# Patient Record
Sex: FEMALE | Race: BLACK OR AFRICAN AMERICAN | NOT HISPANIC OR LATINO | Employment: OTHER | URBAN - METROPOLITAN AREA
[De-identification: names, ages, dates, MRNs, and addresses within clinical notes are randomized per-mention and may not be internally consistent; named-entity substitution may affect disease eponyms.]

---

## 2017-10-14 ENCOUNTER — HOSPITAL ENCOUNTER (INPATIENT)
Facility: HOSPITAL | Age: 72
LOS: 3 days | Discharge: HOME | DRG: 291 | End: 2017-10-17
Attending: EMERGENCY MEDICINE | Admitting: FAMILY MEDICINE
Payer: MEDICARE

## 2017-10-14 DIAGNOSIS — E08.22 DIABETES MELLITUS DUE TO UNDERLYING CONDITION WITH CHRONIC KIDNEY DISEASE, WITH LONG-TERM CURRENT USE OF INSULIN, UNSPECIFIED CKD STAGE: ICD-10-CM

## 2017-10-14 DIAGNOSIS — J81.0 ACUTE PULMONARY EDEMA: ICD-10-CM

## 2017-10-14 DIAGNOSIS — R06.02 SOB (SHORTNESS OF BREATH): Primary | ICD-10-CM

## 2017-10-14 DIAGNOSIS — D64.9 ANEMIA, UNSPECIFIED TYPE: ICD-10-CM

## 2017-10-14 DIAGNOSIS — Z79.4 DIABETES MELLITUS DUE TO UNDERLYING CONDITION WITH CHRONIC KIDNEY DISEASE, WITH LONG-TERM CURRENT USE OF INSULIN, UNSPECIFIED CKD STAGE: ICD-10-CM

## 2017-10-14 DIAGNOSIS — N18.9 CHRONIC KIDNEY DISEASE, UNSPECIFIED CKD STAGE: ICD-10-CM

## 2017-10-14 DIAGNOSIS — R01.1 HEART MURMUR: ICD-10-CM

## 2017-10-14 DIAGNOSIS — E87.70 VOLUME OVERLOAD: ICD-10-CM

## 2017-10-14 PROBLEM — N28.9 KIDNEY DISEASE: Status: ACTIVE | Noted: 2017-10-14

## 2017-10-14 PROBLEM — I10 HTN (HYPERTENSION): Status: ACTIVE | Noted: 2017-10-14

## 2017-10-14 PROBLEM — E11.9 DM (DIABETES MELLITUS): Status: ACTIVE | Noted: 2017-10-14

## 2017-10-14 LAB
25(OH)D3+25(OH)D2 SERPL-MCNC: 12 NG/ML
ALBUMIN SERPL BCP-MCNC: 3.1 G/DL
ALP SERPL-CCNC: 90 U/L
ALT SERPL W/O P-5'-P-CCNC: 30 U/L
ANION GAP SERPL CALC-SCNC: 11 MMOL/L
AST SERPL-CCNC: 23 U/L
BASOPHILS # BLD AUTO: 0.02 K/UL
BASOPHILS # BLD AUTO: 0.02 K/UL
BASOPHILS NFR BLD: 0.2 %
BASOPHILS NFR BLD: 0.2 %
BILIRUB SERPL-MCNC: 0.2 MG/DL
BNP SERPL-MCNC: 384 PG/ML
BUN SERPL-MCNC: 57 MG/DL
CALCIUM SERPL-MCNC: 8.1 MG/DL
CHLORIDE SERPL-SCNC: 110 MMOL/L
CO2 SERPL-SCNC: 22 MMOL/L
CREAT SERPL-MCNC: 4.3 MG/DL
DIFFERENTIAL METHOD: ABNORMAL
DIFFERENTIAL METHOD: ABNORMAL
EOSINOPHIL # BLD AUTO: 0.2 K/UL
EOSINOPHIL # BLD AUTO: 0.2 K/UL
EOSINOPHIL NFR BLD: 2.6 %
EOSINOPHIL NFR BLD: 2.7 %
ERYTHROCYTE [DISTWIDTH] IN BLOOD BY AUTOMATED COUNT: 15.2 %
ERYTHROCYTE [DISTWIDTH] IN BLOOD BY AUTOMATED COUNT: 15.2 %
EST. GFR  (AFRICAN AMERICAN): 11 ML/MIN/1.73 M^2
EST. GFR  (NON AFRICAN AMERICAN): 10 ML/MIN/1.73 M^2
ESTIMATED AVG GLUCOSE: 192 MG/DL
FERRITIN SERPL-MCNC: 161 NG/ML
GLUCOSE SERPL-MCNC: 120 MG/DL
HBA1C MFR BLD HPLC: 8.3 %
HCT VFR BLD AUTO: 26.2 %
HCT VFR BLD AUTO: 26.8 %
HGB BLD-MCNC: 8.3 G/DL
HGB BLD-MCNC: 8.4 G/DL
IRON SERPL-MCNC: 35 UG/DL
LYMPHOCYTES # BLD AUTO: 1.7 K/UL
LYMPHOCYTES # BLD AUTO: 2 K/UL
LYMPHOCYTES NFR BLD: 20.3 %
LYMPHOCYTES NFR BLD: 24.5 %
MCH RBC QN AUTO: 23.3 PG
MCH RBC QN AUTO: 23.4 PG
MCHC RBC AUTO-ENTMCNC: 31.3 G/DL
MCHC RBC AUTO-ENTMCNC: 31.7 G/DL
MCV RBC AUTO: 74 FL
MCV RBC AUTO: 74 FL
MONOCYTES # BLD AUTO: 0.7 K/UL
MONOCYTES # BLD AUTO: 0.9 K/UL
MONOCYTES NFR BLD: 7.9 %
MONOCYTES NFR BLD: 9.9 %
NEUTROPHILS # BLD AUTO: 5.3 K/UL
NEUTROPHILS # BLD AUTO: 5.7 K/UL
NEUTROPHILS NFR BLD: 64.5 %
NEUTROPHILS NFR BLD: 66.8 %
PLATELET # BLD AUTO: 299 K/UL
PLATELET # BLD AUTO: 308 K/UL
PMV BLD AUTO: 8.9 FL
PMV BLD AUTO: 9.2 FL
POCT GLUCOSE: 133 MG/DL (ref 70–110)
POCT GLUCOSE: 197 MG/DL (ref 70–110)
POTASSIUM SERPL-SCNC: 4 MMOL/L
PROT SERPL-MCNC: 7.4 G/DL
PTH-INTACT SERPL-MCNC: 424.1 PG/ML
RBC # BLD AUTO: 3.54 M/UL
RBC # BLD AUTO: 3.61 M/UL
RETICS/RBC NFR AUTO: 2.3 %
SATURATED IRON: 13 %
SODIUM SERPL-SCNC: 143 MMOL/L
TOTAL IRON BINDING CAPACITY: 274 UG/DL
TRANSFERRIN SERPL-MCNC: 185 MG/DL
TROPONIN I SERPL DL<=0.01 NG/ML-MCNC: 0.01 NG/ML
TROPONIN I SERPL DL<=0.01 NG/ML-MCNC: <0.006 NG/ML
TSH SERPL DL<=0.005 MIU/L-ACNC: 1.58 UIU/ML
WBC # BLD AUTO: 8.26 K/UL
WBC # BLD AUTO: 8.59 K/UL

## 2017-10-14 PROCEDURE — 36415 COLL VENOUS BLD VENIPUNCTURE: CPT

## 2017-10-14 PROCEDURE — 85025 COMPLETE CBC W/AUTO DIFF WBC: CPT

## 2017-10-14 PROCEDURE — 93005 ELECTROCARDIOGRAM TRACING: CPT

## 2017-10-14 PROCEDURE — 11000001 HC ACUTE MED/SURG PRIVATE ROOM

## 2017-10-14 PROCEDURE — 99285 EMERGENCY DEPT VISIT HI MDM: CPT

## 2017-10-14 PROCEDURE — 83540 ASSAY OF IRON: CPT

## 2017-10-14 PROCEDURE — 63600175 PHARM REV CODE 636 W HCPCS: Performed by: STUDENT IN AN ORGANIZED HEALTH CARE EDUCATION/TRAINING PROGRAM

## 2017-10-14 PROCEDURE — 25000003 PHARM REV CODE 250: Performed by: EMERGENCY MEDICINE

## 2017-10-14 PROCEDURE — 63600175 PHARM REV CODE 636 W HCPCS: Performed by: EMERGENCY MEDICINE

## 2017-10-14 PROCEDURE — 84443 ASSAY THYROID STIM HORMONE: CPT

## 2017-10-14 PROCEDURE — 83036 HEMOGLOBIN GLYCOSYLATED A1C: CPT

## 2017-10-14 PROCEDURE — 83880 ASSAY OF NATRIURETIC PEPTIDE: CPT

## 2017-10-14 PROCEDURE — 82962 GLUCOSE BLOOD TEST: CPT

## 2017-10-14 PROCEDURE — 84484 ASSAY OF TROPONIN QUANT: CPT | Mod: 91

## 2017-10-14 PROCEDURE — 85045 AUTOMATED RETICULOCYTE COUNT: CPT

## 2017-10-14 PROCEDURE — 82728 ASSAY OF FERRITIN: CPT

## 2017-10-14 PROCEDURE — 84484 ASSAY OF TROPONIN QUANT: CPT

## 2017-10-14 PROCEDURE — 96374 THER/PROPH/DIAG INJ IV PUSH: CPT

## 2017-10-14 PROCEDURE — 82306 VITAMIN D 25 HYDROXY: CPT

## 2017-10-14 PROCEDURE — 83970 ASSAY OF PARATHORMONE: CPT

## 2017-10-14 PROCEDURE — 80053 COMPREHEN METABOLIC PANEL: CPT

## 2017-10-14 PROCEDURE — 25000003 PHARM REV CODE 250: Performed by: STUDENT IN AN ORGANIZED HEALTH CARE EDUCATION/TRAINING PROGRAM

## 2017-10-14 RX ORDER — IBUPROFEN 400 MG/1
400 TABLET ORAL 3 TIMES DAILY
COMMUNITY
End: 2018-04-15

## 2017-10-14 RX ORDER — FUROSEMIDE 10 MG/ML
60 INJECTION INTRAMUSCULAR; INTRAVENOUS 3 TIMES DAILY
Status: DISCONTINUED | OUTPATIENT
Start: 2017-10-15 | End: 2017-10-15

## 2017-10-14 RX ORDER — FUROSEMIDE 10 MG/ML
60 INJECTION INTRAMUSCULAR; INTRAVENOUS
Status: COMPLETED | OUTPATIENT
Start: 2017-10-14 | End: 2017-10-14

## 2017-10-14 RX ORDER — GLUCAGON 1 MG
1 KIT INJECTION
Status: DISCONTINUED | OUTPATIENT
Start: 2017-10-14 | End: 2017-10-17 | Stop reason: HOSPADM

## 2017-10-14 RX ORDER — ONDANSETRON 2 MG/ML
4 INJECTION INTRAMUSCULAR; INTRAVENOUS EVERY 12 HOURS PRN
Status: DISCONTINUED | OUTPATIENT
Start: 2017-10-14 | End: 2017-10-17 | Stop reason: HOSPADM

## 2017-10-14 RX ORDER — ACETAMINOPHEN 325 MG/1
325 TABLET ORAL EVERY 6 HOURS PRN
Status: DISCONTINUED | OUTPATIENT
Start: 2017-10-14 | End: 2017-10-17 | Stop reason: HOSPADM

## 2017-10-14 RX ORDER — NIFEDIPINE 30 MG/1
30 TABLET, FILM COATED, EXTENDED RELEASE ORAL DAILY
COMMUNITY
End: 2018-09-05

## 2017-10-14 RX ORDER — AMLODIPINE BESYLATE 5 MG/1
10 TABLET ORAL DAILY
Status: DISCONTINUED | OUTPATIENT
Start: 2017-10-14 | End: 2017-10-14

## 2017-10-14 RX ORDER — INSULIN GLARGINE 100 [IU]/ML
30 INJECTION, SOLUTION SUBCUTANEOUS NIGHTLY
COMMUNITY
End: 2018-09-08 | Stop reason: SDUPTHER

## 2017-10-14 RX ORDER — LORATADINE 10 MG/1
10 TABLET ORAL DAILY
COMMUNITY
End: 2023-07-10

## 2017-10-14 RX ORDER — ASPIRIN 325 MG
325 TABLET ORAL
Status: COMPLETED | OUTPATIENT
Start: 2017-10-14 | End: 2017-10-14

## 2017-10-14 RX ORDER — NITROGLYCERIN 0.4 MG/1
0.4 TABLET SUBLINGUAL EVERY 5 MIN PRN
Status: DISCONTINUED | OUTPATIENT
Start: 2017-10-14 | End: 2017-10-17 | Stop reason: HOSPADM

## 2017-10-14 RX ORDER — HEPARIN SODIUM 5000 [USP'U]/ML
5000 INJECTION, SOLUTION INTRAVENOUS; SUBCUTANEOUS EVERY 8 HOURS
Status: DISCONTINUED | OUTPATIENT
Start: 2017-10-14 | End: 2017-10-17 | Stop reason: HOSPADM

## 2017-10-14 RX ORDER — HYDRALAZINE HYDROCHLORIDE 20 MG/ML
10 INJECTION INTRAMUSCULAR; INTRAVENOUS EVERY 8 HOURS PRN
Status: DISCONTINUED | OUTPATIENT
Start: 2017-10-14 | End: 2017-10-17 | Stop reason: HOSPADM

## 2017-10-14 RX ORDER — INSULIN ASPART 100 [IU]/ML
1-10 INJECTION, SOLUTION INTRAVENOUS; SUBCUTANEOUS
Status: DISCONTINUED | OUTPATIENT
Start: 2017-10-14 | End: 2017-10-17 | Stop reason: HOSPADM

## 2017-10-14 RX ORDER — IBUPROFEN 200 MG
24 TABLET ORAL
Status: DISCONTINUED | OUTPATIENT
Start: 2017-10-14 | End: 2017-10-17 | Stop reason: HOSPADM

## 2017-10-14 RX ORDER — BISACODYL 5 MG
5 TABLET, DELAYED RELEASE (ENTERIC COATED) ORAL DAILY PRN
COMMUNITY

## 2017-10-14 RX ORDER — ATORVASTATIN CALCIUM 20 MG/1
20 TABLET, FILM COATED ORAL DAILY
COMMUNITY
End: 2018-09-05 | Stop reason: SDUPTHER

## 2017-10-14 RX ORDER — INSULIN ASPART 100 [IU]/ML
8 INJECTION, SOLUTION INTRAVENOUS; SUBCUTANEOUS
COMMUNITY
End: 2018-09-05

## 2017-10-14 RX ORDER — IBUPROFEN 200 MG
16 TABLET ORAL
Status: DISCONTINUED | OUTPATIENT
Start: 2017-10-14 | End: 2017-10-17 | Stop reason: HOSPADM

## 2017-10-14 RX ORDER — FUROSEMIDE 10 MG/ML
60 INJECTION INTRAMUSCULAR; INTRAVENOUS 2 TIMES DAILY
Status: DISCONTINUED | OUTPATIENT
Start: 2017-10-14 | End: 2017-10-14

## 2017-10-14 RX ORDER — FUROSEMIDE 20 MG/1
20 TABLET ORAL DAILY
Status: ON HOLD | COMMUNITY
End: 2017-10-16

## 2017-10-14 RX ADMIN — HEPARIN SODIUM 5000 UNITS: 5000 INJECTION, SOLUTION INTRAVENOUS; SUBCUTANEOUS at 09:10

## 2017-10-14 RX ADMIN — FUROSEMIDE 60 MG: 10 INJECTION, SOLUTION INTRAMUSCULAR; INTRAVENOUS at 06:10

## 2017-10-14 RX ADMIN — INSULIN ASPART 1 UNITS: 100 INJECTION, SOLUTION INTRAVENOUS; SUBCUTANEOUS at 09:10

## 2017-10-14 RX ADMIN — AMLODIPINE BESYLATE 10 MG: 5 TABLET ORAL at 06:10

## 2017-10-14 RX ADMIN — ASPIRIN 325 MG ORAL TABLET 325 MG: 325 PILL ORAL at 03:10

## 2017-10-14 RX ADMIN — FUROSEMIDE 60 MG: 10 INJECTION, SOLUTION INTRAMUSCULAR; INTRAVENOUS at 03:10

## 2017-10-14 NOTE — ED NOTES
APPEARANCE: Alert, oriented and in no acute distress.  CARDIAC: Tachycardic rate and rhythm, no murmur heard.   PERIPHERAL VASCULAR: peripheral pulses present. Normal cap refill. No edema. Warm to touch.    RESPIRATORY:Normal rate and effort, breath sounds diminished bilaterally throughout chest. Respirations are equal and unlabored, pt using accessory muscles on arrive to ER  GASTRO: soft, bowel sounds normal, no tenderness, no abdominal distention.  MUSC: Limited ROM due to aging process. No bony tenderness or soft tissue tenderness. No obvious deformity.  SKIN: Skin is warm and dry, normal skin turgor, mucous membranes moist.  NEURO: 5/5 strength major flexors/extensors bilaterally. Sensory intact to light touch bilaterally. Maribel coma scale: eyes open spontaneously-4, oriented & converses-5, obeys commands-6. No neurological abnormalities.   MENTAL STATUS: awake, alert and aware of environment.  EYE: PERRL, both eyes: pupils brisk and reactive to light. Normal size.  ENT: EARS: no obvious drainage. NOSE: no active bleeding.   Pt states she is short of breath increasing over the past week when her primary doctor changed her fluid pill.

## 2017-10-14 NOTE — H&P
History & Physical  [facility]      SUBJECTIVE:     History of Present Illness:  Patient is a 71 y.o. female with a pmhx of DM2, HTN, HLD, right leg amputation, and CKD presents with SOB for 2 weeks. The patient 1 month ago could walk blocks without getting SOB but she started having SOB upon exertion after her pcp started her on a beta blocker and changed her diuretic to lasix 20 mg daily. The patient says that after she too her beta blocker she had nausea and SOB so she stopped taking it. She now gets SOB when she walks around her house. She also has orthopnea, PND, has hx of swelling in her right leg, and sleeps on 4 pillows. She denies chest pain, syncope, palpitations, diaphoresis, nausea, vomiting, cough, fever, chills, prolonged immobilization, and calf tenderness. The patient denies ever being told of heart disease. She has a nephrologist and does urinate on her own regularly. She normally goes to [facility]. The patient notes that her sister had a CABG in her 60-70s and she does not smoke.       Review of patient's allergies indicates:   Allergen Reactions    Morphine        Past Medical History:   Diagnosis Date    Arthritis     Diabetes mellitus     Hyperlipidemia     Hypertension     Renal disorder     poor kidney function     Past Surgical History:   Procedure Laterality Date    FOOT AMPUTATION Right      History reviewed. No pertinent family history.  Social History   Substance Use Topics    Smoking status: Never Smoker    Smokeless tobacco: Not on file    Alcohol use No        Review of Systems:    Constitutional: Negative for fever and chills.   HENT: Negative for congestion and sore throat.    Eyes: Negative for photophobia and visual disturbance.   Respiratory: shortness of breath Negative for cough  Cardiovascular: Negative for chest pain and palpitations.   Genitourinary: Decreased urination. Negative for dysuria, frequency and hematuria.   Musculoskeletal: Negative  for back pain and gait problem.   Skin: Negative for rash and wound.   Neurological: Negative for seizures and headaches.   Hematological: Negative for adenopathy. Does not bruise/bleed easily.   Psychiatric/Behavioral: Negative for sleep disturbance. The patient is not nervous/anxious.        OBJECTIVE:     Vital Signs (Most Recent)  Temp: 97.7 °F (36.5 °C) (10/14/17 1444)  Pulse: 73 (10/14/17 1705)  Resp: 18 (10/14/17 1705)  BP: (!) 153/77 (10/14/17 1705)  SpO2: 96 % (4L per NC) (10/14/17 1616)    Physical Exam:  Constitutional:  well-developed and well-nourished. No distress.   HENT:   Head: Normocephalic and atraumatic.   Mouth/Throat: Oropharynx is clear and moist.   Eyes: Conjunctivae are normal.   Neck: Normal range of motion.   Cardiovascular: Normal rate and regular rhythm.   Mid systolic murmur heard best over second right intercostal space.  Pulmonary/Chest: She has decreased breath sounds.   Crackles bilaterally   Abdominal: Soft. Bowel sounds are normal. She exhibits no distension. There is no tenderness.   Musculoskeletal: Normal range of motion. She exhibits no edema or tenderness.   Right leg prosthetic  Neurological: She is alert and oriented to person, place, and time.   Skin: Skin is warm and dry.   Psychiatric: She has a normal mood and affect. Her behavior is normal.     Laboratory    LABS  CBC    Recent Labs  Lab 10/14/17  1432   WBC 8.59   RBC 3.61*   HGB 8.4*   HCT 26.8*      MCV 74*   MCH 23.3*   MCHC 31.3*     BMP    Recent Labs  Lab 10/14/17  1432      K 4.0   CO2 22*      BUN 57*   CREATININE 4.3*       POCT-Glucose  POCT Glucose   Date Value Ref Range Status   10/14/2017 133 (H) 70 - 110 mg/dL Final         Recent Labs  Lab 10/14/17  1432   CALCIUM 8.1*     LFT    Recent Labs  Lab 10/14/17  1432   PROT 7.4   ALBUMIN 3.1*   BILITOT 0.2   AST 23   ALKPHOS 90   ALT 30         COAGS  No results for input(s): INR, APTT in the last 168 hours.    Invalid input(s):  PT  CE    Recent Labs  Lab 10/14/17  1432   TROPONINI <0.006     ABGs  No results for input(s): PH, PCO2, PO2, HCO3, POCSATURATED, BE in the last 24 hours.  BNP    Recent Labs  Lab 10/14/17  1432   *     UA  No results for input(s): COLORU, CLARITYU, SPECGRAV, PHUR, PROTEINUA, GLUCOSEU, BLOODU, WBCU, RBCU, BACTERIA, MUCUS in the last 24 hours.    Invalid input(s):  BILIRUBINCON  LAST HbA1c  No results found for: HGBA1C      Diagnostic Results:    Imaging Results          X-Ray Chest 1 View (Final result)  Result time 10/14/17 14:31:08    Final result by Christiano Espinal MD (10/14/17 14:31:08)                 Impression:       Findings suggestive of pulmonary edema or less likely multifocal pneumonia.          Electronically signed by: CHRISTIANO ESPINAL MD  Date:     10/14/17  Time:    14:31              Narrative:    [id number]  Accession # [id number]      Study:  XR CHEST 1 VIEW    Indication: SOB.    Comparison: None.    Findings:     XR CHEST 1 VIEW.    Cardiac silhouette is enlarged in size with increase prominence of pulmonary vasculature.  Increased interstitial lung markings throughout the lungs bilaterally, most suggestive of pulmonary edema or less likely multifocal pneumonia.  No definite pleural effusion.  No pneumothorax.  Degenerative changes of the thoracic spine.                              ASSESSMENT/PLAN:   71 y.o.female has a past medical history of Arthritis; Diabetes mellitus; Hyperlipidemia; Hypertension; and Renal disorder. here for SOB upon exertion likely CHF exacerbation   Problem List as of 10/14/2017 Never Reviewed       Cardiac/Vascular    HTN (hypertension)       Renal/    Kidney disease       Endocrine    DM (diabetes mellitus)       Other    SOB (shortness of breath) on exertion    SOB (shortness of breath)              Plan:     SOB upon exertion  -CXR and crackles on exam support CHF exacerbation  -  -Troponin x2 negative. Follow third troponin  -EKG Normal   -Lasix 60  IV TID  -ASA and nitroglycerin given  -ECHO pending  CXR shows cardiomegaly and pulmonary edema    CKD  -GFR 11  -Patient not on dialysis and is urinating  -Cr 4.3   -Nephrology consulted  - renal diet  -monitor electrolytes  -PTH and Vit D pending     HTN  -Hydralazine prn 10 mg over 160     DM2  Levemir 15 units daily   SSI    Anemia  -8.3/26.2  -MCV 74   -Iron, TIBC, ferritin pend    DVT: Heparin prophylaxis    10/14/2017 Christiano Sheth M.D.

## 2017-10-14 NOTE — ED PROVIDER NOTES
Encounter Date: 10/14/2017       History     Chief Complaint   Patient presents with    Shortness of Breath     C/o SOB on exertion for the past few days, since having medications changed by pcp. EMS reports O2 sat. 90% on room air     71-year-old female with hypertension and diabetes presents with worsening shortness of breath ×1.5 weeks.  She states she saw her primary care physician about a week ago and her medications were changed.  Her diuretic was changed and a beta blocker was added.  She try the new medications and immediately noted increased shortness of breath.  She has continued the diuretic but did not continue the beta blocker.  She reports shortness of breath at rest and with exertion.  This morning she could not walk from one room to the other so she decided to call 911.  She also reports orthopnea.  She denies associated chest pain, diaphoresis, cough, or fever.  No lower extremity swelling.  She does feel bloated in the epigastric area.  Pt's family reports pt is from [place] all previous admissions were at [facility].  They have been unhappy with her care and follow up lately, so they brought her here today.          Review of patient's allergies indicates:   Allergen Reactions    Morphine      Past Medical History:   Diagnosis Date    Arthritis     Diabetes mellitus     Hyperlipidemia     Hypertension     Renal disorder     poor kidney function     Past Surgical History:   Procedure Laterality Date    FOOT AMPUTATION Right      History reviewed. No pertinent family history.  Social History   Substance Use Topics    Smoking status: Never Smoker    Smokeless tobacco: Not on file    Alcohol use No     Review of Systems   Constitutional: Negative for diaphoresis and fever.   Respiratory: Positive for shortness of breath. Negative for cough.    Cardiovascular: Negative for chest pain and leg swelling.   All other systems reviewed and are negative.      Physical Exam     Initial  Vitals [10/14/17 1403]   BP Pulse Resp Temp SpO2   (!) 144/68 78 (!) 25 97.8 °F (36.6 °C) (!) 93 %      MAP       93.33         Physical Exam    Nursing note and vitals reviewed.  Constitutional: She appears well-developed and well-nourished. No distress.   HENT:   Head: Normocephalic and atraumatic.   Mouth/Throat: Oropharynx is clear and moist.   Eyes: Conjunctivae are normal.   Neck: Normal range of motion.   Cardiovascular: Normal rate and regular rhythm.   Murmur heard.  Pulmonary/Chest: She has decreased breath sounds.   Crackles, decreased breath sounds in right lung base   Abdominal: Soft. Bowel sounds are normal. She exhibits no distension. There is no tenderness.   Musculoskeletal: Normal range of motion. She exhibits no edema or tenderness.   R leg prosthesis   Neurological: She is alert and oriented to person, place, and time.   Skin: Skin is warm and dry.   Psychiatric: She has a normal mood and affect. Her behavior is normal.         ED Course   Procedures  Labs Reviewed   CBC W/ AUTO DIFFERENTIAL - Abnormal; Notable for the following:        Result Value    RBC 3.61 (*)     Hemoglobin 8.4 (*)     Hematocrit 26.8 (*)     MCV 74 (*)     MCH 23.3 (*)     MCHC 31.3 (*)     RDW 15.2 (*)     MPV 8.9 (*)     All other components within normal limits   COMPREHENSIVE METABOLIC PANEL - Abnormal; Notable for the following:     CO2 22 (*)     Glucose 120 (*)     BUN, Bld 57 (*)     Creatinine 4.3 (*)     Calcium 8.1 (*)     Albumin 3.1 (*)     eGFR if  11 (*)     eGFR if non  10 (*)     All other components within normal limits   B-TYPE NATRIURETIC PEPTIDE - Abnormal; Notable for the following:      (*)     All other components within normal limits   TROPONIN I     EKG Readings: (Independently Interpreted)   Initial Reading: No STEMI. Rhythm: Normal Sinus Rhythm. Heart Rate: 79. Ectopy: No Ectopy. Conduction: Normal. ST Segments: Normal ST Segments. T Waves: Normal. Clinical  Impression: Normal Sinus Rhythm          Medical Decision Making:   Independently Interpreted Test(s):   I have ordered and independently interpreted EKG Reading(s) - see prior notes  Clinical Tests:   Lab Tests: Ordered and Reviewed  Radiological Study: Ordered and Reviewed  Medical Tests: Ordered and Reviewed  ED Management:  71F with HTN, HLD, and DM presents with worsening SOB x 1.5 weeks.  No associated fever, cough, or CP.  No known hx of CHF or COPD.  On exam, no peripheral edema, mild tachypnea but no respiratory distress.  She does have decreased BS and crackles on exam.  Labs remarkable for elevated BNP with pulm edema on CXR.  Given ASA and lasix.  Anemia, unknown baseline.  Decreased renal function, unknown baseline.  Will admit for pulmonary edema/SOB, likely CHF.  Evaluation of new murmur.  Admission discussed with [facility].                   ED Course      Clinical Impression:   The primary encounter diagnosis was SOB (shortness of breath). Diagnoses of Acute pulmonary edema, Chronic kidney disease, unspecified CKD stage, Anemia, unspecified type, and Heart murmur were also pertinent to this visit.                           Elena Cole MD  10/14/17 1602       Elena Cole MD  10/14/17 1609

## 2017-10-15 LAB
ALBUMIN SERPL BCP-MCNC: 2.9 G/DL
ALP SERPL-CCNC: 87 U/L
ALT SERPL W/O P-5'-P-CCNC: 24 U/L
ANION GAP SERPL CALC-SCNC: 11 MMOL/L
AST SERPL-CCNC: 17 U/L
BASOPHILS # BLD AUTO: 0.01 K/UL
BASOPHILS NFR BLD: 0.1 %
BILIRUB SERPL-MCNC: 0.3 MG/DL
BUN SERPL-MCNC: 59 MG/DL
CALCIUM SERPL-MCNC: 8.2 MG/DL
CHLORIDE SERPL-SCNC: 112 MMOL/L
CO2 SERPL-SCNC: 22 MMOL/L
CREAT SERPL-MCNC: 4.6 MG/DL
CREAT UR-MCNC: 20.7 MG/DL
DIFFERENTIAL METHOD: ABNORMAL
EOSINOPHIL # BLD AUTO: 0.2 K/UL
EOSINOPHIL NFR BLD: 2.8 %
ERYTHROCYTE [DISTWIDTH] IN BLOOD BY AUTOMATED COUNT: 15.5 %
EST. GFR  (AFRICAN AMERICAN): 10 ML/MIN/1.73 M^2
EST. GFR  (NON AFRICAN AMERICAN): 9 ML/MIN/1.73 M^2
GLUCOSE SERPL-MCNC: 139 MG/DL
HCT VFR BLD AUTO: 24.9 %
HGB BLD-MCNC: 8 G/DL
LYMPHOCYTES # BLD AUTO: 2 K/UL
LYMPHOCYTES NFR BLD: 23.5 %
MAGNESIUM SERPL-MCNC: 1.8 MG/DL
MCH RBC QN AUTO: 23.7 PG
MCHC RBC AUTO-ENTMCNC: 32.1 G/DL
MCV RBC AUTO: 74 FL
MONOCYTES # BLD AUTO: 0.5 K/UL
MONOCYTES NFR BLD: 6 %
NEUTROPHILS # BLD AUTO: 5.8 K/UL
NEUTROPHILS NFR BLD: 67.5 %
PHOSPHATE SERPL-MCNC: 5.6 MG/DL
PLATELET # BLD AUTO: 289 K/UL
PMV BLD AUTO: 9.1 FL
POCT GLUCOSE: 145 MG/DL (ref 70–110)
POCT GLUCOSE: 151 MG/DL (ref 70–110)
POCT GLUCOSE: 193 MG/DL (ref 70–110)
POCT GLUCOSE: 211 MG/DL (ref 70–110)
POTASSIUM SERPL-SCNC: 4.1 MMOL/L
PROT SERPL-MCNC: 7.4 G/DL
PROT UR-MCNC: 75 MG/DL
PROT/CREAT RATIO, UR: 3.62
RBC # BLD AUTO: 3.37 M/UL
SODIUM SERPL-SCNC: 145 MMOL/L
TROPONIN I SERPL DL<=0.01 NG/ML-MCNC: 0.01 NG/ML
TROPONIN I SERPL DL<=0.01 NG/ML-MCNC: 0.01 NG/ML
WBC # BLD AUTO: 8.64 K/UL

## 2017-10-15 PROCEDURE — 85025 COMPLETE CBC W/AUTO DIFF WBC: CPT

## 2017-10-15 PROCEDURE — 63600175 PHARM REV CODE 636 W HCPCS: Performed by: FAMILY MEDICINE

## 2017-10-15 PROCEDURE — 93005 ELECTROCARDIOGRAM TRACING: CPT

## 2017-10-15 PROCEDURE — 36415 COLL VENOUS BLD VENIPUNCTURE: CPT

## 2017-10-15 PROCEDURE — 11000001 HC ACUTE MED/SURG PRIVATE ROOM

## 2017-10-15 PROCEDURE — 63600175 PHARM REV CODE 636 W HCPCS: Performed by: STUDENT IN AN ORGANIZED HEALTH CARE EDUCATION/TRAINING PROGRAM

## 2017-10-15 PROCEDURE — 84100 ASSAY OF PHOSPHORUS: CPT

## 2017-10-15 PROCEDURE — 94761 N-INVAS EAR/PLS OXIMETRY MLT: CPT

## 2017-10-15 PROCEDURE — 27000221 HC OXYGEN, UP TO 24 HOURS

## 2017-10-15 PROCEDURE — 83735 ASSAY OF MAGNESIUM: CPT

## 2017-10-15 PROCEDURE — 80053 COMPREHEN METABOLIC PANEL: CPT

## 2017-10-15 PROCEDURE — 84484 ASSAY OF TROPONIN QUANT: CPT

## 2017-10-15 PROCEDURE — 84156 ASSAY OF PROTEIN URINE: CPT

## 2017-10-15 RX ORDER — FUROSEMIDE 10 MG/ML
80 INJECTION INTRAMUSCULAR; INTRAVENOUS 2 TIMES DAILY
Status: DISCONTINUED | OUTPATIENT
Start: 2017-10-15 | End: 2017-10-17 | Stop reason: HOSPADM

## 2017-10-15 RX ORDER — FUROSEMIDE 10 MG/ML
120 INJECTION INTRAMUSCULAR; INTRAVENOUS ONCE
Status: COMPLETED | OUTPATIENT
Start: 2017-10-15 | End: 2017-10-15

## 2017-10-15 RX ADMIN — HEPARIN SODIUM 5000 UNITS: 5000 INJECTION, SOLUTION INTRAVENOUS; SUBCUTANEOUS at 09:10

## 2017-10-15 RX ADMIN — INSULIN DETEMIR 15 UNITS: 100 INJECTION, SOLUTION SUBCUTANEOUS at 08:10

## 2017-10-15 RX ADMIN — HEPARIN SODIUM 5000 UNITS: 5000 INJECTION, SOLUTION INTRAVENOUS; SUBCUTANEOUS at 06:10

## 2017-10-15 RX ADMIN — FUROSEMIDE 80 MG: 10 INJECTION, SOLUTION INTRAMUSCULAR; INTRAVENOUS at 05:10

## 2017-10-15 RX ADMIN — HEPARIN SODIUM 5000 UNITS: 5000 INJECTION, SOLUTION INTRAVENOUS; SUBCUTANEOUS at 01:10

## 2017-10-15 RX ADMIN — FUROSEMIDE 60 MG: 10 INJECTION, SOLUTION INTRAMUSCULAR; INTRAVENOUS at 06:10

## 2017-10-15 RX ADMIN — INSULIN ASPART 2 UNITS: 100 INJECTION, SOLUTION INTRAVENOUS; SUBCUTANEOUS at 05:10

## 2017-10-15 RX ADMIN — INSULIN ASPART 4 UNITS: 100 INJECTION, SOLUTION INTRAVENOUS; SUBCUTANEOUS at 12:10

## 2017-10-15 RX ADMIN — FUROSEMIDE 120 MG: 10 INJECTION, SOLUTION INTRAMUSCULAR; INTRAVENOUS at 08:10

## 2017-10-15 NOTE — PROGRESS NOTES
Pt. Sitting in chair on room air, sats = 84% and a little short of breath, placed back on cannula at 3L/M and increased to 92% sats

## 2017-10-15 NOTE — PLAN OF CARE
Problem: Patient Care Overview  Goal: Plan of Care Review  Outcome: Ongoing (interventions implemented as appropriate)  Plan of care discussed with patient.  Verbalized understanding.  Pt remained AAOx4.  Up ad lindsey to and from bathroom.  Pt placed on strict I's and O's.  Currently outstanding for urine specimen.  Has denied pain, N/V/D.  US of the kidneys performed this AM.  Pt resting in bed with family at bedside.  Will continue to monitor.

## 2017-10-15 NOTE — PROGRESS NOTES
Progress Note    Admit Date: 10/14/2017   LOS: 1 day     SUBJECTIVE:     NAEON, patient reports improvement today from yesterday. Denies N/V/CP. SOB continues with exertion but improving. Urinating without issue. Last BM yesterday.     Scheduled Meds:   furosemide  80 mg Intravenous BID    heparin (porcine)  5,000 Units Subcutaneous Q8H    insulin detemir  15 Units Subcutaneous Daily     Continuous Infusions:   PRN Meds:acetaminophen, dextrose 50%, dextrose 50%, glucagon (human recombinant), glucose, glucose, hydrALAZINE, insulin aspart, nitroGLYCERIN, nitroGLYCERIN, ondansetron    Review of patient's allergies indicates:   Allergen Reactions    Morphine      Review of Systems:  Constitutional: no fever or chills  Eyes: no visual changes  Cardiovascular: no chest pain   Gastrointestinal: no nausea or vomiting; no abdominal pain   Genitourinary: +urination     OBJECTIVE:     Vital Signs (Most Recent)  Temp: 97.2 °F (36.2 °C) (10/15/17 0746)  Pulse: 76 (10/15/17 0746)  Resp: 19 (10/15/17 0746)  BP: 122/61 (10/15/17 0746)  SpO2: (!) 92 % (10/15/17 0815)    Vital Signs Range (Last 24H):  Temp:  [96.7 °F (35.9 °C)-98 °F (36.7 °C)]   Pulse:  [73-80]   Resp:  [18-28]   BP: (121-172)/(57-79)   SpO2:  [84 %-97 %]     I & O (Last 24H):  Intake/Output Summary (Last 24 hours) at 10/15/17 0852  Last data filed at 10/15/17 0400   Gross per 24 hour   Intake                0 ml   Output              600 ml   Net             -600 ml     Physical Exam:  General: well developed, well nourished  Eyes: conjunctivae/corneas clear.   Lungs: minor crackles  Cardiovascular: Heart: systolic murmur 3/6, no rub/ gallop. Chest Wall: no tenderness.   Abdomen/Rectal: Abdomen: soft, non-tender non-distented; bowel sounds normal; stable R BKA, no swelling in LE's.     Laboratory:  CBC:   Recent Labs  Lab 10/15/17  0603   WBC 8.64   RBC 3.37*   HGB 8.0*   HCT 24.9*      MCV 74*   MCH 23.7*   MCHC 32.1     CMP:   Recent Labs  Lab  10/15/17  0603   *   CALCIUM 8.2*   ALBUMIN 2.9*   PROT 7.4      K 4.1   CO2 22*   *   BUN 59*   CREATININE 4.6*   ALKPHOS 87   ALT 24   AST 17   BILITOT 0.3       Diagnostic Results:  CXR: Findings suggestive of pulmonary edema or less likely multifocal pneumonia.    ASSESSMENT/PLAN:     71 y.o.female has a past medical history of Arthritis; Diabetes mellitus; Hyperlipidemia; Hypertension; and Renal disorder. here for SOB upon exertion likely CHF exacerbation        Problem List as of 10/14/2017 Never Reviewed              Cardiac/Vascular     HTN (hypertension)          Renal/     Kidney disease          Endocrine     DM (diabetes mellitus)          Other     SOB (shortness of breath) on exertion     SOB (shortness of breath)                   Plan:      SOB upon exertion  -CXR and crackles on exam support CHF exacerbation  -  -Troponin x2 negative. Follow third troponin  -EKG Normal   -Lasix 60 IV TID  -ASA and nitroglycerin given  -ECHO pending  CXR shows cardiomegaly and pulmonary edema     CKD  -GFR 11  -Patient not on dialysis and is urinating  -Cr 4.6, 4.1 yesterday.  -Nephrology consulted, recs appreciated.   - renal diet  -monitor electrolytes  -PTH and Vit D consistent with CKD     HTN  -Hydralazine prn 10 mg over 160      DM2  Levemir 15 units daily   SSI     Anemia  -8.3/26.2  -MCV 74   -Iron, TIBC, ferritin consistent with AOCD     DVT: Heparin prophylaxis    Plan: f/u nephro recs, continue to diurese. F/U echo.      10/15/2017 8:54 AM Larry Robledo M.D.

## 2017-10-15 NOTE — CONSULTS
"[facility] Consult  Note  CC:   ckd stage 5 on volume over load     HPI:     71 y.o. female with a pmhx of DM2, HTN, HLD, right leg amputation, and CKD presents with SOB for 2 weeks. The patient 1 month ago could walk blocks without getting SOB but she started having SOB upon exertion after her pcp started her on a beta blocker and changed her diuretic to lasix 20 mg daily. The patient says that after she too her beta blocker she had nausea and SOB so she stopped taking it. She now gets SOB when she walks around her house. She also has orthopnea, PND, has hx of swelling in her right leg, and sleeps on 4 pillows. She denies chest pain, syncope, palpitations, diaphoresis, nausea, vomiting, cough, fever, chills, prolonged immobilization, and calf tenderness.      Past Medical History:   Diagnosis Date    Arthritis     Diabetes mellitus     Hyperlipidemia     Hypertension     Renal disorder     poor kidney function     ?  Martha  has a past medical history of Arthritis; Diabetes mellitus; Hyperlipidemia; Hypertension; and Renal disorder.  Martha  has a past surgical history that includes Foot Amputation (Right).  Her family history is not on file.  Martha  reports that she has never smoked. She does not have any smokeless tobacco history on file. She reports that she does not drink alcohol. Her drug history is not on file.  No current facility-administered medications on file prior to encounter.      No current outpatient prescriptions on file prior to encounter.     Martha is allergic to morphine.  ?    All other systems reviewed and otherwise negative?    Objective:   BP (!) 172/79 (BP Location: Right arm, Patient Position: Lying)   Pulse 80   Temp 96.7 °F (35.9 °C) (Oral)   Resp 20   Ht 5' 5" (1.651 m)   Wt 90.7 kg (200 lb)   SpO2 97%   BMI 33.28 kg/m²     Laboratory:  Recent Results (from the past 24 hour(s))   CBC auto differential    Collection Time: 10/14/17  2:32 PM   Result Value Ref Range "    WBC 8.59 3.90 - 12.70 K/uL    RBC 3.61 (L) 4.00 - 5.40 M/uL    Hemoglobin 8.4 (L) 12.0 - 16.0 g/dL    Hematocrit 26.8 (L) 37.0 - 48.5 %    MCV 74 (L) 82 - 98 fL    MCH 23.3 (L) 27.0 - 31.0 pg    MCHC 31.3 (L) 32.0 - 36.0 g/dL    RDW 15.2 (H) 11.5 - 14.5 %    Platelets 299 150 - 350 K/uL    MPV 8.9 (L) 9.2 - 12.9 fL    Gran # 5.7 1.8 - 7.7 K/uL    Lymph # 1.7 1.0 - 4.8 K/uL    Mono # 0.9 0.3 - 1.0 K/uL    Eos # 0.2 0.0 - 0.5 K/uL    Baso # 0.02 0.00 - 0.20 K/uL    Gran% 66.8 38.0 - 73.0 %    Lymph% 20.3 18.0 - 48.0 %    Mono% 9.9 4.0 - 15.0 %    Eosinophil% 2.6 0.0 - 8.0 %    Basophil% 0.2 0.0 - 1.9 %    Differential Method Automated    Comprehensive metabolic panel    Collection Time: 10/14/17  2:32 PM   Result Value Ref Range    Sodium 143 136 - 145 mmol/L    Potassium 4.0 3.5 - 5.1 mmol/L    Chloride 110 95 - 110 mmol/L    CO2 22 (L) 23 - 29 mmol/L    Glucose 120 (H) 70 - 110 mg/dL    BUN, Bld 57 (H) 8 - 23 mg/dL    Creatinine 4.3 (H) 0.5 - 1.4 mg/dL    Calcium 8.1 (L) 8.7 - 10.5 mg/dL    Total Protein 7.4 6.0 - 8.4 g/dL    Albumin 3.1 (L) 3.5 - 5.2 g/dL    Total Bilirubin 0.2 0.1 - 1.0 mg/dL    Alkaline Phosphatase 90 55 - 135 U/L    AST 23 10 - 40 U/L    ALT 30 10 - 44 U/L    Anion Gap 11 8 - 16 mmol/L    eGFR if African American 11 (A) >60 mL/min/1.73 m^2    eGFR if non African American 10 (A) >60 mL/min/1.73 m^2   Troponin I    Collection Time: 10/14/17  2:32 PM   Result Value Ref Range    Troponin I <0.006 0.000 - 0.026 ng/mL   Brain natriuretic peptide    Collection Time: 10/14/17  2:32 PM   Result Value Ref Range     (H) 0 - 99 pg/mL   POCT glucose    Collection Time: 10/14/17  5:33 PM   Result Value Ref Range    POCT Glucose 133 (H) 70 - 110 mg/dL   TSH    Collection Time: 10/14/17  5:34 PM   Result Value Ref Range    TSH 1.583 0.400 - 4.000 uIU/mL   Troponin I    Collection Time: 10/14/17  5:34 PM   Result Value Ref Range    Troponin I 0.007 0.000 - 0.026 ng/mL   CBC with Automated Differential     Collection Time: 10/14/17  5:34 PM   Result Value Ref Range    WBC 8.26 3.90 - 12.70 K/uL    RBC 3.54 (L) 4.00 - 5.40 M/uL    Hemoglobin 8.3 (L) 12.0 - 16.0 g/dL    Hematocrit 26.2 (L) 37.0 - 48.5 %    MCV 74 (L) 82 - 98 fL    MCH 23.4 (L) 27.0 - 31.0 pg    MCHC 31.7 (L) 32.0 - 36.0 g/dL    RDW 15.2 (H) 11.5 - 14.5 %    Platelets 308 150 - 350 K/uL    MPV 9.2 9.2 - 12.9 fL    Gran # 5.3 1.8 - 7.7 K/uL    Lymph # 2.0 1.0 - 4.8 K/uL    Mono # 0.7 0.3 - 1.0 K/uL    Eos # 0.2 0.0 - 0.5 K/uL    Baso # 0.02 0.00 - 0.20 K/uL    Gran% 64.5 38.0 - 73.0 %    Lymph% 24.5 18.0 - 48.0 %    Mono% 7.9 4.0 - 15.0 %    Eosinophil% 2.7 0.0 - 8.0 %    Basophil% 0.2 0.0 - 1.9 %    Differential Method Automated        Recent Labs  Lab 10/14/17  1432 10/14/17  1734   WBC 8.59 8.26   HGB 8.4* 8.3*   HCT 26.8* 26.2*    308   MCV 74* 74*       Recent Labs  Lab 10/14/17  1432      K 4.0      CO2 22*   BUN 57*   *   CALCIUM 8.1*       Recent Labs  Lab 10/14/17  1432   PROT 7.4   ALBUMIN 3.1*   BILITOT 0.2   AST 23   ALT 30   ALKPHOS 90     No results for input(s): PROTIME, PTT, INR in the last 168 hours.  Cardiac:   Recent Labs  Lab 10/14/17  1432 10/14/17  1734   TROPONINI <0.006 0.007   *  --      FLP: No results found for: CHOL, HDL, LDLCALC, TRIG, CHOLHDL  DM: Lab Results   Component Value Date    CREATININE 4.3 (H) 10/14/2017     Thyroid: Lab Results   Component Value Date    TSH 1.583 10/14/2017     Anemia: No results found for: IRON, TIBC, FERRITIN, WGCHLJOB25, FOLATE  Urinalysis: No results found for: LABURIN, COLORU, CLARITYU, SPECGRAV, LABSPEC, NITRITE, PROTEINUR, GLUCOSEU, KETONESU, UROBILINOGEN, BILIRUBINUR, BLOODU  Assessment:   71 y.o. female with a pmhx of DM2, HTN, HLD, right leg amputation, and CKD presents with SOB for 2 weeks.    Ckd stage 5    Patient without electrolytes abnormalities, or metabolic acidosis.or volume overload.    -- not emergent HD at this moment   -- Lasix 120 mg iv x1    -- laisx 80 mg iv bid   -- input and output   --  Renal ultrasound   -- protein/ creatinine ratio  -- urinalysis   -- pth levels     MBD     -- PTH levels   -- phosphate levels     HTN uncontrolled     -- Home meds;  if consider heart failure change nifedipine to hydralazine or amlodipine.     Anemia     -- iron sat  -- ferritin, tibc   Fidel Benoit  [facility] PGY4  Clinic Progress Note  ?  ?

## 2017-10-15 NOTE — PLAN OF CARE
Problem: Patient Care Overview  Goal: Plan of Care Review  Sats 94% on 4L NC; tolerating well; will continue to monitor.

## 2017-10-15 NOTE — PROGRESS NOTES
[facility] Progress Note  CC:   Patient consulted do to ckd and volume over load     Subjective:   71 y.o. female with a pmhx of DM2, HTN, HLD, right leg amputation, and CKD presents with SOB for 2 weeks. The patient 1 month ago could walk blocks without getting SOB but she started having SOB upon exertion after her pcp started her on a beta blocker and changed her diuretic to lasix 20 mg daily. The patient says that after she too her beta blocker she had nausea and SOB so she stopped taking it. She now gets SOB when she walks around her house. She also has orthopnea, PND, has hx of swelling in her right leg, and sleeps on 4 pillows. She denies chest pain, syncope, palpitations, diaphoresis, nausea, vomiting, cough, fever, chills, prolonged immobilization, and calf tenderness.  Past Medical History:   Diagnosis Date    Arthritis     Diabetes mellitus     Hyperlipidemia     Hypertension     Renal disorder     poor kidney function     ?  Martha  has a past medical history of Arthritis; Diabetes mellitus; Hyperlipidemia; Hypertension; and Renal disorder.  Martha  has a past surgical history that includes Foot Amputation (Right).  Her family history is not on file.  Martha  reports that she has never smoked. She does not have any smokeless tobacco history on file. She reports that she does not drink alcohol. Her drug history is not on file.  No current facility-administered medications on file prior to encounter.      No current outpatient prescriptions on file prior to encounter.     Martha is allergic to morphine.  ?  Review of Systems  Constitutional - no weight loss, fever, night sweats  HEENT - negative for visual blurring, double vision, eye pain  Resp - no cough, shortness of breath, or wheezing  negative for cough, shortness of breath, or wheezing  GI - Normal BM's, denies hematochezia, melena or pain.  Skin - negative for dry skin  Heme - anemia   - Negative for dysuria  All other systems  "reviewed and otherwise negative  ?    Objective:   /61 (Patient Position: Lying)   Pulse 74   Temp 97.2 °F (36.2 °C) (Oral)   Resp 19   Ht 5' 5" (1.651 m)   Wt 92.9 kg (204 lb 12.9 oz)   SpO2 (!) 93%   BMI 34.08 kg/m²   Gen - NAD, A+Ox4, not confused  Gait - normal  HEENT/ NECK - EOMI/, NCAT, no JVD   CVS - RRR, no m/r/s3/s4  Resp - CTAB, no w/r/r  Abd - soft, NT, ND   Ext - no c/c/e  Psych - normal affect and behavior  Neuro - no asterixis  Laboratory:  Recent Results (from the past 24 hour(s))   POCT glucose    Collection Time: 10/14/17  5:33 PM   Result Value Ref Range    POCT Glucose 133 (H) 70 - 110 mg/dL   TSH    Collection Time: 10/14/17  5:34 PM   Result Value Ref Range    TSH 1.583 0.400 - 4.000 uIU/mL   Troponin I    Collection Time: 10/14/17  5:34 PM   Result Value Ref Range    Troponin I 0.007 0.000 - 0.026 ng/mL   CBC with Automated Differential    Collection Time: 10/14/17  5:34 PM   Result Value Ref Range    WBC 8.26 3.90 - 12.70 K/uL    RBC 3.54 (L) 4.00 - 5.40 M/uL    Hemoglobin 8.3 (L) 12.0 - 16.0 g/dL    Hematocrit 26.2 (L) 37.0 - 48.5 %    MCV 74 (L) 82 - 98 fL    MCH 23.4 (L) 27.0 - 31.0 pg    MCHC 31.7 (L) 32.0 - 36.0 g/dL    RDW 15.2 (H) 11.5 - 14.5 %    Platelets 308 150 - 350 K/uL    MPV 9.2 9.2 - 12.9 fL    Gran # 5.3 1.8 - 7.7 K/uL    Lymph # 2.0 1.0 - 4.8 K/uL    Mono # 0.7 0.3 - 1.0 K/uL    Eos # 0.2 0.0 - 0.5 K/uL    Baso # 0.02 0.00 - 0.20 K/uL    Gran% 64.5 38.0 - 73.0 %    Lymph% 24.5 18.0 - 48.0 %    Mono% 7.9 4.0 - 15.0 %    Eosinophil% 2.7 0.0 - 8.0 %    Basophil% 0.2 0.0 - 1.9 %    Differential Method Automated    Hemoglobin A1c if not done in past 6 weeks    Collection Time: 10/14/17  5:34 PM   Result Value Ref Range    Hemoglobin A1C 8.3 (H) 4.0 - 5.6 %    Estimated Avg Glucose 192 (H) 68 - 131 mg/dL   Iron and TIBC    Collection Time: 10/14/17  8:01 PM   Result Value Ref Range    Iron 35 30 - 160 ug/dL    Transferrin 185 (L) 200 - 375 mg/dL    TIBC 274 250 - 450 " ug/dL    Saturated Iron 13 (L) 20 - 50 %   Ferritin    Collection Time: 10/14/17  8:01 PM   Result Value Ref Range    Ferritin 161 20.0 - 300.0 ng/mL   PTH, intact    Collection Time: 10/14/17  8:01 PM   Result Value Ref Range    PTH, Intact 424.1 (H) 9.0 - 77.0 pg/mL   Vitamin D 25 hydroxy    Collection Time: 10/14/17  8:01 PM   Result Value Ref Range    Vit D, 25-Hydroxy 12 (L) 30 - 96 ng/mL   Reticulocytes    Collection Time: 10/14/17  8:01 PM   Result Value Ref Range    Retic 2.3 0.5 - 2.5 %   POCT glucose    Collection Time: 10/14/17  8:30 PM   Result Value Ref Range    POCT Glucose 197 (H) 70 - 110 mg/dL   Troponin I    Collection Time: 10/14/17 11:45 PM   Result Value Ref Range    Troponin I 0.009 0.000 - 0.026 ng/mL   POCT glucose    Collection Time: 10/15/17  5:25 AM   Result Value Ref Range    POCT Glucose 151 (H) 70 - 110 mg/dL   Troponin I    Collection Time: 10/15/17  6:03 AM   Result Value Ref Range    Troponin I 0.008 0.000 - 0.026 ng/mL   Phosphorus    Collection Time: 10/15/17  6:03 AM   Result Value Ref Range    Phosphorus 5.6 (H) 2.7 - 4.5 mg/dL   Magnesium    Collection Time: 10/15/17  6:03 AM   Result Value Ref Range    Magnesium 1.8 1.6 - 2.6 mg/dL   Comprehensive Metabolic Panel (CMP)    Collection Time: 10/15/17  6:03 AM   Result Value Ref Range    Sodium 145 136 - 145 mmol/L    Potassium 4.1 3.5 - 5.1 mmol/L    Chloride 112 (H) 95 - 110 mmol/L    CO2 22 (L) 23 - 29 mmol/L    Glucose 139 (H) 70 - 110 mg/dL    BUN, Bld 59 (H) 8 - 23 mg/dL    Creatinine 4.6 (H) 0.5 - 1.4 mg/dL    Calcium 8.2 (L) 8.7 - 10.5 mg/dL    Total Protein 7.4 6.0 - 8.4 g/dL    Albumin 2.9 (L) 3.5 - 5.2 g/dL    Total Bilirubin 0.3 0.1 - 1.0 mg/dL    Alkaline Phosphatase 87 55 - 135 U/L    AST 17 10 - 40 U/L    ALT 24 10 - 44 U/L    Anion Gap 11 8 - 16 mmol/L    eGFR if African American 10 (A) >60 mL/min/1.73 m^2    eGFR if non African American 9 (A) >60 mL/min/1.73 m^2   CBC auto differential    Collection Time: 10/15/17   6:03 AM   Result Value Ref Range    WBC 8.64 3.90 - 12.70 K/uL    RBC 3.37 (L) 4.00 - 5.40 M/uL    Hemoglobin 8.0 (L) 12.0 - 16.0 g/dL    Hematocrit 24.9 (L) 37.0 - 48.5 %    MCV 74 (L) 82 - 98 fL    MCH 23.7 (L) 27.0 - 31.0 pg    MCHC 32.1 32.0 - 36.0 g/dL    RDW 15.5 (H) 11.5 - 14.5 %    Platelets 289 150 - 350 K/uL    MPV 9.1 (L) 9.2 - 12.9 fL    Gran # 5.8 1.8 - 7.7 K/uL    Lymph # 2.0 1.0 - 4.8 K/uL    Mono # 0.5 0.3 - 1.0 K/uL    Eos # 0.2 0.0 - 0.5 K/uL    Baso # 0.01 0.00 - 0.20 K/uL    Gran% 67.5 38.0 - 73.0 %    Lymph% 23.5 18.0 - 48.0 %    Mono% 6.0 4.0 - 15.0 %    Eosinophil% 2.8 0.0 - 8.0 %    Basophil% 0.1 0.0 - 1.9 %    Differential Method Automated    POCT glucose    Collection Time: 10/15/17 12:10 PM   Result Value Ref Range    POCT Glucose 211 (H) 70 - 110 mg/dL       Recent Labs  Lab 10/14/17  1432 10/14/17  1734 10/15/17  0603   WBC 8.59 8.26 8.64   HGB 8.4* 8.3* 8.0*   HCT 26.8* 26.2* 24.9*    308 289   MCV 74* 74* 74*       Recent Labs  Lab 10/14/17  1432 10/15/17  0603    145   K 4.0 4.1    112*   CO2 22* 22*   BUN 57* 59*   * 139*   CALCIUM 8.1* 8.2*   MG  --  1.8   PHOS  --  5.6*       Recent Labs  Lab 10/14/17  1432 10/15/17  0603   PROT 7.4 7.4   ALBUMIN 3.1* 2.9*   BILITOT 0.2 0.3   AST 23 17   ALT 30 24   ALKPHOS 90 87     No results for input(s): PROTIME, PTT, INR in the last 168 hours.  Cardiac:   Recent Labs  Lab 10/14/17  1432 10/14/17  1734 10/14/17  2345 10/15/17  0603   TROPONINI <0.006 0.007 0.009 0.008   *  --   --   --      FLP: No results found for: CHOL, HDL, LDLCALC, TRIG, CHOLHDL  DM: Lab Results   Component Value Date    HGBA1C 8.3 (H) 10/14/2017    CREATININE 4.6 (H) 10/15/2017     Thyroid: Lab Results   Component Value Date    TSH 1.583 10/14/2017     Anemia: Lab Results   Component Value Date    IRON 35 10/14/2017    TIBC 274 10/14/2017    FERRITIN 161 10/14/2017     Urinalysis: No results found for: LABURIN, COLORU, CLARITYU, SPECGRAV,  LABSPEC, NITRITE, PROTEINUR, GLUCOSEU, KETONESU, UROBILINOGEN, BILIRUBINUR, BLOODU  Assessment:   71 y.o. female with a pmhx of DM2, HTN, HLD, right leg amputation, and CKD presents with SOB for 2 weeks.     Ckd stage 5    -- Patient with not electrolytes abnormalities, not metabolic acidosis.today not fells sob, not orthopnea. Follow up with surgeon for avf may need HD in the next few months      -- not emergent HD    MBD   secondary hyperparathyroidism we recommend vit d 50,000 units a week and tums 1 tab with food   -- PTH levels 424  -- phosphate levels   -- vit D 12      HTN      -- Home meds;  if consider heart failure change nifedipine to hydralazine or amlodipine.      Anemia; iron def anemia      -- iron 35  --- sat  13  -- ferritin 161,   --- tibc 274  -- iron      Plan:     vid d 50,000 unit a weeks   Tums one tab with food tid   Ferrous sulfate po 325 tid   Follow up as outpatient for a avf   Continue lasix at the same dose     Fidel Benoit  [facility] PGY4  Clinic Progress Note  ?  ?

## 2017-10-15 NOTE — PLAN OF CARE
Problem: Patient Care Overview  Goal: Plan of Care Review  Outcome: Ongoing (interventions implemented as appropriate)  Pt. On oxygen in no apparent distress. Will cont. To monitor.

## 2017-10-16 PROBLEM — Z79.4 DIABETES MELLITUS DUE TO UNDERLYING CONDITION WITH CHRONIC KIDNEY DISEASE, WITH LONG-TERM CURRENT USE OF INSULIN: Status: ACTIVE | Noted: 2017-10-14

## 2017-10-16 PROBLEM — Z79.4 DIABETES MELLITUS DUE TO UNDERLYING CONDITION WITH CHRONIC KIDNEY DISEASE, WITH LONG-TERM CURRENT USE OF INSULIN: Status: ACTIVE | Noted: 2017-10-16

## 2017-10-16 PROBLEM — N18.9 CHRONIC KIDNEY DISEASE: Status: ACTIVE | Noted: 2017-10-16

## 2017-10-16 PROBLEM — E08.22 DIABETES MELLITUS DUE TO UNDERLYING CONDITION WITH CHRONIC KIDNEY DISEASE, WITH LONG-TERM CURRENT USE OF INSULIN: Status: ACTIVE | Noted: 2017-10-14

## 2017-10-16 PROBLEM — D64.9 ANEMIA: Status: ACTIVE | Noted: 2017-10-16

## 2017-10-16 PROBLEM — J81.0 ACUTE PULMONARY EDEMA: Status: ACTIVE | Noted: 2017-10-16

## 2017-10-16 PROBLEM — R01.1 HEART MURMUR: Status: ACTIVE | Noted: 2017-10-16

## 2017-10-16 PROBLEM — E08.22 DIABETES MELLITUS DUE TO UNDERLYING CONDITION WITH CHRONIC KIDNEY DISEASE, WITH LONG-TERM CURRENT USE OF INSULIN: Status: ACTIVE | Noted: 2017-10-16

## 2017-10-16 LAB
ALBUMIN SERPL BCP-MCNC: 3.2 G/DL
ALP SERPL-CCNC: 88 U/L
ALT SERPL W/O P-5'-P-CCNC: 23 U/L
ANION GAP SERPL CALC-SCNC: 13 MMOL/L
AORTIC VALVE REGURGITATION: ABNORMAL
AST SERPL-CCNC: 16 U/L
BASOPHILS # BLD AUTO: 0.03 K/UL
BASOPHILS NFR BLD: 0.4 %
BILIRUB SERPL-MCNC: 0.3 MG/DL
BUN SERPL-MCNC: 62 MG/DL
CALCIUM SERPL-MCNC: 8.3 MG/DL
CHLORIDE SERPL-SCNC: 108 MMOL/L
CO2 SERPL-SCNC: 23 MMOL/L
CREAT SERPL-MCNC: 4.5 MG/DL
DIASTOLIC DYSFUNCTION: YES
DIFFERENTIAL METHOD: ABNORMAL
EOSINOPHIL # BLD AUTO: 0.3 K/UL
EOSINOPHIL NFR BLD: 4 %
ERYTHROCYTE [DISTWIDTH] IN BLOOD BY AUTOMATED COUNT: 15.3 %
EST. GFR  (AFRICAN AMERICAN): 11 ML/MIN/1.73 M^2
EST. GFR  (NON AFRICAN AMERICAN): 9 ML/MIN/1.73 M^2
GLUCOSE SERPL-MCNC: 114 MG/DL
HCT VFR BLD AUTO: 28.2 %
HGB BLD-MCNC: 8.8 G/DL
LYMPHOCYTES # BLD AUTO: 2.3 K/UL
LYMPHOCYTES NFR BLD: 27.3 %
MAGNESIUM SERPL-MCNC: 1.9 MG/DL
MCH RBC QN AUTO: 23 PG
MCHC RBC AUTO-ENTMCNC: 31.2 G/DL
MCV RBC AUTO: 74 FL
MONOCYTES # BLD AUTO: 0.7 K/UL
MONOCYTES NFR BLD: 8.6 %
NEUTROPHILS # BLD AUTO: 5.1 K/UL
NEUTROPHILS NFR BLD: 59.5 %
PHOSPHATE SERPL-MCNC: 5.2 MG/DL
PLATELET # BLD AUTO: 334 K/UL
PMV BLD AUTO: 9.7 FL
POCT GLUCOSE: 131 MG/DL (ref 70–110)
POCT GLUCOSE: 206 MG/DL (ref 70–110)
POCT GLUCOSE: 242 MG/DL (ref 70–110)
POTASSIUM SERPL-SCNC: 4 MMOL/L
PROT SERPL-MCNC: 8.3 G/DL
RBC # BLD AUTO: 3.83 M/UL
RETIRED EF AND QEF - SEE NOTES: 65 (ref 55–65)
SODIUM SERPL-SCNC: 144 MMOL/L
WBC # BLD AUTO: 8.53 K/UL

## 2017-10-16 PROCEDURE — 63600175 PHARM REV CODE 636 W HCPCS: Performed by: FAMILY MEDICINE

## 2017-10-16 PROCEDURE — 93306 TTE W/DOPPLER COMPLETE: CPT

## 2017-10-16 PROCEDURE — 83735 ASSAY OF MAGNESIUM: CPT

## 2017-10-16 PROCEDURE — 36415 COLL VENOUS BLD VENIPUNCTURE: CPT

## 2017-10-16 PROCEDURE — 84100 ASSAY OF PHOSPHORUS: CPT

## 2017-10-16 PROCEDURE — 27000221 HC OXYGEN, UP TO 24 HOURS

## 2017-10-16 PROCEDURE — 63600175 PHARM REV CODE 636 W HCPCS: Performed by: STUDENT IN AN ORGANIZED HEALTH CARE EDUCATION/TRAINING PROGRAM

## 2017-10-16 PROCEDURE — 94761 N-INVAS EAR/PLS OXIMETRY MLT: CPT

## 2017-10-16 PROCEDURE — 11000001 HC ACUTE MED/SURG PRIVATE ROOM

## 2017-10-16 PROCEDURE — 80053 COMPREHEN METABOLIC PANEL: CPT

## 2017-10-16 PROCEDURE — 85025 COMPLETE CBC W/AUTO DIFF WBC: CPT

## 2017-10-16 RX ORDER — NIFEDIPINE 30 MG/1
30 TABLET, EXTENDED RELEASE ORAL DAILY
Status: DISCONTINUED | OUTPATIENT
Start: 2017-10-17 | End: 2017-10-17 | Stop reason: HOSPADM

## 2017-10-16 RX ORDER — FERROUS SULFATE 325(65) MG
325 TABLET ORAL
Qty: 90 TABLET | Refills: 2 | Status: SHIPPED | OUTPATIENT
Start: 2017-10-16 | End: 2018-09-05

## 2017-10-16 RX ORDER — ERGOCALCIFEROL 1.25 MG/1
50000 CAPSULE ORAL
Qty: 4 CAPSULE | Refills: 2 | Status: SHIPPED | OUTPATIENT
Start: 2017-10-17 | End: 2018-09-05

## 2017-10-16 RX ORDER — FUROSEMIDE 80 MG/1
80 TABLET ORAL EVERY MORNING
Qty: 30 TABLET | Refills: 2 | Status: SHIPPED | OUTPATIENT
Start: 2017-10-16 | End: 2018-07-06

## 2017-10-16 RX ADMIN — FUROSEMIDE 80 MG: 10 INJECTION, SOLUTION INTRAMUSCULAR; INTRAVENOUS at 05:10

## 2017-10-16 RX ADMIN — HEPARIN SODIUM 5000 UNITS: 5000 INJECTION, SOLUTION INTRAVENOUS; SUBCUTANEOUS at 05:10

## 2017-10-16 RX ADMIN — HEPARIN SODIUM 5000 UNITS: 5000 INJECTION, SOLUTION INTRAVENOUS; SUBCUTANEOUS at 10:10

## 2017-10-16 RX ADMIN — INSULIN ASPART 2 UNITS: 100 INJECTION, SOLUTION INTRAVENOUS; SUBCUTANEOUS at 10:10

## 2017-10-16 RX ADMIN — INSULIN ASPART 4 UNITS: 100 INJECTION, SOLUTION INTRAVENOUS; SUBCUTANEOUS at 12:10

## 2017-10-16 RX ADMIN — FUROSEMIDE 80 MG: 10 INJECTION, SOLUTION INTRAMUSCULAR; INTRAVENOUS at 09:10

## 2017-10-16 RX ADMIN — HEPARIN SODIUM 5000 UNITS: 5000 INJECTION, SOLUTION INTRAVENOUS; SUBCUTANEOUS at 01:10

## 2017-10-16 RX ADMIN — INSULIN DETEMIR 15 UNITS: 100 INJECTION, SOLUTION SUBCUTANEOUS at 09:10

## 2017-10-16 RX ADMIN — HYDRALAZINE HYDROCHLORIDE 10 MG: 20 INJECTION INTRAMUSCULAR; INTRAVENOUS at 04:10

## 2017-10-16 NOTE — PROGRESS NOTES
Progress Note    Admit Date: 10/14/2017   LOS: 2 days     SUBJECTIVE:     NAEON, patient reports improvement in SOB, no longer having dyspnea. Denies CP/N/V. Tolerated dinner. Urinated about 8x overnight. Passing gas.     Scheduled Meds:   furosemide  80 mg Intravenous BID    heparin (porcine)  5,000 Units Subcutaneous Q8H    insulin detemir  15 Units Subcutaneous Daily     Continuous Infusions:   PRN Meds:acetaminophen, dextrose 50%, dextrose 50%, glucagon (human recombinant), glucose, glucose, hydrALAZINE, insulin aspart, nitroGLYCERIN, nitroGLYCERIN, ondansetron    Review of patient's allergies indicates:   Allergen Reactions    Morphine      Review of Systems:  Constitutional: no fever or chills  Eyes: no visual changes  Respiratory: no cough or shortness of breath  Cardiovascular: no chest pain   Genitourinary: +urination     OBJECTIVE:     Vital Signs (Most Recent)  Temp: 98.6 °F (37 °C) (10/16/17 0742)  Pulse: 79 (10/16/17 0742)  Resp: 18 (10/16/17 0742)  BP: (!) 178/81 (10/16/17 0742)  SpO2: 98 % (10/16/17 0803)    Vital Signs Range (Last 24H):  Temp:  [97.7 °F (36.5 °C)-98.6 °F (37 °C)]   Pulse:  [74-79]   Resp:  [18-19]   BP: (150-178)/(67-81)   SpO2:  [78 %-98 %]     I & O (Last 24H):  Intake/Output Summary (Last 24 hours) at 10/16/17 0914  Last data filed at 10/16/17 0453   Gross per 24 hour   Intake              800 ml   Output             3100 ml   Net            -2300 ml     Physical Exam:  General: well developed, well nourished  Eyes: conjunctivae/corneas clear.   Lungs:  clear to auscultation bilaterally and normal respiratory effort  Cardiovascular: Heart: regular rate and rhythm, S1, S2 normal, 3/6 systolic murmur. Chest Wall: no tenderness.   Abdomen/Rectal: Abdomen: soft, non-tender non-distented; bowel sounds normal    Laboratory:  CBC:   Recent Labs  Lab 10/16/17  0514   WBC 8.53   RBC 3.83*   HGB 8.8*   HCT 28.2*      MCV 74*   MCH 23.0*   MCHC 31.2*     CMP:   Recent Labs  Lab  10/16/17  0514   *   CALCIUM 8.3*   ALBUMIN 3.2*   PROT 8.3      K 4.0   CO2 23      BUN 62*   CREATININE 4.5*   ALKPHOS 88   ALT 23   AST 16   BILITOT 0.3     Diagnostic Results:  US Kidney yesterday: Elevated resistive indices which is nonspecific but may be seen with medical renal disease.    Small amount of debris within the urinary bladder.  Correlation with urinalysis is recommended.    ASSESSMENT/PLAN:     71 y.o.female has a past medical history of Arthritis; Diabetes mellitus; Hyperlipidemia; Hypertension; and Renal disorder. here for SOB upon exertion likely CHF exacerbation      Volume overload  - likely 2/2 CHF vs. CKD, resolved at this time  - Continue to monitor  - F/U nephro and echo     CKD  -GFR 10  -Patient not on dialysis and is urinating  -Nephrology consulted, recs appreciated.   - renal diet  -monitor electrolytes  -PTH and Vit D consistent with CKD  - Consider outpatient management.      HTN  -Hydralazine prn 10 mg over 160      DM2  Levemir 15 units daily   SSI     Anemia  -stable  -Iron, TIBC, ferritin consistent with AOCD     DVT: Heparin prophylaxis     Plan: f/u nephro recs, continue to diurese. F/U echo. Consider outpatient management.      10/16/2017 9:17 AM Larry Robledo M.D.

## 2017-10-16 NOTE — PROGRESS NOTES
.Pharmacy New Medication Education    Patient accepted medication education.    Pharmacy educated patient on name and purpose of medications and possible side effects, using the teach-back method.     Apap  D50%  Lasix  Glucagon  Glucose  Heparin  Hydralazine  Novolog  Detemir  Ntg  zofran    Learners of pharmacy medication education included:  patient    Patient +/- learner response:  teachback

## 2017-10-16 NOTE — PLAN OF CARE
Problem: Patient Care Overview  Goal: Plan of Care Review  SpO2   95% on   2lpm NC. No apparent distress noted. Will continue to monitor.

## 2017-10-16 NOTE — PROGRESS NOTES
[facility] Progress Note  CC:   Patient consulted do to ckd and volume over load     Subjective:   71 y.o. female with a pmhx of DM2, HTN, HLD, right leg amputation, and CKD presents with SOB for 2 weeks. The patient 1 month ago could walk blocks without getting SOB but she started having SOB upon exertion after her pcp started her on a beta blocker and changed her diuretic to lasix 20 mg daily. The patient says that after she too her beta blocker she had nausea and SOB so she stopped taking it. She now gets SOB when she walks around her house. She also has orthopnea, PND, has hx of swelling in her right leg, and sleeps on 4 pillows. She denies chest pain, syncope, palpitations, diaphoresis, nausea, vomiting, cough, fever, chills, prolonged immobilization, and calf tenderness.  Past Medical History:   Diagnosis Date    Arthritis     Diabetes mellitus     Hyperlipidemia     Hypertension     Renal disorder     poor kidney function     ?  Martha  has a past medical history of Arthritis; Diabetes mellitus; Hyperlipidemia; Hypertension; and Renal disorder.  Martha  has a past surgical history that includes Foot Amputation (Right).  Her family history is not on file.  Martha  reports that she has never smoked. She does not have any smokeless tobacco history on file. She reports that she does not drink alcohol. Her drug history is not on file.  No current facility-administered medications on file prior to encounter.      No current outpatient prescriptions on file prior to encounter.     Martha is allergic to morphine.  ?  Review of Systems  Constitutional - no weight loss, fever, night sweats  HEENT - negative for visual blurring, double vision, eye pain  Resp - no cough, shortness of breath, or wheezing  negative for cough, shortness of breath, or wheezing  GI - Normal BM's, denies hematochezia, melena or pain.  Skin - negative for dry skin  Heme - anemia   - Negative for dysuria  All other systems  "reviewed and otherwise negative  ?    Objective:   BP (!) 169/80   Pulse 73   Temp 96.8 °F (36 °C)   Resp 18   Ht 5' 5" (1.651 m)   Wt 92.9 kg (204 lb 12.9 oz)   SpO2 96%   BMI 34.08 kg/m²   Gen - NAD, A+Ox4, not confused  Gait - normal  HEENT/ NECK - EOMI/, NCAT, no JVD   CVS - RRR, no m/r/s3/s4  Resp - CTAB, no w/r/r  Abd - soft, NT, ND   Ext - no c/c/e  Psych - normal affect and behavior  Neuro - no asterixis  Laboratory:  Recent Results (from the past 24 hour(s))   POCT glucose    Collection Time: 10/15/17  3:53 PM   Result Value Ref Range    POCT Glucose 193 (H) 70 - 110 mg/dL   Protein / creatinine ratio, urine    Collection Time: 10/15/17  5:23 PM   Result Value Ref Range    Protein, Urine Random 75 (H) 0 - 15 mg/dL    Creatinine, Random Ur 20.7 15.0 - 325.0 mg/dL    Prot/Creat Ratio, Ur 3.62 (H) 0.00 - 0.20   POCT glucose    Collection Time: 10/15/17  8:00 PM   Result Value Ref Range    POCT Glucose 145 (H) 70 - 110 mg/dL   POCT glucose    Collection Time: 10/16/17  4:51 AM   Result Value Ref Range    POCT Glucose 131 (H) 70 - 110 mg/dL   Phosphorus    Collection Time: 10/16/17  5:14 AM   Result Value Ref Range    Phosphorus 5.2 (H) 2.7 - 4.5 mg/dL   Magnesium    Collection Time: 10/16/17  5:14 AM   Result Value Ref Range    Magnesium 1.9 1.6 - 2.6 mg/dL   Comprehensive Metabolic Panel (CMP)    Collection Time: 10/16/17  5:14 AM   Result Value Ref Range    Sodium 144 136 - 145 mmol/L    Potassium 4.0 3.5 - 5.1 mmol/L    Chloride 108 95 - 110 mmol/L    CO2 23 23 - 29 mmol/L    Glucose 114 (H) 70 - 110 mg/dL    BUN, Bld 62 (H) 8 - 23 mg/dL    Creatinine 4.5 (H) 0.5 - 1.4 mg/dL    Calcium 8.3 (L) 8.7 - 10.5 mg/dL    Total Protein 8.3 6.0 - 8.4 g/dL    Albumin 3.2 (L) 3.5 - 5.2 g/dL    Total Bilirubin 0.3 0.1 - 1.0 mg/dL    Alkaline Phosphatase 88 55 - 135 U/L    AST 16 10 - 40 U/L    ALT 23 10 - 44 U/L    Anion Gap 13 8 - 16 mmol/L    eGFR if African American 11 (A) >60 mL/min/1.73 m^2    eGFR if non "  9 (A) >60 mL/min/1.73 m^2   CBC auto differential    Collection Time: 10/16/17  5:14 AM   Result Value Ref Range    WBC 8.53 3.90 - 12.70 K/uL    RBC 3.83 (L) 4.00 - 5.40 M/uL    Hemoglobin 8.8 (L) 12.0 - 16.0 g/dL    Hematocrit 28.2 (L) 37.0 - 48.5 %    MCV 74 (L) 82 - 98 fL    MCH 23.0 (L) 27.0 - 31.0 pg    MCHC 31.2 (L) 32.0 - 36.0 g/dL    RDW 15.3 (H) 11.5 - 14.5 %    Platelets 334 150 - 350 K/uL    MPV 9.7 9.2 - 12.9 fL    Gran # 5.1 1.8 - 7.7 K/uL    Lymph # 2.3 1.0 - 4.8 K/uL    Mono # 0.7 0.3 - 1.0 K/uL    Eos # 0.3 0.0 - 0.5 K/uL    Baso # 0.03 0.00 - 0.20 K/uL    Gran% 59.5 38.0 - 73.0 %    Lymph% 27.3 18.0 - 48.0 %    Mono% 8.6 4.0 - 15.0 %    Eosinophil% 4.0 0.0 - 8.0 %    Basophil% 0.4 0.0 - 1.9 %    Differential Method Automated    POCT glucose    Collection Time: 10/16/17 11:39 AM   Result Value Ref Range    POCT Glucose 242 (H) 70 - 110 mg/dL       Recent Labs  Lab 10/14/17  1734 10/15/17  0603 10/16/17  0514   WBC 8.26 8.64 8.53   HGB 8.3* 8.0* 8.8*   HCT 26.2* 24.9* 28.2*    289 334   MCV 74* 74* 74*       Recent Labs  Lab 10/14/17  1432 10/15/17  0603 10/16/17  0514    145 144   K 4.0 4.1 4.0    112* 108   CO2 22* 22* 23   BUN 57* 59* 62*   * 139* 114*   CALCIUM 8.1* 8.2* 8.3*   MG  --  1.8 1.9   PHOS  --  5.6* 5.2*       Recent Labs  Lab 10/14/17  1432 10/15/17  0603 10/16/17  0514   PROT 7.4 7.4 8.3   ALBUMIN 3.1* 2.9* 3.2*   BILITOT 0.2 0.3 0.3   AST 23 17 16   ALT 30 24 23   ALKPHOS 90 87 88     No results for input(s): PROTIME, PTT, INR in the last 168 hours.  Cardiac:     Recent Labs  Lab 10/14/17  1432 10/14/17  1734 10/14/17  2345 10/15/17  0603   TROPONINI <0.006 0.007 0.009 0.008   *  --   --   --      FLP: No results found for: CHOL, HDL, LDLCALC, TRIG, CHOLHDL  DM:   Lab Results   Component Value Date    HGBA1C 8.3 (H) 10/14/2017    CREATININE 4.5 (H) 10/16/2017     Thyroid:   Lab Results   Component Value Date    TSH 1.583 10/14/2017      Anemia:   Lab Results   Component Value Date    IRON 35 10/14/2017    TIBC 274 10/14/2017    FERRITIN 161 10/14/2017     Urinalysis: No results found for: LABURIN, COLORU, CLARITYU, SPECGRAV, LABSPEC, NITRITE, PROTEINUR, GLUCOSEU, KETONESU, UROBILINOGEN, BILIRUBINUR, BLOODU  Assessment:   71 y.o. female with a pmhx of DM2, HTN, HLD, right leg amputation, and CKD presents with SOB for 2 weeks.    Patient is urinating 3 L today and renal function is stable.     Ckd stage 5    -- Patient with not electrolytes abnormalities, not metabolic acidosis.today not fells sob, not orthopnea. Follow up with surgeon for avf may need HD in the next few months      -- not emergent HD    MBD   secondary hyperparathyroidism we recommend vit d 50,000 units a week and tums 1 tab with food   -- PTH levels 424  -- phosphate levels   -- vit D 12      HTN      -- blood pressure is uncontrol consider start home meds   -- patient on nifedipine 30 mg po daily at home.    Anemia; iron def anemia   Iron saturation is low this is consistent with low iron levels. Ferritin is not high. Anemia of chronic disease is r/o      -- iron 35  --- sat  13  -- ferritin 161,   --- tibc 274  -- iron      Plan:     vid d 50,000 unit a weeks   Tums one tab with food tid   Ferrous sulfate po 325 tid   Follow up as outpatient for a arterio venous fistula   Continue lasix  As outpatient 80 mg po  daily in am   Follow up with dr [facility] in one week       Fidel Benoit  [facility] PGY4  Clinic Progress Note  ?  ?

## 2017-10-16 NOTE — PLAN OF CARE
Patient presents with    Shortness of Breath       C/o SOB on exertion for the past few days, since having medications changed by pcp. EMS reports O2 sat. 90% on room air     Pt independent with ADLs, no HH, has RW, BSC, Straight Cane, Shower Chair, Right Prostetic Leg. Pt does not use the DME but got it when she had her amputation. Pt lives alone in [place] but stays in town during the week to watch her grandchildren for her daughter, Luzmaria. Daughter will provide transportation on discharge       10/15/17 2032   Discharge Assessment   Assessment Type Discharge Planning Assessment   Confirmed/corrected address and phone number on facesheet? Yes   Assessment information obtained from? Patient   Expected Length of Stay (days) 2   Communicated expected length of stay with patient/caregiver yes   Prior to hospitilization cognitive status: Alert/Oriented   Prior to hospitalization functional status: Independent   Current cognitive status: Alert/Oriented   Current Functional Status: Independent   Facility Arrived From: (home)   Lives With alone   Able to Return to Prior Arrangements yes   Is patient able to care for self after discharge? Yes   Who are your caregiver(s) and their phone number(s)? Daughter:  Luzmaria Ortiz  [phone]   Patient's perception of discharge disposition home or selfcare   Readmission Within The Last 30 Days no previous admission in last 30 days   Patient currently being followed by outpatient case management? No   Patient currently receives any other outside agency services? No   Equipment Currently Used at Home walker, rolling;bedside commode;cane, straight;shower chair;other (see comments)  (Right Prostetic Leg)   Do you have any problems affording any of your prescribed medications? No   Is the patient taking medications as prescribed? yes   Does the patient have transportation home? Yes   Transportation Available family or friend will provide   Dialysis Name and Scheduled days N/A    Does the patient receive services at the [facility]? No   Discharge Plan A Home   Discharge Plan B Home with family   Patient/Family In Agreement With Plan yes     Celia Orta RN Transitional Navigator  [phone]

## 2017-10-16 NOTE — PROGRESS NOTES
Family medicine notified that patient is still on 2L nasal cannula.  Ordered to hold off on discharge.  Will continue to monitor.

## 2017-10-17 VITALS
HEIGHT: 65 IN | BODY MASS INDEX: 34.12 KG/M2 | DIASTOLIC BLOOD PRESSURE: 74 MMHG | TEMPERATURE: 98 F | WEIGHT: 204.81 LBS | RESPIRATION RATE: 18 BRPM | HEART RATE: 83 BPM | OXYGEN SATURATION: 98 % | SYSTOLIC BLOOD PRESSURE: 120 MMHG

## 2017-10-17 PROBLEM — R06.02 SOB (SHORTNESS OF BREATH): Status: RESOLVED | Noted: 2017-10-14 | Resolved: 2017-10-17

## 2017-10-17 PROBLEM — R06.02 SOB (SHORTNESS OF BREATH) ON EXERTION: Status: RESOLVED | Noted: 2017-10-14 | Resolved: 2017-10-17

## 2017-10-17 LAB
ALBUMIN SERPL BCP-MCNC: 2.9 G/DL
ALP SERPL-CCNC: 81 U/L
ALT SERPL W/O P-5'-P-CCNC: 22 U/L
ANION GAP SERPL CALC-SCNC: 15 MMOL/L
AST SERPL-CCNC: 17 U/L
BASOPHILS # BLD AUTO: 0.02 K/UL
BASOPHILS NFR BLD: 0.2 %
BILIRUB SERPL-MCNC: 0.2 MG/DL
BUN SERPL-MCNC: 75 MG/DL
CALCIUM SERPL-MCNC: 8.1 MG/DL
CHLORIDE SERPL-SCNC: 104 MMOL/L
CO2 SERPL-SCNC: 24 MMOL/L
CREAT SERPL-MCNC: 5 MG/DL
DIFFERENTIAL METHOD: ABNORMAL
EOSINOPHIL # BLD AUTO: 0.3 K/UL
EOSINOPHIL NFR BLD: 3 %
ERYTHROCYTE [DISTWIDTH] IN BLOOD BY AUTOMATED COUNT: 15.4 %
EST. GFR  (AFRICAN AMERICAN): 9 ML/MIN/1.73 M^2
EST. GFR  (NON AFRICAN AMERICAN): 8 ML/MIN/1.73 M^2
GLUCOSE SERPL-MCNC: 184 MG/DL
HCT VFR BLD AUTO: 29.3 %
HGB BLD-MCNC: 9.2 G/DL
LYMPHOCYTES # BLD AUTO: 2.6 K/UL
LYMPHOCYTES NFR BLD: 28.1 %
MAGNESIUM SERPL-MCNC: 1.8 MG/DL
MCH RBC QN AUTO: 22.9 PG
MCHC RBC AUTO-ENTMCNC: 31.4 G/DL
MCV RBC AUTO: 73 FL
MONOCYTES # BLD AUTO: 0.9 K/UL
MONOCYTES NFR BLD: 9.8 %
NEUTROPHILS # BLD AUTO: 5.4 K/UL
NEUTROPHILS NFR BLD: 58.7 %
PHOSPHATE SERPL-MCNC: 5.6 MG/DL
PLATELET # BLD AUTO: 330 K/UL
PMV BLD AUTO: 9.5 FL
POCT GLUCOSE: 172 MG/DL (ref 70–110)
POCT GLUCOSE: 202 MG/DL (ref 70–110)
POTASSIUM SERPL-SCNC: 3.6 MMOL/L
PROT SERPL-MCNC: 7.7 G/DL
RBC # BLD AUTO: 4.02 M/UL
SODIUM SERPL-SCNC: 143 MMOL/L
WBC # BLD AUTO: 9.12 K/UL

## 2017-10-17 PROCEDURE — 80053 COMPREHEN METABOLIC PANEL: CPT

## 2017-10-17 PROCEDURE — 94761 N-INVAS EAR/PLS OXIMETRY MLT: CPT

## 2017-10-17 PROCEDURE — G0008 ADMIN INFLUENZA VIRUS VAC: HCPCS | Performed by: FAMILY MEDICINE

## 2017-10-17 PROCEDURE — G0009 ADMIN PNEUMOCOCCAL VACCINE: HCPCS | Performed by: FAMILY MEDICINE

## 2017-10-17 PROCEDURE — 85025 COMPLETE CBC W/AUTO DIFF WBC: CPT

## 2017-10-17 PROCEDURE — 90471 IMMUNIZATION ADMIN: CPT | Performed by: FAMILY MEDICINE

## 2017-10-17 PROCEDURE — 90662 IIV NO PRSV INCREASED AG IM: CPT | Performed by: FAMILY MEDICINE

## 2017-10-17 PROCEDURE — 84100 ASSAY OF PHOSPHORUS: CPT

## 2017-10-17 PROCEDURE — 90670 PCV13 VACCINE IM: CPT | Performed by: FAMILY MEDICINE

## 2017-10-17 PROCEDURE — 63600175 PHARM REV CODE 636 W HCPCS: Performed by: FAMILY MEDICINE

## 2017-10-17 PROCEDURE — 90472 IMMUNIZATION ADMIN EACH ADD: CPT | Performed by: FAMILY MEDICINE

## 2017-10-17 PROCEDURE — 83735 ASSAY OF MAGNESIUM: CPT

## 2017-10-17 PROCEDURE — 25000003 PHARM REV CODE 250: Performed by: FAMILY MEDICINE

## 2017-10-17 PROCEDURE — 3E0234Z INTRODUCTION OF SERUM, TOXOID AND VACCINE INTO MUSCLE, PERCUTANEOUS APPROACH: ICD-10-PCS | Performed by: FAMILY MEDICINE

## 2017-10-17 PROCEDURE — 63600175 PHARM REV CODE 636 W HCPCS: Performed by: STUDENT IN AN ORGANIZED HEALTH CARE EDUCATION/TRAINING PROGRAM

## 2017-10-17 PROCEDURE — 36415 COLL VENOUS BLD VENIPUNCTURE: CPT

## 2017-10-17 RX ADMIN — INSULIN ASPART 2 UNITS: 100 INJECTION, SOLUTION INTRAVENOUS; SUBCUTANEOUS at 12:10

## 2017-10-17 RX ADMIN — HEPARIN SODIUM 5000 UNITS: 5000 INJECTION, SOLUTION INTRAVENOUS; SUBCUTANEOUS at 06:10

## 2017-10-17 RX ADMIN — FUROSEMIDE 80 MG: 10 INJECTION, SOLUTION INTRAMUSCULAR; INTRAVENOUS at 10:10

## 2017-10-17 RX ADMIN — NIFEDIPINE 30 MG: 30 TABLET, FILM COATED, EXTENDED RELEASE ORAL at 10:10

## 2017-10-17 RX ADMIN — INSULIN ASPART 4 UNITS: 100 INJECTION, SOLUTION INTRAVENOUS; SUBCUTANEOUS at 06:10

## 2017-10-17 RX ADMIN — INFLUENZA A VIRUSA/MICHIGAN/45/2015 X-275 (H1N1) ANTIGEN (FORMALDEHYDE INACTIVATED), INFLUENZA A VIRUS A/HONG KONG/4801/2014 X-263B (H3N2) ANTIGEN (FORMALDEHYDE INACTIVATED), AND INFLUENZA B VIRUS B/BRISBANE/60/2008 ANTIGEN (FORMALDEHYDE INACTIVATED) 0.5 ML: 60; 60; 60 INJECTION, SUSPENSION INTRAMUSCULAR at 02:10

## 2017-10-17 RX ADMIN — HEPARIN SODIUM 5000 UNITS: 5000 INJECTION, SOLUTION INTRAVENOUS; SUBCUTANEOUS at 02:10

## 2017-10-17 RX ADMIN — INSULIN DETEMIR 15 UNITS: 100 INJECTION, SOLUTION SUBCUTANEOUS at 10:10

## 2017-10-17 RX ADMIN — PNEUMOCOCCAL 13-VALENT CONJUGATE VACCINE 0.5 ML: 2.2; 2.2; 2.2; 2.2; 2.2; 4.4; 2.2; 2.2; 2.2; 2.2; 2.2; 2.2; 2.2 INJECTION, SUSPENSION INTRAMUSCULAR at 02:10

## 2017-10-17 NOTE — PROGRESS NOTES
Home Oxygen Evaluation    Date Performed: 10/17/2017    1) Patient's Home O2 Sat on room air, while at rest: 98%        If O2 sats on room air at rest are 88% or below, patient qualifies. No additional testing needed. Document N/A in steps 2 and 3. If 89% or above, complete steps 2.      2) Patient's O2 Sat on room air while exercisin%        If O2 sats on room air while exercising remain 89% or above patient does not qualify, no further testing needed Document N/A in step 3. If O2 sats on room air while exercising are 88% or below, continue to step 3.      3)          (Must show improvement from #2 for patients to qualify)    If O2 sats improve on oxygen, patient qualifies for portable oxygen. If not, the patient does not qualify.

## 2017-10-17 NOTE — PLAN OF CARE
Problem: Patient Care Overview  Goal: Plan of Care Review  Outcome: Ongoing (interventions implemented as appropriate)  Pt on RA sats  95%.

## 2017-10-17 NOTE — PLAN OF CARE
Problem: Patient Care Overview  Goal: Plan of Care Review  Pt on RA SPO2 94%.  No apparent distress.  Will continue to monitor.

## 2017-10-17 NOTE — PROGRESS NOTES
Progress Note    Admit Date: 10/14/2017   LOS: 3 days     SUBJECTIVE:     10/16: PAOLA, patient reports improvement in SOB, no longer having dyspnea. Denies CP/N/V. Tolerated dinner. Urinated overnight. Passing gas.     10/17: NAEON, per nephro, f/u with surg for AVF for possible HD in future. Weaned off O2. On RMA overnight with sats 93-94%. F/U amb sats    Scheduled Meds:   furosemide  80 mg Intravenous BID    heparin (porcine)  5,000 Units Subcutaneous Q8H    insulin detemir  15 Units Subcutaneous Daily    nifedipine  30 mg Oral Daily     Continuous Infusions:   PRN Meds:acetaminophen, dextrose 50%, dextrose 50%, glucagon (human recombinant), glucose, glucose, hydrALAZINE, insulin aspart, nitroGLYCERIN, nitroGLYCERIN, ondansetron    Review of patient's allergies indicates:   Allergen Reactions    Morphine      Review of Systems:  Constitutional: no fever or chills  Eyes: no visual changes  Respiratory: no cough or shortness of breath  Cardiovascular: no chest pain   Genitourinary: +urination     OBJECTIVE:     Vital Signs (Most Recent)  Temp: 98.2 °F (36.8 °C) (10/17/17 0807)  Pulse: 83 (10/17/17 0807)  Resp: 18 (10/17/17 0807)  BP: 120/74 (10/17/17 0807)  SpO2: 95 % (10/17/17 0819)    Vital Signs Range (Last 24H):  Temp:  [96.8 °F (36 °C)-98.3 °F (36.8 °C)]   Pulse:  [73-86]   Resp:  [16-18]   BP: (120-169)/(61-80)   SpO2:  [93 %-96 %]     I & O (Last 24H):    Intake/Output Summary (Last 24 hours) at 10/17/17 0836  Last data filed at 10/17/17 0613   Gross per 24 hour   Intake              150 ml   Output             2200 ml   Net            -2050 ml     Physical Exam:  General: well developed, well nourished  Eyes: conjunctivae/corneas clear.   Lungs:  clear to auscultation bilaterally and normal respiratory effort  Cardiovascular: Heart: regular rate and rhythm, S1, S2 normal, 3/6 systolic murmur. Chest Wall: no tenderness.   Abdomen/Rectal: Abdomen: soft, non-tender non-distented; bowel sounds  normal    Laboratory:  CBC:     Recent Labs  Lab 10/17/17  0429   WBC 9.12   RBC 4.02   HGB 9.2*   HCT 29.3*      MCV 73*   MCH 22.9*   MCHC 31.4*     CMP:     Recent Labs  Lab 10/17/17  0429   *   CALCIUM 8.1*   ALBUMIN 2.9*   PROT 7.7      K 3.6   CO2 24      BUN 75*   CREATININE 5.0*   ALKPHOS 81   ALT 22   AST 17   BILITOT 0.2     Diagnostic Results:  US Kidney yesterday: Elevated resistive indices which is nonspecific but may be seen with medical renal disease.    Small amount of debris within the urinary bladder.  Correlation with urinalysis is recommended.    ASSESSMENT/PLAN:     71 y.o.female has a past medical history of Arthritis; Diabetes mellitus; Hyperlipidemia; Hypertension; and Renal disorder. here for SOB upon exertion likely CHF exacerbation      Volume overload  - likely 2/2 CHF vs. CKD, resolved at this time  - Continue to monitor  - F/U nephro and echo  - Weaned off O2. On RMA overnight with sats 93-94%. F/U amb sats      CKD  -GFR 10  -Patient not on dialysis and is urinating  -Nephrology consulted, recs appreciated.   - renal diet  -monitor electrolytes  -PTH and Vit D consistent with CKD  - Consider outpatient management.   - Per nephro, f/u with surg for AVF for possible HD in future.     HTN  -Hydralazine prn 10 mg over 160      DM2  Levemir 15 units daily   SSI     Anemia  -stable  -Iron, TIBC, ferritin consistent with AOCD     DVT: Heparin prophylaxis     Plan: f/u nephro recs, continue to diurese. F/U echo. Consider outpatient management.      10/17/2017 9:17 AM Palomo Carrillo M.D.

## 2017-10-17 NOTE — PROGRESS NOTES
.Pharmacy New Medication Education    Patient accepted medication education.    Pharmacy educated patient on name and purpose of medications and possible side effects, using the teach-back method.     procardia    Learners of pharmacy medication education included:  patient    Patient +/- learner response:  teachback

## 2017-10-17 NOTE — PLAN OF CARE
Problem: Patient Care Overview  Goal: Plan of Care Review  Outcome: Ongoing (interventions implemented as appropriate)  Plan of care discussed with patient.  Verbalized understanding.  Patient is AAOx4.  Up ad lindsey and ambulatory to bathroom to void.  Remained on 2L oxygen via nasal cannula throughout shift.  Discharge orders canceled due to need for oxygen.  Room air sats taken and patient's SpO2 showed 93%. High blood sugar covered with sliding scale insulin. Pt resting in bed with family at side.  Will continue to monitor.

## 2017-10-17 NOTE — PROGRESS NOTES
[facility] Progress Note  CC:   Patient consulted do to ckd and volume over load     Subjective:   71 y.o. female with a pmhx of DM2, HTN, HLD, right leg amputation, and CKD presents with SOB for 2 weeks. The patient 1 month ago could walk blocks without getting SOB but she started having SOB upon exertion after her pcp started her on a beta blocker and changed her diuretic to lasix 20 mg daily. The patient says that after she too her beta blocker she had nausea and SOB so she stopped taking it. She now gets SOB when she walks around her house. She also has orthopnea, PND, has hx of swelling in her right leg, and sleeps on 4 pillows. She denies chest pain, syncope, palpitations, diaphoresis, nausea, vomiting, cough, fever, chills, prolonged immobilization, and calf tenderness.  Past Medical History:   Diagnosis Date    Arthritis     Diabetes mellitus     Hyperlipidemia     Hypertension     Renal disorder     poor kidney function     ?  Martha  has a past medical history of Arthritis; Diabetes mellitus; Hyperlipidemia; Hypertension; and Renal disorder.  Martha  has a past surgical history that includes Foot Amputation (Right).  Her family history is not on file.  Martha  reports that she has never smoked. She does not have any smokeless tobacco history on file. She reports that she does not drink alcohol. Her drug history is not on file.  No current facility-administered medications on file prior to encounter.      No current outpatient prescriptions on file prior to encounter.     Martha is allergic to morphine.  ?  Review of Systems  Constitutional - no weight loss, fever, night sweats  HEENT - negative for visual blurring, double vision, eye pain  Resp - no cough, shortness of breath, or wheezing  negative for cough, shortness of breath, or wheezing  GI - Normal BM's, denies hematochezia, melena or pain.  Skin - negative for dry skin  Heme - anemia   - Negative for dysuria  All other systems  "reviewed and otherwise negative  ?    Objective:   /74 (BP Location: Left arm, Patient Position: Sitting)   Pulse 83   Temp 98.2 °F (36.8 °C) (Axillary)   Resp 18   Ht 5' 5" (1.651 m)   Wt 92.9 kg (204 lb 12.9 oz)   SpO2 98%   BMI 34.08 kg/m²   Gen - NAD, A+Ox4, not confused  Gait - normal  HEENT/ NECK - EOMI/, NCAT, no JVD   CVS - RRR, no m/r/s3/s4  Resp - CTAB, no w/r/r  Abd - soft, NT, ND   Ext - no c/c/e  Psych - normal affect and behavior  Neuro - no asterixis  Laboratory:  Recent Results (from the past 24 hour(s))   2D echo with color flow doppler    Collection Time: 10/16/17  3:03 PM   Result Value Ref Range    EF 65 55 - 65    Diastolic Dysfunction Yes (A)     Aortic Valve Regurgitation MILD    POCT glucose    Collection Time: 10/16/17  8:03 PM   Result Value Ref Range    POCT Glucose 206 (H) 70 - 110 mg/dL   Phosphorus    Collection Time: 10/17/17  4:29 AM   Result Value Ref Range    Phosphorus 5.6 (H) 2.7 - 4.5 mg/dL   Magnesium    Collection Time: 10/17/17  4:29 AM   Result Value Ref Range    Magnesium 1.8 1.6 - 2.6 mg/dL   Comprehensive Metabolic Panel (CMP)    Collection Time: 10/17/17  4:29 AM   Result Value Ref Range    Sodium 143 136 - 145 mmol/L    Potassium 3.6 3.5 - 5.1 mmol/L    Chloride 104 95 - 110 mmol/L    CO2 24 23 - 29 mmol/L    Glucose 184 (H) 70 - 110 mg/dL    BUN, Bld 75 (H) 8 - 23 mg/dL    Creatinine 5.0 (H) 0.5 - 1.4 mg/dL    Calcium 8.1 (L) 8.7 - 10.5 mg/dL    Total Protein 7.7 6.0 - 8.4 g/dL    Albumin 2.9 (L) 3.5 - 5.2 g/dL    Total Bilirubin 0.2 0.1 - 1.0 mg/dL    Alkaline Phosphatase 81 55 - 135 U/L    AST 17 10 - 40 U/L    ALT 22 10 - 44 U/L    Anion Gap 15 8 - 16 mmol/L    eGFR if African American 9 (A) >60 mL/min/1.73 m^2    eGFR if non African American 8 (A) >60 mL/min/1.73 m^2   CBC auto differential    Collection Time: 10/17/17  4:29 AM   Result Value Ref Range    WBC 9.12 3.90 - 12.70 K/uL    RBC 4.02 4.00 - 5.40 M/uL    Hemoglobin 9.2 (L) 12.0 - 16.0 g/dL    " Hematocrit 29.3 (L) 37.0 - 48.5 %    MCV 73 (L) 82 - 98 fL    MCH 22.9 (L) 27.0 - 31.0 pg    MCHC 31.4 (L) 32.0 - 36.0 g/dL    RDW 15.4 (H) 11.5 - 14.5 %    Platelets 330 150 - 350 K/uL    MPV 9.5 9.2 - 12.9 fL    Gran # 5.4 1.8 - 7.7 K/uL    Lymph # 2.6 1.0 - 4.8 K/uL    Mono # 0.9 0.3 - 1.0 K/uL    Eos # 0.3 0.0 - 0.5 K/uL    Baso # 0.02 0.00 - 0.20 K/uL    Gran% 58.7 38.0 - 73.0 %    Lymph% 28.1 18.0 - 48.0 %    Mono% 9.8 4.0 - 15.0 %    Eosinophil% 3.0 0.0 - 8.0 %    Basophil% 0.2 0.0 - 1.9 %    Differential Method Automated    POCT glucose    Collection Time: 10/17/17  4:34 AM   Result Value Ref Range    POCT Glucose 202 (H) 70 - 110 mg/dL   POCT glucose    Collection Time: 10/17/17 11:20 AM   Result Value Ref Range    POCT Glucose 172 (H) 70 - 110 mg/dL       Recent Labs  Lab 10/15/17  0603 10/16/17  0514 10/17/17  0429   WBC 8.64 8.53 9.12   HGB 8.0* 8.8* 9.2*   HCT 24.9* 28.2* 29.3*    334 330   MCV 74* 74* 73*       Recent Labs  Lab 10/15/17  0603 10/16/17  0514 10/17/17  0429    144 143   K 4.1 4.0 3.6   * 108 104   CO2 22* 23 24   BUN 59* 62* 75*   * 114* 184*   CALCIUM 8.2* 8.3* 8.1*   MG 1.8 1.9 1.8   PHOS 5.6* 5.2* 5.6*       Recent Labs  Lab 10/15/17  0603 10/16/17  0514 10/17/17  0429   PROT 7.4 8.3 7.7   ALBUMIN 2.9* 3.2* 2.9*   BILITOT 0.3 0.3 0.2   AST 17 16 17   ALT 24 23 22   ALKPHOS 87 88 81     No results for input(s): PROTIME, PTT, INR in the last 168 hours.  Cardiac:     Recent Labs  Lab 10/14/17  1432 10/14/17  1734 10/14/17  2345 10/15/17  0603   TROPONINI <0.006 0.007 0.009 0.008   *  --   --   --      FLP: No results found for: CHOL, HDL, LDLCALC, TRIG, CHOLHDL  DM:   Lab Results   Component Value Date    HGBA1C 8.3 (H) 10/14/2017    CREATININE 5.0 (H) 10/17/2017     Thyroid:   Lab Results   Component Value Date    TSH 1.583 10/14/2017     Anemia:   Lab Results   Component Value Date    IRON 35 10/14/2017    TIBC 274 10/14/2017    FERRITIN 161 10/14/2017      Urinalysis: No results found for: LABURIN, COLORU, CLARITYU, SPECGRAV, LABSPEC, NITRITE, PROTEINUR, GLUCOSEU, KETONESU, UROBILINOGEN, BILIRUBINUR, BLOODU  Assessment:   71 y.o. female with a pmhx of DM2, HTN, HLD, right leg amputation, and CKD presents with SOB for 2 weeks.    Patient is urinating 2.2  L today and renal function is stable. With out complain      Ckd stage 5    -- Patient with not electrolytes abnormalities, not metabolic acidosis.today not fells sob, not orthopnea. Follow up with surgeon for avf may need HD in the next few months      -- not emergent HD    MBD   secondary hyperparathyroidism we recommend vit d 50,000 units a week and tums 1 tab with food   -- PTH levels 424  -- phosphate levels   -- vit D 12      HTN      -- blood pressure is uncontrol consider start home meds   -- patient on nifedipine 30 mg po daily at home.    Anemia; iron def anemia   Iron saturation is low this is consistent with low iron levels. Ferritin is not high. Anemia of chronic disease is r/o      -- iron 35  --- sat  13  -- ferritin 161,   --- tibc 274  -- iron      Plan:     vid d 50,000 unit a weeks   Tums one tab with food tid   Ferrous sulfate po 325 tid   Follow up as outpatient for a arterio venous fistula   Continue lasix  As outpatient 80 mg po  daily in am   Follow up with dr flores clinic in one week       Fidel Benoit  [facility] PGY4  Clinic Progress Note  ?  ?

## 2017-10-17 NOTE — PLAN OF CARE
Pt to be discharged home today. Pt prefers to schedule her own follow up appts. Per Neph pt to follow up with Dr. Hernandez in 1 week as pt wanting to change Nephrologist however first availble for Dr. Hernandez is Dec 14,2017. Pt stated she will make own follow up with her current Nephrologist then go to Dr. Hernandez on 12/14/17 Pt's daughter Luzmaria at bedside and stated she can help pt if needed and will provide transportation home No further discharge needs.     10/17/17 1303   Final Note   Assessment Type Final Discharge Note   Discharge Disposition Home   What phone number can be called within the next 1-3 days to see how you are doing after discharge? [phone]   Hospital Follow Up  Appt(s) scheduled? No   Discharge plans and expectations educations in teach back method with documentation complete? Yes   Right Care Referral Info   Post Acute Recommendation No Care

## 2017-10-18 NOTE — PHYSICIAN QUERY
"PT Name: Martha Melvin  MR #: [id number]    Physician Query Form - Heart  Condition Clarification     CDS/: Velvet Welsh RN, CCDS          Contact information: [email or web address]  This form is a permanent document in the medical record.     Query Date: October 18, 2017    By submitting this query, we are merely seeking further clarification of documentation. Please utilize your independent clinical judgment when addressing the question(s) below.    The medical record contains the following   Indicators     Supporting Clinical Findings Location in Medical Record   X  10/14 lab   X EF (EF 60-65%).  10/16 echo   X Radiology findings Increased interstitial lung markings throughout the lungs bilaterally, most suggestive of pulmonary edema or less likely multifocal pneumonia.  10/14 cxr   X Echo Results Normal left ventricular systolic function (EF 60-65%).   - Impaired LV relaxation, normal LAP (grade 1 diastolic dysfunction).  10/16 echo    "Ascites" documented     X "SOB" or "MONTALVO" documented  presents with worsening shortness of breath ×1.5 weeks 10/14 ed note     "Hypoxia" documented     X Heart Failure documented  SOB upon exertion likely CHF exacerbation Volume overload  - likely 2/2 CHF vs. CKD 10/14 h/p    "Edema" documented     X Diuretics/Meds Furosemide 60 mg IV x1  Furosemide 120 mg IV x1  Furosemide 60 mg IV 2 x daily  Furosemide 80 mg IV 2 x  daily 10/14 mar  10/15 mar   10/15-mar  10/15 thru 10/17 mar    Treatment:      Other:          Provider, please specify diagnosis or diagnoses associated with above clinical findings.  Please clarify type CHF exacerbation.                                 [  ] Acute Diastolic Heart Failure ( EF > 40)*  [X  ] Acute on Chronic Diastolic Heart Failure( EF > 40)*  [  ] Other Type of Heart Failure (please specify type): _________________________  [  ] Heart Failure Ruled Out  [  ] Other (please specify): ___________________________________  [  ] Clinically " Undetermined            *American [employer] Association                                                                                                          Please document in your progress notes daily for the duration of treatment until resolved and include in your discharge summary.

## 2017-10-18 NOTE — NURSING
Pt discharged to home, via car with family. No oxygen required, O2 sats at 98% room air and 95% with ambulation.

## 2017-10-18 NOTE — MEDICAL/APP STUDENT
Discharge Summary      Admit Date: 10/14/2017    Discharge Date and Time: 10/18/2017    Attending Physician: Yoselin att. providers found     Discharge Physician:   Principal Diagnoses: Acute pulmonary edema  The primary encounter diagnosis was SOB (shortness of breath). Diagnoses of Acute pulmonary edema, Chronic kidney disease, unspecified CKD stage, Anemia, unspecified type, Heart murmur, Diabetes mellitus due to underlying condition with chronic kidney disease, with long-term current use of insulin, unspecified CKD stage, and Volume overload were also pertinent to this visit.    Discharged Condition: stable    Hospital Course: Martha easton a is a 49 y/o  female with a pmhx of hypertension, hyperlipidemia, below the right knee amputation, T2DM, and CKD who presented on 10/14/17 with SOB of 2 weeks duration. The patient stated that 1 month prior she could walk blocks without getting SOB, but she began becoming SOB upon exertion after her pcp started her on a beta blocker and changed her diuretic to Lasix 20 mg daily. The patient discontinued using the beta blocker due to nausea and SOB that she attributed to the medication. On presentation she stated that she had orthopnea, PND, and a history of swelling in her right leg. Initial physical exam demonstrated a mid-systolic murmur and decreased breath sounds with crackles bilaterally. Troponins were negative throughout admission, but the patient did present with a BNP of 384. Initial chest x ray demonstrates findings suggestive of pulmonary edema or less likely multifocal pneumonia. The patient was started on 60 Lasix IV TID, ASA, and nitro. Echocardiogram was ordered and showed a normal EF of 60-65%, moderate left atrial enlargement, concentric hypertrophy, grade 1 diastolic dysfunction, mild aortic regurgitation, and moderate aortic stenosis. The initial hemoglobin and hematocrit were 8.4 and 26.8, respectively. This value increased throughout  admission with a hemoglobin and hematocrit of 9.2 and 29.3 upon discharge. Iron, TIBC, and ferritin studies were ordered and are consistent with iron deficiency anemia and the patient was started on Ferrous sulfate po 325 tid. Initial BUN/Cr were 57/4.3 which trended up to 62/4.5 on day of discharge. The patient labs also showed a GFR of 11, Phosphorus of 5.6, Calcium of 8.1, PTH of 424.1, Vitamin D of 12, and a Protein/Creatine ratio of 3.62. Nephrology consult said these labs were consistent with CKD stage 5, and to follow up with an outpatient surgeon for AVF. They recommend that the patient may need hemodialysis in the next few months, but does not require emergent hemodialysis at this time and for her to continue 80 mg Lasix daily in the AM. For treatment of the secondary hyperparathyroidism nephrology recommends vitamin D 50,000 units a week and tums 1 tablet with food. Nephrology further recommends that if the patient does indeed have heart failure to manage the hypertension with Hydralazine or Amlodipine instead of the home dose of Nifedipine. The patient was discharged on 10/17/17 with follow up with Dr. Hernandez clinic in one week.      Consults:     Significant Diagnostic Studies: {diagnostics:96631}    Treatments: {Tx:39709}    Disposition: Home or Self Care    Patient Instructions:   Discharge Medication List as of 10/17/2017  2:46 PM      START taking these medications    Details   calcium carbonate (TUMS E-X) 300 mg (750 mg) Chew Take 1 tablet by mouth 3 (three) times daily with meals., Starting Mon 10/16/2017, Until Tue 10/16/2018, Normal      ergocalciferol (ERGOCALCIFEROL) 50,000 unit Cap Take 1 capsule (50,000 Units total) by mouth every 7 days., Starting Mon 10/16/2017, Normal      ferrous sulfate 325 mg (65 mg iron) Tab tablet Take 1 tablet (325 mg total) by mouth 3 (three) times daily with meals., Starting Mon 10/16/2017, Normal         CONTINUE these medications which have CHANGED    Details    furosemide (LASIX) 80 MG tablet Take 1 tablet (80 mg total) by mouth every morning., Starting Mon 10/16/2017, Normal         CONTINUE these medications which have NOT CHANGED    Details   atorvastatin (LIPITOR) 20 MG tablet Take 20 mg by mouth once daily., Historical Med      bisacodyl (DULCOLAX) 5 mg EC tablet Take 5 mg by mouth daily as needed for Constipation., Historical Med      ibuprofen (ADVIL,MOTRIN) 400 MG tablet Take 400 mg by mouth 3 (three) times daily., Historical Med      insulin aspart (NOVOLOG) 100 unit/mL injection Inject 8 Units into the skin 3 (three) times daily before meals., Historical Med      insulin glargine (LANTUS) 100 unit/mL injection Inject 20 Units into the skin every evening., Historical Med      loratadine (CLARITIN) 10 mg tablet Take 10 mg by mouth once daily., Historical Med      nifedipine (ADALAT CC) 30 MG TbSR Take 30 mg by mouth once daily., Historical Med               Discharge Procedure Orders  Diet renal     Diet Cardiac     Activity as tolerated     Call MD for:  temperature >100.4     Call MD for:  persistent nausea and vomiting or diarrhea     Call MD for:  severe uncontrolled pain     Call MD for:  redness, tenderness, or signs of infection (pain, swelling, redness, odor or green/yellow discharge around incision site)     Call MD for:  difficulty breathing or increased cough     Call MD for:  severe persistent headache     Call MD for:  worsening rash     Call MD for:  persistent dizziness, light-headedness, or visual disturbances     Call MD for:  increased confusion or weakness

## 2017-10-19 NOTE — DISCHARGE SUMMARY
Discharge Summary    Admit Date: 10/14/2017     Discharge Date and Time: 10/18/2017     Attending Physician: Yoselin att. providers found      Discharge Physician:   Principal Diagnoses: Acute pulmonary edema  The primary encounter diagnosis was SOB (shortness of breath). Diagnoses of Acute pulmonary edema, Chronic kidney disease, unspecified CKD stage, Anemia, unspecified type, Heart murmur, Diabetes mellitus due to underlying condition with chronic kidney disease, with long-term current use of insulin, unspecified CKD stage, and Volume overload were also pertinent to this visit.     Discharged Condition: stable     Hospital Course: Martha easton a is a 49 y/o  female with a pmhx of hypertension, hyperlipidemia, below the right knee amputation, T2DM, and CKD who presented on 10/14/17 with SOB of 2 weeks duration. The patient stated that 1 month prior she could walk blocks without getting SOB, but she began becoming SOB upon exertion after her pcp started her on a beta blocker and changed her diuretic to Lasix 20 mg daily. The patient discontinued using the beta blocker due to nausea and SOB that she attributed to the medication. On presentation she stated that she had orthopnea, PND, and a history of swelling in her right leg. Initial physical exam demonstrated a mid-systolic murmur and decreased breath sounds with crackles bilaterally. Troponins were negative throughout admission, but the patient did present with a BNP of 384. Initial chest x ray demonstrates findings suggestive of pulmonary edema or less likely multifocal pneumonia. The patient was started on 60 Lasix IV TID, ASA, and nitro. Echocardiogram was ordered and showed a normal EF of 60-65%, moderate left atrial enlargement, concentric hypertrophy, grade 1 diastolic dysfunction, mild aortic regurgitation, and moderate aortic stenosis. The initial hemoglobin and  hematocrit were 8.4 and 26.8, respectively. This value increased throughout admission with a hemoglobin and hematocrit of 9.2 and 29.3 upon discharge. Iron, TIBC, and ferritin studies were ordered and are consistent with iron deficiency anemia and the patient was started on Ferrous sulfate po 325 tid. Initial BUN/Cr were 57/4.3 which trended up to 62/4.5 on day of discharge. The patient labs also showed a GFR of 11, Phosphorus of 5.6, Calcium of 8.1, PTH of 424.1, Vitamin D of 12, and a Protein/Creatine ratio of 3.62. Nephrology consult said these labs were consistent with CKD stage 5, and to follow up with an outpatient surgeon for AVF. They recommend that the patient may need hemodialysis in the next few months, but does not require emergent hemodialysis at this time and for her to continue 80 mg Lasix daily in the AM. For treatment of the secondary hyperparathyroidism nephrology recommends vitamin D 50,000 units a week and tums 1 tablet with food. Nephrology further recommends that if the patient does indeed have heart failure to manage the hypertension with Hydralazine or Amlodipine instead of the home dose of Nifedipine. The patient was discharged on 10/17/17 with follow up with Dr. Hernandez clinic in one week.     Disposition: Home or Self Care     Patient Instructions:       Discharge Medication List as of 10/17/2017  2:46 PM           START taking these medications     Details   calcium carbonate (TUMS E-X) 300 mg (750 mg) Chew Take 1 tablet by mouth 3 (three) times daily with meals., Starting Mon 10/16/2017, Until Tue 10/16/2018, Normal       ergocalciferol (ERGOCALCIFEROL) 50,000 unit Cap Take 1 capsule (50,000 Units total) by mouth every 7 days., Starting Mon 10/16/2017, Normal       ferrous sulfate 325 mg (65 mg iron) Tab tablet Take 1 tablet (325 mg total) by mouth 3 (three) times daily with meals., Starting Mon 10/16/2017, Normal               CONTINUE these medications which have CHANGED     Details    furosemide (LASIX) 80 MG tablet Take 1 tablet (80 mg total) by mouth every morning., Starting Mon 10/16/2017, Normal               CONTINUE these medications which have NOT CHANGED     Details   atorvastatin (LIPITOR) 20 MG tablet Take 20 mg by mouth once daily., Historical Med       bisacodyl (DULCOLAX) 5 mg EC tablet Take 5 mg by mouth daily as needed for Constipation., Historical Med       ibuprofen (ADVIL,MOTRIN) 400 MG tablet Take 400 mg by mouth 3 (three) times daily., Historical Med       insulin aspart (NOVOLOG) 100 unit/mL injection Inject 8 Units into the skin 3 (three) times daily before meals., Historical Med       insulin glargine (LANTUS) 100 unit/mL injection Inject 20 Units into the skin every evening., Historical Med       loratadine (CLARITIN) 10 mg tablet Take 10 mg by mouth once daily., Historical Med       nifedipine (ADALAT CC) 30 MG TbSR Take 30 mg by mouth once daily., Historical Med                    Discharge Procedure Orders  Diet renal      Diet Cardiac      Activity as tolerated      Call MD for:  temperature >100.4      Call MD for:  persistent nausea and vomiting or diarrhea      Call MD for:  severe uncontrolled pain      Call MD for:  redness, tenderness, or signs of infection (pain, swelling, redness, odor or green/yellow discharge around incision site)      Call MD for:  difficulty breathing or increased cough      Call MD for:  severe persistent headache      Call MD for:  worsening rash      Call MD for:  persistent dizziness, light-headedness, or visual disturbances      Call MD for:  increased confusion or weakness

## 2018-01-15 PROBLEM — J81.0 ACUTE PULMONARY EDEMA: Status: RESOLVED | Noted: 2017-10-16 | Resolved: 2018-01-15

## 2018-04-15 ENCOUNTER — HOSPITAL ENCOUNTER (INPATIENT)
Facility: HOSPITAL | Age: 73
LOS: 11 days | Discharge: NURSING, REHAB OR LONG-TERM CARE | DRG: 208 | End: 2018-04-26
Attending: EMERGENCY MEDICINE | Admitting: EMERGENCY MEDICINE
Payer: MEDICARE

## 2018-04-15 DIAGNOSIS — I50.30 DIASTOLIC HEART FAILURE: ICD-10-CM

## 2018-04-15 DIAGNOSIS — Z86.74 HISTORY OF CARDIAC ARREST: ICD-10-CM

## 2018-04-15 DIAGNOSIS — N18.5 TYPE 2 DIABETES MELLITUS WITH STAGE 5 CHRONIC KIDNEY DISEASE AND HYPERTENSION: ICD-10-CM

## 2018-04-15 DIAGNOSIS — E11.69 TYPE 2 DIABETES MELLITUS WITH HYPERLIPIDEMIA: ICD-10-CM

## 2018-04-15 DIAGNOSIS — N18.5 STAGE 5 CHRONIC KIDNEY DISEASE NOT ON CHRONIC DIALYSIS: ICD-10-CM

## 2018-04-15 DIAGNOSIS — Z78.9 IMPAIRED MOBILITY AND ADLS: ICD-10-CM

## 2018-04-15 DIAGNOSIS — N17.9 ACUTE KIDNEY FAILURE: ICD-10-CM

## 2018-04-15 DIAGNOSIS — E78.5 TYPE 2 DIABETES MELLITUS WITH HYPERLIPIDEMIA: ICD-10-CM

## 2018-04-15 DIAGNOSIS — I46.9 CARDIAC ARREST: ICD-10-CM

## 2018-04-15 DIAGNOSIS — N18.6 ESRD (END STAGE RENAL DISEASE): ICD-10-CM

## 2018-04-15 DIAGNOSIS — I50.1 PULMONARY EDEMA CARDIAC CAUSE: ICD-10-CM

## 2018-04-15 DIAGNOSIS — Z74.09 IMPAIRED MOBILITY AND ADLS: ICD-10-CM

## 2018-04-15 DIAGNOSIS — J15.9 COMMUNITY ACQUIRED BACTERIAL PNEUMONIA: ICD-10-CM

## 2018-04-15 DIAGNOSIS — I49.8 BIGEMINY: ICD-10-CM

## 2018-04-15 DIAGNOSIS — R19.7 DIARRHEA OF PRESUMED INFECTIOUS ORIGIN: ICD-10-CM

## 2018-04-15 DIAGNOSIS — N28.9 RENAL INSUFFICIENCY: ICD-10-CM

## 2018-04-15 DIAGNOSIS — Z01.818 PRE-OP EXAMINATION: ICD-10-CM

## 2018-04-15 DIAGNOSIS — I12.0 TYPE 2 DIABETES MELLITUS WITH STAGE 5 CHRONIC KIDNEY DISEASE AND HYPERTENSION: ICD-10-CM

## 2018-04-15 DIAGNOSIS — R06.02 SHORTNESS OF BREATH: ICD-10-CM

## 2018-04-15 DIAGNOSIS — E11.22 TYPE 2 DIABETES MELLITUS WITH STAGE 5 CHRONIC KIDNEY DISEASE AND HYPERTENSION: ICD-10-CM

## 2018-04-15 DIAGNOSIS — J96.01 ACUTE HYPOXEMIC RESPIRATORY FAILURE: ICD-10-CM

## 2018-04-15 DIAGNOSIS — N17.9 AKI (ACUTE KIDNEY INJURY): Primary | ICD-10-CM

## 2018-04-15 DIAGNOSIS — I50.33 ACUTE ON CHRONIC DIASTOLIC HEART FAILURE: ICD-10-CM

## 2018-04-15 LAB
ALBUMIN SERPL BCP-MCNC: 3.1 G/DL
ALLENS TEST: ABNORMAL
ALP SERPL-CCNC: 80 U/L
ALT SERPL W/O P-5'-P-CCNC: 18 U/L
ANION GAP SERPL CALC-SCNC: 13 MMOL/L
AST SERPL-CCNC: 17 U/L
BACTERIA #/AREA URNS AUTO: ABNORMAL /HPF
BASOPHILS # BLD AUTO: 0.02 K/UL
BASOPHILS NFR BLD: 0.1 %
BILIRUB SERPL-MCNC: 0.7 MG/DL
BILIRUB UR QL STRIP: NEGATIVE
BNP SERPL-MCNC: 1051 PG/ML
BUN SERPL-MCNC: 70 MG/DL
CALCIUM SERPL-MCNC: 8.9 MG/DL
CHLORIDE SERPL-SCNC: 106 MMOL/L
CLARITY UR REFRACT.AUTO: ABNORMAL
CO2 SERPL-SCNC: 20 MMOL/L
COLOR UR AUTO: YELLOW
CREAT SERPL-MCNC: 5.4 MG/DL
DELSYS: ABNORMAL
DIFFERENTIAL METHOD: ABNORMAL
EOSINOPHIL # BLD AUTO: 0 K/UL
EOSINOPHIL NFR BLD: 0.1 %
ERYTHROCYTE [DISTWIDTH] IN BLOOD BY AUTOMATED COUNT: 16.1 %
EST. GFR  (AFRICAN AMERICAN): 8.6 ML/MIN/1.73 M^2
EST. GFR  (NON AFRICAN AMERICAN): 7.4 ML/MIN/1.73 M^2
GLUCOSE SERPL-MCNC: 222 MG/DL
GLUCOSE UR QL STRIP: ABNORMAL
HCO3 UR-SCNC: 17.8 MMOL/L (ref 24–28)
HCT VFR BLD AUTO: 25.5 %
HGB BLD-MCNC: 8.4 G/DL
HGB UR QL STRIP: ABNORMAL
HYALINE CASTS UR QL AUTO: 0 /LPF
IMM GRANULOCYTES # BLD AUTO: 0.07 K/UL
IMM GRANULOCYTES NFR BLD AUTO: 0.4 %
INR PPP: 1.1
KETONES UR QL STRIP: NEGATIVE
LEUKOCYTE ESTERASE UR QL STRIP: ABNORMAL
LYMPHOCYTES # BLD AUTO: 1 K/UL
LYMPHOCYTES NFR BLD: 6.5 %
MCH RBC QN AUTO: 23.8 PG
MCHC RBC AUTO-ENTMCNC: 32.9 G/DL
MCV RBC AUTO: 72 FL
MICROSCOPIC COMMENT: ABNORMAL
MONOCYTES # BLD AUTO: 1.4 K/UL
MONOCYTES NFR BLD: 8.8 %
NEUTROPHILS # BLD AUTO: 13.5 K/UL
NEUTROPHILS NFR BLD: 84.1 %
NITRITE UR QL STRIP: NEGATIVE
NRBC BLD-RTO: 0 /100 WBC
PCO2 BLDA: 33.5 MMHG (ref 35–45)
PH SMN: 7.33 [PH] (ref 7.35–7.45)
PH UR STRIP: 5 [PH] (ref 5–8)
PLATELET # BLD AUTO: 235 K/UL
PMV BLD AUTO: 10.7 FL
PO2 BLDA: 54 MMHG (ref 80–100)
POC BE: -8 MMOL/L
POC SATURATED O2: 86 % (ref 95–100)
POC TCO2: 19 MMOL/L (ref 23–27)
POCT GLUCOSE: 223 MG/DL (ref 70–110)
POCT GLUCOSE: 236 MG/DL (ref 70–110)
POTASSIUM SERPL-SCNC: 4 MMOL/L
PROT SERPL-MCNC: 8.3 G/DL
PROT UR QL STRIP: ABNORMAL
PROTHROMBIN TIME: 11.2 SEC
PROVIDER CREDENTIALS: ABNORMAL
PROVIDER NOTIFIED: ABNORMAL
RBC # BLD AUTO: 3.53 M/UL
RBC #/AREA URNS AUTO: 2 /HPF (ref 0–4)
SAMPLE: ABNORMAL
SITE: ABNORMAL
SODIUM SERPL-SCNC: 139 MMOL/L
SP GR UR STRIP: 1.01 (ref 1–1.03)
SQUAMOUS #/AREA URNS AUTO: 5 /HPF
TIME NOTIFIED: 1715
TROPONIN I SERPL DL<=0.01 NG/ML-MCNC: 0.14 NG/ML
URN SPEC COLLECT METH UR: ABNORMAL
UROBILINOGEN UR STRIP-ACNC: NEGATIVE EU/DL
VERBAL RESULT READBACK PERFORMED: YES
WBC # BLD AUTO: 15.99 K/UL
WBC #/AREA URNS AUTO: 16 /HPF (ref 0–5)
WBC CLUMPS UR QL AUTO: ABNORMAL

## 2018-04-15 PROCEDURE — 99291 CRITICAL CARE FIRST HOUR: CPT | Mod: ,,, | Performed by: EMERGENCY MEDICINE

## 2018-04-15 PROCEDURE — 63600175 PHARM REV CODE 636 W HCPCS: Performed by: EMERGENCY MEDICINE

## 2018-04-15 PROCEDURE — 99285 EMERGENCY DEPT VISIT HI MDM: CPT | Mod: 25

## 2018-04-15 PROCEDURE — 87086 URINE CULTURE/COLONY COUNT: CPT

## 2018-04-15 PROCEDURE — 94760 N-INVAS EAR/PLS OXIMETRY 1: CPT

## 2018-04-15 PROCEDURE — 25000242 PHARM REV CODE 250 ALT 637 W/ HCPCS: Performed by: EMERGENCY MEDICINE

## 2018-04-15 PROCEDURE — 99900035 HC TECH TIME PER 15 MIN (STAT)

## 2018-04-15 PROCEDURE — 87186 SC STD MICRODIL/AGAR DIL: CPT

## 2018-04-15 PROCEDURE — 84484 ASSAY OF TROPONIN QUANT: CPT | Mod: 91

## 2018-04-15 PROCEDURE — 84484 ASSAY OF TROPONIN QUANT: CPT

## 2018-04-15 PROCEDURE — 93010 ELECTROCARDIOGRAM REPORT: CPT | Mod: ,,, | Performed by: INTERNAL MEDICINE

## 2018-04-15 PROCEDURE — 93005 ELECTROCARDIOGRAM TRACING: CPT

## 2018-04-15 PROCEDURE — 82962 GLUCOSE BLOOD TEST: CPT

## 2018-04-15 PROCEDURE — 80053 COMPREHEN METABOLIC PANEL: CPT

## 2018-04-15 PROCEDURE — 99223 1ST HOSP IP/OBS HIGH 75: CPT | Mod: ,,, | Performed by: INTERNAL MEDICINE

## 2018-04-15 PROCEDURE — 25000003 PHARM REV CODE 250: Performed by: EMERGENCY MEDICINE

## 2018-04-15 PROCEDURE — 27000190 HC CPAP FULL FACE MASK W/VALVE

## 2018-04-15 PROCEDURE — 20600001 HC STEP DOWN PRIVATE ROOM

## 2018-04-15 PROCEDURE — 82803 BLOOD GASES ANY COMBINATION: CPT

## 2018-04-15 PROCEDURE — 85025 COMPLETE CBC W/AUTO DIFF WBC: CPT

## 2018-04-15 PROCEDURE — 94640 AIRWAY INHALATION TREATMENT: CPT

## 2018-04-15 PROCEDURE — 25000242 PHARM REV CODE 250 ALT 637 W/ HCPCS: Performed by: INTERNAL MEDICINE

## 2018-04-15 PROCEDURE — 99900029 HC O2 SETUP (STAT)

## 2018-04-15 PROCEDURE — 87077 CULTURE AEROBIC IDENTIFY: CPT

## 2018-04-15 PROCEDURE — 36600 WITHDRAWAL OF ARTERIAL BLOOD: CPT

## 2018-04-15 PROCEDURE — 63600175 PHARM REV CODE 636 W HCPCS: Performed by: INTERNAL MEDICINE

## 2018-04-15 PROCEDURE — 27000221 HC OXYGEN, UP TO 24 HOURS

## 2018-04-15 PROCEDURE — 96372 THER/PROPH/DIAG INJ SC/IM: CPT | Mod: 59

## 2018-04-15 PROCEDURE — 87088 URINE BACTERIA CULTURE: CPT

## 2018-04-15 PROCEDURE — 83880 ASSAY OF NATRIURETIC PEPTIDE: CPT

## 2018-04-15 PROCEDURE — 36415 COLL VENOUS BLD VENIPUNCTURE: CPT

## 2018-04-15 PROCEDURE — 25000003 PHARM REV CODE 250: Performed by: INTERNAL MEDICINE

## 2018-04-15 PROCEDURE — 96375 TX/PRO/DX INJ NEW DRUG ADDON: CPT

## 2018-04-15 PROCEDURE — 81001 URINALYSIS AUTO W/SCOPE: CPT

## 2018-04-15 PROCEDURE — 85610 PROTHROMBIN TIME: CPT

## 2018-04-15 PROCEDURE — 96376 TX/PRO/DX INJ SAME DRUG ADON: CPT

## 2018-04-15 PROCEDURE — 94660 CPAP INITIATION&MGMT: CPT

## 2018-04-15 PROCEDURE — 94761 N-INVAS EAR/PLS OXIMETRY MLT: CPT

## 2018-04-15 PROCEDURE — 96365 THER/PROPH/DIAG IV INF INIT: CPT

## 2018-04-15 RX ORDER — GLUCAGON 1 MG
1 KIT INJECTION
Status: DISCONTINUED | OUTPATIENT
Start: 2018-04-15 | End: 2018-04-26 | Stop reason: HOSPADM

## 2018-04-15 RX ORDER — AMOXICILLIN 250 MG
1 CAPSULE ORAL 2 TIMES DAILY
Status: DISCONTINUED | OUTPATIENT
Start: 2018-04-15 | End: 2018-04-16

## 2018-04-15 RX ORDER — INSULIN ASPART 100 [IU]/ML
0-5 INJECTION, SOLUTION INTRAVENOUS; SUBCUTANEOUS
Status: DISCONTINUED | OUTPATIENT
Start: 2018-04-15 | End: 2018-04-17

## 2018-04-15 RX ORDER — MOXIFLOXACIN HYDROCHLORIDE 400 MG/1
400 TABLET ORAL DAILY
Status: DISCONTINUED | OUTPATIENT
Start: 2018-04-16 | End: 2018-04-15

## 2018-04-15 RX ORDER — CEFTRIAXONE 1 G/1
1 INJECTION, POWDER, FOR SOLUTION INTRAMUSCULAR; INTRAVENOUS DAILY
Status: DISCONTINUED | OUTPATIENT
Start: 2018-04-16 | End: 2018-04-16

## 2018-04-15 RX ORDER — IPRATROPIUM BROMIDE AND ALBUTEROL SULFATE 2.5; .5 MG/3ML; MG/3ML
3 SOLUTION RESPIRATORY (INHALATION)
Status: COMPLETED | OUTPATIENT
Start: 2018-04-15 | End: 2018-04-15

## 2018-04-15 RX ORDER — FUROSEMIDE 10 MG/ML
80 INJECTION INTRAMUSCULAR; INTRAVENOUS
Status: COMPLETED | OUTPATIENT
Start: 2018-04-15 | End: 2018-04-15

## 2018-04-15 RX ORDER — ATORVASTATIN CALCIUM 20 MG/1
20 TABLET, FILM COATED ORAL DAILY
Status: DISCONTINUED | OUTPATIENT
Start: 2018-04-16 | End: 2018-04-17

## 2018-04-15 RX ORDER — HEPARIN SODIUM 5000 [USP'U]/ML
5000 INJECTION, SOLUTION INTRAVENOUS; SUBCUTANEOUS EVERY 8 HOURS
Status: DISCONTINUED | OUTPATIENT
Start: 2018-04-15 | End: 2018-04-17

## 2018-04-15 RX ORDER — ALBUTEROL SULFATE 0.83 MG/ML
2.5 SOLUTION RESPIRATORY (INHALATION) EVERY 4 HOURS PRN
Status: DISCONTINUED | OUTPATIENT
Start: 2018-04-15 | End: 2018-04-26 | Stop reason: HOSPADM

## 2018-04-15 RX ORDER — ONDANSETRON 8 MG/1
8 TABLET, ORALLY DISINTEGRATING ORAL EVERY 8 HOURS PRN
Status: DISCONTINUED | OUTPATIENT
Start: 2018-04-15 | End: 2018-04-19

## 2018-04-15 RX ORDER — ACETAMINOPHEN 325 MG/1
650 TABLET ORAL EVERY 6 HOURS PRN
Status: DISCONTINUED | OUTPATIENT
Start: 2018-04-15 | End: 2018-04-26 | Stop reason: HOSPADM

## 2018-04-15 RX ORDER — FUROSEMIDE 10 MG/ML
60 INJECTION INTRAMUSCULAR; INTRAVENOUS
Status: COMPLETED | OUTPATIENT
Start: 2018-04-15 | End: 2018-04-15

## 2018-04-15 RX ORDER — IBUPROFEN 200 MG
16 TABLET ORAL
Status: DISCONTINUED | OUTPATIENT
Start: 2018-04-15 | End: 2018-04-26 | Stop reason: HOSPADM

## 2018-04-15 RX ORDER — AMLODIPINE BESYLATE 5 MG/1
5 TABLET ORAL DAILY
Status: DISCONTINUED | OUTPATIENT
Start: 2018-04-16 | End: 2018-04-16

## 2018-04-15 RX ORDER — SODIUM CHLORIDE 0.9 % (FLUSH) 0.9 %
5 SYRINGE (ML) INJECTION
Status: DISCONTINUED | OUTPATIENT
Start: 2018-04-15 | End: 2018-04-26 | Stop reason: HOSPADM

## 2018-04-15 RX ORDER — FUROSEMIDE 10 MG/ML
60 INJECTION INTRAMUSCULAR; INTRAVENOUS 3 TIMES DAILY
Status: DISCONTINUED | OUTPATIENT
Start: 2018-04-15 | End: 2018-04-16

## 2018-04-15 RX ORDER — INSULIN ASPART 100 [IU]/ML
4 INJECTION, SOLUTION INTRAVENOUS; SUBCUTANEOUS
Status: DISCONTINUED | OUTPATIENT
Start: 2018-04-16 | End: 2018-04-16

## 2018-04-15 RX ORDER — ENOXAPARIN SODIUM 100 MG/ML
40 INJECTION SUBCUTANEOUS EVERY 24 HOURS
Status: DISCONTINUED | OUTPATIENT
Start: 2018-04-15 | End: 2018-04-15

## 2018-04-15 RX ORDER — IBUPROFEN 200 MG
24 TABLET ORAL
Status: DISCONTINUED | OUTPATIENT
Start: 2018-04-15 | End: 2018-04-26 | Stop reason: HOSPADM

## 2018-04-15 RX ORDER — CEFTRIAXONE 1 G/1
1 INJECTION, POWDER, FOR SOLUTION INTRAMUSCULAR; INTRAVENOUS
Status: COMPLETED | OUTPATIENT
Start: 2018-04-15 | End: 2018-04-15

## 2018-04-15 RX ADMIN — AZITHROMYCIN MONOHYDRATE 500 MG: 500 INJECTION, POWDER, LYOPHILIZED, FOR SOLUTION INTRAVENOUS at 04:04

## 2018-04-15 RX ADMIN — IPRATROPIUM BROMIDE AND ALBUTEROL SULFATE 3 ML: .5; 3 SOLUTION RESPIRATORY (INHALATION) at 03:04

## 2018-04-15 RX ADMIN — FUROSEMIDE 60 MG: 10 INJECTION, SOLUTION INTRAMUSCULAR; INTRAVENOUS at 09:04

## 2018-04-15 RX ADMIN — FUROSEMIDE 60 MG: 10 INJECTION, SOLUTION INTRAMUSCULAR; INTRAVENOUS at 04:04

## 2018-04-15 RX ADMIN — INSULIN DETEMIR 10 UNITS: 100 INJECTION, SOLUTION SUBCUTANEOUS at 09:04

## 2018-04-15 RX ADMIN — ALBUTEROL SULFATE 2.5 MG: 2.5 SOLUTION RESPIRATORY (INHALATION) at 10:04

## 2018-04-15 RX ADMIN — INSULIN ASPART 1 UNITS: 100 INJECTION, SOLUTION INTRAVENOUS; SUBCUTANEOUS at 09:04

## 2018-04-15 RX ADMIN — NITROGLYCERIN 0.5 INCH: 20 OINTMENT TOPICAL at 03:04

## 2018-04-15 RX ADMIN — NITROGLYCERIN 1 INCH: 20 OINTMENT TOPICAL at 06:04

## 2018-04-15 RX ADMIN — FUROSEMIDE 80 MG: 10 INJECTION, SOLUTION INTRAMUSCULAR; INTRAVENOUS at 04:04

## 2018-04-15 RX ADMIN — HEPARIN SODIUM 5000 UNITS: 5000 INJECTION, SOLUTION INTRAVENOUS; SUBCUTANEOUS at 10:04

## 2018-04-15 RX ADMIN — CEFTRIAXONE SODIUM 1 G: 1 INJECTION, POWDER, FOR SOLUTION INTRAMUSCULAR; INTRAVENOUS at 04:04

## 2018-04-15 RX ADMIN — STANDARDIZED SENNA CONCENTRATE AND DOCUSATE SODIUM 1 TABLET: 8.6; 5 TABLET, FILM COATED ORAL at 09:04

## 2018-04-15 NOTE — PROGRESS NOTES
Physician Attestation for Scribe:  I, Patricia Dai MD, personally performed the services described in this documentation. All medical record entries made by the scribe were at my direction and in my presence.  I have reviewed the chart and agree that the record reflects my personal performance and is accurate and complete.   Patricia Dai MD      [facility]  History and Physical Exam    Team: [facility]   Admit Date: 4/15/2018 Dr. Dai   Chief Complaint: SOB  Patient information was obtained from patient and ER records.   Primary care Physician: GUILLERMO Mcclure MD  Code status: Full Code    HPI:   Patient is a 73 yo woman with PMH of chronic diastolic dysfunction, CKD stage V, moderate aortic stenosis, diabetes type 2 and HTN is here for SOB for 4 days. She denies associated cough, wheeze, rhinorrhea, sore throat, fever or chills. She normally sleeps on 4 pillows for the last 2 years, but the last 2 months shes been having to sit up at the side of her bed to breathe at night. She does awake at night gasping for air more frequently. She has mild dyspnea on exertion for the last 4 days. She denies CP or palpitations. No nausea or vomiting. She states her normal weight should be 195 pounds. She does snore but has never been told she has sleep apnea. Patient was found to have pulse ox of 90% at home by EMS. On route she was placed on BIPAP and given albuterol treatments. Upon arrival her saturations were at 82%. She has never smoked and no history of COPD or asthma.     Past Medical History: Patient has a past medical history of Arthritis; Diabetes mellitus; Hyperlipidemia; Hypertension; and Renal disorder.    Past Surgical History: Patient has a past surgical history that includes Foot Amputation (Right).    Social History: Patient reports that she has never smoked. She does not have any smokeless tobacco history on file. She reports that she does not drink alcohol.    Family History: family history is not on  file.    Medications:   Prior to Admission medications    Medication Sig Start Date End Date Taking? Authorizing Provider   atorvastatin (LIPITOR) 20 MG tablet Take 20 mg by mouth once daily.   Yes Historical Provider, MD   bisacodyl (DULCOLAX) 5 mg EC tablet Take 5 mg by mouth daily as needed for Constipation.   Yes Historical Provider, MD   calcium carbonate (TUMS E-X) 300 mg (750 mg) Chew Take 1 tablet by mouth 3 (three) times daily with meals. 10/16/17 10/16/18 Yes Palomo Carrillo MD   ergocalciferol (ERGOCALCIFEROL) 50,000 unit Cap Take 1 capsule (50,000 Units total) by mouth every 7 days. 10/16/17  Yes Palomo Carrillo MD   ferrous sulfate 325 mg (65 mg iron) Tab tablet Take 1 tablet (325 mg total) by mouth 3 (three) times daily with meals. 10/16/17  Yes Palomo Carrillo MD   furosemide (LASIX) 80 MG tablet Take 1 tablet (80 mg total) by mouth every morning. 10/16/17  Yes Palomo Carrillo MD   insulin aspart (NOVOLOG) 100 unit/mL injection Inject 8 Units into the skin 3 (three) times daily before meals.   Yes Historical Provider, MD   insulin glargine (LANTUS) 100 unit/mL injection Inject 20 Units into the skin every evening.   Yes Historical Provider, MD   loratadine (CLARITIN) 10 mg tablet Take 10 mg by mouth once daily.   Yes Historical Provider, MD   nifedipine (ADALAT CC) 30 MG TbSR Take 30 mg by mouth once daily.   Yes Historical Provider, MD   ibuprofen (ADVIL,MOTRIN) 400 MG tablet Take 400 mg by mouth 3 (three) times daily.  4/15/18 Yes Historical Provider, MD       Allergies: Patient is allergic to morphine.    ROS  Constitutional: no fever or chills, see HPI  Respiratory: no cough or shortness of breath, see HPI  Cardiovascular: no chest pain or palpitations, See HPI  Gastrointestinal: no nausea or vomiting, no abdominal pain or change in bowel habits  Genitourinary: no hematuria or dysuria  Integument/Breast: no rash or pruritis  Hematologic/Lymphatic: no easy bruising or  lymphadenopathy  Musculoskeletal: no arthralgias or myalgias  Neurological: no seizures or tremors  Behavioral/Psych: no depression or anxiety    PEx  Temp:  [98.3 °F (36.8 °C)]   Pulse:  [85-93]   Resp:  [22-38]   BP: (133-172)/(67-94)   SpO2:  [82 %-97 %]   Body mass index is 33.28 kg/m².     General appearance: no distress, well-developed, well-nourished  Mental status: Alert and oriented x 3  HEENT:  conjunctivae/corneas clear, PERRL  Neck: supple, thyroid not enlarged  Pulm:   normal respiratory effort, CTA B, decreased breath sounds throughout, crackles from mid lung to bases bilaterally  Card: RRR, S1, S2 normal, click, rub or gallop, holosystolic murmur 3/6 on right upper sternal border  Abd: soft, NT, ND, BS present; no masses, no organomegaly  Ext: no c/c/e  Pulses: 2+, symmetric  Skin: color, texture, turgor normal. No rashes or lesions  Neuro: CN II-XII grossly intact, no focal numbness or weakness, normal strength and tone       Recent Labs  Lab 04/15/18  1514   WBC 15.99*   HGB 8.4*   HCT 25.5*          Recent Labs  Lab 04/15/18  1514      K 4.0      CO2 20*   BUN 70*   CREATININE 5.4*   *   CALCIUM 8.9       Recent Labs  Lab 04/15/18  1514   ALKPHOS 80   ALT 18   AST 17   ALBUMIN 3.1*   PROT 8.3   BILITOT 0.7   INR 1.1      Recent Labs      04/15/18   1514   TROPONINI  0.144*       Imaging Results          X-Ray Chest AP Portable (Final result)  Result time 04/15/18 16:29:24    Final result by Frederic Maria MD (04/15/18 16:29:24)                 Impression:      As above.      Electronically signed by: Frederic Maria MD  Date:    04/15/2018  Time:    16:29             Narrative:    EXAMINATION:  XR CHEST AP PORTABLE    CLINICAL HISTORY:  CHF;    TECHNIQUE:  Single frontal view of the chest was performed.    COMPARISON:  Chest one view 10/14/2017    FINDINGS:  There is patchy bilateral lung opacification that may reflect edema, hemorrhage, aspiration, infection, or sepsis.   No pneumothorax.  There is nonspecific enlargement of the cardiac silhouette, mediastinum, and pulmonary vasculature.  The osseous structures are unremarkable.                              ECHO 10/2017  CONCLUSIONS     1 - Moderate left atrial enlargement.     2 - Concentric hypertrophy.     3 - No wall motion abnormalities.     4 - Normal left ventricular systolic function (EF 60-65%).     5 - Impaired LV relaxation, normal LAP (grade 1 diastolic dysfunction).     6 - Normal right ventricular systolic function .     7 - Mild aortic regurgitation.     8 - Moderate aortic stenosis .       Active Hospital Problems    Diagnosis  POA    *Acute hypoxemic respiratory failure [J96.01]  Yes    Type 2 diabetes mellitus with stage 5 chronic kidney disease and hypertension [E11.22, I12.0, N18.5]  Yes    Acute on chronic diastolic heart failure [I50.33]  Yes    Community acquired bacterial pneumonia [J15.9]  Yes    Type 2 diabetes mellitus with hyperlipidemia [E11.69, E78.5]  Yes      Resolved Hospital Problems    Diagnosis Date Resolved POA   No resolved problems to display.       Overview 73 yo with CKD V and diastolic dysfunction is here for SOB x 4 days      Assessment and Plan for Problems addressed today:    Acute hypoxic respiratory failure  Acute on chronic diastolic Heart Failure  Probable Community Acquired Pneumonia, bacterial  Pulmonary edema  Patient received nitroglycerin paste, albuterol treatment, and IV Furosemide in ER   Awaiting ABG  Starting Furosemide 60mg IV TID. Strict I/Os. Daily weights. Fluid restriction 1L  Given ceftriaxone and azithromycin in ER. Will continue for 5 day course at present.   2D echo. Elevated Troponin from baseline. Will trend.   Albuterol nebs and BIPAP as needed.    Diabetes type 2 with CKD stage V and Hypertension  Home medications include detemir 20 units at bedtime and aspart 8 units with meals. Hemoglobin a1c is 8.3. Will give detemir 10 units at bedtime and aspart 4  units with meals. Will change home nifedipine to amlodipine 5mg once daily based on nephrology recommendations on previous stay.   Nephrology consult to follow along with diuresis. She may need initiation of dialysis during stay    Type 2 diabetes with hyperlipidemia  continue atorvastatin 20mg daily       DVT PPx: heparin 5000 unit subq TID    Provider  Scribe Attestation: I personally scribed for Dr. Patricia Dai  on 04/15/2018 at 4:55 PM. Electronically signed by gisele Austin on 04/15/2018 at 4:55 PM.

## 2018-04-15 NOTE — ED NOTES
Patient identifiers verified and correct for Martha Melvin.  LOC: The patient is awake, alert and aware of environment with an appropriate affect, the patient is oriented x 3 and speaking appropriately.   APPEARANCE: Patient appears comfortable and in no acute distress at this time, patient is clean and well groomed.  SKIN: The skin is warm and dry, color consistent with ethnicity, patient has normal skin turgor and moist mucus membranes, skin intact, no breakdown or bruising noted.   MUSCULOSKELETAL: Patient moving all extremities spontaneously, no swelling noted. Pt has right BKA.  RESPIRATORY: Airway is open and patent, respirations are spontaneous, patient has a labored effort and tachy rate, no accessory muscle use noted, pt placed on continuous pulse ox with O2 sats noted at 97% on 12/8 via BiPAP.  CARDIAC: Pt placed on cardiac monitor. Patient has a normal rate and regular rhythm, no edema noted, capillary refill < 3 seconds.   GASTRO: Soft and non tender to palpation, no distention noted, normoactive bowel sounds present in all four quadrants. Pt states bowel movements have been regular.  : Pt denies any pain or frequency with urination.  NEURO: Pt opens eyes spontaneously, behavior appropriate to situation, follows commands, facial expression symmetrical, bilateral hand grasp equal and even, purposeful motor response noted, normal sensation in all extremities when touched with a finger.

## 2018-04-15 NOTE — ED PROVIDER NOTES
Encounter Date: 4/15/2018       History     Chief Complaint   Patient presents with    Shortness of Breath     SOB onset 4 days ago, oxygen sat on scene 90% RA, placed on CPAP, oxygen sat now 82%, denies chest pain. EMS reports wheezing and deminished in all lobes     Disposition 72-year-old female with a history of diabetes, hypertension, CKD who presents with shortness of breath for 4 days.  Has had prior admission for shortness of breath but stated that for the last 4 days shortness of breath worsening.  Denies cough, fever, chills, chest pain, palpitations, nausea, vomiting.  Patient denies lower extremity edema, cough pain, recent travel, recent immobilization.  The patient was brought in by EMS and per EMS did get an albuterol treatment in route.  Also, patient was placed on BiPAP per EMS.          Review of patient's allergies indicates:   Allergen Reactions    Morphine      Past Medical History:   Diagnosis Date    Arthritis     Diabetes mellitus     Hyperlipidemia     Hypertension     Renal disorder     poor kidney function     Past Surgical History:   Procedure Laterality Date    FOOT AMPUTATION Right      History reviewed. No pertinent family history.  Social History   Substance Use Topics    Smoking status: Never Smoker    Smokeless tobacco: Not on file    Alcohol use No     Review of Systems   Constitutional: Negative for fever.   HENT: Negative for sore throat.    Respiratory: Positive for shortness of breath and wheezing. Negative for chest tightness.    Cardiovascular: Negative for chest pain.   Gastrointestinal: Negative for nausea.   Genitourinary: Negative for dysuria.   Musculoskeletal: Negative for back pain.   Skin: Negative for rash.   Neurological: Negative for weakness.   Hematological: Does not bruise/bleed easily.       Physical Exam     Initial Vitals [04/15/18 1447]   BP Pulse Resp Temp SpO2   (!) 167/94 85 (!) 34 -- (!) 82 %      MAP       118.33         Physical  Exam    Constitutional: She is not diaphoretic. She appears distressed.   Patient on BiPAP with mild respiratory distress, but speaking in full sentences and answering questions when taking the history   HENT:   Head: Normocephalic and atraumatic.   Eyes: EOM are normal. Pupils are equal, round, and reactive to light.   Neck: Normal range of motion.   Cardiovascular: Normal rate, regular rhythm and normal heart sounds. Exam reveals no gallop and no friction rub.    No murmur heard.  Pulmonary/Chest: No respiratory distress. She has wheezes. She has no rhonchi. She has rales.   Abdominal: Soft. Bowel sounds are normal. She exhibits no distension. There is no tenderness. There is no rebound and no guarding.   Musculoskeletal: She exhibits no edema or tenderness.        Legs:  Right lower extremity amputation.   Neurological: She is alert and oriented to person, place, and time.   Skin: Skin is warm.         ED Course   Critical Care  Date/Time: 4/15/2018 5:10 PM  Performed by: ROMARIO SINGH  Authorized by: ROMARIO SINGH   Direct patient critical care time: 20 minutes  Additional history critical care time: 5 minutes  Ordering / reviewing critical care time: 5 minutes  Documentation critical care time: 5 minutes  Total critical care time (exclusive of procedural time) : 35 minutes  Critical care was necessary to treat or prevent imminent or life-threatening deterioration of the following conditions: cardiac failure, circulatory failure and respiratory failure.  Critical care was time spent personally by me on the following activities: evaluation of patient's response to treatment, obtaining history from patient or surrogate, pulse oximetry, examination of patient, ordering and performing treatments and interventions, ordering and review of radiographic studies and re-evaluation of patient's condition.  Comments: Bipap management.         Labs Reviewed   CBC W/ AUTO DIFFERENTIAL - Abnormal; Notable for  the following:        Result Value    WBC 15.99 (*)     RBC 3.53 (*)     Hemoglobin 8.4 (*)     Hematocrit 25.5 (*)     MCV 72 (*)     MCH 23.8 (*)     RDW 16.1 (*)     Gran # (ANC) 13.5 (*)     Immature Grans (Abs) 0.07 (*)     Mono # 1.4 (*)     Gran% 84.1 (*)     Lymph% 6.5 (*)     All other components within normal limits   COMPREHENSIVE METABOLIC PANEL - Abnormal; Notable for the following:     CO2 20 (*)     Glucose 222 (*)     BUN, Bld 70 (*)     Creatinine 5.4 (*)     Albumin 3.1 (*)     eGFR if  8.6 (*)     eGFR if non  7.4 (*)     All other components within normal limits   TROPONIN I - Abnormal; Notable for the following:     Troponin I 0.144 (*)     All other components within normal limits   B-TYPE NATRIURETIC PEPTIDE - Abnormal; Notable for the following:     BNP 1,051 (*)     All other components within normal limits   POCT GLUCOSE - Abnormal; Notable for the following:     POCT Glucose 223 (*)     All other components within normal limits   PROTIME-INR   URINALYSIS, REFLEX TO URINE CULTURE   POCT GLUCOSE MONITORING CONTINUOUS                   APC / Resident Notes:   This is a 72-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    Differential diagnosis: Pneumonia, COPD/CHF exacerbation, asthma exacerbation, electrolyte abnormality, MI, PE, dysrhythmia    Workup: We will continue to monitor the patient on BiPAP and adjust accordingly.  Labs, x-ray, nitroglycerin, Lasix, EKG ordered.    However, patient was able to be weaned off BiPAP and able to maintain her O2 saturation above 92% with a nasal cannula.    Labs with a white count of 15, creatinine is elevated but near baseline, chest x-ray with diffuse pulmonary edema but no consolidation, no electrolyte abnormalities.    Patient is started on an antibiotics and hospital medicine has been consulted for admission for further care.    Valentino Schulz MD    Emergency Medicine PGY 3  4:39 PM 4/15/2018             Attending Attestation:   Physician Attestation Statement for Resident:  As the supervising MD   Physician Attestation Statement: I have personally seen and examined this patient.   I agree with the above history. -: Had been evaluated at clinic, but no plans to dialyze. Had gradual and progressive dyspnea.   -: Upon arrival on cpap, felt better and was able to answer questions well. Lungs with coarse rales and wheeze bilaterally.   -: In er sx improved. Min diuresis with first dose of lasix. Will offer additional lasix. Known hx of renal insuff. Family reports Cr of up to 10 in past. Has outside nephrologist.                        Clinical Impression:   The primary encounter diagnosis was Renal insufficiency. Diagnoses of Shortness of breath and Pulmonary edema cardiac cause were also pertinent to this visit.                           Duke Ralph MD  04/15/18 [id number]

## 2018-04-15 NOTE — ED TRIAGE NOTES
Pt presents to the ED via EMS with c/o SOB onset 4 days ago. Pt arrived with CPAP that has gotten progressively worse. Pt states she became extremely short winded this morning with exertion. Pt denies any lung hx. Pt has hx of diabetes, HTN, fluid retention, and right BKA. Pt denies NVD, chest pain, or changes in elimination, or the need for previous intubation.

## 2018-04-16 ENCOUNTER — ANESTHESIA EVENT (OUTPATIENT)
Dept: INTENSIVE CARE | Facility: HOSPITAL | Age: 73
DRG: 208 | End: 2018-04-16
Payer: MEDICARE

## 2018-04-16 ENCOUNTER — ANESTHESIA (OUTPATIENT)
Dept: INTENSIVE CARE | Facility: HOSPITAL | Age: 73
DRG: 208 | End: 2018-04-16
Payer: MEDICARE

## 2018-04-16 ENCOUNTER — ANESTHESIA (OUTPATIENT)
Dept: TRANSPLANT | Facility: HOSPITAL | Age: 73
DRG: 208 | End: 2018-04-16
Payer: MEDICARE

## 2018-04-16 ENCOUNTER — ANESTHESIA EVENT (OUTPATIENT)
Dept: TRANSPLANT | Facility: HOSPITAL | Age: 73
DRG: 208 | End: 2018-04-16
Payer: MEDICARE

## 2018-04-16 PROBLEM — N18.5 ANEMIA, CHRONIC RENAL FAILURE, STAGE 5: Status: ACTIVE | Noted: 2018-04-16

## 2018-04-16 PROBLEM — I46.9 CARDIAC ARREST: Status: ACTIVE | Noted: 2018-04-16

## 2018-04-16 PROBLEM — N18.5 STAGE 5 CHRONIC KIDNEY DISEASE NOT ON CHRONIC DIALYSIS: Status: ACTIVE | Noted: 2017-10-16

## 2018-04-16 PROBLEM — D63.1 ANEMIA, CHRONIC RENAL FAILURE, STAGE 5: Status: ACTIVE | Noted: 2018-04-16

## 2018-04-16 PROBLEM — N28.9 RENAL INSUFFICIENCY: Status: ACTIVE | Noted: 2018-04-16

## 2018-04-16 LAB
ABO + RH BLD: NORMAL
ALBUMIN SERPL BCP-MCNC: 2.8 G/DL
ALLENS TEST: ABNORMAL
ALLENS TEST: ABNORMAL
ANION GAP SERPL CALC-SCNC: 14 MMOL/L
BASOPHILS # BLD AUTO: 0.02 K/UL
BASOPHILS NFR BLD: 0.1 %
BLD GP AB SCN CELLS X3 SERPL QL: NORMAL
BUN SERPL-MCNC: 76 MG/DL
CALCIUM SERPL-MCNC: 8.3 MG/DL
CHLORIDE SERPL-SCNC: 104 MMOL/L
CO2 SERPL-SCNC: 18 MMOL/L
CREAT SERPL-MCNC: 5.9 MG/DL
DELSYS: ABNORMAL
DELSYS: ABNORMAL
DIASTOLIC DYSFUNCTION: YES
DIFFERENTIAL METHOD: ABNORMAL
EOSINOPHIL # BLD AUTO: 0 K/UL
EOSINOPHIL NFR BLD: 0.1 %
ERYTHROCYTE [DISTWIDTH] IN BLOOD BY AUTOMATED COUNT: 15.9 %
ERYTHROCYTE [SEDIMENTATION RATE] IN BLOOD BY WESTERGREN METHOD: 26 MM/H
EST. GFR  (AFRICAN AMERICAN): 7.7 ML/MIN/1.73 M^2
EST. GFR  (NON AFRICAN AMERICAN): 6.7 ML/MIN/1.73 M^2
ESTIMATED AVG GLUCOSE: 183 MG/DL
ESTIMATED PA SYSTOLIC PRESSURE: 64
FIO2: 100
GLUCOSE SERPL-MCNC: 250 MG/DL
HBA1C MFR BLD HPLC: 8 %
HCO3 UR-SCNC: 15.5 MMOL/L (ref 24–28)
HCO3 UR-SCNC: 20.9 MMOL/L (ref 24–28)
HCT VFR BLD AUTO: 22.7 %
HCT VFR BLD CALC: 22 %PCV (ref 36–54)
HGB BLD-MCNC: 7.3 G/DL
IMM GRANULOCYTES # BLD AUTO: 0.06 K/UL
IMM GRANULOCYTES NFR BLD AUTO: 0.4 %
INR PPP: 1.1
LACTATE SERPL-SCNC: 9.5 MMOL/L
LYMPHOCYTES # BLD AUTO: 0.9 K/UL
LYMPHOCYTES NFR BLD: 6 %
MAGNESIUM SERPL-MCNC: 1.9 MG/DL
MAGNESIUM SERPL-MCNC: 2 MG/DL
MCH RBC QN AUTO: 23.7 PG
MCHC RBC AUTO-ENTMCNC: 32.2 G/DL
MCV RBC AUTO: 74 FL
MIN VOL: 11.2
MODE: ABNORMAL
MONOCYTES # BLD AUTO: 1.5 K/UL
MONOCYTES NFR BLD: 9.7 %
NEUTROPHILS # BLD AUTO: 12.7 K/UL
NEUTROPHILS NFR BLD: 83.7 %
NRBC BLD-RTO: 0 /100 WBC
PCO2 BLDA: 50.7 MMHG (ref 35–45)
PCO2 BLDA: 76.1 MMHG (ref 35–45)
PEEP: 10
PH SMN: 7.05 [PH] (ref 7.35–7.45)
PH SMN: 7.09 [PH] (ref 7.35–7.45)
PHOSPHATE SERPL-MCNC: 4.2 MG/DL
PHOSPHATE SERPL-MCNC: 6.9 MG/DL
PIP: 25
PLATELET # BLD AUTO: 218 K/UL
PMV BLD AUTO: 11 FL
PO2 BLDA: 31 MMHG (ref 40–60)
PO2 BLDA: 49 MMHG (ref 80–100)
POC BE: -10 MMOL/L
POC BE: -14 MMOL/L
POC IONIZED CALCIUM: 1 MMOL/L (ref 1.06–1.42)
POC SATURATED O2: 36 % (ref 95–100)
POC SATURATED O2: 69 % (ref 95–100)
POC TCO2: 17 MMOL/L (ref 23–27)
POC TCO2: 23 MMOL/L (ref 24–29)
POCT GLUCOSE: 197 MG/DL (ref 70–110)
POCT GLUCOSE: 295 MG/DL (ref 70–110)
POTASSIUM BLD-SCNC: 4.8 MMOL/L (ref 3.5–5.1)
POTASSIUM SERPL-SCNC: 3.8 MMOL/L
PROTHROMBIN TIME: 11.6 SEC
RBC # BLD AUTO: 3.08 M/UL
RETIRED EF AND QEF - SEE NOTES: 55 (ref 55–65)
SAMPLE: ABNORMAL
SAMPLE: ABNORMAL
SITE: ABNORMAL
SITE: ABNORMAL
SODIUM BLD-SCNC: 140 MMOL/L (ref 136–145)
SODIUM SERPL-SCNC: 136 MMOL/L
SP02: 64
TRICUSPID VALVE REGURGITATION: ABNORMAL
TROPONIN I SERPL DL<=0.01 NG/ML-MCNC: 0.22 NG/ML
TROPONIN I SERPL DL<=0.01 NG/ML-MCNC: 0.22 NG/ML
VT: 364
WBC # BLD AUTO: 15.2 K/UL

## 2018-04-16 PROCEDURE — 99900035 HC TECH TIME PER 15 MIN (STAT)

## 2018-04-16 PROCEDURE — 93306 TTE W/DOPPLER COMPLETE: CPT | Mod: 26,,, | Performed by: INTERNAL MEDICINE

## 2018-04-16 PROCEDURE — 84100 ASSAY OF PHOSPHORUS: CPT

## 2018-04-16 PROCEDURE — 87040 BLOOD CULTURE FOR BACTERIA: CPT

## 2018-04-16 PROCEDURE — 63600175 PHARM REV CODE 636 W HCPCS: Performed by: INTERNAL MEDICINE

## 2018-04-16 PROCEDURE — 83036 HEMOGLOBIN GLYCOSYLATED A1C: CPT

## 2018-04-16 PROCEDURE — 85025 COMPLETE CBC W/AUTO DIFF WBC: CPT | Mod: 91

## 2018-04-16 PROCEDURE — 97161 PT EVAL LOW COMPLEX 20 MIN: CPT

## 2018-04-16 PROCEDURE — G8978 MOBILITY CURRENT STATUS: HCPCS | Mod: CJ

## 2018-04-16 PROCEDURE — 85610 PROTHROMBIN TIME: CPT

## 2018-04-16 PROCEDURE — 97535 SELF CARE MNGMENT TRAINING: CPT

## 2018-04-16 PROCEDURE — G8979 MOBILITY GOAL STATUS: HCPCS | Mod: CI

## 2018-04-16 PROCEDURE — 20000000 HC ICU ROOM

## 2018-04-16 PROCEDURE — 94640 AIRWAY INHALATION TREATMENT: CPT

## 2018-04-16 PROCEDURE — 63600175 PHARM REV CODE 636 W HCPCS: Performed by: NURSE PRACTITIONER

## 2018-04-16 PROCEDURE — 25000242 PHARM REV CODE 250 ALT 637 W/ HCPCS: Performed by: PHYSICIAN ASSISTANT

## 2018-04-16 PROCEDURE — 76937 US GUIDE VASCULAR ACCESS: CPT | Performed by: ANESTHESIOLOGY

## 2018-04-16 PROCEDURE — 84132 ASSAY OF SERUM POTASSIUM: CPT

## 2018-04-16 PROCEDURE — 84295 ASSAY OF SERUM SODIUM: CPT

## 2018-04-16 PROCEDURE — 63600175 PHARM REV CODE 636 W HCPCS: Performed by: ANESTHESIOLOGY

## 2018-04-16 PROCEDURE — 25000003 PHARM REV CODE 250: Performed by: PHYSICIAN ASSISTANT

## 2018-04-16 PROCEDURE — 82330 ASSAY OF CALCIUM: CPT

## 2018-04-16 PROCEDURE — 97165 OT EVAL LOW COMPLEX 30 MIN: CPT

## 2018-04-16 PROCEDURE — 83540 ASSAY OF IRON: CPT

## 2018-04-16 PROCEDURE — 63600175 PHARM REV CODE 636 W HCPCS

## 2018-04-16 PROCEDURE — 37799 UNLISTED PX VASCULAR SURGERY: CPT

## 2018-04-16 PROCEDURE — 03HY32Z INSERTION OF MONITORING DEVICE INTO UPPER ARTERY, PERCUTANEOUS APPROACH: ICD-10-PCS | Performed by: INTERNAL MEDICINE

## 2018-04-16 PROCEDURE — 84484 ASSAY OF TROPONIN QUANT: CPT

## 2018-04-16 PROCEDURE — 85730 THROMBOPLASTIN TIME PARTIAL: CPT

## 2018-04-16 PROCEDURE — 83735 ASSAY OF MAGNESIUM: CPT

## 2018-04-16 PROCEDURE — 99223 1ST HOSP IP/OBS HIGH 75: CPT | Mod: GC,,, | Performed by: INTERNAL MEDICINE

## 2018-04-16 PROCEDURE — G8987 SELF CARE CURRENT STATUS: HCPCS | Mod: CK

## 2018-04-16 PROCEDURE — 83880 ASSAY OF NATRIURETIC PEPTIDE: CPT

## 2018-04-16 PROCEDURE — 83605 ASSAY OF LACTIC ACID: CPT

## 2018-04-16 PROCEDURE — 97530 THERAPEUTIC ACTIVITIES: CPT

## 2018-04-16 PROCEDURE — 86850 RBC ANTIBODY SCREEN: CPT

## 2018-04-16 PROCEDURE — 94002 VENT MGMT INPAT INIT DAY: CPT

## 2018-04-16 PROCEDURE — 36556 INSERT NON-TUNNEL CV CATH: CPT | Mod: ,,, | Performed by: ANESTHESIOLOGY

## 2018-04-16 PROCEDURE — 5A1945Z RESPIRATORY VENTILATION, 24-96 CONSECUTIVE HOURS: ICD-10-PCS | Performed by: INTERNAL MEDICINE

## 2018-04-16 PROCEDURE — 80069 RENAL FUNCTION PANEL: CPT

## 2018-04-16 PROCEDURE — 85014 HEMATOCRIT: CPT

## 2018-04-16 PROCEDURE — 82803 BLOOD GASES ANY COMBINATION: CPT

## 2018-04-16 PROCEDURE — G8988 SELF CARE GOAL STATUS: HCPCS | Mod: CI

## 2018-04-16 PROCEDURE — 31500 INSERT EMERGENCY AIRWAY: CPT

## 2018-04-16 PROCEDURE — 86920 COMPATIBILITY TEST SPIN: CPT

## 2018-04-16 PROCEDURE — 99233 SBSQ HOSP IP/OBS HIGH 50: CPT | Mod: ,,, | Performed by: INTERNAL MEDICINE

## 2018-04-16 PROCEDURE — 94761 N-INVAS EAR/PLS OXIMETRY MLT: CPT

## 2018-04-16 PROCEDURE — 25000242 PHARM REV CODE 250 ALT 637 W/ HCPCS: Performed by: INTERNAL MEDICINE

## 2018-04-16 PROCEDURE — 25000003 PHARM REV CODE 250: Performed by: INTERNAL MEDICINE

## 2018-04-16 PROCEDURE — 0BH18EZ INSERTION OF ENDOTRACHEAL AIRWAY INTO TRACHEA, VIA NATURAL OR ARTIFICIAL OPENING ENDOSCOPIC: ICD-10-PCS | Performed by: ANESTHESIOLOGY

## 2018-04-16 PROCEDURE — 36415 COLL VENOUS BLD VENIPUNCTURE: CPT

## 2018-04-16 PROCEDURE — 31500 INSERT EMERGENCY AIRWAY: CPT | Mod: ,,, | Performed by: ANESTHESIOLOGY

## 2018-04-16 PROCEDURE — 82728 ASSAY OF FERRITIN: CPT

## 2018-04-16 PROCEDURE — 93306 TTE W/DOPPLER COMPLETE: CPT

## 2018-04-16 PROCEDURE — 25000003 PHARM REV CODE 250: Performed by: ANESTHESIOLOGY

## 2018-04-16 PROCEDURE — 80053 COMPREHEN METABOLIC PANEL: CPT

## 2018-04-16 RX ORDER — SODIUM CHLORIDE 9 MG/ML
INJECTION, SOLUTION INTRAVENOUS
Status: CANCELLED | OUTPATIENT
Start: 2018-04-16

## 2018-04-16 RX ORDER — CISATRACURIUM BESYLATE 2 MG/ML
INJECTION, SOLUTION INTRAVENOUS
Status: COMPLETED
Start: 2018-04-16 | End: 2018-04-17

## 2018-04-16 RX ORDER — SODIUM CHLORIDE 9 MG/ML
INJECTION, SOLUTION INTRAVENOUS ONCE
Status: CANCELLED | OUTPATIENT
Start: 2018-04-16 | End: 2018-04-16

## 2018-04-16 RX ORDER — MAGNESIUM SULFATE HEPTAHYDRATE 40 MG/ML
2 INJECTION, SOLUTION INTRAVENOUS
Status: DISCONTINUED | OUTPATIENT
Start: 2018-04-17 | End: 2018-04-17 | Stop reason: ALTCHOICE

## 2018-04-16 RX ORDER — FUROSEMIDE 10 MG/ML
120 INJECTION INTRAMUSCULAR; INTRAVENOUS ONCE
Status: DISCONTINUED | OUTPATIENT
Start: 2018-04-16 | End: 2018-04-16

## 2018-04-16 RX ORDER — EPINEPHRINE 0.1 MG/ML
INJECTION INTRAVENOUS
Status: DISPENSED
Start: 2018-04-16 | End: 2018-04-17

## 2018-04-16 RX ORDER — AMLODIPINE BESYLATE 2.5 MG/1
2.5 TABLET ORAL DAILY
Status: DISCONTINUED | OUTPATIENT
Start: 2018-04-17 | End: 2018-04-17

## 2018-04-16 RX ORDER — FENTANYL CITRATE-0.9 % NACL/PF 10 MCG/ML
PLASTIC BAG, INJECTION (ML) INTRAVENOUS CONTINUOUS
Status: DISCONTINUED | OUTPATIENT
Start: 2018-04-17 | End: 2018-04-18

## 2018-04-16 RX ORDER — FUROSEMIDE 10 MG/ML
80 INJECTION INTRAMUSCULAR; INTRAVENOUS ONCE
Status: COMPLETED | OUTPATIENT
Start: 2018-04-16 | End: 2018-04-16

## 2018-04-16 RX ORDER — FUROSEMIDE 10 MG/ML
120 INJECTION INTRAMUSCULAR; INTRAVENOUS ONCE
Status: COMPLETED | OUTPATIENT
Start: 2018-04-16 | End: 2018-04-16

## 2018-04-16 RX ORDER — EPINEPHRINE 0.1 MG/ML
INJECTION INTRAVENOUS CODE/TRAUMA/SEDATION MEDICATION
Status: COMPLETED | OUTPATIENT
Start: 2018-04-16 | End: 2018-04-16

## 2018-04-16 RX ORDER — EPINEPHRINE 1 MG/ML
INJECTION INTRAMUSCULAR; INTRAVENOUS; SUBCUTANEOUS
Status: DISPENSED
Start: 2018-04-16 | End: 2018-04-17

## 2018-04-16 RX ORDER — HYDROCODONE BITARTRATE AND ACETAMINOPHEN 500; 5 MG/1; MG/1
TABLET ORAL CONTINUOUS
Status: DISCONTINUED | OUTPATIENT
Start: 2018-04-17 | End: 2018-04-17 | Stop reason: ALTCHOICE

## 2018-04-16 RX ORDER — FLUTICASONE PROPIONATE 50 MCG
2 SPRAY, SUSPENSION (ML) NASAL DAILY
Status: DISCONTINUED | OUTPATIENT
Start: 2018-04-16 | End: 2018-04-17

## 2018-04-16 RX ORDER — PROPOFOL 10 MG/ML
INJECTION, EMULSION INTRAVENOUS
Status: COMPLETED
Start: 2018-04-16 | End: 2018-04-16

## 2018-04-16 RX ADMIN — EPINEPHRINE 0.7 MCG/KG/MIN: 1 INJECTION INTRAMUSCULAR; INTRAVENOUS; SUBCUTANEOUS at 11:04

## 2018-04-16 RX ADMIN — STANDARDIZED SENNA CONCENTRATE AND DOCUSATE SODIUM 1 TABLET: 8.6; 5 TABLET, FILM COATED ORAL at 08:04

## 2018-04-16 RX ADMIN — AZITHROMYCIN MONOHYDRATE 250 MG: 500 INJECTION, POWDER, LYOPHILIZED, FOR SOLUTION INTRAVENOUS at 12:04

## 2018-04-16 RX ADMIN — ATORVASTATIN CALCIUM 20 MG: 20 TABLET, FILM COATED ORAL at 08:04

## 2018-04-16 RX ADMIN — HEPARIN SODIUM 5000 UNITS: 5000 INJECTION, SOLUTION INTRAVENOUS; SUBCUTANEOUS at 02:04

## 2018-04-16 RX ADMIN — EPINEPHRINE 1 MG: 0.1 INJECTION, SOLUTION ENDOTRACHEAL; INTRACARDIAC; INTRAVENOUS at 09:04

## 2018-04-16 RX ADMIN — INSULIN ASPART 4 UNITS: 100 INJECTION, SOLUTION INTRAVENOUS; SUBCUTANEOUS at 05:04

## 2018-04-16 RX ADMIN — PROPOFOL 20 MCG/KG/MIN: 10 INJECTION, EMULSION INTRAVENOUS at 11:04

## 2018-04-16 RX ADMIN — CHLOROTHIAZIDE SODIUM 250 MG: 500 INJECTION, POWDER, LYOPHILIZED, FOR SOLUTION INTRAVENOUS at 04:04

## 2018-04-16 RX ADMIN — GUAIFENESIN AND DEXTROMETHORPHAN HYDROBROMIDE 1 TABLET: 600; 30 TABLET, EXTENDED RELEASE ORAL at 05:04

## 2018-04-16 RX ADMIN — ALBUTEROL SULFATE 2.5 MG: 2.5 SOLUTION RESPIRATORY (INHALATION) at 10:04

## 2018-04-16 RX ADMIN — EPINEPHRINE 1 MCG/MIN: 1 INJECTION INTRAMUSCULAR; INTRAVENOUS; SUBCUTANEOUS at 09:04

## 2018-04-16 RX ADMIN — INSULIN ASPART 4 UNITS: 100 INJECTION, SOLUTION INTRAVENOUS; SUBCUTANEOUS at 12:04

## 2018-04-16 RX ADMIN — AMLODIPINE BESYLATE 5 MG: 5 TABLET ORAL at 08:04

## 2018-04-16 RX ADMIN — FUROSEMIDE 60 MG: 10 INJECTION, SOLUTION INTRAMUSCULAR; INTRAVENOUS at 09:04

## 2018-04-16 RX ADMIN — FUROSEMIDE 10 MG/HR: 10 INJECTION, SOLUTION INTRAVENOUS at 02:04

## 2018-04-16 RX ADMIN — INSULIN ASPART 4 UNITS: 100 INJECTION, SOLUTION INTRAVENOUS; SUBCUTANEOUS at 08:04

## 2018-04-16 RX ADMIN — Medication 150 MCG/HR: at 11:04

## 2018-04-16 RX ADMIN — Medication 50 MCG/HR: at 11:04

## 2018-04-16 RX ADMIN — CEFTRIAXONE SODIUM 1 G: 1 INJECTION, POWDER, FOR SOLUTION INTRAMUSCULAR; INTRAVENOUS at 08:04

## 2018-04-16 RX ADMIN — FLUTICASONE PROPIONATE 100 MCG: 50 SPRAY, METERED NASAL at 09:04

## 2018-04-16 RX ADMIN — ALBUTEROL SULFATE 2.5 MG: 2.5 SOLUTION RESPIRATORY (INHALATION) at 04:04

## 2018-04-16 RX ADMIN — SODIUM BICARBONATE 50 MEQ: 84 INJECTION, SOLUTION INTRAVENOUS at 09:04

## 2018-04-16 RX ADMIN — FUROSEMIDE 120 MG: 10 INJECTION, SOLUTION INTRAMUSCULAR; INTRAVENOUS at 04:04

## 2018-04-16 RX ADMIN — FUROSEMIDE 80 MG: 10 INJECTION, SOLUTION INTRAMUSCULAR; INTRAVENOUS at 02:04

## 2018-04-16 RX ADMIN — EPINEPHRINE 1 MG: 0.1 INJECTION, SOLUTION ENDOTRACHEAL; INTRACARDIAC; INTRAVENOUS at 10:04

## 2018-04-16 NOTE — PLAN OF CARE
Problem: Physical Therapy Goal  Goal: Physical Therapy Goal  Goals to be met by: 18     Patient will increase functional independence with mobility by performin. Bed to chair transfer with Stand-by Assistance using Rolling Walker PRN.    2. Gait  x 50 feet with Stand-by Assistance using Rolling Walker PRN, with supp O2 PRN.   3. Lower extremity exercise program x 20 reps per handout, with assistance as needed.    Outcome: Ongoing (interventions implemented as appropriate)  PT goals established.

## 2018-04-16 NOTE — PLAN OF CARE
PCP- BRIAN COPPOLA    PT HAS A RIDE HOME AND FAMILY SUPPORT WITH ADULT DAUGHTER       04/16/18 1420   Discharge Assessment   Assessment Type Discharge Planning Assessment   Confirmed/corrected address and phone number on facesheet? Yes   Assessment information obtained from? Patient   Expected Length of Stay (days) 3   Communicated expected length of stay with patient/caregiver yes   Prior to hospitilization cognitive status: Alert/Oriented   Prior to hospitalization functional status: Assistive Equipment   Current cognitive status: Alert/Oriented   Current Functional Status: Assistive Equipment   Lives With alone   Able to Return to Prior Arrangements yes   Is patient able to care for self after discharge? Yes   Patient's perception of discharge disposition home health;home or selfcare   Readmission Within The Last 30 Days no previous admission in last 30 days   Patient currently being followed by outpatient case management? No   Patient currently receives any other outside agency services? No   Equipment Currently Used at Home bedside commode;cane, straight;wheelchair;shower chair;grab bar   Do you have any problems affording any of your prescribed medications? No   Is the patient taking medications as prescribed? yes   Does the patient have transportation home? Yes   Transportation Available car;family or friend will provide   Does the patient receive services at the [facility]? No   Discharge Plan A Home with family   Discharge Plan B Home with family   Patient/Family In Agreement With Plan yes

## 2018-04-16 NOTE — ED PROVIDER NOTES
Encounter Date: 4/15/2018       History     Chief Complaint   Patient presents with    Shortness of Breath     SOB onset 4 days ago, oxygen sat on scene 90% RA, placed on CPAP, oxygen sat now 82%, denies chest pain. EMS reports wheezing and deminished in all lobes     HPI  Review of patient's allergies indicates:   Allergen Reactions    Morphine      Past Medical History:   Diagnosis Date    Arthritis     Diabetes mellitus     Hyperlipidemia     Hypertension     Renal disorder     poor kidney function     Past Surgical History:   Procedure Laterality Date    FOOT AMPUTATION Right      History reviewed. No pertinent family history.  Social History   Substance Use Topics    Smoking status: Never Smoker    Smokeless tobacco: Not on file    Alcohol use No     Review of Systems    Physical Exam     Initial Vitals   BP Pulse Resp Temp SpO2   04/15/18 1447 04/15/18 1447 04/15/18 1447 04/15/18 1617 04/15/18 1447   (!) 167/94 85 (!) 34 98.3 °F (36.8 °C) (!) 82 %      MAP       04/15/18 1447       118.33         Physical Exam    ED Course   Procedures  Labs Reviewed   CBC W/ AUTO DIFFERENTIAL - Abnormal; Notable for the following:        Result Value    WBC 15.99 (*)     RBC 3.53 (*)     Hemoglobin 8.4 (*)     Hematocrit 25.5 (*)     MCV 72 (*)     MCH 23.8 (*)     RDW 16.1 (*)     Gran # (ANC) 13.5 (*)     Immature Grans (Abs) 0.07 (*)     Mono # 1.4 (*)     Gran% 84.1 (*)     Lymph% 6.5 (*)     All other components within normal limits   COMPREHENSIVE METABOLIC PANEL - Abnormal; Notable for the following:     CO2 20 (*)     Glucose 222 (*)     BUN, Bld 70 (*)     Creatinine 5.4 (*)     Albumin 3.1 (*)     eGFR if  8.6 (*)     eGFR if non  7.4 (*)     All other components within normal limits   TROPONIN I - Abnormal; Notable for the following:     Troponin I 0.144 (*)     All other components within normal limits   B-TYPE NATRIURETIC PEPTIDE - Abnormal; Notable for the following:      BNP 1,051 (*)     All other components within normal limits   URINALYSIS, REFLEX TO URINE CULTURE - Abnormal; Notable for the following:     Appearance, UA Cloudy (*)     Protein, UA 3+ (*)     Glucose, UA 2+ (*)     Occult Blood UA 2+ (*)     Leukocytes, UA 1+ (*)     All other components within normal limits    Narrative:     Preferred Collection Type->Urine, Clean Catch   URINALYSIS MICROSCOPIC - Abnormal; Notable for the following:     WBC, UA 16 (*)     WBC Clumps, UA Occasional (*)     Bacteria, UA Many (*)     All other components within normal limits    Narrative:     Preferred Collection Type->Urine, Clean Catch   POCT GLUCOSE - Abnormal; Notable for the following:     POCT Glucose 223 (*)     All other components within normal limits   ISTAT PROCEDURE - Abnormal; Notable for the following:     POC PH 7.335 (*)     POC PCO2 33.5 (*)     POC PO2 54 (*)     POC HCO3 17.8 (*)     POC SATURATED O2 86 (*)     POC TCO2 19 (*)     All other components within normal limits   CULTURE, URINE   PROTIME-INR   POCT GLUCOSE MONITORING CONTINUOUS                                  Clinical Impression:   The primary encounter diagnosis was Renal insufficiency. Diagnoses of Shortness of breath, Pulmonary edema cardiac cause, and Diastolic heart failure were also pertinent to this visit.                           Valentino Schulz MD  Resident  04/15/18 2011

## 2018-04-16 NOTE — PLAN OF CARE
Problem: Patient Care Overview  Goal: Plan of Care Review  Outcome: Ongoing (interventions implemented as appropriate)  Pt is AAOx3.Pt needs stand by assistance only. CBG monitored AC HS.  SS coverage given when appropriate.Standard precautions maintained.  Pt denies pian and/or discomfort at this time.Pt remains injury and fall free, non skid footwear donned, call light within reach, personal items within reach, bed in low/locked position, pt able to voice needs all needs voiced have been met at this time.

## 2018-04-16 NOTE — MEDICAL/APP STUDENT
Hospital Medicine  Progress note    Team: [facility] MED D Krissy Austin  Admit Date: 4/15/2018  SHAE 4/19/2018  Code status: Full Code    Principal Problem:  Acute hypoxemic respiratory failure    Interval hx:  Feeling a little better but requiring 10L of oxygen at this point.     ROS   Respiratory: no cough or shortness of breath  Cardiovascular: no chest pain or palpitations  Gastrointestinal: no nausea or vomiting, no abdominal pain or change in bowel habits  Behavioral/Psych: no depression or anxiety      PEx  Temp:  [98.3 °F (36.8 °C)-99.5 °F (37.5 °C)]   Pulse:  []   Resp:  [19-38]   BP: (106-172)/(56-94)   SpO2:  [82 %-97 %]     Intake/Output Summary (Last 24 hours) at 04/16/18 1146  Last data filed at 04/16/18 0900   Gross per 24 hour   Intake              370 ml   Output              800 ml   Net             -430 ml        General appearance: no distress, well-developed, well-nourished  Mental status: Alert and oriented x 3  HEENT:  conjunctivae/corneas clear, PERRL  Neck: supple, thyroid not enlarged  Pulm:   normal respiratory effort, CTA B, decreased breath sounds throughout, crackles from mid lung to bases bilaterally  Card: RRR, S1, S2 normal, click, rub or gallop, holosystolic murmur 3/6 on right upper sternal border  Abd: soft, NT, ND, BS present; no masses, no organomegaly  Ext: no c/c/e  Pulses: 2+, symmetric  Skin: color, texture, turgor normal. No rashes or lesions  Neuro: CN II-XII grossly intact, no focal numbness or weakness, normal strength and tone       Recent Labs  Lab 04/15/18  1514 04/16/18  0458   WBC 15.99* 15.20*   HGB 8.4* 7.3*   HCT 25.5* 22.7*    218       Recent Labs  Lab 04/15/18  1514 04/16/18  0458    136   K 4.0 3.8    104   CO2 20* 18*   BUN 70* 76*   CREATININE 5.4* 5.9*   * 250*   CALCIUM 8.9 8.3*   MG  --  1.9   PHOS  --  4.2       Recent Labs  Lab 04/15/18  1514 04/16/18  0458   ALKPHOS 80  --    ALT 18  --    AST 17  --    ALBUMIN 3.1*  2.8*   PROT 8.3  --    BILITOT 0.7  --    INR 1.1  --         Recent Labs  Lab 04/15/18  1703 04/15/18  2133 04/16/18  0754   POCTGLUCOSE 223* 236* 197*     Recent Labs      04/15/18   1514  04/15/18   2352  04/16/18   0759   TROPONINI  0.144*  0.218*  0.216*       Scheduled Meds:   [START ON 4/17/2018] amLODIPine  2.5 mg Oral Daily    atorvastatin  20 mg Oral Daily    azithromycin  250 mg Intravenous Daily    cefTRIAXone (ROCEPHIN) IVPB  1 g Intravenous Daily    epoetin bashir (PROCRIT) injection  5,000 Units Subcutaneous Every Mon, Wed, Fri    fluticasone  2 spray Each Nare Daily    furosemide  80 mg Intravenous Once    heparin (porcine)  5,000 Units Subcutaneous Q8H    insulin aspart U-100  4 Units Subcutaneous TIDWM    insulin detemir U-100  10 Units Subcutaneous QHS    senna-docusate 8.6-50 mg  1 tablet Oral BID     Continuous Infusions:   furosemide (LASIX) 2 mg/mL infusion (non-titrating)       As Needed:  acetaminophen, albuterol sulfate, dextromethorphan-guaifenesin  mg, dextrose 50%, dextrose 50%, glucagon (human recombinant), glucose, glucose, insulin aspart U-100, ondansetron, sodium chloride 0.9%    Active Hospital Problems    Diagnosis  POA    *Acute hypoxemic respiratory failure [J96.01]  Yes    Anemia, chronic renal failure, stage 5 [N18.5, D63.1]  Yes    Type 2 diabetes mellitus with stage 5 chronic kidney disease and hypertension [E11.22, I12.0, N18.5]  Yes    Acute on chronic diastolic heart failure [I50.33]  Yes    Community acquired bacterial pneumonia [J15.9]  Yes    Type 2 diabetes mellitus with hyperlipidemia [E11.69, E78.5]  Yes      Resolved Hospital Problems    Diagnosis Date Resolved POA   No resolved problems to display.       Overview 71 yo with CKD V and diastolic dysfunction is here for SOB x 4 days      Assessment and Plan for Problems addressed today:    Acute hypoxic respiratory failure  Acute on chronic diastolic Heart Failure  Probable Community Acquired  Pneumonia, bacterial  Pulmonary edema  Patient received nitroglycerin paste, albuterol treatment, and IV Furosemide in ER   Awaiting ABG  Starting Furosemide 60mg IV TID. Strict I/Os. Daily weights. Fluid restriction 1L  Given ceftriaxone and azithromycin in ER. Will continue for 5 day course at present.   2D echo. Elevated Troponin from baseline. Will trend.   Albuterol nebs and BIPAP as needed.  4/16/18  Not responsive to IV bolus will switch to continuous [id number] per hour     Diabetes type 2 with CKD stage V and Hypertension  Home medications include detemir 20 units at bedtime and aspart 8 units with meals. Hemoglobin a1c is 8.3. Will give detemir 10 units at bedtime and aspart 4 units with meals. Will change home nifedipine to amlodipine 5mg once daily based on nephrology recommendations on previous stay.   Nephrology consult to follow along with diuresis. She may need initiation of dialysis during stay  4/16/18 Blood pressure much improved with some number at low normal. Decrease amlodipine to 2.5mg. May consider stopping after initiation of dialysis if required.     Type 2 diabetes with hyperlipidemia  continue atorvastatin 20mg daily     Anemia of CKD Stage V  Iron studies tomorrow. Checking stool occult. Starting epogen 5000 subQ 3x week     Probable UTI  UA equivocal for infection. Urine culture pending. Current treatment ceftriaxone     DVT PPx: heparin 5000 unit subq TID    Discharge plan and follow up  Home with or without home health. Probable outpatient dialysis.     Provider   Dr. Patricia Hameed Attestation: I personally scribed for Patricia Dai MD on 04/16/2018 at 11:35 AM. Electronically signed by gisele Austin on 04/16/2018 at 11:35 AM.

## 2018-04-16 NOTE — PT/OT/SLP EVAL
Occupational Therapy   Evaluation/Treatment    Name: Martha Melvin  MRN: [id number]  Admitting Diagnosis:  Acute hypoxemic respiratory failure      Recommendations:     Discharge Recommendations: home with home health  Discharge Equipment Recommendations:  none  Barriers to discharge:  None    History:     Occupational Profile:  Living Environment: Patient lives alone in a 1 SH with no steps to enter and 1 step inside the house. Patient has a tub/shower with bench and grab bars (but does not use the bench. She sits in tub to take a bath). Patient has a BSC but uses a regular toilet. Patient has a rollator, w/c, and 2 straight canes. Patient did not have oxygen at home, but was on 10 L in room on nasal cannula after receiving a breathing treatment from respiratory. Patient reports being (I) with ADLs PTA.   Equipment Owned:  bedside commode, cane, straight, wheelchair, rollator, shower chair    Past Medical History:   Diagnosis Date    Arthritis     Diabetes mellitus     Hyperlipidemia     Hypertension     Renal disorder     poor kidney function       Past Surgical History:   Procedure Laterality Date    FOOT AMPUTATION Right        Subjective     Chief Complaint:impaired cardiopulmonary response to activity  Patient/Family stated goals: to go home  Communicated with: patient prior to session.  Pain/Comfort:  · Pain Rating 1: 0/10  · Pain Rating Post-Intervention 1: 0/10    Patients cultural, spiritual, Holiness conflicts given the current situation:      Objective:     Patient found with: peripheral IV, oxygen    General Precautions: Standard, fall   Orthopedic Precautions:N/A   Braces:  (R LE prosthesis)     Occupational Performance:    Bed Mobility:    · Patient completed Scooting/Bridging with supervision  · Patient completed Supine to Sit with supervision    Functional Mobility/Transfers:  · Patient completed Sit <> Stand Transfer with supervision  with  no assistive device   · Patient completed Bed <>  Chair Transfer using Stand Step Pivot technique with stand by assistance with no assistive device    Activities of Daily Living:  · UB Dressing: supervision    · LB Dressing: contact guard assistance Donning R prosthesis    Cognitive/Visual Perceptual:  Cognitive/Psychosocial Skills:     -       Oriented to: Person, Place, Time and Situation   -       Follows Commands/attention:Follows multistep  commands  -       Communication: clear/fluent  -       Memory: No Deficits noted  -       Safety awareness/insight to disability: intact   -       Mood/Affect/Coping skills/emotional control: Appropriate to situation  Visual/Perceptual:      -Intact    Physical Exam:  Postural examination/scapula alignment:    -       No postural abnormalities identified  Skin integrity: Visible skin intact  Edema:  None noted  Sensation:    -       Intact  Motor Planning: -       WFL  Dominant hand: -       Right  Upper Extremity Range of Motion:     -       Right Upper Extremity: WFL  -       Left Upper Extremity: WFL  Upper Extremity Strength:    -       Right Upper Extremity: WFL  -       Left Upper Extremity: WFL   Strength:    -       Right Upper Extremity: WFL  -       Left Upper Extremity: WFL  Fine Motor Coordination:    -       Intact  Gross motor coordination:   WFL    Patient left up in chair with call button in reach and all needs met.     AMPA 6 Click:  AMPAC Total Score: 18    Treatment & Education:  Safety, ADL retraining, functional mobility, discharge planning  Education:    Assessment:     Martha Melvin is a 72 y.o. female with a medical diagnosis of Acute hypoxemic respiratory failure.  Performance deficits affecting function are weakness, impaired endurance, impaired self care skills, impaired functional mobilty, gait instability, impaired balance, impaired cardiopulmonary response to activity.      Rehab Prognosis:  good; patient would benefit from acute skilled OT services to address these deficits and reach  "maximum level of function.         Clinical Decision Makin.  OT Low:  "Pt evaluation falls under low complexity for evaluation coding due to performance deficits noted in 1-3 areas as stated above and 0 co-morbities affecting current functional status. Data obtained from problem focused assessments. No modifications or assistance was required for completion of evaluation. Only brief occupational profile and history review completed."     Plan:     Patient to be seen 3 x/week to address the above listed problems via self-care/home management, therapeutic activities, therapeutic exercises  · Plan of Care Expires: 18  · Plan of Care Reviewed with: patient    This Plan of care has been discussed with the patient who was involved in its development and understands and is in agreement with the identified goals and treatment plan    GOALS:    Occupational Therapy Goals        Problem: Occupational Therapy Goal    Goal Priority Disciplines Outcome Interventions   Occupational Therapy Goal     OT, PT/OT Ongoing (interventions implemented as appropriate)    Description:  Goals to be met by: 2018     Patient will increase functional independence with ADLs by performing:    LE Dressing with Modified [place].  Grooming while standing with Modified [place].  Toileting from bedside commode with Supervision for hygiene and clothing management.   Stand pivot transfers with Modified [place].  Toilet transfer to bedside commode with Modified [place].  Patient will be independent with B UE ROM HEP 3 x 15 reps (rest breaks as needed) to increase functional endurance.                       Time Tracking:     OT Date of Treatment: 18  OT Start Time: 1012  OT Stop Time: 1039  OT Total Time (min): 27 min    Billable Minutes:Evaluation 15  Self Care/Home Management 12    VARUN Kessler  2018    "

## 2018-04-16 NOTE — SUBJECTIVE & OBJECTIVE
Past Medical History:   Diagnosis Date    Arthritis     Diabetes mellitus     Hyperlipidemia     Hypertension     Renal disorder     poor kidney function       Past Surgical History:   Procedure Laterality Date    FOOT AMPUTATION Right        Review of patient's allergies indicates:   Allergen Reactions    Morphine      Current Facility-Administered Medications   Medication Frequency    acetaminophen tablet 650 mg Q6H PRN    albuterol nebulizer solution 2.5 mg Q4H PRN    [START ON 4/17/2018] amLODIPine tablet 2.5 mg Daily    atorvastatin tablet 20 mg Daily    azithromycin (ZITHROMAX) 250 mg in sodium chloride 0.9% 250 mL IVPB Daily    cefTRIAXone injection 1 g Daily    dextromethorphan-guaifenesin  mg per 12 hr tablet 1 tablet BID PRN    dextrose 50% injection 12.5 g PRN    dextrose 50% injection 25 g PRN    epoetin bashir (PROCRIT) injection 5,000 units kit Every Mon, Wed, Fri    fluticasone 50 mcg/actuation nasal spray 100 mcg Daily    furosemide (LASIX) 2 mg/mL in sodium chloride 0.9% 100 mL infusion (conc: 2 mg/mL) Continuous    furosemide injection 80 mg Once    glucagon (human recombinant) injection 1 mg PRN    glucose chewable tablet 16 g PRN    glucose chewable tablet 24 g PRN    heparin (porcine) injection 5,000 Units Q8H    insulin aspart U-100 pen 0-5 Units QID (AC + HS) PRN    insulin aspart U-100 pen 4 Units TIDWM    insulin detemir U-100 pen 10 Units QHS    ondansetron disintegrating tablet 8 mg Q8H PRN    senna-docusate 8.6-50 mg per tablet 1 tablet BID    sodium chloride 0.9% flush 5 mL PRN     Family History     None        Social History Main Topics    Smoking status: Never Smoker    Smokeless tobacco: Not on file    Alcohol use No    Drug use: Unknown    Sexual activity: Not on file     Review of Systems   Respiratory: Positive for shortness of breath.    Genitourinary: Negative for difficulty urinating, dysuria and hematuria.   All other systems reviewed and  are negative.    Objective:     Vital Signs (Most Recent):  Temp: 98.7 °F (37.1 °C) (04/16/18 1136)  Pulse: 96 (04/16/18 1136)  Resp: 20 (04/16/18 1136)  BP: (!) 106/56 (04/16/18 1136)  SpO2: (!) 86 % (04/16/18 1015)  O2 Device (Oxygen Therapy): High Flow nasal Cannula (04/16/18 1345) Vital Signs (24h Range):  Temp:  [98.3 °F (36.8 °C)-99.5 °F (37.5 °C)] 98.7 °F (37.1 °C)  Pulse:  [] 96  Resp:  [19-38] 20  SpO2:  [82 %-97 %] 86 %  BP: (106-172)/(56-94) 106/56     Weight: 90.7 kg (200 lb) (04/15/18 2223)  Body mass index is 32.28 kg/m².  Body surface area is 2.06 meters squared.    I/O last 3 completed shifts:  In: 250 [IV Piggyback:250]  Out: 700 [Urine:700]    Physical Exam   Constitutional: She is oriented to person, place, and time. She appears well-developed and well-nourished. No distress.   HENT:   Head: Normocephalic and atraumatic.   Eyes: Conjunctivae and EOM are normal.   Neck: Neck supple. No thyromegaly present.   Cardiovascular: Normal rate and regular rhythm.    Pulmonary/Chest: She has wheezes. She has rales.   Abdominal: Soft. She exhibits no distension.   Musculoskeletal: She exhibits no edema (BLE without edema) or deformity.   Lymphadenopathy:     She has no cervical adenopathy.        Right: No supraclavicular adenopathy present.        Left: No supraclavicular adenopathy present.   Neurological: She is alert and oriented to person, place, and time.   Skin: Skin is warm and dry. She is not diaphoretic.       Significant Labs:  ABGs:   Recent Labs  Lab 04/15/18  1714   PH 7.335*   PCO2 33.5*   HCO3 17.8*   POCSATURATED 86*   BE -8     Cardiac Markers: No results for input(s): CKMB, TROPONINT, MYOGLOBIN in the last 168 hours.  CBC:   Recent Labs  Lab 04/16/18  0458   WBC 15.20*   RBC 3.08*   HGB 7.3*   HCT 22.7*      MCV 74*   MCH 23.7*   MCHC 32.2     CMP:   Recent Labs  Lab 04/15/18  1514 04/16/18  0458   * 250*   CALCIUM 8.9 8.3*   ALBUMIN 3.1* 2.8*   PROT 8.3  --      136   K 4.0 3.8   CO2 20* 18*    104   BUN 70* 76*   CREATININE 5.4* 5.9*   ALKPHOS 80  --    ALT 18  --    AST 17  --    BILITOT 0.7  --        Recent Labs  Lab 04/15/18  1850   COLORU Yellow   SPECGRAV 1.010   PHUR 5.0   PROTEINUA 3+*   BACTERIA Many*   NITRITE Negative   LEUKOCYTESUR 1+*   UROBILINOGEN Negative   HYALINECASTS 0     All labs within the past 24 hours have been reviewed.    Significant Imaging:  Labs: Reviewed  X-Ray: Reviewed

## 2018-04-16 NOTE — HPI
Ms. Melvin is a 73 yo AAF with HFpEF, aortic stenosis, HTN, T2DM, and CKD stage V (baseline sCr 4.5-5.0) admitted on 4/15 with ADHF. She reported worsening SOB, orthopnea, and PND x 4 days. SpO2 82% on RA upon arrival. In the ED she was given nitro paste, albuterol, and lasix IV. She was admitted to medicine and started on lasix 60mg IV TID with 1L fluid restriction. She was also started on rocephin and azithromycin for CAP. No events overnight. UOP only 700cc. sCr tere from 5.4 to 5.9. Nephrology consulted for fluid management in setting of CKD stage V. Patient is aware of CKD diagnosis. She was offered dialysis 1.5 years ago but denied it at that time due to lack of transportation. She was previously followed by a nephrologist in [place] for the last 4 years. She was on lasix 20mg po for a long time. She developed ADHF a few months ago and was admitted to [facility]. She was told here that she would need higher doses of lasix given her advanced CKD. She was started on lasix 80mg po BID at that time. About 2 weeks ago she ran out of lasix. She called her PCP asking for a refill of the 80mg and this was denied. She asked if she could take 4 of the 20mg tablets and was told that her insurance would not cover this. She continues to feel very SOB this morning. She is interested in proceeding with dialysis if needed.

## 2018-04-16 NOTE — NURSING
Dr Dai to visit, informed of sats 85-86% on 6 liters ( with min exertion/ talking), switched over to 10 liters high nasal cannula. Orders noted for lasix gtt

## 2018-04-16 NOTE — PLAN OF CARE
Problem: Occupational Therapy Goal  Goal: Occupational Therapy Goal  Goals to be met by: 4/25/2018     Patient will increase functional independence with ADLs by performing:    LE Dressing with Modified [place].  Grooming while standing with Modified [place].  Toileting from bedside commode with Supervision for hygiene and clothing management.   Stand pivot transfers with Modified [place].  Toilet transfer to bedside commode with Modified [place].  Patient will be independent with B UE ROM HEP 3 x 15 reps (rest breaks as needed) to increase functional endurance.     Outcome: Ongoing (interventions implemented as appropriate)  Patient's goals are set.   VARUN Kessler  4/16/2018

## 2018-04-16 NOTE — CONSULTS
[facility]  Nephrology  Consult Note    Patient Name: Martha Melvin  MRN: [id number]  Admission Date: 4/15/2018  Hospital Length of Stay: 1 days  Attending Provider: Jeronimo Garcia MD   Primary Care Physician: GUILLERMO Mcclure MD  Principal Problem:Acute hypoxemic respiratory failure    Inpatient consult to Nephrology  Consult performed by: JEWELS VÁSQUEZ  Consult ordered by: JERONIMO GARCIA  Reason for consult: volume overload in CKD stage 5        Subjective:     HPI: Ms. Melvin is a 71 yo AAF with HFpEF, aortic stenosis, HTN, T2DM, and CKD stage V (baseline sCr 4.5-5.0) admitted on 4/15 with ADHF. She reported worsening SOB, orthopnea, and PND x 4 days. SpO2 82% on RA upon arrival. In the ED she was given nitro paste, albuterol, and lasix IV. She was admitted to medicine and started on lasix 60mg IV TID with 1L fluid restriction. She was also started on rocephin and azithromycin for CAP. No events overnight. UOP only 700cc. sCr tere from 5.4 to 5.9. Nephrology consulted for fluid management in setting of CKD stage V. Patient is aware of CKD diagnosis. She was offered dialysis 1.5 years ago but denied it at that time due to lack of transportation. She was previously followed by a nephrologist in [place] for the last 4 years. She was on lasix 20mg po for a long time. She developed ADHF a few months ago and was admitted to [facility]. She was told here that she would need higher doses of lasix given her advanced CKD. She was started on lasix 80mg po BID at that time. About 2 weeks ago she ran out of lasix. She called her PCP asking for a refill of the 80mg and this was denied. She asked if she could take 4 of the 20mg tablets and was told that her insurance would not cover this. She continues to feel very SOB this morning. She is interested in proceeding with dialysis if needed.     Past Medical History:   Diagnosis Date    Arthritis     Diabetes mellitus     Hyperlipidemia      Hypertension     Renal disorder     poor kidney function       Past Surgical History:   Procedure Laterality Date    FOOT AMPUTATION Right        Review of patient's allergies indicates:   Allergen Reactions    Morphine      Current Facility-Administered Medications   Medication Frequency    acetaminophen tablet 650 mg Q6H PRN    albuterol nebulizer solution 2.5 mg Q4H PRN    [START ON 4/17/2018] amLODIPine tablet 2.5 mg Daily    atorvastatin tablet 20 mg Daily    azithromycin (ZITHROMAX) 250 mg in sodium chloride 0.9% 250 mL IVPB Daily    cefTRIAXone injection 1 g Daily    dextromethorphan-guaifenesin  mg per 12 hr tablet 1 tablet BID PRN    dextrose 50% injection 12.5 g PRN    dextrose 50% injection 25 g PRN    epoetin bashir (PROCRIT) injection 5,000 units kit Every Mon, Wed, Fri    fluticasone 50 mcg/actuation nasal spray 100 mcg Daily    furosemide (LASIX) 2 mg/mL in sodium chloride 0.9% 100 mL infusion (conc: 2 mg/mL) Continuous    furosemide injection 80 mg Once    glucagon (human recombinant) injection 1 mg PRN    glucose chewable tablet 16 g PRN    glucose chewable tablet 24 g PRN    heparin (porcine) injection 5,000 Units Q8H    insulin aspart U-100 pen 0-5 Units QID (AC + HS) PRN    insulin aspart U-100 pen 4 Units TIDWM    insulin detemir U-100 pen 10 Units QHS    ondansetron disintegrating tablet 8 mg Q8H PRN    senna-docusate 8.6-50 mg per tablet 1 tablet BID    sodium chloride 0.9% flush 5 mL PRN     Family History     None        Social History Main Topics    Smoking status: Never Smoker    Smokeless tobacco: Not on file    Alcohol use No    Drug use: Unknown    Sexual activity: Not on file     Review of Systems   Respiratory: Positive for shortness of breath.    Genitourinary: Negative for difficulty urinating, dysuria and hematuria.   All other systems reviewed and are negative.    Objective:     Vital Signs (Most Recent):  Temp: 98.7 °F (37.1 °C) (04/16/18  1136)  Pulse: 96 (04/16/18 1136)  Resp: 20 (04/16/18 1136)  BP: (!) 106/56 (04/16/18 1136)  SpO2: (!) 86 % (04/16/18 1015)  O2 Device (Oxygen Therapy): High Flow nasal Cannula (04/16/18 1345) Vital Signs (24h Range):  Temp:  [98.3 °F (36.8 °C)-99.5 °F (37.5 °C)] 98.7 °F (37.1 °C)  Pulse:  [] 96  Resp:  [19-38] 20  SpO2:  [82 %-97 %] 86 %  BP: (106-172)/(56-94) 106/56     Weight: 90.7 kg (200 lb) (04/15/18 2223)  Body mass index is 32.28 kg/m².  Body surface area is 2.06 meters squared.    I/O last 3 completed shifts:  In: 250 [IV Piggyback:250]  Out: 700 [Urine:700]    Physical Exam   Constitutional: She is oriented to person, place, and time. She appears well-developed and well-nourished. No distress.   HENT:   Head: Normocephalic and atraumatic.   Eyes: Conjunctivae and EOM are normal.   Neck: Neck supple. No thyromegaly present.   Cardiovascular: Normal rate and regular rhythm.    Pulmonary/Chest: She has wheezes. She has rales.   Abdominal: Soft. She exhibits no distension.   Musculoskeletal: She exhibits no edema (BLE without edema) or deformity.   Lymphadenopathy:     She has no cervical adenopathy.        Right: No supraclavicular adenopathy present.        Left: No supraclavicular adenopathy present.   Neurological: She is alert and oriented to person, place, and time.   Skin: Skin is warm and dry. She is not diaphoretic.       Significant Labs:  ABGs:   Recent Labs  Lab 04/15/18  1714   PH 7.335*   PCO2 33.5*   HCO3 17.8*   POCSATURATED 86*   BE -8     Cardiac Markers: No results for input(s): CKMB, TROPONINT, MYOGLOBIN in the last 168 hours.  CBC:   Recent Labs  Lab 04/16/18  0458   WBC 15.20*   RBC 3.08*   HGB 7.3*   HCT 22.7*      MCV 74*   MCH 23.7*   MCHC 32.2     CMP:   Recent Labs  Lab 04/15/18  1514 04/16/18  0458   * 250*   CALCIUM 8.9 8.3*   ALBUMIN 3.1* 2.8*   PROT 8.3  --     136   K 4.0 3.8   CO2 20* 18*    104   BUN 70* 76*   CREATININE 5.4* 5.9*   ALKPHOS 80   --    ALT 18  --    AST 17  --    BILITOT 0.7  --        Recent Labs  Lab 04/15/18  1850   COLORU Yellow   SPECGRAV 1.010   PHUR 5.0   PROTEINUA 3+*   BACTERIA Many*   NITRITE Negative   LEUKOCYTESUR 1+*   UROBILINOGEN Negative   HYALINECASTS 0     All labs within the past 24 hours have been reviewed.    Significant Imaging:  Labs: Reviewed  X-Ray: Reviewed    Assessment/Plan:     Stage 5 chronic kidney disease not on chronic dialysis    - baseline sCr 4.5-5.0 c/w CKD stage V  - admitted with ADHF. Minimal UOP despite lasix 60mg IV TID. No improvement in symptoms; requiring 6L NC  - will increase lasix to 120mg BID now. If UOP doesn't immediately improve, recommend also adding diuril 250mg IV BID  - if no response overnight, will consider HD initiation as this is her 2nd admission with volume overload in setting of CKD stage V. Patient aware.  Will discuss with staff.         Anemia, chronic renal failure, stage 5    - will start weight-based CHACHO  - will check iron stores with AM labs            Matilda Pascal, PGY-5  Nephrology Fellow  [facility]  Pager: [phone]      ADDENDUM:  Rounded on patient with staff. Will order lasix 120mg IV + diuril 250mg IV x1 now. Will f/u UOP. If no response, will plan for HD initiation tomorrow. If patient able to lay flat, will attempt to arrange TDC placement with [facility]. Please call with any changes or worsening hypoxia overnight.   Patient may deny dialysis if she does not have transportation. Recommend SW/CM consult to discuss options.       Matilda Pascal, PGY-5  Nephrology Fellow  [facility]  Pager: [phone]

## 2018-04-16 NOTE — PT/OT/SLP EVAL
"Physical Therapy Evaluation    Patient Name:  Martha Melvin   MRN:  [id number]    Recommendations:     Discharge Recommendations:  home health PT   Discharge Equipment Recommendations: none   Barriers to discharge: None    Assessment:     Martha Melvin is a 72 y.o. female admitted with a medical diagnosis of Acute hypoxemic respiratory failure.  She presents with the following impairments/functional limitations:  weakness, impaired endurance, impaired cardiopulmonary response to activity, impaired self care skills, impaired functional mobilty, gait instability, impaired balance, decreased lower extremity function, decreased safety awareness.  Pt presents with a history of diabetes, hypertension, CKD who presents with shortness of breath for 4 days.  Currently requires 6L/min of supp O2 via NC.  Pts aerobic capacity is impaired to the point of causing MONTALVO sitting EOB when donning R prosthesis.  Recommendation for pt to receive skilled PT services in the home setting, pt may also require supp O2 upon discharge.  Pt will benefit from acute skilled PT services to address these deficits and reach maximum level of function.      Recent Surgery: * No surgery found *      Plan:     During this hospitalization, patient to be seen 3 x/week to address the above listed problems via gait training, therapeutic activities, therapeutic exercises, neuromuscular re-education  · Plan of Care Expires:  05/16/18   Plan of Care Reviewed with: patient    Subjective     Communicated with nursing prior to session.  Patient found in supine upon PT entry to room, agreeable to evaluation.      "Never had any pain."    Chief Complaint: MONTALVO  Patient comments/goals: To improve respiration  Pain/Comfort:  · Pain Rating 1: 0/10    Patients cultural, spiritual, Episcopal conflicts given the current situation: no    Living Environment:  Pt lives alone, SSH, 0 GERI, 1 step down into kitchen.  Pt has family and friends to provide transportation.  "   Prior to admission, patients level of function was independent.  Patient has the following equipment: grab bar, glucometer, bath bench.  DME owned (not currently used): rollator and BSC.  Upon discharge, patient will have assistance from friends.    Objective:     Patient found with: peripheral IV, telemetry, pulse ox (continuous), oxygen     General Precautions: Standard, fall   Orthopedic Precautions:N/A   Braces: N/A     Exams:  · Cognitive Exam:  Patient is oriented to Person, Place, Time and Situation and follows 100% of multi step commands   · Fine Motor Coordination:    · -       Intact  Left hand thumb/finger opposition skills and Right hand thumb/finger opposition skills  · Gross Motor Coordination:  WFL  · Postural Exam:  Patient presented with the following abnormalities:    · -       No postural abnormalities identified  · Sensation:    · -       Intact  light/touch B LEs  · Skin Integrity/Edema:      · -       Skin integrity: Visible skin intact  · -       Edema: None noted B LEs  · RUE ROM: WFL  · RUE Strength: WFL  · LUE ROM: WFL  · LUE Strength: WFL  · RLE ROM: WFL  · RLE Strength: WFL  · LLE ROM: WFL  · LLE Strength: WFL    Functional Mobility:  · Bed Mobility:     · Scooting: independence  · Supine to Sit: independence, with HOB elevated by 55 deg  · Sit to Supine: independence    · Transfers:     · Sit to Stand:  independence with no AD  · Bed to/from BSC: CGA, without AD, with A to stabilize BSC    · Gait: Pt amb a few steps during stand pivot transfer with CGA, without AD    · Balance: CGA: dynamic standing balance without AD    AM-PAC 6 CLICK MOBILITY  Total Score:21       Therapeutic Activities and Exercises:   Sitting EOB to don R prosthesis with Jami sec to MONTALVO    Patient left supine with all lines intact, call button in reach and nursing notified.    GOALS:    Physical Therapy Goals        Problem: Physical Therapy Goal    Goal Priority Disciplines Outcome Goal Variances Interventions    Physical Therapy Goal     PT/OT, PT Ongoing (interventions implemented as appropriate)     Description:  Goals to be met by: 18     Patient will increase functional independence with mobility by performin. Bed to chair transfer with Stand-by Assistance using Rolling Walker PRN.    2. Gait  x 50 feet with Stand-by Assistance using Rolling Walker PRN, with supp O2 PRN.   3. Lower extremity exercise program x 20 reps per handout, with assistance as needed.                      History:     Past Medical History:   Diagnosis Date    Arthritis     Diabetes mellitus     Hyperlipidemia     Hypertension     Renal disorder     poor kidney function       Past Surgical History:   Procedure Laterality Date    FOOT AMPUTATION Right        Clinical Decision Making:     History  Co-morbidities and personal factors that may impact the plan of care Examination  Body Structures and Functions, activity limitations and participation restrictions that may impact the plan of care Clinical Presentation   Decision Making/ Complexity Score   Co-morbidities:   [] Time since onset of injury / illness / exacerbation  [] Status of current condition  []Patient's cognitive status and safety concerns    [] Multiple Medical Problems (see med hx)  Personal Factors:   [] Patient's age  [] Prior Level of function   [] Patient's home situation (environment and family support)  [] Patient's level of motivation  [] Expected progression of patient      HISTORY:(criteria)    [] 54112 - no personal factors/history    [x] 20085 - has 1-2 personal factor/comorbidity     [] 81362 - has >3 personal factor/comorbidity     Body Regions:  [] Objective examination findings  [] Head     []  Neck  [x] Trunk   [] Upper Extremity  [x] Lower Extremity    Body Systems:  [] For communication ability, affect, cognition, language, and learning style: the assessment of the ability to make needs known, consciousness, orientation (person, place, and time),  expected emotional /behavioral responses, and learning preferences (eg, learning barriers, education  needs)  [x] For the neuromuscular system: a general assessment of gross coordinated movement (eg, balance, gait, locomotion, transfers, and transitions) and motor function  (motor control and motor learning)  [x] For the musculoskeletal system: the assessment of gross symmetry, gross range of motion, gross strength, height, and weight  [] For the integumentary system: the assessment of pliability(texture), presence of scar formation, skin color, and skin integrity  [x] For cardiovascular/pulmonary system: the assessment of heart rate, respiratory rate, blood pressure, and edema     Activity limitations:    [] Patient's cognitive status and saf ety concerns          [] Status of current condition      [] Weight bearing restriction  [x] Cardiopulmunary Restriction    Participation Restrictions:   [] Goals and goal agreement with the patient     [] Rehab potential (prognosis) and probable outcome      Examination of Body System: (criteria)    [x] 32578 - addressing 1-2 elements    [] 42793 - addressing a total of 3 or more elements     [] 26595 -  Addressing a total of 4 or more elements         Clinical Presentation: (criteria)  Stable - 48623     On examination of body system using standardized tests and measures patient presents with 1-2 elements from any of the following: body structures and functions, activity limitations, and/or participation restrictions.  Leading to a clinical presentation that is considered stable and/or uncomplicated                              Clinical Decision Making  (Eval Complexity):  Low- 15156     Time Tracking:     PT Received On: 04/16/18  PT Start Time: 1013     PT Stop Time: 1039  PT Total Time (min): 26 min     Billable Minutes: Evaluation 12 and Therapeutic Activity 14      Eda Solomon, PT  04/16/2018

## 2018-04-16 NOTE — ASSESSMENT & PLAN NOTE
- baseline sCr 4.5-5.0 c/w CKD stage V  - admitted with ADHF. Minimal UOP despite lasix 60mg IV TID. No improvement in symptoms; requiring 6L NC  - recommend increasing lasix to 100mg IV BID. If UOP doesn't immediately improve, recommend also adding diuril 250mg IV BID  - if no response overnight, will consider HD initiation as this is her 2nd admission with volume overload in setting of CKD stage V. Patient aware.  Will discuss with staff.

## 2018-04-16 NOTE — NURSING
Order noted for 120 mg Lasix IVP from neph. Dr. Pascal ( neph) called and informed that 80 mg Lasix just given before start of Lasix gtt per primary team order.Will reevaluate upon rounds with staff.

## 2018-04-17 PROBLEM — J81.1 PULMONARY EDEMA: Status: ACTIVE | Noted: 2018-04-17

## 2018-04-17 PROBLEM — N39.0 UTI (URINARY TRACT INFECTION): Status: ACTIVE | Noted: 2018-04-17

## 2018-04-17 PROBLEM — D72.829 LEUKOCYTOSIS: Status: ACTIVE | Noted: 2018-04-17

## 2018-04-17 PROBLEM — I35.0 AORTIC STENOSIS: Status: ACTIVE | Noted: 2018-04-17

## 2018-04-17 PROBLEM — E87.29 HIGH ANION GAP METABOLIC ACIDOSIS: Status: ACTIVE | Noted: 2018-04-17

## 2018-04-17 PROBLEM — R74.8 ELEVATED LIVER ENZYMES: Status: ACTIVE | Noted: 2018-04-17

## 2018-04-17 LAB
ALBUMIN SERPL BCP-MCNC: 2.1 G/DL
ALBUMIN SERPL BCP-MCNC: 2.2 G/DL
ALBUMIN SERPL BCP-MCNC: 2.4 G/DL
ALBUMIN SERPL BCP-MCNC: 2.4 G/DL
ALBUMIN SERPL BCP-MCNC: 2.5 G/DL
ALBUMIN SERPL BCP-MCNC: 2.6 G/DL
ALLENS TEST: ABNORMAL
ALP SERPL-CCNC: 135 U/L
ALP SERPL-CCNC: 158 U/L
ALP SERPL-CCNC: 87 U/L
ALT SERPL W/O P-5'-P-CCNC: 385 U/L
ALT SERPL W/O P-5'-P-CCNC: 771 U/L
ALT SERPL W/O P-5'-P-CCNC: 781 U/L
ANION GAP SERPL CALC-SCNC: 11 MMOL/L
ANION GAP SERPL CALC-SCNC: 12 MMOL/L
ANION GAP SERPL CALC-SCNC: 18 MMOL/L
ANION GAP SERPL CALC-SCNC: 18 MMOL/L
ANION GAP SERPL CALC-SCNC: 19 MMOL/L
ANION GAP SERPL CALC-SCNC: 19 MMOL/L
APTT BLDCRRT: 23.5 SEC
AST SERPL-CCNC: 1076 U/L
AST SERPL-CCNC: 1083 U/L
AST SERPL-CCNC: 443 U/L
BACTERIA #/AREA URNS AUTO: ABNORMAL /HPF
BASOPHILS # BLD AUTO: 0.02 K/UL
BASOPHILS # BLD AUTO: 0.02 K/UL
BASOPHILS # BLD AUTO: 0.03 K/UL
BASOPHILS # BLD AUTO: 0.03 K/UL
BASOPHILS NFR BLD: 0.1 %
BASOPHILS NFR BLD: 0.2 %
BASOPHILS NFR BLD: 0.2 %
BASOPHILS NFR BLD: 0.3 %
BILIRUB SERPL-MCNC: 0.5 MG/DL
BILIRUB SERPL-MCNC: 1.3 MG/DL
BILIRUB SERPL-MCNC: 1.3 MG/DL
BILIRUB UR QL STRIP: NEGATIVE
BLD PROD TYP BPU: NORMAL
BLOOD UNIT EXPIRATION DATE: NORMAL
BLOOD UNIT TYPE CODE: 5100
BLOOD UNIT TYPE: NORMAL
BNP SERPL-MCNC: 589 PG/ML
BUN SERPL-MCNC: 14 MG/DL
BUN SERPL-MCNC: 38 MG/DL
BUN SERPL-MCNC: 57 MG/DL
BUN SERPL-MCNC: 65 MG/DL
BUN SERPL-MCNC: 65 MG/DL
BUN SERPL-MCNC: 90 MG/DL
CALCIUM SERPL-MCNC: 7.2 MG/DL
CALCIUM SERPL-MCNC: 7.4 MG/DL
CALCIUM SERPL-MCNC: 7.6 MG/DL
CALCIUM SERPL-MCNC: 7.8 MG/DL
CALCIUM SERPL-MCNC: 7.8 MG/DL
CALCIUM SERPL-MCNC: 8 MG/DL
CHLORIDE SERPL-SCNC: 102 MMOL/L
CHLORIDE SERPL-SCNC: 103 MMOL/L
CHLORIDE SERPL-SCNC: 104 MMOL/L
CLARITY UR REFRACT.AUTO: ABNORMAL
CO2 SERPL-SCNC: 16 MMOL/L
CO2 SERPL-SCNC: 22 MMOL/L
CO2 SERPL-SCNC: 26 MMOL/L
CODING SYSTEM: NORMAL
COLOR UR AUTO: YELLOW
CREAT SERPL-MCNC: 1.6 MG/DL
CREAT SERPL-MCNC: 3.4 MG/DL
CREAT SERPL-MCNC: 4.8 MG/DL
CREAT SERPL-MCNC: 5.2 MG/DL
CREAT SERPL-MCNC: 5.2 MG/DL
CREAT SERPL-MCNC: 6.8 MG/DL
DELSYS: ABNORMAL
DIFFERENTIAL METHOD: ABNORMAL
DISPENSE STATUS: NORMAL
EOSINOPHIL # BLD AUTO: 0 K/UL
EOSINOPHIL # BLD AUTO: 0 K/UL
EOSINOPHIL # BLD AUTO: 0.1 K/UL
EOSINOPHIL # BLD AUTO: 0.1 K/UL
EOSINOPHIL NFR BLD: 0 %
EOSINOPHIL NFR BLD: 0.3 %
EOSINOPHIL NFR BLD: 0.6 %
EOSINOPHIL NFR BLD: 1.1 %
ERYTHROCYTE [DISTWIDTH] IN BLOOD BY AUTOMATED COUNT: 16.2 %
ERYTHROCYTE [DISTWIDTH] IN BLOOD BY AUTOMATED COUNT: 16.5 %
ERYTHROCYTE [DISTWIDTH] IN BLOOD BY AUTOMATED COUNT: 16.6 %
ERYTHROCYTE [DISTWIDTH] IN BLOOD BY AUTOMATED COUNT: 17.3 %
ERYTHROCYTE [SEDIMENTATION RATE] IN BLOOD BY WESTERGREN METHOD: 33 MM/H
ERYTHROCYTE [SEDIMENTATION RATE] IN BLOOD BY WESTERGREN METHOD: 35 MM/H
EST. GFR  (AFRICAN AMERICAN): 15 ML/MIN/1.73 M^2
EST. GFR  (AFRICAN AMERICAN): 37.4 ML/MIN/1.73 M^2
EST. GFR  (AFRICAN AMERICAN): 6.5 ML/MIN/1.73 M^2
EST. GFR  (AFRICAN AMERICAN): 9 ML/MIN/1.73 M^2
EST. GFR  (AFRICAN AMERICAN): 9 ML/MIN/1.73 M^2
EST. GFR  (AFRICAN AMERICAN): 9.9 ML/MIN/1.73 M^2
EST. GFR  (NON AFRICAN AMERICAN): 13 ML/MIN/1.73 M^2
EST. GFR  (NON AFRICAN AMERICAN): 32.4 ML/MIN/1.73 M^2
EST. GFR  (NON AFRICAN AMERICAN): 5.6 ML/MIN/1.73 M^2
EST. GFR  (NON AFRICAN AMERICAN): 7.8 ML/MIN/1.73 M^2
EST. GFR  (NON AFRICAN AMERICAN): 7.8 ML/MIN/1.73 M^2
EST. GFR  (NON AFRICAN AMERICAN): 8.6 ML/MIN/1.73 M^2
FACT X PPP CHRO-ACNC: 0.63 IU/ML
FACT X PPP CHRO-ACNC: <0.1 IU/ML
FERRITIN SERPL-MCNC: 1785 NG/ML
FIO2: 100
FIO2: 100
GLUCOSE SERPL-MCNC: 100 MG/DL
GLUCOSE SERPL-MCNC: 179 MG/DL
GLUCOSE SERPL-MCNC: 223 MG/DL
GLUCOSE SERPL-MCNC: 273 MG/DL
GLUCOSE SERPL-MCNC: 273 MG/DL
GLUCOSE SERPL-MCNC: 308 MG/DL
GLUCOSE UR QL STRIP: ABNORMAL
HCO3 UR-SCNC: 13.5 MMOL/L (ref 24–28)
HCO3 UR-SCNC: 14.1 MMOL/L (ref 24–28)
HCO3 UR-SCNC: 16.6 MMOL/L (ref 24–28)
HCO3 UR-SCNC: 18.8 MMOL/L (ref 24–28)
HCO3 UR-SCNC: 19.1 MMOL/L (ref 24–28)
HCO3 UR-SCNC: 22.6 MMOL/L (ref 24–28)
HCT VFR BLD AUTO: 22.2 %
HCT VFR BLD AUTO: 23.2 %
HCT VFR BLD AUTO: 23.7 %
HCT VFR BLD AUTO: 25.5 %
HGB BLD-MCNC: 6.9 G/DL
HGB BLD-MCNC: 7.7 G/DL
HGB BLD-MCNC: 7.7 G/DL
HGB BLD-MCNC: 8.1 G/DL
HGB UR QL STRIP: ABNORMAL
HYALINE CASTS UR QL AUTO: 0 /LPF
IMM GRANULOCYTES # BLD AUTO: 0.04 K/UL
IMM GRANULOCYTES # BLD AUTO: 0.07 K/UL
IMM GRANULOCYTES # BLD AUTO: 0.18 K/UL
IMM GRANULOCYTES # BLD AUTO: 0.25 K/UL
IMM GRANULOCYTES NFR BLD AUTO: 0.4 %
IMM GRANULOCYTES NFR BLD AUTO: 0.6 %
IMM GRANULOCYTES NFR BLD AUTO: 1.1 %
IMM GRANULOCYTES NFR BLD AUTO: 1.3 %
IRON SERPL-MCNC: 19 UG/DL
KETONES UR QL STRIP: NEGATIVE
LACTATE SERPL-SCNC: 1.8 MMOL/L
LEUKOCYTE ESTERASE UR QL STRIP: NEGATIVE
LYMPHOCYTES # BLD AUTO: 0.7 K/UL
LYMPHOCYTES # BLD AUTO: 1.4 K/UL
LYMPHOCYTES # BLD AUTO: 1.5 K/UL
LYMPHOCYTES # BLD AUTO: 2.6 K/UL
LYMPHOCYTES NFR BLD: 12.4 %
LYMPHOCYTES NFR BLD: 13.7 %
LYMPHOCYTES NFR BLD: 18.8 %
LYMPHOCYTES NFR BLD: 3.1 %
MAGNESIUM SERPL-MCNC: 1.6 MG/DL
MAGNESIUM SERPL-MCNC: 1.7 MG/DL
MAGNESIUM SERPL-MCNC: 1.9 MG/DL
MAGNESIUM SERPL-MCNC: 2.1 MG/DL
MCH RBC QN AUTO: 23.6 PG
MCH RBC QN AUTO: 23.9 PG
MCH RBC QN AUTO: 24.1 PG
MCH RBC QN AUTO: 24.4 PG
MCHC RBC AUTO-ENTMCNC: 31.1 G/DL
MCHC RBC AUTO-ENTMCNC: 31.8 G/DL
MCHC RBC AUTO-ENTMCNC: 32.5 G/DL
MCHC RBC AUTO-ENTMCNC: 33.2 G/DL
MCV RBC AUTO: 74 FL
MCV RBC AUTO: 74 FL
MCV RBC AUTO: 76 FL
MCV RBC AUTO: 76 FL
MICROSCOPIC COMMENT: ABNORMAL
MIN VOL: 11.7
MIN VOL: 13
MODE: ABNORMAL
MODE: ABNORMAL
MONOCYTES # BLD AUTO: 0.6 K/UL
MONOCYTES # BLD AUTO: 1 K/UL
MONOCYTES # BLD AUTO: 1.1 K/UL
MONOCYTES # BLD AUTO: 1.9 K/UL
MONOCYTES NFR BLD: 6 %
MONOCYTES NFR BLD: 7 %
MONOCYTES NFR BLD: 8.7 %
MONOCYTES NFR BLD: 9.1 %
NEUTROPHILS # BLD AUTO: 10.1 K/UL
NEUTROPHILS # BLD AUTO: 19.4 K/UL
NEUTROPHILS # BLD AUTO: 7.9 K/UL
NEUTROPHILS # BLD AUTO: 9.1 K/UL
NEUTROPHILS NFR BLD: 72.4 %
NEUTROPHILS NFR BLD: 76.5 %
NEUTROPHILS NFR BLD: 79.1 %
NEUTROPHILS NFR BLD: 87 %
NITRITE UR QL STRIP: NEGATIVE
NRBC BLD-RTO: 0 /100 WBC
PCO2 BLDA: 26.2 MMHG (ref 35–45)
PCO2 BLDA: 30.8 MMHG (ref 35–45)
PCO2 BLDA: 35.5 MMHG (ref 35–45)
PCO2 BLDA: 36.8 MMHG (ref 35–45)
PCO2 BLDA: 37.3 MMHG (ref 35–45)
PCO2 BLDA: 43.6 MMHG (ref 35–45)
PEEP: 12
PEEP: 14
PH SMN: 7.19 [PH] (ref 7.35–7.45)
PH SMN: 7.25 [PH] (ref 7.35–7.45)
PH SMN: 7.32 [PH] (ref 7.35–7.45)
PH SMN: 7.34 [PH] (ref 7.35–7.45)
PH SMN: 7.34 [PH] (ref 7.35–7.45)
PH SMN: 7.39 [PH] (ref 7.35–7.45)
PH UR STRIP: 5 [PH] (ref 5–8)
PHOSPHATE SERPL-MCNC: 1.4 MG/DL
PHOSPHATE SERPL-MCNC: 3.3 MG/DL
PHOSPHATE SERPL-MCNC: 4.6 MG/DL
PHOSPHATE SERPL-MCNC: 5 MG/DL
PIP: 31
PIP: 35
PLATELET # BLD AUTO: 179 K/UL
PLATELET # BLD AUTO: 209 K/UL
PLATELET # BLD AUTO: 217 K/UL
PLATELET # BLD AUTO: 223 K/UL
PMV BLD AUTO: 10.7 FL
PMV BLD AUTO: 10.9 FL
PMV BLD AUTO: 11.1 FL
PMV BLD AUTO: 11.6 FL
PO2 BLDA: 162 MMHG (ref 80–100)
PO2 BLDA: 52 MMHG (ref 80–100)
PO2 BLDA: 54 MMHG (ref 80–100)
PO2 BLDA: 59 MMHG (ref 80–100)
PO2 BLDA: 66 MMHG (ref 80–100)
PO2 BLDA: 75 MMHG (ref 80–100)
POC BE: -12 MMOL/L
POC BE: -15 MMOL/L
POC BE: -2 MMOL/L
POC BE: -7 MMOL/L
POC BE: -8 MMOL/L
POC BE: -9 MMOL/L
POC SATURATED O2: 80 % (ref 95–100)
POC SATURATED O2: 80 % (ref 95–100)
POC SATURATED O2: 90 % (ref 95–100)
POC SATURATED O2: 91 % (ref 95–100)
POC SATURATED O2: 94 % (ref 95–100)
POC SATURATED O2: 99 % (ref 95–100)
POC TCO2: 15 MMOL/L (ref 23–27)
POC TCO2: 15 MMOL/L (ref 23–27)
POC TCO2: 17 MMOL/L (ref 23–27)
POC TCO2: 20 MMOL/L (ref 23–27)
POC TCO2: 20 MMOL/L (ref 23–27)
POC TCO2: 24 MMOL/L (ref 23–27)
POCT GLUCOSE: 100 MG/DL (ref 70–110)
POCT GLUCOSE: 119 MG/DL (ref 70–110)
POCT GLUCOSE: 172 MG/DL (ref 70–110)
POCT GLUCOSE: 189 MG/DL (ref 70–110)
POCT GLUCOSE: 246 MG/DL (ref 70–110)
POTASSIUM SERPL-SCNC: 3.6 MMOL/L
POTASSIUM SERPL-SCNC: 3.8 MMOL/L
POTASSIUM SERPL-SCNC: 3.9 MMOL/L
POTASSIUM SERPL-SCNC: 3.9 MMOL/L
POTASSIUM SERPL-SCNC: 4 MMOL/L
POTASSIUM SERPL-SCNC: 4.2 MMOL/L
PROT SERPL-MCNC: 6.5 G/DL
PROT SERPL-MCNC: 7 G/DL
PROT SERPL-MCNC: 7.2 G/DL
PROT UR QL STRIP: ABNORMAL
RBC # BLD AUTO: 2.92 M/UL
RBC # BLD AUTO: 3.15 M/UL
RBC # BLD AUTO: 3.22 M/UL
RBC # BLD AUTO: 3.36 M/UL
RBC #/AREA URNS AUTO: 5 /HPF (ref 0–4)
SAMPLE: ABNORMAL
SATURATED IRON: 9 %
SITE: ABNORMAL
SODIUM SERPL-SCNC: 137 MMOL/L
SODIUM SERPL-SCNC: 138 MMOL/L
SODIUM SERPL-SCNC: 138 MMOL/L
SODIUM SERPL-SCNC: 140 MMOL/L
SP GR UR STRIP: 1.01 (ref 1–1.03)
SP02: 86
SP02: 99
SQUAMOUS #/AREA URNS AUTO: 0 /HPF
TOTAL IRON BINDING CAPACITY: 212 UG/DL
TRANS ERYTHROCYTES VOL PATIENT: NORMAL ML
TRANSFERRIN SERPL-MCNC: 143 MG/DL
TROPONIN I SERPL DL<=0.01 NG/ML-MCNC: 0.46 NG/ML
URN SPEC COLLECT METH UR: ABNORMAL
UROBILINOGEN UR STRIP-ACNC: NEGATIVE EU/DL
VT: 354
VT: 462
WBC # BLD AUTO: 10.02 K/UL
WBC # BLD AUTO: 11.82 K/UL
WBC # BLD AUTO: 13.96 K/UL
WBC # BLD AUTO: 22.29 K/UL
WBC #/AREA URNS AUTO: 2 /HPF (ref 0–5)

## 2018-04-17 PROCEDURE — 93010 ELECTROCARDIOGRAM REPORT: CPT | Mod: 76,,, | Performed by: INTERNAL MEDICINE

## 2018-04-17 PROCEDURE — 80069 RENAL FUNCTION PANEL: CPT | Mod: 91

## 2018-04-17 PROCEDURE — 82803 BLOOD GASES ANY COMBINATION: CPT

## 2018-04-17 PROCEDURE — 99900022

## 2018-04-17 PROCEDURE — 25000003 PHARM REV CODE 250: Performed by: SURGERY

## 2018-04-17 PROCEDURE — 36415 COLL VENOUS BLD VENIPUNCTURE: CPT

## 2018-04-17 PROCEDURE — 25000003 PHARM REV CODE 250: Performed by: INTERNAL MEDICINE

## 2018-04-17 PROCEDURE — 63600175 PHARM REV CODE 636 W HCPCS: Performed by: INTERNAL MEDICINE

## 2018-04-17 PROCEDURE — 63600175 PHARM REV CODE 636 W HCPCS: Performed by: STUDENT IN AN ORGANIZED HEALTH CARE EDUCATION/TRAINING PROGRAM

## 2018-04-17 PROCEDURE — 99900026 HC AIRWAY MAINTENANCE (STAT)

## 2018-04-17 PROCEDURE — 63600175 PHARM REV CODE 636 W HCPCS: Performed by: ANESTHESIOLOGY

## 2018-04-17 PROCEDURE — 99900035 HC TECH TIME PER 15 MIN (STAT)

## 2018-04-17 PROCEDURE — 87040 BLOOD CULTURE FOR BACTERIA: CPT

## 2018-04-17 PROCEDURE — 93005 ELECTROCARDIOGRAM TRACING: CPT

## 2018-04-17 PROCEDURE — 25000003 PHARM REV CODE 250: Performed by: STUDENT IN AN ORGANIZED HEALTH CARE EDUCATION/TRAINING PROGRAM

## 2018-04-17 PROCEDURE — 90945 DIALYSIS ONE EVALUATION: CPT

## 2018-04-17 PROCEDURE — 93010 ELECTROCARDIOGRAM REPORT: CPT | Mod: ,,, | Performed by: INTERNAL MEDICINE

## 2018-04-17 PROCEDURE — 85520 HEPARIN ASSAY: CPT

## 2018-04-17 PROCEDURE — 20000000 HC ICU ROOM

## 2018-04-17 PROCEDURE — 27000221 HC OXYGEN, UP TO 24 HOURS

## 2018-04-17 PROCEDURE — P9021 RED BLOOD CELLS UNIT: HCPCS

## 2018-04-17 PROCEDURE — 83735 ASSAY OF MAGNESIUM: CPT | Mod: 91

## 2018-04-17 PROCEDURE — 81001 URINALYSIS AUTO W/SCOPE: CPT

## 2018-04-17 PROCEDURE — 63600175 PHARM REV CODE 636 W HCPCS: Performed by: SURGERY

## 2018-04-17 PROCEDURE — 80069 RENAL FUNCTION PANEL: CPT

## 2018-04-17 PROCEDURE — S0028 INJECTION, FAMOTIDINE, 20 MG: HCPCS | Performed by: INTERNAL MEDICINE

## 2018-04-17 PROCEDURE — 85520 HEPARIN ASSAY: CPT | Mod: 91

## 2018-04-17 PROCEDURE — 83605 ASSAY OF LACTIC ACID: CPT

## 2018-04-17 PROCEDURE — 37799 UNLISTED PX VASCULAR SURGERY: CPT

## 2018-04-17 PROCEDURE — 25000003 PHARM REV CODE 250

## 2018-04-17 PROCEDURE — 84100 ASSAY OF PHOSPHORUS: CPT

## 2018-04-17 PROCEDURE — 80053 COMPREHEN METABOLIC PANEL: CPT

## 2018-04-17 PROCEDURE — 99233 SBSQ HOSP IP/OBS HIGH 50: CPT | Mod: ,,, | Performed by: INTERNAL MEDICINE

## 2018-04-17 PROCEDURE — 85025 COMPLETE CBC W/AUTO DIFF WBC: CPT | Mod: 91

## 2018-04-17 PROCEDURE — 83735 ASSAY OF MAGNESIUM: CPT

## 2018-04-17 PROCEDURE — 99291 CRITICAL CARE FIRST HOUR: CPT | Mod: ,,, | Performed by: INTERNAL MEDICINE

## 2018-04-17 PROCEDURE — 80053 COMPREHEN METABOLIC PANEL: CPT | Mod: 91

## 2018-04-17 PROCEDURE — 87086 URINE CULTURE/COLONY COUNT: CPT

## 2018-04-17 RX ORDER — PROPOFOL 10 MG/ML
20 INJECTION, EMULSION INTRAVENOUS CONTINUOUS
Status: DISCONTINUED | OUTPATIENT
Start: 2018-04-17 | End: 2018-04-21

## 2018-04-17 RX ORDER — FAMOTIDINE 20 MG/1
20 TABLET, FILM COATED ORAL DAILY
Status: DISCONTINUED | OUTPATIENT
Start: 2018-04-17 | End: 2018-04-21

## 2018-04-17 RX ORDER — EPINEPHRINE 0.1 MG/ML
INJECTION INTRAVENOUS CODE/TRAUMA/SEDATION MEDICATION
Status: DISCONTINUED | OUTPATIENT
Start: 2018-04-16 | End: 2018-04-23

## 2018-04-17 RX ORDER — SUCCINYLCHOLINE CHLORIDE 20 MG/ML
INJECTION INTRAMUSCULAR; INTRAVENOUS CODE/TRAUMA/SEDATION MEDICATION
Status: DISCONTINUED | OUTPATIENT
Start: 2018-04-17 | End: 2018-04-23

## 2018-04-17 RX ORDER — VANCOMYCIN/0.9 % SOD CHLORIDE 1 G/100 ML
1000 PLASTIC BAG, INJECTION (ML) INTRAVENOUS ONCE
Status: COMPLETED | OUTPATIENT
Start: 2018-04-17 | End: 2018-04-17

## 2018-04-17 RX ORDER — HEPARIN SODIUM,PORCINE/D5W 25000/250
17 INTRAVENOUS SOLUTION INTRAVENOUS CONTINUOUS
Status: DISCONTINUED | OUTPATIENT
Start: 2018-04-17 | End: 2018-04-18

## 2018-04-17 RX ORDER — HYDROCODONE BITARTRATE AND ACETAMINOPHEN 500; 5 MG/1; MG/1
TABLET ORAL
Status: DISCONTINUED | OUTPATIENT
Start: 2018-04-17 | End: 2018-04-17

## 2018-04-17 RX ORDER — SODIUM BICARBONATE 1 MEQ/ML
SYRINGE (ML) INTRAVENOUS CODE/TRAUMA/SEDATION MEDICATION
Status: DISCONTINUED | OUTPATIENT
Start: 2018-04-16 | End: 2018-04-23

## 2018-04-17 RX ORDER — CISATRACURIUM BESYLATE 2 MG/ML
0.15 INJECTION, SOLUTION INTRAVENOUS ONCE
Status: COMPLETED | OUTPATIENT
Start: 2018-04-17 | End: 2018-04-17

## 2018-04-17 RX ORDER — CISATRACURIUM BESYLATE 10 MG/ML
0.15 INJECTION, SOLUTION INTRAVENOUS ONCE
Status: DISCONTINUED | OUTPATIENT
Start: 2018-04-17 | End: 2018-04-17

## 2018-04-17 RX ORDER — HYDROCODONE BITARTRATE AND ACETAMINOPHEN 500; 5 MG/1; MG/1
TABLET ORAL CONTINUOUS
Status: ACTIVE | OUTPATIENT
Start: 2018-04-17 | End: 2018-04-18

## 2018-04-17 RX ORDER — NOREPINEPHRINE BITARTRATE/D5W 4MG/250ML
0.02 PLASTIC BAG, INJECTION (ML) INTRAVENOUS CONTINUOUS
Status: DISCONTINUED | OUTPATIENT
Start: 2018-04-17 | End: 2018-04-21

## 2018-04-17 RX ORDER — MAGNESIUM SULFATE HEPTAHYDRATE 40 MG/ML
2 INJECTION, SOLUTION INTRAVENOUS
Status: DISPENSED | OUTPATIENT
Start: 2018-04-17 | End: 2018-04-18

## 2018-04-17 RX ORDER — FAMOTIDINE 10 MG/ML
20 INJECTION INTRAVENOUS DAILY
Status: DISCONTINUED | OUTPATIENT
Start: 2018-04-17 | End: 2018-04-17

## 2018-04-17 RX ORDER — CHLORHEXIDINE GLUCONATE ORAL RINSE 1.2 MG/ML
15 SOLUTION DENTAL 2 TIMES DAILY
Status: DISCONTINUED | OUTPATIENT
Start: 2018-04-17 | End: 2018-04-21

## 2018-04-17 RX ORDER — INSULIN ASPART 100 [IU]/ML
0-5 INJECTION, SOLUTION INTRAVENOUS; SUBCUTANEOUS EVERY 6 HOURS PRN
Status: DISCONTINUED | OUTPATIENT
Start: 2018-04-17 | End: 2018-04-21

## 2018-04-17 RX ADMIN — HEPARIN SODIUM 5000 UNITS: 5000 INJECTION, SOLUTION INTRAVENOUS; SUBCUTANEOUS at 02:04

## 2018-04-17 RX ADMIN — MAGNESIUM SULFATE IN WATER 2 G: 40 INJECTION, SOLUTION INTRAVENOUS at 05:04

## 2018-04-17 RX ADMIN — PROPOFOL 25 MCG/KG/MIN: 10 INJECTION, EMULSION INTRAVENOUS at 10:04

## 2018-04-17 RX ADMIN — PROPOFOL 20 MCG/KG/MIN: 10 INJECTION, EMULSION INTRAVENOUS at 08:04

## 2018-04-17 RX ADMIN — EPINEPHRINE 1 MG: 0.1 INJECTION, SOLUTION ENDOTRACHEAL; INTRACARDIAC; INTRAVENOUS at 03:04

## 2018-04-17 RX ADMIN — PIPERACILLIN AND TAZOBACTAM 4.5 G: 4; .5 INJECTION, POWDER, LYOPHILIZED, FOR SOLUTION INTRAVENOUS; PARENTERAL at 02:04

## 2018-04-17 RX ADMIN — CISATRACURIUM BESYLATE 13.6 MG: 2 INJECTION INTRAVENOUS at 12:04

## 2018-04-17 RX ADMIN — VANCOMYCIN HYDROCHLORIDE 1 G: 5 INJECTION, POWDER, LYOPHILIZED, FOR SOLUTION INTRAVENOUS at 03:04

## 2018-04-17 RX ADMIN — Medication 0.03 MCG/KG/MIN: at 10:04

## 2018-04-17 RX ADMIN — ALTEPLASE 2 MG: 2.2 INJECTION, POWDER, LYOPHILIZED, FOR SOLUTION INTRAVENOUS at 10:04

## 2018-04-17 RX ADMIN — HEPARIN SODIUM 17 UNITS/KG/HR: 10000 INJECTION, SOLUTION INTRAVENOUS at 03:04

## 2018-04-17 RX ADMIN — Medication 2500 MCG: at 07:04

## 2018-04-17 RX ADMIN — INSULIN DETEMIR 10 UNITS: 100 INJECTION, SOLUTION SUBCUTANEOUS at 02:04

## 2018-04-17 RX ADMIN — SODIUM CHLORIDE: 0.9 INJECTION, SOLUTION INTRAVENOUS at 01:04

## 2018-04-17 RX ADMIN — Medication 1250 MG: at 01:04

## 2018-04-17 RX ADMIN — PIPERACILLIN AND TAZOBACTAM 4.5 G: 4; .5 INJECTION, POWDER, LYOPHILIZED, FOR SOLUTION INTRAVENOUS; PARENTERAL at 06:04

## 2018-04-17 RX ADMIN — NOREPINEPHRINE BITARTRATE 0.06 MCG/KG/MIN: 1 INJECTION, SOLUTION, CONCENTRATE INTRAVENOUS at 01:04

## 2018-04-17 RX ADMIN — SODIUM CHLORIDE: 0.9 INJECTION, SOLUTION INTRAVENOUS at 12:04

## 2018-04-17 RX ADMIN — MAGNESIUM SULFATE IN WATER 2 G: 40 INJECTION, SOLUTION INTRAVENOUS at 11:04

## 2018-04-17 RX ADMIN — PIPERACILLIN AND TAZOBACTAM 4.5 G: 4; .5 INJECTION, POWDER, LYOPHILIZED, FOR SOLUTION INTRAVENOUS; PARENTERAL at 09:04

## 2018-04-17 RX ADMIN — PROPOFOL 30 MCG/KG/MIN: 10 INJECTION, EMULSION INTRAVENOUS at 02:04

## 2018-04-17 RX ADMIN — INSULIN DETEMIR 10 UNITS: 100 INJECTION, SOLUTION SUBCUTANEOUS at 09:04

## 2018-04-17 RX ADMIN — CISATRACURIUM BESYLATE 13.6 MG: 2 INJECTION, SOLUTION INTRAVENOUS at 12:04

## 2018-04-17 RX ADMIN — FAMOTIDINE 20 MG: 10 INJECTION INTRAVENOUS at 08:04

## 2018-04-17 RX ADMIN — HEPARIN SODIUM 5000 UNITS: 5000 INJECTION, SOLUTION INTRAVENOUS; SUBCUTANEOUS at 05:04

## 2018-04-17 RX ADMIN — SUCCINYLCHOLINE CHLORIDE 140 MG: 20 INJECTION, SOLUTION INTRAMUSCULAR; INTRAVENOUS at 03:04

## 2018-04-17 RX ADMIN — PROPOFOL 20 MCG/KG/MIN: 10 INJECTION, EMULSION INTRAVENOUS at 03:04

## 2018-04-17 RX ADMIN — CHLORHEXIDINE GLUCONATE 15 ML: 1.2 RINSE ORAL at 08:04

## 2018-04-17 RX ADMIN — CHLORHEXIDINE GLUCONATE 15 ML: 1.2 RINSE ORAL at 09:04

## 2018-04-17 NOTE — SIGNIFICANT EVENT
Pt called for assistance to use bedside commode. Upon entering room pt had urinated in the bed. PCT changed bed and cleaned pt. PT in NAD at this time. Apprx 5 mins later RN entered room to find pt unresponsive and not breathing. CN entered room to assess pt immediately and assessed pt to be in cardiac arrest. CPR started and Code Blue/ACLS protocal initiated. See Code Flowsheet for further documentation. At 2154 pt transferred to SICU in  on monitor and intubated. Pt accompanied by RN x3, RRT, and MD.

## 2018-04-17 NOTE — ASSESSMENT & PLAN NOTE
Occurred following trialysis placement  Likely 2/2 hypoxia  Acidemia likely 2/2 elevated lactate 2/2 cardiac arrest  Differential includes pneumothorax, PE (including air embolus), acidemia, coronary thrombosis  CXR negative for pneumothorax, however, pneumo can be missed on CXR  Lung sliding evident on lung US  Electrolytes (k+, mag, ca (corrected)) wnl  F/u LE US to r/o DVT, recommend CTA when patient more stable  Troponin mildly elevated at .462

## 2018-04-17 NOTE — ASSESSMENT & PLAN NOTE
anion gap metabolic acidosis w/ concomitant resp acidosis  lactate 9.5, bicarb 16, anion gap 23  Likely 2/2 lactic acidosis  Started on CRRT

## 2018-04-17 NOTE — HOSPITAL COURSE
4/17 patient able to follow commands, trialysis clotted, however, clot removed with syringe; patient started on heparin drip empirically for possible PE  4/18 patient continues to be on CRRT, vent setting adjusted FiO2 70% to 50% and RR 28 to 22; PEEP decreased from 14 to 5; urine culture positive for MDRO E Coli- c/w zosyn  4/19 patient continues to follow commands when sedation weaned, continuing to wean vent settings  4/20: no acute events overnight. UOP has dropped (19cc over past 24 hours.) SCUF overnight, 7.3 L removed. Minimal pressors (levo 0.01) SBT today as she has failed yesterday. Following commands and is alert this morning   4/21: Extubated to NC yesterday, passed beside swallow test. no acute events overnight. Had CRRT overnight but clotted off around 6 am. No longer on pressors but did receive 500cc bolus this AM due to MAP in low 60s. Complains of generalized fatigue. Denies CP, SOB, abd pain, nausea. Advance diet today and start PT/OT

## 2018-04-17 NOTE — PLAN OF CARE
Notified by CCM that a CODE was called on a patient.  On arrival to unit, CCM already at bedside and will transfer patient to ICU.  Appreciate their assistance.    Krissy Woods, [facility], PA-C  [facility] Medicine Department  Staff:  Dr. Santamaria

## 2018-04-17 NOTE — NURSING
CODE called on patient while in room 8086.  Upon entering room, compressions were already initiated and meds being given. Pt intubated, pulse was regained and epi gtt started.  Patient was then transferred by RN and RT to 60 while being bagged and attached to cardiac monitoring; 3 RN's and RRT.

## 2018-04-17 NOTE — ASSESSMENT & PLAN NOTE
TTE 4/16/18 EF 55-60% w/ grade II DD  moderately sclerotic with moderately restricted leaflet mobility  peak velocity obtained across the aortic valve is 3.16 m/s  mean transvalvular gradient 24 mmHg  calculated aortic valve area is 0.83 cm2 consistent w/ severe aortic stenosis  Cautious w/ volume removal as aortic stenosis is preload dependent

## 2018-04-17 NOTE — PLAN OF CARE
Problem: Patient Care Overview  Goal: Plan of Care Review  Outcome: Ongoing (interventions implemented as appropriate)  Patient resting comfortably on vent/AC, rate: 28, PEEP: 14, FIO2: 70%. HR . MAP >65 maintained with levo. Propofol and fentanyl infusing for comfort. Patient woke up this AM and followed commands and nodded appropriately but had to be re-sedated due to decreased FIO2%. CRRT tolerated at a UF of 400. Patient's trialysis clotted of this AM, alteplase used, no problems since CRRT set back up. Patient straight cathed x1 - 500mL drained. Will start Q6H bladder scans and titrate to keep MAP>65. Patient and daughter updated regarding plan of care - questions answered. Will continue to monitor.

## 2018-04-17 NOTE — ANESTHESIA PROCEDURE NOTES
Intubation    Diagnosis: respiratory failure  Patient location during procedure: ICU  Procedure start time: 4/16/2018 9:45 PM  Timeout: 4/16/2018 9:45 PM  Procedure end time: 4/16/2018 9:50 PM  Staffing  Anesthesiologist: ALEX RAMIREZ  Resident/CRNA: ALDAIR PENNINGTON  Performed: resident/CRNA   Anesthesiologist was present at the time of the procedure.  Preanesthetic Checklist  Completed: patient identified, site marked, surgical consent, pre-op evaluation, timeout performed, IV checked, risks and benefits discussed, monitors and equipment checked and anesthesia consent given  Intubation  Indication: respiratory failure mask ventilation: easy with oral airway.  Intubation status: with succinylcholine- code., laryngoscopy direct, Rivas 2.  Endotracheal Tube: oral, 7.0 mm ID, cuffed (inflated to minimal occlusive pressure)  Attempts: 1, Grade I - full view of cords  DIFFICULT INTUBATION DESCRIPTOR: chest compressions.  Tube secured at 23 cm at the lips.  Findings post-intubation: bilateral breath sounds, positive ETCO2, atraumatic / condition of teeth unchanged  Position Confirmation: auscultation

## 2018-04-17 NOTE — ASSESSMENT & PLAN NOTE
Elevated WBC  CXR w/ patchy infiltrates more likely edema, however, could mask a PNA  Pt also w/ leukocytosis  Azithromycin/ rocephin switched to vanc/ zosyn  F/u vanc trough

## 2018-04-17 NOTE — NURSING
Dr. Ruelas ( neph) to visit, updated on response to diuretics and increased O2 demands. Will contact IM team re: initiating hemodialysis this pm.Aware patient has no dialysis access at present and will need line placed

## 2018-04-17 NOTE — H&P
Physician Attestation for Scribe:  I, Patricia Dai MD, personally performed the services described in this documentation. All medical record entries made by the scribe were at my direction and in my presence.  I have reviewed the chart and agree that the record reflects my personal performance and is accurate and complete.   Patricia Dai MD      Hospital Medicine  History and Physical Exam    Team: [facility] [place] CRITICAL CARE MEDICINE   Admit Date: 4/15/2018 Dr. Dai   Chief Complaint: SOB  Patient information was obtained from patient and ER records.   Primary care Physician: GUILLERMO Mcclure MD  Code status: Full Code    HPI:   Patient is a 73 yo woman with PMH of chronic diastolic dysfunction, CKD stage V, moderate aortic stenosis, diabetes type 2 and HTN is here for SOB for 4 days. She denies associated cough, wheeze, rhinorrhea, sore throat, fever or chills. She normally sleeps on 4 pillows for the last 2 years, but the last 2 months shes been having to sit up at the side of her bed to breathe at night. She does awake at night gasping for air more frequently. She has mild dyspnea on exertion for the last 4 days. She denies CP or palpitations. No nausea or vomiting. She states her normal weight should be 195 pounds. She does snore but has never been told she has sleep apnea. Patient was found to have pulse ox of 90% at home by EMS. On route she was placed on BIPAP and given albuterol treatments. Upon arrival her saturations were at 82%. She has never smoked and no history of COPD or asthma.     Past Medical History: Patient has a past medical history of Arthritis; Diabetes mellitus; Hyperlipidemia; Hypertension; and Renal disorder.    Past Surgical History: Patient has a past surgical history that includes Foot Amputation (Right).    Social History: Patient reports that she has never smoked. She does not have any smokeless tobacco history on file. She reports that she does not drink alcohol.    Family History: family  history is not on file.    Medications:   Prior to Admission medications    Medication Sig Start Date End Date Taking? Authorizing Provider   atorvastatin (LIPITOR) 20 MG tablet Take 20 mg by mouth once daily.   Yes Historical Provider, MD   bisacodyl (DULCOLAX) 5 mg EC tablet Take 5 mg by mouth daily as needed for Constipation.   Yes Historical Provider, MD   calcium carbonate (TUMS E-X) 300 mg (750 mg) Chew Take 1 tablet by mouth 3 (three) times daily with meals. 10/16/17 10/16/18 Yes Palomo Carrillo MD   ergocalciferol (ERGOCALCIFEROL) 50,000 unit Cap Take 1 capsule (50,000 Units total) by mouth every 7 days. 10/16/17  Yes Palomo Carrillo MD   ferrous sulfate 325 mg (65 mg iron) Tab tablet Take 1 tablet (325 mg total) by mouth 3 (three) times daily with meals. 10/16/17  Yes Palomo Carrillo MD   furosemide (LASIX) 80 MG tablet Take 1 tablet (80 mg total) by mouth every morning. 10/16/17  Yes Palomo Carrillo MD   insulin aspart (NOVOLOG) 100 unit/mL injection Inject 8 Units into the skin 3 (three) times daily before meals.   Yes Historical Provider, MD   insulin glargine (LANTUS) 100 unit/mL injection Inject 20 Units into the skin every evening.   Yes Historical Provider, MD   loratadine (CLARITIN) 10 mg tablet Take 10 mg by mouth once daily.   Yes Historical Provider, MD   nifedipine (ADALAT CC) 30 MG TbSR Take 30 mg by mouth once daily.   Yes Historical Provider, MD   ibuprofen (ADVIL,MOTRIN) 400 MG tablet Take 400 mg by mouth 3 (three) times daily.  4/15/18 Yes Historical Provider, MD       Allergies: Patient is allergic to morphine.    ROS  Constitutional: no fever or chills, see HPI  Respiratory: no cough or shortness of breath, see HPI  Cardiovascular: no chest pain or palpitations, See HPI  Gastrointestinal: no nausea or vomiting, no abdominal pain or change in bowel habits  Genitourinary: no hematuria or dysuria  Integument/Breast: no rash or pruritis  Hematologic/Lymphatic: no easy bruising or  lymphadenopathy  Musculoskeletal: no arthralgias or myalgias  Neurological: no seizures or tremors  Behavioral/Psych: no depression or anxiety    PEx  Temp:  [98.1 °F (36.7 °C)-99.5 °F (37.5 °C)]   Pulse:  []   Resp:  []   BP: (106-180)/(52-90)   SpO2:  [69 %-96 %]   Body mass index is 32.28 kg/m².     General appearance: no distress, well-developed, well-nourished  Mental status: Alert and oriented x 3  HEENT:  conjunctivae/corneas clear, PERRL  Neck: supple, thyroid not enlarged  Pulm:   normal respiratory effort, CTA B, decreased breath sounds throughout, crackles from mid lung to bases bilaterally  Card: RRR, S1, S2 normal, click, rub or gallop, holosystolic murmur 3/6 on right upper sternal border  Abd: soft, NT, ND, BS present; no masses, no organomegaly  Ext: no c/c/e  Pulses: 2+, symmetric  Skin: color, texture, turgor normal. No rashes or lesions  Neuro: CN II-XII grossly intact, no focal numbness or weakness, normal strength and tone       Recent Labs  Lab 04/15/18  1514 04/16/18  0458 04/16/18  2222   WBC 15.99* 15.20*  --    HGB 8.4* 7.3*  --    HCT 25.5* 22.7* 22*    218  --        Recent Labs  Lab 04/15/18  1514 04/16/18  0458 04/16/18  2224    136  --    K 4.0 3.8  --     104  --    CO2 20* 18*  --    BUN 70* 76*  --    CREATININE 5.4* 5.9*  --    * 250*  --    CALCIUM 8.9 8.3*  --    MG  --  1.9 2.0   PHOS  --  4.2 6.9*       Recent Labs  Lab 04/15/18  1514 04/16/18 0458 04/16/18 2224   ALKPHOS 80  --   --    ALT 18  --   --    AST 17  --   --    ALBUMIN 3.1* 2.8*  --    PROT 8.3  --   --    BILITOT 0.7  --   --    INR 1.1  --  1.1      Recent Labs      04/15/18   1514  04/15/18   2352  04/16/18   0759   TROPONINI  0.144*  0.218*  0.216*       Imaging Results          X-Ray Chest AP Portable (Final result)  Result time 04/15/18 16:29:24    Final result by Frederic Maria MD (04/15/18 16:29:24)                 Impression:      As above.      Electronically  signed by: Frederic Maria MD  Date:    04/15/2018  Time:    16:29             Narrative:    EXAMINATION:  XR CHEST AP PORTABLE    CLINICAL HISTORY:  CHF;    TECHNIQUE:  Single frontal view of the chest was performed.    COMPARISON:  Chest one view 10/14/2017    FINDINGS:  There is patchy bilateral lung opacification that may reflect edema, hemorrhage, aspiration, infection, or sepsis.  No pneumothorax.  There is nonspecific enlargement of the cardiac silhouette, mediastinum, and pulmonary vasculature.  The osseous structures are unremarkable.                              ECHO 10/2017  CONCLUSIONS     1 - Moderate left atrial enlargement.     2 - Concentric hypertrophy.     3 - No wall motion abnormalities.     4 - Normal left ventricular systolic function (EF 60-65%).     5 - Impaired LV relaxation, normal LAP (grade 1 diastolic dysfunction).     6 - Normal right ventricular systolic function .     7 - Mild aortic regurgitation.     8 - Moderate aortic stenosis .       Active Hospital Problems    Diagnosis  POA    *Acute hypoxemic respiratory failure [J96.01]  Yes    Anemia, chronic renal failure, stage 5 [N18.5, D63.1]  Yes    Renal insufficiency [N28.9]  Yes    Pulmonary edema cardiac cause [I50.1]  Unknown    Type 2 diabetes mellitus with stage 5 chronic kidney disease and hypertension [E11.22, I12.0, N18.5]  Yes    Acute on chronic diastolic heart failure [I50.33]  Yes    Community acquired bacterial pneumonia [J15.9]  Yes    Type 2 diabetes mellitus with hyperlipidemia [E11.69, E78.5]  Yes    Stage 5 chronic kidney disease not on chronic dialysis [N18.5]  Yes      Resolved Hospital Problems    Diagnosis Date Resolved POA   No resolved problems to display.       Overview 71 yo with CKD V and diastolic dysfunction is here for SOB x 4 days      Assessment and Plan for Problems addressed today:    Acute hypoxic respiratory failure  Acute on chronic diastolic Heart Failure  Probable Community Acquired  Pneumonia, bacterial  Pulmonary edema  Patient received nitroglycerin paste, albuterol treatment, and IV Furosemide in ER   Awaiting ABG  Starting Furosemide 60mg IV TID. Strict I/Os. Daily weights. Fluid restriction 1L  Given ceftriaxone and azithromycin in ER. Will continue for 5 day course at present.   2D echo. Elevated Troponin from baseline. Will trend.   Albuterol nebs and BIPAP as needed.    Diabetes type 2 with CKD stage V and Hypertension  Home medications include detemir 20 units at bedtime and aspart 8 units with meals. Hemoglobin a1c is 8.3. Will give detemir 10 units at bedtime and aspart 4 units with meals. Will change home nifedipine to amlodipine 5mg once daily based on nephrology recommendations on previous stay.   Nephrology consult to follow along with diuresis. She may need initiation of dialysis during stay    Type 2 diabetes with hyperlipidemia  continue atorvastatin 20mg daily       DVT PPx: heparin 5000 unit subq TID    Provider  Scribe Attestation: I personally scribed for Dr. Patricia Dai  on 04/16/2018 at 4:55 PM. Electronically signed by gisele Austin on 04/16/2018 at 4:55 PM.

## 2018-04-17 NOTE — ASSESSMENT & PLAN NOTE
- baseline sCr 4.5-5.0 c/w CKD stage V  - admitted with ADHF. Attempted medical diuresis without improvement in UOP. Increased O2 requirements throughout the day  - s/p PEA arrest last night; likely related to hypoxia. Now with shock liver.   - currently on pressors and high vent requirements  - unfortunately trialysis catheter clotted off this morning (venous port no longer aspirating). Will need to change over wire ASAP.  - will resume SLED once new catheter in place. UF goal 400cc/hr as tolerated. Citrate contraindicated in setting of shock liver.

## 2018-04-17 NOTE — PT/OT/SLP DISCHARGE
Physical Therapy Discharge Summary    Name: Martha Melvin  MRN: [id number]   Principal Problem: Cardiac arrest     Patient Discharged from acute Physical Therapy on 18.  Please refer to prior PT noted date on 18 for functional status.     Assessment:     Patient was discharged unexpectedly.  Information required to complete an accurate discharge summary is unknown.  Refer to therapy initial evaluation and last progress note for initial and most recent functional status and goal achievement.  Recommendations made may be found in medical record.    Objective:     GOALS:    Physical Therapy Goals        Problem: Physical Therapy Goal    Goal Priority Disciplines Outcome Goal Variances Interventions   Physical Therapy Goal     PT/OT, PT Ongoing (interventions implemented as appropriate)     Description:  Goals to be met by: 18     Patient will increase functional independence with mobility by performin. Bed to chair transfer with Stand-by Assistance using Rolling Walker PRN.    2. Gait  x 50 feet with Stand-by Assistance using Rolling Walker PRN, with supp O2 PRN.   3. Lower extremity exercise program x 20 reps per handout, with assistance as needed.                      Reasons for Discontinuation of Therapy Services  Transfer to alternate level of care. and Patient is unable to continue work toward goals because of medical or psychosocial complications.      Plan:     Patient Discharged to: ICU.    Desiree Echeverria, PT  2018

## 2018-04-17 NOTE — ASSESSMENT & PLAN NOTE
HbA1c 8.0  Due to CKD V cautious for insulin stacking  Monitor accuchecks  aspart sliding scale  C/w detemir 10 units daily

## 2018-04-17 NOTE — PROGRESS NOTES
Physician Attestation for Scribe:  I, Patricia Dai MD, personally performed the services described in this documentation. All medical record entries made by the scribe were at my direction and in my presence.  I have reviewed the chart and agree that the record reflects my personal performance and is accurate and complete.   Patricia Dai MD    Hospital Medicine  Progress note    [place] [facility] [place] Patricia Dai MD  Admit Date: 4/15/2018  SHAE 4/19/2018  Code status: Full Code    Principal Problem:  Acute hypoxemic respiratory failure    Interval hx:  Feeling a little better but requiring 10L of oxygen at this point.     ROS   Respiratory: no cough or shortness of breath  Cardiovascular: no chest pain or palpitations  Gastrointestinal: no nausea or vomiting, no abdominal pain or change in bowel habits  Behavioral/Psych: no depression or anxiety      PEx  Temp:  [98.1 °F (36.7 °C)-99.5 °F (37.5 °C)]   Pulse:  []   Resp:  []   BP: (106-180)/(52-90)   SpO2:  [69 %-96 %]     Intake/Output Summary (Last 24 hours) at 04/16/18 2323  Last data filed at 04/16/18 1800   Gross per 24 hour   Intake              650 ml   Output              350 ml   Net              300 ml        General appearance: no distress, well-developed, well-nourished  Mental status: Alert and oriented x 3  HEENT:  conjunctivae/corneas clear, PERRL  Neck: supple, thyroid not enlarged  Pulm:   normal respiratory effort, CTA B, decreased breath sounds throughout, crackles from mid lung to bases bilaterally  Card: RRR, S1, S2 normal, click, rub or gallop, holosystolic murmur 3/6 on right upper sternal border  Abd: soft, NT, ND, BS present; no masses, no organomegaly  Ext: no c/c/e  Pulses: 2+, symmetric  Skin: color, texture, turgor normal. No rashes or lesions  Neuro: CN II-XII grossly intact, no focal numbness or weakness, normal strength and tone       Recent Labs  Lab 04/15/18  1514 04/16/18  0458 04/16/18  2222   WBC 15.99*  15.20*  --    HGB 8.4* 7.3*  --    HCT 25.5* 22.7* 22*    218  --        Recent Labs  Lab 04/15/18  1514 04/16/18  0458 04/16/18  2224    136  --    K 4.0 3.8  --     104  --    CO2 20* 18*  --    BUN 70* 76*  --    CREATININE 5.4* 5.9*  --    * 250*  --    CALCIUM 8.9 8.3*  --    MG  --  1.9 2.0   PHOS  --  4.2 6.9*       Recent Labs  Lab 04/15/18  1514 04/16/18  0458 04/16/18  2224   ALKPHOS 80  --   --    ALT 18  --   --    AST 17  --   --    ALBUMIN 3.1* 2.8*  --    PROT 8.3  --   --    BILITOT 0.7  --   --    INR 1.1  --  1.1        Recent Labs  Lab 04/15/18  1703 04/15/18  2133 04/16/18  0754 04/16/18  2249   POCTGLUCOSE 223* 236* 197* 295*     Recent Labs      04/15/18   1514  04/15/18   2352  04/16/18   0759   TROPONINI  0.144*  0.218*  0.216*       Scheduled Meds:   [START ON 4/17/2018] amLODIPine  2.5 mg Oral Daily    atorvastatin  20 mg Oral Daily    azithromycin  250 mg Intravenous Daily    cefTRIAXone (ROCEPHIN) IVPB  1 g Intravenous Daily    EPINEPHrine        EPINEPHrine        [START ON 4/18/2018] epoetin bashir (PROCRIT) injection  50 Units/kg Subcutaneous Every Mon, Wed, Fri    fluticasone  2 spray Each Nare Daily    heparin (porcine)  5,000 Units Subcutaneous Q8H    insulin aspart U-100  4 Units Subcutaneous TIDWM    insulin detemir U-100  10 Units Subcutaneous QHS    senna-docusate 8.6-50 mg  1 tablet Oral BID     Continuous Infusions:   [START ON 4/17/2018] epinephrine infusion      [START ON 4/17/2018] fentanyl 50 mcg/hr (04/16/18 1955)    furosemide (LASIX) 2 mg/mL infusion (non-titrating) 10 mg/hr (04/16/18 1406)     As Needed:  acetaminophen, albuterol sulfate, dextromethorphan-guaifenesin  mg, dextrose 50%, dextrose 50%, EPINEPHrine, glucagon (human recombinant), glucose, glucose, insulin aspart U-100, ondansetron, sodium chloride 0.9%    Active Hospital Problems    Diagnosis  POA    *Acute hypoxemic respiratory failure [J96.01]  Yes    Anemia,  chronic renal failure, stage 5 [N18.5, D63.1]  Yes    Renal insufficiency [N28.9]  Yes    Type 2 diabetes mellitus with stage 5 chronic kidney disease and hypertension [E11.22, I12.0, N18.5]  Yes    Acute on chronic diastolic heart failure [I50.33]  Yes    Community acquired bacterial pneumonia [J15.9]  Yes    Type 2 diabetes mellitus with hyperlipidemia [E11.69, E78.5]  Yes    Stage 5 chronic kidney disease not on chronic dialysis [N18.5]  Yes      Resolved Hospital Problems    Diagnosis Date Resolved POA   No resolved problems to display.       Overview 71 yo with CKD V and diastolic dysfunction is here for SOB x 4 days      Assessment and Plan for Problems addressed today:    Acute hypoxic respiratory failure  Acute on chronic diastolic Heart Failure  Probable Community Acquired Pneumonia, bacterial  Pulmonary edema  Patient received nitroglycerin paste, albuterol treatment, and IV Furosemide in ER   Awaiting ABG  Starting Furosemide 60mg IV TID. Strict I/Os. Daily weights. Fluid restriction 1L  Given ceftriaxone and azithromycin in ER. Will continue for 5 day course at present.   2D echo. Elevated Troponin from baseline. Will trend.   Albuterol nebs and BIPAP as needed.  4/16/18  Not responsive to IV bolus will switch to continuous [id number] per hour     Diabetes type 2 with CKD stage V and Hypertension  Home medications include detemir 20 units at bedtime and aspart 8 units with meals. Hemoglobin a1c is 8.3. Will give detemir 10 units at bedtime and aspart 4 units with meals. Will change home nifedipine to amlodipine 5mg once daily based on nephrology recommendations on previous stay.   Nephrology consult to follow along with diuresis. She may need initiation of dialysis during stay  4/16/18 Blood pressure much improved with some number at low normal. Decrease amlodipine to 2.5mg. May consider stopping after initiation of dialysis if required.     Type 2 diabetes with hyperlipidemia  continue  atorvastatin 20mg daily     Anemia of CKD Stage V  Iron studies tomorrow. Checking stool occult. Starting epogen 5000 subQ 3x week     Probable UTI  UA equivocal for infection. Urine culture pending. Current treatment ceftriaxone     DVT PPx: heparin 5000 unit subq TID    Discharge plan and follow up  Home with or without home health. Probable outpatient dialysis.     Provider   Dr. Patricia Hameed Attestation: I personally scribed for Patricia Dai MD on 04/16/2018 at 11:35 AM. Electronically signed by gisele Austin on 04/16/2018 at 11:35 AM.

## 2018-04-17 NOTE — PLAN OF CARE
Problem: Spiritual Distress, Risk/Actual (Adult,Obstetrics,Pediatric)  Intervention: Facilitate Personal Exploration/Expression of Spirituality  Provided initial visit (for this ). Pt's daughter, and eventually pt's son, present. Introduced and offered pastoral support with reflective listening. Daughter relying on her kofi for strength. No spiritual needs expressed. Family aware of 's presence as needed. Will continue to follow.

## 2018-04-17 NOTE — CARE UPDATE
Called by nephrology to inform me that patient will need urgent HD.  Consented for HD.  Will need trialysis catheter placed for urgent HD.  Anesthesia called and at bedside to place trialysis catheter.  SpO2 87% on 12L NC.  Started the shift on 10L NC.  Spoke with HD nurse who will come dialyze patient once line is placed.

## 2018-04-17 NOTE — SUBJECTIVE & OBJECTIVE
Past Medical History:   Diagnosis Date    Arthritis     Diabetes mellitus     Hyperlipidemia     Hypertension     Renal disorder     poor kidney function       Past Surgical History:   Procedure Laterality Date    FOOT AMPUTATION Right        Review of patient's allergies indicates:   Allergen Reactions    Morphine        Family History     None        Social History Main Topics    Smoking status: Never Smoker    Smokeless tobacco: Not on file    Alcohol use No    Drug use: Unknown    Sexual activity: Not on file      Review of Systems   Unable to perform ROS: Intubated     Objective:     Vital Signs (Most Recent):  Temp: 98.1 °F (36.7 °C) (04/16/18 1901)  Pulse: (!) 124 (04/16/18 2304)  Resp: (!) 117 (04/16/18 2300)  BP: (!) 180/90 (04/16/18 2225)  SpO2: (!) 71 % (04/16/18 2304) Vital Signs (24h Range):  Temp:  [98.1 °F (36.7 °C)-99.5 °F (37.5 °C)] 98.1 °F (36.7 °C)  Pulse:  [] 124  Resp:  [] 117  SpO2:  [69 %-96 %] 71 %  BP: (106-180)/(52-90) 180/90   Weight: 90.7 kg (200 lb)  Body mass index is 32.28 kg/m².      Intake/Output Summary (Last 24 hours) at 04/17/18 0102  Last data filed at 04/16/18 1800   Gross per 24 hour   Intake              650 ml   Output              350 ml   Net              300 ml       Physical Exam   HENT:   Head: Normocephalic and atraumatic.   OG tube in place   Eyes: Conjunctivae are normal. No scleral icterus.   Neck:   trialysis in place   Cardiovascular: Regular rhythm and intact distal pulses.    Murmur heard.  tachycardic   Pulmonary/Chest: She has no wheezes. She has rales.   ET tube in place on ventilator   Abdominal: Soft. She exhibits distension. There is no tenderness. There is no guarding.   Musculoskeletal: She exhibits no edema.   Lymphadenopathy:     She has no cervical adenopathy.   Neurological:   obtunded   Skin: No rash noted.       Vents:  Vent Mode: A/C (04/16/18 2300)  Ventilator Initiated: Yes (04/16/18 2205)  Set Rate: 30 bmp (04/16/18  2304)  Vt Set: 380 mL (04/16/18 2300)  PEEP/CPAP: 10 cmH20 (04/16/18 2300)  Oxygen Concentration (%): 100 (04/16/18 2205)  Peak Airway Pressure: 24 cmH2O (04/16/18 2300)  Total Ve: 12.3 mL (04/16/18 2300)  F/VT Ratio<105 (RSBI): 296.95 (04/16/18 2300)  Lines/Drains/Airways     Central Venous Catheter Line                 Percutaneous Central Line Insertion/Assessment - triple lumen  04/16/18 2050 right internal jugular less than 1 day          Airway                 Airway - Non-Surgical 04/16/18 1 day          Arterial Line                 Arterial Line 04/16/18 2250 Left Radial less than 1 day          Peripheral Intravenous Line                 Peripheral IV - Single Lumen 04/15/18 Right Antecubital 2 days              Significant Labs:    CBC/Anemia Profile:    Recent Labs  Lab 04/15/18  1514 04/16/18  0458 04/16/18  2222 04/16/18  2224   WBC 15.99* 15.20*  --  13.96*   HGB 8.4* 7.3*  --  6.9*   HCT 25.5* 22.7* 22* 22.2*    218  --  217   MCV 72* 74*  --  76*   RDW 16.1* 15.9*  --  16.2*        Chemistries:    Recent Labs  Lab 04/15/18  1514 04/16/18  0458 04/16/18  2224    136 138   K 4.0 3.8 4.2    104 103   CO2 20* 18* 16*   BUN 70* 76* 90*   CREATININE 5.4* 5.9* 6.8*   CALCIUM 8.9 8.3* 8.0*   ALBUMIN 3.1* 2.8* 2.5*   PROT 8.3  --  7.0   BILITOT 0.7  --  0.5   ALKPHOS 80  --  87   ALT 18  --  385*   AST 17  --  443*   MG  --  1.9 2.0   PHOS  --  4.2 6.9*       A1C:   Recent Labs  Lab 04/16/18 0458   HGBA1C 8.0*     ABGs:   Recent Labs  Lab 04/16/18 2301   PH 7.095*   PCO2 50.7*   HCO3 15.5*   POCSATURATED 69*   BE -14     Blood Culture: No results for input(s): LABBLOO in the last 48 hours.  CMP:   Recent Labs  Lab 04/15/18  1514 04/16/18  0458 04/16/18  2224    136 138   K 4.0 3.8 4.2    104 103   CO2 20* 18* 16*   * 250* 308*   BUN 70* 76* 90*   CREATININE 5.4* 5.9* 6.8*   CALCIUM 8.9 8.3* 8.0*   PROT 8.3  --  7.0   ALBUMIN 3.1* 2.8* 2.5*   BILITOT 0.7  --  0.5    ALKPHOS 80  --  87   AST 17  --  443*   ALT 18  --  385*   ANIONGAP 13 14 19*   EGFRNONAA 7.4* 6.7* 5.6*     Coagulation:   Recent Labs  Lab 04/16/18  2224   INR 1.1   APTT 23.5     Lactic Acid:   Recent Labs  Lab 04/16/18  2224   LACTATE 9.5*     Urine Studies:   Recent Labs  Lab 04/15/18  1850   COLORU Yellow   APPEARANCEUA Cloudy*   PHUR 5.0   SPECGRAV 1.010   PROTEINUA 3+*   GLUCUA 2+*   KETONESU Negative   BILIRUBINUA Negative   OCCULTUA 2+*   NITRITE Negative   UROBILINOGEN Negative   LEUKOCYTESUR 1+*   RBCUA 2   WBCUA 16*   BACTERIA Many*   SQUAMEPITHEL 5   HYALINECASTS 0       Significant Imaging: CXR: I have reviewed all pertinent results/findings within the past 24 hours and my personal findings are:  Patchy interstitial changes b/l

## 2018-04-17 NOTE — PLAN OF CARE
Problem: Patient Care Overview  Goal: Plan of Care Review  Patient addmited to SICU overnight. Patient coded on arrival to unit, 1 amp of EPI and 1 of bicarb given.  After code SaO2 in the high 60s, and increased to the 80s, team aware. Patient's SaO2 up to 100% this morning. Patient transitioned form Epi drip to levo drip overngiht. Levo down to 0.02 this morning. Patient continues on propofol and fentanyl per order. 1 unit of PRBCs given overnight. Patient started on CRRT overnight, clotted off once. CRRT restarted and currently running at UF of 400cc/hr, which is goal. Blood cultures done per order. Patient's family at bedside overnight. Plan of care reviewed with patient and family. Will continue to monitor closely.

## 2018-04-17 NOTE — ASSESSMENT & PLAN NOTE
Was previously discharged on lasix 80mg daily for ADHF  Patient ran out of medication, unkown for how long  Failed trial of diuretics w/ lasix and diuril  Started on CRRT  Nephrology following

## 2018-04-17 NOTE — CONSULTS
Patient admitted to Critical Care Medicine following cardiac arrest. Full H&P to follow.     Alexa Garsia NP  Critical Care Medicine

## 2018-04-17 NOTE — PHYSICIAN QUERY
"PT Name: Martha Melvin  MR #: [id number]    Physician Query Form - Heart  Condition Clarification     CDS/: Diamond Dominguez               Contact information:[email or web address]  This form is a permanent document in the medical record.     Query Date: April 17, 2018    By submitting this query, we are merely seeking further clarification of documentation. Please utilize your independent clinical judgment when addressing the question(s) below.    The medical record contains the following   Indicators     Supporting Clinical Findings Location in Medical Record   x BNP BNP= 1051   Labs 4/15   x EF EF 55-60%   Echo 4/16   x Radiology findings There is patchy bilateral lung opacification that may reflect edema, hemorrhage, aspiration, infection, or sepsis.  No pneumothorax.  There is nonspecific enlargement of the cardiac silhouette, mediastinum, and pulmonary vasculature.  The osseous structures are unremarkable.   CXR 4/15   x Echo Results 1 - Normal left ventricular systolic function (EF 55-60%).     2 - Indeterminate LV diastolic function.     3 - Normal right ventricular systolic function .     4 - Mild tricuspid regurgitation.     5 - Pulmonary hypertension. The estimated PA systolic pressure is 64 mmHg.     6 - Increased central venous pressure.     7 - Impaired LV relaxation, elevated LAP (grade 2 diastolic dysfunction).  Echo 4/16    "Ascites" documented     x "SOB" or "MONTALVO" documented here for SOB for 4 days    She has mild dyspnea on exertion for the last 4 days     H&P 4/15   x "Hypoxia" documented Acute hypoxic respiratory failure   H&P 4/15   x Heart Failure documented Acute on chronic diastolic heart failure    PMhx HFrEF    Acute on chronic diastolic Heart Failure   H&P 4/17        H&P 4/15    "Edema" documented     x Diuretics/Meds Starting Furosemide 60mg IV TID H&P 4/15    Treatment:      Other:          Provider, please specify diagnosis or diagnoses associated with above clinical " findings.     [  ] Acute on Chronic Systolic Heart Failure ( EF < 40)*  [  ] Acute on Chronic Diastolic Heart Failure( EF > 40)*  [  ] Acute on Chronic Combined Systolic and Diastolic Heart Failure  [  ] Other Type of Heart Failure (please specify type): _________________________  [  ] Other (please specify): ___________________________________  [  ] Clinically Undetermined            *American [employer] Association                                                                                                          Please document in your progress notes daily for the duration of treatment until resolved and include in your discharge summary.      One of the above answers is clearly stated in Assessment and Plan of H&P and progress notes authored by me in [facility].

## 2018-04-17 NOTE — PROCEDURES
"Martha Melvin is a 72 y.o. female patient.    Temp: 98.1 °F (36.7 °C) (04/16/18 1901)  Pulse: (!) 141 (04/16/18 2225)  Resp: (!) 26 (04/16/18 2225)  BP: (!) 180/90 (04/16/18 2225)  SpO2: 96 % (04/16/18 2225)  Weight: 90.7 kg (200 lb) (04/15/18 2223)  Height: 5' 6" (167.6 cm) (04/15/18 2223)       Arterial Line  Date/Time: 4/16/2018 10:58 PM  Location procedure was performed: [facility] CARDIAC MEDICAL ICU ([facility])  Performed by: DELL GRECO  Authorized by: YASMANI MILLER   Consent Done: Emergent Situation  Preparation: Patient was prepped and draped in the usual sterile fashion.  Indications: respiratory failure and hemodynamic monitoring  Location: left radial  Needle gauge: 20  Seldinger technique: Seldinger technique used  Number of attempts: 3  Complications: No  Post-procedure: line sutured and dressing applied  Post-procedure CMS: unchanged  Patient tolerance: Patient tolerated the procedure well with no immediate complications          Dell Greco MD  4/16/2018  "

## 2018-04-17 NOTE — HPI
73 y/o F PMhx HFrEF, CKD stage V (makes urine, not on dialysis), moderate aortic stenosis, DM II, R leg amputation (2015), HTN who presented w/ SOB x 4 days. Patient was discharged from [facility] 10/14/17 for SOB 2/2 ADHF and she was subsequently discharged on an increased of lasix from 20mg to 80mg daily due to her renal failure. Daughter states patient ran out of lasix 80mg and asked PCP for rx refill. However, per daughter PCP refused to fill 80mg and therefore patient had been taking 20mg x 4 daily. Daughter states she last saw patient on 4/11 prior to admission and that patient was in good health at that time. She spoke with patient over the phone on 4/16 and at that time noted patient to be SOB which prompted her to call a family friend to check on patient. In conversation daughter was made aware that patient's lasix had run out. EMS was called and on route she pllaced on BIPAP and given albuterol. Upon arrival her saturations were at 82%.    In the ED patient given nitro paste, albuterol, as well as lasix. She was subsequently admitted to medicine. She was started on lasix 60mg IV tid as well as rocephin and azithromycin for suspected CAP. Nephrology was consulted for volume overload in the setting of CKD stage V, however, due to poor urine output despite diuresis w/ lasix and diuril nephrology requested trialysis. Following placement patient found to be in PEA arrest. ROSC achieved for <10min then patient again coded. ROSC achieved and patient was subsequently transferred to SICU where patient coded a 3rd time. ROSC achieved and maintained, epi switched to levo and placed on vent. Patient started on urgent dialysis. Continued to be hypoxic therefore given nimbex, elevation of HOB w/ improvement in sats.    Per daughter patient had been aware of CKD V since 2015 and had refused dialysis since that time due to fear of having to be on dialysis.    History obtained from daughter and rest of family as  patient intubated.

## 2018-04-17 NOTE — PROGRESS NOTES
[facility]  Nephrology  Progress Note    Patient Name: Martha Melvin  MRN: [id number]  Admission Date: 4/15/2018  Hospital Length of Stay: 2 days  Attending Provider: Akilah Rowley MD   Primary Care Physician: GUILLERMO Mcclure MD  Principal Problem:Cardiac arrest    Subjective:     HPI: Ms. Melvin is a 73 yo AAF with HFpEF, aortic stenosis, HTN, T2DM, and CKD stage V (baseline sCr 4.5-5.0) admitted on 4/15 with ADHF. She reported worsening SOB, orthopnea, and PND x 4 days. SpO2 82% on RA upon arrival. In the ED she was given nitro paste, albuterol, and lasix IV. She was admitted to medicine and started on lasix 60mg IV TID with 1L fluid restriction. She was also started on rocephin and azithromycin for CAP. No events overnight. UOP only 700cc. sCr tere from 5.4 to 5.9. Nephrology consulted for fluid management in setting of CKD stage V. Patient is aware of CKD diagnosis. She was offered dialysis 1.5 years ago but denied it at that time due to lack of transportation. She was previously followed by a nephrologist in [place] for the last 4 years. She was on lasix 20mg po for a long time. She developed ADHF a few months ago and was admitted to [facility]. She was told here that she would need higher doses of lasix given her advanced CKD. She was started on lasix 80mg po BID at that time. About 2 weeks ago she ran out of lasix. She called her PCP asking for a refill of the 80mg and this was denied. She asked if she could take 4 of the 20mg tablets and was told that her insurance would not cover this. She continues to feel very SOB this morning. She is interested in proceeding with dialysis if needed.     Interval History:   Failed diuretic challenge. trialysis catheter placed around 9pm. PEA arrest around 10pm. Patient coded with ROSC x3 overnight. SLED started once stable; clotted several times. CXR reveals catheter not advanced far enough. Venous port with long clot this AM; unable to aspirate. Hourly  intake 50cc/hr with levophed and sedatives. FiO2 70%/PEEP 14. Daughter at bedside, very upset about the events that occurred overnight.   Net +1.6L yesterday.     Review of patient's allergies indicates:   Allergen Reactions    Morphine      Current Facility-Administered Medications   Medication Frequency    0.9%  NaCl infusion (CRRT USE ONLY) Continuous    0.9%  NaCl infusion (for blood administration) Q24H PRN    acetaminophen tablet 650 mg Q6H PRN    albuterol nebulizer solution 2.5 mg Q4H PRN    alteplase injection 2 mg Once    chlorhexidine 0.12 % solution 15 mL BID    dextromethorphan-guaifenesin  mg per 12 hr tablet 1 tablet BID PRN    dextrose 50% injection 12.5 g PRN    dextrose 50% injection 25 g PRN    EPINEPHrine 0.1 mg/mL injection Code/trauma/sedation Med    [START ON 4/18/2018] epoetin bashir injection 4,500 Units Every Mon, Wed, Fri    famotidine (PF) injection 20 mg Daily    fentaNYL 2500 mcg in 0.9% sodium chloride 250 mL infusion premix (titrating) Continuous    glucagon (human recombinant) injection 1 mg PRN    glucose chewable tablet 16 g PRN    glucose chewable tablet 24 g PRN    heparin (porcine) injection 5,000 Units Q8H    insulin aspart U-100 pen 0-5 Units QID (AC + HS) PRN    insulin detemir U-100 pen 10 Units QHS    magnesium sulfate 2g in water 50mL IVPB (premix) PRN    norepinephrine 4 mg in dextrose 5% 250 mL infusion (premix) (titrating) Continuous    ondansetron disintegrating tablet 8 mg Q8H PRN    piperacillin-tazobactam 4.5 g in sodium chloride 0.9% 100 mL IVPB (ready to mix system) Q8H    propofol (DIPRIVAN) 10 mg/mL infusion Continuous    sodium bicarbonate 8.4 % (1 mEq/mL) injection Code/trauma/sedation Med    sodium chloride 0.9% flush 5 mL PRN    sodium phosphate 20.01 mmol in dextrose 5 % 250 mL IVPB PRN    sodium phosphate 30 mmol in dextrose 5 % 250 mL IVPB PRN    sodium phosphate 39.99 mmol in dextrose 5 % 250 mL IVPB PRN     succinylcholine injection Code/trauma/sedation Med       Objective:     Vital Signs (Most Recent):  Temp: 98 °F (36.7 °C) (04/17/18 0700)  Pulse: 92 (04/17/18 1045)  Resp: (!) 39 (04/17/18 1045)  BP: (!) 147/76 (04/17/18 0300)  SpO2: 95 % (04/17/18 1045)  O2 Device (Oxygen Therapy): ventilator (04/17/18 0723) Vital Signs (24h Range):  Temp:  [97.9 °F (36.6 °C)-99.8 °F (37.7 °C)] 98 °F (36.7 °C)  Pulse:  [] 92  Resp:  [9-100] 39  SpO2:  [40 %-100 %] 95 %  BP: (106-187)/() 147/76  Arterial Line BP: ()/(39-78) 142/67     Weight: 94.1 kg (207 lb 7.3 oz) (04/17/18 0400)  Body mass index is 33.48 kg/m².  Body surface area is 2.09 meters squared.    I/O last 3 completed shifts:  In: 2724 [P.O.:330; I.V.:1879; Blood:215; IV Piggyback:300]  Out: 1627 [Urine:600; Other:1027]    Physical Exam   Constitutional: She appears well-developed and well-nourished. She is sedated and intubated.   HENT:   Head: Normocephalic and atraumatic.   Neck: Neck supple. No thyromegaly present.   Cardiovascular: Normal rate and regular rhythm.    Pulmonary/Chest: She is intubated. She has no wheezes.   Abdominal: Soft. She exhibits no distension.   Musculoskeletal: She exhibits no edema or deformity.   Skin: Skin is warm and dry. She is not diaphoretic.       Significant Labs:  ABGs:   Recent Labs  Lab 04/17/18  0922   PH 7.338*   PCO2 30.8*   HCO3 16.6*   POCSATURATED 99   BE -9     CBC:   Recent Labs  Lab 04/17/18  0427   WBC 22.29*   RBC 3.36*   HGB 8.1*   HCT 25.5*      MCV 76*   MCH 24.1*   MCHC 31.8*     CMP:   Recent Labs  Lab 04/17/18  0427   *  273*   CALCIUM 7.8*  7.8*   ALBUMIN 2.4*  2.4*   PROT 6.5     137   K 3.9  3.9   CO2 16*  16*     103   BUN 65*  65*   CREATININE 5.2*  5.2*   ALKPHOS 135   *   AST 1,076*   BILITOT 1.3*     All labs within the past 24 hours have been reviewed.     Significant Imaging:  Labs: Reviewed  X-Ray: Reviewed    Assessment/Plan:     Stage 5  chronic kidney disease not on chronic dialysis    - baseline sCr 4.5-5.0 c/w CKD stage V  - admitted with ADHF. Attempted medical diuresis without improvement in UOP. Increased O2 requirements throughout the day  - s/p PEA arrest last night; likely related to hypoxia. Now with shock liver.   - currently on pressors and high vent requirements  - unfortunately trialysis catheter clotted off this morning (venous port no longer aspirating). Will need to change over wire ASAP.  - will resume SLED once new catheter in place. UF goal 400cc/hr as tolerated. Citrate contraindicated in setting of shock liver.             Matilda Pascal, PGY-5  Nephrology Fellow  [facility]  Pager: [phone]

## 2018-04-17 NOTE — H&P
[facility]  Critical Care Medicine  History & Physical    Patient Name: Martha Melvin  MRN: [id number]  Admission Date: 4/15/2018  Hospital Length of Stay: 2 days  Code Status: Full Code  Attending Physician: Akilah Rowley  Primary Care Provider: GUILLERMO Mcclure MD   Principal Problem: Cardiac arrest    Subjective:     HPI:  71 y/o F PMhx HFrEF, CKD stage V (makes urine, not on dialysis), moderate aortic stenosis, DM II, R leg amputation (2015), HTN who presented w/ SOB x 4 days. Patient was discharged from [facility] 10/14/17 for SOB 2/2 ADHF and she was subsequently discharged on an increased of lasix from 20mg to 80mg daily due to her renal failure. Daughter states patient ran out of lasix 80mg and asked PCP for rx refill. However, per daughter PCP refused to fill 80mg and therefore patient had been taking 20mg x 4 daily. Daughter states she last saw patient on 4/11 prior to admission and that patient was in good health at that time. She spoke with patient over the phone on 4/16 and at that time noted patient to be SOB which prompted her to call a family friend to check on patient. In conversation daughter was made aware that patient's lasix had run out. EMS was called and on route she pllaced on BIPAP and given albuterol. Upon arrival her saturations were at 82%.    In the ED patient given nitro paste, albuterol, as well as lasix. She was subsequently admitted to medicine. She was started on lasix 60mg IV tid as well as rocephin and azithromycin for suspected CAP. Nephrology was consulted for volume overload in the setting of CKD stage V, however, due to poor urine output despite diuresis w/ lasix and diuril nephrology requested trialysis. Following placement patient found to be in PEA arrest. ROSC achieved for <10min then patient again coded. ROSC achieved and patient was subsequently transferred to SICU where patient coded a 3rd time. ROSC achieved and maintained, epi switched to levo and  placed on vent. Patient started on urgent dialysis. Continued to be hypoxic therefore given nimbex, elevation of HOB w/ improvement in sats.    Per daughter patient had been aware of CKD V since 2015 and had refused dialysis since that time due to fear of having to be on dialysis.    History obtained from daughter and rest of family as patient intubated.    Hospital/ICU Course:  No notes on file     Past Medical History:   Diagnosis Date    Arthritis     Diabetes mellitus     Hyperlipidemia     Hypertension     Renal disorder     poor kidney function       Past Surgical History:   Procedure Laterality Date    FOOT AMPUTATION Right        Review of patient's allergies indicates:   Allergen Reactions    Morphine        Family History     None        Social History Main Topics    Smoking status: Never Smoker    Smokeless tobacco: Not on file    Alcohol use No    Drug use: Unknown    Sexual activity: Not on file      Review of Systems   Unable to perform ROS: Intubated     Objective:     Vital Signs (Most Recent):  Temp: 98.1 °F (36.7 °C) (04/16/18 1901)  Pulse: (!) 124 (04/16/18 2304)  Resp: (!) 117 (04/16/18 2300)  BP: (!) 180/90 (04/16/18 2225)  SpO2: (!) 71 % (04/16/18 2304) Vital Signs (24h Range):  Temp:  [98.1 °F (36.7 °C)-99.5 °F (37.5 °C)] 98.1 °F (36.7 °C)  Pulse:  [] 124  Resp:  [] 117  SpO2:  [69 %-96 %] 71 %  BP: (106-180)/(52-90) 180/90   Weight: 90.7 kg (200 lb)  Body mass index is 32.28 kg/m².      Intake/Output Summary (Last 24 hours) at 04/17/18 0102  Last data filed at 04/16/18 1800   Gross per 24 hour   Intake              650 ml   Output              350 ml   Net              300 ml       Physical Exam   HENT:   Head: Normocephalic and atraumatic.   OG tube in place   Eyes: Conjunctivae are normal. No scleral icterus.   Neck:   trialysis in place   Cardiovascular: Regular rhythm and intact distal pulses.    Murmur heard.  tachycardic   Pulmonary/Chest: She has no wheezes. She  has rales.   ET tube in place on ventilator   Abdominal: Soft. She exhibits distension. There is no tenderness. There is no guarding.   Musculoskeletal: She exhibits no edema.   Lymphadenopathy:     She has no cervical adenopathy.   Neurological:   obtunded   Skin: No rash noted.       Vents:  Vent Mode: A/C (04/16/18 2300)  Ventilator Initiated: Yes (04/16/18 2205)  Set Rate: 30 bmp (04/16/18 2304)  Vt Set: 380 mL (04/16/18 2300)  PEEP/CPAP: 10 cmH20 (04/16/18 2300)  Oxygen Concentration (%): 100 (04/16/18 2205)  Peak Airway Pressure: 24 cmH2O (04/16/18 2300)  Total Ve: 12.3 mL (04/16/18 2300)  F/VT Ratio<105 (RSBI): 296.95 (04/16/18 2300)  Lines/Drains/Airways     Central Venous Catheter Line                 Percutaneous Central Line Insertion/Assessment - triple lumen  04/16/18 2050 right internal jugular less than 1 day          Airway                 Airway - Non-Surgical 04/16/18 1 day          Arterial Line                 Arterial Line 04/16/18 2250 Left Radial less than 1 day          Peripheral Intravenous Line                 Peripheral IV - Single Lumen 04/15/18 Right Antecubital 2 days              Significant Labs:    CBC/Anemia Profile:    Recent Labs  Lab 04/15/18  1514 04/16/18  0458 04/16/18  2222 04/16/18  2224   WBC 15.99* 15.20*  --  13.96*   HGB 8.4* 7.3*  --  6.9*   HCT 25.5* 22.7* 22* 22.2*    218  --  217   MCV 72* 74*  --  76*   RDW 16.1* 15.9*  --  16.2*        Chemistries:    Recent Labs  Lab 04/15/18  1514 04/16/18  0458 04/16/18  2224    136 138   K 4.0 3.8 4.2    104 103   CO2 20* 18* 16*   BUN 70* 76* 90*   CREATININE 5.4* 5.9* 6.8*   CALCIUM 8.9 8.3* 8.0*   ALBUMIN 3.1* 2.8* 2.5*   PROT 8.3  --  7.0   BILITOT 0.7  --  0.5   ALKPHOS 80  --  87   ALT 18  --  385*   AST 17  --  443*   MG  --  1.9 2.0   PHOS  --  4.2 6.9*       A1C:   Recent Labs  Lab 04/16/18  0458   HGBA1C 8.0*     ABGs:   Recent Labs  Lab 04/16/18  2301   PH 7.095*   PCO2 50.7*   HCO3 15.5*    POCSATURATED 69*   BE -14     Blood Culture: No results for input(s): LABBLOO in the last 48 hours.  CMP:   Recent Labs  Lab 04/15/18  1514 04/16/18  0458 04/16/18  2224    136 138   K 4.0 3.8 4.2    104 103   CO2 20* 18* 16*   * 250* 308*   BUN 70* 76* 90*   CREATININE 5.4* 5.9* 6.8*   CALCIUM 8.9 8.3* 8.0*   PROT 8.3  --  7.0   ALBUMIN 3.1* 2.8* 2.5*   BILITOT 0.7  --  0.5   ALKPHOS 80  --  87   AST 17  --  443*   ALT 18  --  385*   ANIONGAP 13 14 19*   EGFRNONAA 7.4* 6.7* 5.6*     Coagulation:   Recent Labs  Lab 04/16/18  2224   INR 1.1   APTT 23.5     Lactic Acid:   Recent Labs  Lab 04/16/18  2224   LACTATE 9.5*     Urine Studies:   Recent Labs  Lab 04/15/18  1850   COLORU Yellow   APPEARANCEUA Cloudy*   PHUR 5.0   SPECGRAV 1.010   PROTEINUA 3+*   GLUCUA 2+*   KETONESU Negative   BILIRUBINUA Negative   OCCULTUA 2+*   NITRITE Negative   UROBILINOGEN Negative   LEUKOCYTESUR 1+*   RBCUA 2   WBCUA 16*   BACTERIA Many*   SQUAMEPITHEL 5   HYALINECASTS 0       Significant Imaging: CXR: I have reviewed all pertinent results/findings within the past 24 hours and my personal findings are:  Patchy interstitial changes b/l    Assessment/Plan:     Pulmonary   Pulmonary edema    Was previously discharged on lasix 80mg daily for ADHF  Patient ran out of medication, unkown for how long  Failed trial of diuretics w/ lasix and diuril  Started on CRRT  Nephrology following        Acute hypoxemic respiratory failure    Likely cause of cardiac arrest  See cardiac arrest        Cardiac/Vascular   * Cardiac arrest    Occurred following trialysis placement  Likely 2/2 hypoxia  Acidemia likely 2/2 elevated lactate 2/2 cardiac arrest  Differential includes pneumothorax, PE (including air embolus), acidemia, coronary thrombosis  CXR negative for pneumothorax, however, pneumo can be missed on CXR  Lung sliding evident on lung US  Electrolytes (k+, mag, ca (corrected)) wnl  F/u LE US to r/o DVT, recommend CTA when  patient more stable  Troponin mildly elevated at .462 and in setting of ESRD          Aortic stenosis    TTE 4/16/18 EF 55-60% w/ grade II DD  moderately sclerotic with moderately restricted leaflet mobility  peak velocity obtained across the aortic valve is 3.16 m/s  mean transvalvular gradient 24 mmHg  calculated aortic valve area is 0.83 cm2 consistent w/ severe aortic stenosis  Cautious w/ volume removal as aortic stenosis is preload dependent        Acute on chronic diastolic heart failure    Possibly 2/2 cardiorenal syndrome type 4, longstanding renal failure  See pulmonary edema        Renal/   High anion gap metabolic acidosis    anion gap metabolic acidosis w/ concomitant resp acidosis  lactate 9.5, bicarb 16, anion gap 23  Likely 2/2 lactic acidosis  Started on CRRT        Stage 5 chronic kidney disease not on chronic dialysis    Refused dialysis in the past  Per nephrology pt agreeable to dialysis this admission  Started on CRRT overnight        Oncology   Leukocytosis    Elevated WBC  CXR w/ patchy infiltrates more likely edema, however, could mask a PNA  Pt also w/ leukocytosis  Azithromycin/ rocephin switched to vanc/ zosyn  F/u vanc trough          Anemia, chronic renal failure, stage 5    Anemic on presentation, MCV <80,   Monitor CBC  Transfuse for Hb < 7  Hb 6.9, giving 1 unit pRBC        Endocrine   Type 2 diabetes mellitus with stage 5 chronic kidney disease and hypertension    HbA1c 8.0  Due to CKD V cautious for insulin stacking  Monitor accuchecks  aspart sliding scale  C/w detemir 10 units daily     UTI  UA positive  Pt w/ leukocytosis  F/u urine cx  F/u blood cx  C/w vanc/ zosyn    Elevated liver enzymes  2/2 shocked liver 2/2 hypotension following cardiac arrest  Monitor hepatic function panel       Critical Care Daily Checklist:    A: Awake: RASS Goal/Actual Goal:    Actual:     B: Spontaneous Breathing Trial Performed?     C: SAT & SBT Coordinated?                     D: Delirium:  CAM-ICU     E: Early Mobility Performed? No   F: Feeding Goal:    Status:     Current Diet Order   Procedures    Diet NPO      AS: Analgesia/Sedation Propofol, fentanyl   T: Thromboembolic Prophylaxis Heparin sq   H: HOB > 300 Yes   U: Stress Ulcer Prophylaxis (if needed) Famotidine as pt on vent   G: Glucose Control Detemir, aspart sliding scale   B: Bowel Function Stool Occurrence: 0   I: Indwelling Catheter (Lines & Cano) Necessity trialysis   D: De-escalation of Antimicrobials/Pharmacotherapies Vanc/ zosyn    Plan for the day/ETD     Code Status:  Family/Goals of Care: Full Code         Critical secondary to Patient has a condition that poses threat to life and bodily function: Severe Respiratory Distress and renal failure, s/p cardiac arrest     Critical care was time spent personally by me on the following activities: development of treatment plan with patient or surrogate and bedside caregivers, discussions with consultants, evaluation of patient's response to treatment, examination of patient, ordering and performing treatments and interventions, ordering and review of laboratory studies, ordering and review of radiographic studies, pulse oximetry, re-evaluation of patient's condition. This critical care time did not overlap with that of any other provider or involve time for any procedures.     Pepito Elizabeth MD  Critical Care Medicine  [facility]

## 2018-04-17 NOTE — ASSESSMENT & PLAN NOTE
Refused dialysis in the past  Per nephrology pt agreeable to dialysis this admission  Started on CRRT overnight

## 2018-04-17 NOTE — PT/OT/SLP DISCHARGE
Occupational Therapy Discharge Summary    Martha Melvin  MRN: [id number]   Principal Problem: Cardiac arrest      Patient Discharged from acute Occupational Therapy on 4/17/18.  Please refer to prior OT note dated 4/16/18 for functional status.    Assessment:      Patient was discharged unexpectedly.  Information required to complete an accurate discharge summary is unknown.  Refer to therapy initial evaluation and last progress note for initial and most recent functional status and goal achievement.  Recommendations made may be found in medical record.    Objective:     GOALS:    Occupational Therapy Goals        Problem: Occupational Therapy Goal    Goal Priority Disciplines Outcome Interventions   Occupational Therapy Goal     OT, PT/OT Ongoing (interventions implemented as appropriate)    Description:  Goals to be met by: 4/25/2018     Patient will increase functional independence with ADLs by performing:    LE Dressing with Modified [place].  Grooming while standing with Modified [place].  Toileting from bedside commode with Supervision for hygiene and clothing management.   Stand pivot transfers with Modified [place].  Toilet transfer to bedside commode with Modified [place].  Patient will be independent with B UE ROM HEP 3 x 15 reps (rest breaks as needed) to increase functional endurance.                       Reasons for Discontinuation of Therapy Services  Transfer to alternate level of care.      Plan:     Patient Discharged to: transferred to ICU 4/16/18    Marni Dillon OT  4/17/2018

## 2018-04-17 NOTE — PROGRESS NOTES
SLED restarted. Patient tolerated well. Both arterial and venous ports with good flow. Plan of care discussed with the primary nurse

## 2018-04-17 NOTE — PROGRESS NOTES
Pt was transferred from floor post code,was placed on vent settings as ordered,pt coded again CPR was done ,and later placed back on vent.

## 2018-04-17 NOTE — PROGRESS NOTES
Patient still not make any urine after challenge with diuretics (furosemide 200mg in addition to the drip and diuril). Patient with pulmonary edema on high flow oxygen. She appears comfortable with high flow but that can change. Dialysis consent taken. \  Will remove volume today with UF as soon as the line is placed.

## 2018-04-17 NOTE — SUBJECTIVE & OBJECTIVE
Interval History:   Failed diuretic challenge. trialysis catheter placed around 9pm. PEA arrest around 10pm. Patient coded with ROSC x3 overnight. SLED started once stable; clotted several times. CXR reveals catheter not advanced far enough. Venous port with long clot this AM; unable to aspirate. Hourly intake 50cc/hr with levophed and sedatives. FiO2 70%/PEEP 14. Daughter at bedside, very upset about the events that occurred overnight.   Net +1.6L yesterday.     Review of patient's allergies indicates:   Allergen Reactions    Morphine      Current Facility-Administered Medications   Medication Frequency    0.9%  NaCl infusion (CRRT USE ONLY) Continuous    0.9%  NaCl infusion (for blood administration) Q24H PRN    acetaminophen tablet 650 mg Q6H PRN    albuterol nebulizer solution 2.5 mg Q4H PRN    alteplase injection 2 mg Once    chlorhexidine 0.12 % solution 15 mL BID    dextromethorphan-guaifenesin  mg per 12 hr tablet 1 tablet BID PRN    dextrose 50% injection 12.5 g PRN    dextrose 50% injection 25 g PRN    EPINEPHrine 0.1 mg/mL injection Code/trauma/sedation Med    [START ON 4/18/2018] epoetin bashir injection 4,500 Units Every Mon, Wed, Fri    famotidine (PF) injection 20 mg Daily    fentaNYL 2500 mcg in 0.9% sodium chloride 250 mL infusion premix (titrating) Continuous    glucagon (human recombinant) injection 1 mg PRN    glucose chewable tablet 16 g PRN    glucose chewable tablet 24 g PRN    heparin (porcine) injection 5,000 Units Q8H    insulin aspart U-100 pen 0-5 Units QID (AC + HS) PRN    insulin detemir U-100 pen 10 Units QHS    magnesium sulfate 2g in water 50mL IVPB (premix) PRN    norepinephrine 4 mg in dextrose 5% 250 mL infusion (premix) (titrating) Continuous    ondansetron disintegrating tablet 8 mg Q8H PRN    piperacillin-tazobactam 4.5 g in sodium chloride 0.9% 100 mL IVPB (ready to mix system) Q8H    propofol (DIPRIVAN) 10 mg/mL infusion Continuous    sodium  bicarbonate 8.4 % (1 mEq/mL) injection Code/trauma/sedation Med    sodium chloride 0.9% flush 5 mL PRN    sodium phosphate 20.01 mmol in dextrose 5 % 250 mL IVPB PRN    sodium phosphate 30 mmol in dextrose 5 % 250 mL IVPB PRN    sodium phosphate 39.99 mmol in dextrose 5 % 250 mL IVPB PRN    succinylcholine injection Code/trauma/sedation Med       Objective:     Vital Signs (Most Recent):  Temp: 98 °F (36.7 °C) (04/17/18 0700)  Pulse: 92 (04/17/18 1045)  Resp: (!) 39 (04/17/18 1045)  BP: (!) 147/76 (04/17/18 0300)  SpO2: 95 % (04/17/18 1045)  O2 Device (Oxygen Therapy): ventilator (04/17/18 0723) Vital Signs (24h Range):  Temp:  [97.9 °F (36.6 °C)-99.8 °F (37.7 °C)] 98 °F (36.7 °C)  Pulse:  [] 92  Resp:  [9-100] 39  SpO2:  [40 %-100 %] 95 %  BP: (106-187)/() 147/76  Arterial Line BP: ()/(39-78) 142/67     Weight: 94.1 kg (207 lb 7.3 oz) (04/17/18 0400)  Body mass index is 33.48 kg/m².  Body surface area is 2.09 meters squared.    I/O last 3 completed shifts:  In: 2724 [P.O.:330; I.V.:1879; Blood:215; IV Piggyback:300]  Out: 1627 [Urine:600; Other:1027]    Physical Exam   Constitutional: She appears well-developed and well-nourished. She is sedated and intubated.   HENT:   Head: Normocephalic and atraumatic.   Neck: Neck supple. No thyromegaly present.   Cardiovascular: Normal rate and regular rhythm.    Pulmonary/Chest: She is intubated. She has no wheezes.   Abdominal: Soft. She exhibits no distension.   Musculoskeletal: She exhibits no edema or deformity.   Skin: Skin is warm and dry. She is not diaphoretic.       Significant Labs:  ABGs:   Recent Labs  Lab 04/17/18  0922   PH 7.338*   PCO2 30.8*   HCO3 16.6*   POCSATURATED 99   BE -9     CBC:   Recent Labs  Lab 04/17/18 0427   WBC 22.29*   RBC 3.36*   HGB 8.1*   HCT 25.5*      MCV 76*   MCH 24.1*   MCHC 31.8*     CMP:   Recent Labs  Lab 04/17/18 0427   *  273*   CALCIUM 7.8*  7.8*   ALBUMIN 2.4*  2.4*   PROT 6.5      137   K 3.9  3.9   CO2 16*  16*     103   BUN 65*  65*   CREATININE 5.2*  5.2*   ALKPHOS 135   *   AST 1,076*   BILITOT 1.3*     All labs within the past 24 hours have been reviewed.     Significant Imaging:  Labs: Reviewed  X-Ray: Reviewed

## 2018-04-17 NOTE — ANESTHESIA PROCEDURE NOTES
Central Line    Diagnosis: renal failure  Patient location during procedure: ICU  Procedure start time: 4/16/2018 8:50 PM  Timeout: 4/16/2018 8:30 PM  Procedure end time: 4/16/2018 9:00 PM  Staffing  Anesthesiologist: ALEX RAMIREZ  Resident/CRNA: ALDAIR PENNINGTON  Performed: resident/CRNA   Anesthesiologist was present at the time of the procedure.  Preanesthetic Checklist  Completed: patient identified, site marked, surgical consent, pre-op evaluation, timeout performed, IV checked, risks and benefits discussed, monitors and equipment checked and anesthesia consent given  Indication  Indication: hemodynamic monitoring, vascular access, med administration, hemodialysis     Anesthesia     Central Line  Skin Prep: skin prepped with ChloraPrep, skin prep agent completely dried prior to procedure  maximum sterile barriers used during central venous catheter insertion  hand hygiene performed prior to central venous catheter insertion  Location: right internal jugular,   Catheter type: triple lumen  Catheter Size: 12 Fr  Inserted Catheter Length: 16 cm  Ultrasound: vascular probe with ultrasound  Vessel Caliber: medium, patent  Vascular Doppler:  not done, compressibility normal  Needle advanced into vessel with real time Ultrasound guidance.  Guidewire confirmed in vessel.  Sterile sheath used.  Image recorded and saved.   Manometry: Venous cannualation confirmed by visual estimation of blood vessel pressure using manometry.  Insertion Attempts: 1   Securement:line sutured, chlorhexidine patch, sterile dressing applied and blood return through all ports     Post-Procedure  Adverse Events:none

## 2018-04-17 NOTE — ASSESSMENT & PLAN NOTE
S/p trialysis placement  Likely 2/2 hypoxia  Acidemia likely 2/2 elevated lactate 2/2 cardiac arrest  Differential includes pneumothorax, PE (including air embolus), acidemia, coronary thrombosis  CXR negative for pneumothorax, lung sliding evident on lung US making pneumo less likely  Troponin mildly elevated at .462 and in setting of ESRD therefore coronary event unlikely; also no new EKG changes seen, ST depressions present since admission   Electrolytes (k+, mag, ca (corrected)) wnl  LE US negative  Clot found in trialysis  patient started empirically on heparin drip for possible PE  As already started on treatment for PE no longer need for CTA

## 2018-04-18 LAB
ALBUMIN SERPL BCP-MCNC: 2.1 G/DL
ALBUMIN SERPL BCP-MCNC: 2.3 G/DL
ALLENS TEST: ABNORMAL
ALLENS TEST: ABNORMAL
ALP SERPL-CCNC: 114 U/L
ALT SERPL W/O P-5'-P-CCNC: 551 U/L
ANION GAP SERPL CALC-SCNC: 10 MMOL/L
ANION GAP SERPL CALC-SCNC: 12 MMOL/L
ANION GAP SERPL CALC-SCNC: 9 MMOL/L
AST SERPL-CCNC: 432 U/L
BACTERIA UR CULT: NORMAL
BASOPHILS # BLD AUTO: 0.03 K/UL
BASOPHILS # BLD AUTO: 0.03 K/UL
BASOPHILS NFR BLD: 0.3 %
BASOPHILS NFR BLD: 0.3 %
BILIRUB DIRECT SERPL-MCNC: 0.8 MG/DL
BILIRUB SERPL-MCNC: 1 MG/DL
BUN SERPL-MCNC: 10 MG/DL
BUN SERPL-MCNC: 4 MG/DL
BUN SERPL-MCNC: 8 MG/DL
CALCIUM SERPL-MCNC: 7.5 MG/DL
CALCIUM SERPL-MCNC: 7.7 MG/DL
CALCIUM SERPL-MCNC: 7.8 MG/DL
CHLORIDE SERPL-SCNC: 101 MMOL/L
CHLORIDE SERPL-SCNC: 102 MMOL/L
CHLORIDE SERPL-SCNC: 103 MMOL/L
CO2 SERPL-SCNC: 26 MMOL/L
CO2 SERPL-SCNC: 27 MMOL/L
CO2 SERPL-SCNC: 29 MMOL/L
CREAT SERPL-MCNC: 0.8 MG/DL
CREAT SERPL-MCNC: 1.3 MG/DL
CREAT SERPL-MCNC: 1.6 MG/DL
DELSYS: ABNORMAL
DELSYS: ABNORMAL
DIFFERENTIAL METHOD: ABNORMAL
DIFFERENTIAL METHOD: ABNORMAL
EOSINOPHIL # BLD AUTO: 0.3 K/UL
EOSINOPHIL # BLD AUTO: 0.3 K/UL
EOSINOPHIL NFR BLD: 3.5 %
EOSINOPHIL NFR BLD: 3.5 %
ERYTHROCYTE [DISTWIDTH] IN BLOOD BY AUTOMATED COUNT: 16.5 %
ERYTHROCYTE [DISTWIDTH] IN BLOOD BY AUTOMATED COUNT: 16.5 %
ERYTHROCYTE [SEDIMENTATION RATE] IN BLOOD BY WESTERGREN METHOD: 22 MM/H
ERYTHROCYTE [SEDIMENTATION RATE] IN BLOOD BY WESTERGREN METHOD: 28 MM/H
EST. GFR  (AFRICAN AMERICAN): 37.4 ML/MIN/1.73 M^2
EST. GFR  (AFRICAN AMERICAN): 48 ML/MIN/1.73 M^2
EST. GFR  (AFRICAN AMERICAN): >60 ML/MIN/1.73 M^2
EST. GFR  (NON AFRICAN AMERICAN): 32.4 ML/MIN/1.73 M^2
EST. GFR  (NON AFRICAN AMERICAN): 41.7 ML/MIN/1.73 M^2
EST. GFR  (NON AFRICAN AMERICAN): >60 ML/MIN/1.73 M^2
FACT X PPP CHRO-ACNC: 0.6 IU/ML
FIO2: 50
FIO2: 70
GLUCOSE SERPL-MCNC: 108 MG/DL
GLUCOSE SERPL-MCNC: 91 MG/DL
GLUCOSE SERPL-MCNC: 92 MG/DL
HCO3 UR-SCNC: 27.9 MMOL/L (ref 24–28)
HCO3 UR-SCNC: 29.7 MMOL/L (ref 24–28)
HCT VFR BLD AUTO: 22.6 %
HCT VFR BLD AUTO: 22.6 %
HGB BLD-MCNC: 7.6 G/DL
HGB BLD-MCNC: 7.6 G/DL
IMM GRANULOCYTES # BLD AUTO: 0.06 K/UL
IMM GRANULOCYTES # BLD AUTO: 0.06 K/UL
IMM GRANULOCYTES NFR BLD AUTO: 0.7 %
IMM GRANULOCYTES NFR BLD AUTO: 0.7 %
LYMPHOCYTES # BLD AUTO: 1.6 K/UL
LYMPHOCYTES # BLD AUTO: 1.6 K/UL
LYMPHOCYTES NFR BLD: 17.2 %
LYMPHOCYTES NFR BLD: 17.2 %
MAGNESIUM SERPL-MCNC: 1.7 MG/DL
MAGNESIUM SERPL-MCNC: 2.2 MG/DL
MAGNESIUM SERPL-MCNC: 2.3 MG/DL
MCH RBC QN AUTO: 24.1 PG
MCH RBC QN AUTO: 24.1 PG
MCHC RBC AUTO-ENTMCNC: 33.6 G/DL
MCHC RBC AUTO-ENTMCNC: 33.6 G/DL
MCV RBC AUTO: 72 FL
MCV RBC AUTO: 72 FL
MIN VOL: 7
MIN VOL: 9.9
MODE: ABNORMAL
MODE: ABNORMAL
MONOCYTES # BLD AUTO: 0.9 K/UL
MONOCYTES # BLD AUTO: 0.9 K/UL
MONOCYTES NFR BLD: 10.2 %
MONOCYTES NFR BLD: 10.2 %
NEUTROPHILS # BLD AUTO: 6.3 K/UL
NEUTROPHILS # BLD AUTO: 6.3 K/UL
NEUTROPHILS NFR BLD: 68.1 %
NEUTROPHILS NFR BLD: 68.1 %
NRBC BLD-RTO: 0 /100 WBC
NRBC BLD-RTO: 0 /100 WBC
PCO2 BLDA: 35.1 MMHG (ref 35–45)
PCO2 BLDA: 37 MMHG (ref 35–45)
PEEP: 14
PEEP: 14
PH SMN: 7.49 [PH] (ref 7.35–7.45)
PH SMN: 7.54 [PH] (ref 7.35–7.45)
PHOSPHATE SERPL-MCNC: 2 MG/DL
PHOSPHATE SERPL-MCNC: 2.9 MG/DL
PHOSPHATE SERPL-MCNC: 3 MG/DL
PIP: 33
PIP: 33
PLATELET # BLD AUTO: 182 K/UL
PLATELET # BLD AUTO: 182 K/UL
PMV BLD AUTO: 10.3 FL
PMV BLD AUTO: 10.3 FL
PO2 BLDA: 112 MMHG (ref 80–100)
PO2 BLDA: 184 MMHG (ref 80–100)
POC BE: 5 MMOL/L
POC BE: 7 MMOL/L
POC SATURATED O2: 100 % (ref 95–100)
POC SATURATED O2: 99 % (ref 95–100)
POC TCO2: 29 MMOL/L (ref 23–27)
POC TCO2: 31 MMOL/L (ref 23–27)
POCT GLUCOSE: 112 MG/DL (ref 70–110)
POCT GLUCOSE: 88 MG/DL (ref 70–110)
POTASSIUM SERPL-SCNC: 3.7 MMOL/L
POTASSIUM SERPL-SCNC: 4.4 MMOL/L
POTASSIUM SERPL-SCNC: 4.4 MMOL/L
PROT SERPL-MCNC: 6.2 G/DL
RBC # BLD AUTO: 3.16 M/UL
RBC # BLD AUTO: 3.16 M/UL
SAMPLE: ABNORMAL
SAMPLE: ABNORMAL
SITE: ABNORMAL
SITE: ABNORMAL
SODIUM SERPL-SCNC: 137 MMOL/L
SODIUM SERPL-SCNC: 141 MMOL/L
SODIUM SERPL-SCNC: 141 MMOL/L
SP02: 100
SP02: 100
VANCOMYCIN SERPL-MCNC: 17.7 UG/ML
VT: 350
VT: 353
WBC # BLD AUTO: 9.2 K/UL
WBC # BLD AUTO: 9.2 K/UL

## 2018-04-18 PROCEDURE — 25000003 PHARM REV CODE 250: Performed by: INTERNAL MEDICINE

## 2018-04-18 PROCEDURE — 99900026 HC AIRWAY MAINTENANCE (STAT)

## 2018-04-18 PROCEDURE — 37799 UNLISTED PX VASCULAR SURGERY: CPT

## 2018-04-18 PROCEDURE — 25000003 PHARM REV CODE 250: Performed by: STUDENT IN AN ORGANIZED HEALTH CARE EDUCATION/TRAINING PROGRAM

## 2018-04-18 PROCEDURE — 63600175 PHARM REV CODE 636 W HCPCS: Performed by: SURGERY

## 2018-04-18 PROCEDURE — 80069 RENAL FUNCTION PANEL: CPT

## 2018-04-18 PROCEDURE — 83735 ASSAY OF MAGNESIUM: CPT | Mod: 91

## 2018-04-18 PROCEDURE — 20000000 HC ICU ROOM

## 2018-04-18 PROCEDURE — 63600175 PHARM REV CODE 636 W HCPCS: Performed by: INTERNAL MEDICINE

## 2018-04-18 PROCEDURE — 82803 BLOOD GASES ANY COMBINATION: CPT

## 2018-04-18 PROCEDURE — 63600175 PHARM REV CODE 636 W HCPCS: Performed by: STUDENT IN AN ORGANIZED HEALTH CARE EDUCATION/TRAINING PROGRAM

## 2018-04-18 PROCEDURE — 85520 HEPARIN ASSAY: CPT

## 2018-04-18 PROCEDURE — 80076 HEPATIC FUNCTION PANEL: CPT

## 2018-04-18 PROCEDURE — 63600175 PHARM REV CODE 636 W HCPCS: Mod: JG | Performed by: INTERNAL MEDICINE

## 2018-04-18 PROCEDURE — 25000003 PHARM REV CODE 250: Performed by: SURGERY

## 2018-04-18 PROCEDURE — 80100008 HC CRRT DAILY MAINTENANCE

## 2018-04-18 PROCEDURE — 94003 VENT MGMT INPAT SUBQ DAY: CPT

## 2018-04-18 PROCEDURE — 99291 CRITICAL CARE FIRST HOUR: CPT | Mod: ,,, | Performed by: INTERNAL MEDICINE

## 2018-04-18 PROCEDURE — 80202 ASSAY OF VANCOMYCIN: CPT

## 2018-04-18 PROCEDURE — 90945 DIALYSIS ONE EVALUATION: CPT | Mod: GC,,, | Performed by: INTERNAL MEDICINE

## 2018-04-18 PROCEDURE — 90945 DIALYSIS ONE EVALUATION: CPT

## 2018-04-18 PROCEDURE — 85025 COMPLETE CBC W/AUTO DIFF WBC: CPT

## 2018-04-18 PROCEDURE — 99900022

## 2018-04-18 PROCEDURE — 94761 N-INVAS EAR/PLS OXIMETRY MLT: CPT

## 2018-04-18 PROCEDURE — 25500020 PHARM REV CODE 255: Performed by: INTERNAL MEDICINE

## 2018-04-18 PROCEDURE — 27000221 HC OXYGEN, UP TO 24 HOURS

## 2018-04-18 RX ORDER — MAGNESIUM SULFATE HEPTAHYDRATE 40 MG/ML
2 INJECTION, SOLUTION INTRAVENOUS
Status: DISCONTINUED | OUTPATIENT
Start: 2018-04-18 | End: 2018-04-19

## 2018-04-18 RX ORDER — FENTANYL CITRATE-0.9 % NACL/PF 10 MCG/ML
PLASTIC BAG, INJECTION (ML) INTRAVENOUS CONTINUOUS
Status: DISCONTINUED | OUTPATIENT
Start: 2018-04-18 | End: 2018-04-21

## 2018-04-18 RX ORDER — HYDROCODONE BITARTRATE AND ACETAMINOPHEN 500; 5 MG/1; MG/1
TABLET ORAL CONTINUOUS
Status: DISCONTINUED | OUTPATIENT
Start: 2018-04-18 | End: 2018-04-19 | Stop reason: ALTCHOICE

## 2018-04-18 RX ADMIN — Medication 0.05 MCG/KG/MIN: at 12:04

## 2018-04-18 RX ADMIN — PROPOFOL 25 MCG/KG/MIN: 10 INJECTION, EMULSION INTRAVENOUS at 10:04

## 2018-04-18 RX ADMIN — CHLORHEXIDINE GLUCONATE 15 ML: 1.2 RINSE ORAL at 09:04

## 2018-04-18 RX ADMIN — IOHEXOL 100 ML: 350 INJECTION, SOLUTION INTRAVENOUS at 02:04

## 2018-04-18 RX ADMIN — INSULIN DETEMIR 10 UNITS: 100 INJECTION, SOLUTION SUBCUTANEOUS at 09:04

## 2018-04-18 RX ADMIN — PROPOFOL 20 MCG/KG/MIN: 10 INJECTION, EMULSION INTRAVENOUS at 03:04

## 2018-04-18 RX ADMIN — PIPERACILLIN AND TAZOBACTAM 4.5 G: 4; .5 INJECTION, POWDER, LYOPHILIZED, FOR SOLUTION INTRAVENOUS; PARENTERAL at 10:04

## 2018-04-18 RX ADMIN — PROPOFOL 25 MCG/KG/MIN: 10 INJECTION, EMULSION INTRAVENOUS at 01:04

## 2018-04-18 RX ADMIN — FAMOTIDINE 20 MG: 20 TABLET, FILM COATED ORAL at 08:04

## 2018-04-18 RX ADMIN — SODIUM PHOSPHATE, MONOBASIC, MONOHYDRATE 39.99 MMOL: 276; 142 INJECTION, SOLUTION INTRAVENOUS at 01:04

## 2018-04-18 RX ADMIN — PROPOFOL 25 MCG/KG/MIN: 10 INJECTION, EMULSION INTRAVENOUS at 03:04

## 2018-04-18 RX ADMIN — SODIUM CHLORIDE: 0.9 INJECTION, SOLUTION INTRAVENOUS at 01:04

## 2018-04-18 RX ADMIN — MAGNESIUM SULFATE IN WATER 2 G: 40 INJECTION, SOLUTION INTRAVENOUS at 04:04

## 2018-04-18 RX ADMIN — Medication 2500 MCG: at 03:04

## 2018-04-18 RX ADMIN — HEPARIN SODIUM 17 UNITS/KG/HR: 10000 INJECTION, SOLUTION INTRAVENOUS at 06:04

## 2018-04-18 RX ADMIN — PIPERACILLIN AND TAZOBACTAM 4.5 G: 4; .5 INJECTION, POWDER, LYOPHILIZED, FOR SOLUTION INTRAVENOUS; PARENTERAL at 02:04

## 2018-04-18 RX ADMIN — PIPERACILLIN AND TAZOBACTAM 4.5 G: 4; .5 INJECTION, POWDER, LYOPHILIZED, FOR SOLUTION INTRAVENOUS; PARENTERAL at 05:04

## 2018-04-18 RX ADMIN — CHLORHEXIDINE GLUCONATE 15 ML: 1.2 RINSE ORAL at 08:04

## 2018-04-18 RX ADMIN — SODIUM CHLORIDE 200 ML: 0.9 INJECTION, SOLUTION INTRAVENOUS at 03:04

## 2018-04-18 RX ADMIN — Medication 0.07 MCG/KG/MIN: at 05:04

## 2018-04-18 RX ADMIN — Medication 1250 MG: at 01:04

## 2018-04-18 RX ADMIN — ERYTHROPOIETIN 4500 UNITS: 10000 INJECTION, SOLUTION INTRAVENOUS; SUBCUTANEOUS at 10:04

## 2018-04-18 NOTE — PLAN OF CARE
Problem: Patient Care Overview  Goal: Plan of Care Review  Outcome: Ongoing (interventions implemented as appropriate)  Pt remains intubated, 50% O2 14 Peep A/C. Gtts: Fentanyl, Heparin, Levo & Propofol. UOP 15-20cc/hr. CRRT @ goal 400cc/hr, pt tolerating well. CRRT restarted once this shift. Cano inserted after bladder scan revealed volume. Heparin gtt titrated to maintain therapeutic Anti Xa. Levo titrated to maintain MAP >65. Pt arouses to voice, follows commands and moves all extremities. Pt turned w/ pillow, no skin breakdown noted at this time. Plan to continue weaning Levo & Vent as tolerated. Daughter at bedside overnight. Pt and daughter updated with plan of are, all questions answered.

## 2018-04-18 NOTE — SUBJECTIVE & OBJECTIVE
Interval History/Significant Events: Patient started on heparin drip yesterday empirically for possible PE. Patient following commands when sedation off or weaned. She continued to be on CRRTovernight, vent setting adjusted FiO2 70% to 50% and RR 28 to 22.    Review of Systems   Unable to perform ROS: Intubated     Objective:     Vital Signs (Most Recent):  Temp: 97.8 °F (36.6 °C) (04/18/18 0700)  Pulse: 91 (04/18/18 0715)  Resp: (!) 22 (04/18/18 0715)  BP: 96/63 (04/18/18 0700)  SpO2: 100 % (04/18/18 0715) Vital Signs (24h Range):  Temp:  [97.7 °F (36.5 °C)-97.9 °F (36.6 °C)] 97.8 °F (36.6 °C)  Pulse:  [] 91  Resp:  [] 22  SpO2:  [89 %-100 %] 100 %  BP: ()/(58-81) 96/63  Arterial Line BP: ()/(39-76) 101/54   Weight: 94.3 kg (207 lb 14.3 oz)  Body mass index is 33.55 kg/m².      Intake/Output Summary (Last 24 hours) at 04/18/18 0736  Last data filed at 04/18/18 0700   Gross per 24 hour   Intake           5083.7 ml   Output             8428 ml   Net          -3344.3 ml       Physical Exam   HENT:   Head: Normocephalic and atraumatic.   Eyes: Conjunctivae are normal. No scleral icterus.   Cardiovascular: Normal rate, regular rhythm and intact distal pulses.    Pulmonary/Chest: She has no wheezes. She has rales.   On the ventilator   Abdominal: Soft. She exhibits distension. There is no tenderness. There is no guarding.   Musculoskeletal: She exhibits edema. She exhibits no tenderness.   Lymphadenopathy:     She has no cervical adenopathy.   Neurological:   obtunded   Skin: No rash noted.       Vents:  Vent Mode: A/C (04/18/18 0511)  Ventilator Initiated: Yes (04/16/18 2205)  Set Rate: 28 bmp (04/18/18 0511)  Vt Set: 350 mL (04/18/18 0511)  PEEP/CPAP: 14 cmH20 (04/18/18 0511)  Oxygen Concentration (%): 50 (04/18/18 0600)  Peak Airway Pressure: 33 cmH2O (04/18/18 0511)  Total Ve: 9.9 mL (04/18/18 0511)  F/VT Ratio<105 (RSBI): (!) 79.32 (04/18/18 0511)  Lines/Drains/Airways     Central Venous  Catheter Line                 Trialysis (Dialysis) Catheter 04/16/18 2050 right internal jugular 1 day          Drain                 NG/OG Tube 04/16/18 2230 orogastric 1 day         Urethral Catheter 04/18/18 0000 less than 1 day          Airway                 Airway - Non-Surgical 04/16/18 2 days         Airway - Non-Surgical 04/16/18 2147 Endotracheal Tube 1 day          Arterial Line                 Arterial Line 04/16/18 2250 Left Radial 1 day          Peripheral Intravenous Line                 Peripheral IV - Single Lumen 04/16/18 2300 Right Antecubital 1 day              Significant Labs:    CBC/Anemia Profile:    Recent Labs  Lab 04/16/18  2224  04/17/18  1415 04/17/18  1513 04/18/18  0300   WBC 13.96*  < > 10.02 11.82 9.20  9.20   HGB 6.9*  < > 7.7* 7.7* 7.6*  7.6*   HCT 22.2*  < > 23.2* 23.7* 22.6*  22.6*     < > 179 209 182  182   MCV 76*  < > 74* 74* 72*  72*   RDW 16.2*  < > 16.6* 16.5* 16.5*  16.5*   IRON 19*  --   --   --   --    FERRITIN 1,785*  --   --   --   --    < > = values in this interval not displayed.     Chemistries:    Recent Labs  Lab 04/17/18  0201 04/17/18  0427 04/17/18  1415 04/17/18  2200 04/18/18  0300    137  137 138 140 141   K 3.8 3.9  3.9 4.0 3.6 3.7    103  103 104 103 102   CO2 16* 16*  16* 22* 26 27   BUN 57* 65*  65* 38* 14 10   CREATININE 4.8* 5.2*  5.2* 3.4* 1.6* 1.3   CALCIUM 7.2* 7.8*  7.8* 7.6* 7.4* 7.5*   ALBUMIN 2.6* 2.4*  2.4* 2.2* 2.1* 2.1*  2.1*   PROT 7.2 6.5  --   --  6.2   BILITOT 1.3* 1.3*  --   --  1.0   ALKPHOS 158* 135  --   --  114   * 781*  --   --  551*   AST 1,083* 1,076*  --   --  432*   MG 1.9 1.7 2.1 1.6 2.3   PHOS 5.0* 4.6* 3.3 1.4* 3.0       ABGs:   Recent Labs  Lab 04/18/18  0427   PH 7.535*   PCO2 35.1   HCO3 29.7*   POCSATURATED 100   BE 7     CMP:   Recent Labs  Lab 04/17/18  0201 04/17/18  0427 04/17/18  1415 04/17/18  2200 04/18/18  0300    137  137 138 140 141   K 3.8 3.9  3.9 4.0 3.6 3.7     103  103 104 103 102   CO2 16* 16*  16* 22* 26 27   * 273*  273* 179* 100 91   BUN 57* 65*  65* 38* 14 10   CREATININE 4.8* 5.2*  5.2* 3.4* 1.6* 1.3   CALCIUM 7.2* 7.8*  7.8* 7.6* 7.4* 7.5*   PROT 7.2 6.5  --   --  6.2   ALBUMIN 2.6* 2.4*  2.4* 2.2* 2.1* 2.1*  2.1*   BILITOT 1.3* 1.3*  --   --  1.0   ALKPHOS 158* 135  --   --  114   AST 1,083* 1,076*  --   --  432*   * 781*  --   --  551*   ANIONGAP 19* 18*  18* 12 11 12   EGFRNONAA 8.6* 7.8*  7.8* 13.0* 32.4* 41.7*       Significant Imaging:  CXR: I have reviewed all pertinent results/findings within the past 24 hours and my personal findings are:  pulm edema

## 2018-04-18 NOTE — PROGRESS NOTES
Dr. Elizabeth notified pt's bladder scan volume of 382mL. Orders received to place chappell catheter.

## 2018-04-18 NOTE — SUBJECTIVE & OBJECTIVE
Interval History:   Received cathflo yesterday; SLED restarted around 1pm. She received I/O cath with 500cc UOP; chappell catheter placed. UOP 920cc overnight. Net negative 3.3L yesterday. CXR improved but still with edema. Vent requirements improved. Hourly intake 45cc/hr with levophed, heparin, and sedatives. Planning for CT later today.     Review of patient's allergies indicates:   Allergen Reactions    Morphine      Current Facility-Administered Medications   Medication Frequency    0.9%  NaCl infusion (CRRT USE ONLY) Continuous    acetaminophen tablet 650 mg Q6H PRN    albuterol nebulizer solution 2.5 mg Q4H PRN    alteplase injection 2 mg Once    chlorhexidine 0.12 % solution 15 mL BID    dextromethorphan-guaifenesin  mg per 12 hr tablet 1 tablet BID PRN    dextrose 50% injection 12.5 g PRN    dextrose 50% injection 25 g PRN    EPINEPHrine 0.1 mg/mL injection Code/trauma/sedation Med    epoetin bashir injection 4,500 Units Every Mon, Wed, Fri    famotidine tablet 20 mg Daily    fentaNYL 2500 mcg in 0.9% sodium chloride 250 mL infusion premix (titrating) Continuous    glucagon (human recombinant) injection 1 mg PRN    glucose chewable tablet 16 g PRN    glucose chewable tablet 24 g PRN    insulin aspart U-100 pen 0-5 Units Q6H PRN    insulin detemir U-100 pen 10 Units QHS    magnesium sulfate 2g in water 50mL IVPB (premix) PRN    norepinephrine 4 mg in dextrose 5% 250 mL infusion (premix) (titrating) Continuous    ondansetron disintegrating tablet 8 mg Q8H PRN    piperacillin-tazobactam 4.5 g in sodium chloride 0.9% 100 mL IVPB (ready to mix system) Q8H    propofol (DIPRIVAN) 10 mg/mL infusion Continuous    sodium bicarbonate 8.4 % (1 mEq/mL) injection Code/trauma/sedation Med    sodium chloride 0.9% flush 5 mL PRN    sodium phosphate 20.01 mmol in dextrose 5 % 250 mL IVPB PRN    sodium phosphate 30 mmol in dextrose 5 % 250 mL IVPB PRN    sodium phosphate 39.99 mmol in dextrose 5 %  250 mL IVPB PRN    succinylcholine injection Code/trauma/sedation Med       Objective:     Vital Signs (Most Recent):  Temp: 98.8 °F (37.1 °C) (04/18/18 1515)  Pulse: 90 (04/18/18 1845)  Resp: (!) 22 (04/18/18 1845)  BP: 97/61 (04/18/18 1800)  SpO2: (!) 94 % (04/18/18 1845)  O2 Device (Oxygen Therapy): ventilator (04/18/18 1800) Vital Signs (24h Range):  Temp:  [97.7 °F (36.5 °C)-98.8 °F (37.1 °C)] 98.8 °F (37.1 °C)  Pulse:  [] 90  Resp:  [] 22  SpO2:  [90 %-100 %] 94 %  BP: ()/(51-81) 97/61  Arterial Line BP: ()/(47-68) 105/53     Weight: 94.3 kg (207 lb 14.3 oz) (04/18/18 0400)  Body mass index is 33.55 kg/m².  Body surface area is 2.1 meters squared.    I/O last 3 completed shifts:  In: 7157.7 [I.V.:6942.7; Blood:215]  Out: 9455 [Urine:920; Other:8535]    Physical Exam   Constitutional: She appears well-developed and well-nourished. She is sedated and intubated.   HENT:   Head: Normocephalic and atraumatic.   Neck: Neck supple. No tracheal deviation present.   Cardiovascular: Normal rate and regular rhythm.    Pulmonary/Chest: She is intubated. She has no wheezes. She has no rhonchi.   Abdominal: Soft. She exhibits no distension.   Musculoskeletal: She exhibits no edema or deformity.   Skin: Skin is warm and dry.       Significant Labs:  ABGs:   Recent Labs  Lab 04/18/18  0903   PH 7.486*   PCO2 37.0   HCO3 27.9   POCSATURATED 99   BE 5     CBC:   Recent Labs  Lab 04/18/18  0300   WBC 9.20  9.20   RBC 3.16*  3.16*   HGB 7.6*  7.6*   HCT 22.6*  22.6*     182   MCV 72*  72*   MCH 24.1*  24.1*   MCHC 33.6  33.6     CMP:   Recent Labs  Lab 04/18/18  0300 04/18/18  1400   GLU 91 92   CALCIUM 7.5* 7.8*   ALBUMIN 2.1*  2.1* 2.1*   PROT 6.2  --     141   K 3.7 4.4   CO2 27 29    103   BUN 10 4*   CREATININE 1.3 0.8   ALKPHOS 114  --    *  --    *  --    BILITOT 1.0  --      All labs within the past 24 hours have been reviewed.     Significant  Imaging:  Labs: Reviewed  X-Ray: Reviewed

## 2018-04-18 NOTE — PROGRESS NOTES
[facility]  Critical Care Medicine  Progress Note    Patient Name: Martha Melvin  MRN: [id number]  Admission Date: 4/15/2018  Hospital Length of Stay: 3 days  Code Status: Full Code  Attending Provider: Akilah Rowley MD  Primary Care Provider: GUILLERMO Mcclure MD   Principal Problem: Cardiac arrest    Subjective:     HPI:  73 y/o F PMhx HFrEF, CKD stage V (makes urine, not on dialysis), moderate aortic stenosis, DM II, R leg amputation (2015), HTN who presented w/ SOB x 4 days. Patient was discharged from [facility] 10/14/17 for SOB 2/2 ADHF and she was subsequently discharged on an increased of lasix from 20mg to 80mg daily due to her renal failure. Daughter states patient ran out of lasix 80mg and asked PCP for rx refill. However, per daughter PCP refused to fill 80mg and therefore patient had been taking 20mg x 4 daily. Daughter states she last saw patient on 4/11 prior to admission and that patient was in good health at that time. She spoke with patient over the phone on 4/16 and at that time noted patient to be SOB which prompted her to call a family friend to check on patient. In conversation daughter was made aware that patient's lasix had run out. EMS was called and on route she pllaced on BIPAP and given albuterol. Upon arrival her saturations were at 82%.    In the ED patient given nitro paste, albuterol, as well as lasix. She was subsequently admitted to medicine. She was started on lasix 60mg IV tid as well as rocephin and azithromycin for suspected CAP. Nephrology was consulted for volume overload in the setting of CKD stage V, however, due to poor urine output despite diuresis w/ lasix and diuril nephrology requested trialysis. Following placement patient found to be in PEA arrest. ROSC achieved for <10min then patient again coded. ROSC achieved and patient was subsequently transferred to SICU where patient coded a 3rd time. ROSC achieved and maintained, epi switched to levo and  placed on vent. Patient started on urgent dialysis. Continued to be hypoxic therefore given nimbex, elevation of HOB w/ improvement in sats.    Per daughter patient had been aware of CKD V since 2015 and had refused dialysis since that time due to fear of having to be on dialysis.    History obtained from daughter and rest of family as patient intubated.    Hospital/ICU Course:  4/17 patient initially heavily sedated, however, once sedation turned off able to follow commands; trialysis clotted, however, clot removed with syringe; patient started on heparin drip empirically for possible PE  4/18 patient continues to be on CRRT, vent setting adjusted FiO2 70% to 50% and RR 28 to 22    Interval History/Significant Events: Patient started on heparin drip yesterday empirically for possible PE. Patient following commands when sedation off or weaned. She continued to be on CRRTovernight, vent setting adjusted FiO2 70% to 50% and RR 28 to 22.    Review of Systems   Unable to perform ROS: Intubated     Objective:     Vital Signs (Most Recent):  Temp: 97.8 °F (36.6 °C) (04/18/18 0700)  Pulse: 91 (04/18/18 0715)  Resp: (!) 22 (04/18/18 0715)  BP: 96/63 (04/18/18 0700)  SpO2: 100 % (04/18/18 0715) Vital Signs (24h Range):  Temp:  [97.7 °F (36.5 °C)-97.9 °F (36.6 °C)] 97.8 °F (36.6 °C)  Pulse:  [] 91  Resp:  [] 22  SpO2:  [89 %-100 %] 100 %  BP: ()/(58-81) 96/63  Arterial Line BP: ()/(39-76) 101/54   Weight: 94.3 kg (207 lb 14.3 oz)  Body mass index is 33.55 kg/m².      Intake/Output Summary (Last 24 hours) at 04/18/18 0736  Last data filed at 04/18/18 0700   Gross per 24 hour   Intake           5083.7 ml   Output             8428 ml   Net          -3344.3 ml       Physical Exam   HENT:   Head: Normocephalic and atraumatic.   Eyes: Conjunctivae are normal. No scleral icterus.   Cardiovascular: Normal rate, regular rhythm and intact distal pulses.    Pulmonary/Chest: She has no wheezes. She has rales.   On  the ventilator   Abdominal: Soft. She exhibits distension. There is no tenderness. There is no guarding.   Musculoskeletal: She exhibits edema. She exhibits no tenderness.   Lymphadenopathy:     She has no cervical adenopathy.   Neurological:   obtunded   Skin: No rash noted.       Vents:  Vent Mode: A/C (04/18/18 0511)  Ventilator Initiated: Yes (04/16/18 2205)  Set Rate: 28 bmp (04/18/18 0511)  Vt Set: 350 mL (04/18/18 0511)  PEEP/CPAP: 14 cmH20 (04/18/18 0511)  Oxygen Concentration (%): 50 (04/18/18 0600)  Peak Airway Pressure: 33 cmH2O (04/18/18 0511)  Total Ve: 9.9 mL (04/18/18 0511)  F/VT Ratio<105 (RSBI): (!) 79.32 (04/18/18 0511)  Lines/Drains/Airways     Central Venous Catheter Line                 Trialysis (Dialysis) Catheter 04/16/18 2050 right internal jugular 1 day          Drain                 NG/OG Tube 04/16/18 2230 orogastric 1 day         Urethral Catheter 04/18/18 0000 less than 1 day          Airway                 Airway - Non-Surgical 04/16/18 2 days         Airway - Non-Surgical 04/16/18 2147 Endotracheal Tube 1 day          Arterial Line                 Arterial Line 04/16/18 2250 Left Radial 1 day          Peripheral Intravenous Line                 Peripheral IV - Single Lumen 04/16/18 2300 Right Antecubital 1 day              Significant Labs:    CBC/Anemia Profile:    Recent Labs  Lab 04/16/18  2224  04/17/18  1415 04/17/18  1513 04/18/18  0300   WBC 13.96*  < > 10.02 11.82 9.20  9.20   HGB 6.9*  < > 7.7* 7.7* 7.6*  7.6*   HCT 22.2*  < > 23.2* 23.7* 22.6*  22.6*     < > 179 209 182  182   MCV 76*  < > 74* 74* 72*  72*   RDW 16.2*  < > 16.6* 16.5* 16.5*  16.5*   IRON 19*  --   --   --   --    FERRITIN 1,785*  --   --   --   --    < > = values in this interval not displayed.     Chemistries:    Recent Labs  Lab 04/17/18  0201 04/17/18  0427 04/17/18  1415 04/17/18  2200 04/18/18  0300    137  137 138 140 141   K 3.8 3.9  3.9 4.0 3.6 3.7    103  103 104 103 102    CO2 16* 16*  16* 22* 26 27   BUN 57* 65*  65* 38* 14 10   CREATININE 4.8* 5.2*  5.2* 3.4* 1.6* 1.3   CALCIUM 7.2* 7.8*  7.8* 7.6* 7.4* 7.5*   ALBUMIN 2.6* 2.4*  2.4* 2.2* 2.1* 2.1*  2.1*   PROT 7.2 6.5  --   --  6.2   BILITOT 1.3* 1.3*  --   --  1.0   ALKPHOS 158* 135  --   --  114   * 781*  --   --  551*   AST 1,083* 1,076*  --   --  432*   MG 1.9 1.7 2.1 1.6 2.3   PHOS 5.0* 4.6* 3.3 1.4* 3.0       ABGs:   Recent Labs  Lab 04/18/18  0427   PH 7.535*   PCO2 35.1   HCO3 29.7*   POCSATURATED 100   BE 7     CMP:   Recent Labs  Lab 04/17/18  0201 04/17/18  0427 04/17/18  1415 04/17/18  2200 04/18/18  0300    137  137 138 140 141   K 3.8 3.9  3.9 4.0 3.6 3.7    103  103 104 103 102   CO2 16* 16*  16* 22* 26 27   * 273*  273* 179* 100 91   BUN 57* 65*  65* 38* 14 10   CREATININE 4.8* 5.2*  5.2* 3.4* 1.6* 1.3   CALCIUM 7.2* 7.8*  7.8* 7.6* 7.4* 7.5*   PROT 7.2 6.5  --   --  6.2   ALBUMIN 2.6* 2.4*  2.4* 2.2* 2.1* 2.1*  2.1*   BILITOT 1.3* 1.3*  --   --  1.0   ALKPHOS 158* 135  --   --  114   AST 1,083* 1,076*  --   --  432*   * 781*  --   --  551*   ANIONGAP 19* 18*  18* 12 11 12   EGFRNONAA 8.6* 7.8*  7.8* 13.0* 32.4* 41.7*       Significant Imaging:  CXR: I have reviewed all pertinent results/findings within the past 24 hours and my personal findings are:  pulm edema    Assessment/Plan:     Pulmonary   Pulmonary edema    Was previously discharged on lasix 80mg daily for ADHF  Patient ran out of medication, unkown for how long  Failed trial of diuretics w/ lasix and diuril  Started on CRRT  Nephrology following        Acute hypoxemic respiratory failure    Likely cause of cardiac arrest  Wean ventilator settings  See cardiac arrest        Cardiac/Vascular   * Cardiac arrest    S/p trialysis placement  Likely 2/2 hypoxia  Acidemia likely 2/2 elevated lactate 2/2 cardiac arrest  Differential includes pneumothorax, PE (including air embolus), acidemia, coronary  thrombosis  CXR negative for pneumothorax, lung sliding evident on lung US making pneumo less likely  Troponin mildly elevated at .462 and in setting of ESRD therefore coronary event unlikely; also no new EKG changes seen, ST depressions present since admission   Electrolytes (k+, mag, ca (corrected)) wnl  LE US negative  Clot found in trialysis  patient started empirically on heparin drip for possible PE  As already started on treatment for PE no longer need for CTA        Aortic stenosis    TTE 4/16/18 EF 55-60% w/ grade II DD  moderately sclerotic with moderately restricted leaflet mobility  peak velocity obtained across the aortic valve is 3.16 m/s  mean transvalvular gradient 24 mmHg  calculated aortic valve area is 0.83 cm2 consistent w/ severe aortic stenosis  Cautious w/ volume removal as aortic stenosis is preload dependent        Acute on chronic diastolic heart failure    Possibly 2/2 cardiorenal syndrome type 4, longstanding renal failure  See pulmonary edema        Renal/   UTI (urinary tract infection)    UA positive  Urine cx positive for E Coli  F/u blood cx, NGTD thus far  C/w vanc/ zosyn        High anion gap metabolic acidosis    anion gap metabolic acidosis 2/2 lactic acidosis w/ concomitant resp acidosis resolved s/p CRRT        Stage 5 chronic kidney disease not on chronic dialysis    Refused dialysis in the past  Per nephrology pt agreeable to dialysis this admission  CRRT per nephrology  Patient making urine- chappell in place due to retention        Oncology   Leukocytosis    Elevated WBC  CXR w/ patchy infiltrates more likely edema, however, could mask a PNA  Pt also w/ leukocytosis  Azithromycin/ rocephin switched to vanc/ zosyn  F/u vanc trough          Anemia, chronic renal failure, stage 5    Anemic on presentation  Likely 2/2 CKD  Monitor CBC  Transfuse for Hb < 7        Endocrine   Type 2 diabetes mellitus with stage 5 chronic kidney disease and hypertension    HbA1c 8.0  Due to CKD V  cautious for insulin stacking  Monitor accuchecks  aspart sliding scale  C/w detemir 10 units daily        GI   Elevated liver enzymes    2/2 shocked liver 2/2 hypotension following cardiac arrest  Enzymes improving  Monitor CMP           Critical Care Daily Checklist:    A: Awake: RASS Goal/Actual Goal:    Actual: [place] Agitation Sedation Scale (RASS): Light sedation   B: Spontaneous Breathing Trial Performed?     C: SAT & SBT Coordinated?  yes                      D: Delirium: CAM-ICU     E: Early Mobility Performed? No   F: Feeding Goal:    Status:     Current Diet Order   Procedures    Diet NPO      AS: Analgesia/Sedation Fentanyl, propofol   T: Thromboembolic Prophylaxis Heparin drip   H: HOB > 300 Yes   U: Stress Ulcer Prophylaxis (if needed) famotidine   G: Glucose Control Aspart, detemir   B: Bowel Function Stool Occurrence: 0   I: Indwelling Catheter (Lines & Chappell) Necessity Trialysis, chappell   D: De-escalation of Antimicrobials/Pharmacotherapies Vanc/ zosyn    Plan for the day/ETD Wean vent settings    Code Status:  Family/Goals of Care: Full Code         Critical secondary to Patient has a condition that poses threat to life and bodily function: Severe Respiratory Distress      Critical care was time spent personally by me on the following activities: development of treatment plan with patient or surrogate and bedside caregivers, discussions with consultants, evaluation of patient's response to treatment, examination of patient, ordering and performing treatments and interventions, ordering and review of laboratory studies, ordering and review of radiographic studies, pulse oximetry, re-evaluation of patient's condition. This critical care time did not overlap with that of any other provider or involve time for any procedures.     Pepito Elizabeth MD  Critical Care Medicine  [facility]

## 2018-04-18 NOTE — ASSESSMENT & PLAN NOTE
Refused dialysis in the past  Per nephrology pt agreeable to dialysis this admission  CRRT per nephrology  Patient making urine- chappell in place due to retention

## 2018-04-18 NOTE — ASSESSMENT & PLAN NOTE
- baseline sCr 4.5-5.0 c/w CKD stage V  - admitted with ADHF. Attempted medical diuresis without improvement in UOP. Increased O2 requirements throughout the day. PEA arrest on 4/16-4/17; likely related to hypoxia. SLED started same day  - net negative 3.3L yesterday; 900cc UOP. CXR and vent requirements improved; still with pulmonary edema  - will change to SCUF today with same UF goals; will possibly hold tomorrow if further improvement in respiratory status  - continue strict I/Os in setting of non-oliguria. Avoid nephrotoxic agents.

## 2018-04-18 NOTE — PROGRESS NOTES
[facility]  Nephrology  Progress Note    Patient Name: Martha Melvin  MRN: [id number]  Admission Date: 4/15/2018  Hospital Length of Stay: 3 days  Attending Provider: Akilah Rowley MD   Primary Care Physician: GULILERMO Mcclure MD  Principal Problem:Cardiac arrest    Subjective:     HPI: Ms. Melvin is a 71 yo AAF with HFpEF, aortic stenosis, HTN, T2DM, and CKD stage V (baseline sCr 4.5-5.0) admitted on 4/15 with ADHF. She reported worsening SOB, orthopnea, and PND x 4 days. SpO2 82% on RA upon arrival. In the ED she was given nitro paste, albuterol, and lasix IV. She was admitted to medicine and started on lasix 60mg IV TID with 1L fluid restriction. She was also started on rocephin and azithromycin for CAP. No events overnight. UOP only 700cc. sCr tere from 5.4 to 5.9. Nephrology consulted for fluid management in setting of CKD stage V. Patient is aware of CKD diagnosis. She was offered dialysis 1.5 years ago but denied it at that time due to lack of transportation. She was previously followed by a nephrologist in [place] for the last 4 years. She was on lasix 20mg po for a long time. She developed ADHF a few months ago and was admitted to [facility]. She was told here that she would need higher doses of lasix given her advanced CKD. She was started on lasix 80mg po BID at that time. About 2 weeks ago she ran out of lasix. She called her PCP asking for a refill of the 80mg and this was denied. She asked if she could take 4 of the 20mg tablets and was told that her insurance would not cover this. She continues to feel very SOB this morning. She is interested in proceeding with dialysis if needed.     Interval History:   Received cathflo yesterday; SLED restarted around 1pm. She received I/O cath with 500cc UOP; chappell catheter placed. UOP 920cc overnight. Net negative 3.3L yesterday. CXR improved but still with edema. Vent requirements improved. Hourly intake 45cc/hr with levophed, heparin, and  sedatives. Planning for CT later today.     Review of patient's allergies indicates:   Allergen Reactions    Morphine      Current Facility-Administered Medications   Medication Frequency    0.9%  NaCl infusion (CRRT USE ONLY) Continuous    acetaminophen tablet 650 mg Q6H PRN    albuterol nebulizer solution 2.5 mg Q4H PRN    alteplase injection 2 mg Once    chlorhexidine 0.12 % solution 15 mL BID    dextromethorphan-guaifenesin  mg per 12 hr tablet 1 tablet BID PRN    dextrose 50% injection 12.5 g PRN    dextrose 50% injection 25 g PRN    EPINEPHrine 0.1 mg/mL injection Code/trauma/sedation Med    epoetin bashir injection 4,500 Units Every Mon, Wed, Fri    famotidine tablet 20 mg Daily    fentaNYL 2500 mcg in 0.9% sodium chloride 250 mL infusion premix (titrating) Continuous    glucagon (human recombinant) injection 1 mg PRN    glucose chewable tablet 16 g PRN    glucose chewable tablet 24 g PRN    insulin aspart U-100 pen 0-5 Units Q6H PRN    insulin detemir U-100 pen 10 Units QHS    magnesium sulfate 2g in water 50mL IVPB (premix) PRN    norepinephrine 4 mg in dextrose 5% 250 mL infusion (premix) (titrating) Continuous    ondansetron disintegrating tablet 8 mg Q8H PRN    piperacillin-tazobactam 4.5 g in sodium chloride 0.9% 100 mL IVPB (ready to mix system) Q8H    propofol (DIPRIVAN) 10 mg/mL infusion Continuous    sodium bicarbonate 8.4 % (1 mEq/mL) injection Code/trauma/sedation Med    sodium chloride 0.9% flush 5 mL PRN    sodium phosphate 20.01 mmol in dextrose 5 % 250 mL IVPB PRN    sodium phosphate 30 mmol in dextrose 5 % 250 mL IVPB PRN    sodium phosphate 39.99 mmol in dextrose 5 % 250 mL IVPB PRN    succinylcholine injection Code/trauma/sedation Med       Objective:     Vital Signs (Most Recent):  Temp: 98.8 °F (37.1 °C) (04/18/18 1515)  Pulse: 90 (04/18/18 1845)  Resp: (!) 22 (04/18/18 1845)  BP: 97/61 (04/18/18 1800)  SpO2: (!) 94 % (04/18/18 1845)  O2 Device (Oxygen  Therapy): ventilator (04/18/18 1800) Vital Signs (24h Range):  Temp:  [97.7 °F (36.5 °C)-98.8 °F (37.1 °C)] 98.8 °F (37.1 °C)  Pulse:  [] 90  Resp:  [] 22  SpO2:  [90 %-100 %] 94 %  BP: ()/(51-81) 97/61  Arterial Line BP: ()/(47-68) 105/53     Weight: 94.3 kg (207 lb 14.3 oz) (04/18/18 0400)  Body mass index is 33.55 kg/m².  Body surface area is 2.1 meters squared.    I/O last 3 completed shifts:  In: 7157.7 [I.V.:6942.7; Blood:215]  Out: 9455 [Urine:920; Other:8535]    Physical Exam   Constitutional: She appears well-developed and well-nourished. She is sedated and intubated.   HENT:   Head: Normocephalic and atraumatic.   Neck: Neck supple. No tracheal deviation present.   Cardiovascular: Normal rate and regular rhythm.    Pulmonary/Chest: She is intubated. She has no wheezes. She has no rhonchi.   Abdominal: Soft. She exhibits no distension.   Musculoskeletal: She exhibits no edema or deformity.   Skin: Skin is warm and dry.       Significant Labs:  ABGs:   Recent Labs  Lab 04/18/18  0903   PH 7.486*   PCO2 37.0   HCO3 27.9   POCSATURATED 99   BE 5     CBC:   Recent Labs  Lab 04/18/18  0300   WBC 9.20  9.20   RBC 3.16*  3.16*   HGB 7.6*  7.6*   HCT 22.6*  22.6*     182   MCV 72*  72*   MCH 24.1*  24.1*   MCHC 33.6  33.6     CMP:   Recent Labs  Lab 04/18/18  0300 04/18/18  1400   GLU 91 92   CALCIUM 7.5* 7.8*   ALBUMIN 2.1*  2.1* 2.1*   PROT 6.2  --     141   K 3.7 4.4   CO2 27 29    103   BUN 10 4*   CREATININE 1.3 0.8   ALKPHOS 114  --    *  --    *  --    BILITOT 1.0  --      All labs within the past 24 hours have been reviewed.     Significant Imaging:  Labs: Reviewed  X-Ray: Reviewed    Assessment/Plan:     Stage 5 chronic kidney disease not on chronic dialysis    - baseline sCr 4.5-5.0 c/w CKD stage V  - admitted with ADHF. Attempted medical diuresis without improvement in UOP. Increased O2 requirements throughout the day. PEA arrest on  4/16-4/17; likely related to hypoxia. SLED started same day  - net negative 3.3L yesterday; 900cc UOP. CXR and vent requirements improved; still with pulmonary edema  - will change to SCUF today with same UF goals; will possibly hold tomorrow if further improvement in respiratory status  - continue strict I/Os in setting of non-oliguria. Avoid nephrotoxic agents.         Anemia, chronic renal failure, stage 5    - Tsat low but ferritin > 1000; cannot give IV iron  - CHACHO started this admission. May need to hold in setting of recent MI. Will discuss with staff.             Matilda Pascal, PGY-5  Nephrology Fellow  [facility]  Pager: [phone]    ATTENDING PHYSICIAN ATTESTATION  I have personally interviewed and examined the patient. I thoroughly reviewed the demographic, clinical, laboratorial and imaging information available in medical records. I agree with the assessment and recommendations provided by the subspecialty resident. Dr. Pascal was under my supervision.     DIALYSIS NOTE  Patient evaluated while undergoing Slow Low Efficiency Dialysis (SLED) indicated for MANPREET on CKD stage 5 and hemodynamic instability. No complications, tolerating session with current UFR. Will continue current support.

## 2018-04-18 NOTE — ASSESSMENT & PLAN NOTE
- Tsat low but ferritin > 1000; cannot give IV iron  - CHACHO started this admission. May need to hold in setting of recent MI. Will discuss with staff.

## 2018-04-19 PROBLEM — D72.829 LEUKOCYTOSIS: Status: RESOLVED | Noted: 2018-04-17 | Resolved: 2018-04-19

## 2018-04-19 PROBLEM — N17.9 AKI (ACUTE KIDNEY INJURY): Status: ACTIVE | Noted: 2018-04-19

## 2018-04-19 LAB
ALBUMIN SERPL BCP-MCNC: 2.2 G/DL
ALBUMIN SERPL BCP-MCNC: 2.2 G/DL
ALBUMIN SERPL BCP-MCNC: 2.3 G/DL
ALLENS TEST: ABNORMAL
ALP SERPL-CCNC: 111 U/L
ALT SERPL W/O P-5'-P-CCNC: 456 U/L
ANION GAP SERPL CALC-SCNC: 13 MMOL/L
ANION GAP SERPL CALC-SCNC: 14 MMOL/L
ANION GAP SERPL CALC-SCNC: 14 MMOL/L
ANION GAP SERPL CALC-SCNC: 15 MMOL/L
AORTIC VALVE STENOSIS: ABNORMAL
AST SERPL-CCNC: 219 U/L
BACTERIA UR CULT: NO GROWTH
BASOPHILS # BLD AUTO: 0.04 K/UL
BASOPHILS NFR BLD: 0.5 %
BILIRUB DIRECT SERPL-MCNC: 0.7 MG/DL
BILIRUB SERPL-MCNC: 0.9 MG/DL
BUN SERPL-MCNC: 11 MG/DL
BUN SERPL-MCNC: 15 MG/DL
BUN SERPL-MCNC: 15 MG/DL
BUN SERPL-MCNC: 20 MG/DL
CALCIUM SERPL-MCNC: 7.4 MG/DL
CALCIUM SERPL-MCNC: 7.9 MG/DL
CHLORIDE SERPL-SCNC: 103 MMOL/L
CHLORIDE SERPL-SCNC: 104 MMOL/L
CHLORIDE SERPL-SCNC: 104 MMOL/L
CHLORIDE SERPL-SCNC: 106 MMOL/L
CO2 SERPL-SCNC: 18 MMOL/L
CO2 SERPL-SCNC: 23 MMOL/L
CREAT SERPL-MCNC: 1.9 MG/DL
CREAT SERPL-MCNC: 2.3 MG/DL
CREAT SERPL-MCNC: 2.3 MG/DL
CREAT SERPL-MCNC: 2.7 MG/DL
DELSYS: ABNORMAL
DIASTOLIC DYSFUNCTION: YES
DIFFERENTIAL METHOD: ABNORMAL
EOSINOPHIL # BLD AUTO: 0.4 K/UL
EOSINOPHIL NFR BLD: 4.3 %
ERYTHROCYTE [DISTWIDTH] IN BLOOD BY AUTOMATED COUNT: 17.3 %
ERYTHROCYTE [SEDIMENTATION RATE] IN BLOOD BY WESTERGREN METHOD: 18 MM/H
ERYTHROCYTE [SEDIMENTATION RATE] IN BLOOD BY WESTERGREN METHOD: 24 MM/H
EST. GFR  (AFRICAN AMERICAN): 19.8 ML/MIN/1.73 M^2
EST. GFR  (AFRICAN AMERICAN): 24.1 ML/MIN/1.73 M^2
EST. GFR  (AFRICAN AMERICAN): 24.1 ML/MIN/1.73 M^2
EST. GFR  (AFRICAN AMERICAN): 30.4 ML/MIN/1.73 M^2
EST. GFR  (NON AFRICAN AMERICAN): 17.2 ML/MIN/1.73 M^2
EST. GFR  (NON AFRICAN AMERICAN): 20.9 ML/MIN/1.73 M^2
EST. GFR  (NON AFRICAN AMERICAN): 20.9 ML/MIN/1.73 M^2
EST. GFR  (NON AFRICAN AMERICAN): 26.3 ML/MIN/1.73 M^2
ESTIMATED PA SYSTOLIC PRESSURE: 56.29
FIO2: 40
FIO2: 50
FIO2: 50
GLUCOSE SERPL-MCNC: 118 MG/DL
GLUCOSE SERPL-MCNC: 141 MG/DL
GLUCOSE SERPL-MCNC: 150 MG/DL
GLUCOSE SERPL-MCNC: 150 MG/DL
HCO3 UR-SCNC: 23.4 MMOL/L (ref 24–28)
HCO3 UR-SCNC: 23.6 MMOL/L (ref 24–28)
HCO3 UR-SCNC: 23.9 MMOL/L (ref 24–28)
HCT VFR BLD AUTO: 25.8 %
HGB BLD-MCNC: 8.2 G/DL
IMM GRANULOCYTES # BLD AUTO: 0.03 K/UL
IMM GRANULOCYTES NFR BLD AUTO: 0.3 %
LYMPHOCYTES # BLD AUTO: 1.2 K/UL
LYMPHOCYTES NFR BLD: 14.1 %
MAGNESIUM SERPL-MCNC: 2.1 MG/DL
MAGNESIUM SERPL-MCNC: 2.2 MG/DL
MCH RBC QN AUTO: 24.3 PG
MCHC RBC AUTO-ENTMCNC: 31.8 G/DL
MCV RBC AUTO: 76 FL
MODE: ABNORMAL
MONOCYTES # BLD AUTO: 0.8 K/UL
MONOCYTES NFR BLD: 9.1 %
NEUTROPHILS # BLD AUTO: 6.2 K/UL
NEUTROPHILS NFR BLD: 71.7 %
NRBC BLD-RTO: 1 /100 WBC
PCO2 BLDA: 38.9 MMHG (ref 35–45)
PCO2 BLDA: 46.4 MMHG (ref 35–45)
PCO2 BLDA: 53.1 MMHG (ref 35–45)
PEEP: 5
PH SMN: 7.25 [PH] (ref 7.35–7.45)
PH SMN: 7.32 [PH] (ref 7.35–7.45)
PH SMN: 7.39 [PH] (ref 7.35–7.45)
PHOSPHATE SERPL-MCNC: 5.2 MG/DL
PHOSPHATE SERPL-MCNC: 6.7 MG/DL
PLATELET # BLD AUTO: 165 K/UL
PMV BLD AUTO: 10.7 FL
PO2 BLDA: 70 MMHG (ref 80–100)
PO2 BLDA: 72 MMHG (ref 80–100)
PO2 BLDA: 89 MMHG (ref 80–100)
POC BE: -1 MMOL/L
POC BE: -2 MMOL/L
POC BE: -4 MMOL/L
POC SATURATED O2: 91 % (ref 95–100)
POC SATURATED O2: 94 % (ref 95–100)
POC SATURATED O2: 96 % (ref 95–100)
POC TCO2: 25 MMOL/L (ref 23–27)
POCT GLUCOSE: 117 MG/DL (ref 70–110)
POCT GLUCOSE: 118 MG/DL (ref 70–110)
POCT GLUCOSE: 127 MG/DL (ref 70–110)
POCT GLUCOSE: 153 MG/DL (ref 70–110)
POCT GLUCOSE: 155 MG/DL (ref 70–110)
POTASSIUM SERPL-SCNC: 4.2 MMOL/L
POTASSIUM SERPL-SCNC: 4.4 MMOL/L
POTASSIUM SERPL-SCNC: 4.4 MMOL/L
POTASSIUM SERPL-SCNC: 5.1 MMOL/L
PROT SERPL-MCNC: 6.7 G/DL
PROVIDER CREDENTIALS: ABNORMAL
PROVIDER NOTIFIED: ABNORMAL
PS: 5
RBC # BLD AUTO: 3.38 M/UL
RETIRED EF AND QEF - SEE NOTES: 55 (ref 55–65)
SAMPLE: ABNORMAL
SITE: ABNORMAL
SODIUM SERPL-SCNC: 136 MMOL/L
SODIUM SERPL-SCNC: 136 MMOL/L
SODIUM SERPL-SCNC: 139 MMOL/L
SODIUM SERPL-SCNC: 139 MMOL/L
SP02: 97
SPONT RATE: 15
TRICUSPID VALVE REGURGITATION: ABNORMAL
VT: 350
VT: 350
WBC # BLD AUTO: 8.7 K/UL

## 2018-04-19 PROCEDURE — 25000003 PHARM REV CODE 250: Performed by: INTERNAL MEDICINE

## 2018-04-19 PROCEDURE — 82803 BLOOD GASES ANY COMBINATION: CPT

## 2018-04-19 PROCEDURE — 80069 RENAL FUNCTION PANEL: CPT

## 2018-04-19 PROCEDURE — 25000003 PHARM REV CODE 250: Performed by: STUDENT IN AN ORGANIZED HEALTH CARE EDUCATION/TRAINING PROGRAM

## 2018-04-19 PROCEDURE — 90945 DIALYSIS ONE EVALUATION: CPT

## 2018-04-19 PROCEDURE — 63600175 PHARM REV CODE 636 W HCPCS: Performed by: STUDENT IN AN ORGANIZED HEALTH CARE EDUCATION/TRAINING PROGRAM

## 2018-04-19 PROCEDURE — 80100008 HC CRRT DAILY MAINTENANCE

## 2018-04-19 PROCEDURE — 83735 ASSAY OF MAGNESIUM: CPT

## 2018-04-19 PROCEDURE — 99900026 HC AIRWAY MAINTENANCE (STAT)

## 2018-04-19 PROCEDURE — 80069 RENAL FUNCTION PANEL: CPT | Mod: 91

## 2018-04-19 PROCEDURE — 90945 DIALYSIS ONE EVALUATION: CPT | Mod: GC,,, | Performed by: INTERNAL MEDICINE

## 2018-04-19 PROCEDURE — 85025 COMPLETE CBC W/AUTO DIFF WBC: CPT

## 2018-04-19 PROCEDURE — 99900035 HC TECH TIME PER 15 MIN (STAT)

## 2018-04-19 PROCEDURE — 63600175 PHARM REV CODE 636 W HCPCS: Performed by: INTERNAL MEDICINE

## 2018-04-19 PROCEDURE — 93306 TTE W/DOPPLER COMPLETE: CPT | Mod: 26,,, | Performed by: INTERNAL MEDICINE

## 2018-04-19 PROCEDURE — 94761 N-INVAS EAR/PLS OXIMETRY MLT: CPT

## 2018-04-19 PROCEDURE — 83735 ASSAY OF MAGNESIUM: CPT | Mod: 91

## 2018-04-19 PROCEDURE — 51798 US URINE CAPACITY MEASURE: CPT

## 2018-04-19 PROCEDURE — 80076 HEPATIC FUNCTION PANEL: CPT

## 2018-04-19 PROCEDURE — 27000221 HC OXYGEN, UP TO 24 HOURS

## 2018-04-19 PROCEDURE — 93306 TTE W/DOPPLER COMPLETE: CPT

## 2018-04-19 PROCEDURE — 94003 VENT MGMT INPAT SUBQ DAY: CPT

## 2018-04-19 PROCEDURE — 20000000 HC ICU ROOM

## 2018-04-19 PROCEDURE — 25000003 PHARM REV CODE 250: Performed by: SURGERY

## 2018-04-19 PROCEDURE — 37799 UNLISTED PX VASCULAR SURGERY: CPT

## 2018-04-19 PROCEDURE — 99291 CRITICAL CARE FIRST HOUR: CPT | Mod: ,,, | Performed by: INTERNAL MEDICINE

## 2018-04-19 RX ORDER — ONDANSETRON 4 MG/1
4 TABLET, ORALLY DISINTEGRATING ORAL EVERY 6 HOURS PRN
Status: DISCONTINUED | OUTPATIENT
Start: 2018-04-19 | End: 2018-04-26 | Stop reason: HOSPADM

## 2018-04-19 RX ORDER — HEPARIN SODIUM 5000 [USP'U]/ML
5000 INJECTION, SOLUTION INTRAVENOUS; SUBCUTANEOUS EVERY 8 HOURS
Status: DISCONTINUED | OUTPATIENT
Start: 2018-04-19 | End: 2018-04-26 | Stop reason: HOSPADM

## 2018-04-19 RX ORDER — MAGNESIUM SULFATE HEPTAHYDRATE 40 MG/ML
2 INJECTION, SOLUTION INTRAVENOUS
Status: DISCONTINUED | OUTPATIENT
Start: 2018-04-19 | End: 2018-04-20 | Stop reason: ALTCHOICE

## 2018-04-19 RX ORDER — HYDROCODONE BITARTRATE AND ACETAMINOPHEN 500; 5 MG/1; MG/1
TABLET ORAL CONTINUOUS
Status: DISCONTINUED | OUTPATIENT
Start: 2018-04-19 | End: 2018-04-20 | Stop reason: ALTCHOICE

## 2018-04-19 RX ORDER — ONDANSETRON 2 MG/ML
8 INJECTION INTRAMUSCULAR; INTRAVENOUS EVERY 8 HOURS PRN
Status: DISCONTINUED | OUTPATIENT
Start: 2018-04-19 | End: 2018-04-19

## 2018-04-19 RX ADMIN — PROMETHAZINE HYDROCHLORIDE 12.5 MG: 25 INJECTION INTRAMUSCULAR; INTRAVENOUS at 01:04

## 2018-04-19 RX ADMIN — CHLORHEXIDINE GLUCONATE 15 ML: 1.2 RINSE ORAL at 08:04

## 2018-04-19 RX ADMIN — HEPARIN SODIUM 5000 UNITS: 5000 INJECTION, SOLUTION INTRAVENOUS; SUBCUTANEOUS at 11:04

## 2018-04-19 RX ADMIN — PROPOFOL 25 MCG/KG/MIN: 10 INJECTION, EMULSION INTRAVENOUS at 03:04

## 2018-04-19 RX ADMIN — SODIUM PHOSPHATE, MONOBASIC, MONOHYDRATE 20.01 MMOL: 276; 142 INJECTION, SOLUTION INTRAVENOUS at 12:04

## 2018-04-19 RX ADMIN — Medication 0.02 MCG/KG/MIN: at 03:04

## 2018-04-19 RX ADMIN — PROPOFOL 20 MCG/KG/MIN: 10 INJECTION, EMULSION INTRAVENOUS at 07:04

## 2018-04-19 RX ADMIN — PIPERACILLIN AND TAZOBACTAM 4.5 G: 4; .5 INJECTION, POWDER, LYOPHILIZED, FOR SOLUTION INTRAVENOUS; PARENTERAL at 09:04

## 2018-04-19 RX ADMIN — ONDANSETRON 8 MG: 8 TABLET, ORALLY DISINTEGRATING ORAL at 09:04

## 2018-04-19 RX ADMIN — PIPERACILLIN AND TAZOBACTAM 4.5 G: 4; .5 INJECTION, POWDER, LYOPHILIZED, FOR SOLUTION INTRAVENOUS; PARENTERAL at 03:04

## 2018-04-19 RX ADMIN — HEPARIN SODIUM 5000 UNITS: 5000 INJECTION, SOLUTION INTRAVENOUS; SUBCUTANEOUS at 02:04

## 2018-04-19 RX ADMIN — CHLORHEXIDINE GLUCONATE 15 ML: 1.2 RINSE ORAL at 09:04

## 2018-04-19 RX ADMIN — FAMOTIDINE 20 MG: 20 TABLET, FILM COATED ORAL at 09:04

## 2018-04-19 RX ADMIN — Medication 100 MCG: at 02:04

## 2018-04-19 RX ADMIN — PIPERACILLIN AND TAZOBACTAM 4.5 G: 4; .5 INJECTION, POWDER, LYOPHILIZED, FOR SOLUTION INTRAVENOUS; PARENTERAL at 05:04

## 2018-04-19 RX ADMIN — HEPARIN SODIUM 5000 UNITS: 5000 INJECTION, SOLUTION INTRAVENOUS; SUBCUTANEOUS at 05:04

## 2018-04-19 RX ADMIN — SODIUM CHLORIDE: 0.9 INJECTION, SOLUTION INTRAVENOUS at 02:04

## 2018-04-19 NOTE — PLAN OF CARE
Problem: Patient Care Overview  Goal: Plan of Care Review  Outcome: Ongoing (interventions implemented as appropriate)  Pt remains intubated on assist control--rate 22, FiO2 50%, 5 PEEP. Pt awakening to voice and following commands. Propofol and fentanyl gtts titrated to maintain pt comfort. Levo gtt titrated to maintain MAP >65. Pt transferred to for CTA today. CRRT UF rate currently at 350 cc/hr. Daughter at bedside throughout the day and updated on plan of care.

## 2018-04-19 NOTE — SUBJECTIVE & OBJECTIVE
Interval History:   CTA negative for PE. No events overnight. Remained on SCUF with UF 350cc/hr since ~4pm last night. Unsure why she was on 350cc/hr instead of 400cc/hr. Net +500cc yesterday. UOP only 10cc/24h. Remains on levophed. Awake and alert this morning; remembered me from a few days ago. Minimal vent requirements. Possible extubation soon.     Review of patient's allergies indicates:   Allergen Reactions    Morphine      Current Facility-Administered Medications   Medication Frequency    0.9%  NaCl infusion (CRRT USE ONLY) Continuous    acetaminophen tablet 650 mg Q6H PRN    albuterol nebulizer solution 2.5 mg Q4H PRN    chlorhexidine 0.12 % solution 15 mL BID    dextromethorphan-guaifenesin  mg per 12 hr tablet 1 tablet BID PRN    dextrose 50% injection 12.5 g PRN    dextrose 50% injection 25 g PRN    EPINEPHrine 0.1 mg/mL injection Code/trauma/sedation Med    epoetin bashir injection 4,500 Units Every Mon, Wed, Fri    famotidine tablet 20 mg Daily    fentaNYL 2500 mcg in 0.9% sodium chloride 250 mL infusion premix (titrating) Continuous    glucagon (human recombinant) injection 1 mg PRN    glucose chewable tablet 16 g PRN    glucose chewable tablet 24 g PRN    heparin (porcine) injection 5,000 Units Q8H    insulin aspart U-100 pen 0-5 Units Q6H PRN    magnesium sulfate 2g in water 50mL IVPB (premix) PRN    norepinephrine 4 mg in dextrose 5% 250 mL infusion (premix) (titrating) Continuous    piperacillin-tazobactam 4.5 g in sodium chloride 0.9% 100 mL IVPB (ready to mix system) Q8H    promethazine (PHENERGAN) 12.5 mg in dextrose 5 % 50 mL IVPB Q24H PRN    propofol (DIPRIVAN) 10 mg/mL infusion Continuous    sodium bicarbonate 8.4 % (1 mEq/mL) injection Code/trauma/sedation Med    sodium chloride 0.9% flush 5 mL PRN    sodium phosphate 20.01 mmol in dextrose 5 % 250 mL IVPB PRN    sodium phosphate 30 mmol in dextrose 5 % 250 mL IVPB PRN    sodium phosphate 39.99 mmol in  dextrose 5 % 250 mL IVPB PRN    succinylcholine injection Code/trauma/sedation Med       Objective:     Vital Signs (Most Recent):  Temp: 97 °F (36.1 °C) (04/19/18 1100)  Pulse: 69 (04/19/18 1313)  Resp: (!) 73 (04/19/18 1313)  BP: (!) 97/58 (04/19/18 1300)  SpO2: 100 % (04/19/18 1313)  O2 Device (Oxygen Therapy): ventilator (04/19/18 1313) Vital Signs (24h Range):  Temp:  [97 °F (36.1 °C)-98.8 °F (37.1 °C)] 97 °F (36.1 °C)  Pulse:  [] 69  Resp:  [0-153] 73  SpO2:  [90 %-100 %] 100 %  BP: ()/(53-75) 97/58  Arterial Line BP: ()/(46-67) 100/49     Weight: 93.6 kg (206 lb 5.6 oz) (04/19/18 0400)  Body mass index is 33.31 kg/m².  Body surface area is 2.09 meters squared.    I/O last 3 completed shifts:  In: 76026.8 [I.V.:36441.8; NG/GT:30]  Out: 51289 [Urine:430; Drains:250; Other:89938]    Physical Exam   Constitutional: She appears well-developed and well-nourished.   HENT:   Head: Normocephalic and atraumatic.   Eyes: Conjunctivae and EOM are normal.   Cardiovascular: Normal rate and regular rhythm.    Pulmonary/Chest: She has decreased breath sounds. She has no rales.   Abdominal: Soft. She exhibits no distension.   Musculoskeletal: She exhibits no edema or deformity.   Skin: Skin is warm and dry. She is not diaphoretic.       Significant Labs:  CBC:   Recent Labs  Lab 04/19/18 0420   WBC 8.70   RBC 3.38*   HGB 8.2*   HCT 25.8*      MCV 76*   MCH 24.3*   MCHC 31.8*     CMP:   Recent Labs  Lab 04/19/18 0420   *   CALCIUM 7.4*   ALBUMIN 2.2*  2.2*   PROT 6.7      K 4.2   CO2 23      BUN 11   CREATININE 1.9*   ALKPHOS 111   *   *   BILITOT 0.9     All labs within the past 24 hours have been reviewed.     Significant Imaging:  Labs: Reviewed

## 2018-04-19 NOTE — PLAN OF CARE
Patient remains intubated, daily SBTs and vent weaning.  Nephrology following, patient continues on CRRT.  PT/OT following, recs TBD.  Patient remains inappropriate for disposition planning at this time.  CM will continue to follow.     04/19/18 1038   Right Care Assessment   Can the patient answer the patient profile reliably? Yes, cognitively intact   How often would a person be available to care for the patient? Occasionally   Describe the patient's ability to walk at the present time. Major restrictions/daily assistance from another person   How does the patient rate their overall health at the present time? Fair   Number of comorbid conditions (as recorded on the chart) Four   During the past month, has the patient often been bothered by feeling down, depressed or hopeless? No   During the past month, has the patient often been bothered by little interest or pleasure in doing things? No   Tina Hernandez, RN, BSN, [facility]  Case Management  [facility]  Ext. [phone]

## 2018-04-19 NOTE — PROGRESS NOTES
[facility]  Nephrology  Progress Note    Patient Name: Martha Melvin  MRN: [id number]  Admission Date: 4/15/2018  Hospital Length of Stay: 4 days  Attending Provider: Akilah Rowley MD   Primary Care Physician: GUILLERMO Mcclure MD  Principal Problem:Cardiac arrest    Subjective:     HPI: Ms. Melvin is a 71 yo AAF with HFpEF, aortic stenosis, HTN, T2DM, and CKD stage V (baseline sCr 4.5-5.0) admitted on 4/15 with ADHF. She reported worsening SOB, orthopnea, and PND x 4 days. SpO2 82% on RA upon arrival. In the ED she was given nitro paste, albuterol, and lasix IV. She was admitted to medicine and started on lasix 60mg IV TID with 1L fluid restriction. She was also started on rocephin and azithromycin for CAP. No events overnight. UOP only 700cc. sCr tere from 5.4 to 5.9. Nephrology consulted for fluid management in setting of CKD stage V. Patient is aware of CKD diagnosis. She was offered dialysis 1.5 years ago but denied it at that time due to lack of transportation. She was previously followed by a nephrologist in [place] for the last 4 years. She was on lasix 20mg po for a long time. She developed ADHF a few months ago and was admitted to [facility]. She was told here that she would need higher doses of lasix given her advanced CKD. She was started on lasix 80mg po BID at that time. About 2 weeks ago she ran out of lasix. She called her PCP asking for a refill of the 80mg and this was denied. She asked if she could take 4 of the 20mg tablets and was told that her insurance would not cover this. She continues to feel very SOB this morning. She is interested in proceeding with dialysis if needed.     Interval History:   CTA negative for PE. No events overnight. Remained on SCUF with UF 350cc/hr since ~4pm last night. Unsure why she was on 350cc/hr instead of 400cc/hr. Net +500cc yesterday. UOP only 10cc/24h. Remains on levophed. Awake and alert this morning; remembered me from a few days ago. Minimal  vent requirements. Possible extubation soon.     Review of patient's allergies indicates:   Allergen Reactions    Morphine      Current Facility-Administered Medications   Medication Frequency    0.9%  NaCl infusion (CRRT USE ONLY) Continuous    acetaminophen tablet 650 mg Q6H PRN    albuterol nebulizer solution 2.5 mg Q4H PRN    chlorhexidine 0.12 % solution 15 mL BID    dextromethorphan-guaifenesin  mg per 12 hr tablet 1 tablet BID PRN    dextrose 50% injection 12.5 g PRN    dextrose 50% injection 25 g PRN    EPINEPHrine 0.1 mg/mL injection Code/trauma/sedation Med    epoetin bashir injection 4,500 Units Every Mon, Wed, Fri    famotidine tablet 20 mg Daily    fentaNYL 2500 mcg in 0.9% sodium chloride 250 mL infusion premix (titrating) Continuous    glucagon (human recombinant) injection 1 mg PRN    glucose chewable tablet 16 g PRN    glucose chewable tablet 24 g PRN    heparin (porcine) injection 5,000 Units Q8H    insulin aspart U-100 pen 0-5 Units Q6H PRN    magnesium sulfate 2g in water 50mL IVPB (premix) PRN    norepinephrine 4 mg in dextrose 5% 250 mL infusion (premix) (titrating) Continuous    piperacillin-tazobactam 4.5 g in sodium chloride 0.9% 100 mL IVPB (ready to mix system) Q8H    promethazine (PHENERGAN) 12.5 mg in dextrose 5 % 50 mL IVPB Q24H PRN    propofol (DIPRIVAN) 10 mg/mL infusion Continuous    sodium bicarbonate 8.4 % (1 mEq/mL) injection Code/trauma/sedation Med    sodium chloride 0.9% flush 5 mL PRN    sodium phosphate 20.01 mmol in dextrose 5 % 250 mL IVPB PRN    sodium phosphate 30 mmol in dextrose 5 % 250 mL IVPB PRN    sodium phosphate 39.99 mmol in dextrose 5 % 250 mL IVPB PRN    succinylcholine injection Code/trauma/sedation Med       Objective:     Vital Signs (Most Recent):  Temp: 97 °F (36.1 °C) (04/19/18 1100)  Pulse: 69 (04/19/18 1313)  Resp: (!) 73 (04/19/18 1313)  BP: (!) 97/58 (04/19/18 1300)  SpO2: 100 % (04/19/18 1313)  O2 Device (Oxygen  Therapy): ventilator (04/19/18 1313) Vital Signs (24h Range):  Temp:  [97 °F (36.1 °C)-98.8 °F (37.1 °C)] 97 °F (36.1 °C)  Pulse:  [] 69  Resp:  [0-153] 73  SpO2:  [90 %-100 %] 100 %  BP: ()/(53-75) 97/58  Arterial Line BP: ()/(46-67) 100/49     Weight: 93.6 kg (206 lb 5.6 oz) (04/19/18 0400)  Body mass index is 33.31 kg/m².  Body surface area is 2.09 meters squared.    I/O last 3 completed shifts:  In: 15990.8 [I.V.:82107.8; NG/GT:30]  Out: 22144 [Urine:430; Drains:250; Other:60800]    Physical Exam   Constitutional: She appears well-developed and well-nourished.   HENT:   Head: Normocephalic and atraumatic.   Eyes: Conjunctivae and EOM are normal.   Cardiovascular: Normal rate and regular rhythm.    Pulmonary/Chest: She has decreased breath sounds. She has no rales.   Abdominal: Soft. She exhibits no distension.   Musculoskeletal: She exhibits no edema or deformity.   Skin: Skin is warm and dry. She is not diaphoretic.       Significant Labs:  CBC:   Recent Labs  Lab 04/19/18  0420   WBC 8.70   RBC 3.38*   HGB 8.2*   HCT 25.8*      MCV 76*   MCH 24.3*   MCHC 31.8*     CMP:   Recent Labs  Lab 04/19/18  0420   *   CALCIUM 7.4*   ALBUMIN 2.2*  2.2*   PROT 6.7      K 4.2   CO2 23      BUN 11   CREATININE 1.9*   ALKPHOS 111   *   *   BILITOT 0.9     All labs within the past 24 hours have been reviewed.     Significant Imaging:  Labs: Reviewed    Assessment/Plan:     Stage 5 chronic kidney disease not on chronic dialysis    - baseline sCr 4.5-5.0 c/w CKD stage V  - admitted with ADHF. Attempted medical diuresis without improvement in UOP. Increased O2 requirements throughout the day. PEA arrest on 4/16-4/17; likely related to hypoxia. SLED started same day  - changed to SCUF yesterday. Ordered -400cc/hr; however patient remained on 350cc/hr overnight.  - net +500cc yesterday. Minimal vent requirements this morning. No new CXR today.   - will continue SCUF  today with UF goal 400cc/hr. Will plan to continue until patient extubated; then will start to space out treatments  - UOP 900cc/24h --> 10cc/24h. Will order bladder scan. Cano catheter may need to be exchanged.   - repeat CXR in AM        Anemia, chronic renal failure, stage 5    - Tsat low but ferritin > 1000; cannot give IV iron  - will hold CHACHO for now            Matilda Pascal, PGY-5  Nephrology Fellow  [facility]  Pager: [phone]    ATTENDING PHYSICIAN ATTESTATION  I have personally interviewed and examined the patient. I thoroughly reviewed the demographic, clinical, laboratorial and imaging information available in medical records. I agree with the assessment and recommendations provided by the subspecialty resident. Dr. Pascal was under my supervision.     DIALYSIS NOTE  Patient evaluated while undergoing Slow Low Efficiency Dialysis (SLED) indicated for MANPREET and hemodynamic instability. No complications, tolerating session with current UFR. Will continue current support.

## 2018-04-19 NOTE — PHYSICIAN QUERY
PT Name: Martha Melvin  MR #: [id number]     Physician Query Form - Documentation Clarification      CDS/: Diamond Dominguez               Contact information:[email or web address]    This form is a permanent document in the medical record.     Query Date: April 19, 2018    By submitting this query, we are merely seeking further clarification of documentation. Please utilize your independent clinical judgment when addressing the question(s) below.    The Medical record reflects the following:    Supporting Clinical Findings Location in Medical Record   1 - Normal left ventricular systolic function (EF 55-60%).     2 - Indeterminate LV diastolic function.     3 - Normal right ventricular systolic function .     4 - Mild tricuspid regurgitation.     5 - Pulmonary hypertension. The estimated PA systolic pressure is 64 mmHg.     6 - Increased central venous pressure.     7 - Impaired LV relaxation, elevated LAP (grade 2 diastolic dysfunction).        Echo 4/16   Acute on chronic diastolic Heart Failure    73 y/o F PMhx HFrEF, CKD stage V   H&P 4/15 & CCM PN 4/19     CCM PN 4/19                                                                            Doctor, Due to conflicting documentation, please specify the appropriate type of CHF.    Provider Use Only      [  x ]  Acute on Chronic Diastolic Heart Failure    [   ]  Acute on Chronic Systolic Heart Failure    [   ]  Other explanations with details______________________________________                                                                                                               [  ] Clinically undetermined

## 2018-04-19 NOTE — ASSESSMENT & PLAN NOTE
UA positive  Urine cx positive for MDRO E Coli sensitive to zosyn  blood cx NGTD thus far  C/w zosyn  Contact isolation

## 2018-04-19 NOTE — ASSESSMENT & PLAN NOTE
Likely 2/2 hypoxia  Acidemia likely 2/2 elevated lactate 2/2 cardiac arrest  Differential includes pneumothorax, PE (including air embolus), acidemia, coronary thrombosis  CXR negative for pneumothorax, lung sliding evident on lung US making pneumo less likely  Troponin mildly elevated at .462 and in setting of ESRD therefore coronary event unlikely; also no new EKG changes seen, ST depressions present since admission   Electrolytes (k+, mag, ca (corrected)) wnl  LE US negative  patient started empirically on heparin drip for possible PE, however d/c as CTA negative  Continue to wean ventilator  TTE s/p cardiac arrest

## 2018-04-19 NOTE — SUBJECTIVE & OBJECTIVE
Interval History/Significant Events: CTA negative for PE therefore heparin discontinued. Urine culture positive for MDRO E Coli sensitive to zosyn which patient is receiving.    Review of Systems   Unable to perform ROS: Intubated   Objective:     Vital Signs (Most Recent):  Temp: 97 °F (36.1 °C) (04/19/18 0300)  Pulse: 75 (04/19/18 0645)  Resp: (!) 145 (04/19/18 0645)  BP: 97/62 (04/19/18 0600)  SpO2: 100 % (04/19/18 0645) Vital Signs (24h Range):  Temp:  [97 °F (36.1 °C)-98.8 °F (37.1 °C)] 97 °F (36.1 °C)  Pulse:  [] 75  Resp:  [0-153] 145  SpO2:  [90 %-100 %] 100 %  BP: ()/(51-74) 97/62  Arterial Line BP: ()/(46-67) 126/60   Weight: 93.6 kg (206 lb 5.6 oz)  Body mass index is 33.31 kg/m².      Intake/Output Summary (Last 24 hours) at 04/19/18 0734  Last data filed at 04/19/18 0640   Gross per 24 hour   Intake          7187.14 ml   Output             6367 ml   Net           820.14 ml       Physical Exam   HENT:   Head: Normocephalic and atraumatic.   Eyes: Conjunctivae are normal. No scleral icterus.   Cardiovascular: Normal rate, regular rhythm and intact distal pulses.    Pulmonary/Chest: She has no wheezes. She has rales.   On the ventilator   Abdominal: Soft. She exhibits distension. There is no tenderness. There is no guarding.   Musculoskeletal: She exhibits edema. She exhibits no tenderness.   R LE amputation   Lymphadenopathy:     She has no cervical adenopathy.   Neurological:   obtunded   Skin: No rash noted.       Vents:  Vent Mode: A/C (04/19/18 0509)  Ventilator Initiated: Yes (04/16/18 2205)  Set Rate: 18 bmp (04/19/18 0509)  Vt Set: 350 mL (04/19/18 0509)  PEEP/CPAP: 5 cmH20 (04/19/18 0509)  Oxygen Concentration (%): 50 (04/19/18 0509)  Peak Airway Pressure: 33 cmH2O (04/19/18 0509)  Total Ve: 6.35 mL (04/19/18 0509)  F/VT Ratio<105 (RSBI): 371.1 (04/19/18 0509)  Lines/Drains/Airways     Central Venous Catheter Line                 Trialysis (Dialysis) Catheter 04/16/18 2050 right  internal jugular 2 days          Drain                 NG/OG Tube 04/16/18 2230 orogastric 2 days         Urethral Catheter 04/18/18 0000 1 day          Airway                 Airway - Non-Surgical 04/16/18 3 days          Arterial Line                 Arterial Line 04/16/18 2250 Left Radial 2 days          Peripheral Intravenous Line                 Peripheral IV - Single Lumen 04/16/18 2300 Right Antecubital 2 days              Significant Labs:    CBC/Anemia Profile:    Recent Labs  Lab 04/17/18  1513 04/18/18  0300 04/19/18  0420   WBC 11.82 9.20  9.20 8.70   HGB 7.7* 7.6*  7.6* 8.2*   HCT 23.7* 22.6*  22.6* 25.8*    182  182 165   MCV 74* 72*  72* 76*   RDW 16.5* 16.5*  16.5* 17.3*        Chemistries:    Recent Labs  Lab 04/18/18  0300 04/18/18  1400 04/18/18 2159 04/19/18  0420    141 137 139   K 3.7 4.4 4.4 4.2    103 101 103   CO2 27 29 26 23   BUN 10 4* 8 11   CREATININE 1.3 0.8 1.6* 1.9*   CALCIUM 7.5* 7.8* 7.7* 7.4*   ALBUMIN 2.1*  2.1* 2.1* 2.3* 2.2*  2.2*   PROT 6.2  --   --  6.7   BILITOT 1.0  --   --  0.9   ALKPHOS 114  --   --  111   *  --   --  456*   *  --   --  219*   MG 2.3 1.7 2.2 2.2   PHOS 3.0 2.0* 2.9 5.2*       ABGs:     Recent Labs  Lab 04/19/18  0435   PH 7.320*   PCO2 46.4*   HCO3 23.9*   POCSATURATED 96   BE -2     CMP:     Recent Labs  Lab 04/18/18  0300 04/18/18  1400 04/18/18 2159 04/19/18  0420    141 137 139   K 3.7 4.4 4.4 4.2    103 101 103   CO2 27 29 26 23   GLU 91 92 108 118*   BUN 10 4* 8 11   CREATININE 1.3 0.8 1.6* 1.9*   CALCIUM 7.5* 7.8* 7.7* 7.4*   PROT 6.2  --   --  6.7   ALBUMIN 2.1*  2.1* 2.1* 2.3* 2.2*  2.2*   BILITOT 1.0  --   --  0.9   ALKPHOS 114  --   --  111   *  --   --  219*   *  --   --  456*   ANIONGAP 12 9 10 13   EGFRNONAA 41.7* >60.0 32.4* 26.3*       Significant Imaging:  CXR: I have reviewed all pertinent results/findings within the past 24 hours and my personal findings are:  No  evidence of pulmonary thromboembolism. Moderate bilateral dependent atelectasis and small bilateral pleural effusions.   Small amount of retained secretions within the bilateral mainstem bronchi and segmental bronchi to the lower lobes.

## 2018-04-19 NOTE — PROGRESS NOTES
[facility]  Critical Care Medicine  Progress Note    Patient Name: Martha Melvin  MRN: [id number]  Admission Date: 4/15/2018  Hospital Length of Stay: 4 days  Code Status: Full Code  Attending Provider: Akilah Rowley MD  Primary Care Provider: GUILLERMO Mcclure MD   Principal Problem: Cardiac arrest    Subjective:     HPI:  71 y/o F PMhx HFrEF, CKD stage V (makes urine, not on dialysis), moderate aortic stenosis, DM II, R leg amputation (2015), HTN who presented w/ SOB x 4 days. Patient was discharged from [facility] 10/14/17 for SOB 2/2 ADHF and she was subsequently discharged on an increased of lasix from 20mg to 80mg daily due to her renal failure. Daughter states patient ran out of lasix 80mg and asked PCP for rx refill. However, per daughter PCP refused to fill 80mg and therefore patient had been taking 20mg x 4 daily. Daughter states she last saw patient on 4/11 prior to admission and that patient was in good health at that time. She spoke with patient over the phone on 4/16 and at that time noted patient to be SOB which prompted her to call a family friend to check on patient. In conversation daughter was made aware that patient's lasix had run out. EMS was called and on route she pllaced on BIPAP and given albuterol. Upon arrival her saturations were at 82%.    In the ED patient given nitro paste, albuterol, as well as lasix. She was subsequently admitted to medicine. She was started on lasix 60mg IV tid as well as rocephin and azithromycin for suspected CAP. Nephrology was consulted for volume overload in the setting of CKD stage V, however, due to poor urine output despite diuresis w/ lasix and diuril nephrology requested trialysis. Following placement patient found to be in PEA arrest. ROSC achieved for <10min then patient again coded. ROSC achieved and patient was subsequently transferred to SICU where patient coded a 3rd time. ROSC achieved and maintained, epi switched to levo and  placed on vent. Patient started on urgent dialysis. Continued to be hypoxic therefore given nimbex, elevation of HOB w/ improvement in sats.    Per daughter patient had been aware of CKD V since 2015 and had refused dialysis since that time due to fear of having to be on dialysis.    History obtained from daughter and rest of family as patient intubated.    Hospital/ICU Course:  4/17 patient able to follow commands, trialysis clotted, however, clot removed with syringe; patient started on heparin drip empirically for possible PE  4/18 patient continues to be on CRRT, vent setting adjusted FiO2 70% to 50% and RR 28 to 22; PEEP decreased from 14 to 5; urine culture positive for MDRO E Coli- c/w zosyn  4/19 patient continues to follow commands when sedation weaned, continuing to wean vent settings    Interval History/Significant Events: CTA negative for PE therefore heparin discontinued. Urine culture positive for MDRO E Coli sensitive to zosyn which patient is receiving.    Review of Systems   Unable to perform ROS: Intubated   Objective:     Vital Signs (Most Recent):  Temp: 97 °F (36.1 °C) (04/19/18 0300)  Pulse: 75 (04/19/18 0645)  Resp: (!) 145 (04/19/18 0645)  BP: 97/62 (04/19/18 0600)  SpO2: 100 % (04/19/18 0645) Vital Signs (24h Range):  Temp:  [97 °F (36.1 °C)-98.8 °F (37.1 °C)] 97 °F (36.1 °C)  Pulse:  [] 75  Resp:  [0-153] 145  SpO2:  [90 %-100 %] 100 %  BP: ()/(51-74) 97/62  Arterial Line BP: ()/(46-67) 126/60   Weight: 93.6 kg (206 lb 5.6 oz)  Body mass index is 33.31 kg/m².      Intake/Output Summary (Last 24 hours) at 04/19/18 0734  Last data filed at 04/19/18 0640   Gross per 24 hour   Intake          7187.14 ml   Output             6367 ml   Net           820.14 ml       Physical Exam   HENT:   Head: Normocephalic and atraumatic.   Eyes: Conjunctivae are normal. No scleral icterus.   Cardiovascular: Normal rate, regular rhythm and intact distal pulses.    Pulmonary/Chest: She has no  wheezes. She has rales.   On the ventilator   Abdominal: Soft. She exhibits distension. There is no tenderness. There is no guarding.   Musculoskeletal: She exhibits edema. She exhibits no tenderness.   R LE amputation   Lymphadenopathy:     She has no cervical adenopathy.   Neurological:   obtunded   Skin: No rash noted.       Vents:  Vent Mode: A/C (04/19/18 0509)  Ventilator Initiated: Yes (04/16/18 2205)  Set Rate: 18 bmp (04/19/18 0509)  Vt Set: 350 mL (04/19/18 0509)  PEEP/CPAP: 5 cmH20 (04/19/18 0509)  Oxygen Concentration (%): 50 (04/19/18 0509)  Peak Airway Pressure: 33 cmH2O (04/19/18 0509)  Total Ve: 6.35 mL (04/19/18 0509)  F/VT Ratio<105 (RSBI): 371.1 (04/19/18 0509)  Lines/Drains/Airways     Central Venous Catheter Line                 Trialysis (Dialysis) Catheter 04/16/18 2050 right internal jugular 2 days          Drain                 NG/OG Tube 04/16/18 2230 orogastric 2 days         Urethral Catheter 04/18/18 0000 1 day          Airway                 Airway - Non-Surgical 04/16/18 3 days          Arterial Line                 Arterial Line 04/16/18 2250 Left Radial 2 days          Peripheral Intravenous Line                 Peripheral IV - Single Lumen 04/16/18 2300 Right Antecubital 2 days              Significant Labs:    CBC/Anemia Profile:    Recent Labs  Lab 04/17/18  1513 04/18/18  0300 04/19/18  0420   WBC 11.82 9.20  9.20 8.70   HGB 7.7* 7.6*  7.6* 8.2*   HCT 23.7* 22.6*  22.6* 25.8*    182  182 165   MCV 74* 72*  72* 76*   RDW 16.5* 16.5*  16.5* 17.3*        Chemistries:    Recent Labs  Lab 04/18/18  0300 04/18/18  1400 04/18/18  2159 04/19/18  0420    141 137 139   K 3.7 4.4 4.4 4.2    103 101 103   CO2 27 29 26 23   BUN 10 4* 8 11   CREATININE 1.3 0.8 1.6* 1.9*   CALCIUM 7.5* 7.8* 7.7* 7.4*   ALBUMIN 2.1*  2.1* 2.1* 2.3* 2.2*  2.2*   PROT 6.2  --   --  6.7   BILITOT 1.0  --   --  0.9   ALKPHOS 114  --   --  111   *  --   --  456*   *  --   --   219*   MG 2.3 1.7 2.2 2.2   PHOS 3.0 2.0* 2.9 5.2*       ABGs:     Recent Labs  Lab 04/19/18  0435   PH 7.320*   PCO2 46.4*   HCO3 23.9*   POCSATURATED 96   BE -2     CMP:     Recent Labs  Lab 04/18/18  0300 04/18/18  1400 04/18/18  2159 04/19/18  0420    141 137 139   K 3.7 4.4 4.4 4.2    103 101 103   CO2 27 29 26 23   GLU 91 92 108 118*   BUN 10 4* 8 11   CREATININE 1.3 0.8 1.6* 1.9*   CALCIUM 7.5* 7.8* 7.7* 7.4*   PROT 6.2  --   --  6.7   ALBUMIN 2.1*  2.1* 2.1* 2.3* 2.2*  2.2*   BILITOT 1.0  --   --  0.9   ALKPHOS 114  --   --  111   *  --   --  219*   *  --   --  456*   ANIONGAP 12 9 10 13   EGFRNONAA 41.7* >60.0 32.4* 26.3*       Significant Imaging:  CXR: I have reviewed all pertinent results/findings within the past 24 hours and my personal findings are:  No evidence of pulmonary thromboembolism. Moderate bilateral dependent atelectasis and small bilateral pleural effusions.   Small amount of retained secretions within the bilateral mainstem bronchi and segmental bronchi to the lower lobes.     Assessment/Plan:     Pulmonary   Pulmonary edema    Was previously discharged on lasix 80mg daily for ADHF  Patient ran out of medication, unkown for how long  Failed trial of diuretics w/ lasix and diuril  Started on CRRT  Nephrology following        Acute hypoxemic respiratory failure    Likely cause of cardiac arrest  Wean ventilator settings  See cardiac arrest        Cardiac/Vascular   * Cardiac arrest    Likely 2/2 hypoxia  Acidemia likely 2/2 elevated lactate 2/2 cardiac arrest  Differential includes pneumothorax, PE (including air embolus), acidemia, coronary thrombosis  CXR negative for pneumothorax, lung sliding evident on lung US making pneumo less likely  Troponin mildly elevated at .462 and in setting of ESRD therefore coronary event unlikely; also no new EKG changes seen, ST depressions present since admission   Electrolytes (k+, mag, ca (corrected)) wnl  LE US  negative  patient started empirically on heparin drip for possible PE, however d/c as CTA negative  Continue to wean ventilator  TTE s/p cardiac arrest          Aortic stenosis    TTE 4/16/18 EF 55-60% w/ grade II DD  moderately sclerotic with moderately restricted leaflet mobility  peak velocity obtained across the aortic valve is 3.16 m/s  mean transvalvular gradient 24 mmHg  calculated aortic valve area is 0.83 cm2 consistent w/ severe aortic stenosis  Cautious w/ volume removal as aortic stenosis is preload dependent        Acute on chronic diastolic heart failure    Possibly 2/2 cardiorenal syndrome type 4, longstanding renal failure  See pulmonary edema        Renal/   UTI (urinary tract infection)    UA positive  Urine cx positive for MDRO E Coli sensitive to zosyn  blood cx NGTD thus far  C/w zosyn  Contact isolation        High anion gap metabolic acidosis    anion gap metabolic acidosis 2/2 lactic acidosis w/ concomitant resp acidosis resolved s/p CRRT        Stage 5 chronic kidney disease not on chronic dialysis    Refused dialysis in the past  Per nephrology pt agreeable to dialysis this admission  CRRT per nephrology  Patient making urine- chappell in place due to retention        Oncology   Anemia, chronic renal failure, stage 5    Anemic on presentation  Likely 2/2 CKD  Monitor CBC  Transfuse for Hb < 7        Endocrine   Type 2 diabetes mellitus with stage 5 chronic kidney disease and hypertension    HbA1c 8.0  Due to CKD V cautious for insulin stacking  Monitor accuchecks  aspart sliding scale  C/w detemir 10 units daily        GI   Elevated liver enzymes    2/2 shocked liver 2/2 hypotension following cardiac arrest  Enzymes improving  Monitor CMP           Critical Care Daily Checklist:    A: Awake: RASS Goal/Actual Goal: RASS Goal: 0-->alert and calm  Actual: [place] Agitation Sedation Scale (RASS): Alert and calm   B: Spontaneous Breathing Trial Performed?     C: SAT & SBT Coordinated?  yes                       D: Delirium: CAM-ICU Overall CAM-ICU: Negative   E: Early Mobility Performed? No   F: Feeding Goal:    Status:     Current Diet Order   Procedures    Diet NPO      AS: Analgesia/Sedation Propofol, fentanyl   T: Thromboembolic Prophylaxis Heparin sq   H: HOB > 300 Yes   U: Stress Ulcer Prophylaxis (if needed) famotidine   G: Glucose Control Detemir, aspart sliding scale   B: Bowel Function Stool Occurrence: 0   I: Indwelling Catheter (Lines & Cano) Necessity Cano, trialysis   D: De-escalation of Antimicrobials/Pharmacotherapies C/w osyn, d/c vanc    Plan for the day/ETD Wean ventilator settings    Code Status:  Family/Goals of Care: Full Code         Critical secondary to Patient has a condition that poses threat to life and bodily function: Severe Respiratory Distress      Critical care was time spent personally by me on the following activities: development of treatment plan with patient or surrogate and bedside caregivers, discussions with consultants, evaluation of patient's response to treatment, examination of patient, ordering and performing treatments and interventions, ordering and review of laboratory studies, ordering and review of radiographic studies, pulse oximetry, re-evaluation of patient's condition. This critical care time did not overlap with that of any other provider or involve time for any procedures.     Pepito Elizabeth MD  Critical Care Medicine  [facility]

## 2018-04-19 NOTE — PHYSICIAN QUERY
PT Name: Martha Melvin  MR #: [id number]    Physician Query Form - Perfusion Diagnosis Clarification     CDS/: Diamond Dominguez               Contact information:[email or web address]  This form is a permanent document in the medical record.     Query Date: April 19, 2018    By submitting this query, we are merely seeking further clarification of documentation. Please utilize your independent clinical judgment when addressing the question(s) below.    The medical record contains the following:    Indicators   Supporting Clinical Findings   Location in Medical Record   x Acute Illness (e.g. AMI, Sepsis, etc.) Acute hypoxemic respiratory failure    Acute on chronic diastolic heart failure    Stage 5 chronic kidney disease not on chronic dialysis           [facility] PN 4/19   x Acidosis documented Likely 2/2 hypoxia  Acidemia likely 2/2 elevated lactate 2/2 cardiac arrest     [facility] PN 4/19   x ABGs / Labs PH = 7.095  PCO2 = 50.7  PO2 = 49  HCO3 = 15.5 ABGs 4/16    Vital Signs     x Hypotension or Low Blood Pressure documented shocked liver 2/2 hypotension following cardiac arrest [facility] PN 4/19    Altered Mental Status or Confusion      Diaphoresis, Cold Extremities or Cyanosis      Oliguria     x Medication/Treatment:  -Vasopressors  -Inotropic Drugs  -IV Fluids  -Cardiac Assist Devices  -Hemodynamic Monitoring  -Blood/Blood Products epi switched to levo and placed on vent    Patient started on urgent dialysis [facility] PN 4/19   x Other: due to poor urine output despite diuresis w/ lasix and diuril nephrology requested trialysis. Following placement patient found to be in PEA arrest  ROSC achieved for <10min then patient again coded. ROSC achieved and patient was subsequently transferred to SICU where patient coded a 3rd time. ROSC achieved and maintained [facility] PN 4/19     Provider, please specify diagnosis or diagnoses associated with above clinical findings.    [  ] Cardiogenic Shock  [  ] Other Shock (please specify):  _________________________________  [x  ] Shock Unspecified  [  ] Other Condition (please specify): ___________________________________  [  ] Clinically Undetermined    Please document in your progress notes daily for the duration of treatment until resolved and include in your discharge summary.

## 2018-04-19 NOTE — ASSESSMENT & PLAN NOTE
- baseline sCr 4.5-5.0 c/w CKD stage V  - admitted with ADHF. Attempted medical diuresis without improvement in UOP. Increased O2 requirements throughout the day. PEA arrest on 4/16-4/17; likely related to hypoxia. SLED started same day  - changed to SCUF yesterday. Ordered -400cc/hr; however patient remained on 350cc/hr overnight.  - net +500cc yesterday. Minimal vent requirements this morning. No new CXR today.   - will continue SCUF today with UF goal 400cc/hr. Will plan to continue until patient extubated; then will start to space out treatments  - UOP 900cc/24h --> 10cc/24h. Will order bladder scan. Cano catheter may need to be exchanged.   - repeat CXR in AM

## 2018-04-19 NOTE — PLAN OF CARE
Problem: Patient Care Overview  Goal: Plan of Care Review  Outcome: Ongoing (interventions implemented as appropriate)  POC reviewed with patient and family at bedside at 1130. Both patient and family acknowledged understanding of POC; patient requires reinforcement. Patient has remained free of falls, injuries and skin breakdown for the duration of the shift. VSS. No acute distress noted; complains of discomfort in throat; treated with PRN meds. Plans: Patient to remain intubated; trend ABGs; CRRT; trend Renal Function; Fentanyl gtt for discomfort; Propofol discontinued; Levophed for BP support. Will continue to monitor to and update as needed.

## 2018-04-20 LAB
ALBUMIN SERPL BCP-MCNC: 2.3 G/DL
ALBUMIN SERPL BCP-MCNC: 2.3 G/DL
ALBUMIN SERPL BCP-MCNC: 2.4 G/DL
ALBUMIN SERPL BCP-MCNC: 2.5 G/DL
ALLENS TEST: ABNORMAL
ALP SERPL-CCNC: 111 U/L
ALT SERPL W/O P-5'-P-CCNC: 382 U/L
ANION GAP SERPL CALC-SCNC: 11 MMOL/L
ANION GAP SERPL CALC-SCNC: 16 MMOL/L
ANION GAP SERPL CALC-SCNC: 17 MMOL/L
AST SERPL-CCNC: 140 U/L
BASOPHILS # BLD AUTO: 0.03 K/UL
BASOPHILS NFR BLD: 0.3 %
BILIRUB DIRECT SERPL-MCNC: 0.6 MG/DL
BILIRUB SERPL-MCNC: 0.8 MG/DL
BUN SERPL-MCNC: 11 MG/DL
BUN SERPL-MCNC: 22 MG/DL
BUN SERPL-MCNC: 4 MG/DL
CALCIUM SERPL-MCNC: 8.1 MG/DL
CALCIUM SERPL-MCNC: 8.1 MG/DL
CALCIUM SERPL-MCNC: 8.6 MG/DL
CHLORIDE SERPL-SCNC: 104 MMOL/L
CHLORIDE SERPL-SCNC: 106 MMOL/L
CHLORIDE SERPL-SCNC: 108 MMOL/L
CO2 SERPL-SCNC: 17 MMOL/L
CO2 SERPL-SCNC: 18 MMOL/L
CO2 SERPL-SCNC: 25 MMOL/L
CREAT SERPL-MCNC: 1 MG/DL
CREAT SERPL-MCNC: 1.8 MG/DL
CREAT SERPL-MCNC: 3 MG/DL
DELSYS: ABNORMAL
DIFFERENTIAL METHOD: ABNORMAL
EOSINOPHIL # BLD AUTO: 0.1 K/UL
EOSINOPHIL NFR BLD: 0.8 %
ERYTHROCYTE [DISTWIDTH] IN BLOOD BY AUTOMATED COUNT: 17.5 %
ERYTHROCYTE [SEDIMENTATION RATE] IN BLOOD BY WESTERGREN METHOD: 24 MM/H
EST. GFR  (AFRICAN AMERICAN): 17.5 ML/MIN/1.73 M^2
EST. GFR  (AFRICAN AMERICAN): 32.4 ML/MIN/1.73 M^2
EST. GFR  (AFRICAN AMERICAN): >60 ML/MIN/1.73 M^2
EST. GFR  (NON AFRICAN AMERICAN): 15.2 ML/MIN/1.73 M^2
EST. GFR  (NON AFRICAN AMERICAN): 28.1 ML/MIN/1.73 M^2
EST. GFR  (NON AFRICAN AMERICAN): 57.2 ML/MIN/1.73 M^2
FIO2: 30
FIO2: 30
FIO2: 40
GLUCOSE SERPL-MCNC: 110 MG/DL
GLUCOSE SERPL-MCNC: 116 MG/DL
GLUCOSE SERPL-MCNC: 84 MG/DL
HCO3 UR-SCNC: 14.6 MMOL/L (ref 24–28)
HCO3 UR-SCNC: 16.9 MMOL/L (ref 24–28)
HCO3 UR-SCNC: 18.7 MMOL/L (ref 24–28)
HCT VFR BLD AUTO: 26.8 %
HGB BLD-MCNC: 8.3 G/DL
IMM GRANULOCYTES # BLD AUTO: 0.05 K/UL
IMM GRANULOCYTES NFR BLD AUTO: 0.5 %
LACTATE SERPL-SCNC: 0.6 MMOL/L
LYMPHOCYTES # BLD AUTO: 1.1 K/UL
LYMPHOCYTES NFR BLD: 11.9 %
MAGNESIUM SERPL-MCNC: 1.9 MG/DL
MAGNESIUM SERPL-MCNC: 2 MG/DL
MAGNESIUM SERPL-MCNC: 2.1 MG/DL
MCH RBC QN AUTO: 24.2 PG
MCHC RBC AUTO-ENTMCNC: 31 G/DL
MCV RBC AUTO: 78 FL
MODE: ABNORMAL
MONOCYTES # BLD AUTO: 1.1 K/UL
MONOCYTES NFR BLD: 11.9 %
NEUTROPHILS # BLD AUTO: 7.1 K/UL
NEUTROPHILS NFR BLD: 74.6 %
NRBC BLD-RTO: 1 /100 WBC
PCO2 BLDA: 34.4 MMHG (ref 35–45)
PCO2 BLDA: 37.1 MMHG (ref 35–45)
PCO2 BLDA: 39.4 MMHG (ref 35–45)
PEEP: 5
PH SMN: 7.24 [PH] (ref 7.35–7.45)
PH SMN: 7.24 [PH] (ref 7.35–7.45)
PH SMN: 7.31 [PH] (ref 7.35–7.45)
PHOSPHATE SERPL-MCNC: 2.2 MG/DL
PHOSPHATE SERPL-MCNC: 3.4 MG/DL
PHOSPHATE SERPL-MCNC: 6.9 MG/DL
PLATELET # BLD AUTO: 150 K/UL
PMV BLD AUTO: 10.7 FL
PO2 BLDA: 109 MMHG (ref 80–100)
PO2 BLDA: 109 MMHG (ref 80–100)
PO2 BLDA: 124 MMHG (ref 80–100)
POC BE: -11 MMOL/L
POC BE: -13 MMOL/L
POC BE: -8 MMOL/L
POC SATURATED O2: 97 % (ref 95–100)
POC SATURATED O2: 98 % (ref 95–100)
POC SATURATED O2: 98 % (ref 95–100)
POC TCO2: 16 MMOL/L (ref 23–27)
POC TCO2: 18 MMOL/L (ref 23–27)
POC TCO2: 20 MMOL/L (ref 23–27)
POCT GLUCOSE: 108 MG/DL (ref 70–110)
POCT GLUCOSE: 120 MG/DL (ref 70–110)
POCT GLUCOSE: 135 MG/DL (ref 70–110)
POCT GLUCOSE: 96 MG/DL (ref 70–110)
POTASSIUM SERPL-SCNC: 4.7 MMOL/L
POTASSIUM SERPL-SCNC: 4.8 MMOL/L
POTASSIUM SERPL-SCNC: 5.1 MMOL/L
PROT SERPL-MCNC: 7.2 G/DL
PROVIDER CREDENTIALS: ABNORMAL
PROVIDER NOTIFIED: ABNORMAL
PS: 5
PS: 5
RBC # BLD AUTO: 3.43 M/UL
SAMPLE: ABNORMAL
SITE: ABNORMAL
SODIUM SERPL-SCNC: 140 MMOL/L
SODIUM SERPL-SCNC: 140 MMOL/L
SODIUM SERPL-SCNC: 142 MMOL/L
SP02: 100
SPONT RATE: 19
SPONT RATE: 27
TIME NOTIFIED: 931
VT: 350
WBC # BLD AUTO: 9.53 K/UL

## 2018-04-20 PROCEDURE — 20000000 HC ICU ROOM

## 2018-04-20 PROCEDURE — 80100008 HC CRRT DAILY MAINTENANCE

## 2018-04-20 PROCEDURE — 37799 UNLISTED PX VASCULAR SURGERY: CPT

## 2018-04-20 PROCEDURE — 80069 RENAL FUNCTION PANEL: CPT | Mod: 91

## 2018-04-20 PROCEDURE — 99291 CRITICAL CARE FIRST HOUR: CPT | Mod: ,,, | Performed by: INTERNAL MEDICINE

## 2018-04-20 PROCEDURE — 99900017 HC EXTUBATION W/PARAMETERS (STAT)

## 2018-04-20 PROCEDURE — 63600175 PHARM REV CODE 636 W HCPCS: Performed by: STUDENT IN AN ORGANIZED HEALTH CARE EDUCATION/TRAINING PROGRAM

## 2018-04-20 PROCEDURE — 94003 VENT MGMT INPAT SUBQ DAY: CPT

## 2018-04-20 PROCEDURE — 90945 DIALYSIS ONE EVALUATION: CPT | Mod: GC,,, | Performed by: INTERNAL MEDICINE

## 2018-04-20 PROCEDURE — 25000003 PHARM REV CODE 250: Performed by: STUDENT IN AN ORGANIZED HEALTH CARE EDUCATION/TRAINING PROGRAM

## 2018-04-20 PROCEDURE — 85025 COMPLETE CBC W/AUTO DIFF WBC: CPT

## 2018-04-20 PROCEDURE — 94761 N-INVAS EAR/PLS OXIMETRY MLT: CPT

## 2018-04-20 PROCEDURE — 25000003 PHARM REV CODE 250: Performed by: INTERNAL MEDICINE

## 2018-04-20 PROCEDURE — 99900035 HC TECH TIME PER 15 MIN (STAT)

## 2018-04-20 PROCEDURE — 94150 VITAL CAPACITY TEST: CPT

## 2018-04-20 PROCEDURE — 83735 ASSAY OF MAGNESIUM: CPT

## 2018-04-20 PROCEDURE — 94010 BREATHING CAPACITY TEST: CPT

## 2018-04-20 PROCEDURE — 82803 BLOOD GASES ANY COMBINATION: CPT

## 2018-04-20 PROCEDURE — 99900026 HC AIRWAY MAINTENANCE (STAT)

## 2018-04-20 PROCEDURE — 83605 ASSAY OF LACTIC ACID: CPT

## 2018-04-20 PROCEDURE — 63600175 PHARM REV CODE 636 W HCPCS: Performed by: INTERNAL MEDICINE

## 2018-04-20 PROCEDURE — 27000221 HC OXYGEN, UP TO 24 HOURS

## 2018-04-20 PROCEDURE — 90945 DIALYSIS ONE EVALUATION: CPT

## 2018-04-20 PROCEDURE — 80076 HEPATIC FUNCTION PANEL: CPT

## 2018-04-20 RX ORDER — MAGNESIUM SULFATE HEPTAHYDRATE 40 MG/ML
2 INJECTION, SOLUTION INTRAVENOUS
Status: DISCONTINUED | OUTPATIENT
Start: 2018-04-20 | End: 2018-04-21 | Stop reason: ALTCHOICE

## 2018-04-20 RX ORDER — HYDROCODONE BITARTRATE AND ACETAMINOPHEN 500; 5 MG/1; MG/1
TABLET ORAL CONTINUOUS
Status: DISCONTINUED | OUTPATIENT
Start: 2018-04-20 | End: 2018-04-21 | Stop reason: ALTCHOICE

## 2018-04-20 RX ADMIN — HEPARIN SODIUM 5000 UNITS: 5000 INJECTION, SOLUTION INTRAVENOUS; SUBCUTANEOUS at 01:04

## 2018-04-20 RX ADMIN — PIPERACILLIN AND TAZOBACTAM 4.5 G: 4; .5 INJECTION, POWDER, LYOPHILIZED, FOR SOLUTION INTRAVENOUS; PARENTERAL at 03:04

## 2018-04-20 RX ADMIN — FAMOTIDINE 20 MG: 20 TABLET, FILM COATED ORAL at 09:04

## 2018-04-20 RX ADMIN — PIPERACILLIN AND TAZOBACTAM 4.5 G: 4; .5 INJECTION, POWDER, LYOPHILIZED, FOR SOLUTION INTRAVENOUS; PARENTERAL at 06:04

## 2018-04-20 RX ADMIN — PROPOFOL 10 MCG/KG/MIN: 10 INJECTION, EMULSION INTRAVENOUS at 05:04

## 2018-04-20 RX ADMIN — HEPARIN SODIUM 5000 UNITS: 5000 INJECTION, SOLUTION INTRAVENOUS; SUBCUTANEOUS at 10:04

## 2018-04-20 RX ADMIN — CHLORHEXIDINE GLUCONATE 15 ML: 1.2 RINSE ORAL at 09:04

## 2018-04-20 RX ADMIN — SODIUM CHLORIDE: 0.9 INJECTION, SOLUTION INTRAVENOUS at 09:04

## 2018-04-20 RX ADMIN — CHLORHEXIDINE GLUCONATE 15 ML: 1.2 RINSE ORAL at 10:04

## 2018-04-20 RX ADMIN — HEPARIN SODIUM 5000 UNITS: 5000 INJECTION, SOLUTION INTRAVENOUS; SUBCUTANEOUS at 05:04

## 2018-04-20 RX ADMIN — PIPERACILLIN AND TAZOBACTAM 4.5 G: 4; .5 INJECTION, POWDER, LYOPHILIZED, FOR SOLUTION INTRAVENOUS; PARENTERAL at 09:04

## 2018-04-20 NOTE — ASSESSMENT & PLAN NOTE
- Initially anion gap metabolic acidosis 2/2 lactic acidosis w/ concomitant resp acidosis resolved s/p CRRT  - Lactic acid 0.6 this AM  - new AGMA resolving with CRRT

## 2018-04-20 NOTE — ASSESSMENT & PLAN NOTE
UA positive  Urine cx positive for MDRO E Coli sensitive to zosyn  blood cx NGTD thus far  C/w zosyn (day 4). Will need 7 days   Contact isolation

## 2018-04-20 NOTE — SUBJECTIVE & OBJECTIVE
Interval History/Significant Events: no acute events overnight. UOP has dropped (19cc over past 24 hours.) SLED overnight, 7.3 L removed. Minimal pressors (levo 0.01) SBT today as she has failed yesterday. Alert, following commands    Review of Systems   Unable to perform ROS: Intubated     Objective:     Vital Signs (Most Recent):  Temp: 97.6 °F (36.4 °C) (04/20/18 0800)  Pulse: 80 (04/20/18 0920)  Resp: (!) 44 (04/20/18 0920)  BP: 124/65 (04/20/18 0800)  SpO2: 100 % (04/20/18 0920) Vital Signs (24h Range):  Temp:  [96.7 °F (35.9 °C)-97.6 °F (36.4 °C)] 97.6 °F (36.4 °C)  Pulse:  [68-84] 80  Resp:  [0-129] 44  SpO2:  [98 %-100 %] 100 %  BP: ()/(50-67) 124/65  Arterial Line BP: ()/(47-64) 117/51   Weight: 91.5 kg (201 lb 11.5 oz)  Body mass index is 32.56 kg/m².      Intake/Output Summary (Last 24 hours) at 04/20/18 0950  Last data filed at 04/20/18 0900   Gross per 24 hour   Intake           7164.9 ml   Output             7535 ml   Net           -370.1 ml       Physical Exam   HENT:   Head: Normocephalic and atraumatic.   Eyes: Conjunctivae are normal. No scleral icterus.   Cardiovascular: Normal rate, regular rhythm and intact distal pulses.    Pulmonary/Chest: She has no wheezes. She has rales.   On the ventilator   Abdominal: Soft. She exhibits distension. There is no tenderness. There is no guarding.   Musculoskeletal: She exhibits edema. She exhibits no tenderness.   R LE amputation   Lymphadenopathy:     She has no cervical adenopathy.   Neurological: She is alert.   Unable to assess orientation due to intubation   off sedation    Skin: No rash noted.       Vents:  Vent Mode: PS/CPAP (04/20/18 0920)  Ventilator Initiated: Yes (04/16/18 2205)  Set Rate: 24 bmp (04/20/18 0726)  Vt Set: 350 mL (04/20/18 0726)  Pressure Support: 5 cmH20 (04/20/18 0920)  PEEP/CPAP: 5 cmH20 (04/20/18 0920)  Oxygen Concentration (%): 30 (04/20/18 0726)  Peak Airway Pressure: 10 cmH2O (04/20/18 0920)  Total Ve: 7.7 mL  (04/20/18 0920)  Negative Inspiratory Force (cm H2O): -39 (04/20/18 0920)  F/VT Ratio<105 (RSBI): 146.67 (04/20/18 0920)  Lines/Drains/Airways     Central Venous Catheter Line                 Trialysis (Dialysis) Catheter 04/16/18 2050 right internal jugular 3 days          Drain                 NG/OG Tube 04/16/18 2230 orogastric 3 days         Urethral Catheter 04/18/18 0000 2 days          Airway                 Airway - Non-Surgical 04/16/18 4 days          Arterial Line                 Arterial Line 04/16/18 2250 Left Radial 3 days          Peripheral Intravenous Line                 Peripheral IV - Single Lumen 04/16/18 2300 Right Antecubital 3 days              Significant Labs:    CBC/Anemia Profile:    Recent Labs  Lab 04/19/18  0420 04/20/18  0323   WBC 8.70 9.53   HGB 8.2* 8.3*   HCT 25.8* 26.8*    150   MCV 76* 78*   RDW 17.3* 17.5*        Chemistries:    Recent Labs  Lab 04/19/18  0420 04/19/18  1412 04/19/18  2219 04/20/18  0323    136  136 139 140   K 4.2 4.4  4.4 5.1 5.1    104  104 106 106   CO2 23 18*  18* 18* 17*   BUN 11 15  15 20 22   CREATININE 1.9* 2.3*  2.3* 2.7* 3.0*   CALCIUM 7.4* 7.4*  7.4* 7.9* 8.1*   ALBUMIN 2.2*  2.2* 2.3*  2.3* 2.3* 2.3*  2.3*   PROT 6.7  --   --  7.2   BILITOT 0.9  --   --  0.8   ALKPHOS 111  --   --  111   *  --   --  382*   *  --   --  140*   MG 2.2 2.1  2.1 2.1 2.1   PHOS 5.2* 5.2*  5.2* 6.7* 6.9*       All pertinent labs within the past 24 hours have been reviewed.    Significant Imaging:  I have reviewed all pertinent imaging results/findings within the past 24 hours.

## 2018-04-20 NOTE — PROGRESS NOTES
[facility]  Critical Care Medicine  Progress Note    Patient Name: Martha Melvin  MRN: [id number]  Admission Date: 4/15/2018  Hospital Length of Stay: 5 days  Code Status: Full Code  Attending Provider: Akilah Rowley MD  Primary Care Provider: GUILLERMO Mcclure MD   Principal Problem: Cardiac arrest    Subjective:     HPI:  73 y/o F PMhx HFrEF, CKD stage V (makes urine, not on dialysis), moderate aortic stenosis, DM II, R leg amputation (2015), HTN who presented w/ SOB x 4 days. Patient was discharged from [facility] 10/14/17 for SOB 2/2 ADHF and she was subsequently discharged on an increased of lasix from 20mg to 80mg daily due to her renal failure. Daughter states patient ran out of lasix 80mg and asked PCP for rx refill. However, per daughter PCP refused to fill 80mg and therefore patient had been taking 20mg x 4 daily. Daughter states she last saw patient on 4/11 prior to admission and that patient was in good health at that time. She spoke with patient over the phone on 4/16 and at that time noted patient to be SOB which prompted her to call a family friend to check on patient. In conversation daughter was made aware that patient's lasix had run out. EMS was called and on route she pllaced on BIPAP and given albuterol. Upon arrival her saturations were at 82%.    In the ED patient given nitro paste, albuterol, as well as lasix. She was subsequently admitted to medicine. She was started on lasix 60mg IV tid as well as rocephin and azithromycin for suspected CAP. Nephrology was consulted for volume overload in the setting of CKD stage V, however, due to poor urine output despite diuresis w/ lasix and diuril nephrology requested trialysis. Following placement patient found to be in PEA arrest. ROSC achieved for <10min then patient again coded. ROSC achieved and patient was subsequently transferred to SICU where patient coded a 3rd time. ROSC achieved and maintained, epi switched to levo and  placed on vent. Patient started on urgent dialysis. Continued to be hypoxic therefore given nimbex, elevation of HOB w/ improvement in sats.    Per daughter patient had been aware of CKD V since 2015 and had refused dialysis since that time due to fear of having to be on dialysis.    History obtained from daughter and rest of family as patient intubated.    Hospital/ICU Course:  4/17 patient able to follow commands, trialysis clotted, however, clot removed with syringe; patient started on heparin drip empirically for possible PE  4/18 patient continues to be on CRRT, vent setting adjusted FiO2 70% to 50% and RR 28 to 22; PEEP decreased from 14 to 5; urine culture positive for MDRO E Coli- c/w zosyn  4/19 patient continues to follow commands when sedation weaned, continuing to wean vent settings  4/20: no acute events overnight. UOP has dropped (19cc over past 24 hours.) SCUF overnight, 7.3 L removed. Minimal pressors (levo 0.01) SBT today as she has failed yesterday. Following commands and is alert this morning     Interval History/Significant Events: no acute events overnight. UOP has dropped (19cc over past 24 hours.) SLED overnight, 7.3 L removed. Minimal pressors (levo 0.01) SBT today as she has failed yesterday. Alert, following commands    Review of Systems   Unable to perform ROS: Intubated     Objective:     Vital Signs (Most Recent):  Temp: 97.6 °F (36.4 °C) (04/20/18 0800)  Pulse: 80 (04/20/18 0920)  Resp: (!) 44 (04/20/18 0920)  BP: 124/65 (04/20/18 0800)  SpO2: 100 % (04/20/18 0920) Vital Signs (24h Range):  Temp:  [96.7 °F (35.9 °C)-97.6 °F (36.4 °C)] 97.6 °F (36.4 °C)  Pulse:  [68-84] 80  Resp:  [0-129] 44  SpO2:  [98 %-100 %] 100 %  BP: ()/(50-67) 124/65  Arterial Line BP: ()/(47-64) 117/51   Weight: 91.5 kg (201 lb 11.5 oz)  Body mass index is 32.56 kg/m².      Intake/Output Summary (Last 24 hours) at 04/20/18 0950  Last data filed at 04/20/18 0900   Gross per 24 hour   Intake            7164.9 ml   Output             7535 ml   Net           -370.1 ml       Physical Exam   HENT:   Head: Normocephalic and atraumatic.   Eyes: Conjunctivae are normal. No scleral icterus.   Cardiovascular: Normal rate, regular rhythm and intact distal pulses.    Pulmonary/Chest: She has no wheezes. She has rales.   On the ventilator   Abdominal: Soft. She exhibits distension. There is no tenderness. There is no guarding.   Musculoskeletal: She exhibits edema. She exhibits no tenderness.   R LE amputation   Lymphadenopathy:     She has no cervical adenopathy.   Neurological: She is alert.   Unable to assess orientation due to intubation   off sedation    Skin: No rash noted.       Vents:  Vent Mode: PS/CPAP (04/20/18 0920)  Ventilator Initiated: Yes (04/16/18 2205)  Set Rate: 24 bmp (04/20/18 0726)  Vt Set: 350 mL (04/20/18 0726)  Pressure Support: 5 cmH20 (04/20/18 0920)  PEEP/CPAP: 5 cmH20 (04/20/18 0920)  Oxygen Concentration (%): 30 (04/20/18 0726)  Peak Airway Pressure: 10 cmH2O (04/20/18 0920)  Total Ve: 7.7 mL (04/20/18 0920)  Negative Inspiratory Force (cm H2O): -39 (04/20/18 0920)  F/VT Ratio<105 (RSBI): 146.67 (04/20/18 0920)  Lines/Drains/Airways     Central Venous Catheter Line                 Trialysis (Dialysis) Catheter 04/16/18 2050 right internal jugular 3 days          Drain                 NG/OG Tube 04/16/18 2230 orogastric 3 days         Urethral Catheter 04/18/18 0000 2 days          Airway                 Airway - Non-Surgical 04/16/18 4 days          Arterial Line                 Arterial Line 04/16/18 2250 Left Radial 3 days          Peripheral Intravenous Line                 Peripheral IV - Single Lumen 04/16/18 2300 Right Antecubital 3 days              Significant Labs:    CBC/Anemia Profile:    Recent Labs  Lab 04/19/18  0420 04/20/18  0323   WBC 8.70 9.53   HGB 8.2* 8.3*   HCT 25.8* 26.8*    150   MCV 76* 78*   RDW 17.3* 17.5*        Chemistries:    Recent Labs  Lab 04/19/18  0420  04/19/18  1412 04/19/18  2219 04/20/18  0323    136  136 139 140   K 4.2 4.4  4.4 5.1 5.1    104  104 106 106   CO2 23 18*  18* 18* 17*   BUN 11 15  15 20 22   CREATININE 1.9* 2.3*  2.3* 2.7* 3.0*   CALCIUM 7.4* 7.4*  7.4* 7.9* 8.1*   ALBUMIN 2.2*  2.2* 2.3*  2.3* 2.3* 2.3*  2.3*   PROT 6.7  --   --  7.2   BILITOT 0.9  --   --  0.8   ALKPHOS 111  --   --  111   *  --   --  382*   *  --   --  140*   MG 2.2 2.1  2.1 2.1 2.1   PHOS 5.2* 5.2*  5.2* 6.7* 6.9*       All pertinent labs within the past 24 hours have been reviewed.    Significant Imaging:  I have reviewed all pertinent imaging results/findings within the past 24 hours.    Assessment/Plan:     Pulmonary   Pulmonary edema    Was previously discharged on lasix 80mg daily for ADHF  Patient ran out of medication, unknown for how long  Failed trial of diuretics w/ lasix and diuril  On SLED. Nephrology following.   CXR shows some improvement with volume removal           Acute hypoxemic respiratory failure    Likely cause of cardiac arrest  Wean ventilator settings  See cardiac arrest        Cardiac/Vascular   * Cardiac arrest    Likely 2/2 hypoxia  Acidemia likely 2/2 elevated lactate 2/2 cardiac arrest  Differential includes pneumothorax, PE (including air embolus), acidemia, coronary thrombosis  CXR negative for pneumothorax, lung sliding evident on lung US making pneumo less likely  Troponin mildly elevated at .462 and in setting of ESRD therefore coronary event unlikely; also no new EKG changes seen, ST depressions present since admission   Electrolytes (k+, mag, ca (corrected)) wnl  LE US negative  patient started empirically on heparin drip for possible PE, however d/c as CTA negative  Continue to wean ventilator  TTE s/p cardiac arrest          Aortic stenosis    TTE 4/16/18 EF 55-60% w/ grade II DD  moderately sclerotic with moderately restricted leaflet mobility  peak velocity obtained across the aortic valve is  3.16 m/s  mean transvalvular gradient 24 mmHg  calculated aortic valve area is 0.83 cm2 consistent w/ severe aortic stenosis  Cautious w/ volume removal as aortic stenosis is preload dependent        Acute on chronic diastolic heart failure    Possibly 2/2 cardiorenal syndrome type 4, longstanding renal failure  See pulmonary edema        Renal/   UTI (urinary tract infection)    UA positive  Urine cx positive for MDRO E Coli sensitive to zosyn  blood cx NGTD thus far  C/w zosyn (day 4). Will need 7 days   Contact isolation        High anion gap metabolic acidosis    - Initially anion gap metabolic acidosis 2/2 lactic acidosis w/ concomitant resp acidosis resolved s/p CRRT  - Lactic acid 0.6 this AM  - Now with new AGMA - could be due to renal failure          Stage 5 chronic kidney disease not on chronic dialysis    Refused dialysis in the past  Per nephrology pt agreeable to dialysis   CRRT per nephrology.   UOP has dropped, now making 19cc past 24 hours  Cano in place - remove and replace with pure wick         Oncology   Anemia, chronic renal failure, stage 5    Anemic on presentation  Likely 2/2 CKD  Monitor CBC  Transfuse for Hb < 7        Endocrine   Type 2 diabetes mellitus with stage 5 chronic kidney disease and hypertension    HbA1c 8.0  Due to CKD V cautious for insulin stacking  Monitor accuchecks  aspart sliding scale  D/c'd detemir due to low BG and possible insulin stacking   - sugars have been well controlled        GI   Elevated liver enzymes    2/2 shocked liver 2/2 hypotension following cardiac arrest  Enzymes improving  Monitor CMP           Critical Care Daily Checklist:    A: Awake: RASS Goal/Actual Goal: RASS Goal: 0-->alert and calm  Actual: [place] Agitation Sedation Scale (RASS): Restless   B: Spontaneous Breathing Trial Performed? Spon. Breathing Trial Initiated?:  (doctor initiated at 1035 per nurse stated) (04/19/18 1108)   C: SAT & SBT Coordinated?  yes                      D:  Delirium: CAM-ICU Overall CAM-ICU: Negative   E: Early Mobility Performed? Yes   F: Feeding Goal:    Status:     Current Diet Order   Procedures    Diet NPO      AS: Analgesia/Sedation Off sedation   T: Thromboembolic Prophylaxis    H: HOB > 300 Yes   U: Stress Ulcer Prophylaxis (if needed) famotidine   G: Glucose Control Detemir, LDSSI    B: Bowel Function Stool Occurrence: 0   I: Indwelling Catheter (Lines & Cano) Necessity Cano, central line   D: De-escalation of Antimicrobials/Pharmacotherapies none    Plan for the day/ETD SBT, extubation     Code Status:  Family/Goals of Care: Full Code         Critical secondary to Patient has a condition that poses threat to life and bodily function: acute hypoxemic resp failure, sepsis       Critical care was time spent personally by me on the following activities: development of treatment plan with patient or surrogate and bedside caregivers, discussions with consultants, evaluation of patient's response to treatment, examination of patient, ordering and performing treatments and interventions, ordering and review of laboratory studies, ordering and review of radiographic studies, pulse oximetry, re-evaluation of patient's condition. This critical care time did not overlap with that of any other provider or involve time for any procedures.     Shorty Mustafa MD  Critical Care Medicine  [facility]

## 2018-04-20 NOTE — ASSESSMENT & PLAN NOTE
HbA1c 8.0  Due to CKD V cautious for insulin stacking  Monitor accuchecks  aspart sliding scale  D/c'd detemir due to low BG and possible insulin stacking   - sugars have been well controlled

## 2018-04-20 NOTE — PROGRESS NOTES
Pt extubated to 3 L NC by RT per MD order. Following extubation, pt is AAO x4, bilateral breath sounds noted. Restraints removed safely w/o injury. Pt's family brought to bedside, updated on the PoC for remainder of shift.

## 2018-04-20 NOTE — ASSESSMENT & PLAN NOTE
UA positive  Urine cx positive for MDRO E Coli sensitive to zosyn  blood cx NGTD thus far  C/w zosyn (day 5). Will need 7 days   Contact isolation

## 2018-04-20 NOTE — ASSESSMENT & PLAN NOTE
Refused dialysis in the past  Per nephrology pt agreeable to dialysis   SLED per nephrology.   UOP has dropped, now making 19cc past 24 hours  Cano in place

## 2018-04-20 NOTE — PLAN OF CARE
Problem: Patient Care Overview  Goal: Plan of Care Review  Outcome: Ongoing (interventions implemented as appropriate)  Dx: S/P Code.     Shift Events: Extubated at 1200. Pt. Tolerating 2L NC . Levo discontinued and CRRT titrated to maintain MAP goal >65. All VS stable and pt remains afebrile throughout shift. Denies any pain. Pt. Free from falls. No acute events this shift.     Neuro: Pt. Alert and oriented x4. Following commands.    Vital Signs: VS stable throughout shift. MAP goal maintained greater than 65. Last CVP 6.    Intake/Diet: Bedside swallow study performed and pt. Passed. Transitioned to clear liquid diet.    Output: Pt. Is currently anuric. Cano removed. Bladder scan Q6H reveals minimal urine in bladder.     CRRT: UF goal is 400-450cc pt. Currently at UF of 300 to maintain MAP >65.    Skin: Elbows, sacrum, and heel free from any breakdown. Heel boot maintained on left leg. Right BKA intact.     Will continue monitor pt. Closely. See flowsheets for specific details.      Faith Ashley RN

## 2018-04-20 NOTE — PROGRESS NOTES
Bedside swallow study done 3 hours after pt was extubated. Pt passed bedside swallow, MD aware. Will continue to monitor pt and respiratory status closely.

## 2018-04-20 NOTE — ASSESSMENT & PLAN NOTE
- Initially anion gap metabolic acidosis 2/2 lactic acidosis w/ concomitant resp acidosis resolved s/p CRRT  - Lactic acid 0.6 this AM  - Now with new AGMA - could be due to renal failure

## 2018-04-20 NOTE — PROGRESS NOTES
[facility]  Nephrology  Progress Note    Patient Name: Martha Melvin  MRN: [id number]  Admission Date: 4/15/2018  Hospital Length of Stay: 5 days  Attending Provider: Akilah Rowley MD   Primary Care Physician: GUILLERMO Mcclure MD  Principal Problem:Cardiac arrest    Subjective:     HPI: Ms. Melvin is a 71 yo AAF with HFpEF, aortic stenosis, HTN, T2DM, and CKD stage V (baseline sCr 4.5-5.0) admitted on 4/15 with ADHF. She reported worsening SOB, orthopnea, and PND x 4 days. SpO2 82% on RA upon arrival. In the ED she was given nitro paste, albuterol, and lasix IV. She was admitted to medicine and started on lasix 60mg IV TID with 1L fluid restriction. She was also started on rocephin and azithromycin for CAP. No events overnight. UOP only 700cc. sCr tere from 5.4 to 5.9. Nephrology consulted for fluid management in setting of CKD stage V. Patient is aware of CKD diagnosis. She was offered dialysis 1.5 years ago but denied it at that time due to lack of transportation. She was previously followed by a nephrologist in [place] for the last 4 years. She was on lasix 20mg po for a long time. She developed ADHF a few months ago and was admitted to [facility]. She was told here that she would need higher doses of lasix given her advanced CKD. She was started on lasix 80mg po BID at that time. About 2 weeks ago she ran out of lasix. She called her PCP asking for a refill of the 80mg and this was denied. She asked if she could take 4 of the 20mg tablets and was told that her insurance would not cover this. She continues to feel very SOB this morning. She is interested in proceeding with dialysis if needed.     Interval History:   Remained on SCUF overnight with UF 350cc/hr. Net +1.1L yesterday. Bladder scan negative. AM labs with combined metabolic and respiratory acidosis. Possible extubation later today if acidemia improves. Hourly intake 3cc/hr with levophed. UOP 19cc/24h. Minimal vent requirements.      Review of patient's allergies indicates:   Allergen Reactions    Morphine      Current Facility-Administered Medications   Medication Frequency    0.9%  NaCl infusion (CRRT USE ONLY) Continuous    acetaminophen tablet 650 mg Q6H PRN    albuterol nebulizer solution 2.5 mg Q4H PRN    chlorhexidine 0.12 % solution 15 mL BID    dextromethorphan-guaifenesin  mg per 12 hr tablet 1 tablet BID PRN    dextrose 50% injection 12.5 g PRN    dextrose 50% injection 25 g PRN    EPINEPHrine 0.1 mg/mL injection Code/trauma/sedation Med    famotidine tablet 20 mg Daily    fentaNYL 2500 mcg in 0.9% sodium chloride 250 mL infusion premix (titrating) Continuous    glucagon (human recombinant) injection 1 mg PRN    glucose chewable tablet 16 g PRN    glucose chewable tablet 24 g PRN    heparin (porcine) injection 5,000 Units Q8H    insulin aspart U-100 pen 0-5 Units Q6H PRN    magnesium sulfate 2g in water 50mL IVPB (premix) PRN    norepinephrine 4 mg in dextrose 5% 250 mL infusion (premix) (titrating) Continuous    ondansetron disintegrating tablet 4 mg Q6H PRN    piperacillin-tazobactam 4.5 g in sodium chloride 0.9% 100 mL IVPB (ready to mix system) Q8H    promethazine (PHENERGAN) 12.5 mg in dextrose 5 % 50 mL IVPB Q24H PRN    propofol (DIPRIVAN) 10 mg/mL infusion Continuous    sodium bicarbonate 8.4 % (1 mEq/mL) injection Code/trauma/sedation Med    sodium chloride 0.9% flush 5 mL PRN    sodium phosphate 20.01 mmol in dextrose 5 % 250 mL IVPB PRN    sodium phosphate 30 mmol in dextrose 5 % 250 mL IVPB PRN    sodium phosphate 39.99 mmol in dextrose 5 % 250 mL IVPB PRN    succinylcholine injection Code/trauma/sedation Med       Objective:     Vital Signs (Most Recent):  Temp: 97.8 °F (36.6 °C) (04/20/18 1100)  Pulse: 80 (04/20/18 1100)  Resp: (!) 45 (04/20/18 1100)  BP: (!) 100/59 (04/20/18 1100)  SpO2: 100 % (04/20/18 1100)  O2 Device (Oxygen Therapy): ventilator (04/20/18 1100) Vital Signs (24h  Range):  Temp:  [96.7 °F (35.9 °C)-97.8 °F (36.6 °C)] 97.8 °F (36.6 °C)  Pulse:  [69-84] 80  Resp:  [] 45  SpO2:  [98 %-100 %] 100 %  BP: ()/(50-67) 100/59  Arterial Line BP: ()/(47-64) 94/47     Weight: 91.5 kg (201 lb 11.5 oz) (04/20/18 0400)  Body mass index is 32.56 kg/m².  Body surface area is 2.06 meters squared.    I/O last 3 completed shifts:  In: 42129.2 [I.V.:75795.2; IV Piggyback:850]  Out: 98282 [Urine:19; Other:16774]    Physical Exam   Constitutional: She appears well-developed and well-nourished.   HENT:   Head: Normocephalic and atraumatic.   Eyes: Conjunctivae and EOM are normal.   Cardiovascular: Normal rate and regular rhythm.    Pulmonary/Chest: Breath sounds normal. She has no wheezes. She has no rales.   Abdominal: Soft. She exhibits no distension.   Musculoskeletal: She exhibits no edema or deformity.   Skin: Skin is warm and dry. She is not diaphoretic.       Significant Labs:  ABGs:   Recent Labs  Lab 04/20/18  0928   PH 7.237*   PCO2 34.4*   HCO3 14.6*   POCSATURATED 97   BE -13     CBC:   Recent Labs  Lab 04/20/18  0323   WBC 9.53   RBC 3.43*   HGB 8.3*   HCT 26.8*      MCV 78*   MCH 24.2*   MCHC 31.0*     CMP:   Recent Labs  Lab 04/20/18  0323      CALCIUM 8.1*   ALBUMIN 2.3*  2.3*   PROT 7.2      K 5.1   CO2 17*      BUN 22   CREATININE 3.0*   ALKPHOS 111   *   *   BILITOT 0.8     All labs within the past 24 hours have been reviewed.     Significant Imaging:  Labs: Reviewed    Assessment/Plan:     Stage 5 chronic kidney disease not on chronic dialysis    - baseline sCr 4.5-5.0 c/w CKD stage V  - admitted with ADHF. Attempted medical diuresis without improvement in UOP. Increased O2 requirements throughout the day. PEA arrest on 4/16-4/17; likely related to hypoxia. SLED started same day  - changed to SCUF on 4/18. Remains on SCUF this morning with UF 350cc/hr; unsure why UF isn't at upper limit of goal UF  - anuric; bladder scan  negative. Net +1.1L yesterday. Worsening CXR this AM.   - combined metabolic and respiratory acidosis this morning. Will change SCUF to SLED to help with metabolic acidosis. Will increase UF goal to 400-450cc/hr.             Matilda Pascal, PGY-5  Nephrology Fellow  [facility]  Pager: [phone]    ATTENDING PHYSICIAN ATTESTATION  I have personally interviewed and examined the patient. I thoroughly reviewed the demographic, clinical, laboratorial and imaging information available in medical records. I agree with the assessment and recommendations provided by the subspecialty resident. Dr. Pascal was under my supervision.     DIALYSIS NOTE  Patient evaluated while undergoing Slow Low Efficiency Dialysis (SLED) indicated for MANPREET and hemodynamic instability. No complications, tolerating session with current UFR. Will continue current support.

## 2018-04-20 NOTE — ASSESSMENT & PLAN NOTE
- baseline sCr 4.5-5.0 c/w CKD stage V  - admitted with ADHF. Attempted medical diuresis without improvement in UOP. Increased O2 requirements throughout the day. PEA arrest on 4/16-4/17; likely related to hypoxia. SLED started same day  - changed to SCUF on 4/18. Remains on SCUF this morning with UF 350cc/hr; unsure why UF isn't at upper limit of goal UF  - anuric; bladder scan negative. Net +1.1L yesterday. Worsening CXR this AM.   - combined metabolic and respiratory acidosis this morning. Will change SCUF to SLED to help with metabolic acidosis. Will increase UF goal to 400-450cc/hr.

## 2018-04-20 NOTE — SUBJECTIVE & OBJECTIVE
Interval History:   Remained on SCUF overnight with UF 350cc/hr. Net +1.1L yesterday. Bladder scan negative. AM labs with combined metabolic and respiratory acidosis. Possible extubation later today if acidemia improves. Hourly intake 3cc/hr with levophed. UOP 19cc/24h. Minimal vent requirements.     Review of patient's allergies indicates:   Allergen Reactions    Morphine      Current Facility-Administered Medications   Medication Frequency    0.9%  NaCl infusion (CRRT USE ONLY) Continuous    acetaminophen tablet 650 mg Q6H PRN    albuterol nebulizer solution 2.5 mg Q4H PRN    chlorhexidine 0.12 % solution 15 mL BID    dextromethorphan-guaifenesin  mg per 12 hr tablet 1 tablet BID PRN    dextrose 50% injection 12.5 g PRN    dextrose 50% injection 25 g PRN    EPINEPHrine 0.1 mg/mL injection Code/trauma/sedation Med    famotidine tablet 20 mg Daily    fentaNYL 2500 mcg in 0.9% sodium chloride 250 mL infusion premix (titrating) Continuous    glucagon (human recombinant) injection 1 mg PRN    glucose chewable tablet 16 g PRN    glucose chewable tablet 24 g PRN    heparin (porcine) injection 5,000 Units Q8H    insulin aspart U-100 pen 0-5 Units Q6H PRN    magnesium sulfate 2g in water 50mL IVPB (premix) PRN    norepinephrine 4 mg in dextrose 5% 250 mL infusion (premix) (titrating) Continuous    ondansetron disintegrating tablet 4 mg Q6H PRN    piperacillin-tazobactam 4.5 g in sodium chloride 0.9% 100 mL IVPB (ready to mix system) Q8H    promethazine (PHENERGAN) 12.5 mg in dextrose 5 % 50 mL IVPB Q24H PRN    propofol (DIPRIVAN) 10 mg/mL infusion Continuous    sodium bicarbonate 8.4 % (1 mEq/mL) injection Code/trauma/sedation Med    sodium chloride 0.9% flush 5 mL PRN    sodium phosphate 20.01 mmol in dextrose 5 % 250 mL IVPB PRN    sodium phosphate 30 mmol in dextrose 5 % 250 mL IVPB PRN    sodium phosphate 39.99 mmol in dextrose 5 % 250 mL IVPB PRN    succinylcholine injection  Code/trauma/sedation Med       Objective:     Vital Signs (Most Recent):  Temp: 97.8 °F (36.6 °C) (04/20/18 1100)  Pulse: 80 (04/20/18 1100)  Resp: (!) 45 (04/20/18 1100)  BP: (!) 100/59 (04/20/18 1100)  SpO2: 100 % (04/20/18 1100)  O2 Device (Oxygen Therapy): ventilator (04/20/18 1100) Vital Signs (24h Range):  Temp:  [96.7 °F (35.9 °C)-97.8 °F (36.6 °C)] 97.8 °F (36.6 °C)  Pulse:  [69-84] 80  Resp:  [] 45  SpO2:  [98 %-100 %] 100 %  BP: ()/(50-67) 100/59  Arterial Line BP: ()/(47-64) 94/47     Weight: 91.5 kg (201 lb 11.5 oz) (04/20/18 0400)  Body mass index is 32.56 kg/m².  Body surface area is 2.06 meters squared.    I/O last 3 completed shifts:  In: 22310.2 [I.V.:19182.2; IV Piggyback:850]  Out: 91274 [Urine:19; Other:63570]    Physical Exam   Constitutional: She appears well-developed and well-nourished.   HENT:   Head: Normocephalic and atraumatic.   Eyes: Conjunctivae and EOM are normal.   Cardiovascular: Normal rate and regular rhythm.    Pulmonary/Chest: Breath sounds normal. She has no wheezes. She has no rales.   Abdominal: Soft. She exhibits no distension.   Musculoskeletal: She exhibits no edema or deformity.   Skin: Skin is warm and dry. She is not diaphoretic.       Significant Labs:  ABGs:   Recent Labs  Lab 04/20/18  0928   PH 7.237*   PCO2 34.4*   HCO3 14.6*   POCSATURATED 97   BE -13     CBC:   Recent Labs  Lab 04/20/18  0323   WBC 9.53   RBC 3.43*   HGB 8.3*   HCT 26.8*      MCV 78*   MCH 24.2*   MCHC 31.0*     CMP:   Recent Labs  Lab 04/20/18  0323      CALCIUM 8.1*   ALBUMIN 2.3*  2.3*   PROT 7.2      K 5.1   CO2 17*      BUN 22   CREATININE 3.0*   ALKPHOS 111   *   *   BILITOT 0.8     All labs within the past 24 hours have been reviewed.     Significant Imaging:  Labs: Reviewed

## 2018-04-20 NOTE — ASSESSMENT & PLAN NOTE
Likely 2/2 hypoxia  Acidemia likely 2/2 elevated lactate 2/2 cardiac arrest  Differential includes pneumothorax, PE (including air embolus), acidemia, coronary thrombosis  CXR negative for pneumothorax, lung sliding evident on lung US making pneumo less likely  Troponin mildly elevated at .462 and in setting of ESRD therefore coronary event unlikely; also no new EKG changes seen, ST depressions present since admission   Electrolytes (k+, mag, ca (corrected)) wnl  LE US negative  patient started empirically on heparin drip for possible PE, however d/c as CTA negative  TTE s/p cardiac arrest showed normal EF (55%), DD  Extubated on 4/20 to NC, doing well  Start PT/OT

## 2018-04-20 NOTE — ASSESSMENT & PLAN NOTE
HbA1c 8.0  Due to CKD V cautious for insulin stacking  Monitor accuchecks  aspart sliding scale  C/w detemir 10 units daily  - sugars have been well controlled

## 2018-04-20 NOTE — ASSESSMENT & PLAN NOTE
Was previously discharged on lasix 80mg daily for ADHF  Patient ran out of medication, unknown for how long  Failed trial of diuretics w/ lasix and diuril  On SLED. Nephrology following.   CXR shows some improvement with volume removal

## 2018-04-20 NOTE — ASSESSMENT & PLAN NOTE
Refused dialysis in the past  Per nephrology pt agreeable to dialysis   CRRT per nephrology.   UOP has dropped, now making 35 cc past 24 hours  Cano in place - remove and replace with pure wick

## 2018-04-21 LAB
ALBUMIN SERPL BCP-MCNC: 2.5 G/DL
ALBUMIN SERPL BCP-MCNC: 2.5 G/DL
ALP SERPL-CCNC: 129 U/L
ALT SERPL W/O P-5'-P-CCNC: 320 U/L
ANION GAP SERPL CALC-SCNC: 14 MMOL/L
AST SERPL-CCNC: 106 U/L
BASOPHILS # BLD AUTO: 0.04 K/UL
BASOPHILS NFR BLD: 0.4 %
BILIRUB DIRECT SERPL-MCNC: 0.5 MG/DL
BILIRUB SERPL-MCNC: 0.7 MG/DL
BUN SERPL-MCNC: 3 MG/DL
CALCIUM SERPL-MCNC: 8.7 MG/DL
CHLORIDE SERPL-SCNC: 105 MMOL/L
CO2 SERPL-SCNC: 23 MMOL/L
CREAT SERPL-MCNC: 0.9 MG/DL
DIFFERENTIAL METHOD: ABNORMAL
EOSINOPHIL # BLD AUTO: 0.1 K/UL
EOSINOPHIL NFR BLD: 0.9 %
ERYTHROCYTE [DISTWIDTH] IN BLOOD BY AUTOMATED COUNT: 17.9 %
EST. GFR  (AFRICAN AMERICAN): >60 ML/MIN/1.73 M^2
EST. GFR  (NON AFRICAN AMERICAN): >60 ML/MIN/1.73 M^2
GLUCOSE SERPL-MCNC: 92 MG/DL
HCT VFR BLD AUTO: 27 %
HGB BLD-MCNC: 8.4 G/DL
IMM GRANULOCYTES # BLD AUTO: 0.07 K/UL
IMM GRANULOCYTES NFR BLD AUTO: 0.7 %
LYMPHOCYTES # BLD AUTO: 1.4 K/UL
LYMPHOCYTES NFR BLD: 14.8 %
MAGNESIUM SERPL-MCNC: 1.9 MG/DL
MCH RBC QN AUTO: 23.8 PG
MCHC RBC AUTO-ENTMCNC: 31.1 G/DL
MCV RBC AUTO: 77 FL
MONOCYTES # BLD AUTO: 1.3 K/UL
MONOCYTES NFR BLD: 13.8 %
NEUTROPHILS # BLD AUTO: 6.7 K/UL
NEUTROPHILS NFR BLD: 69.4 %
NRBC BLD-RTO: 1 /100 WBC
PHOSPHATE SERPL-MCNC: 2.1 MG/DL
PLATELET # BLD AUTO: 169 K/UL
PMV BLD AUTO: 10.8 FL
POCT GLUCOSE: 118 MG/DL (ref 70–110)
POCT GLUCOSE: 133 MG/DL (ref 70–110)
POCT GLUCOSE: 152 MG/DL (ref 70–110)
POCT GLUCOSE: 171 MG/DL (ref 70–110)
POTASSIUM SERPL-SCNC: 4.6 MMOL/L
PROT SERPL-MCNC: 7.7 G/DL
RBC # BLD AUTO: 3.53 M/UL
SODIUM SERPL-SCNC: 142 MMOL/L
WBC # BLD AUTO: 9.72 K/UL

## 2018-04-21 PROCEDURE — 63600175 PHARM REV CODE 636 W HCPCS: Performed by: STUDENT IN AN ORGANIZED HEALTH CARE EDUCATION/TRAINING PROGRAM

## 2018-04-21 PROCEDURE — 83735 ASSAY OF MAGNESIUM: CPT

## 2018-04-21 PROCEDURE — 99232 SBSQ HOSP IP/OBS MODERATE 35: CPT | Mod: GC,,, | Performed by: INTERNAL MEDICINE

## 2018-04-21 PROCEDURE — 85025 COMPLETE CBC W/AUTO DIFF WBC: CPT

## 2018-04-21 PROCEDURE — 80076 HEPATIC FUNCTION PANEL: CPT

## 2018-04-21 PROCEDURE — 80069 RENAL FUNCTION PANEL: CPT

## 2018-04-21 PROCEDURE — 25000003 PHARM REV CODE 250: Performed by: STUDENT IN AN ORGANIZED HEALTH CARE EDUCATION/TRAINING PROGRAM

## 2018-04-21 PROCEDURE — 80100008 HC CRRT DAILY MAINTENANCE

## 2018-04-21 PROCEDURE — 63600175 PHARM REV CODE 636 W HCPCS: Performed by: HOSPITALIST

## 2018-04-21 PROCEDURE — 90945 DIALYSIS ONE EVALUATION: CPT

## 2018-04-21 PROCEDURE — 63600175 PHARM REV CODE 636 W HCPCS: Performed by: INTERNAL MEDICINE

## 2018-04-21 PROCEDURE — 11000001 HC ACUTE MED/SURG PRIVATE ROOM

## 2018-04-21 RX ORDER — INSULIN ASPART 100 [IU]/ML
0-5 INJECTION, SOLUTION INTRAVENOUS; SUBCUTANEOUS
Status: DISCONTINUED | OUTPATIENT
Start: 2018-04-21 | End: 2018-04-26 | Stop reason: HOSPADM

## 2018-04-21 RX ORDER — INSULIN ASPART 100 [IU]/ML
2 INJECTION, SOLUTION INTRAVENOUS; SUBCUTANEOUS
Status: DISCONTINUED | OUTPATIENT
Start: 2018-04-21 | End: 2018-04-24

## 2018-04-21 RX ADMIN — PIPERACILLIN AND TAZOBACTAM 4.5 G: 4; .5 INJECTION, POWDER, LYOPHILIZED, FOR SOLUTION INTRAVENOUS; PARENTERAL at 09:04

## 2018-04-21 RX ADMIN — INSULIN ASPART 2 UNITS: 100 INJECTION, SOLUTION INTRAVENOUS; SUBCUTANEOUS at 12:04

## 2018-04-21 RX ADMIN — PIPERACILLIN AND TAZOBACTAM 4.5 G: 4; .5 INJECTION, POWDER, LYOPHILIZED, FOR SOLUTION INTRAVENOUS; PARENTERAL at 06:04

## 2018-04-21 RX ADMIN — SODIUM CHLORIDE 500 ML: 9 INJECTION, SOLUTION INTRAVENOUS at 06:04

## 2018-04-21 RX ADMIN — HEPARIN SODIUM 5000 UNITS: 5000 INJECTION, SOLUTION INTRAVENOUS; SUBCUTANEOUS at 09:04

## 2018-04-21 RX ADMIN — FAMOTIDINE 20 MG: 20 TABLET, FILM COATED ORAL at 08:04

## 2018-04-21 RX ADMIN — PIPERACILLIN AND TAZOBACTAM 4.5 G: 4; .5 INJECTION, POWDER, LYOPHILIZED, FOR SOLUTION INTRAVENOUS; PARENTERAL at 02:04

## 2018-04-21 RX ADMIN — HEPARIN SODIUM 5000 UNITS: 5000 INJECTION, SOLUTION INTRAVENOUS; SUBCUTANEOUS at 04:04

## 2018-04-21 RX ADMIN — HEPARIN SODIUM 5000 UNITS: 5000 INJECTION, SOLUTION INTRAVENOUS; SUBCUTANEOUS at 06:04

## 2018-04-21 RX ADMIN — INSULIN ASPART 2 UNITS: 100 INJECTION, SOLUTION INTRAVENOUS; SUBCUTANEOUS at 04:04

## 2018-04-21 RX ADMIN — MAGNESIUM SULFATE IN WATER 2 G: 40 INJECTION, SOLUTION INTRAVENOUS at 06:04

## 2018-04-21 NOTE — SUBJECTIVE & OBJECTIVE
Interval History:   Extubated around noon yesterday. Remained on SLED overnight; clotted around 6am. Off pressors. Required 500cc bolus this AM for hypotension. No UOP recorded. Net negative 1.3L yesterday. Doing okay this morning; eating breakfast.     Review of patient's allergies indicates:   Allergen Reactions    Morphine      Current Facility-Administered Medications   Medication Frequency    acetaminophen tablet 650 mg Q6H PRN    albuterol nebulizer solution 2.5 mg Q4H PRN    dextromethorphan-guaifenesin  mg per 12 hr tablet 1 tablet BID PRN    dextrose 50% injection 12.5 g PRN    dextrose 50% injection 25 g PRN    EPINEPHrine 0.1 mg/mL injection Code/trauma/sedation Med    glucagon (human recombinant) injection 1 mg PRN    glucose chewable tablet 16 g PRN    glucose chewable tablet 24 g PRN    heparin (porcine) injection 5,000 Units Q8H    insulin aspart U-100 pen 0-5 Units QID (AC + HS) PRN    insulin aspart U-100 pen 2 Units TIDWM    ondansetron disintegrating tablet 4 mg Q6H PRN    piperacillin-tazobactam 4.5 g in sodium chloride 0.9% 100 mL IVPB (ready to mix system) Q8H    promethazine (PHENERGAN) 12.5 mg in dextrose 5 % 50 mL IVPB Q24H PRN    sodium bicarbonate 8.4 % (1 mEq/mL) injection Code/trauma/sedation Med    sodium chloride 0.9% flush 5 mL PRN    succinylcholine injection Code/trauma/sedation Med       Objective:     Vital Signs (Most Recent):  Temp: 98.6 °F (37 °C) (04/21/18 1100)  Pulse: 75 (04/21/18 1200)  Resp: 20 (04/21/18 1200)  BP: (!) 113/58 (04/21/18 1200)  SpO2: 100 % (04/21/18 1200)  O2 Device (Oxygen Therapy): room air (04/21/18 1100) Vital Signs (24h Range):  Temp:  [98.4 °F (36.9 °C)-98.8 °F (37.1 °C)] 98.6 °F (37 °C)  Pulse:  [75-91] 75  Resp:  [12-52] 20  SpO2:  [92 %-100 %] 100 %  BP: ()/(50-59) 113/58  Arterial Line BP: ()/(42-56) 113/51     Weight: 87.3 kg (192 lb 7.4 oz) (04/21/18 0302)  Body mass index is 31.06 kg/m².  Body surface area is  2.02 meters squared.    I/O last 3 completed shifts:  In: 31107.7 [I.V.:68846.7; NG/GT:30; IV Piggyback:900]  Out: 58773 [Drains:100; Other:02544]    Physical Exam   Constitutional: She appears well-developed and well-nourished. No distress.   HENT:   Head: Normocephalic and atraumatic.   Eyes: Conjunctivae and EOM are normal.   Cardiovascular: Normal rate and regular rhythm.    Pulmonary/Chest: Effort normal and breath sounds normal.   Abdominal: Soft. She exhibits no distension.   Musculoskeletal: She exhibits no edema or deformity.   Skin: Skin is warm and dry. She is not diaphoretic.       Significant Labs:  CBC:   Recent Labs  Lab 04/21/18  0343   WBC 9.72   RBC 3.53*   HGB 8.4*   HCT 27.0*      MCV 77*   MCH 23.8*   MCHC 31.1*     CMP:   Recent Labs  Lab 04/21/18  0343   GLU 92   CALCIUM 8.7   ALBUMIN 2.5*  2.5*   PROT 7.7      K 4.6   CO2 23      BUN 3*   CREATININE 0.9   ALKPHOS 129   *   *   BILITOT 0.7     All labs within the past 24 hours have been reviewed.     Significant Imaging:  Labs: Reviewed

## 2018-04-21 NOTE — PROGRESS NOTES
[facility]  Nephrology  Progress Note    Patient Name: Martha Melvin  MRN: [id number]  Admission Date: 4/15/2018  Hospital Length of Stay: 6 days  Attending Provider: Akilah Rowley MD   Primary Care Physician: GUILLERMO Mcclure MD  Principal Problem:Cardiac arrest    Subjective:     HPI: Ms. Melvin is a 73 yo AAF with HFpEF, aortic stenosis, HTN, T2DM, and CKD stage V (baseline sCr 4.5-5.0) admitted on 4/15 with ADHF. She reported worsening SOB, orthopnea, and PND x 4 days. SpO2 82% on RA upon arrival. In the ED she was given nitro paste, albuterol, and lasix IV. She was admitted to medicine and started on lasix 60mg IV TID with 1L fluid restriction. She was also started on rocephin and azithromycin for CAP. No events overnight. UOP only 700cc. sCr tere from 5.4 to 5.9. Nephrology consulted for fluid management in setting of CKD stage V. Patient is aware of CKD diagnosis. She was offered dialysis 1.5 years ago but denied it at that time due to lack of transportation. She was previously followed by a nephrologist in [place] for the last 4 years. She was on lasix 20mg po for a long time. She developed ADHF a few months ago and was admitted to [facility]. She was told here that she would need higher doses of lasix given her advanced CKD. She was started on lasix 80mg po BID at that time. About 2 weeks ago she ran out of lasix. She called her PCP asking for a refill of the 80mg and this was denied. She asked if she could take 4 of the 20mg tablets and was told that her insurance would not cover this. She continues to feel very SOB this morning. She is interested in proceeding with dialysis if needed.     Interval History:   Extubated around noon yesterday. Remained on SLED overnight; clotted around 6am. Off pressors. Required 500cc bolus this AM for hypotension. No UOP recorded. Net negative 1.3L yesterday. Doing okay this morning; eating breakfast.     Review of patient's allergies indicates:   Allergen  Reactions    Morphine      Current Facility-Administered Medications   Medication Frequency    acetaminophen tablet 650 mg Q6H PRN    albuterol nebulizer solution 2.5 mg Q4H PRN    dextromethorphan-guaifenesin  mg per 12 hr tablet 1 tablet BID PRN    dextrose 50% injection 12.5 g PRN    dextrose 50% injection 25 g PRN    EPINEPHrine 0.1 mg/mL injection Code/trauma/sedation Med    glucagon (human recombinant) injection 1 mg PRN    glucose chewable tablet 16 g PRN    glucose chewable tablet 24 g PRN    heparin (porcine) injection 5,000 Units Q8H    insulin aspart U-100 pen 0-5 Units QID (AC + HS) PRN    insulin aspart U-100 pen 2 Units TIDWM    ondansetron disintegrating tablet 4 mg Q6H PRN    piperacillin-tazobactam 4.5 g in sodium chloride 0.9% 100 mL IVPB (ready to mix system) Q8H    promethazine (PHENERGAN) 12.5 mg in dextrose 5 % 50 mL IVPB Q24H PRN    sodium bicarbonate 8.4 % (1 mEq/mL) injection Code/trauma/sedation Med    sodium chloride 0.9% flush 5 mL PRN    succinylcholine injection Code/trauma/sedation Med       Objective:     Vital Signs (Most Recent):  Temp: 98.6 °F (37 °C) (04/21/18 1100)  Pulse: 75 (04/21/18 1200)  Resp: 20 (04/21/18 1200)  BP: (!) 113/58 (04/21/18 1200)  SpO2: 100 % (04/21/18 1200)  O2 Device (Oxygen Therapy): room air (04/21/18 1100) Vital Signs (24h Range):  Temp:  [98.4 °F (36.9 °C)-98.8 °F (37.1 °C)] 98.6 °F (37 °C)  Pulse:  [75-91] 75  Resp:  [12-52] 20  SpO2:  [92 %-100 %] 100 %  BP: ()/(50-59) 113/58  Arterial Line BP: ()/(42-56) 113/51     Weight: 87.3 kg (192 lb 7.4 oz) (04/21/18 0302)  Body mass index is 31.06 kg/m².  Body surface area is 2.02 meters squared.    I/O last 3 completed shifts:  In: 95456.7 [I.V.:07405.7; NG/GT:30; IV Piggyback:900]  Out: 96257 [Drains:100; Other:73508]    Physical Exam   Constitutional: She appears well-developed and well-nourished. No distress.   HENT:   Head: Normocephalic and atraumatic.   Eyes:  Conjunctivae and EOM are normal.   Cardiovascular: Normal rate and regular rhythm.    Pulmonary/Chest: Effort normal and breath sounds normal.   Abdominal: Soft. She exhibits no distension.   Musculoskeletal: She exhibits no edema or deformity.   Skin: Skin is warm and dry. She is not diaphoretic.       Significant Labs:  CBC:   Recent Labs  Lab 04/21/18  0343   WBC 9.72   RBC 3.53*   HGB 8.4*   HCT 27.0*      MCV 77*   MCH 23.8*   MCHC 31.1*     CMP:   Recent Labs  Lab 04/21/18  0343   GLU 92   CALCIUM 8.7   ALBUMIN 2.5*  2.5*   PROT 7.7      K 4.6   CO2 23      BUN 3*   CREATININE 0.9   ALKPHOS 129   *   *   BILITOT 0.7     All labs within the past 24 hours have been reviewed.     Significant Imaging:  Labs: Reviewed    Assessment/Plan:     Stage 5 chronic kidney disease not on chronic dialysis    - baseline sCr 4.5-5.0 c/w CKD stage V  - admitted with ADHF. Attempted medical diuresis without improvement in UOP. Increased O2 requirements throughout the day. PEA arrest on 4/16-4/17; likely related to hypoxia. SLED started same day  - has remained on RRT since 4/17  - clotted this morning. Euvolemic on exam. No electrolyte abnormalities. Will hold today.   - will reassess in AM for SLED vs HD            Matilda Concepcion, PGY-5  Nephrology Fellow  [facility]  Pager: [phone]    ATTENDING PHYSICIAN ATTESTATION  I have personally interviewed and examined the patient. I thoroughly reviewed the demographic, clinical, laboratorial and imaging information available in medical records. I agree with the assessment and recommendations provided by the subspecialty resident. Dr. Concepcion was under my supervision.

## 2018-04-21 NOTE — PROGRESS NOTES
[facility]  Critical Care Medicine  Progress Note    Patient Name: Martha Melvin  MRN: [id number]  Admission Date: 4/15/2018  Hospital Length of Stay: 6 days  Code Status: Full Code  Attending Provider: Akilah Rowley MD  Primary Care Provider: GUILLERMO Mcclure MD   Principal Problem: Cardiac arrest    Subjective:     HPI:  71 y/o F PMhx HFrEF, CKD stage V (makes urine, not on dialysis), moderate aortic stenosis, DM II, R leg amputation (2015), HTN who presented w/ SOB x 4 days. Patient was discharged from [facility] 10/14/17 for SOB 2/2 ADHF and she was subsequently discharged on an increased of lasix from 20mg to 80mg daily due to her renal failure. Daughter states patient ran out of lasix 80mg and asked PCP for rx refill. However, per daughter PCP refused to fill 80mg and therefore patient had been taking 20mg x 4 daily. Daughter states she last saw patient on 4/11 prior to admission and that patient was in good health at that time. She spoke with patient over the phone on 4/16 and at that time noted patient to be SOB which prompted her to call a family friend to check on patient. In conversation daughter was made aware that patient's lasix had run out. EMS was called and on route she pllaced on BIPAP and given albuterol. Upon arrival her saturations were at 82%.    In the ED patient given nitro paste, albuterol, as well as lasix. She was subsequently admitted to medicine. She was started on lasix 60mg IV tid as well as rocephin and azithromycin for suspected CAP. Nephrology was consulted for volume overload in the setting of CKD stage V, however, due to poor urine output despite diuresis w/ lasix and diuril nephrology requested trialysis. Following placement patient found to be in PEA arrest. ROSC achieved for <10min then patient again coded. ROSC achieved and patient was subsequently transferred to SICU where patient coded a 3rd time. ROSC achieved and maintained, epi switched to levo and  placed on vent. Patient started on urgent dialysis. Continued to be hypoxic therefore given nimbex, elevation of HOB w/ improvement in sats.    Per daughter patient had been aware of CKD V since 2015 and had refused dialysis since that time due to fear of having to be on dialysis.    History obtained from daughter and rest of family as patient intubated.    Hospital/ICU Course:  4/17 patient able to follow commands, trialysis clotted, however, clot removed with syringe; patient started on heparin drip empirically for possible PE  4/18 patient continues to be on CRRT, vent setting adjusted FiO2 70% to 50% and RR 28 to 22; PEEP decreased from 14 to 5; urine culture positive for MDRO E Coli- c/w zosyn  4/19 patient continues to follow commands when sedation weaned, continuing to wean vent settings  4/20: no acute events overnight. UOP has dropped (19cc over past 24 hours.) SCUF overnight, 7.3 L removed. Minimal pressors (levo 0.01) SBT today as she has failed yesterday. Following commands and is alert this morning   4/21: Extubated to NC yesterday, passed beside swallow test. no acute events overnight. Had CRRT overnight but clotted off around 6 am. No longer on pressors but did receive 500cc bolus this AM due to MAP in low 60s. Complains of generalized fatigue. Denies CP, SOB, abd pain, nausea. Advance diet today and start PT/OT      Interval History/Significant Events: Extubated to NC yesterday, passed beside swallow test. no acute events overnight. Had CRRT overnight but clotted off around 6 am. No longer on pressors but did receive 500cc bolus this AM due to MAP in low 60s. Complains of generalized fatigue. Denies CP, SOB, abd pain, nausea.     Review of Systems   Constitutional: Positive for fatigue. Negative for chills and fever.   HENT: Negative for trouble swallowing.    Eyes: Negative for visual disturbance.   Respiratory: Negative for cough and shortness of breath.    Cardiovascular: Negative for chest pain,  palpitations and leg swelling.   Gastrointestinal: Negative for abdominal pain, constipation, diarrhea, nausea and vomiting.   Genitourinary: Negative for dysuria and hematuria.   Musculoskeletal: Negative for arthralgias and back pain.   Neurological: Positive for weakness. Negative for dizziness and headaches.     Objective:     Vital Signs (Most Recent):  Temp: 98.4 °F (36.9 °C) (04/21/18 0700)  Pulse: 80 (04/21/18 0715)  Resp: (!) 23 (04/21/18 0715)  BP: (!) 91/53 (04/21/18 0700)  SpO2: 97 % (04/21/18 0715) Vital Signs (24h Range):  Temp:  [97.8 °F (36.6 °C)-98.8 °F (37.1 °C)] 98.4 °F (36.9 °C)  Pulse:  [79-91] 80  Resp:  [12-52] 23  SpO2:  [92 %-100 %] 97 %  BP: ()/(50-64) 91/53  Arterial Line BP: ()/(42-60) 96/43   Weight: 87.3 kg (192 lb 7.4 oz)  Body mass index is 31.06 kg/m².      Intake/Output Summary (Last 24 hours) at 04/21/18 0800  Last data filed at 04/21/18 0615   Gross per 24 hour   Intake          5996.67 ml   Output             7038 ml   Net         -1041.33 ml       Physical Exam   Constitutional: She is oriented to person, place, and time. She appears well-developed and well-nourished. No distress.   HENT:   Head: Normocephalic and atraumatic.   Eyes: Conjunctivae are normal. No scleral icterus.   Cardiovascular: Normal rate, regular rhythm and intact distal pulses.    Pulmonary/Chest: She has no wheezes. She has rales.   Abdominal: Soft. She exhibits distension. There is no tenderness. There is no guarding.   Musculoskeletal: She exhibits edema. She exhibits no tenderness.   R LE amputation   Lymphadenopathy:     She has no cervical adenopathy.   Neurological: She is alert and oriented to person, place, and time.   4/5 strength globally    Skin: Skin is warm and dry. No rash noted.       Lines/Drains/Airways     Central Venous Catheter Line                 Trialysis (Dialysis) Catheter 04/16/18 2050 right internal jugular 4 days          Arterial Line                 Arterial Line  04/16/18 2250 Left Radial 4 days          Peripheral Intravenous Line                 Peripheral IV - Single Lumen 04/16/18 2300 Right Antecubital 4 days              Significant Labs:    CBC/Anemia Profile:    Recent Labs  Lab 04/20/18  0323 04/21/18  0343   WBC 9.53 9.72   HGB 8.3* 8.4*   HCT 26.8* 27.0*    169   MCV 78* 77*   RDW 17.5* 17.9*        Chemistries:    Recent Labs  Lab 04/20/18  0323 04/20/18  1341 04/20/18  2200 04/21/18  0343    142 140 142   K 5.1 4.8 4.7 4.6    108 104 105   CO2 17* 18* 25 23   BUN 22 11 4* 3*   CREATININE 3.0* 1.8* 1.0 0.9   CALCIUM 8.1* 8.1* 8.6* 8.7   ALBUMIN 2.3*  2.3* 2.4* 2.5* 2.5*  2.5*   PROT 7.2  --   --  7.7   BILITOT 0.8  --   --  0.7   ALKPHOS 111  --   --  129   *  --   --  320*   *  --   --  106*   MG 2.1 2.0 1.9 1.9   PHOS 6.9* 3.4 2.2* 2.1*       All pertinent labs within the past 24 hours have been reviewed.    Significant Imaging:  I have reviewed all pertinent imaging results/findings within the past 24 hours.    Assessment/Plan:     Pulmonary   Pulmonary edema    Was previously discharged on lasix 80mg daily for ADHF  Patient ran out of medication, unknown for how long  Failed trial of diuretics w/ lasix and diuril  On SLED. Nephrology following.   CXR shows some improvement with volume removal           Acute hypoxemic respiratory failure    Likely cause of cardiac arrest  Extubated to NC yesterday, satting well  See cardiac arrest        Cardiac/Vascular   * Cardiac arrest    Likely 2/2 hypoxia  Acidemia likely 2/2 elevated lactate 2/2 cardiac arrest  Differential includes pneumothorax, PE (including air embolus), acidemia, coronary thrombosis  CXR negative for pneumothorax, lung sliding evident on lung US making pneumo less likely  Troponin mildly elevated at .462 and in setting of ESRD therefore coronary event unlikely; also no new EKG changes seen, ST depressions present since admission   Electrolytes (k+, mag, ca  (corrected)) wnl  LE US negative  patient started empirically on heparin drip for possible PE, however d/c as CTA negative  TTE s/p cardiac arrest showed normal EF (55%), DD  Extubated on 4/20 to NC, doing well  Start PT/OT          Aortic stenosis    TTE 4/16/18 EF 55-60% w/ grade II DD  moderately sclerotic with moderately restricted leaflet mobility  peak velocity obtained across the aortic valve is 3.16 m/s  mean transvalvular gradient 24 mmHg  calculated aortic valve area is 0.83 cm2 consistent w/ severe aortic stenosis  Cautious w/ volume removal as aortic stenosis is preload dependent        Acute on chronic diastolic heart failure    Possibly 2/2 cardiorenal syndrome type 4, longstanding renal failure  See pulmonary edema        Renal/   UTI (urinary tract infection)    UA positive  Urine cx positive for MDRO E Coli sensitive to zosyn  blood cx NGTD thus far  C/w zosyn (day 5). Will need 7 days   Contact isolation        High anion gap metabolic acidosis    - Initially anion gap metabolic acidosis 2/2 lactic acidosis w/ concomitant resp acidosis resolved s/p CRRT  - Lactic acid 0.6 this AM  - new AGMA resolving with CRRT          Stage 5 chronic kidney disease not on chronic dialysis    Refused dialysis in the past  Per nephrology pt agreeable to dialysis   CRRT per nephrology.   UOP has dropped, now making 35 cc past 24 hours  Cano in place - remove and replace with pure wick         Oncology   Anemia, chronic renal failure, stage 5    Anemic on presentation  Likely 2/2 CKD  Monitor CBC  Transfuse for Hb < 7        Endocrine   Type 2 diabetes mellitus with stage 5 chronic kidney disease and hypertension    HbA1c 8.0  Due to CKD V cautious for insulin stacking  Monitor accuchecks  aspart sliding scale  D/c'd detemir due to low BG and possible insulin stacking   - sugars have been well controlled        GI   Elevated liver enzymes    2/2 shocked liver 2/2 hypotension following cardiac arrest  Enzymes  improving  Monitor CMP           Critical Care Daily Checklist:    A: Awake: RASS Goal/Actual Goal: RASS Goal: 0-->alert and calm  Actual: [place] Agitation Sedation Scale (RASS): Alert and calm   B: Spontaneous Breathing Trial Performed? Spon. Breathing Trial Initiated?: Initiated (04/20/18 1200)   C: SAT & SBT Coordinated?  no                      D: Delirium: CAM-ICU Overall CAM-ICU: Negative   E: Early Mobility Performed? Yes   F: Feeding Goal:    Status:     Current Diet Order   Procedures    Diet diabetic [facility]; 2800 Calorie; Renal     Order Specific Question:   Indicate patient location for additional diet options:     Answer:   [facility]     Order Specific Question:   Total calories:     Answer:   2800 Calorie     Order Specific Question:   Additional Diet Options:     Answer:   Renal      AS: Analgesia/Sedation none   T: Thromboembolic Prophylaxis Heparin    H: HOB > 300 Yes   U: Stress Ulcer Prophylaxis (if needed) no   G: Glucose Control controlled   B: Bowel Function Stool Occurrence: 0   I: Indwelling Catheter (Lines & Cano) Necessity TLC   D: De-escalation of Antimicrobials/Pharmacotherapies none    Plan for the day/ETD PT/OT, possible S/D    Code Status:  Family/Goals of Care: Full Code         Critical secondary to Patient has a condition that poses threat to life and bodily function: cardiac arrest       Critical care was time spent personally by me on the following activities: development of treatment plan with patient or surrogate and bedside caregivers, discussions with consultants, evaluation of patient's response to treatment, examination of patient, ordering and performing treatments and interventions, ordering and review of laboratory studies, ordering and review of radiographic studies, pulse oximetry, re-evaluation of patient's condition. This critical care time did not overlap with that of any other provider or involve time for any procedures.     Shorty Mustafa,  MD  Critical Care Medicine  [facility]

## 2018-04-21 NOTE — ASSESSMENT & PLAN NOTE
- baseline sCr 4.5-5.0 c/w CKD stage V  - admitted with ADHF. Attempted medical diuresis without improvement in UOP. Increased O2 requirements throughout the day. PEA arrest on 4/16-4/17; likely related to hypoxia. SLED started same day  - has remained on RRT since 4/17  - clotted this morning. Euvolemic on exam. No electrolyte abnormalities. Will hold today.   - will reassess in AM for SLED vs HD

## 2018-04-21 NOTE — SUBJECTIVE & OBJECTIVE
Interval History/Significant Events: Extubated to NC yesterday, passed beside swallow test. no acute events overnight. Had CRRT overnight but clotted off around 6 am. No longer on pressors but did receive 500cc bolus this AM due to MAP in low 60s. Complains of generalized fatigue. Denies CP, SOB, abd pain, nausea.     Review of Systems   Constitutional: Positive for fatigue. Negative for chills and fever.   HENT: Negative for trouble swallowing.    Eyes: Negative for visual disturbance.   Respiratory: Negative for cough and shortness of breath.    Cardiovascular: Negative for chest pain, palpitations and leg swelling.   Gastrointestinal: Negative for abdominal pain, constipation, diarrhea, nausea and vomiting.   Genitourinary: Negative for dysuria and hematuria.   Musculoskeletal: Negative for arthralgias and back pain.   Neurological: Positive for weakness. Negative for dizziness and headaches.     Objective:     Vital Signs (Most Recent):  Temp: 98.4 °F (36.9 °C) (04/21/18 0700)  Pulse: 80 (04/21/18 0715)  Resp: (!) 23 (04/21/18 0715)  BP: (!) 91/53 (04/21/18 0700)  SpO2: 97 % (04/21/18 0715) Vital Signs (24h Range):  Temp:  [97.8 °F (36.6 °C)-98.8 °F (37.1 °C)] 98.4 °F (36.9 °C)  Pulse:  [79-91] 80  Resp:  [12-52] 23  SpO2:  [92 %-100 %] 97 %  BP: ()/(50-64) 91/53  Arterial Line BP: ()/(42-60) 96/43   Weight: 87.3 kg (192 lb 7.4 oz)  Body mass index is 31.06 kg/m².      Intake/Output Summary (Last 24 hours) at 04/21/18 0800  Last data filed at 04/21/18 0615   Gross per 24 hour   Intake          5996.67 ml   Output             7038 ml   Net         -1041.33 ml       Physical Exam   Constitutional: She is oriented to person, place, and time. She appears well-developed and well-nourished. No distress.   HENT:   Head: Normocephalic and atraumatic.   Eyes: Conjunctivae are normal. No scleral icterus.   Cardiovascular: Normal rate, regular rhythm and intact distal pulses.    Pulmonary/Chest: She has no  wheezes. She has rales.   Abdominal: Soft. She exhibits distension. There is no tenderness. There is no guarding.   Musculoskeletal: She exhibits edema. She exhibits no tenderness.   R LE amputation   Lymphadenopathy:     She has no cervical adenopathy.   Neurological: She is alert and oriented to person, place, and time.   4/5 strength globally    Skin: Skin is warm and dry. No rash noted.       Lines/Drains/Airways     Central Venous Catheter Line                 Trialysis (Dialysis) Catheter 04/16/18 2050 right internal jugular 4 days          Arterial Line                 Arterial Line 04/16/18 2250 Left Radial 4 days          Peripheral Intravenous Line                 Peripheral IV - Single Lumen 04/16/18 2300 Right Antecubital 4 days              Significant Labs:    CBC/Anemia Profile:    Recent Labs  Lab 04/20/18  0323 04/21/18  0343   WBC 9.53 9.72   HGB 8.3* 8.4*   HCT 26.8* 27.0*    169   MCV 78* 77*   RDW 17.5* 17.9*        Chemistries:    Recent Labs  Lab 04/20/18  0323 04/20/18  1341 04/20/18  2200 04/21/18  0343    142 140 142   K 5.1 4.8 4.7 4.6    108 104 105   CO2 17* 18* 25 23   BUN 22 11 4* 3*   CREATININE 3.0* 1.8* 1.0 0.9   CALCIUM 8.1* 8.1* 8.6* 8.7   ALBUMIN 2.3*  2.3* 2.4* 2.5* 2.5*  2.5*   PROT 7.2  --   --  7.7   BILITOT 0.8  --   --  0.7   ALKPHOS 111  --   --  129   *  --   --  320*   *  --   --  106*   MG 2.1 2.0 1.9 1.9   PHOS 6.9* 3.4 2.2* 2.1*       All pertinent labs within the past 24 hours have been reviewed.    Significant Imaging:  I have reviewed all pertinent imaging results/findings within the past 24 hours.

## 2018-04-21 NOTE — NURSING TRANSFER
Nursing Transfer Note      4/21/2018     Transfer To: Room 1144    Transfer via bed    Transfer with NC to O2 on 2 L, cardiac monitoring    Transported by Mikey Franklin RN    Medicines sent: Insulin    Chart send with patient: Yes    Notified: daughter at bedside    Patient reassessed at: 4/21/18, 1515 (date, time)    Upon arrival to floor: cardiac monitor applied, patient oriented to room, call bell in reach and bed in lowest position. MERON Zapien notified and at bedside to assess pt.

## 2018-04-21 NOTE — RESIDENT HANDOFF
Handoff     Primary Team: [facility] CRITICAL CARE MEDICINE Room Number: 1144/1144 A     Patient Name: Martha Melvin MRN: [id number]     Date of Birth: [id number] Allergies: Morphine     Age: 72 y.o. Admit Date: 4/15/2018     Sex: female  BMI: Body mass index is 31.06 kg/m².     Code Status: Full Code        Illness Level (current clinical status): Watcher - No    Reason for Admission: Cardiac arrest    Brief HPI:   73 y/o F PMhx HFrEF, CKD stage V (makes urine, not on dialysis), moderate aortic stenosis, DM II, R leg amputation (2015), HTN who presented w/ SOB x 4 days. Patient was discharged from [facility] 10/14/17 for SOB 2/2 ADHF and she was subsequently discharged on an increased of lasix from 20mg to 80mg daily due to her renal failure. Daughter states patient ran out of lasix 80mg and asked PCP for rx refill. However, per daughter PCP refused to fill 80mg and therefore patient had been taking 20mg x 4 daily. Daughter states she last saw patient on 4/11 prior to admission and that patient was in good health at that time. She spoke with patient over the phone on 4/16 and at that time noted patient to be SOB which prompted her to call a family friend to check on patient. In conversation daughter was made aware that patient's lasix had run out. EMS was called and on route she pllaced on BIPAP and given albuterol. Upon arrival her saturations were at 82%.     In the ED patient given nitro paste, albuterol, as well as lasix. She was subsequently admitted to medicine. She was started on lasix 60mg IV tid as well as rocephin and azithromycin for suspected CAP. Nephrology was consulted for volume overload in the setting of CKD stage V, however, due to poor urine output despite diuresis w/ lasix and diuril nephrology requested trialysis. Following placement patient found to be in PEA arrest. ROSC achieved for <10min then patient again coded. ROSC achieved and patient was subsequently transferred to SICU where patient coded a  3rd time. ROSC achieved and maintained, epi switched to levo and placed on vent. Patient started on urgent dialysis. Continued to be hypoxic therefore given nimbex, elevation of HOB w/ improvement in sats.     Per daughter patient had been aware of CKD V since 2015 and had refused dialysis since that time due to fear of having to be on dialysis.     History obtained from daughter and rest of family as patient intubated.       Procedure Date:   - 4/16/2018:  Temporary dialysis catheter placement    - 4/16/2018:  Arterial line placement    Hospital Course:   Martha Melvin was transferred to the ICU after PEA arrest on 4/17/2018.  She remained intubated and sedated requiring vasopressors.  The trialysis catheter that was placed clotted off but was able to be declotted at bedside.  At that time there was concern for a possible PE so a heparin gtt was initiated.  Nephrology was following and she was started on CRRT (SCUF) for volume removal and was later transitioned to SLED.  Urine culture positive for multidrug resistant Klebsiella for which she was treated with Zosyn.  She was able to be weaned from vasopressors and the ventilator.  She was extubated to nasal cannula on 4/20/2018.  On 4/21/2018 she was tolerating a diet with minimal oxygen requirement and was deemed stable to be stepped down out of the ICU.      Tasks:  - Follow up nephrology recommendations for HD  - 7 days total of Zosyn for UTI (currently day 5 of 7)    Estimated Discharge Date: TBD    Discharge Disposition: Skilled Nursing Facility    Mentored By: Dr. Amrik Chow MD   Resident Physician  [facility]

## 2018-04-22 PROBLEM — E87.29 HIGH ANION GAP METABOLIC ACIDOSIS: Status: RESOLVED | Noted: 2018-04-17 | Resolved: 2018-04-22

## 2018-04-22 LAB
ALBUMIN SERPL BCP-MCNC: 2.3 G/DL
ALBUMIN SERPL BCP-MCNC: 2.3 G/DL
ALP SERPL-CCNC: 191 U/L
ALT SERPL W/O P-5'-P-CCNC: 206 U/L
ANION GAP SERPL CALC-SCNC: 13 MMOL/L
AST SERPL-CCNC: 79 U/L
BACTERIA BLD CULT: NORMAL
BACTERIA BLD CULT: NORMAL
BASOPHILS # BLD AUTO: 0.06 K/UL
BASOPHILS NFR BLD: 0.5 %
BILIRUB DIRECT SERPL-MCNC: 0.5 MG/DL
BILIRUB SERPL-MCNC: 0.7 MG/DL
BUN SERPL-MCNC: 29 MG/DL
CALCIUM SERPL-MCNC: 8.8 MG/DL
CHLORIDE SERPL-SCNC: 104 MMOL/L
CO2 SERPL-SCNC: 22 MMOL/L
CREAT SERPL-MCNC: 4.3 MG/DL
DIFFERENTIAL METHOD: ABNORMAL
EOSINOPHIL # BLD AUTO: 0.2 K/UL
EOSINOPHIL NFR BLD: 1.8 %
ERYTHROCYTE [DISTWIDTH] IN BLOOD BY AUTOMATED COUNT: 18.3 %
EST. GFR  (AFRICAN AMERICAN): 11.3 ML/MIN/1.73 M^2
EST. GFR  (NON AFRICAN AMERICAN): 9.8 ML/MIN/1.73 M^2
GLUCOSE SERPL-MCNC: 216 MG/DL
HCT VFR BLD AUTO: 25.7 %
HGB BLD-MCNC: 7.7 G/DL
IMM GRANULOCYTES # BLD AUTO: 0.17 K/UL
IMM GRANULOCYTES NFR BLD AUTO: 1.4 %
LYMPHOCYTES # BLD AUTO: 2.5 K/UL
LYMPHOCYTES NFR BLD: 21.1 %
MCH RBC QN AUTO: 23.2 PG
MCHC RBC AUTO-ENTMCNC: 30 G/DL
MCV RBC AUTO: 77 FL
MONOCYTES # BLD AUTO: 1.7 K/UL
MONOCYTES NFR BLD: 14.5 %
NEUTROPHILS # BLD AUTO: 7.3 K/UL
NEUTROPHILS NFR BLD: 60.7 %
NRBC BLD-RTO: 0 /100 WBC
PHOSPHATE SERPL-MCNC: 4.3 MG/DL
PLATELET # BLD AUTO: 161 K/UL
PMV BLD AUTO: 10.5 FL
POCT GLUCOSE: 162 MG/DL (ref 70–110)
POCT GLUCOSE: 211 MG/DL (ref 70–110)
POCT GLUCOSE: 219 MG/DL (ref 70–110)
POCT GLUCOSE: 221 MG/DL (ref 70–110)
POTASSIUM SERPL-SCNC: 4.2 MMOL/L
PROT SERPL-MCNC: 7 G/DL
RBC # BLD AUTO: 3.32 M/UL
SODIUM SERPL-SCNC: 139 MMOL/L
WBC # BLD AUTO: 12.01 K/UL

## 2018-04-22 PROCEDURE — 25000003 PHARM REV CODE 250: Performed by: STUDENT IN AN ORGANIZED HEALTH CARE EDUCATION/TRAINING PROGRAM

## 2018-04-22 PROCEDURE — 63600175 PHARM REV CODE 636 W HCPCS: Performed by: STUDENT IN AN ORGANIZED HEALTH CARE EDUCATION/TRAINING PROGRAM

## 2018-04-22 PROCEDURE — 80069 RENAL FUNCTION PANEL: CPT

## 2018-04-22 PROCEDURE — 86704 HEP B CORE ANTIBODY TOTAL: CPT

## 2018-04-22 PROCEDURE — 11000001 HC ACUTE MED/SURG PRIVATE ROOM

## 2018-04-22 PROCEDURE — 63600175 PHARM REV CODE 636 W HCPCS: Performed by: HOSPITALIST

## 2018-04-22 PROCEDURE — 86580 TB INTRADERMAL TEST: CPT | Performed by: STUDENT IN AN ORGANIZED HEALTH CARE EDUCATION/TRAINING PROGRAM

## 2018-04-22 PROCEDURE — 85025 COMPLETE CBC W/AUTO DIFF WBC: CPT

## 2018-04-22 PROCEDURE — 87340 HEPATITIS B SURFACE AG IA: CPT

## 2018-04-22 PROCEDURE — 86706 HEP B SURFACE ANTIBODY: CPT

## 2018-04-22 PROCEDURE — 80076 HEPATIC FUNCTION PANEL: CPT

## 2018-04-22 PROCEDURE — 99233 SBSQ HOSP IP/OBS HIGH 50: CPT | Mod: GC,,, | Performed by: HOSPITALIST

## 2018-04-22 PROCEDURE — 36415 COLL VENOUS BLD VENIPUNCTURE: CPT

## 2018-04-22 PROCEDURE — 86709 HEPATITIS A IGM ANTIBODY: CPT

## 2018-04-22 RX ORDER — SODIUM CHLORIDE 9 MG/ML
INJECTION, SOLUTION INTRAVENOUS
Status: DISCONTINUED | OUTPATIENT
Start: 2018-04-23 | End: 2018-04-26 | Stop reason: HOSPADM

## 2018-04-22 RX ADMIN — PIPERACILLIN AND TAZOBACTAM 4.5 G: 4; .5 INJECTION, POWDER, LYOPHILIZED, FOR SOLUTION INTRAVENOUS; PARENTERAL at 02:04

## 2018-04-22 RX ADMIN — PIPERACILLIN AND TAZOBACTAM 4.5 G: 4; .5 INJECTION, POWDER, LYOPHILIZED, FOR SOLUTION INTRAVENOUS; PARENTERAL at 10:04

## 2018-04-22 RX ADMIN — INSULIN ASPART 2 UNITS: 100 INJECTION, SOLUTION INTRAVENOUS; SUBCUTANEOUS at 05:04

## 2018-04-22 RX ADMIN — INSULIN ASPART 1 UNITS: 100 INJECTION, SOLUTION INTRAVENOUS; SUBCUTANEOUS at 10:04

## 2018-04-22 RX ADMIN — HEPARIN SODIUM 5000 UNITS: 5000 INJECTION, SOLUTION INTRAVENOUS; SUBCUTANEOUS at 03:04

## 2018-04-22 RX ADMIN — INSULIN ASPART 2 UNITS: 100 INJECTION, SOLUTION INTRAVENOUS; SUBCUTANEOUS at 08:04

## 2018-04-22 RX ADMIN — HEPARIN SODIUM 5000 UNITS: 5000 INJECTION, SOLUTION INTRAVENOUS; SUBCUTANEOUS at 05:04

## 2018-04-22 RX ADMIN — INSULIN ASPART 2 UNITS: 100 INJECTION, SOLUTION INTRAVENOUS; SUBCUTANEOUS at 11:04

## 2018-04-22 RX ADMIN — Medication 5 UNITS: at 08:04

## 2018-04-22 RX ADMIN — PIPERACILLIN AND TAZOBACTAM 4.5 G: 4; .5 INJECTION, POWDER, LYOPHILIZED, FOR SOLUTION INTRAVENOUS; PARENTERAL at 05:04

## 2018-04-22 RX ADMIN — HEPARIN SODIUM 5000 UNITS: 5000 INJECTION, SOLUTION INTRAVENOUS; SUBCUTANEOUS at 10:04

## 2018-04-22 NOTE — PROGRESS NOTES
[facility] Hospital Medicine  Progress Note    Patient Name: Martha Melvin  MRN: [id number]  Patient Class: IP- Inpatient   Admission Date: 4/15/2018  Length of Stay: 7 days  Attending Physician: Jania Booker MD  Primary Care Provider: GUILLERMO Mcclure MD    [facility] Medicine Team: [facility] 1 Chaitanya Rosales MD    Subjective:     Principal Problem:Cardiac arrest    HPI:  73 y/o F PMhx HFrEF, CKD stage V (makes urine, not on dialysis), moderate aortic stenosis, DM II, R leg amputation (2015), HTN who presented w/ SOB x 4 days. Patient was discharged from [facility] 10/14/17 for SOB 2/2 ADHF and she was subsequently discharged on an increased of lasix from 20mg to 80mg daily due to her renal failure. Daughter states patient ran out of lasix 80mg and asked PCP for rx refill. However, per daughter PCP refused to fill 80mg and therefore patient had been taking 20mg x 4 daily. Daughter states she last saw patient on 4/11 prior to admission and that patient was in good health at that time. She spoke with patient over the phone on 4/16 and at that time noted patient to be SOB which prompted her to call a family friend to check on patient. In conversation daughter was made aware that patient's lasix had run out. EMS was called and on route she pllaced on BIPAP and given albuterol. Upon arrival her saturations were at 82%.     In the ED patient given nitro paste, albuterol, as well as lasix. She was subsequently admitted to medicine. She was started on lasix 60mg IV tid as well as rocephin and azithromycin for suspected CAP. Nephrology was consulted for volume overload in the setting of CKD stage V, however, due to poor urine output despite diuresis w/ lasix and diuril nephrology requested trialysis. Following placement patient found to be in PEA arrest. ROSC achieved for <10min then patient again coded. ROSC achieved and patient was subsequently transferred to SICU where patient coded a 3rd time. ROSC  achieved and maintained, epi switched to levo and placed on vent. Patient started on urgent dialysis. Continued to be hypoxic therefore given nimbex, elevation of HOB w/ improvement in sats.     Per daughter patient had been aware of CKD V since 2015 and had refused dialysis since that time due to fear of having to be on dialysis.    Interval History: Stepped down to Hospital Medicine this morning. Had some loose BMs overnight but otherwise no complaints.     Review of Systems   Constitutional: Positive for fatigue. Negative for chills and fever.   HENT: Negative for trouble swallowing.    Eyes: Negative for visual disturbance.   Respiratory: Negative for cough and shortness of breath.    Cardiovascular: Negative for chest pain, palpitations and leg swelling.   Gastrointestinal: Negative for abdominal pain, constipation, diarrhea, nausea and vomiting.   Genitourinary: Negative for dysuria and hematuria.   Musculoskeletal: Negative for arthralgias and back pain.   Neurological: Positive for weakness. Negative for dizziness and headaches.     Objective:     Vital Signs (Most Recent):  Temp: 98.3 °F (36.8 °C) (04/22/18 1051)  Pulse: 74 (04/22/18 1145)  Resp: 16 (04/22/18 1145)  BP: 112/72 (04/22/18 1145)  SpO2: 97 % (04/22/18 1145) Vital Signs (24h Range):  Temp:  [97.6 °F (36.4 °C)-99 °F (37.2 °C)] 98.3 °F (36.8 °C)  Pulse:  [73-81] 74  Resp:  [16-23] 16  SpO2:  [92 %-99 %] 97 %  BP: (105-117)/(60-72) 112/72   Weight: 84 kg (185 lb 3 oz)  Body mass index is 29.89 kg/m².      Intake/Output Summary (Last 24 hours) at 04/22/18 1425  Last data filed at 04/21/18 2300   Gross per 24 hour   Intake              100 ml   Output                0 ml   Net              100 ml       Physical Exam   Constitutional: She is oriented to person, place, and time. She appears well-developed and well-nourished. No distress.   HENT:   Head: Normocephalic and atraumatic.   Eyes: Conjunctivae are normal. No scleral icterus.   Cardiovascular:  Normal rate, regular rhythm and intact distal pulses.    Pulmonary/Chest: She has no wheezes. She has rales.   Abdominal: Soft. Bowel sounds are normal. She exhibits no distension. There is no tenderness. There is no guarding.   Musculoskeletal: She exhibits edema. She exhibits no tenderness.   R LE amputation   Lymphadenopathy:     She has no cervical adenopathy.   Neurological: She is alert and oriented to person, place, and time.   4/5 strength globally    Skin: Skin is warm and dry. No rash noted.   Psychiatric: She has a normal mood and affect. Her behavior is normal.   Nursing note and vitals reviewed.      Lines/Drains/Airways     Central Venous Catheter Line                 Trialysis (Dialysis) Catheter 04/16/18 2050 right internal jugular 5 days          Arterial Line                 Arterial Line 04/16/18 2250 Left Radial 5 days              Significant Labs:    CBC/Anemia Profile:    Recent Labs  Lab 04/21/18  0343 04/22/18  0703   WBC 9.72 12.01   HGB 8.4* 7.7*   HCT 27.0* 25.7*    161   MCV 77* 77*   RDW 17.9* 18.3*        Chemistries:    Recent Labs  Lab 04/20/18  2200 04/21/18  0343 04/22/18  0703 04/22/18  0952    142  --  139   K 4.7 4.6  --  4.2    105  --  104   CO2 25 23  --  22*   BUN 4* 3*  --  29*   CREATININE 1.0 0.9  --  4.3*   CALCIUM 8.6* 8.7  --  8.8   ALBUMIN 2.5* 2.5*  2.5* 2.3* 2.3*   PROT  --  7.7 7.0  --    BILITOT  --  0.7 0.7  --    ALKPHOS  --  129 191*  --    ALT  --  320* 206*  --    AST  --  106* 79*  --    MG 1.9 1.9  --   --    PHOS 2.2* 2.1*  --  4.3       All pertinent labs within the past 24 hours have been reviewed.    Significant Imaging:  I have reviewed all pertinent imaging results/findings within the past 24 hours.    Assessment/Plan:      * Cardiac arrest    Likely 2/2 hypoxia  Acidemia likely 2/2 elevated lactate 2/2 cardiac arrest  Differential includes pneumothorax, PE (including air embolus), acidemia, coronary thrombosis  CXR negative for  pneumothorax, lung sliding evident on lung US making pneumo less likely  Troponin mildly elevated at .462 and in setting of ESRD therefore coronary event unlikely; also no new EKG changes seen, ST depressions present since admission   Electrolytes (k+, mag, ca (corrected)) wnl  LE US negative  patient started empirically on heparin drip for possible PE, however d/c as CTA negative  TTE s/p cardiac arrest showed normal EF (55%), DD  Extubated on 4/20 to NC, doing well          Elevated liver enzymes    shocked liver 2/2 hypotension following cardiac arrest  Enzymes improving  Monitor CMP          Aortic stenosis    TTE 4/16/18 EF 55-60% w/ grade II DD  moderately sclerotic with moderately restricted leaflet mobility  peak velocity obtained across the aortic valve is 3.16 m/s  mean transvalvular gradient 24 mmHg  calculated aortic valve area is 0.83 cm2 consistent w/ severe aortic stenosis  Cautious w/ volume removal as aortic stenosis is preload dependent          Pulmonary edema    Was previously discharged on lasix 80mg daily for ADHF  Patient ran out of medication, unknown for how long  Failed trial of diuretics w/ lasix and diuril  On SLED. Nephrology following.   Clinically stable          Anemia, chronic renal failure, stage 5    Anemic on presentation  Likely 2/2 CKD  Monitor CBC  Transfuse for Hb < 7  Stable          Acute hypoxemic respiratory failure    Likely cause of cardiac arrest  Extubated to NC 4/20  No significant respiratory distress          Type 2 diabetes mellitus with stage 5 chronic kidney disease and hypertension    HbA1c 8.0  Due to CKD V cautious for insulin stacking  Monitor accuchecks  aspart sliding scale  D/c'd detemir due to low BG and possible insulin stacking   -BG at goal          Stage 5 chronic kidney disease not on chronic dialysis    - baseline sCr 4.5-5.0 c/w CKD stage V  - admitted with ADHF. Attempted medical diuresis without improvement in UOP. PEA arrest on 4/16-4/17;  likely related to hypoxia. SLED started same day  - has remained on RRT since 4/17  -Will discuss with nephrology for long term dialysis  -HD outpatient labs          VTE Risk Mitigation         Ordered     IP VTE HIGH RISK PATIENT  Once      04/21/18 1936     Place sequential compression device  Until discontinued      04/21/18 1936     heparin (porcine) injection 5,000 Units  Every 8 hours      04/19/18 0159          Chaitanya Rosales MD   Internal Medicine PGY-1  [phone]

## 2018-04-22 NOTE — ASSESSMENT & PLAN NOTE
Was previously discharged on lasix 80mg daily for ADHF  Patient ran out of medication, unknown for how long  Failed trial of diuretics w/ lasix and diuril  On SLED. Nephrology following.   Clinically stable

## 2018-04-22 NOTE — PLAN OF CARE
Problem: Diabetes, Type 2 (Adult)  Intervention: Optimize Glycemic Control  Blood glucose monitored at bedtime and before meals. Blood glucose was 171 at bedtime- no insulin coverage required. Patient had 2 loose BMs overnight. Vital signs stable, no distress noted at this time. Patient on contact isolation for klebsiella in urine. Family and patient educated on importance of PPE and hand hygiene.

## 2018-04-22 NOTE — SUBJECTIVE & OBJECTIVE
Interval History: Stepped down to Hospital Medicine this morning. Had some loose BMs overnight but otherwise no complaints.     Review of Systems   Constitutional: Positive for fatigue. Negative for chills and fever.   HENT: Negative for trouble swallowing.    Eyes: Negative for visual disturbance.   Respiratory: Negative for cough and shortness of breath.    Cardiovascular: Negative for chest pain, palpitations and leg swelling.   Gastrointestinal: Negative for abdominal pain, constipation, diarrhea, nausea and vomiting.   Genitourinary: Negative for dysuria and hematuria.   Musculoskeletal: Negative for arthralgias and back pain.   Neurological: Positive for weakness. Negative for dizziness and headaches.     Objective:     Vital Signs (Most Recent):  Temp: 98.3 °F (36.8 °C) (04/22/18 1051)  Pulse: 74 (04/22/18 1145)  Resp: 16 (04/22/18 1145)  BP: 112/72 (04/22/18 1145)  SpO2: 97 % (04/22/18 1145) Vital Signs (24h Range):  Temp:  [97.6 °F (36.4 °C)-99 °F (37.2 °C)] 98.3 °F (36.8 °C)  Pulse:  [73-81] 74  Resp:  [16-23] 16  SpO2:  [92 %-99 %] 97 %  BP: (105-117)/(60-72) 112/72   Weight: 84 kg (185 lb 3 oz)  Body mass index is 29.89 kg/m².      Intake/Output Summary (Last 24 hours) at 04/22/18 1425  Last data filed at 04/21/18 2300   Gross per 24 hour   Intake              100 ml   Output                0 ml   Net              100 ml       Physical Exam   Constitutional: She is oriented to person, place, and time. She appears well-developed and well-nourished. No distress.   HENT:   Head: Normocephalic and atraumatic.   Eyes: Conjunctivae are normal. No scleral icterus.   Cardiovascular: Normal rate, regular rhythm and intact distal pulses.    Pulmonary/Chest: She has no wheezes. She has rales.   Abdominal: Soft. Bowel sounds are normal. She exhibits no distension. There is no tenderness. There is no guarding.   Musculoskeletal: She exhibits edema. She exhibits no tenderness.   R LE amputation   Lymphadenopathy:      She has no cervical adenopathy.   Neurological: She is alert and oriented to person, place, and time.   4/5 strength globally    Skin: Skin is warm and dry. No rash noted.   Psychiatric: She has a normal mood and affect. Her behavior is normal.   Nursing note and vitals reviewed.      Lines/Drains/Airways     Central Venous Catheter Line                 Trialysis (Dialysis) Catheter 04/16/18 2050 right internal jugular 5 days          Arterial Line                 Arterial Line 04/16/18 2250 Left Radial 5 days              Significant Labs:    CBC/Anemia Profile:    Recent Labs  Lab 04/21/18  0343 04/22/18  0703   WBC 9.72 12.01   HGB 8.4* 7.7*   HCT 27.0* 25.7*    161   MCV 77* 77*   RDW 17.9* 18.3*        Chemistries:    Recent Labs  Lab 04/20/18  2200 04/21/18  0343 04/22/18  0703 04/22/18  0952    142  --  139   K 4.7 4.6  --  4.2    105  --  104   CO2 25 23  --  22*   BUN 4* 3*  --  29*   CREATININE 1.0 0.9  --  4.3*   CALCIUM 8.6* 8.7  --  8.8   ALBUMIN 2.5* 2.5*  2.5* 2.3* 2.3*   PROT  --  7.7 7.0  --    BILITOT  --  0.7 0.7  --    ALKPHOS  --  129 191*  --    ALT  --  320* 206*  --    AST  --  106* 79*  --    MG 1.9 1.9  --   --    PHOS 2.2* 2.1*  --  4.3       All pertinent labs within the past 24 hours have been reviewed.    Significant Imaging:  I have reviewed all pertinent imaging results/findings within the past 24 hours.

## 2018-04-22 NOTE — HPI
73 y/o F PMhx HFrEF, CKD stage V (makes urine, not on dialysis), moderate aortic stenosis, DM II, R leg amputation (2015), HTN who presented w/ SOB x 4 days. Patient was discharged from [facility] 10/14/17 for SOB 2/2 ADHF and she was subsequently discharged on an increased of lasix from 20mg to 80mg daily due to her renal failure. Daughter states patient ran out of lasix 80mg and asked PCP for rx refill. However, per daughter PCP refused to fill 80mg and therefore patient had been taking 20mg x 4 daily. Daughter states she last saw patient on 4/11 prior to admission and that patient was in good health at that time. She spoke with patient over the phone on 4/16 and at that time noted patient to be SOB which prompted her to call a family friend to check on patient. In conversation daughter was made aware that patient's lasix had run out. EMS was called and on route she pllaced on BIPAP and given albuterol. Upon arrival her saturations were at 82%.     In the ED patient given nitro paste, albuterol, as well as lasix. She was subsequently admitted to medicine. She was started on lasix 60mg IV tid as well as rocephin and azithromycin for suspected CAP. Nephrology was consulted for volume overload in the setting of CKD stage V, however, due to poor urine output despite diuresis w/ lasix and diuril nephrology requested trialysis. Following placement patient found to be in PEA arrest. ROSC achieved for <10min then patient again coded. ROSC achieved and patient was subsequently transferred to SICU where patient coded a 3rd time. ROSC achieved and maintained, epi switched to levo and placed on vent. Patient started on urgent dialysis. Continued to be hypoxic therefore given nimbex, elevation of HOB w/ improvement in sats.     Per daughter patient had been aware of CKD V since 2015 and had refused dialysis since that time due to fear of having to be on dialysis.

## 2018-04-22 NOTE — ASSESSMENT & PLAN NOTE
HbA1c 8.0  Due to CKD V cautious for insulin stacking  Monitor accuchecks  aspart sliding scale  D/c'd detemir due to low BG and possible insulin stacking   -BG at goal

## 2018-04-22 NOTE — ASSESSMENT & PLAN NOTE
- baseline sCr 4.5-5.0 c/w CKD stage V  - admitted with ADHF. Attempted medical diuresis without improvement in UOP. PEA arrest on 4/16-4/17; likely related to hypoxia. SLED started same day  - has remained on RRT since 4/17  -Will discuss with nephrology for long term dialysis  -HD outpatient labs

## 2018-04-22 NOTE — ASSESSMENT & PLAN NOTE
Likely 2/2 hypoxia  Acidemia likely 2/2 elevated lactate 2/2 cardiac arrest  Differential includes pneumothorax, PE (including air embolus), acidemia, coronary thrombosis  CXR negative for pneumothorax, lung sliding evident on lung US making pneumo less likely  Troponin mildly elevated at .462 and in setting of ESRD therefore coronary event unlikely; also no new EKG changes seen, ST depressions present since admission   Electrolytes (k+, mag, ca (corrected)) wnl  LE US negative  patient started empirically on heparin drip for possible PE, however d/c as CTA negative  TTE s/p cardiac arrest showed normal EF (55%), DD  Extubated on 4/20 to NC, doing well

## 2018-04-23 PROBLEM — J15.9 COMMUNITY ACQUIRED BACTERIAL PNEUMONIA: Status: RESOLVED | Noted: 2018-04-15 | Resolved: 2018-04-23

## 2018-04-23 PROBLEM — N18.6 ESRD (END STAGE RENAL DISEASE): Status: ACTIVE | Noted: 2017-10-16

## 2018-04-23 PROBLEM — J81.1 PULMONARY EDEMA: Status: RESOLVED | Noted: 2018-04-17 | Resolved: 2018-04-23

## 2018-04-23 LAB
ALBUMIN SERPL BCP-MCNC: 2.4 G/DL
ALP SERPL-CCNC: 140 U/L
ALT SERPL W/O P-5'-P-CCNC: 147 U/L
ANION GAP SERPL CALC-SCNC: 15 MMOL/L
ANISOCYTOSIS BLD QL SMEAR: SLIGHT
AST SERPL-CCNC: 41 U/L
BASOPHILS # BLD AUTO: 0.05 K/UL
BASOPHILS NFR BLD: 0.4 %
BILIRUB DIRECT SERPL-MCNC: 0.4 MG/DL
BILIRUB SERPL-MCNC: 0.5 MG/DL
BUN SERPL-MCNC: 42 MG/DL
CALCIUM SERPL-MCNC: 8.4 MG/DL
CHLORIDE SERPL-SCNC: 102 MMOL/L
CO2 SERPL-SCNC: 20 MMOL/L
CREAT SERPL-MCNC: 6.2 MG/DL
DIFFERENTIAL METHOD: ABNORMAL
EOSINOPHIL # BLD AUTO: 0.4 K/UL
EOSINOPHIL NFR BLD: 2.9 %
ERYTHROCYTE [DISTWIDTH] IN BLOOD BY AUTOMATED COUNT: 18.4 %
EST. GFR  (AFRICAN AMERICAN): 7.3 ML/MIN/1.73 M^2
EST. GFR  (NON AFRICAN AMERICAN): 6.3 ML/MIN/1.73 M^2
GLUCOSE SERPL-MCNC: 211 MG/DL
HAV IGM SERPL QL IA: NEGATIVE
HBV CORE AB SERPL QL IA: NEGATIVE
HBV SURFACE AB SER-ACNC: NEGATIVE M[IU]/ML
HBV SURFACE AG SERPL QL IA: NEGATIVE
HCT VFR BLD AUTO: 23.7 %
HGB BLD-MCNC: 7.4 G/DL
IMM GRANULOCYTES # BLD AUTO: 0.27 K/UL
IMM GRANULOCYTES NFR BLD AUTO: 2 %
LYMPHOCYTES # BLD AUTO: 2.9 K/UL
LYMPHOCYTES NFR BLD: 20.7 %
MCH RBC QN AUTO: 24 PG
MCHC RBC AUTO-ENTMCNC: 31.2 G/DL
MCV RBC AUTO: 77 FL
MONOCYTES # BLD AUTO: 2.1 K/UL
MONOCYTES NFR BLD: 15.3 %
NEUTROPHILS # BLD AUTO: 8.1 K/UL
NEUTROPHILS NFR BLD: 58.7 %
NRBC BLD-RTO: 0 /100 WBC
PLATELET # BLD AUTO: 163 K/UL
PLATELET BLD QL SMEAR: ABNORMAL
PMV BLD AUTO: 10.9 FL
POCT GLUCOSE: 157 MG/DL (ref 70–110)
POCT GLUCOSE: 220 MG/DL (ref 70–110)
POCT GLUCOSE: 251 MG/DL (ref 70–110)
POLYCHROMASIA BLD QL SMEAR: ABNORMAL
POTASSIUM SERPL-SCNC: 4.3 MMOL/L
PROT SERPL-MCNC: 6.9 G/DL
RBC # BLD AUTO: 3.08 M/UL
SODIUM SERPL-SCNC: 137 MMOL/L
WBC # BLD AUTO: 13.84 K/UL

## 2018-04-23 PROCEDURE — 85025 COMPLETE CBC W/AUTO DIFF WBC: CPT

## 2018-04-23 PROCEDURE — 25000003 PHARM REV CODE 250: Performed by: INTERNAL MEDICINE

## 2018-04-23 PROCEDURE — 97530 THERAPEUTIC ACTIVITIES: CPT

## 2018-04-23 PROCEDURE — 97168 OT RE-EVAL EST PLAN CARE: CPT

## 2018-04-23 PROCEDURE — 99233 SBSQ HOSP IP/OBS HIGH 50: CPT | Mod: GC,,, | Performed by: HOSPITALIST

## 2018-04-23 PROCEDURE — 97535 SELF CARE MNGMENT TRAINING: CPT

## 2018-04-23 PROCEDURE — 25000003 PHARM REV CODE 250: Performed by: STUDENT IN AN ORGANIZED HEALTH CARE EDUCATION/TRAINING PROGRAM

## 2018-04-23 PROCEDURE — 80048 BASIC METABOLIC PNL TOTAL CA: CPT

## 2018-04-23 PROCEDURE — 90935 HEMODIALYSIS ONE EVALUATION: CPT

## 2018-04-23 PROCEDURE — 11000001 HC ACUTE MED/SURG PRIVATE ROOM

## 2018-04-23 PROCEDURE — 63600175 PHARM REV CODE 636 W HCPCS: Performed by: STUDENT IN AN ORGANIZED HEALTH CARE EDUCATION/TRAINING PROGRAM

## 2018-04-23 PROCEDURE — 90935 HEMODIALYSIS ONE EVALUATION: CPT | Mod: GC,,, | Performed by: INTERNAL MEDICINE

## 2018-04-23 PROCEDURE — 97164 PT RE-EVAL EST PLAN CARE: CPT | Performed by: PHYSICAL THERAPIST

## 2018-04-23 PROCEDURE — 97116 GAIT TRAINING THERAPY: CPT | Performed by: PHYSICAL THERAPIST

## 2018-04-23 PROCEDURE — 80076 HEPATIC FUNCTION PANEL: CPT

## 2018-04-23 PROCEDURE — 36415 COLL VENOUS BLD VENIPUNCTURE: CPT

## 2018-04-23 RX ORDER — BENZONATATE 100 MG/1
200 CAPSULE ORAL 3 TIMES DAILY PRN
Status: DISCONTINUED | OUTPATIENT
Start: 2018-04-23 | End: 2018-04-26 | Stop reason: HOSPADM

## 2018-04-23 RX ADMIN — INSULIN ASPART 2 UNITS: 100 INJECTION, SOLUTION INTRAVENOUS; SUBCUTANEOUS at 06:04

## 2018-04-23 RX ADMIN — PIPERACILLIN AND TAZOBACTAM 4.5 G: 4; .5 INJECTION, POWDER, LYOPHILIZED, FOR SOLUTION INTRAVENOUS; PARENTERAL at 02:04

## 2018-04-23 RX ADMIN — PIPERACILLIN AND TAZOBACTAM 4.5 G: 4; .5 INJECTION, POWDER, LYOPHILIZED, FOR SOLUTION INTRAVENOUS; PARENTERAL at 10:04

## 2018-04-23 RX ADMIN — HEPARIN SODIUM 5000 UNITS: 5000 INJECTION, SOLUTION INTRAVENOUS; SUBCUTANEOUS at 09:04

## 2018-04-23 RX ADMIN — HEPARIN SODIUM 5000 UNITS: 5000 INJECTION, SOLUTION INTRAVENOUS; SUBCUTANEOUS at 05:04

## 2018-04-23 RX ADMIN — HEPARIN SODIUM 5000 UNITS: 5000 INJECTION, SOLUTION INTRAVENOUS; SUBCUTANEOUS at 06:04

## 2018-04-23 RX ADMIN — SODIUM CHLORIDE: 9 INJECTION, SOLUTION INTRAVENOUS at 08:04

## 2018-04-23 RX ADMIN — BENZONATATE 200 MG: 100 CAPSULE ORAL at 10:04

## 2018-04-23 NOTE — PROGRESS NOTES
Pt arrived via stretcher.  Placed on cardiac monitor and b/p check every 15 minutes. Right IJ  Dialysis access prep with prevantic swab. Maintance Hemodialysis started per orders.

## 2018-04-23 NOTE — ASSESSMENT & PLAN NOTE
- New today, WBC 13.8 prior to HD  - Etiology unclear, but suspicion for C. Diff is high given antibiotic exposure, recent critical illness, and loose bowel movements  - Stool studies ordered

## 2018-04-23 NOTE — ASSESSMENT & PLAN NOTE
-- Stable  -- HbA1c 8.0  -- Will hold home oral antihyperglycemic agents in favor of aspart SSI + POCT glucose Q6H  -- Cautious with insulin titration given ESRD  -- Diabetic diet restriction when not NPO

## 2018-04-23 NOTE — PLAN OF CARE
Problem: Occupational Therapy Goal  Goal: Occupational Therapy Goal  Goals to be met by: 4/30/18    Patient will increase functional independence with ADLs by performing:    LE Dressing with Supervision (to don prosthetic).  Grooming while standing at sink with Stand-by Assistance.  Toileting from toilet with Minimal Assistance for hygiene and clothing management.   Supine to sit with Stand-by Assistance.  Stand pivot transfers with Contact Guard Assistance.  Toilet transfer to toilet with Contact Guard Assistance.         Outcome: Ongoing (interventions implemented as appropriate)  Re-Evaluation completed and new POC established.    VARUN Bowers

## 2018-04-23 NOTE — NURSING
Patient back from dialysis. Dialyzed for 3 hours and had one liter removed. Patient alert and oriented upon arrival to her room. Several family members waiting for her at bedside. Temperature was 97.7 on arrival. /78 and heart rate was 75.

## 2018-04-23 NOTE — PROGRESS NOTES
[facility] Hospital Medicine  Progress Note    Patient Name: Martha Melvin  MRN: [id number]  Patient Class: IP- Inpatient   Admission Date: 4/15/2018  Length of Stay: 8 days  Attending Physician: Jania Booker MD  Primary Care Provider: GUILLERMO Mcclure MD    [facility] Medicine Team: [facility] HOSP MED 1 Bobby Meyer MD    Subjective:     Principal Problem:Cardiac arrest    HPI:  73 y/o F PMhx HFrEF, CKD stage V (makes urine, not on dialysis), moderate aortic stenosis, DM II, R leg amputation (2015), HTN who presented w/ SOB x 4 days. Patient was discharged from [facility] 10/14/17 for SOB 2/2 ADHF and she was subsequently discharged on an increased of lasix from 20mg to 80mg daily due to her renal failure. Daughter states patient ran out of lasix 80mg and asked PCP for rx refill. However, per daughter PCP refused to fill 80mg and therefore patient had been taking 20mg x 4 daily. Daughter states she last saw patient on 4/11 prior to admission and that patient was in good health at that time. She spoke with patient over the phone on 4/16 and at that time noted patient to be SOB which prompted her to call a family friend to check on patient. In conversation daughter was made aware that patient's lasix had run out. EMS was called and on route she pllaced on BIPAP and given albuterol. Upon arrival her saturations were at 82%.     In the ED patient given nitro paste, albuterol, as well as lasix. She was subsequently admitted to medicine. She was started on lasix 60mg IV tid as well as rocephin and azithromycin for suspected CAP. Nephrology was consulted for volume overload in the setting of CKD stage V, however, due to poor urine output despite diuresis w/ lasix and diuril nephrology requested trialysis. Following placement patient found to be in PEA arrest. ROSC achieved for <10min then patient again coded. ROSC achieved and patient was subsequently transferred to SICU where patient coded a 3rd time.  ROSC achieved and maintained, epi switched to levo and placed on vent. Patient started on urgent dialysis. Continued to be hypoxic therefore given nimbex, elevation of HOB w/ improvement in sats.     Per daughter patient had been aware of CKD V since 2015 and had refused dialysis since that time due to fear of having to be on dialysis.    Hospital Course:  No notes on file    Interval History:     Ms. Melvin did well overnight. She continues to have loose bowel movements for which we are obtained C. Diff studies. Working on placement, made complicated by her new dialysis requirement.    Review of Systems   Constitutional: Positive for fatigue. Negative for chills and fever.   HENT: Negative for congestion and sore throat.    Eyes: Negative for visual disturbance.   Respiratory: Negative for cough and shortness of breath.    Cardiovascular: Negative for chest pain, palpitations and leg swelling.   Gastrointestinal: Negative for abdominal distention, abdominal pain, constipation, diarrhea, nausea and vomiting.   Endocrine: Negative for polyuria.   Genitourinary: Negative for difficulty urinating and dysuria.   Musculoskeletal: Negative for myalgias.   Skin: Negative for rash and wound.   Allergic/Immunologic: Negative for immunocompromised state.   Neurological: Positive for weakness. Negative for dizziness and numbness.   Hematological: Negative for adenopathy.   Psychiatric/Behavioral: Negative for dysphoric mood. The patient is not nervous/anxious.      Objective:     Vital Signs (Most Recent):  Temp: 97.8 °F (36.6 °C) (04/23/18 1445)  Pulse: 75 (04/23/18 1145)  Resp: 16 (04/23/18 1145)  BP: 136/78 (04/23/18 1145)  SpO2: (!) 94 % (04/23/18 0700) Vital Signs (24h Range):  Temp:  [97.7 °F (36.5 °C)-98.1 °F (36.7 °C)] 97.8 °F (36.6 °C)  Pulse:  [74-79] 75  Resp:  [13-19] 16  SpO2:  [94 %-100 %] 94 %  BP: ()/(55-81) 136/78     Weight: 84 kg (185 lb 3 oz)  Body mass index is 29.89 kg/m².    Intake/Output Summary  (Last 24 hours) at 04/23/18 1548  Last data filed at 04/23/18 1145   Gross per 24 hour   Intake             1450 ml   Output             1600 ml   Net             -150 ml      Physical Exam   Constitutional: She is oriented to person, place, and time. She appears well-developed and well-nourished. No distress.   HENT:   Head: Normocephalic and atraumatic.   Eyes: Conjunctivae are normal. No scleral icterus.   Cardiovascular: Normal rate, regular rhythm and intact distal pulses.    Pulmonary/Chest: She has no wheezes. She has no rales.   Abdominal: Soft. Bowel sounds are normal. She exhibits no distension. There is no tenderness. There is no guarding.   Musculoskeletal: She exhibits no edema or tenderness.   R LE amputation   Lymphadenopathy:     She has no cervical adenopathy.   Neurological: She is alert and oriented to person, place, and time.   4/5 strength globally    Skin: Skin is warm and dry. No rash noted.   Psychiatric: She has a normal mood and affect. Her behavior is normal.   Nursing note and vitals reviewed.      Significant Labs:     CBC:    Recent Labs  Lab 04/22/18  0703 04/23/18  0621   WBC 12.01 13.84*   GRAN 60.7  7.3 58.7  8.1*   HGB 7.7* 7.4*   HCT 25.7* 23.7*    163       Chem 10:    Recent Labs  Lab 04/22/18  0952 04/23/18  0621    137   K 4.2 4.3    102   CO2 22* 20*   BUN 29* 42*   CREATININE 4.3* 6.2*   * 211*   CALCIUM 8.8 8.4*   PHOS 4.3  --        LFTs:    Recent Labs  Lab 04/22/18  0703  04/23/18  0621   ALKPHOS 191*  --  140*   BILITOT 0.7  --  0.5   AST 79*  --  41*   *  --  147*   ALBUMIN 2.3*  < > 2.4*   < > = values in this interval not displayed.      Assessment/Plan:      * Cardiac arrest    - Stable. She has made significant improvements  - Etiology of PEA arrest likely hypoxia  - TTE s/p cardiac arrest showed normal EF (55%), DD  - Extubated on 4/20 to NC, still doing well  - Anticipate that she will need SNF. PT/OT pending          UTI  (urinary tract infection)    - Resolving  - UCx grew ESBL  - Day #7 of zosyn therapy per ID recommendations        ESRD (end stage renal disease)    - Stable  - Baseline sCr 4.5-5.0 c/w CKD stage V  - Admitted with ADHF. Attempted medical diuresis without improvement in UOP. PEA arrest on 4/16-4/17; likely related to hypoxia.  SLED started same day  - has remained on RRT since 4/17, now tolerating HD well  - HD outpatient labs ordered, including hepatitis and PPD        Elevated liver enzymes    - Resolving, etiology shock liver        Aortic stenosis    - Stable  - TTE 4/16/18 EF 55-60% w/ grade II DD  - Moderate aortic stenosis, SABRINA = 1.15 cm2, AVAi = 0.57 cm2/m2, peak velocity = 3.01 m/s, mean gradient = 21 mmHg  - Cautious w/ volume removal as aortic stenosis is preload dependent        Leukocytosis    - New today, WBC 13.8 prior to HD  - Etiology unclear, but suspicion for C. Diff is high given antibiotic exposure, recent critical illness, and loose bowel movements  - Stool studies ordered        Anemia, chronic renal failure, stage 5    - Stable  - Etiology thought to be CKD  - Continue to monitor with CBC daily, Hgb <7        Type 2 diabetes mellitus with hyperlipidemia    - Stable  - HbA1c 8.0  - Will hold home oral antihyperglycemic agents in favor of aspart SSI + POCT glucose Q6H  - Continue aspart 2 units TID  - Diabetic diet when not NPO        Acute on chronic diastolic heart failure    - Acute portion resolved  - LVEF preserved, biatrial enlargement with grade 2 diastolic dysfunction, moderate AS, and pHTN on most recent TTE  - Euvolemic with HD        Acute hypoxemic respiratory failure    - Stable  - Likely cause of PEA arrest  - Extubated and doing well with minimal O2 requirements via NC  - Markedly improved with UF        Type 2 diabetes mellitus with stage 5 chronic kidney disease and hypertension    -- Stable  -- HbA1c 8.0  -- Will hold home oral antihyperglycemic agents in favor of aspart SSI +  POCT glucose Q6H  -- Cautious with insulin titration given ESRD  -- Diabetic diet restriction when not NPO          VTE Risk Mitigation         Ordered     IP VTE HIGH RISK PATIENT  Once      04/21/18 1936     heparin (porcine) injection 5,000 Units  Every 8 hours      04/19/18 0159          Patient discussed with Dr. Booker, who helped formulate the above plan.    Bobby Meyer MD  Department of Hospital Medicine   [facility]

## 2018-04-23 NOTE — PT/OT/SLP RE-EVAL
Occupational Therapy   Re-evaluation    Name: Martha Melvin  MRN: [id number]  Admitting Diagnosis:  Cardiac arrest      Recommendations:     Discharge Recommendations: rehabilitation facility  Discharge Equipment Recommendations:  walker, rolling  Barriers to discharge: N/A    History:     Past Medical History:   Diagnosis Date    Arthritis     Diabetes mellitus     Hyperlipidemia     Hypertension     Renal disorder     poor kidney function       Past Surgical History:   Procedure Laterality Date    FOOT AMPUTATION Right        Subjective     Chief Complaint: Decreased (I).  Patient/Family stated goals: Return to being (I).  Communicated with: RN prior to session.  Pain/Comfort:  · Pain Rating 1: 0/10    Objective:     Patient found with: oxygen, telemetry    General Precautions: Standard, fall   Orthopedic Precautions:N/A   Braces:  (RLE prosthetic)     Occupational Performance:    Bed Mobility:    · Patient completed Rolling/Turning to Right with minimum assistance  · Patient completed Scooting/Bridging with maximal assistance  · Patient completed Supine to Sit with moderate assistance    Functional Mobility/Transfers:  · Patient completed Sit <> Stand Transfer with minimum assistance and cues for proper hand placement  with  rolling walker   · Patient completed Bed <> Chair Transfer using Step Transfer technique with minimum assistance with rolling walker and forgetting to reach back for a controlled sit.  · Patient completed Toilet Transfer Step Transfer technique with moderate assistance to stand, maximal assistance to sit and falling backwards onto toilet w/o reaching back and not being close enough to safely sit with  rolling walker.  · Functional Mobility: Pt walked from the bed>bathroom>chair (~ 10' x 2) with Min-Mod A using a RW --- pt with some difficulty due to loose fitting prosthesis with it turning to the side.    Activities of Daily Living:  · LB Dressing: minimum assistance to don prosthesis  sleeves and able to reach left sock from chair level.  · Toileting: maximal assistance attempted toileting in standing but with difficulty reaching far enough to complete.    Cognitive/Visual Perceptual:  Cognitive/Psychosocial Skills:     -       Oriented to: Person, Place, Time and Situation   -       Safety awareness/insight to disability: impaired   -       Mood/Affect/Coping skills/emotional control: Tearful    Physical Exam:  Balance:    -       EOB sitting balance = Fair-Poor due to falling posteriorly on one instance. Static standing = Mod A while OT was performing perineal care.  Upper Extremity Range of Motion:     -       Right Upper Extremity: WNL  -       Left Upper Extremity: WNL  Upper Extremity Strength:    -       Right Upper Extremity: WNL  -       Left Upper Extremity: WNL    Patient left up in chair with all lines intact and call button in reach    [facility] 6 Click:  [facility] Total Score: 19    Treatment & Education:  UE ROM/MMT  Bed mobility training / assessment  Functional mobility assessment  Sit/standing balance assessment  Discussed OT POC / Post-acute plan  Educated on importance of sitting OOB  Education:    Assessment:     Martha Melvin is a 72 y.o. female with a medical diagnosis of Cardiac arrest.  She presents with decreased (I) in multiple ADL areas, functional mobility & t/fs as well as decreased overall strength, ROM, endurance and balance. Prior to ICU t/f, pt was functioning at a much higher level but the stay in ICU has deconditioned her significantly causing much decreased level of functional (I). Therefore, pt would benefit from skilled OT services as well as IP Rehab to address these deficits and to facilitate improving (I) with daily tasks. Pt tearful during session due to her recent functional decline stating that she didn't expect to be this weak and that she used to be able to do everything for herself prior to admit.  Performance deficits affecting function are weakness, gait  "instability, decreased lower extremity function, impaired balance, impaired endurance, decreased safety awareness, impaired self care skills, impaired functional mobilty, decreased coordination, impaired cardiopulmonary response to activity.      Rehab Prognosis:  Good; patient would benefit from acute skilled OT services to address these deficits and reach maximum level of function.         Clinical Decision Makin.  OT Mod:  "Pt evaluation falls under moderate complexity for evaluation coding due to identification of 3-5 performance deficits noted as stated above. Eval required Min/Mod assistance to complete on this date and detailed assessment(s) were utilized. Moreover, an expanded review of history and occupational profile obtained with additional review of cognitive, physical and psychosocial hx."     Plan:     Patient to be seen 4 x/week to address the above listed problems via self-care/home management, therapeutic activities, therapeutic exercises, neuromuscular re-education  · Plan of Care Expires: 18  · Plan of Care Reviewed with: patient, mother    This Plan of care has been discussed with the patient who was involved in its development and understands and is in agreement with the identified goals and treatment plan    GOALS:    Occupational Therapy Goals        Problem: Occupational Therapy Goal    Goal Priority Disciplines Outcome Interventions   Occupational Therapy Goal     OT, PT/OT Ongoing (interventions implemented as appropriate)    Description:  Goals to be met by: 18    Patient will increase functional independence with ADLs by performing:    LE Dressing with Supervision (to don prosthetic).  Grooming while standing at sink with Stand-by Assistance.  Toileting from toilet with Minimal Assistance for hygiene and clothing management.   Supine to sit with Stand-by Assistance.  Stand pivot transfers with Contact Guard Assistance.  Toilet transfer to toilet with Contact Guard " Assistance.                           Time Tracking:     OT Date of Treatment: 04/23/18  OT Start Time: 1344  OT Stop Time: 1423  OT Total Time (min): 39 min    Billable Minutes:Re-eval 15  Self Care/Home Management 12  Therapeutic Activity 12    VARUN Davey  4/23/2018

## 2018-04-23 NOTE — PLAN OF CARE
Problem: Patient Care Overview  Goal: Plan of Care Review  Outcome: Ongoing (interventions implemented as appropriate)  Patient  Instructed that stool sample is needed. Unable to obtain specimen, will notify next shift. Electrodes replaced for patient. No distress noted at this time. Will continue to monitor.

## 2018-04-23 NOTE — PLAN OF CARE
Patient currently off unit for dialysis treatment. PT & OT ordered to eval and treat. CM awaiting recommendation for discharge plan.     04/23/18 1675   Discharge Assessment   Assessment Type Discharge Planning Reassessment   Confirmed/corrected address and phone number on facesheet? Yes   Assessment information obtained from? Other  (son, Mr Andrade)   Prior to hospitilization cognitive status: Alert/Oriented

## 2018-04-23 NOTE — ASSESSMENT & PLAN NOTE
- Stable. She has made significant improvements  - Etiology of PEA arrest likely hypoxia  - TTE s/p cardiac arrest showed normal EF (55%), DD  - Extubated on 4/20 to NC, still doing well  - Anticipate that she will need SNF. PT/OT pending

## 2018-04-23 NOTE — PLAN OF CARE
Problem: Physical Therapy Goal  Goal: Physical Therapy Goal  Goals to be met by: 18    Patient will increase functional independence with mobility by performin.Supine to and from sit stand by assistance  2. Gait  x 50 feet with Stand-by Assistance using Rolling Walker PRN, with supp O2 PRN.   3. Transfer sit to stand with RW and Stand-by Assistance  4. Patient tolerate 10 minutes of standing activities including exercises and or ADLs    Outcome: Ongoing (interventions implemented as appropriate)  PT re-heatheral completed    Akash Wisdom, PT  2018

## 2018-04-23 NOTE — ASSESSMENT & PLAN NOTE
- Stable  - TTE 4/16/18 EF 55-60% w/ grade II DD  - Moderate aortic stenosis, SABRINA = 1.15 cm2, AVAi = 0.57 cm2/m2, peak velocity = 3.01 m/s, mean gradient = 21 mmHg  - Cautious w/ volume removal as aortic stenosis is preload dependent

## 2018-04-23 NOTE — SUBJECTIVE & OBJECTIVE
Interval History:   No events overnight. Patient seen on dialysis this morning; tolerating well. Denies any muscle cramps. Does report chest pain that started earlier this morning prior to dialysis; unchanged. No SOB; no UOP.     Review of patient's allergies indicates:   Allergen Reactions    Morphine      Current Facility-Administered Medications   Medication Frequency    0.9%  NaCl infusion PRN    acetaminophen tablet 650 mg Q6H PRN    albuterol nebulizer solution 2.5 mg Q4H PRN    benzonatate capsule 200 mg TID PRN    dextrose 50% injection 12.5 g PRN    dextrose 50% injection 25 g PRN    glucagon (human recombinant) injection 1 mg PRN    glucose chewable tablet 16 g PRN    glucose chewable tablet 24 g PRN    heparin (porcine) injection 5,000 Units Q8H    insulin aspart U-100 pen 0-5 Units QID (AC + HS) PRN    insulin aspart U-100 pen 2 Units TIDWM    ondansetron disintegrating tablet 4 mg Q6H PRN    piperacillin-tazobactam 4.5 g in sodium chloride 0.9% 100 mL IVPB (ready to mix system) Q8H    sodium chloride 0.9% flush 5 mL PRN       Objective:     Vital Signs (Most Recent):  Temp: 97.8 °F (36.6 °C) (04/23/18 1445)  Pulse: 75 (04/23/18 1145)  Resp: 16 (04/23/18 1145)  BP: 136/78 (04/23/18 1145)  SpO2: (!) 94 % (04/23/18 0700)  O2 Device (Oxygen Therapy): room air (04/21/18 1100) Vital Signs (24h Range):  Temp:  [97.7 °F (36.5 °C)-98.1 °F (36.7 °C)] 97.8 °F (36.6 °C)  Pulse:  [74-79] 75  Resp:  [13-19] 16  SpO2:  [94 %-100 %] 94 %  BP: ()/(55-81) 136/78     Weight: 84 kg (185 lb 3 oz) (04/22/18 0608)  Body mass index is 29.89 kg/m².  Body surface area is 1.98 meters squared.    I/O last 3 completed shifts:  In: 1150 [P.O.:750; IV Piggyback:400]  Out: 0     Physical Exam   Constitutional: She is oriented to person, place, and time. She appears well-developed and well-nourished. No distress.   HENT:   Head: Normocephalic and atraumatic.   Eyes: Conjunctivae and EOM are normal.    Cardiovascular: Normal rate and regular rhythm.    Pulmonary/Chest: Effort normal. She has decreased breath sounds.   Abdominal: Soft. She exhibits no distension.   Musculoskeletal: She exhibits no edema or deformity.   Neurological: She is alert and oriented to person, place, and time.   Skin: Skin is warm and dry. She is not diaphoretic.       Significant Labs:  CBC:   Recent Labs  Lab 04/23/18  0621   WBC 13.84*   RBC 3.08*   HGB 7.4*   HCT 23.7*      MCV 77*   MCH 24.0*   MCHC 31.2*     CMP:   Recent Labs  Lab 04/23/18  0621   *   CALCIUM 8.4*   ALBUMIN 2.4*   PROT 6.9      K 4.3   CO2 20*      BUN 42*   CREATININE 6.2*   ALKPHOS 140*   *   AST 41*   BILITOT 0.5     All labs within the past 24 hours have been reviewed.     Significant Imaging:  Labs: Reviewed

## 2018-04-23 NOTE — PT/OT/SLP RE-EVAL
Physical Therapy Re-evaluation    Patient Name:  Martha Melvin   MRN:  [id number]    Recommendations:     Discharge Recommendations:  rehabilitation facility   Discharge Equipment Recommendations: none   Barriers to discharge: Decreased caregiver support    Assessment:     Martha Melvin is a 72 y.o. female admitted with a medical diagnosis of Cardiac arrest.  She presents with the following impairments/functional limitations:  weakness, impaired balance, impaired cardiopulmonary response to activity, impaired endurance, impaired functional mobilty, decreased lower extremity function.  Pt requiring significantly more assist for bed mobility, gait, and transfes as compared to initial PT eval and treatments prior to ICU transfer.  Pt with reduced endurance for gait tolerating 10ft to and from bathroom requiring min/mod assist.  Additionally, pt had more difficulty with her prosthetic today and required increased support from RW.  This may be due to pt not having all of her sleeves available and her prosthetic fitting losely today.  Becaue of this patients reduced mobility status, she may benefit from a short rehab stay to maximize functional gains..    Rehab Prognosis:  Good.; patient would benefit from acute skilled PT services to address these deficits and reach maximum level of function.      Recent Surgery: * No surgery found *      Plan:     During this hospitalization, patient to be seen 4 x/week to address the above listed problems via gait training, therapeutic activities, therapeutic exercises  · Plan of Care Expires:  05/23/18   Plan of Care Reviewed with: patient    Subjective     Communicated with RN prior to session.  Patient found seated EOB with OT upon PT entry to room, agreeable to evaluation.      Chief Complaint: frustration over significantly increased LE weakness and deconditioning  Patient comments/goals: pt expressing frustration and even tearful at times about increased weakness and difficulty  with mobility  Pain/Comfort:  · Pain Rating 1: 0/10  · Pain Rating Post-Intervention 1: 0/10    Patients cultural, spiritual, Moravian conflicts given the current situation: none stated      Objective:     Patient found with: peripheral IV, oxygen, central line     General Precautions: Standard, fall   Orthopedic Precautions:N/A   Braces: N/A (RLE prosthetsis.)     Exams:  · Cognitive Exam:  Patient is oriented to Person, Place, Time and Situation and follows 1000% of 1 step commands   · LLE ROM: WFL  · LLE Strength: 4/5    Functional Mobility:  · Bed Mobility:     · Supine to Sit: minimum assistance and performed with OT prior to PT arrival  · Transfers:     · Sit to Stand:  minimum assistance with rolling walker and multiple cues required for hand placement  · Toilet Transfer: moderate assistance with  rolling walker  using  sit to stand.  performed multiple trials from standard toilet.  very weak.  additionally perforemd static standign while OT performed pericare requiring min/mod assist for balance during pericare  · Gait: 2 trials of ambulation performed.  first trial from bed to bathroom, pt required min/mod assist then required max assist as she backed up to toilet and began to sit too early.  Pt RLE prosthetic was fitting losely and was turning laterally a lto and likely needs additional sleeves for stability. additionally pt had difficulty advancing both LEs during gait  · Balance: performed static standign at commode using RW and requiring min/mod assist.  able to stand for about 1 minute then required seated rest    AM-PAC 6 CLICK MOBILITY  Total Score:13       Therapeutic Activities and Exercises:   pt educated on safety with mobiltiy  Pt educated on PT POC  Pt educated on need for extensive rehab  Pt educated on benefit of remaining OOB in chair and performing activities OOB consistently  Pt RN instructed to order BSC and RW to use with patient and to encourage daily OOB activity    Patient left up  in chair with all lines intact and call button in reach.    GOALS:    Physical Therapy Goals        Problem: Physical Therapy Goal    Goal Priority Disciplines Outcome Goal Variances Interventions   Physical Therapy Goal     PT/OT, PT Ongoing (interventions implemented as appropriate)     Description:  Goals to be met by: 18    Patient will increase functional independence with mobility by performin.Supine to and from sit stand by assistance  2. Gait  x 50 feet with Stand-by Assistance using Rolling Walker PRN, with supp O2 PRN.   3. Transfer sit to stand with RW and Stand-by Assistance  4. Patient tolerate 10 minutes of standing activities including exercises and or ADLs                      History:     Past Medical History:   Diagnosis Date    Arthritis     Diabetes mellitus     Hyperlipidemia     Hypertension     Renal disorder     poor kidney function       Past Surgical History:   Procedure Laterality Date    FOOT AMPUTATION Right          Time Tracking:     PT Received On: 18  PT Start Time: 1355     PT Stop Time: 1422  PT Total Time (min): 27 min     Billable Minutes: Re-eval 15 and Gait Training 12      Akash Wisdom, PT  2018

## 2018-04-23 NOTE — ASSESSMENT & PLAN NOTE
- Tsat low but ferritin > 1000; cannot give IV iron  - will start CHACHO three times weekly with HD

## 2018-04-23 NOTE — ASSESSMENT & PLAN NOTE
- Stable  - HbA1c 8.0  - Will hold home oral antihyperglycemic agents in favor of aspart SSI + POCT glucose Q6H  - Continue aspart 2 units TID  - Diabetic diet when not NPO

## 2018-04-23 NOTE — ASSESSMENT & PLAN NOTE
- baseline sCr 4.5-5.0 c/w CKD stage V  - admitted with ADHF. Attempted medical diuresis without improvement in UOP. Increased O2 requirements throughout the day. PEA arrest on 4/16-4/17; likely related to hypoxia. SLED started same day. She has remained on RRT since 4/17  - received first conventional HD treatment this morning; tolerated well  - not anticipating recovery  - ordered vein mapping for future AV creation  - will need tunneled HD cath prior to discharge  - SW to work on outpatient dialysis chair placement

## 2018-04-23 NOTE — ASSESSMENT & PLAN NOTE
- Stable  - Baseline sCr 4.5-5.0 c/w CKD stage V  - Admitted with ADHF. Attempted medical diuresis without improvement in UOP. PEA arrest on 4/16-4/17; likely related to hypoxia.  SLED started same day  - has remained on RRT since 4/17, now tolerating HD well  - HD outpatient labs ordered, including hepatitis and PPD

## 2018-04-23 NOTE — ASSESSMENT & PLAN NOTE
- Acute portion resolved  - LVEF preserved, biatrial enlargement with grade 2 diastolic dysfunction, moderate AS, and pHTN on most recent TTE  - Euvolemic with HD

## 2018-04-23 NOTE — PROGRESS NOTES
Dialysis complete.  Blood returned.  Right iij    CVC flushed * 2 with       Locked with cap and tape.  No s/s infection at cvc site.   Pt tolerated hemodialysis without diff.  Pt ran 3   Hrs on hemodialysis machine.   Took off   1000 Ml net volume.      Used F-160 dialyzer.

## 2018-04-23 NOTE — PROGRESS NOTES
04/23/18 0300   Unmeasured Output   Stool Occurrence 1   Patient had loose BM. Patient asked to be turned, unaware that she had BM. Unable to collect specimen due to stool soaking into pad.

## 2018-04-23 NOTE — PROGRESS NOTES
[facility]  Nephrology  Progress Note    Patient Name: Martha Melvin  MRN: [id number]  Admission Date: 4/15/2018  Hospital Length of Stay: 8 days  Attending Provider: Jania Booker MD   Primary Care Physician: GUILLERMO Mcclure MD  Principal Problem:Cardiac arrest    Subjective:     HPI: Ms. Melvin is a 73 yo AAF with HFpEF, aortic stenosis, HTN, T2DM, and CKD stage V (baseline sCr 4.5-5.0) admitted on 4/15 with ADHF. She reported worsening SOB, orthopnea, and PND x 4 days. SpO2 82% on RA upon arrival. In the ED she was given nitro paste, albuterol, and lasix IV. She was admitted to medicine and started on lasix 60mg IV TID with 1L fluid restriction. She was also started on rocephin and azithromycin for CAP. No events overnight. UOP only 700cc. sCr tere from 5.4 to 5.9. Nephrology consulted for fluid management in setting of CKD stage V. Patient is aware of CKD diagnosis. She was offered dialysis 1.5 years ago but denied it at that time due to lack of transportation. She was previously followed by a nephrologist in [place] for the last 4 years. She was on lasix 20mg po for a long time. She developed ADHF a few months ago and was admitted to [facility]. She was told here that she would need higher doses of lasix given her advanced CKD. She was started on lasix 80mg po BID at that time. About 2 weeks ago she ran out of lasix. She called her PCP asking for a refill of the 80mg and this was denied. She asked if she could take 4 of the 20mg tablets and was told that her insurance would not cover this. She continues to feel very SOB this morning. She is interested in proceeding with dialysis if needed.     Interval History:   No events overnight. Patient seen on dialysis this morning; tolerating well. Denies any muscle cramps. Does report chest pain that started earlier this morning prior to dialysis; unchanged. No SOB; no UOP.     Review of patient's allergies indicates:   Allergen Reactions    Morphine       Current Facility-Administered Medications   Medication Frequency    0.9%  NaCl infusion PRN    acetaminophen tablet 650 mg Q6H PRN    albuterol nebulizer solution 2.5 mg Q4H PRN    benzonatate capsule 200 mg TID PRN    dextrose 50% injection 12.5 g PRN    dextrose 50% injection 25 g PRN    glucagon (human recombinant) injection 1 mg PRN    glucose chewable tablet 16 g PRN    glucose chewable tablet 24 g PRN    heparin (porcine) injection 5,000 Units Q8H    insulin aspart U-100 pen 0-5 Units QID (AC + HS) PRN    insulin aspart U-100 pen 2 Units TIDWM    ondansetron disintegrating tablet 4 mg Q6H PRN    piperacillin-tazobactam 4.5 g in sodium chloride 0.9% 100 mL IVPB (ready to mix system) Q8H    sodium chloride 0.9% flush 5 mL PRN       Objective:     Vital Signs (Most Recent):  Temp: 97.8 °F (36.6 °C) (04/23/18 1445)  Pulse: 75 (04/23/18 1145)  Resp: 16 (04/23/18 1145)  BP: 136/78 (04/23/18 1145)  SpO2: (!) 94 % (04/23/18 0700)  O2 Device (Oxygen Therapy): room air (04/21/18 1100) Vital Signs (24h Range):  Temp:  [97.7 °F (36.5 °C)-98.1 °F (36.7 °C)] 97.8 °F (36.6 °C)  Pulse:  [74-79] 75  Resp:  [13-19] 16  SpO2:  [94 %-100 %] 94 %  BP: ()/(55-81) 136/78     Weight: 84 kg (185 lb 3 oz) (04/22/18 0608)  Body mass index is 29.89 kg/m².  Body surface area is 1.98 meters squared.    I/O last 3 completed shifts:  In: 1150 [P.O.:750; IV Piggyback:400]  Out: 0     Physical Exam   Constitutional: She is oriented to person, place, and time. She appears well-developed and well-nourished. No distress.   HENT:   Head: Normocephalic and atraumatic.   Eyes: Conjunctivae and EOM are normal.   Cardiovascular: Normal rate and regular rhythm.    Pulmonary/Chest: Effort normal. She has decreased breath sounds.   Abdominal: Soft. She exhibits no distension.   Musculoskeletal: She exhibits no edema or deformity.   Neurological: She is alert and oriented to person, place, and time.   Skin: Skin is warm and dry.  She is not diaphoretic.       Significant Labs:  CBC:   Recent Labs  Lab 04/23/18  0621   WBC 13.84*   RBC 3.08*   HGB 7.4*   HCT 23.7*      MCV 77*   MCH 24.0*   MCHC 31.2*     CMP:   Recent Labs  Lab 04/23/18  0621   *   CALCIUM 8.4*   ALBUMIN 2.4*   PROT 6.9      K 4.3   CO2 20*      BUN 42*   CREATININE 6.2*   ALKPHOS 140*   *   AST 41*   BILITOT 0.5     All labs within the past 24 hours have been reviewed.     Significant Imaging:  Labs: Reviewed    Assessment/Plan:     ESRD (end stage renal disease)    - baseline sCr 4.5-5.0 c/w CKD stage V  - admitted with ADHF. Attempted medical diuresis without improvement in UOP. Increased O2 requirements throughout the day. PEA arrest on 4/16-4/17; likely related to hypoxia. SLED started same day. She has remained on RRT since 4/17  - received first conventional HD treatment this morning; tolerated well  - not anticipating recovery  - ordered vein mapping for future AV creation  - will need tunneled HD cath prior to discharge  - SW to work on outpatient dialysis chair placement        Anemia, chronic renal failure, stage 5    - Tsat low but ferritin > 1000; cannot give IV iron  - will start CHACHO three times weekly with HD            Matilda Pascal, PGY-5  Nephrology Fellow  [facility]  Pager: [phone]    ATTENDING PHYSICIAN ATTESTATION  I have personally interviewed and examined the patient. I thoroughly reviewed the demographic, clinical, laboratorial and imaging information available in medical records. I agree with the assessment and recommendations provided by the subspecialty resident. Dr. Pascal was under my supervision.     HEMODIALYSIS NOTE  Patient evaluated while undergoing hemodialysis indicated for ESRD. Tolerating session with current UFR, no complications.

## 2018-04-23 NOTE — SUBJECTIVE & OBJECTIVE
Interval History:     Ms. Melvin did well overnight. She continues to have loose bowel movements for which we are obtained C. Diff studies. Working on placement, made complicated by her new dialysis requirement.    Review of Systems   Constitutional: Positive for fatigue. Negative for chills and fever.   HENT: Negative for congestion and sore throat.    Eyes: Negative for visual disturbance.   Respiratory: Negative for cough and shortness of breath.    Cardiovascular: Negative for chest pain, palpitations and leg swelling.   Gastrointestinal: Negative for abdominal distention, abdominal pain, constipation, diarrhea, nausea and vomiting.   Endocrine: Negative for polyuria.   Genitourinary: Negative for difficulty urinating and dysuria.   Musculoskeletal: Negative for myalgias.   Skin: Negative for rash and wound.   Allergic/Immunologic: Negative for immunocompromised state.   Neurological: Positive for weakness. Negative for dizziness and numbness.   Hematological: Negative for adenopathy.   Psychiatric/Behavioral: Negative for dysphoric mood. The patient is not nervous/anxious.      Objective:     Vital Signs (Most Recent):  Temp: 97.8 °F (36.6 °C) (04/23/18 1445)  Pulse: 75 (04/23/18 1145)  Resp: 16 (04/23/18 1145)  BP: 136/78 (04/23/18 1145)  SpO2: (!) 94 % (04/23/18 0700) Vital Signs (24h Range):  Temp:  [97.7 °F (36.5 °C)-98.1 °F (36.7 °C)] 97.8 °F (36.6 °C)  Pulse:  [74-79] 75  Resp:  [13-19] 16  SpO2:  [94 %-100 %] 94 %  BP: ()/(55-81) 136/78     Weight: 84 kg (185 lb 3 oz)  Body mass index is 29.89 kg/m².    Intake/Output Summary (Last 24 hours) at 04/23/18 1548  Last data filed at 04/23/18 1145   Gross per 24 hour   Intake             1450 ml   Output             1600 ml   Net             -150 ml      Physical Exam   Constitutional: She is oriented to person, place, and time. She appears well-developed and well-nourished. No distress.   HENT:   Head: Normocephalic and atraumatic.   Eyes: Conjunctivae  are normal. No scleral icterus.   Cardiovascular: Normal rate, regular rhythm and intact distal pulses.    Pulmonary/Chest: She has no wheezes. She has no rales.   Abdominal: Soft. Bowel sounds are normal. She exhibits no distension. There is no tenderness. There is no guarding.   Musculoskeletal: She exhibits no edema or tenderness.   R LE amputation   Lymphadenopathy:     She has no cervical adenopathy.   Neurological: She is alert and oriented to person, place, and time.   4/5 strength globally    Skin: Skin is warm and dry. No rash noted.   Psychiatric: She has a normal mood and affect. Her behavior is normal.   Nursing note and vitals reviewed.      Significant Labs:     CBC:    Recent Labs  Lab 04/22/18  0703 04/23/18  0621   WBC 12.01 13.84*   GRAN 60.7  7.3 58.7  8.1*   HGB 7.7* 7.4*   HCT 25.7* 23.7*    163       Chem 10:    Recent Labs  Lab 04/22/18  0952 04/23/18  0621    137   K 4.2 4.3    102   CO2 22* 20*   BUN 29* 42*   CREATININE 4.3* 6.2*   * 211*   CALCIUM 8.8 8.4*   PHOS 4.3  --        LFTs:    Recent Labs  Lab 04/22/18  0703  04/23/18  0621   ALKPHOS 191*  --  140*   BILITOT 0.7  --  0.5   AST 79*  --  41*   *  --  147*   ALBUMIN 2.3*  < > 2.4*   < > = values in this interval not displayed.

## 2018-04-23 NOTE — ASSESSMENT & PLAN NOTE
- Stable  - Likely cause of PEA arrest  - Extubated and doing well with minimal O2 requirements via NC  - Markedly improved with UF

## 2018-04-24 PROBLEM — J96.01 ACUTE HYPOXEMIC RESPIRATORY FAILURE: Status: RESOLVED | Noted: 2018-04-15 | Resolved: 2018-04-24

## 2018-04-24 PROBLEM — D72.829 LEUKOCYTOSIS: Status: RESOLVED | Noted: 2018-04-17 | Resolved: 2018-04-24

## 2018-04-24 PROBLEM — R19.7 DIARRHEA OF PRESUMED INFECTIOUS ORIGIN: Status: ACTIVE | Noted: 2018-04-24

## 2018-04-24 PROBLEM — Z75.8 DISCHARGE PLANNING ISSUES: Status: ACTIVE | Noted: 2018-04-24

## 2018-04-24 PROBLEM — Z86.74 HISTORY OF CARDIAC ARREST: Status: ACTIVE | Noted: 2018-04-16

## 2018-04-24 PROBLEM — N39.0 UTI (URINARY TRACT INFECTION): Status: RESOLVED | Noted: 2018-04-17 | Resolved: 2018-04-24

## 2018-04-24 PROBLEM — I50.32 CHRONIC DIASTOLIC HEART FAILURE: Status: ACTIVE | Noted: 2018-04-15

## 2018-04-24 LAB
ALBUMIN SERPL BCP-MCNC: 2.4 G/DL
ALP SERPL-CCNC: 202 U/L
ALT SERPL W/O P-5'-P-CCNC: 118 U/L
ANION GAP SERPL CALC-SCNC: 11 MMOL/L
ANISOCYTOSIS BLD QL SMEAR: SLIGHT
AST SERPL-CCNC: 37 U/L
BASOPHILS # BLD AUTO: 0.05 K/UL
BASOPHILS NFR BLD: 0.4 %
BILIRUB SERPL-MCNC: 0.4 MG/DL
BUN SERPL-MCNC: 28 MG/DL
CALCIUM SERPL-MCNC: 8.4 MG/DL
CHLORIDE SERPL-SCNC: 102 MMOL/L
CO2 SERPL-SCNC: 24 MMOL/L
CREAT SERPL-MCNC: 5.3 MG/DL
DIFFERENTIAL METHOD: ABNORMAL
EOSINOPHIL # BLD AUTO: 0.4 K/UL
EOSINOPHIL NFR BLD: 3 %
ERYTHROCYTE [DISTWIDTH] IN BLOOD BY AUTOMATED COUNT: 18.5 %
EST. GFR  (AFRICAN AMERICAN): 8.8 ML/MIN/1.73 M^2
EST. GFR  (NON AFRICAN AMERICAN): 7.6 ML/MIN/1.73 M^2
GLUCOSE SERPL-MCNC: 223 MG/DL
HCT VFR BLD AUTO: 24.8 %
HGB BLD-MCNC: 7.8 G/DL
HYPOCHROMIA BLD QL SMEAR: ABNORMAL
IMM GRANULOCYTES # BLD AUTO: 0.18 K/UL
IMM GRANULOCYTES NFR BLD AUTO: 1.6 %
LYMPHOCYTES # BLD AUTO: 2.9 K/UL
LYMPHOCYTES NFR BLD: 24.7 %
MCH RBC QN AUTO: 24.1 PG
MCHC RBC AUTO-ENTMCNC: 31.5 G/DL
MCV RBC AUTO: 77 FL
MONOCYTES # BLD AUTO: 1.9 K/UL
MONOCYTES NFR BLD: 16.5 %
NEUTROPHILS # BLD AUTO: 6.2 K/UL
NEUTROPHILS NFR BLD: 53.8 %
NRBC BLD-RTO: 0 /100 WBC
PHOSPHATE SERPL-MCNC: 4.5 MG/DL
PLATELET # BLD AUTO: 178 K/UL
PLATELET BLD QL SMEAR: ABNORMAL
PMV BLD AUTO: 11.1 FL
POCT GLUCOSE: 239 MG/DL (ref 70–110)
POCT GLUCOSE: 251 MG/DL (ref 70–110)
POLYCHROMASIA BLD QL SMEAR: ABNORMAL
POTASSIUM SERPL-SCNC: 3.9 MMOL/L
PROT SERPL-MCNC: 7.1 G/DL
RBC # BLD AUTO: 3.24 M/UL
SODIUM SERPL-SCNC: 137 MMOL/L
WBC # BLD AUTO: 11.6 K/UL

## 2018-04-24 PROCEDURE — 36415 COLL VENOUS BLD VENIPUNCTURE: CPT

## 2018-04-24 PROCEDURE — 63600175 PHARM REV CODE 636 W HCPCS: Performed by: INTERNAL MEDICINE

## 2018-04-24 PROCEDURE — 25000003 PHARM REV CODE 250: Performed by: STUDENT IN AN ORGANIZED HEALTH CARE EDUCATION/TRAINING PROGRAM

## 2018-04-24 PROCEDURE — 97530 THERAPEUTIC ACTIVITIES: CPT

## 2018-04-24 PROCEDURE — 99231 SBSQ HOSP IP/OBS SF/LOW 25: CPT | Mod: GC,,, | Performed by: INTERNAL MEDICINE

## 2018-04-24 PROCEDURE — 87449 NOS EACH ORGANISM AG IA: CPT

## 2018-04-24 PROCEDURE — 63600175 PHARM REV CODE 636 W HCPCS: Performed by: STUDENT IN AN ORGANIZED HEALTH CARE EDUCATION/TRAINING PROGRAM

## 2018-04-24 PROCEDURE — 99231 SBSQ HOSP IP/OBS SF/LOW 25: CPT | Mod: GC,,, | Performed by: HOSPITALIST

## 2018-04-24 PROCEDURE — 80053 COMPREHEN METABOLIC PANEL: CPT

## 2018-04-24 PROCEDURE — 11000001 HC ACUTE MED/SURG PRIVATE ROOM

## 2018-04-24 PROCEDURE — 85025 COMPLETE CBC W/AUTO DIFF WBC: CPT

## 2018-04-24 PROCEDURE — 84100 ASSAY OF PHOSPHORUS: CPT

## 2018-04-24 PROCEDURE — 97116 GAIT TRAINING THERAPY: CPT

## 2018-04-24 PROCEDURE — 93971 EXTREMITY STUDY: CPT | Performed by: SURGERY

## 2018-04-24 RX ORDER — INSULIN ASPART 100 [IU]/ML
2 INJECTION, SOLUTION INTRAVENOUS; SUBCUTANEOUS
Status: DISCONTINUED | OUTPATIENT
Start: 2018-04-25 | End: 2018-04-26 | Stop reason: HOSPADM

## 2018-04-24 RX ORDER — INSULIN ASPART 100 [IU]/ML
3 INJECTION, SOLUTION INTRAVENOUS; SUBCUTANEOUS
Status: DISCONTINUED | OUTPATIENT
Start: 2018-04-25 | End: 2018-04-24

## 2018-04-24 RX ADMIN — HEPARIN SODIUM 5000 UNITS: 5000 INJECTION, SOLUTION INTRAVENOUS; SUBCUTANEOUS at 09:04

## 2018-04-24 RX ADMIN — INSULIN ASPART 1 UNITS: 100 INJECTION, SOLUTION INTRAVENOUS; SUBCUTANEOUS at 09:04

## 2018-04-24 RX ADMIN — PIPERACILLIN AND TAZOBACTAM 4.5 G: 4; .5 INJECTION, POWDER, LYOPHILIZED, FOR SOLUTION INTRAVENOUS; PARENTERAL at 05:04

## 2018-04-24 RX ADMIN — INSULIN ASPART 2 UNITS: 100 INJECTION, SOLUTION INTRAVENOUS; SUBCUTANEOUS at 08:04

## 2018-04-24 RX ADMIN — INSULIN DETEMIR 5 UNITS: 100 INJECTION, SOLUTION SUBCUTANEOUS at 09:04

## 2018-04-24 RX ADMIN — HEPARIN SODIUM 5000 UNITS: 5000 INJECTION, SOLUTION INTRAVENOUS; SUBCUTANEOUS at 05:04

## 2018-04-24 NOTE — PLAN OF CARE
Problem: Physical Therapy Goal  Goal: Physical Therapy Goal  Goals to be met by: 18    Patient will increase functional independence with mobility by performin.Supine to and from sit stand by assistance  2. Gait  x 50 feet with Stand-by Assistance using Rolling Walker PRN, with supp O2 PRN.   3. Transfer sit to stand with RW and Stand-by Assistance  4. Patient tolerate 10 minutes of standing activities including exercises and or ADLs     Outcome: Ongoing (interventions implemented as appropriate)  Goals remain appropriate.

## 2018-04-24 NOTE — ASSESSMENT & PLAN NOTE
- Stable  - She has made significant improvements  - Etiology of PEA arrest likely hypoxia, now resolved and on room air  - TTE s/p cardiac arrest showed normal EF (55%), diastolic dysfunction  - Extubated on 4/20 to NC, still doing well  - PT/OT recommending inpatient rehabilitation

## 2018-04-24 NOTE — ASSESSMENT & PLAN NOTE
- Improving  - Etiology unclear, but suspicion for C. Diff is high given antibiotic exposure, recent critical illness, and loose bowel movements  - Stool studies ordered, awaiting collection. Will d/c if her bowel movements improve. Leukocytosis now improved

## 2018-04-24 NOTE — ASSESSMENT & PLAN NOTE
- Stable  - HbA1c 8.0  - Will hold home oral antihyperglycemic agents in favor of aspart SSI + POCT glucose Q6H    - Diabetic diet when not NPO

## 2018-04-24 NOTE — ASSESSMENT & PLAN NOTE
- Stable  - Acute portion resolved  - LVEF preserved, biatrial enlargement with grade 2 diastolic dysfunction, moderate AS, and pHTN on most recent TTE  - Euvolemic with HD

## 2018-04-24 NOTE — ASSESSMENT & PLAN NOTE
- baseline sCr 4.5-5.0 c/w CKD stage V  - admitted with ADHF. Attempted medical diuresis without improvement in UOP. Increased O2 requirements throughout the day. PEA arrest on 4/16-4/17; likely related to hypoxia. SLED started same day. She has remained on RRT since 4/17  - transitioned to HD on 4/23; tolerated well. 1L removed  - no need for RRT today  - primary team working on tunneled HD catheter placement and outpatient dialysis chair  - vein mapping pending  - will plan for HD tomorrow.   - pleuritic chest pain; no recent CXR. Will repeat today.

## 2018-04-24 NOTE — ASSESSMENT & PLAN NOTE
- Stable  - HbA1c 8.0  - Will hold home oral antihyperglycemic agents in favor of aspart SSI + POCT glucose Q6H  - Cautious with insulin titration given ESRD. Added basal insulin tonight  - Continue aspart 2 units TID  - Diabetic diet restriction when not NPO

## 2018-04-24 NOTE — PROGRESS NOTES
[facility]  Nephrology  Progress Note    Patient Name: Martha Melvin  MRN: [id number]  Admission Date: 4/15/2018  Hospital Length of Stay: 9 days  Attending Provider: Kendal Osborn MD   Primary Care Physician: GUILLERMO Mcclure MD  Principal Problem:Cardiac arrest    Subjective:     HPI: Ms. Melvin is a 71 yo AAF with HFpEF, aortic stenosis, HTN, T2DM, and CKD stage V (baseline sCr 4.5-5.0) admitted on 4/15 with ADHF. She reported worsening SOB, orthopnea, and PND x 4 days. SpO2 82% on RA upon arrival. In the ED she was given nitro paste, albuterol, and lasix IV. She was admitted to medicine and started on lasix 60mg IV TID with 1L fluid restriction. She was also started on rocephin and azithromycin for CAP. No events overnight. UOP only 700cc. sCr tere from 5.4 to 5.9. Nephrology consulted for fluid management in setting of CKD stage V. Patient is aware of CKD diagnosis. She was offered dialysis 1.5 years ago but denied it at that time due to lack of transportation. She was previously followed by a nephrologist in [place] for the last 4 years. She was on lasix 20mg po for a long time. She developed ADHF a few months ago and was admitted to [facility]. She was told here that she would need higher doses of lasix given her advanced CKD. She was started on lasix 80mg po BID at that time. About 2 weeks ago she ran out of lasix. She called her PCP asking for a refill of the 80mg and this was denied. She asked if she could take 4 of the 20mg tablets and was told that her insurance would not cover this. She continues to feel very SOB this morning. She is interested in proceeding with dialysis if needed.     Interval History:   Received HD yesterday with 1L removed. Tolerated well. No events overnight. Doing well this morning; chatting on phone. No UOP. Complains of some chest pain/pressure whenever she coughs. Agreeable to HD tomorrow.     Review of patient's allergies indicates:   Allergen Reactions     Morphine      Current Facility-Administered Medications   Medication Frequency    0.9%  NaCl infusion PRN    acetaminophen tablet 650 mg Q6H PRN    albuterol nebulizer solution 2.5 mg Q4H PRN    benzonatate capsule 200 mg TID PRN    dextrose 50% injection 12.5 g PRN    dextrose 50% injection 25 g PRN    [START ON 4/25/2018] epoetin bashir injection 4,500 Units Every Mon, Wed, Fri    glucagon (human recombinant) injection 1 mg PRN    glucose chewable tablet 16 g PRN    glucose chewable tablet 24 g PRN    heparin (porcine) injection 5,000 Units Q8H    insulin aspart U-100 pen 0-5 Units QID (AC + HS) PRN    insulin aspart U-100 pen 2 Units TIDWM    ondansetron disintegrating tablet 4 mg Q6H PRN    piperacillin-tazobactam 4.5 g in sodium chloride 0.9% 100 mL IVPB (ready to mix system) Q8H    sodium chloride 0.9% flush 5 mL PRN       Objective:     Vital Signs (Most Recent):  Temp: 98.4 °F (36.9 °C) (04/24/18 0559)  Pulse: 75 (04/24/18 0817)  Resp: 16 (04/24/18 0559)  BP: 108/69 (04/24/18 0559)  SpO2: 100 % (04/24/18 0559)  O2 Device (Oxygen Therapy): room air (04/21/18 1100) Vital Signs (24h Range):  Temp:  [97.7 °F (36.5 °C)-98.7 °F (37.1 °C)] 98.4 °F (36.9 °C)  Pulse:  [74-86] 75  Resp:  [13-22] 16  SpO2:  [96 %-100 %] 100 %  BP: (107-147)/(63-81) 108/69     Weight: 84 kg (185 lb 3 oz) (04/22/18 0608)  Body mass index is 29.89 kg/m².  Body surface area is 1.98 meters squared.    I/O last 3 completed shifts:  In: 600 [Other:600]  Out: 1600 [Other:1600]    Physical Exam   Constitutional: She is oriented to person, place, and time. She appears well-developed and well-nourished. No distress.   HENT:   Head: Normocephalic and atraumatic.   Eyes: Conjunctivae and EOM are normal.   Cardiovascular: Normal rate and regular rhythm.    Pulmonary/Chest: Effort normal.   Abdominal: Soft. She exhibits no distension.   Musculoskeletal: She exhibits no edema or deformity.   Neurological: She is alert and oriented to  person, place, and time.   Skin: Skin is warm and dry. She is not diaphoretic.       Significant Labs:  CBC:   Recent Labs  Lab 04/24/18  0619   WBC 11.60   RBC 3.24*   HGB 7.8*   HCT 24.8*      MCV 77*   MCH 24.1*   MCHC 31.5*     CMP:   Recent Labs  Lab 04/24/18  0619   *   CALCIUM 8.4*   ALBUMIN 2.4*   PROT 7.1      K 3.9   CO2 24      BUN 28*   CREATININE 5.3*   ALKPHOS 202*   *   AST 37   BILITOT 0.4     All labs within the past 24 hours have been reviewed.     Significant Imaging:  Labs: Reviewed    Assessment/Plan:     ESRD (end stage renal disease)    - baseline sCr 4.5-5.0 c/w CKD stage V  - admitted with ADHF. Attempted medical diuresis without improvement in UOP. Increased O2 requirements throughout the day. PEA arrest on 4/16-4/17; likely related to hypoxia. SLED started same day. She has remained on RRT since 4/17  - transitioned to HD on 4/23; tolerated well. 1L removed  - no need for RRT today  - primary team working on tunneled HD catheter placement and outpatient dialysis chair  - vein mapping pending  - will plan for HD tomorrow.   - pleuritic chest pain; no recent CXR. Will repeat today.         Anemia, chronic renal failure, stage 5    - Tsat low but ferritin > 1000; cannot give IV iron  - will start CHACHO three times weekly with HD            Matilda Pascal, PGY-5  Nephrology Fellow  [facility]  Pager: [phone]    ATTENDING PHYSICIAN ATTESTATION  I have personally interviewed and examined the patient. I thoroughly reviewed the demographic, clinical, laboratorial and imaging information available in medical records. I agree with the assessment and recommendations provided by the subspecialty resident. Dr. Pascal was under my supervision.

## 2018-04-24 NOTE — NURSING
Patient went to dialysis this morning and returned around 1300. She needed to go to the BR and have a BM. PT came in and got her up to the bathroom but she required a lot of assistance. Afterwards she has sat up in the recliner and has done well. We did get her a bedside commode for later and ordered a walker.

## 2018-04-24 NOTE — PROGRESS NOTES
[facility]  Nephrology  Progress Note     Patient Name: Martha Melvin  MRN: [id number]  Admission Date: 4/15/2018  Hospital Length of Stay: 9 days  Attending Provider: Kendal Osborn MD   Primary Care Physician: GUILLERMO Mcclure MD  Principal Problem:Cardiac arrest     Subjective:      HPI: Ms. Melvin is a 71 yo AAF with HFpEF, aortic stenosis, HTN, T2DM, and CKD stage V (baseline sCr 4.5-5.0) admitted on 4/15 with ADHF. She reported worsening SOB, orthopnea, and PND x 4 days. SpO2 82% on RA upon arrival. In the ED she was given nitro paste, albuterol, and lasix IV. She was admitted to medicine and started on lasix 60mg IV TID with 1L fluid restriction. She was also started on rocephin and azithromycin for CAP. No events overnight. UOP only 700cc. sCr tere from 5.4 to 5.9. Nephrology consulted for fluid management in setting of CKD stage V. Patient is aware of CKD diagnosis. She was offered dialysis 1.5 years ago but denied it at that time due to lack of transportation. She was previously followed by a nephrologist in [place] for the last 4 years. She was on lasix 20mg po for a long time. She developed ADHF a few months ago and was admitted to [facility]. She was told here that she would need higher doses of lasix given her advanced CKD. She was started on lasix 80mg po BID at that time. About 2 weeks ago she ran out of lasix. She called her PCP asking for a refill of the 80mg and this was denied. She asked if she could take 4 of the 20mg tablets and was told that her insurance would not cover this. She continues to feel very SOB this morning. She is interested in proceeding with dialysis if needed.      Interval History:   UGY consulted for permacath placement.           Review of patient's allergies indicates:   Allergen Reactions    Morphine             Current Facility-Administered Medications   Medication Frequency    0.9%  NaCl infusion PRN    acetaminophen tablet 650 mg Q6H PRN     albuterol nebulizer solution 2.5 mg Q4H PRN    benzonatate capsule 200 mg TID PRN    dextrose 50% injection 12.5 g PRN    dextrose 50% injection 25 g PRN    [START ON 4/25/2018] epoetin bashir injection 4,500 Units Every Mon, Wed, Fri    glucagon (human recombinant) injection 1 mg PRN    glucose chewable tablet 16 g PRN    glucose chewable tablet 24 g PRN    heparin (porcine) injection 5,000 Units Q8H    insulin aspart U-100 pen 0-5 Units QID (AC + HS) PRN    insulin aspart U-100 pen 2 Units TIDWM    ondansetron disintegrating tablet 4 mg Q6H PRN    piperacillin-tazobactam 4.5 g in sodium chloride 0.9% 100 mL IVPB (ready to mix system) Q8H    sodium chloride 0.9% flush 5 mL PRN         Objective:      Vital Signs (Most Recent):  Temp: 98.4 °F (36.9 °C) (04/24/18 0559)  Pulse: 75 (04/24/18 0817)  Resp: 16 (04/24/18 0559)  BP: 108/69 (04/24/18 0559)  SpO2: 100 % (04/24/18 0559)  O2 Device (Oxygen Therapy): room air (04/21/18 1100) Vital Signs (24h Range):  Temp:  [97.7 °F (36.5 °C)-98.7 °F (37.1 °C)] 98.4 °F (36.9 °C)  Pulse:  [74-86] 75  Resp:  [13-22] 16  SpO2:  [96 %-100 %] 100 %  BP: (107-147)/(63-81) 108/69      Weight: 84 kg (185 lb 3 oz) (04/22/18 0608)  Body mass index is 29.89 kg/m².  Body surface area is 1.98 meters squared.     I/O last 3 completed shifts:  In: 600 [Other:600]  Out: 1600 [Other:1600]     Physical Exam   Constitutional: She is oriented to person, place, and time. She appears well-developed and well-nourished. No distress.   HENT:   Head: Normocephalic and atraumatic.   Eyes: Conjunctivae and EOM are normal.   Cardiovascular: Normal rate and regular rhythm.    Pulmonary/Chest: Effort normal.   Abdominal: Soft. She exhibits no distension.   Musculoskeletal: She exhibits no edema or deformity.   Neurological: She is alert and oriented to person, place, and time.   Skin: Skin is warm and dry. She is not diaphoretic.         Significant Labs:  CBC:   Recent Labs  Lab 04/24/18  0619    WBC 11.60   RBC 3.24*   HGB 7.8*   HCT 24.8*      MCV 77*   MCH 24.1*   MCHC 31.5*      CMP:   Recent Labs  Lab 04/24/18  0619   *   CALCIUM 8.4*   ALBUMIN 2.4*   PROT 7.1      K 3.9   CO2 24      BUN 28*   CREATININE 5.3*   ALKPHOS 202*   *   AST 37   BILITOT 0.4      All labs within the past 24 hours have been reviewed.      Significant Imaging:  Labs: Reviewed     Assessment/Plan:      ESRD (end stage renal disease)     Will place tunneled HD cathete tomorrow for ongoing HD.

## 2018-04-24 NOTE — PROGRESS NOTES
[facility] Hospital Medicine  Progress Note    Patient Name: Martha Melvin  MRN: [id number]  Patient Class: IP- Inpatient   Admission Date: 4/15/2018  Length of Stay: 9 days  Attending Physician: Kendal Osborn MD  Primary Care Provider: GUILLERMO Mcclure MD    [facility] Medicine Team: [facility] 1 Bobby Meyer MD    Subjective:     Principal Problem:History of cardiac arrest    HPI:  71 y/o F PMhx HFrEF, CKD stage V (makes urine, not on dialysis), moderate aortic stenosis, DM II, R leg amputation (2015), HTN who presented w/ SOB x 4 days. Patient was discharged from [facility] 10/14/17 for SOB 2/2 ADHF and she was subsequently discharged on an increased of lasix from 20mg to 80mg daily due to her renal failure. Daughter states patient ran out of lasix 80mg and asked PCP for rx refill. However, per daughter PCP refused to fill 80mg and therefore patient had been taking 20mg x 4 daily. Daughter states she last saw patient on 4/11 prior to admission and that patient was in good health at that time. She spoke with patient over the phone on 4/16 and at that time noted patient to be SOB which prompted her to call a family friend to check on patient. In conversation daughter was made aware that patient's lasix had run out. EMS was called and on route she pllaced on BIPAP and given albuterol. Upon arrival her saturations were at 82%.     In the ED patient given nitro paste, albuterol, as well as lasix. She was subsequently admitted to medicine. She was started on lasix 60mg IV tid as well as rocephin and azithromycin for suspected CAP. Nephrology was consulted for volume overload in the setting of CKD stage V, however, due to poor urine output despite diuresis w/ lasix and diuril nephrology requested trialysis. Following placement patient found to be in PEA arrest. ROSC achieved for <10min then patient again coded. ROSC achieved and patient was subsequently transferred to SICU where patient coded  a 3rd time. ROSC achieved and maintained, epi switched to levo and placed on vent. Patient started on urgent dialysis. Continued to be hypoxic therefore given nimbex, elevation of HOB w/ improvement in sats.     Per daughter patient had been aware of CKD V since 2015 and had refused dialysis since that time due to fear of having to be on dialysis.    Hospital Course:  No notes on file    Interval History:     Ms. Melvin did well overnight. There were no acute events, and she had no new complaints. She is breathing comfortably on room air. Awaiting stool collection for C diff, outpatient HD labs, and tunneled catheter placement.    Discussed tunneled catheter with nephrology access service this morning.    Review of Systems   Constitutional: Positive for fatigue. Negative for chills and fever.   HENT: Negative for congestion and sore throat.    Eyes: Negative for visual disturbance.   Respiratory: Negative for cough and shortness of breath.    Cardiovascular: Negative for chest pain, palpitations and leg swelling.   Gastrointestinal: Negative for abdominal distention, abdominal pain, constipation, diarrhea, nausea and vomiting.   Endocrine: Negative for polyuria.   Genitourinary: Negative for difficulty urinating and dysuria.   Musculoskeletal: Negative for myalgias.   Skin: Negative for rash and wound.   Allergic/Immunologic: Negative for immunocompromised state.   Neurological: Positive for weakness. Negative for dizziness and numbness.   Hematological: Negative for adenopathy.   Psychiatric/Behavioral: Negative for dysphoric mood. The patient is not nervous/anxious.      Objective:     Vital Signs (Most Recent):  Temp: 98.2 °F (36.8 °C) (04/24/18 1000)  Pulse: 81 (04/24/18 1505)  Resp: 18 (04/24/18 1500)  BP: 111/69 (04/24/18 1500)  SpO2: 95 % (04/24/18 1500) Vital Signs (24h Range):  Temp:  [98.2 °F (36.8 °C)-98.7 °F (37.1 °C)] 98.2 °F (36.8 °C)  Pulse:  [75-86] 81  Resp:  [13-22] 18  SpO2:  [90 %-100 %] 95  %  BP: (107-127)/(63-78) 111/69     Weight: 84 kg (185 lb 3 oz)  Body mass index is 29.89 kg/m².  No intake or output data in the 24 hours ending 04/24/18 1732   Physical Exam   Constitutional: She is oriented to person, place, and time. She appears well-developed and well-nourished. No distress.   HENT:   Head: Normocephalic and atraumatic.   Eyes: Conjunctivae are normal. No scleral icterus.   Cardiovascular: Normal rate, regular rhythm and intact distal pulses.    Murmur (grade 3 VAMSI) heard.  R. IJ trialysis catheter   Pulmonary/Chest: She has no wheezes. She has no rales.   Abdominal: Soft. Bowel sounds are normal. She exhibits no distension. There is no tenderness. There is no guarding.   Musculoskeletal: She exhibits no edema or tenderness.   R LE amputation   Lymphadenopathy:     She has no cervical adenopathy.   Neurological: She is alert and oriented to person, place, and time.   4/5 strength globally    Skin: Skin is warm and dry. No rash noted.   Psychiatric: She has a normal mood and affect. Her behavior is normal.   Nursing note and vitals reviewed.      Significant Labs:     CBC:    Recent Labs  Lab 04/23/18  0621 04/24/18  0619   WBC 13.84* 11.60   GRAN 58.7  8.1* 53.8  6.2   HGB 7.4* 7.8*   HCT 23.7* 24.8*    178       Chem 10:    Recent Labs  Lab 04/23/18  0621 04/24/18  0619    137   K 4.3 3.9    102   CO2 20* 24   BUN 42* 28*   CREATININE 6.2* 5.3*   * 223*   CALCIUM 8.4* 8.4*   PHOS  --  4.5       LFTs:    Recent Labs  Lab 04/23/18  0621 04/24/18  0619   ALKPHOS 140* 202*   BILITOT 0.5 0.4   AST 41* 37   * 118*   ALBUMIN 2.4* 2.4*     Assessment/Plan:      * History of cardiac arrest    - Stable  - She has made significant improvements  - Etiology of PEA arrest likely hypoxia, now resolved and on room air  - TTE s/p cardiac arrest showed normal EF (55%), diastolic dysfunction  - Extubated on 4/20 to NC, still doing well  - PT/OT recommending inpatient  rehabilitation        ESRD (end stage renal disease)    - Stable  - Baseline sCr 4.5-5.0 c/w CKD stage V prior to admission  - Admitted with ADHF. Attempted medical diuresis without improvement in UOP. PEA arrest on 4/16-4/17; likely related to hypoxia.  SLED started same day  - Has remained on RRT since 4/17, now tolerating conventional HD well  - HD outpatient labs ordered. Hepatitis panel pending. Awaiting PPD interpretation  - Nephrology access service called regarding tunneled HD cath. Appreciate assistance        Diarrhea of presumed infectious origin    - Improving  - Etiology unclear, but suspicion for C. Diff is high given antibiotic exposure, recent critical illness, and loose bowel movements  - Stool studies ordered, awaiting collection. Will d/c if her bowel movements improve. Leukocytosis now improved        Discharge planning issues    - Working on outpatient HD placement, catheter placement        Elevated liver enzymes    - Resolving, etiology shock liver        Aortic stenosis    - Stable  - TTE 4/16/18 EF 55-60% w/ grade II DD  - Moderate aortic stenosis, SABRINA = 1.15 cm2, AVAi = 0.57 cm2/m2, peak velocity = 3.01 m/s, mean gradient = 21 mmHg  - Cautious w/ volume removal as aortic stenosis is preload dependent        Anemia, chronic renal failure, stage 5    - Stable  - Etiology thought to be CKD  - Continue to monitor with CBC daily, Hgb < 7        Chronic diastolic heart failure    - Stable  - Acute portion resolved  - LVEF preserved, biatrial enlargement with grade 2 diastolic dysfunction, moderate AS, and pHTN on most recent TTE  - Euvolemic with HD        Type 2 diabetes mellitus with stage 5 chronic kidney disease and hypertension    - Stable  - HbA1c 8.0  - Will hold home oral antihyperglycemic agents in favor of aspart SSI + POCT glucose Q6H  - Cautious with insulin titration given ESRD. Added basal insulin tonight  - Continue aspart 2 units TID  - Diabetic diet restriction when not NPO           VTE Risk Mitigation         Ordered     IP VTE HIGH RISK PATIENT  Once      04/21/18 1936     heparin (porcine) injection 5,000 Units  Every 8 hours      04/19/18 0159        Patient discussed with Dr. Osborn, who helped formulate the above plan.    Bobby Meyer MD  Department of Hospital Medicine   [facility]

## 2018-04-24 NOTE — SUBJECTIVE & OBJECTIVE
Interval History:     Ms. Melvin did well overnight. There were no acute events, and she had no new complaints. She is breathing comfortably on room air. Awaiting stool collection for C diff, outpatient HD labs, and tunneled catheter placement.    Discussed tunneled catheter with nephrology access service this morning.    Review of Systems   Constitutional: Positive for fatigue. Negative for chills and fever.   HENT: Negative for congestion and sore throat.    Eyes: Negative for visual disturbance.   Respiratory: Negative for cough and shortness of breath.    Cardiovascular: Negative for chest pain, palpitations and leg swelling.   Gastrointestinal: Negative for abdominal distention, abdominal pain, constipation, diarrhea, nausea and vomiting.   Endocrine: Negative for polyuria.   Genitourinary: Negative for difficulty urinating and dysuria.   Musculoskeletal: Negative for myalgias.   Skin: Negative for rash and wound.   Allergic/Immunologic: Negative for immunocompromised state.   Neurological: Positive for weakness. Negative for dizziness and numbness.   Hematological: Negative for adenopathy.   Psychiatric/Behavioral: Negative for dysphoric mood. The patient is not nervous/anxious.      Objective:     Vital Signs (Most Recent):  Temp: 98.2 °F (36.8 °C) (04/24/18 1000)  Pulse: 81 (04/24/18 1505)  Resp: 18 (04/24/18 1500)  BP: 111/69 (04/24/18 1500)  SpO2: 95 % (04/24/18 1500) Vital Signs (24h Range):  Temp:  [98.2 °F (36.8 °C)-98.7 °F (37.1 °C)] 98.2 °F (36.8 °C)  Pulse:  [75-86] 81  Resp:  [13-22] 18  SpO2:  [90 %-100 %] 95 %  BP: (107-127)/(63-78) 111/69     Weight: 84 kg (185 lb 3 oz)  Body mass index is 29.89 kg/m².  No intake or output data in the 24 hours ending 04/24/18 1732   Physical Exam   Constitutional: She is oriented to person, place, and time. She appears well-developed and well-nourished. No distress.   HENT:   Head: Normocephalic and atraumatic.   Eyes: Conjunctivae are normal. No scleral icterus.    Cardiovascular: Normal rate, regular rhythm and intact distal pulses.    Murmur (grade 3 VAMSI) heard.  R. IJ trialysis catheter   Pulmonary/Chest: She has no wheezes. She has no rales.   Abdominal: Soft. Bowel sounds are normal. She exhibits no distension. There is no tenderness. There is no guarding.   Musculoskeletal: She exhibits no edema or tenderness.   R LE amputation   Lymphadenopathy:     She has no cervical adenopathy.   Neurological: She is alert and oriented to person, place, and time.   4/5 strength globally    Skin: Skin is warm and dry. No rash noted.   Psychiatric: She has a normal mood and affect. Her behavior is normal.   Nursing note and vitals reviewed.      Significant Labs:     CBC:    Recent Labs  Lab 04/23/18 0621 04/24/18 0619   WBC 13.84* 11.60   GRAN 58.7  8.1* 53.8  6.2   HGB 7.4* 7.8*   HCT 23.7* 24.8*    178       Chem 10:    Recent Labs  Lab 04/23/18 0621 04/24/18 0619    137   K 4.3 3.9    102   CO2 20* 24   BUN 42* 28*   CREATININE 6.2* 5.3*   * 223*   CALCIUM 8.4* 8.4*   PHOS  --  4.5       LFTs:    Recent Labs  Lab 04/23/18 0621 04/24/18 0619   ALKPHOS 140* 202*   BILITOT 0.5 0.4   AST 41* 37   * 118*   ALBUMIN 2.4* 2.4*

## 2018-04-24 NOTE — PT/OT/SLP PROGRESS
"Physical Therapy Treatment    Patient Name:  Martha Melvin   MRN:  [id number]    Recommendations:     Discharge Recommendations:  rehabilitation facility   Discharge Equipment Recommendations: walker, rolling   Barriers to discharge: Decreased caregiver support    Assessment:     Martha Melvin is a 72 y.o. female admitted with a medical diagnosis of Cardiac arrest.  She presents with the following impairments/functional limitations:  weakness, impaired endurance, impaired self care skills, impaired functional mobilty, gait instability, decreased safety awareness, impaired balance, decreased coordination, impaired cardiopulmonary response to activity. Pt tolerated tx well with focus on gait training, sit<>stand transfer, and bed mobility. Pt progressing well ambulating 20 ft with no rest break needed this session. Pt limited d/t fatigue, poor fitting RLE prothesis and pt is on isolation precautions. Pt will continue to benefit from skilled therapy to improve impairments listed below.     Rehab Prognosis:  Good ; patient would benefit from acute skilled PT services to address these deficits and reach maximum level of function.      Recent Surgery: Procedure(s) (LRB):  PERMCATH INSERTION-TUNNELED CVC (N/A)      Plan:     During this hospitalization, patient to be seen 4 x/week to address the above listed problems via gait training, therapeutic activities, therapeutic exercises  · Plan of Care Expires:  05/23/18   Plan of Care Reviewed with: patient, mother    Subjective     Communicated with NSG prior to session.  Patient found sitting in bed with HOB elevated upon PTA entry to room, agreeable to treatment.      Chief Complaint: no complaints  Patient comments/goals: "I feel like I did better than yesterday."  Pain/Comfort:  · Pain Rating 1: 0/10  · Pain Rating Post-Intervention 1: 0/10    Patients cultural, spiritual, Religion conflicts given the current situation: none stated    Objective:     Patient found with: " oxygen, telemetry     General Precautions: Standard, fall   Orthopedic Precautions:N/A   Braces: RLE Prosthesis     Functional Mobility:  · Bed Mobility:     · Rolling Right: contact guard assistance  · Scooting: contact guard assistance  · Supine to Sit: contact guard assistance  · Sit to Supine: contact guard assistance  · Transfers:     · Sit to Stand:  contact guard assistance with rolling walker  · Gait: Pt ambulated 20 ft with RW and CGA. Pt with slow meg and poor progression of RLE d/t poor fit of prothesis.   · Balance: Pt with good balance, maintaining static stand and ambualitng with RW for stability      AM-PAC 6 CLICK MOBILITY  Turning over in bed (including adjusting bedclothes, sheets and blankets)?: 3  Sitting down on and standing up from a chair with arms (e.g., wheelchair, bedside commode, etc.): 2  Moving from lying on back to sitting on the side of the bed?: 3  Moving to and from a bed to a chair (including a wheelchair)?: 2  Need to walk in hospital room?: 2  Climbing 3-5 steps with a railing?: 1  Total Score: 13       Therapeutic Activities and Exercises:   Pt assisted with bed mobility, sitting EOB, and donning RLE prothesis.   Pt requires Min A to properly don prothesis. Pts prothesis fits loosely, with pt stating that she has had a new prothesis made but that she hasn't been able to pick it up.   Pt assisted with doffing prothesis and returning to supine following gait training.   Pt educated on improving tolerance to upright positioning and continued participation with therapy.     Patient left HOB elevated with call button in reach and NSG notified that pts cannula is caught in her IV line and needs to be adjusted so pt can properly and safely don her NC.    GOALS:    Physical Therapy Goals        Problem: Physical Therapy Goal    Goal Priority Disciplines Outcome Goal Variances Interventions   Physical Therapy Goal     PT/OT, PT Ongoing (interventions implemented as appropriate)      Description:  Goals to be met by: 18    Patient will increase functional independence with mobility by performin.Supine to and from sit stand by assistance  2. Gait  x 50 feet with Stand-by Assistance using Rolling Walker PRN, with supp O2 PRN.   3. Transfer sit to stand with RW and Stand-by Assistance  4. Patient tolerate 10 minutes of standing activities including exercises and or ADLs                      Time Tracking:     PT Received On: 18  PT Start Time: 1330     PT Stop Time: 1356  PT Total Time (min): 26 min     Billable Minutes: Gait Training 12 and Therapeutic Activity 14    Treatment Type: Treatment  PT/PTA: PTA     PTA Visit Number: 1     Marcus Cain, PTA  2018

## 2018-04-24 NOTE — ASSESSMENT & PLAN NOTE
- Stable  - Baseline sCr 4.5-5.0 c/w CKD stage V prior to admission  - Admitted with ADHF. Attempted medical diuresis without improvement in UOP. PEA arrest on 4/16-4/17; likely related to hypoxia.  SLED started same day  - Has remained on RRT since 4/17, now tolerating conventional HD well  - HD outpatient labs ordered. Hepatitis panel pending. Awaiting PPD interpretation  - Nephrology access service called regarding tunneled HD cath. Appreciate assistance

## 2018-04-24 NOTE — SUBJECTIVE & OBJECTIVE
Interval History:   Received HD yesterday with 1L removed. Tolerated well. No events overnight. Doing well this morning; chatting on phone. No UOP. Complains of some chest pain/pressure whenever she coughs. Agreeable to HD tomorrow.     Review of patient's allergies indicates:   Allergen Reactions    Morphine      Current Facility-Administered Medications   Medication Frequency    0.9%  NaCl infusion PRN    acetaminophen tablet 650 mg Q6H PRN    albuterol nebulizer solution 2.5 mg Q4H PRN    benzonatate capsule 200 mg TID PRN    dextrose 50% injection 12.5 g PRN    dextrose 50% injection 25 g PRN    [START ON 4/25/2018] epoetin bashir injection 4,500 Units Every Mon, Wed, Fri    glucagon (human recombinant) injection 1 mg PRN    glucose chewable tablet 16 g PRN    glucose chewable tablet 24 g PRN    heparin (porcine) injection 5,000 Units Q8H    insulin aspart U-100 pen 0-5 Units QID (AC + HS) PRN    insulin aspart U-100 pen 2 Units TIDWM    ondansetron disintegrating tablet 4 mg Q6H PRN    piperacillin-tazobactam 4.5 g in sodium chloride 0.9% 100 mL IVPB (ready to mix system) Q8H    sodium chloride 0.9% flush 5 mL PRN       Objective:     Vital Signs (Most Recent):  Temp: 98.4 °F (36.9 °C) (04/24/18 0559)  Pulse: 75 (04/24/18 0817)  Resp: 16 (04/24/18 0559)  BP: 108/69 (04/24/18 0559)  SpO2: 100 % (04/24/18 0559)  O2 Device (Oxygen Therapy): room air (04/21/18 1100) Vital Signs (24h Range):  Temp:  [97.7 °F (36.5 °C)-98.7 °F (37.1 °C)] 98.4 °F (36.9 °C)  Pulse:  [74-86] 75  Resp:  [13-22] 16  SpO2:  [96 %-100 %] 100 %  BP: (107-147)/(63-81) 108/69     Weight: 84 kg (185 lb 3 oz) (04/22/18 0608)  Body mass index is 29.89 kg/m².  Body surface area is 1.98 meters squared.    I/O last 3 completed shifts:  In: 600 [Other:600]  Out: 1600 [Other:1600]    Physical Exam   Constitutional: She is oriented to person, place, and time. She appears well-developed and well-nourished. No distress.   HENT:   Head:  Normocephalic and atraumatic.   Eyes: Conjunctivae and EOM are normal.   Cardiovascular: Normal rate and regular rhythm.    Pulmonary/Chest: Effort normal.   Abdominal: Soft. She exhibits no distension.   Musculoskeletal: She exhibits no edema or deformity.   Neurological: She is alert and oriented to person, place, and time.   Skin: Skin is warm and dry. She is not diaphoretic.       Significant Labs:  CBC:   Recent Labs  Lab 04/24/18  0619   WBC 11.60   RBC 3.24*   HGB 7.8*   HCT 24.8*      MCV 77*   MCH 24.1*   MCHC 31.5*     CMP:   Recent Labs  Lab 04/24/18  0619   *   CALCIUM 8.4*   ALBUMIN 2.4*   PROT 7.1      K 3.9   CO2 24      BUN 28*   CREATININE 5.3*   ALKPHOS 202*   *   AST 37   BILITOT 0.4     All labs within the past 24 hours have been reviewed.     Significant Imaging:  Labs: Reviewed

## 2018-04-25 PROBLEM — Z78.9 IMPAIRED MOBILITY AND ADLS: Status: ACTIVE | Noted: 2018-04-25

## 2018-04-25 PROBLEM — Z74.09 IMPAIRED MOBILITY AND ADLS: Status: ACTIVE | Noted: 2018-04-25

## 2018-04-25 LAB
ALBUMIN SERPL BCP-MCNC: 2.4 G/DL
ALP SERPL-CCNC: 154 U/L
ALT SERPL W/O P-5'-P-CCNC: 92 U/L
ANION GAP SERPL CALC-SCNC: 14 MMOL/L
ANISOCYTOSIS BLD QL SMEAR: SLIGHT
AST SERPL-CCNC: 27 U/L
BASOPHILS # BLD AUTO: 0.04 K/UL
BASOPHILS NFR BLD: 0.4 %
BILIRUB SERPL-MCNC: 0.3 MG/DL
BUN SERPL-MCNC: 41 MG/DL
C DIFF GDH STL QL: NEGATIVE
C DIFF TOX A+B STL QL IA: NEGATIVE
CALCIUM SERPL-MCNC: 8.1 MG/DL
CHLORIDE SERPL-SCNC: 103 MMOL/L
CO2 SERPL-SCNC: 21 MMOL/L
CREAT SERPL-MCNC: 7.9 MG/DL
DIFFERENTIAL METHOD: ABNORMAL
EOSINOPHIL # BLD AUTO: 0.4 K/UL
EOSINOPHIL NFR BLD: 3.8 %
ERYTHROCYTE [DISTWIDTH] IN BLOOD BY AUTOMATED COUNT: 19.2 %
EST. GFR  (AFRICAN AMERICAN): 5.4 ML/MIN/1.73 M^2
EST. GFR  (NON AFRICAN AMERICAN): 4.7 ML/MIN/1.73 M^2
GLUCOSE SERPL-MCNC: 210 MG/DL
HCT VFR BLD AUTO: 24.9 %
HGB BLD-MCNC: 7.8 G/DL
HYPOCHROMIA BLD QL SMEAR: ABNORMAL
IMM GRANULOCYTES # BLD AUTO: 0.16 K/UL
IMM GRANULOCYTES NFR BLD AUTO: 1.5 %
LYMPHOCYTES # BLD AUTO: 2.8 K/UL
LYMPHOCYTES NFR BLD: 25.5 %
MCH RBC QN AUTO: 23.7 PG
MCHC RBC AUTO-ENTMCNC: 31.3 G/DL
MCV RBC AUTO: 76 FL
MONOCYTES # BLD AUTO: 1.9 K/UL
MONOCYTES NFR BLD: 17.1 %
NEUTROPHILS # BLD AUTO: 5.7 K/UL
NEUTROPHILS NFR BLD: 51.7 %
NRBC BLD-RTO: 0 /100 WBC
OVALOCYTES BLD QL SMEAR: ABNORMAL
PLATELET # BLD AUTO: 201 K/UL
PMV BLD AUTO: 10.9 FL
POCT GLUCOSE: 170 MG/DL (ref 70–110)
POCT GLUCOSE: 184 MG/DL (ref 70–110)
POCT GLUCOSE: 224 MG/DL (ref 70–110)
POCT GLUCOSE: 225 MG/DL (ref 70–110)
POIKILOCYTOSIS BLD QL SMEAR: SLIGHT
POLYCHROMASIA BLD QL SMEAR: ABNORMAL
POTASSIUM SERPL-SCNC: 4.1 MMOL/L
PROT SERPL-MCNC: 7.2 G/DL
RBC # BLD AUTO: 3.29 M/UL
SODIUM SERPL-SCNC: 138 MMOL/L
WBC # BLD AUTO: 10.92 K/UL

## 2018-04-25 PROCEDURE — 25000003 PHARM REV CODE 250: Performed by: INTERNAL MEDICINE

## 2018-04-25 PROCEDURE — 63600175 PHARM REV CODE 636 W HCPCS: Performed by: STUDENT IN AN ORGANIZED HEALTH CARE EDUCATION/TRAINING PROGRAM

## 2018-04-25 PROCEDURE — 63600175 PHARM REV CODE 636 W HCPCS: Performed by: INTERNAL MEDICINE

## 2018-04-25 PROCEDURE — C1750 CATH, HEMODIALYSIS,LONG-TERM: HCPCS

## 2018-04-25 PROCEDURE — 76937 US GUIDE VASCULAR ACCESS: CPT | Mod: 26,,, | Performed by: INTERNAL MEDICINE

## 2018-04-25 PROCEDURE — 80053 COMPREHEN METABOLIC PANEL: CPT

## 2018-04-25 PROCEDURE — 97530 THERAPEUTIC ACTIVITIES: CPT

## 2018-04-25 PROCEDURE — C1894 INTRO/SHEATH, NON-LASER: HCPCS

## 2018-04-25 PROCEDURE — 99232 SBSQ HOSP IP/OBS MODERATE 35: CPT | Mod: GC,,, | Performed by: HOSPITALIST

## 2018-04-25 PROCEDURE — 97802 MEDICAL NUTRITION INDIV IN: CPT

## 2018-04-25 PROCEDURE — 36415 COLL VENOUS BLD VENIPUNCTURE: CPT

## 2018-04-25 PROCEDURE — 90935 HEMODIALYSIS ONE EVALUATION: CPT

## 2018-04-25 PROCEDURE — 77001 FLUOROGUIDE FOR VEIN DEVICE: CPT | Mod: 26,,, | Performed by: INTERNAL MEDICINE

## 2018-04-25 PROCEDURE — 99152 MOD SED SAME PHYS/QHP 5/>YRS: CPT | Mod: ,,, | Performed by: INTERNAL MEDICINE

## 2018-04-25 PROCEDURE — 99222 1ST HOSP IP/OBS MODERATE 55: CPT | Mod: ,,, | Performed by: NURSE PRACTITIONER

## 2018-04-25 PROCEDURE — 85025 COMPLETE CBC W/AUTO DIFF WBC: CPT

## 2018-04-25 PROCEDURE — 25000003 PHARM REV CODE 250

## 2018-04-25 PROCEDURE — 63600175 PHARM REV CODE 636 W HCPCS

## 2018-04-25 PROCEDURE — 99231 SBSQ HOSP IP/OBS SF/LOW 25: CPT | Mod: GC,,, | Performed by: INTERNAL MEDICINE

## 2018-04-25 PROCEDURE — 11000001 HC ACUTE MED/SURG PRIVATE ROOM

## 2018-04-25 PROCEDURE — 36558 INSERT TUNNELED CV CATH: CPT | Mod: ,,, | Performed by: INTERNAL MEDICINE

## 2018-04-25 PROCEDURE — 97110 THERAPEUTIC EXERCISES: CPT

## 2018-04-25 PROCEDURE — 02HV33Z INSERTION OF INFUSION DEVICE INTO SUPERIOR VENA CAVA, PERCUTANEOUS APPROACH: ICD-10-PCS | Performed by: INTERNAL MEDICINE

## 2018-04-25 RX ORDER — HEPARIN SODIUM 1000 [USP'U]/ML
1000 INJECTION, SOLUTION INTRAVENOUS; SUBCUTANEOUS
Status: DISCONTINUED | OUTPATIENT
Start: 2018-04-25 | End: 2018-04-26 | Stop reason: HOSPADM

## 2018-04-25 RX ADMIN — HEPARIN SODIUM 1000 UNITS: 1000 INJECTION, SOLUTION INTRAVENOUS; SUBCUTANEOUS at 05:04

## 2018-04-25 RX ADMIN — INSULIN ASPART 1 UNITS: 100 INJECTION, SOLUTION INTRAVENOUS; SUBCUTANEOUS at 09:04

## 2018-04-25 RX ADMIN — HEPARIN SODIUM 5000 UNITS: 5000 INJECTION, SOLUTION INTRAVENOUS; SUBCUTANEOUS at 09:04

## 2018-04-25 RX ADMIN — ERYTHROPOIETIN 4500 UNITS: 10000 INJECTION, SOLUTION INTRAVENOUS; SUBCUTANEOUS at 05:04

## 2018-04-25 RX ADMIN — SODIUM CHLORIDE: 9 INJECTION, SOLUTION INTRAVENOUS at 02:04

## 2018-04-25 RX ADMIN — INSULIN DETEMIR 5 UNITS: 100 INJECTION, SOLUTION SUBCUTANEOUS at 09:04

## 2018-04-25 NOTE — ASSESSMENT & PLAN NOTE
- Stable  - HbA1c 8.0  - Will hold home oral antihyperglycemic agents in favor of aspart SSI + POCT glucose Q6H  - Cautious with insulin titration given ESRD. Added basal insulin yesterday with persistent elevated blood glucose.   - Continue aspart 2 units TID  - Diabetic diet restriction

## 2018-04-25 NOTE — ASSESSMENT & PLAN NOTE
-  (I)/mod(I) prior to admission  -  Prolonged/acute hospital stay  -  PT and OT following   Recommendations  -  Encourage mobility, OOB in chair, and early ambulation as appropriate  -  PT/OT evaluate and treat  -  Pain management  -  Monitor for and prevent skin breakdown and pressure ulcers  · Early mobility, repositioning/weight shifting every 20-30 minutes when sitting, turn patient every 2 hours, proper mattress/overlay and chair cushioning, pressure relief/heel protector boots  -  DVT prophylaxis:  On [facility]

## 2018-04-25 NOTE — ASSESSMENT & PLAN NOTE
-  PEA arrest (likely 2/2 hypoxia) on 4/17/18 requiring intubation and pressor support  -  Extubated on 4/20/18

## 2018-04-25 NOTE — ASSESSMENT & PLAN NOTE
- baseline sCr 4.5-5.0 c/w CKD stage V  - admitted with ADHF. Attempted medical diuresis without improvement in UOP. Increased O2 requirements throughout the day. PEA arrest on 4/16-4/17; likely related to hypoxia. SLED started same day. She has remained on RRT since 4/17  - transitioned to HD on 4/23; tolerated well.  - tunneled HD cath placed today  - vein mapping complete. Will need outpatient follow-up scheduled with vascular surgery prior to discharge  - due for HD today. Given CXR with mild pulmonary edema and pleuritic chest pain; will attempt to obtain UF of 2L today  - okay to discharge from renal standpoint once outpatient dialysis chair arranged

## 2018-04-25 NOTE — PLAN OF CARE
Problem: Occupational Therapy Goal  Goal: Occupational Therapy Goal  Goals to be met by: 4/30/18    Patient will increase functional independence with ADLs by performing:    LE Dressing with Supervision (to don prosthetic).  Grooming while standing at sink with Stand-by Assistance.  Toileting from toilet with Minimal Assistance for hygiene and clothing management.   Supine to sit with Stand-by Assistance. MET 4/25  REVISED to (I).  Stand pivot transfers with Contact Guard Assistance. MET 4/25  REVISED to (S).  Toilet transfer to toilet with Contact Guard Assistance.          Outcome: Ongoing (interventions implemented as appropriate)  Con't POC.    VARUN Davey

## 2018-04-25 NOTE — ASSESSMENT & PLAN NOTE
- Stable  - TTE 4/16/18 EF 55-60% w/ grade II DD  - Moderate aortic stenosis, SABRINA = 1.15 cm2, AVAi = 0.57 cm2/m2, peak velocty = 3.01 m/s, mean gradient = 21 mmHg  - Cautious w/ volume removal as aortic stenosis is preload dependent  -Outpatient follow up for long term management

## 2018-04-25 NOTE — PROCEDURES
Procedure Date: 4/25/18    (s) and Role:   Armaan Sidhu MD    Indication: HD access     Pre-op Diagnosis:    CKD V    Post-op Diagnosis;  CKD V    Procedure:   1. Insertion Tunneled HD Catheter with Fluoro   2. Conscious Sedation     Anesthesia: IV Sedation + Local     Findings/Key Components:   Catheter placed in RA. Good flush and draw through each port.  Estimated Blood Loss: 5mL     Specimens     None         PROCEDURE: After obtaining consent, the patient was taken to the GUY suite. Sedation was administered. The right neck and chest were prepped and draped in usual sterile fashion. We began using ultrasound guidance to examine the right internal jugular vein. It appeared to be widely patent and was free of thrombus that appeared suitable for access for HD catheter. We accessed the internal jugular vein using a Seldinger technique with an micropunture needle without difficulty, then the thin wire was passed into the superior vena cava through the heart into the inferior vena cava. The wire position was confirmed with intraoperative fluoroscopy, then a 5Fr sheath was introduced over the wire. The wire was removed and the the guidewire was passed into the IVC under fluoroscopic guidance. Counterincision was made at the venotomy and on the chest wall just below the clavicle. Local anesthesia was used to anesthetize the track and a tunneler was used to pass the 19cm GlidePath catheter underneath the chest wall until the felt cuff was just underneath the counter incision. The 5fr sheath was removed and the vein was dilated using serial dilators. Then the large peel-away sheath was then inserted over the guidewire under fluoroscopic guidance. The dilator and wire were removed. The catheter was placed through the peel-away sheath and positioned appropriately at center of RA. Fluoroscopy was used to confirm that the catheter tip was in appropriate position and that there was no kinking at the apex of the  catheter. A permanent recording of the catheter position was also taken with fluoroscopy. Both lumens had excellent draw and flush. The catheter was then secured in place with 3-0 Prolene. The counterincision in the neck was closed with a 3-0 Vicryl. There were no immediate complications. Patient tolerated the procedure well and discharged in stable condition.     Anesthesia:  Conscious sedation was achieved with 100 mcg of Fentanyl and 2 mg of Midazolam (Versed). Local anesthesia was achieved with 20 ml of Lidocaine 2%. Moderate conscious sedation was performed and cardiorespiratory functions were monitored the entire procedure by me and RN. Sedation began at 837am and concluded at 907am, totaling 30 minutes.

## 2018-04-25 NOTE — ASSESSMENT & PLAN NOTE
Contributing Nutrition Diagnosis  Altered nutritional labs    Related to (etiology):   ESRD    Signs and Symptoms (as evidenced by):   BUN-41, Crt-7.9, GFR-5.4    Interventions/Recommendations (treatment strategy):  See recs above    Nutrition Diagnosis Status:   New

## 2018-04-25 NOTE — PROGRESS NOTES
[facility]  Nephrology  Progress Note    Patient Name: Martha Melvin  MRN: [id number]  Admission Date: 4/15/2018  Hospital Length of Stay: 10 days  Attending Provider: Kendal Osborn MD   Primary Care Physician: GUILLERMO Mcclure MD  Principal Problem:History of cardiac arrest    Subjective:     HPI: Ms. Melvin is a 71 yo AAF with HFpEF, aortic stenosis, HTN, T2DM, and CKD stage V (baseline sCr 4.5-5.0) admitted on 4/15 with ADHF. She reported worsening SOB, orthopnea, and PND x 4 days. SpO2 82% on RA upon arrival. In the ED she was given nitro paste, albuterol, and lasix IV. She was admitted to medicine and started on lasix 60mg IV TID with 1L fluid restriction. She was also started on rocephin and azithromycin for CAP. No events overnight. UOP only 700cc. sCr tere from 5.4 to 5.9. Nephrology consulted for fluid management in setting of CKD stage V. Patient is aware of CKD diagnosis. She was offered dialysis 1.5 years ago but denied it at that time due to lack of transportation. She was previously followed by a nephrologist in [place] for the last 4 years. She was on lasix 20mg po for a long time. She developed ADHF a few months ago and was admitted to [facility]. She was told here that she would need higher doses of lasix given her advanced CKD. She was started on lasix 80mg po BID at that time. About 2 weeks ago she ran out of lasix. She called her PCP asking for a refill of the 80mg and this was denied. She asked if she could take 4 of the 20mg tablets and was told that her insurance would not cover this. She continues to feel very SOB this morning. She is interested in proceeding with dialysis if needed.     Interval History:   Patient seen this morning after TDC placement. Doing well. Denies any pain. No UOP. Still with pleuritic chest pain.     Review of patient's allergies indicates:   Allergen Reactions    Morphine      Current Facility-Administered Medications   Medication Frequency     0.9%  NaCl infusion PRN    acetaminophen tablet 650 mg Q6H PRN    albuterol nebulizer solution 2.5 mg Q4H PRN    benzonatate capsule 200 mg TID PRN    dextrose 50% injection 12.5 g PRN    dextrose 50% injection 25 g PRN    epoetin bashir injection 4,500 Units Every Mon, Wed, Fri    glucagon (human recombinant) injection 1 mg PRN    glucose chewable tablet 16 g PRN    glucose chewable tablet 24 g PRN    heparin (porcine) injection 1,000 Units PRN    heparin (porcine) injection 5,000 Units Q8H    insulin aspart U-100 pen 0-5 Units QID (AC + HS) PRN    insulin aspart U-100 pen 2 Units TIDWM    insulin detemir U-100 pen 5 Units QHS    ondansetron disintegrating tablet 4 mg Q6H PRN    sodium chloride 0.9% flush 5 mL PRN       Objective:     Vital Signs (Most Recent):  Temp: (!) 73.4 °F (23 °C) (04/25/18 0545)  Pulse: 78 (04/25/18 1520)  Resp: 13 (04/25/18 0545)  BP: (!) 139/91 (04/25/18 0545)  SpO2: 99 % (04/25/18 0545)  O2 Device (Oxygen Therapy): nasal cannula (04/24/18 1500) Vital Signs (24h Range):  Temp:  [73.4 °F (23 °C)-98.1 °F (36.7 °C)] 73.4 °F (23 °C)  Pulse:  [] 78  Resp:  [13-20] 13  SpO2:  [94 %-99 %] 99 %  BP: (130-150)/(85-98) 139/91     Weight: 84 kg (185 lb 3 oz) (04/25/18 1100)  Body mass index is 29.9 kg/m².  Body surface area is 1.98 meters squared.    No intake/output data recorded.    Physical Exam   Constitutional: She appears well-developed and well-nourished. No distress.   HENT:   Head: Normocephalic and atraumatic.   Eyes: Conjunctivae and EOM are normal.   Cardiovascular: Normal rate and regular rhythm.    Pulmonary/Chest: Effort normal and breath sounds normal.   Abdominal: Soft. She exhibits no distension.   Musculoskeletal: She exhibits edema (mild). She exhibits no deformity.   Skin: Skin is warm and dry. She is not diaphoretic.       Significant Labs:  CBC:   Recent Labs  Lab 04/25/18  0622   WBC 10.92   RBC 3.29*   HGB 7.8*   HCT 24.9*      MCV 76*   MCH 23.7*    MCHC 31.3*     CMP:   Recent Labs  Lab 04/25/18  0622   *   CALCIUM 8.1*   ALBUMIN 2.4*   PROT 7.2      K 4.1   CO2 21*      BUN 41*   CREATININE 7.9*   ALKPHOS 154*   ALT 92*   AST 27   BILITOT 0.3     All labs within the past 24 hours have been reviewed.     Significant Imaging:  Labs: Reviewed  X-Ray: Reviewed    Assessment/Plan:     ESRD (end stage renal disease)    - baseline sCr 4.5-5.0 c/w CKD stage V  - admitted with ADHF. Attempted medical diuresis without improvement in UOP. Increased O2 requirements throughout the day. PEA arrest on 4/16-4/17; likely related to hypoxia. SLED started same day. She has remained on RRT since 4/17  - transitioned to HD on 4/23; tolerated well.  - tunneled HD cath placed today  - vein mapping complete. Will need outpatient follow-up scheduled with vascular surgery prior to discharge  - due for HD today. Given CXR with mild pulmonary edema and pleuritic chest pain; will attempt to obtain UF of 2L today  - okay to discharge from renal standpoint once outpatient dialysis chair arranged        Anemia, chronic renal failure, stage 5    - Tsat low but ferritin > 1000; cannot give IV iron  - will start CHACHO three times weekly with HD            Matilda Pascal, PGY-5  Nephrology Fellow  [facility]  Pager: [phone]    ATTENDING PHYSICIAN ATTESTATION  I have personally interviewed and examined the patient. I thoroughly reviewed the demographic, clinical, laboratorial and imaging information available in medical records. I agree with the assessment and recommendations provided by the subspecialty resident. Dr. Pascal was under my supervision.

## 2018-04-25 NOTE — PLAN OF CARE
Problem: Nutrition, Imbalanced: Inadequate Oral Intake (Adult)  Intervention: Promote/Optimize Nutrition   04/25/18 1119   Nutrition Interventions   Oral Nutrition Promotion calorie dense foods provided;nutritional therapy counseling provided   Recommendations     Recommendation/Intervention: ADAT to renal diet w/ Nova source as needed to increase po intake.  RD to follow  Goals: some form of Nutrient intake by RD f/u  Nutrition Goal Status: new  Communication of RD Recs:  (POC)  Assessment and Plan         ESRD (end stage renal disease)     Contributing Nutrition Diagnosis  Altered nutritional labs     Related to (etiology):   ESRD     Signs and Symptoms (as evidenced by):   BUN-41, Crt-7.9, GFR-5.4     Interventions/Recommendations (treatment strategy):  See recs above     Nutrition Diagnosis Status:   New

## 2018-04-25 NOTE — PROGRESS NOTES
HD treatment complete. Duration of treatment 3 hours and 1 L removed. Treatment was tolerated well and no complication with access to right chest wall catheter. Catheter flushed with NS and locked with heparin. Capped and taped.

## 2018-04-25 NOTE — ASSESSMENT & PLAN NOTE
- Stable  - Baseline sCr 4.5-5.0 c/w CKD stage V prior to admission  - Admitted with ADHF. Attempted medical diuresis without improvement in UOP. PEA arrest on 4/16-4/17; likely related to hypoxia.  SLED started same day  - Has remained on RRT since 4/17, now tolerating conventional HD well  - Hepatitis negative and PPD placed  - Nephrology access service placed permacath today, dialysis today, will asses function.

## 2018-04-25 NOTE — PLAN OF CARE
MARY ANNE met with pt and her son in room to discuss DC options.  Pt stated that she would like a consult sent to [facility] because her daughter and son live in the [facility] and that is the closest option for support for her.  MARY ANNE explained that the HD company that [facility] uses is [facility] and a referral will be snet to them for the outpt HD chair.  Pt expressed understanding and stated that there was an [facility] clinic close to her home in [place].  Pt son also in agreement.  MARY ANNE then called Zafar with [facility] and explained that there was a PM&R consult and that [facility] is pt's [facility] choice.  Jim stated that he would notify MARY ANNE when he puts in for auth.  Pt had her tunnel cath placement, so MARY ANNE will now begin the process for HD chair placement.  MARY ANNE notified VIRA Peterson.        Rosalie Pauilno LMSW

## 2018-04-25 NOTE — HPI
Martha Melvin is a 72-year-old female with PMHx of HTN, HLD, DMII, CKD stage V, CHF, aortic stenosis, arthritis, R foot amputation (2015), and recent admission (10/14/17-10/18/17) for ADHF.  Patient presented to [facility] on 4/15/18 with worsening SOB x 4 days.  On arrival, found to have acute hypoxic respiratory failure, acute on chronic diastolic heart failure, pulmonary edema, CAP, and UTI.  Treated with Nitro paste and Albuterol in ED and started on IV Lasix, Rocephin, and Azithromycin.  Following admission, Nephrology consulted for volume overload despite diuresis, and trialysis placed for SLED.  Hospital course complicated by ICU transfer for PEA arrest (likely 2/2 hypoxia) on 4/17/18 requiring intubation and pressor support, ESRD requiring CRRT with transition to HD s/p permacath on 4/25/18 (Nephrology following), shock liver, multidrug resistant Klebsiella UTI treated with Zosyn x 7 days, and diarrhea (C.diff negative).  Extubated on 4/20/18.  Stepped down to floor on 4/21/18.        Functional History: Patient lives in [place], alone, in a single story home with one step to enter.  Prior to admission, she was (I)/mod(I) with ADLs and mobility.  Ambulated with R foot prosthesis.  DME: rollator, WC, SC, grab bars, BSC, shower chair, prosthesis

## 2018-04-25 NOTE — HOSPITAL COURSE
4/16/18:  Evaluated by PT and OT.  Bed mobility (I)-SV.  EOB SV.  Sit to stand (I)-SV and transfers SBA-CGA.  Ambulated a few steps CGA.  UBD SV and LBD CGA.  4/23/18:  Re-evaluated by PT and OT.  Bed mobility Jami.  Sit to stand Jami with RW and transfers modA with RW.  Ambulated 10 ft x 2 min-modA with RW.  Tolieting maxA and LBD Jami.  4/24/18:  Participated with PT.  Bed mobility CGA.  Sit to stand CGA with RW and transfers CGA with RW.  Ambulated 20 ft CGA with RW.  4/25/18:  Participated with OT.  Bed mobility SBA.  Sit to stand CGA with RW and transfers CGA with RW.  Ambulated 25 ft CGA with RW.  UBD Jami and LBD SBA.

## 2018-04-25 NOTE — CONSULTS
Inpatient consult to Physical Medicine Rehab  Consult performed by: ESTEFANIA URRUTIA  Consult ordered by: DWIGHT KWOK  Reason for consult: reheb evaluation       Reviewed patient history and current admission.  Rehab team following.  Full consult to follow.    MALIHA Maier, FNP-C  Physical Medicine & Rehabilitation   04/25/2018  Spectralink: [id number]

## 2018-04-25 NOTE — SUBJECTIVE & OBJECTIVE
Interval History: No complaints this morning. No SOB, constipation.     Review of Systems  Objective:     Vital Signs (Most Recent):  Temp: 98.1 F (04/25/18 0545)  Pulse: 78 (04/25/18 1520)  Resp: 13 (04/25/18 0545)  BP: (!) 139/91 (04/25/18 0545)  SpO2: 99 % (04/25/18 0545) Vital Signs (24h Range):  Temp:  [73.4 °F (23 °C)-98.1 °F (36.7 °C)] 73.4 °F (23 °C)  Pulse:  [] 78  Resp:  [13-20] 13  SpO2:  [94 %-99 %] 99 %  BP: (130-150)/(85-98) 139/91     Weight: 84 kg (185 lb 3 oz)  Body mass index is 29.9 kg/m².  No intake or output data in the 24 hours ending 04/25/18 1612   Physical Exam   Constitutional: She is oriented to person, place, and time. She appears well-developed and well-nourished. No distress.   HENT:   Head: Normocephalic and atraumatic.   Eyes: Conjunctivae are normal. No scleral icterus.   Cardiovascular: Normal rate and regular rhythm.    Systolic ejection murmur   Pulmonary/Chest: Effort normal. No respiratory distress. She has no wheezes.   Abdominal: Soft. Bowel sounds are normal. She exhibits no distension. There is no tenderness. There is no guarding.   Musculoskeletal: She exhibits edema. She exhibits no tenderness.   R LE amputation   Lymphadenopathy:     She has no cervical adenopathy.   Neurological: She is alert and oriented to person, place, and time.   4/5 strength globally    Skin: Skin is warm and dry. No rash noted.   Psychiatric: She has a normal mood and affect. Her behavior is normal.   Nursing note and vitals reviewed.      Significant Labs:   Recent Lab Results       04/25/18  1442 04/25/18  0622 04/24/18  2258 04/24/18  2149 04/24/18  1810      Immature Granulocytes  1.5(H)        Immature Grans (Abs)  0.16  Comment:  Mild elevation in immature granulocytes is non specific and   can be seen in a variety of conditions including stress response,   acute inflammation, trauma and pregnancy. Correlation with other   laboratory and clinical findings is essential.  (H)         Albumin  2.4(L)        Alkaline Phosphatase  154(H)        ALT  92(H)        Anion Gap  14        Aniso  Slight        AST  27        Baso #  0.04        Basophil%  0.4        Total Bilirubin  0.3  Comment:  For infants and newborns, interpretation of results should be based  on gestational age, weight and in agreement with clinical  observations.  Premature Infant recommended reference ranges:  Up to 24 hours.............<8.0 mg/dL  Up to 48 hours............<12.0 mg/dL  3-5 days..................<15.0 mg/dL  6-29 days.................<15.0 mg/dL          BUN, Bld  41(H)        C difficile Toxins A+B, EIA   Negative  Comment:  Testing not recommended for children <24 months old.       C. diff Antigen   Negative       Calcium  8.1(L)        Chloride  103        CO2  21(L)        Creatinine  7.9(H)        Differential Method  Automated        eGFR if   5.4(A)        eGFR if non   4.7  Comment:  Calculation used to obtain the estimated glomerular filtration  rate (eGFR) is the CKD-EPI equation.   (A)        Eos #  0.4        Eosinophil%  3.8        Glucose  210(H)        Gran # (ANC)  5.7        Gran%  51.7        Hematocrit  24.9(L)        Hemoglobin  7.8(L)        Hypo  Occasional        Lymph #  2.8        Lymph%  25.5        MCH  23.7(L)        MCHC  31.3(L)        MCV  76(L)        Mono #  1.9(H)        Mono%  17.1(H)        MPV  10.9        nRBC  0        Ovalocytes  Occasional        Platelets  201        POCT Glucose 184(H)   225(H) 251(H)     Poik  Slight        Poly  Occasional        Potassium  4.1        Total Protein  7.2        RBC  3.29(L)        RDW  19.2(H)        Sodium  138        WBC  10.92

## 2018-04-25 NOTE — PROGRESS NOTES
[facility] Hospital Medicine  Progress Note    Patient Name: Martha Melvin  MRN: [id number]  Patient Class: IP- Inpatient   Admission Date: 4/15/2018  Length of Stay: 10 days  Attending Physician: Kendal Osborn MD  Primary Care Provider: GUILLERMO Mcclure MD    [facility] Medicine Team: [facility] MED 1 Chaitanya Rosales MD    Subjective:     Principal Problem:History of cardiac arrest    HPI:  71 y/o F PMhx HFrEF, CKD stage V (makes urine, not on dialysis), moderate aortic stenosis, DM II, R leg amputation (2015), HTN who presented w/ SOB x 4 days. Patient was discharged from [facility] 10/14/17 for SOB 2/2 ADHF and she was subsequently discharged on an increased of lasix from 20mg to 80mg daily due to her renal failure. Daughter states patient ran out of lasix 80mg and asked PCP for rx refill. However, per daughter PCP refused to fill 80mg and therefore patient had been taking 20mg x 4 daily. Daughter states she last saw patient on 4/11 prior to admission and that patient was in good health at that time. She spoke with patient over the phone on 4/16 and at that time noted patient to be SOB which prompted her to call a family friend to check on patient. In conversation daughter was made aware that patient's lasix had run out. EMS was called and on route she pllaced on BIPAP and given albuterol. Upon arrival her saturations were at 82%.     In the ED patient given nitro paste, albuterol, as well as lasix. She was subsequently admitted to medicine. She was started on lasix 60mg IV tid as well as rocephin and azithromycin for suspected CAP. Nephrology was consulted for volume overload in the setting of CKD stage V, however, due to poor urine output despite diuresis w/ lasix and diuril nephrology requested trialysis. Following placement patient found to be in PEA arrest. ROSC achieved for <10min then patient again coded. ROSC achieved and patient was subsequently transferred to SICU where patient coded  a 3rd time. ROSC achieved and maintained, epi switched to levo and placed on vent. Patient started on urgent dialysis. Continued to be hypoxic therefore given nimbex, elevation of HOB w/ improvement in sats.     Per daughter patient had been aware of CKD V since 2015 and had refused dialysis since that time due to fear of having to be on dialysis.    Interval History: No complaints this morning. No SOB, constipation.     Review of Systems  Objective:     Vital Signs (Most Recent):  Temp: 98.1 F (04/25/18 0545)  Pulse: 78 (04/25/18 1520)  Resp: 13 (04/25/18 0545)  BP: (!) 139/91 (04/25/18 0545)  SpO2: 99 % (04/25/18 0545) Vital Signs (24h Range):  Temp:  [73.4 °F (23 °C)-98.1 °F (36.7 °C)] 73.4 °F (23 °C)  Pulse:  [] 78  Resp:  [13-20] 13  SpO2:  [94 %-99 %] 99 %  BP: (130-150)/(85-98) 139/91     Weight: 84 kg (185 lb 3 oz)  Body mass index is 29.9 kg/m².  No intake or output data in the 24 hours ending 04/25/18 1612   Physical Exam   Constitutional: She is oriented to person, place, and time. She appears well-developed and well-nourished. No distress.   HENT:   Head: Normocephalic and atraumatic.   Eyes: Conjunctivae are normal. No scleral icterus.   Cardiovascular: Normal rate and regular rhythm.    Systolic ejection murmur   Pulmonary/Chest: Effort normal. No respiratory distress. She has no wheezes.   Abdominal: Soft. Bowel sounds are normal. She exhibits no distension. There is no tenderness. There is no guarding.   Musculoskeletal: She exhibits edema. She exhibits no tenderness.   R LE amputation   Lymphadenopathy:     She has no cervical adenopathy.   Neurological: She is alert and oriented to person, place, and time.   4/5 strength globally    Skin: Skin is warm and dry. No rash noted.   Psychiatric: She has a normal mood and affect. Her behavior is normal.   Nursing note and vitals reviewed.      Significant Labs:   Recent Lab Results       04/25/18  1442 04/25/18  0622 04/24/18  2258 04/24/18  2141  04/24/18  1810      Immature Granulocytes  1.5(H)        Immature Grans (Abs)  0.16  Comment:  Mild elevation in immature granulocytes is non specific and   can be seen in a variety of conditions including stress response,   acute inflammation, trauma and pregnancy. Correlation with other   laboratory and clinical findings is essential.  (H)        Albumin  2.4(L)        Alkaline Phosphatase  154(H)        ALT  92(H)        Anion Gap  14        Aniso  Slight        AST  27        Baso #  0.04        Basophil%  0.4        Total Bilirubin  0.3  Comment:  For infants and newborns, interpretation of results should be based  on gestational age, weight and in agreement with clinical  observations.  Premature Infant recommended reference ranges:  Up to 24 hours.............<8.0 mg/dL  Up to 48 hours............<12.0 mg/dL  3-5 days..................<15.0 mg/dL  6-29 days.................<15.0 mg/dL          BUN, Bld  41(H)        C difficile Toxins A+B, EIA   Negative  Comment:  Testing not recommended for children <24 months old.       C. diff Antigen   Negative       Calcium  8.1(L)        Chloride  103        CO2  21(L)        Creatinine  7.9(H)        Differential Method  Automated        eGFR if   5.4(A)        eGFR if non   4.7  Comment:  Calculation used to obtain the estimated glomerular filtration  rate (eGFR) is the CKD-EPI equation.   (A)        Eos #  0.4        Eosinophil%  3.8        Glucose  210(H)        Gran # (ANC)  5.7        Gran%  51.7        Hematocrit  24.9(L)        Hemoglobin  7.8(L)        Hypo  Occasional        Lymph #  2.8        Lymph%  25.5        MCH  23.7(L)        MCHC  31.3(L)        MCV  76(L)        Mono #  1.9(H)        Mono%  17.1(H)        MPV  10.9        nRBC  0        Ovalocytes  Occasional        Platelets  201        POCT Glucose 184(H)   225(H) 251(H)     Poik  Slight        Poly  Occasional        Potassium  4.1        Total Protein  7.2         RBC  3.29(L)        RDW  19.2(H)        Sodium  138        WBC  10.92                          Assessment/Plan:      * History of cardiac arrest    - Stable  - She has made significant improvements  - Etiology of PEA arrest likely hypoxia, now resolved and on room air  - TTE s/p cardiac arrest showed normal EF (55%), diastolic dysfunction  - Extubated on 4/20 to NC, still doing well  - PT/OT recommending inpatient rehabilitation        Diarrhea of presumed infectious origin    - C.diff negative, diarrhea improved        Discharge planning issues    - Catheter placement today  - Needs outpatient HD chair, MARY ANNE working on scheduling  - PT/OT recommending rehab, PM&R consulted        Elevated liver enzymes    - Resolving, etiology shock liver        Aortic stenosis    - Stable  - TTE 4/16/18 EF 55-60% w/ grade II DD  - Moderate aortic stenosis, SABRINA = 1.15 cm2, AVAi = 0.57 cm2/m2, peak velocty = 3.01 m/s, mean gradient = 21 mmHg  - Cautious w/ volume removal as aortic stenosis is preload dependent  -Outpatient follow up for long term management        Anemia, chronic renal failure, stage 5    - Stable  - Etiology thought to be CKD  - Will transfuse for Hgb < 7        Chronic diastolic heart failure    - Stable  - Acute portion resolved  - LVEF preserved, biatrial enlargement with grade 2 diastolic dysfunction, moderate AS, and pHTN on most recent TTE  - Euvolemic with HD        Type 2 diabetes mellitus with stage 5 chronic kidney disease and hypertension    - Stable  - HbA1c 8.0  - Will hold home oral antihyperglycemic agents in favor of aspart SSI + POCT glucose Q6H  - Cautious with insulin titration given ESRD. Added basal insulin yesterday with persistent elevated blood glucose.   - Continue aspart 2 units TID  - Diabetic diet restriction        ESRD (end stage renal disease)    - Stable  - Baseline sCr 4.5-5.0 c/w CKD stage V prior to admission  - Admitted with ADHF. Attempted medical diuresis without improvement in  UOP. PEA arrest on 4/16-4/17; likely related to hypoxia.  SLED started same day  - Has remained on RRT since 4/17, now tolerating conventional HD well  - Hepatitis negative and PPD placed  - Nephrology access service placed permacath today, dialysis today, will asses function.           VTE Risk Mitigation         Ordered     heparin (porcine) injection 1,000 Units  As needed (PRN)      04/25/18 0800     IP VTE HIGH RISK PATIENT  Once      04/21/18 1936     heparin (porcine) injection 5,000 Units  Every 8 hours      04/19/18 0159          Chaitanya Rosales MD   Internal Medicine PGY-1  [phone]

## 2018-04-25 NOTE — PT/OT/SLP PROGRESS
Occupational Therapy   Treatment    Name: Martha Melvin  MRN: [id number]  Admitting Diagnosis:  History of cardiac arrest  Day of Surgery    Recommendations:     Discharge Recommendations: rehabilitation facility  Discharge Equipment Recommendations:  walker, rolling  Barriers to discharge:  Decreased caregiver support    Subjective     Communicated with: RN prior to session.  Pain/Comfort:  · Pain Rating 1: 0/10    Patients cultural, spiritual, Voodoo conflicts given the current situation:      Objective:     Patient found with: oxygen    General Precautions: Standard, fall   Orthopedic Precautions:N/A   Braces:       Occupational Performance:    Bed Mobility:    · Patient completed Rolling/Turning to Right with stand by assistance  · Patient completed Scooting/Bridging with stand by assistance  · Patient completed Supine to Sit with stand by assistance     Functional Mobility/Transfers:  · Patient completed Sit <> Stand Transfer with contact guard assistance  with  rolling walker   · Patient completed Bed <> Chair Transfer using Step Transfer technique with contact guard assistance with rolling walker  · Functional Mobility: Pt walked within room only (due to isolation) with CGA using a RW for a distance of ~ 25'.    Activities of Daily Living:  · UB Dressing: minimum assistance to don gown.  · LB Dressing: stand by assistance to don RLE prosthetic.    Patient left up in chair with all lines intact and call button in reach    [facility] 6 Click:  [facility] Total Score: 19    Treatment & Education:  Pt educated on proper/safe RW management and t/f techniques to avoid potential falls.  Pt sat EOB and completed BUE/LE therex (2x10 reps each) with 3# dowel including:  - elbow flexion/extension  - shld presses  - hor Abd/Add  Education:    Assessment:     Martha Melvin is a 72 y.o. female with a medical diagnosis of History of cardiac arrest.  She presents with decreased (I) in multiple ADL areas, functional mobility & t/fs  and would benefit from skilled OT services as well as to address these deficits and to facilitate improving (I) with daily tasks. Demonstrated decreased safety awareness during chair t/f by leaving walker and sitting w/o reaching back for chair arm. Requires ongoing education to ensure adherence. IP Rehab still appropriate to facilitate return to independent living.  Performance deficits affecting function are weakness, gait instability, impaired balance, impaired endurance, decreased safety awareness, impaired self care skills, impaired functional mobilty, decreased coordination.      Rehab Prognosis:  Good; patient would benefit from acute skilled OT services to address these deficits and reach maximum level of function.       Plan:     Patient to be seen 4 x/week to address the above listed problems via self-care/home management, therapeutic activities, therapeutic exercises, neuromuscular re-education  · Plan of Care Expires: 05/23/18  · Plan of Care Reviewed with: patient, son    This Plan of care has been discussed with the patient who was involved in its development and understands and is in agreement with the identified goals and treatment plan    GOALS:    Occupational Therapy Goals        Problem: Occupational Therapy Goal    Goal Priority Disciplines Outcome Interventions   Occupational Therapy Goal     OT, PT/OT Ongoing (interventions implemented as appropriate)    Description:  Goals to be met by: 4/30/18    Patient will increase functional independence with ADLs by performing:    LE Dressing with Supervision (to don prosthetic).  Grooming while standing at sink with Stand-by Assistance.  Toileting from toilet with Minimal Assistance for hygiene and clothing management.   Supine to sit with Stand-by Assistance. MET 4/25  REVISED to (I).  Stand pivot transfers with Contact Guard Assistance. MET 4/25  REVISED to (S).  Toilet transfer to toilet with Contact Guard Assistance.                            Time  Tracking:     OT Date of Treatment: 04/25/18  OT Start Time: 1328  OT Stop Time: 1355  OT Total Time (min): 27 min    Billable Minutes:Therapeutic Activity 14  Therapeutic Exercise 13    VARUN Davey  4/25/2018

## 2018-04-25 NOTE — PROGRESS NOTES
" [facility]  Adult Nutrition  Progress Note    SUMMARY       Recommendations    Recommendation/Intervention: ADAT to renal diet w/ [place] source as needed to increase po intake.  RD to follow  Goals: some form of Nutrient intake by RD f/u  Nutrition Goal Status: new  Communication of RD Recs:  (POC)    Reason for Assessment    Reason for Assessment: length of stay  Diagnosis:  (History of cardiac arrest )  Relevant Medical History: T2DM, HL, HTN, Renal disorder  Interdisciplinary Rounds: did not attend  General Information Comments: Pt w/ son in rm, pt states decreased appetite since admit 2/2 feeling weak. Note pt has AKA of right leg  Nutrition Discharge Planning: Renal diet w/ po intake > 75% EEN/EPN    Nutrition Risk Screen    Nutrition Risk Screen: no indicators present    Nutrition/Diet History    Patient Reported Diet/Restrictions/Preferences: renal  Typical Food/Fluid Intake: adequate PTA  Food Preferences: none  Do you have any cultural, spiritual, Christianity conflicts, given your current situation?: none stated  Food Allergies: NKFA  Factors Affecting Nutritional Intake: NPO    Anthropometrics    Temp: (!) 73.4 °F (23 °C)  Height: 5' 5.98" (167.6 cm)  Height (inches): 65.98 in  Weight Method: Bed Scale  Weight: 84 kg (185 lb 3 oz)  Weight (lb): 185.19 lb  Ideal Body Weight (IBW), Female: 129.9 lb  % Ideal Body Weight, Female (lb): 142.56 lb  BMI (Calculated): 30  BMI Grade: 30 - 34.9- obesity - grade I  Weight Loss: unintentional  Usual Body Weight (UBW), k.9 kg  Weight Change Amount: 14 lb 12.3 oz  % Usual Body Weight: 92.6  % Weight Change From Usual Weight: -7.59 %  Weight Loss Since Admission: 200 lb 6.4 oz  % Weight Change Since Admission: 108.21  Amputation %: 5.9  Total Amputation %: 5.9  Amputation Ideal Body Weight (IBW), Female (lb): 124 lb  Amputee BMI (kg/m2): 31.87 kg/m2       Lab/Procedures/Meds    Pertinent Labs Reviewed: reviewed  Pertinent Labs Comments: CO2-21, " Glu-210, BUN-41, GFR-5.4, Crt-7.9, Ca-8.1, Alb-2.4, Alk Phos-154, ALT-92,   Pertinent Medications Reviewed: reviewed  Pertinent Medications Comments: IVF, Heparin, Insulin    Physical Findings/Assessment    Tubes:  (Trialysis (Dialysis) Catheter, Arterial Line)    Estimated/Assessed Needs    Weight Used For Calorie Calculations: 84 kg (185 lb 3 oz)  Energy Calorie Requirements (kcal): 1652kcal/d  Energy Need Method: [place]-St Jeor (x 1.2)  Protein Requirements: 84-101g/day (1-1.2g/kg)  Weight Used For Protein Calculations: 84 kg (185 lb 3 oz)  Fluid Requirements (mL): per MD  Fluid Need Method: RDA Method  RDA Method (mL): 1652  CHO Requirement: 45-50% total intake (185-206g/d/CHO)      Nutrition Prescription Ordered    Current Diet Order: NPO    Evaluation of Received Nutrient/Fluid Intake    IV Fluid (mL):  (PRN)  I/O: None noted  No intake or output data in the 24 hours ending 04/25/18 1117    Energy Calories Required: not meeting needs  Protein Required: not meeting needs  Fluid Required: not meeting needs  Comments: LBM:/24/18  % Intake of Estimated Energy Needs: 50 - 75 %  % Meal Intake: 50 - 75 %    Nutrition Risk    Level of Risk/Frequency of Follow-up: high     Assessment and Plan    ESRD (end stage renal disease)    Contributing Nutrition Diagnosis  Altered nutritional labs    Related to (etiology):   ESRD    Signs and Symptoms (as evidenced by):   BUN-41, Crt-7.9, GFR-5.4    Interventions/Recommendations (treatment strategy):  See recs above    Nutrition Diagnosis Status:   New               Monitor and Evaluation    Food and Nutrient Intake: energy intake, food and beverage intake, enteral nutrition intake  Food and Nutrient Administration: diet order, enteral and parenteral nutrition administration  Physical Activity and Function: nutrition-related ADLs and IADLs  Anthropometric Measurements: weight, weight change  Biochemical Data, Medical Tests and Procedures:  (All labs)  Nutrition-Focused Physical  Findings: overall appearance     Nutrition Follow-Up    RD Follow-up?: Yes

## 2018-04-25 NOTE — PT/OT/SLP PROGRESS
Physical Therapy      Patient Name:  Martha Melvin   MRN:  [id number]    Patient not seen today secondary to Unavailable (Comment) (Pt away to dialysis.) Attempted to see pt x 3 attempts. 0905, 1340, and 1425. Pt initially away at cath lab, then pt required 4 hours of bedrest after procedure in cath lab, and pt away to dialysis on final attempt. Will follow-up at next scheduled visit per POC.     Marcus Cain, PTA

## 2018-04-25 NOTE — ASSESSMENT & PLAN NOTE
- Catheter placement today  - Needs outpatient HD chair, MARY ANNE working on scheduling  - PT/OT recommending rehab, PM&R consulted

## 2018-04-25 NOTE — SUBJECTIVE & OBJECTIVE
Interval History:   Patient seen this morning after TDC placement. Doing well. Denies any pain. No UOP. Still with pleuritic chest pain.     Review of patient's allergies indicates:   Allergen Reactions    Morphine      Current Facility-Administered Medications   Medication Frequency    0.9%  NaCl infusion PRN    acetaminophen tablet 650 mg Q6H PRN    albuterol nebulizer solution 2.5 mg Q4H PRN    benzonatate capsule 200 mg TID PRN    dextrose 50% injection 12.5 g PRN    dextrose 50% injection 25 g PRN    epoetin bashir injection 4,500 Units Every Mon, Wed, Fri    glucagon (human recombinant) injection 1 mg PRN    glucose chewable tablet 16 g PRN    glucose chewable tablet 24 g PRN    heparin (porcine) injection 1,000 Units PRN    heparin (porcine) injection 5,000 Units Q8H    insulin aspart U-100 pen 0-5 Units QID (AC + HS) PRN    insulin aspart U-100 pen 2 Units TIDWM    insulin detemir U-100 pen 5 Units QHS    ondansetron disintegrating tablet 4 mg Q6H PRN    sodium chloride 0.9% flush 5 mL PRN       Objective:     Vital Signs (Most Recent):  Temp: (!) 73.4 °F (23 °C) (04/25/18 0545)  Pulse: 78 (04/25/18 1520)  Resp: 13 (04/25/18 0545)  BP: (!) 139/91 (04/25/18 0545)  SpO2: 99 % (04/25/18 0545)  O2 Device (Oxygen Therapy): nasal cannula (04/24/18 1500) Vital Signs (24h Range):  Temp:  [73.4 °F (23 °C)-98.1 °F (36.7 °C)] 73.4 °F (23 °C)  Pulse:  [] 78  Resp:  [13-20] 13  SpO2:  [94 %-99 %] 99 %  BP: (130-150)/(85-98) 139/91     Weight: 84 kg (185 lb 3 oz) (04/25/18 1100)  Body mass index is 29.9 kg/m².  Body surface area is 1.98 meters squared.    No intake/output data recorded.    Physical Exam   Constitutional: She appears well-developed and well-nourished. No distress.   HENT:   Head: Normocephalic and atraumatic.   Eyes: Conjunctivae and EOM are normal.   Cardiovascular: Normal rate and regular rhythm.    Pulmonary/Chest: Effort normal and breath sounds normal.   Abdominal: Soft. She  exhibits no distension.   Musculoskeletal: She exhibits edema (mild). She exhibits no deformity.   Skin: Skin is warm and dry. She is not diaphoretic.       Significant Labs:  CBC:   Recent Labs  Lab 04/25/18  0622   WBC 10.92   RBC 3.29*   HGB 7.8*   HCT 24.9*      MCV 76*   MCH 23.7*   MCHC 31.3*     CMP:   Recent Labs  Lab 04/25/18 0622   *   CALCIUM 8.1*   ALBUMIN 2.4*   PROT 7.2      K 4.1   CO2 21*      BUN 41*   CREATININE 7.9*   ALKPHOS 154*   ALT 92*   AST 27   BILITOT 0.3     All labs within the past 24 hours have been reviewed.     Significant Imaging:  Labs: Reviewed  X-Ray: Reviewed

## 2018-04-25 NOTE — SUBJECTIVE & OBJECTIVE
Past Medical History:   Diagnosis Date    Arthritis     Diabetes mellitus     Hyperlipidemia     Hypertension     Renal disorder     poor kidney function     Past Surgical History:   Procedure Laterality Date    FOOT AMPUTATION Right      Review of patient's allergies indicates:   Allergen Reactions    Morphine        Scheduled Medications:    epoetin bashir (PROCRIT) injection  50 Units/kg (Dosing Weight) Intravenous Every Mon, Wed, Fri    heparin (porcine)  5,000 Units Subcutaneous Q8H    insulin aspart U-100  2 Units Subcutaneous TIDWM    insulin detemir U-100  5 Units Subcutaneous QHS       PRN Medications: sodium chloride 0.9%, acetaminophen, albuterol sulfate, benzonatate, dextrose 50%, dextrose 50%, glucagon (human recombinant), glucose, glucose, heparin (porcine), insulin aspart U-100, ondansetron, sodium chloride 0.9%    Family History     None        Social History Main Topics    Smoking status: Never Smoker    Smokeless tobacco: Not on file    Alcohol use No    Drug use: Unknown    Sexual activity: Not on file     Review of Systems   Constitutional: Positive for activity change. Negative for chills, fatigue and fever.   HENT: Negative for drooling, hearing loss, trouble swallowing and voice change.    Eyes: Negative for pain and visual disturbance.   Respiratory: Negative for cough, shortness of breath and wheezing.    Cardiovascular: Negative for chest pain and palpitations.   Gastrointestinal: Negative for abdominal distention, nausea and vomiting.   Genitourinary: Negative for difficulty urinating and flank pain.   Musculoskeletal: Positive for gait problem. Negative for arthralgias, back pain, myalgias and neck pain.   Skin: Negative for rash and wound.   Neurological: Positive for weakness. Negative for dizziness, numbness and headaches.        No phantom pain or sensation.   Psychiatric/Behavioral: Negative for agitation and hallucinations. The patient is not nervous/anxious.       Objective:     Vital Signs (Most Recent):  Temp: (!) 73.4 °F (23 °C) (18 0545)  Pulse: 73 (18 1133)  Resp: 13 (18 0545)  BP: (!) 139/91 (18 0545)  SpO2: 99 % (18 0545)    Vital Signs (24h Range):  Temp:  [73.4 °F (23 °C)-98.1 °F (36.7 °C)] 73.4 °F (23 °C)  Pulse:  [] 73  Resp:  [13-20] 13  SpO2:  [94 %-99 %] 99 %  BP: (111-150)/(69-98) 139/91     Body mass index is 29.9 kg/m².    Physical Exam   Constitutional: She is oriented to person, place, and time. She appears well-developed and well-nourished. No distress.   HENT:   Head: Normocephalic and atraumatic.   Right Ear: External ear normal.   Left Ear: External ear normal.   Nose: Nose normal.   Eyes: Right eye exhibits no discharge. Left eye exhibits no discharge. No scleral icterus.   Neck: Normal range of motion.   Cardiovascular: Normal rate, regular rhythm and intact distal pulses.    Pulmonary/Chest: Effort normal. No respiratory distress. She has no wheezes.   R permacath intact   Abdominal: Soft. She exhibits no distension. There is no tenderness.   Musculoskeletal: Normal range of motion. She exhibits no edema or tenderness.   R foot/lower leg amputation   Neurological: She is alert and oriented to person, place, and time. She exhibits normal muscle tone.   -  Mental Status:  AAOx3.  Follows commands.  Answers correct age and .  Recent and remote memory intact.   -  Speech and language:  no aphasia or dysarthria.    -  Vision:  no hemianopsia or ptosis.    -  Facial movement (CN VII): symmetrical.   -  Motor:  No pronator drift. RUE: /.  LUE: .  RLE: .  LLE: .  -  Tone:  Normal.   -  Sensory:  Intact to light touch and pin prick.   Skin: Skin is warm and dry. No rash noted.   Psychiatric: She has a normal mood and affect. Her behavior is normal. Thought content normal.   Vitals reviewed.    NEUROLOGICAL EXAMINATION:     MENTAL STATUS   Oriented to person, place, and time.       Diagnostic Results:    Labs: Reviewed  X-Ray: Reviewed  US: Reviewed  CTA: Reviewed

## 2018-04-25 NOTE — CONSULTS
[facility]  Physical Medicine & Rehab  Consult Note    Patient Name: Martha Melvin  MRN: [id number]  Admission Date: 4/15/2018  Hospital Length of Stay: 10 days  Attending Physician: Kendal Osborn MD     Inpatient consult to Physical Medicine & Rehabilitation  Consult performed by: Lorraine Pearson NP  Consult requested by:  Kendal Osborn MD    Collaborating Physician: Martha Harden MD  Reason for Consult:  Rehab evaluation     Consults  Subjective:     Principal Problem: History of cardiac arrest    HPI: Martha Melvin is a 72-year-old female with PMHx of HTN, HLD, DMII, CKD stage V, CHF, aortic stenosis, arthritis, R foot amputation (2015), and recent admission (10/14/17-10/18/17) for ADHF.  [place] [facility] [place] with worsening SOB x 4 days.  On arrival, found to have acute hypoxic respiratory failure, acute on chronic diastolic heart failure, pulmonary edema, CAP, and UTI.  Treated with Nitro paste and Albuterol in ED and started on IV Lasix, Rocephin, and Azithromycin.  Following admission, Nephrology consulted for volume overload despite diuresis, and trialysis placed for SLED.  Hospital course complicated by ICU transfer for PEA arrest (likely 2/2 hypoxia) on 4/17/18 requiring intubation and pressor support, ESRD requiring CRRT with transition to HD s/p permacath on 4/25/18 (Nephrology following), shock liver, multidrug resistant Klebsiella UTI treated with Zosyn x 7 days, and diarrhea (C.diff negative).  Extubated on 4/20/18.  Stepped down to floor on 4/21/18.        Functional History: Patient lives in [place], alone, in a single story home with one step to enter.  Prior to admission, she was (I)/mod(I) with ADLs and mobility.  Ambulated with R foot prosthesis.  DME: rollator, WC, SC, grab bars, BSC, shower chair, prosthesis    Hospital Course:   4/16/18:  Evaluated by PT and OT.  Bed mobility (I)-SV.  EOB SV.  Sit to stand (I)-SV and transfers SBA-CGA.  Ambulated a  few steps CGA.  UBD SV and LBD CGA.  4/23/18:  Re-evaluated by PT and OT.  Bed mobility Jami.  Sit to stand Jami with RW and transfers modA with RW.  Ambulated 10 ft x 2 min-modA with RW.  Tolieting maxA and LBD Jami.  4/24/18:  Participated with PT.  Bed mobility CGA.  Sit to stand CGA with RW and transfers CGA with RW.  Ambulated 20 ft CGA with RW.    Past Medical History:   Diagnosis Date    Arthritis     Diabetes mellitus     Hyperlipidemia     Hypertension     Renal disorder     poor kidney function     Past Surgical History:   Procedure Laterality Date    FOOT AMPUTATION Right      Review of patient's allergies indicates:   Allergen Reactions    Morphine        Scheduled Medications:    epoetin bashir (PROCRIT) injection  50 Units/kg (Dosing Weight) Intravenous Every Mon, Wed, Fri    heparin (porcine)  5,000 Units Subcutaneous Q8H    insulin aspart U-100  2 Units Subcutaneous TIDWM    insulin detemir U-100  5 Units Subcutaneous QHS       PRN Medications: sodium chloride 0.9%, acetaminophen, albuterol sulfate, benzonatate, dextrose 50%, dextrose 50%, glucagon (human recombinant), glucose, glucose, heparin (porcine), insulin aspart U-100, ondansetron, sodium chloride 0.9%    Family History     None        Social History Main Topics    Smoking status: Never Smoker    Smokeless tobacco: Not on file    Alcohol use No    Drug use: Unknown    Sexual activity: Not on file     Review of Systems   Constitutional: Positive for activity change. Negative for chills, fatigue and fever.   HENT: Negative for drooling, hearing loss, trouble swallowing and voice change.    Eyes: Negative for pain and visual disturbance.   Respiratory: Negative for cough, shortness of breath and wheezing.    Cardiovascular: Negative for chest pain and palpitations.   Gastrointestinal: Negative for abdominal distention, nausea and vomiting.   Genitourinary: Negative for difficulty urinating and flank pain.   Musculoskeletal:  Positive for gait problem. Negative for arthralgias, back pain, myalgias and neck pain.   Skin: Negative for rash and wound.   Neurological: Positive for weakness. Negative for dizziness, numbness and headaches.        No phantom pain or sensation.   Psychiatric/Behavioral: Negative for agitation and hallucinations. The patient is not nervous/anxious.      Objective:     Vital Signs (Most Recent):  Temp: (!) 73.4 °F (23 °C) (18 0545)  Pulse: 73 (18 1133)  Resp: 13 (18 0545)  BP: (!) 139/91 (18 0545)  SpO2: 99 % (18 0545)    Vital Signs (24h Range):  Temp:  [73.4 °F (23 °C)-98.1 °F (36.7 °C)] 73.4 °F (23 °C)  Pulse:  [] 73  Resp:  [13-20] 13  SpO2:  [94 %-99 %] 99 %  BP: (111-150)/(69-98) 139/91     Body mass index is 29.9 kg/m².    Physical Exam   Constitutional: She is oriented to person, place, and time. She appears well-developed and well-nourished. No distress.   HENT:   Head: Normocephalic and atraumatic.   Right Ear: External ear normal.   Left Ear: External ear normal.   Nose: Nose normal.   Eyes: Right eye exhibits no discharge. Left eye exhibits no discharge. No scleral icterus.   Neck: Normal range of motion.   Cardiovascular: Normal rate, regular rhythm and intact distal pulses.    Pulmonary/Chest: Effort normal. No respiratory distress. She has no wheezes.   R permacath intact   Abdominal: Soft. She exhibits no distension. There is no tenderness.   Musculoskeletal: Normal range of motion. She exhibits no edema or tenderness.   R foot/lower leg amputation   Neurological: She is alert and oriented to person, place, and time. She exhibits normal muscle tone.   -  Mental Status:  AAOx3.  Follows commands.  Answers correct age and .  Recent and remote memory intact.   -  Speech and language:  no aphasia or dysarthria.    -  Vision:  no hemianopsia or ptosis.    -  Facial movement (CN VII): symmetrical.   -  Motor:  No pronator drift. RUE: 5/5.  LUE: .  RLE: .  LLE:  4/5.  -  Tone:  Normal.   -  Sensory:  Intact to light touch and pin prick.   Skin: Skin is warm and dry. No rash noted.   Psychiatric: She has a normal mood and affect. Her behavior is normal. Thought content normal.   Vitals reviewed.    Diagnostic Results:   Labs: Reviewed  X-Ray: Reviewed  US: Reviewed  CTA: Reviewed    Assessment/Plan:     * History of cardiac arrest    -  PEA arrest (likely 2/2 hypoxia) on 4/17/18 requiring intubation and pressor support  -  Extubated on 4/20/18          Impaired mobility and ADLs    -  (I)/mod(I) prior to admission  -  Prolonged/acute hospital stay  -  PT and OT following   Recommendations  -  Encourage mobility, OOB in chair, and early ambulation as appropriate  -  PT/OT evaluate and treat  -  Pain management  -  Monitor for and prevent skin breakdown and pressure ulcers  · Early mobility, repositioning/weight shifting every 20-30 minutes when sitting, turn patient every 2 hours, proper mattress/overlay and chair cushioning, pressure relief/heel protector boots  -  DVT prophylaxis:  On SQH        Diarrhea of presumed infectious origin    -  C.diff negative  -  Improving         ESRD (end stage renal disease)    -  Nephrology following  -  Started on CRRT with transition to HD  -  S/p permacath on 4/25/18        Patient with rehab goals.  Progressing well, now CGA with mobility and functional ambulation.  Will follow progress and discuss with rehab team for final rehab recommendation.    Thank you for your consult.     ALIDA Maier  Department of Physical Medicine & Rehab  [facility]

## 2018-04-25 NOTE — PLAN OF CARE
HD referral sent to [facility].  MARY ANNE will F/U.          Rosalie Paulino LMSW

## 2018-04-26 ENCOUNTER — HOSPITAL ENCOUNTER (INPATIENT)
Facility: HOSPITAL | Age: 73
LOS: 5 days | DRG: 091 | End: 2018-05-01
Attending: PHYSICAL MEDICINE & REHABILITATION | Admitting: PHYSICAL MEDICINE & REHABILITATION
Payer: MEDICARE

## 2018-04-26 VITALS
RESPIRATION RATE: 14 BRPM | BODY MASS INDEX: 29.76 KG/M2 | WEIGHT: 185.19 LBS | HEART RATE: 83 BPM | SYSTOLIC BLOOD PRESSURE: 118 MMHG | DIASTOLIC BLOOD PRESSURE: 66 MMHG | TEMPERATURE: 98 F | OXYGEN SATURATION: 93 % | HEIGHT: 66 IN

## 2018-04-26 DIAGNOSIS — G72.81 CRITICAL ILLNESS MYOPATHY: ICD-10-CM

## 2018-04-26 PROBLEM — N18.5 STAGE 5 CHRONIC KIDNEY DISEASE NOT ON CHRONIC DIALYSIS: Status: ACTIVE | Noted: 2018-04-26

## 2018-04-26 LAB
ALBUMIN SERPL BCP-MCNC: 2.4 G/DL
ALP SERPL-CCNC: 131 U/L
ALT SERPL W/O P-5'-P-CCNC: 71 U/L
ANION GAP SERPL CALC-SCNC: 12 MMOL/L
AST SERPL-CCNC: 23 U/L
BASOPHILS # BLD AUTO: 0.03 K/UL
BASOPHILS NFR BLD: 0.3 %
BILIRUB SERPL-MCNC: 0.3 MG/DL
BUN SERPL-MCNC: 22 MG/DL
CALCIUM SERPL-MCNC: 8.6 MG/DL
CHLORIDE SERPL-SCNC: 104 MMOL/L
CO2 SERPL-SCNC: 22 MMOL/L
CREAT SERPL-MCNC: 5.6 MG/DL
DIFFERENTIAL METHOD: ABNORMAL
EOSINOPHIL # BLD AUTO: 0.4 K/UL
EOSINOPHIL NFR BLD: 3.9 %
ERYTHROCYTE [DISTWIDTH] IN BLOOD BY AUTOMATED COUNT: 19.3 %
EST. GFR  (AFRICAN AMERICAN): 8.2 ML/MIN/1.73 M^2
EST. GFR  (NON AFRICAN AMERICAN): 7.1 ML/MIN/1.73 M^2
GLUCOSE SERPL-MCNC: 174 MG/DL
HCT VFR BLD AUTO: 24.7 %
HGB BLD-MCNC: 7.7 G/DL
IMM GRANULOCYTES # BLD AUTO: 0.09 K/UL
IMM GRANULOCYTES NFR BLD AUTO: 0.9 %
LYMPHOCYTES # BLD AUTO: 2.3 K/UL
LYMPHOCYTES NFR BLD: 23 %
MCH RBC QN AUTO: 24.1 PG
MCHC RBC AUTO-ENTMCNC: 31.2 G/DL
MCV RBC AUTO: 77 FL
MONOCYTES # BLD AUTO: 1.9 K/UL
MONOCYTES NFR BLD: 18.9 %
NEUTROPHILS # BLD AUTO: 5.4 K/UL
NEUTROPHILS NFR BLD: 53 %
NRBC BLD-RTO: 0 /100 WBC
PLATELET # BLD AUTO: 208 K/UL
PMV BLD AUTO: 11 FL
POCT GLUCOSE: 188 MG/DL (ref 70–110)
POCT GLUCOSE: 239 MG/DL (ref 70–110)
POCT GLUCOSE: 241 MG/DL (ref 70–110)
POCT GLUCOSE: 320 MG/DL (ref 70–110)
POTASSIUM SERPL-SCNC: 4.3 MMOL/L
PROT SERPL-MCNC: 7 G/DL
RBC # BLD AUTO: 3.2 M/UL
SODIUM SERPL-SCNC: 138 MMOL/L
WBC # BLD AUTO: 10.15 K/UL

## 2018-04-26 PROCEDURE — 99900035 HC TECH TIME PER 15 MIN (STAT)

## 2018-04-26 PROCEDURE — 63600175 PHARM REV CODE 636 W HCPCS: Performed by: INTERNAL MEDICINE

## 2018-04-26 PROCEDURE — 11800000 HC REHAB PRIVATE ROOM

## 2018-04-26 PROCEDURE — 94761 N-INVAS EAR/PLS OXIMETRY MLT: CPT

## 2018-04-26 PROCEDURE — 63600175 PHARM REV CODE 636 W HCPCS: Performed by: PHYSICAL MEDICINE & REHABILITATION

## 2018-04-26 PROCEDURE — 36415 COLL VENOUS BLD VENIPUNCTURE: CPT

## 2018-04-26 PROCEDURE — 97530 THERAPEUTIC ACTIVITIES: CPT

## 2018-04-26 PROCEDURE — 99232 SBSQ HOSP IP/OBS MODERATE 35: CPT | Mod: ,,, | Performed by: NURSE PRACTITIONER

## 2018-04-26 PROCEDURE — 97116 GAIT TRAINING THERAPY: CPT

## 2018-04-26 PROCEDURE — 85025 COMPLETE CBC W/AUTO DIFF WBC: CPT

## 2018-04-26 PROCEDURE — 99238 HOSP IP/OBS DSCHRG MGMT 30/<: CPT | Mod: GC,,, | Performed by: HOSPITALIST

## 2018-04-26 PROCEDURE — 25000003 PHARM REV CODE 250: Performed by: INTERNAL MEDICINE

## 2018-04-26 PROCEDURE — 63600175 PHARM REV CODE 636 W HCPCS: Performed by: STUDENT IN AN ORGANIZED HEALTH CARE EDUCATION/TRAINING PROGRAM

## 2018-04-26 PROCEDURE — 94799 UNLISTED PULMONARY SVC/PX: CPT

## 2018-04-26 PROCEDURE — 80053 COMPREHEN METABOLIC PANEL: CPT

## 2018-04-26 RX ORDER — AMOXICILLIN 250 MG
1 CAPSULE ORAL 2 TIMES DAILY
Status: CANCELLED | OUTPATIENT
Start: 2018-04-26

## 2018-04-26 RX ORDER — INSULIN ASPART 100 [IU]/ML
2 INJECTION, SOLUTION INTRAVENOUS; SUBCUTANEOUS
Status: CANCELLED | OUTPATIENT
Start: 2018-04-27

## 2018-04-26 RX ORDER — ACETAMINOPHEN 325 MG/1
650 TABLET ORAL EVERY 6 HOURS PRN
Status: DISCONTINUED | OUTPATIENT
Start: 2018-04-26 | End: 2018-04-27

## 2018-04-26 RX ORDER — ONDANSETRON 4 MG/1
4 TABLET, ORALLY DISINTEGRATING ORAL EVERY 6 HOURS PRN
Status: CANCELLED | OUTPATIENT
Start: 2018-04-26

## 2018-04-26 RX ORDER — ONDANSETRON 4 MG/1
4 TABLET, ORALLY DISINTEGRATING ORAL EVERY 6 HOURS PRN
Status: DISCONTINUED | OUTPATIENT
Start: 2018-04-26 | End: 2018-05-01 | Stop reason: HOSPADM

## 2018-04-26 RX ORDER — BISACODYL 10 MG
10 SUPPOSITORY, RECTAL RECTAL DAILY PRN
Status: DISCONTINUED | OUTPATIENT
Start: 2018-04-26 | End: 2018-05-01 | Stop reason: HOSPADM

## 2018-04-26 RX ORDER — INSULIN ASPART 100 [IU]/ML
2 INJECTION, SOLUTION INTRAVENOUS; SUBCUTANEOUS
Status: DISCONTINUED | OUTPATIENT
Start: 2018-04-27 | End: 2018-05-01 | Stop reason: HOSPADM

## 2018-04-26 RX ORDER — AMOXICILLIN 250 MG
1 CAPSULE ORAL 2 TIMES DAILY
Status: DISCONTINUED | OUTPATIENT
Start: 2018-04-26 | End: 2018-05-01 | Stop reason: HOSPADM

## 2018-04-26 RX ORDER — RAMELTEON 8 MG/1
8 TABLET ORAL NIGHTLY PRN
Status: CANCELLED | OUTPATIENT
Start: 2018-04-26

## 2018-04-26 RX ORDER — ACETAMINOPHEN 325 MG/1
650 TABLET ORAL EVERY 6 HOURS PRN
Status: DISCONTINUED | OUTPATIENT
Start: 2018-04-26 | End: 2018-05-01 | Stop reason: HOSPADM

## 2018-04-26 RX ORDER — BENZONATATE 100 MG/1
200 CAPSULE ORAL 3 TIMES DAILY PRN
Status: CANCELLED | OUTPATIENT
Start: 2018-04-26

## 2018-04-26 RX ORDER — GLUCAGON 1 MG
1 KIT INJECTION
Status: DISCONTINUED | OUTPATIENT
Start: 2018-04-26 | End: 2018-05-01 | Stop reason: HOSPADM

## 2018-04-26 RX ORDER — INSULIN ASPART 100 [IU]/ML
1-10 INJECTION, SOLUTION INTRAVENOUS; SUBCUTANEOUS
Status: DISCONTINUED | OUTPATIENT
Start: 2018-04-26 | End: 2018-05-01 | Stop reason: HOSPADM

## 2018-04-26 RX ORDER — ACETAMINOPHEN 325 MG/1
650 TABLET ORAL EVERY 6 HOURS PRN
Status: CANCELLED | OUTPATIENT
Start: 2018-04-26

## 2018-04-26 RX ORDER — IBUPROFEN 200 MG
24 TABLET ORAL
Status: DISCONTINUED | OUTPATIENT
Start: 2018-04-26 | End: 2018-05-01 | Stop reason: HOSPADM

## 2018-04-26 RX ORDER — BENZONATATE 100 MG/1
200 CAPSULE ORAL 3 TIMES DAILY PRN
Status: DISCONTINUED | OUTPATIENT
Start: 2018-04-26 | End: 2018-05-01 | Stop reason: HOSPADM

## 2018-04-26 RX ORDER — RAMELTEON 8 MG/1
8 TABLET ORAL NIGHTLY PRN
Status: DISCONTINUED | OUTPATIENT
Start: 2018-04-26 | End: 2018-05-01 | Stop reason: HOSPADM

## 2018-04-26 RX ORDER — IBUPROFEN 200 MG
16 TABLET ORAL
Status: DISCONTINUED | OUTPATIENT
Start: 2018-04-26 | End: 2018-05-01 | Stop reason: HOSPADM

## 2018-04-26 RX ADMIN — HEPARIN SODIUM 5000 UNITS: 5000 INJECTION, SOLUTION INTRAVENOUS; SUBCUTANEOUS at 06:04

## 2018-04-26 RX ADMIN — INSULIN ASPART 3 UNITS: 100 INJECTION, SOLUTION INTRAVENOUS; SUBCUTANEOUS at 09:04

## 2018-04-26 RX ADMIN — INSULIN ASPART 2 UNITS: 100 INJECTION, SOLUTION INTRAVENOUS; SUBCUTANEOUS at 11:04

## 2018-04-26 RX ADMIN — INSULIN DETEMIR 5 UNITS: 100 INJECTION, SOLUTION SUBCUTANEOUS at 09:04

## 2018-04-26 RX ADMIN — HEPARIN SODIUM 5000 UNITS: 5000 INJECTION, SOLUTION INTRAVENOUS; SUBCUTANEOUS at 01:04

## 2018-04-26 RX ADMIN — INSULIN ASPART 2 UNITS: 100 INJECTION, SOLUTION INTRAVENOUS; SUBCUTANEOUS at 08:04

## 2018-04-26 RX ADMIN — INSULIN ASPART 2 UNITS: 100 INJECTION, SOLUTION INTRAVENOUS; SUBCUTANEOUS at 06:04

## 2018-04-26 NOTE — SUBJECTIVE & OBJECTIVE
Interval History 4/26/2018:  Patient is seen for follow-up rehab evaluation and recommendations: No acute events over night.  Without new complaints.  Participated with therapy yesterday.  Barriers for discharge/rehab admission: Insurance approval pending for rehab admission.      HPI, Past Medical, Family, and Social History remains the same as documented in the initial encounter.    Scheduled Medications:    epoetin bashir (PROCRIT) injection  50 Units/kg (Dosing Weight) Intravenous Every Mon, Wed, Fri    heparin (porcine)  5,000 Units Subcutaneous Q8H    insulin aspart U-100  2 Units Subcutaneous TIDWM    insulin detemir U-100  5 Units Subcutaneous QHS     PRN Medications: sodium chloride 0.9%, acetaminophen, albuterol sulfate, benzonatate, dextrose 50%, dextrose 50%, glucagon (human recombinant), glucose, glucose, heparin (porcine), insulin aspart U-100, ondansetron, sodium chloride 0.9%    Review of Systems   Constitutional: Positive for activity change. Negative for chills, fatigue and fever.   HENT: Negative for drooling, hearing loss, trouble swallowing and voice change.    Eyes: Negative for pain and visual disturbance.   Respiratory: Negative for cough, shortness of breath and wheezing.    Cardiovascular: Negative for chest pain and palpitations.   Gastrointestinal: Negative for abdominal distention, nausea and vomiting.   Genitourinary: Negative for difficulty urinating and flank pain.   Musculoskeletal: Positive for gait problem. Negative for arthralgias, back pain, myalgias and neck pain.   Skin: Negative for rash and wound.   Neurological: Positive for weakness. Negative for dizziness, numbness and headaches.        No phantom pain or sensation.   Psychiatric/Behavioral: Negative for agitation and hallucinations. The patient is not nervous/anxious.      Objective:     Vital Signs (Most Recent):  Temp: 98.9 °F (37.2 °C) (04/26/18 1110)  Pulse: 80 (04/26/18 1239)  Resp: (!) 21 (04/26/18 1200)  BP: (!)  105/59 (18 1200)  SpO2: (!) 89 % (18 1200)    Vital Signs (24h Range):  Temp:  [97 °F (36.1 °C)-98.9 °F (37.2 °C)] 98.9 °F (37.2 °C)  Pulse:  [75-88] 80  Resp:  [12-27] 21  SpO2:  [85 %-96 %] 89 %  BP: (105-155)/(58-82) 105/59     Physical Exam   Constitutional: She is oriented to person, place, and time. She appears well-developed and well-nourished. No distress.   HENT:   Head: Normocephalic and atraumatic.   Right Ear: External ear normal.   Left Ear: External ear normal.   Nose: Nose normal.   Eyes: Right eye exhibits no discharge. Left eye exhibits no discharge. No scleral icterus.   Neck: Normal range of motion.   Cardiovascular: Normal rate, regular rhythm and intact distal pulses.    Pulmonary/Chest: Effort normal. No respiratory distress. She has no wheezes.   R permacath intact   Abdominal: Soft. She exhibits no distension. There is no tenderness.   Musculoskeletal: Normal range of motion. She exhibits no edema or tenderness.   R foot/lower leg amputation   Neurological: She is alert and oriented to person, place, and time. She exhibits normal muscle tone.   -  Mental Status:  AAOx3.  Follows commands.  Answers correct age and .  Recent and remote memory intact.   -  Speech and language:  no aphasia or dysarthria.    -  Vision:  no hemianopsia or ptosis.    -  Facial movement (CN VII): symmetrical.   -  Motor:  No pronator drift. RUE: 5/5.  LUE: 5/5.  RLE: 4/5.  LLE: 4/5.  -  Tone:  Normal.   -  Sensory:  Intact to light touch and pin prick.   Skin: Skin is warm and dry. No rash noted.   Psychiatric: She has a normal mood and affect. Her behavior is normal. Thought content normal.   Vitals reviewed.    Diagnostic Results:   Labs: Reviewed  X-Ray: Reviewed  US: Reviewed  CTA: Reviewed  NEUROLOGICAL EXAMINATION:     MENTAL STATUS   Oriented to person, place, and time.

## 2018-04-26 NOTE — PLAN OF CARE
MARY ANNE called [facility] to follow up on HD chair referral ([phone]).  Merry is following pt. MARY ANNE spoke to Juan Miguel and left a message asking Merry to call this MARY ANNE.  Juan Miguel stated that schedule for pt's chair has been released, but he is not sure what the schedule is.  MARY ANNE will continue to follow.    1416:  [facility] to [facility], who informed SW that pt has been approved for Inpt Rehab.  MARY ANNE called Merry with [facility], again, in an attempt to find out the status of pt's HD chair.  Merry sent pt's info to the clinic yesterday and is awtg their official approval of the pt.  MARY ANNE explained that pt has been accepted to [facility] and the dc barrier is the absence of an HD chair.  Merry stated that she will contact the clinic and call MARY ANNE if she gets approval for pt's chair.    MARY ANNE requested discharge/readmit orders, in case pt is ready to dc today.  MARY ANNE attempted to reach MD, unsuccessfully and left a message with the CM, requesting orders.    Aleksandra Lee LCSW   [facility]    [phone]   [phone] fax

## 2018-04-26 NOTE — ASSESSMENT & PLAN NOTE
- Stable  - HbA1c 8.0  - Will hold home oral antihyperglycemic agents in favor of aspart SSI + POCT glucose Q6H  - Cautious with insulin titration given ESRD. On 5 units detemir QHS and 2 units aspart TID with most blood sugars at goal, will continue to monitor  - Diabetic diet restriction

## 2018-04-26 NOTE — PLAN OF CARE
Problem: Physical Therapy Goal  Goal: Physical Therapy Goal  Goals to be met by: 18    Patient will increase functional independence with mobility by performin.Supine to and from sit stand by assistance - Met   Updated: Supine to sit with Mod I  2. Gait  x 50 feet with Stand-by Assistance using Rolling Walker PRN, with supp O2 PRN.   3. Transfer sit to stand with RW and Stand-by Assistance  4. Patient tolerate 10 minutes of standing activities including exercises and or ADLs     Outcome: Ongoing (interventions implemented as appropriate)  Goals updated to reflect progress. Goals remain appropriate to improve functional mobility.    Christopher Christy III, DPT, PT  2018

## 2018-04-26 NOTE — PLAN OF CARE
On call SW:    Trans[facility] has been [facility] via w/c stretcher due to ESBL.  ETA is w/in the hour which starts at time of note.  Marlin, nurse [id number], has been given # [id number] for report and will hand off to charge nurse Elma.    Rubi Jack LMSW

## 2018-04-26 NOTE — PROGRESS NOTES
[facility] Hospital Medicine  Progress Note    Patient Name: Martha Melvin  MRN: [id number]  Patient Class: IP- Inpatient   Admission Date: 4/15/2018  Length of Stay: 11 days  Attending Physician: Kendal Osborn MD  Primary Care Provider: GUILLERMO Mcclure MD    [facility] Medicine Team: [facility] HOSP MED 1 Chaitanya Rosales MD    Subjective:     Principal Problem:History of cardiac arrest    HPI:  73 y/o F PMhx HFrEF, CKD stage V (makes urine, not on dialysis), moderate aortic stenosis, DM II, R leg amputation (2015), HTN who presented w/ SOB x 4 days. Patient was discharged from [facility] 10/14/17 for SOB 2/2 ADHF and she was subsequently discharged on an increased of lasix from 20mg to 80mg daily due to her renal failure. Daughter states patient ran out of lasix 80mg and asked PCP for rx refill. However, per daughter PCP refused to fill 80mg and therefore patient had been taking 20mg x 4 daily. Daughter states she last saw patient on 4/11 prior to admission and that patient was in good health at that time. She spoke with patient over the phone on 4/16 and at that time noted patient to be SOB which prompted her to call a family friend to check on patient. In conversation daughter was made aware that patient's lasix had run out. EMS was called and on route she pllaced on BIPAP and given albuterol. Upon arrival her saturations were at 82%.     In the ED patient given nitro paste, albuterol, as well as lasix. She was subsequently admitted to medicine. She was started on lasix 60mg IV tid as well as rocephin and azithromycin for suspected CAP. Nephrology was consulted for volume overload in the setting of CKD stage V, however, due to poor urine output despite diuresis w/ lasix and diuril nephrology requested trialysis. Following placement patient found to be in PEA arrest. ROSC achieved for <10min then patient again coded. ROSC achieved and patient was subsequently transferred to SICU where patient coded  a 3rd time. ROSC achieved and maintained, epi switched to levo and placed on vent. Patient started on urgent dialysis. Continued to be hypoxic therefore given nimbex, elevation of HOB w/ improvement in sats.     Per daughter patient had been aware of CKD V since 2015 and had refused dialysis since that time due to fear of having to be on dialysis.    Interval History: No complaints this morning. No SOB, constipation. Feels strength is improving.     Review of Systems    Objective:     Vital Signs (Most Recent):  Temp: 98.1 F (04/25/18 0545)  Pulse: 78 (04/25/18 1520)  Resp: 13 (04/25/18 0545)  BP: (!) 139/91 (04/25/18 0545)  SpO2: 99 % (04/25/18 0545) Vital Signs (24h Range):  Temp:  [97 °F (36.1 °C)-98.3 °F (36.8 °C)] 97.4 °F (36.3 °C)  Pulse:  [73-88] 83  Resp:  [12-28] 12  SpO2:  [93 %-98 %] 95 %  BP: (106-169)/(58-82) 132/82     Weight: 84 kg (185 lb 3 oz)  Body mass index is 29.9 kg/m².    Intake/Output Summary (Last 24 hours) at 04/26/18 1040  Last data filed at 04/25/18 1730   Gross per 24 hour   Intake              600 ml   Output             1600 ml   Net            -1000 ml      Physical Exam   Constitutional: She is oriented to person, place, and time. She appears well-developed and well-nourished. No distress.   HENT:   Head: Normocephalic and atraumatic.   Eyes: Conjunctivae are normal. No scleral icterus.   Cardiovascular: Normal rate and regular rhythm.    Systolic ejection murmur   Pulmonary/Chest: Effort normal. No respiratory distress. She has no wheezes.   Abdominal: Soft. Bowel sounds are normal. She exhibits no distension. There is no tenderness. There is no guarding.   Musculoskeletal: She exhibits no edema or tenderness.   R LE amputation   Lymphadenopathy:     She has no cervical adenopathy.   Neurological: She is alert and oriented to person, place, and time.   4+/5 strength globally    Skin: Skin is warm and dry. No rash noted.   Psychiatric: She has a normal mood and affect. Her behavior  is normal.   Nursing note and vitals reviewed.      Significant Labs:   Recent Lab Results       04/26/18  0847 04/26/18  0629 04/25/18  2108 04/25/18  1748 04/25/18  1442      Immature Granulocytes  0.9(H)        Immature Grans (Abs)  0.09  Comment:  Mild elevation in immature granulocytes is non specific and   can be seen in a variety of conditions including stress response,   acute inflammation, trauma and pregnancy. Correlation with other   laboratory and clinical findings is essential.  (H)        Albumin  2.4(L)        Alkaline Phosphatase  131        ALT  71(H)        Anion Gap  12        AST  23        Baso #  0.03        Basophil%  0.3        Total Bilirubin  0.3  Comment:  For infants and newborns, interpretation of results should be based  on gestational age, weight and in agreement with clinical  observations.  Premature Infant recommended reference ranges:  Up to 24 hours.............<8.0 mg/dL  Up to 48 hours............<12.0 mg/dL  3-5 days..................<15.0 mg/dL  6-29 days.................<15.0 mg/dL          BUN, Bld  22        Calcium  8.6(L)        Chloride  104        CO2  22(L)        Creatinine  5.6(H)        Differential Method  Automated        eGFR if   8.2(A)        eGFR if non   7.1  Comment:  Calculation used to obtain the estimated glomerular filtration  rate (eGFR) is the CKD-EPI equation.   (A)        Eos #  0.4        Eosinophil%  3.9        Glucose  174(H)        Gran # (ANC)  5.4        Gran%  53.0        Hematocrit  24.7(L)        Hemoglobin  7.7(L)        Lymph #  2.3        Lymph%  23.0        MCH  24.1(L)        MCHC  31.2(L)        MCV  77(L)        Mono #  1.9(H)        Mono%  18.9(H)        MPV  11.0        nRBC  0        Platelets  208        POCT Glucose 188(H)  224(H) 170(H) 184(H)     Potassium  4.3        Total Protein  7.0        RBC  3.20(L)        RDW  19.3(H)        Sodium  138        WBC  10.15                           Assessment/Plan:      * History of cardiac arrest    - Stable  - She has made significant improvements  - Etiology of PEA arrest likely hypoxia, now resolved and on room air  - TTE s/p cardiac arrest showed normal EF (55%), diastolic dysfunction  - Extubated on 4/20 to NC, still doing well  - PT/OT recommending inpatient rehabilitation        Diarrhea of presumed infectious origin    - C.diff negative, diarrhea improved        Discharge planning issues    - Needs outpatient HD chair, SW working on scheduling  - PT/OT recommending rehab, PM&R following        Elevated liver enzymes    - Resolving, etiology shock liver        Aortic stenosis    - Stable  - TTE 4/16/18 EF 55-60% w/ grade II DD  - Moderate aortic stenosis, SABRINA = 1.15 cm2, AVAi = 0.57 cm2/m2, peak velocty = 3.01 m/s, mean gradient = 21 mmHg  - Cautious w/ volume removal as aortic stenosis is preload dependent  -Outpatient follow up for long term management        Anemia, chronic renal failure, stage 5    - Etiology thought to be CKD  - Stable,will hold on further evaluation unless clinically indicated        Chronic diastolic heart failure    - Stable  - Acute portion resolved  - LVEF preserved, biatrial enlargement with grade 2 diastolic dysfunction, moderate AS, and pHTN on most recent TTE  - Euvolemic with HD and now on room letha        Type 2 diabetes mellitus with stage 5 chronic kidney disease and hypertension    - Stable  - HbA1c 8.0  - Will hold home oral antihyperglycemic agents in favor of aspart SSI + POCT glucose Q6H  - Cautious with insulin titration given ESRD. On 5 units detemir QHS and 2 units aspart TID with most blood sugars at goal, will continue to monitor  - Diabetic diet restriction        ESRD (end stage renal disease)    - Stable  - Baseline sCr 4.5-5.0 c/w CKD stage V prior to admission  - Admitted with ADHF. Attempted medical diuresis without improvement in UOP. PEA arrest on 4/16-4/17; likely related to hypoxia.  SLED  started same day  - Has remained on RRT since 4/17, now tolerating conventional HD well  - Hepatitis negative and PPD placed  - Nephrology access service placed permacath today, dialysis today, will asses function.           VTE Risk Mitigation         Ordered     heparin (porcine) injection 1,000 Units  As needed (PRN)      04/25/18 0800     IP VTE HIGH RISK PATIENT  Once      04/21/18 1936     heparin (porcine) injection 5,000 Units  Every 8 hours      04/19/18 0158          Chaitanya Rosales MD   Internal Medicine PGY-1  [phone]

## 2018-04-26 NOTE — PROGRESS NOTES
Chart reviewed. Will plan for HD tomorrow.     Matilda Pascal, PGY-5  Nephrology Fellow  [facility]  Pager: [phone]

## 2018-04-26 NOTE — SUBJECTIVE & OBJECTIVE
Interval History: No complaints this morning. No SOB, constipation. Feels strength is improving.     Review of Systems    Objective:     Vital Signs (Most Recent):  Temp: 98.1 F (04/25/18 0545)  Pulse: 78 (04/25/18 1520)  Resp: 13 (04/25/18 0545)  BP: (!) 139/91 (04/25/18 0545)  SpO2: 99 % (04/25/18 0545) Vital Signs (24h Range):  Temp:  [97 °F (36.1 °C)-98.3 °F (36.8 °C)] 97.4 °F (36.3 °C)  Pulse:  [73-88] 83  Resp:  [12-28] 12  SpO2:  [93 %-98 %] 95 %  BP: (106-169)/(58-82) 132/82     Weight: 84 kg (185 lb 3 oz)  Body mass index is 29.9 kg/m².    Intake/Output Summary (Last 24 hours) at 04/26/18 1040  Last data filed at 04/25/18 1730   Gross per 24 hour   Intake              600 ml   Output             1600 ml   Net            -1000 ml      Physical Exam   Constitutional: She is oriented to person, place, and time. She appears well-developed and well-nourished. No distress.   HENT:   Head: Normocephalic and atraumatic.   Eyes: Conjunctivae are normal. No scleral icterus.   Cardiovascular: Normal rate and regular rhythm.    Systolic ejection murmur   Pulmonary/Chest: Effort normal. No respiratory distress. She has no wheezes.   Abdominal: Soft. Bowel sounds are normal. She exhibits no distension. There is no tenderness. There is no guarding.   Musculoskeletal: She exhibits no edema or tenderness.   R LE amputation   Lymphadenopathy:     She has no cervical adenopathy.   Neurological: She is alert and oriented to person, place, and time.   4+/5 strength globally    Skin: Skin is warm and dry. No rash noted.   Psychiatric: She has a normal mood and affect. Her behavior is normal.   Nursing note and vitals reviewed.      Significant Labs:   Recent Lab Results       04/26/18  0847 04/26/18  0629 04/25/18  2108 04/25/18  1748 04/25/18  1442      Immature Granulocytes  0.9(H)        Immature Grans (Abs)  0.09  Comment:  Mild elevation in immature granulocytes is non specific and   can be seen in a variety of conditions  including stress response,   acute inflammation, trauma and pregnancy. Correlation with other   laboratory and clinical findings is essential.  (H)        Albumin  2.4(L)        Alkaline Phosphatase  131        ALT  71(H)        Anion Gap  12        AST  23        Baso #  0.03        Basophil%  0.3        Total Bilirubin  0.3  Comment:  For infants and newborns, interpretation of results should be based  on gestational age, weight and in agreement with clinical  observations.  Premature Infant recommended reference ranges:  Up to 24 hours.............<8.0 mg/dL  Up to 48 hours............<12.0 mg/dL  3-5 days..................<15.0 mg/dL  6-29 days.................<15.0 mg/dL          BUN, Bld  22        Calcium  8.6(L)        Chloride  104        CO2  22(L)        Creatinine  5.6(H)        Differential Method  Automated        eGFR if   8.2(A)        eGFR if non   7.1  Comment:  Calculation used to obtain the estimated glomerular filtration  rate (eGFR) is the CKD-EPI equation.   (A)        Eos #  0.4        Eosinophil%  3.9        Glucose  174(H)        Gran # (ANC)  5.4        Gran%  53.0        Hematocrit  24.7(L)        Hemoglobin  7.7(L)        Lymph #  2.3        Lymph%  23.0        MCH  24.1(L)        MCHC  31.2(L)        MCV  77(L)        Mono #  1.9(H)        Mono%  18.9(H)        MPV  11.0        nRBC  0        Platelets  208        POCT Glucose 188(H)  224(H) 170(H) 184(H)     Potassium  4.3        Total Protein  7.0        RBC  3.20(L)        RDW  19.3(H)        Sodium  138        WBC  10.15

## 2018-04-26 NOTE — PT/OT/SLP PROGRESS
"Physical Therapy Treatment    Patient Name:  Martha Melvin   MRN:  [id number]    Recommendations:     Discharge Recommendations:  rehabilitation facility   Discharge Equipment Recommendations: walker, rolling   Barriers to discharge: Decreased caregiver support    Assessment:     Martha Melvin is a 72 y.o. female admitted with a medical diagnosis of History of cardiac arrest.  She presents with the following impairments/functional limitations:  weakness, impaired endurance, impaired functional mobilty, gait instability, impaired balance, decreased lower extremity function. Good tolerance to PT session. Most limited by impaired endurance and increasing back discomfort with increased gait distance. Able to perform kentrell-care with minimal assistance in bathroom. To benefit from continued skilled PT intervention to address deficits, decrease fall risk, and maximize functional independence.    Rehab Prognosis:  Good; patient would benefit from acute skilled PT services to address these deficits and reach maximum level of function.      Recent Surgery: Procedure(s) (LRB):  PERMCATH INSERTION-TUNNELED CVC (N/A) 1 Day Post-Op    Plan:     During this hospitalization, patient to be seen 4 x/week to address the above listed problems via gait training, therapeutic activities, therapeutic exercises  · Plan of Care Expires:  05/23/18   Plan of Care Reviewed with: patient    Subjective     Communicated with RN prior to session.  Patient found supine upon PT entry to room, agreeable to treatment.      Chief Complaint: low back discomfort with increased ambulation  Patient comments/goals: "can I sit now"  Pain/Comfort:  · Pain Rating 1: 0/10  · Pain Rating Post-Intervention 1: 0/10    Patients cultural, spiritual, Restorationism conflicts given the current situation: none stated    Objective:     Patient found with: telemetry     General Precautions: Standard, fall   Orthopedic Precautions:N/A   Braces: N/A     Functional " Mobility:  · Bed Mobility:  Rolling Left:  supervision  · Scooting: supervision  · Supine to Sit: supervision  · Transfers:  Sit to Stand:  contact guard assistance with rolling walker  · Bed to Chair: contact guard assistance with  rolling walker  using  Stand Pivot  · Toilet Transfer: minimum assistance with  rolling walker  using  Stand Pivot  · Gait: 10ftx2; 40ftx1 using rolling walker with CGA. Distance limited by low back discomfort. Ambulates with significant forward flexed posture  · Balance: impaired dynamic balance requiring CGA to decrease fall risk      AM-PAC 6 CLICK MOBILITY  Turning over in bed (including adjusting bedclothes, sheets and blankets)?: 3  Sitting down on and standing up from a chair with arms (e.g., wheelchair, bedside commode, etc.): 3  Moving from lying on back to sitting on the side of the bed?: 3  Moving to and from a bed to a chair (including a wheelchair)?: 3  Need to walk in hospital room?: 3  Climbing 3-5 steps with a railing?: 1  Total Score: 16       Therapeutic Activities and Exercises:   Patient educated on:   - role of PT/POC    - safety with all functional mobility   - bed mobility training   - transfer training   - gait training with rolling walker   - seated LE therex to be performed   - importance of participation with therapy services   - safes to ambulate with rolling walker and 1 person assist at this time.    Verbalized understanding of all education provided.    Ambulated to bathroom. Pari-care and toilet transfer performed with Min Assist. CGA provided while patient washed hands at sink in bathroom. Demonstrated significant forward flexed posture while standing at sink and during short-distance ambulation using rolling walker.    Educated to perform seated LE therex x10 reps each 3x/day on well-leg:   - ankle pump   - LAQ   - march    Demonstrates understanding.      Patient left up in chair with all lines intact, call button in reach and RN notified..    GOALS:     Physical Therapy Goals        Problem: Physical Therapy Goal    Goal Priority Disciplines Outcome Goal Variances Interventions   Physical Therapy Goal     PT/OT, PT Ongoing (interventions implemented as appropriate)     Description:  Goals to be met by: 18    Patient will increase functional independence with mobility by performin.Supine to and from sit stand by assistance - Met   Updated: Supine to sit with Mod I  2. Gait  x 50 feet with Stand-by Assistance using Rolling Walker PRN, with supp O2 PRN.   3. Transfer sit to stand with RW and Stand-by Assistance  4. Patient tolerate 10 minutes of standing activities including exercises and or ADLs                       Time Tracking:     PT Received On: 18  PT Start Time: 1446     PT Stop Time: 1511  PT Total Time (min): 25 min     Billable Minutes: Gait Training 8 and Therapeutic Activity 15    Treatment Type: Treatment  PT/PTA: PT     PTA Visit Number: 1     Christopher Christy III, PT  2018

## 2018-04-26 NOTE — ASSESSMENT & PLAN NOTE
- Stable  - Acute portion resolved  - LVEF preserved, biatrial enlargement with grade 2 diastolic dysfunction, moderate AS, and pHTN on most recent TTE  - Euvolemic with HD and now on room letha

## 2018-04-26 NOTE — ASSESSMENT & PLAN NOTE
- Needs outpatient HD chair, MARY ANNE working on scheduling  - PT/OT recommending rehab, PM&R following

## 2018-04-26 NOTE — PROGRESS NOTES
[facility]  Physical Medicine & Rehab  Progress Note    Patient Name: Martha Melvin  MRN: [id number]  Admission Date: 4/15/2018  Length of Stay: 11 days  Attending Physician: Kendal Osborn MD    Subjective:     Principal Problem:History of cardiac arrest    Hospital Course:   4/16/18:  Evaluated by PT and OT.  Bed mobility (I)-SV.  EOB SV.  Sit to stand (I)-SV and transfers SBA-CGA.  Ambulated a few steps CGA.  UBD SV and LBD CGA.  4/23/18:  Re-evaluated by PT and OT.  Bed mobility Jami.  Sit to stand Jami with RW and transfers modA with RW.  Ambulated 10 ft x 2 min-modA with RW.  Tolieting maxA and LBD Jami.  4/24/18:  Participated with PT.  Bed mobility CGA.  Sit to stand CGA with RW and transfers CGA with RW.  Ambulated 20 ft CGA with RW.  4/25/18:  Participated with OT.  Bed mobility SBA.  Sit to stand CGA with RW and transfers CGA with RW.  Ambulated 25 ft CGA with RW.  UBD Jami and LBD SBA.    Interval History 4/26/2018:  Patient is seen for follow-up rehab evaluation and recommendations: No acute events over night.  Without new complaints.  Participated with therapy yesterday.  Barriers for discharge/rehab admission: Insurance approval pending for rehab admission.      HPI, Past Medical, Family, and Social History remains the same as documented in the initial encounter.    Scheduled Medications:    epoetin bashir (PROCRIT) injection  50 Units/kg (Dosing Weight) Intravenous Every Mon, Wed, Fri    heparin (porcine)  5,000 Units Subcutaneous Q8H    insulin aspart U-100  2 Units Subcutaneous TIDWM    insulin detemir U-100  5 Units Subcutaneous QHS     PRN Medications: sodium chloride 0.9%, acetaminophen, albuterol sulfate, benzonatate, dextrose 50%, dextrose 50%, glucagon (human recombinant), glucose, glucose, heparin (porcine), insulin aspart U-100, ondansetron, sodium chloride 0.9%    Review of Systems   Constitutional: Positive for activity change. Negative for chills, fatigue and  fever.   HENT: Negative for drooling, hearing loss, trouble swallowing and voice change.    Eyes: Negative for pain and visual disturbance.   Respiratory: Negative for cough, shortness of breath and wheezing.    Cardiovascular: Negative for chest pain and palpitations.   Gastrointestinal: Negative for abdominal distention, nausea and vomiting.   Genitourinary: Negative for difficulty urinating and flank pain.   Musculoskeletal: Positive for gait problem. Negative for arthralgias, back pain, myalgias and neck pain.   Skin: Negative for rash and wound.   Neurological: Positive for weakness. Negative for dizziness, numbness and headaches.        No phantom pain or sensation.   Psychiatric/Behavioral: Negative for agitation and hallucinations. The patient is not nervous/anxious.      Objective:     Vital Signs (Most Recent):  Temp: 98.9 °F (37.2 °C) (04/26/18 1110)  Pulse: 80 (04/26/18 1239)  Resp: (!) 21 (04/26/18 1200)  BP: (!) 105/59 (04/26/18 1200)  SpO2: (!) 89 % (04/26/18 1200)    Vital Signs (24h Range):  Temp:  [97 °F (36.1 °C)-98.9 °F (37.2 °C)] 98.9 °F (37.2 °C)  Pulse:  [75-88] 80  Resp:  [12-27] 21  SpO2:  [85 %-96 %] 89 %  BP: (105-155)/(58-82) 105/59     Physical Exam   Constitutional: She is oriented to person, place, and time. She appears well-developed and well-nourished. No distress.   HENT:   Head: Normocephalic and atraumatic.   Right Ear: External ear normal.   Left Ear: External ear normal.   Nose: Nose normal.   Eyes: Right eye exhibits no discharge. Left eye exhibits no discharge. No scleral icterus.   Neck: Normal range of motion.   Cardiovascular: Normal rate, regular rhythm and intact distal pulses.    Pulmonary/Chest: Effort normal. No respiratory distress. She has no wheezes.   R permacath intact   Abdominal: Soft. She exhibits no distension. There is no tenderness.   Musculoskeletal: Normal range of motion. She exhibits no edema or tenderness.   R foot/lower leg amputation   Neurological:  She is alert and oriented to person, place, and time. She exhibits normal muscle tone.   -  Mental Status:  AAOx3.  Follows commands.  Answers correct age and .  Recent and remote memory intact.   -  Speech and language:  no aphasia or dysarthria.    -  Vision:  no hemianopsia or ptosis.    -  Facial movement (CN VII): symmetrical.   -  Motor:  No pronator drift. RUE: 5/5.  LUE: 5/5.  RLE: 4/5.  LLE: 4/5.  -  Tone:  Normal.   -  Sensory:  Intact to light touch and pin prick.   Skin: Skin is warm and dry. No rash noted.   Psychiatric: She has a normal mood and affect. Her behavior is normal. Thought content normal.   Vitals reviewed.    Diagnostic Results:   Labs: Reviewed  X-Ray: Reviewed  US: Reviewed  CTA: Reviewed    Assessment/Plan:      * History of cardiac arrest    -  PEA arrest (likely 2/2 hypoxia) on 18 requiring intubation and pressor support  -  Extubated on 18          Impaired mobility and ADLs    -  (I)/mod(I) prior to admission  -  Prolonged/acute hospital stay  -  PT and OT following   Recommendations  -  Encourage mobility, OOB in chair, and early ambulation as appropriate  -  PT/OT evaluate and treat  -  Pain management  -  Monitor for and prevent skin breakdown and pressure ulcers  · Early mobility, repositioning/weight shifting every 20-30 minutes when sitting, turn patient every 2 hours, proper mattress/overlay and chair cushioning, pressure relief/heel protector boots  -  DVT prophylaxis:  On SQH        Diarrhea of presumed infectious origin    -  C.diff negative  -  Improving         ESRD (end stage renal disease)    -  Nephrology following  -  Started on CRRT with transition to HD  -  S/p permacath on 18        Recommend IRF.  Patient approved for [facility].  Insurance pending admission.  Transfer to rehab when insurance clears and she is medically ready for discharge.    Lorraine Pearson, ALIDA  Department of Physical Medicine & Rehab   [facility]

## 2018-04-27 LAB
ALBUMIN SERPL BCP-MCNC: 2.5 G/DL
ALP SERPL-CCNC: 116 U/L
ALT SERPL W/O P-5'-P-CCNC: 55 U/L
ANION GAP SERPL CALC-SCNC: 11 MMOL/L
AST SERPL-CCNC: 23 U/L
BASOPHILS # BLD AUTO: 0.04 K/UL
BASOPHILS NFR BLD: 0.4 %
BILIRUB SERPL-MCNC: 0.3 MG/DL
BUN SERPL-MCNC: 36 MG/DL
CALCIUM SERPL-MCNC: 8.4 MG/DL
CHLORIDE SERPL-SCNC: 100 MMOL/L
CO2 SERPL-SCNC: 22 MMOL/L
CREAT SERPL-MCNC: 7.8 MG/DL
DIFFERENTIAL METHOD: ABNORMAL
EOSINOPHIL # BLD AUTO: 0.4 K/UL
EOSINOPHIL NFR BLD: 3.7 %
ERYTHROCYTE [DISTWIDTH] IN BLOOD BY AUTOMATED COUNT: 19.8 %
EST. GFR  (AFRICAN AMERICAN): 5.5 ML/MIN/1.73 M^2
EST. GFR  (NON AFRICAN AMERICAN): 4.8 ML/MIN/1.73 M^2
GLUCOSE SERPL-MCNC: 193 MG/DL
HCT VFR BLD AUTO: 24.9 %
HGB BLD-MCNC: 7.7 G/DL
IMM GRANULOCYTES # BLD AUTO: 0.07 K/UL
IMM GRANULOCYTES NFR BLD AUTO: 0.7 %
LYMPHOCYTES # BLD AUTO: 2.7 K/UL
LYMPHOCYTES NFR BLD: 27.2 %
MAGNESIUM SERPL-MCNC: 1.9 MG/DL
MCH RBC QN AUTO: 23.8 PG
MCHC RBC AUTO-ENTMCNC: 30.9 G/DL
MCV RBC AUTO: 77 FL
MONOCYTES # BLD AUTO: 1.7 K/UL
MONOCYTES NFR BLD: 17.7 %
NEUTROPHILS # BLD AUTO: 4.9 K/UL
NEUTROPHILS NFR BLD: 50.3 %
NRBC BLD-RTO: 0 /100 WBC
PLATELET # BLD AUTO: 268 K/UL
PMV BLD AUTO: 11.3 FL
POCT GLUCOSE: 197 MG/DL (ref 70–110)
POCT GLUCOSE: 230 MG/DL (ref 70–110)
POTASSIUM SERPL-SCNC: 3.6 MMOL/L
PREALB SERPL-MCNC: 24 MG/DL
PROT SERPL-MCNC: 7.1 G/DL
RBC # BLD AUTO: 3.23 M/UL
SODIUM SERPL-SCNC: 133 MMOL/L
WBC # BLD AUTO: 9.8 K/UL

## 2018-04-27 PROCEDURE — 97161 PT EVAL LOW COMPLEX 20 MIN: CPT

## 2018-04-27 PROCEDURE — 63600175 PHARM REV CODE 636 W HCPCS: Performed by: INTERNAL MEDICINE

## 2018-04-27 PROCEDURE — 97116 GAIT TRAINING THERAPY: CPT

## 2018-04-27 PROCEDURE — 97530 THERAPEUTIC ACTIVITIES: CPT

## 2018-04-27 PROCEDURE — 97110 THERAPEUTIC EXERCISES: CPT

## 2018-04-27 PROCEDURE — 36415 COLL VENOUS BLD VENIPUNCTURE: CPT

## 2018-04-27 PROCEDURE — 84134 ASSAY OF PREALBUMIN: CPT

## 2018-04-27 PROCEDURE — 97167 OT EVAL HIGH COMPLEX 60 MIN: CPT

## 2018-04-27 PROCEDURE — 97802 MEDICAL NUTRITION INDIV IN: CPT

## 2018-04-27 PROCEDURE — 80053 COMPREHEN METABOLIC PANEL: CPT

## 2018-04-27 PROCEDURE — 63600175 PHARM REV CODE 636 W HCPCS: Performed by: PHYSICAL MEDICINE & REHABILITATION

## 2018-04-27 PROCEDURE — 83735 ASSAY OF MAGNESIUM: CPT

## 2018-04-27 PROCEDURE — 11800000 HC REHAB PRIVATE ROOM

## 2018-04-27 PROCEDURE — 97535 SELF CARE MNGMENT TRAINING: CPT

## 2018-04-27 PROCEDURE — 85025 COMPLETE CBC W/AUTO DIFF WBC: CPT

## 2018-04-27 PROCEDURE — 99223 1ST HOSP IP/OBS HIGH 75: CPT | Mod: AI,,, | Performed by: PHYSICAL MEDICINE & REHABILITATION

## 2018-04-27 RX ORDER — HEPARIN SODIUM 5000 [USP'U]/ML
5000 INJECTION, SOLUTION INTRAVENOUS; SUBCUTANEOUS EVERY 8 HOURS
Status: DISCONTINUED | OUTPATIENT
Start: 2018-04-27 | End: 2018-05-01 | Stop reason: HOSPADM

## 2018-04-27 RX ADMIN — HEPARIN SODIUM 5000 UNITS: 5000 INJECTION, SOLUTION INTRAVENOUS; SUBCUTANEOUS at 11:04

## 2018-04-27 RX ADMIN — INSULIN ASPART 2 UNITS: 100 INJECTION, SOLUTION INTRAVENOUS; SUBCUTANEOUS at 08:04

## 2018-04-27 RX ADMIN — INSULIN DETEMIR 5 UNITS: 100 INJECTION, SOLUTION SUBCUTANEOUS at 11:04

## 2018-04-27 RX ADMIN — INSULIN ASPART 4 UNITS: 100 INJECTION, SOLUTION INTRAVENOUS; SUBCUTANEOUS at 12:04

## 2018-04-27 RX ADMIN — INSULIN ASPART 2 UNITS: 100 INJECTION, SOLUTION INTRAVENOUS; SUBCUTANEOUS at 12:04

## 2018-04-27 RX ADMIN — HEPARIN SODIUM 5000 UNITS: 5000 INJECTION, SOLUTION INTRAVENOUS; SUBCUTANEOUS at 02:04

## 2018-04-27 NOTE — PLAN OF CARE
Met with patient. Alert and oriented. Confirmed all information on facesheet. States live alone but can have son Nnamdi come stay with her if she needs supervision as he does not work. Also he can provide transportation for her. Pt has all DME. Endorses no HH or outpt preference. Uses [facility]. Denies having an advanced directive. Rehab process, team conference and family training explained to pt, all questions answered. CM will continue to follow.     Per Dr. Lima, pt states she was supposed to  a new prosthetic in early April. Reached out to [employer] and left a message for a call back about this.    Reached out to daughter with no answer but will follow up with patient on return from HD about her outpt dialysis placement. CM will continue to follow.        04/27/18 1600   Discharge Assessment   Assessment Type Discharge Planning Assessment   Confirmed/corrected address and phone number on facesheet? Yes   Assessment information obtained from? Patient   Expected Length of Stay (days) 14   Communicated expected length of stay with patient/caregiver yes   Prior to hospitilization cognitive status: Alert/Oriented   Prior to hospitalization functional status: Independent   Current cognitive status: Alert/Oriented   Current Functional Status: Assistive Equipment;Needs Assistance   Facility Arrived From: [facility] [place] [facility]   Lives With alone   Able to Return to Prior Arrangements unable to determine at this time (comments)   Is patient able to care for self after discharge? Unable to determine at this time (comments)   Who are your caregiver(s) and their phone number(s)? jose Dutta, [phone]   Patient's perception of discharge disposition home or selfcare   Equipment Currently Used at Home walker, rolling;wheelchair;bedside commode;other (see comments)  (Tub bench)   Do you have any problems affording any of your prescribed medications? No   Does the patient have transportation home? Yes    Transportation Available family or friend will provide   Dialysis Name and Scheduled days Unknown outpt, HD MWF   Discharge Plan A Home;Home Health   Discharge Plan B Home;Home Health   Patient/Family In Agreement With Plan yes

## 2018-04-27 NOTE — PROGRESS NOTES
Physical Therapy   Admin [facility]    Martha Melvin   MRN: [id number]        04/27/18 1102   Transfers   Bed/Chair/WC 2   Toilet 2   Locomotion   Distance Walked 1   Distance Wheelchair 2   Walk 1   Wheelchair 2   Mode B   Stairs 1     Ruslan Mitchell, PT, DPT  4/27/2018

## 2018-04-27 NOTE — PROGRESS NOTES
Pt arrived to room 261A, tolerating transfer well.  Pt assisted to bed with moderate assist and is resting in no apparent distress with no needs verbalized.  Dr. Lima notified of pt arrival and need for admission orders.  See assessment documentation for details of physical exam.

## 2018-04-27 NOTE — PLAN OF CARE
Problem: Patient Care Overview  Goal: Plan of Care Review  Outcome: Ongoing (interventions implemented as appropriate)  Pt care plan initiated and individualized as needed.  See associated flowsheets for relevant documentation. Frequent rounds made per protocol to maintain pt safety and meet pt needs.  Continuing to monitor and follow up as needed.      Problem: Diabetes, Type 2 (Adult)  Goal: Signs and Symptoms of Listed Potential Problems Will be Absent, Minimized or Managed (Diabetes, Type 2)  Signs and symptoms of listed potential problems will be absent, minimized or managed by discharge/transition of care (reference Diabetes, Type 2 (Adult) CPG).   Outcome: Ongoing (interventions implemented as appropriate)  Elevated glucose treated as indicated and monitoring will continue as ordered.  MD aware of elevated glucose and need for sliding scale coverage.    Problem: Infection, Risk/Actual (Adult)  Goal: Infection Prevention/Resolution  Patient will demonstrate the desired outcomes by discharge/transition of care.   Outcome: Ongoing (interventions implemented as appropriate)  Contact isolation maintained.  Pt without complaint of symptoms of infection.    Problem: Fall Risk (Adult)  Goal: Absence of Falls  Patient will demonstrate the desired outcomes by discharge/transition of care.   Outcome: Ongoing (interventions implemented as appropriate)  Pt remains free from falls or trauma thus far this shift.

## 2018-04-27 NOTE — PLAN OF CARE
Due to pending weights, Pt's energy and protein needs were calculated from last encounter weight.     Problem: Diabetes, Type 2 (Adult)  Goal: Signs and Symptoms of Listed Potential Problems Will be Absent, Minimized or Managed (Diabetes, Type 2)  Signs and symptoms of listed potential problems will be absent, minimized or managed by discharge/transition of care (reference Diabetes, Type 2 (Adult) CPG).   Outcome: Ongoing (interventions implemented as appropriate)  Nutrition Problem  Altered nutrition-related lab values     Related to (etiology):   Diabetes and CKD    Signs and Symptoms (as evidenced by):   BUN 36, Cr 7.8, Glucose 193, Ca 8.4, POCT Gluc 230    Interventions/Recommendations (treatment strategy):  Renal/Diabetic diet, Diabetic/Renal diet education before DC     Nutrition Diagnosis Status:   New    Recommendations    1. Continue Diabetic/Renal diet.   2. Pt to be re-educated on Diabetic/Renal diet.   3.RD to follow.     Goals: Po intake >75%.   Nutrition Goal Status: new

## 2018-04-27 NOTE — HOSPITAL COURSE
Ms. Melvin presented with a four-day history of shortness of breath and was admitted with acute on chronic diastolic heart failure complicated by CKD V with progression to ERSD. She was initially managed with diuretics and BiPAP as well as azithromycin and rocephin for suspected CAP, but ultimately required initiation of HD. Following line placement she decompensated and went into PEA arrest. ROSC was quickly achieved and she was intubated, but in total she coded three times. With pressors and nimbex on board she underwent CRRT with significant improvement. She was extubated and transferred from the ICU to hospital medicine, where she continued her rapid improvement while arrangements were made for outpatient HD. Throughout this time she completed therapy for complicated ESBL UTI with zosyn. A tunneled catheter was placed by the nephrology access service without any complications. PT, OT, and ultimately PM&R evaluated and recommended acute rehab, for which she was transferred to [place] for further therapy. On the day of discharge, she was hemodynamically stable and without complaint.

## 2018-04-27 NOTE — H&P
[facility]  Physical Medicine & Rehab  History & Physical    Patient Name: Martha Melvin  MRN: [id number]  Admission Date: 4/26/2018  Attending Physician: Christiano Lima MD   Primary Care Provider: GUILLERMO Mcclure MD    Subjective:     Principal Problem: Critical illness myopathy    HPI: Martha Melvin is a 72-year-old female with PMHx of HTN, HLD, DMII, CKD stage V, CHF, aortic stenosis, arthritis, R foot amputation (2015), and recent admission (10/14/17-10/18/17) for ADHF.  Patient presented to [facility] on 4/15/18 with worsening SOB x 4 days.  On arrival, found to have acute hypoxic respiratory failure, acute on chronic diastolic heart failure, pulmonary edema, CAP, and UTI.  Treated with Nitro paste and Albuterol in ED and started on IV Lasix, Rocephin, and Azithromycin.  Following admission, Nephrology consulted for volume overload despite diuresis, and trialysis placed for SLED.  Hospital course complicated by ICU transfer for PEA arrest (likely 2/2 hypoxia) on 4/17/18 requiring intubation and pressor support, ESRD requiring CRRT with transition to HD s/p permacath on 4/25/18 (Nephrology following), shock liver, multidrug resistant Klebsiella UTI treated with Zosyn x 7 days, and diarrhea (C.diff negative).  Extubated on 4/20/18.  Stepped down to floor on 4/21/18. Currently below functional baseline    Current Functional Status:  Participating with therapy.   Bed mobility CGA.  Sit to stand CGA with RW and transfers CGA with RW.  Ambulated 20 ft CGA with RW. Tolieting maxA and LBD Jami.    Functional History: Patient lives in [place], alone, in a single story home with one step to enter.  Prior to admission, she was (I)/mod(I) with ADLs and mobility.  Ambulated with R foot prosthesis.  DME: rollator, WC, SC, grab bars, BSC, shower chair, prosthesis.     Past Medical History:   Diagnosis Date    Arthritis     Diabetes mellitus     Hyperlipidemia     Hypertension     Renal disorder     poor  kidney function     Past Surgical History:   Procedure Laterality Date    FOOT AMPUTATION Right      Review of patient's allergies indicates:   Allergen Reactions    Morphine        Scheduled Medications:    epoetin bashir (PROCRIT) injection  50 Units/kg (Dosing Weight) Intravenous Every Mon, Wed, Fri    heparin (porcine)  5,000 Units Subcutaneous Q8H    insulin aspart U-100  2 Units Subcutaneous TIDWM    insulin detemir U-100  5 Units Subcutaneous QHS    senna-docusate 8.6-50 mg  1 tablet Oral BID       PRN Medications: acetaminophen, benzonatate, bisacodyl, dextrose 50%, dextrose 50%, glucagon (human recombinant), glucose, glucose, insulin aspart U-100, ondansetron, ramelteon    Family History     None        Social History Main Topics    Smoking status: Never Smoker    Smokeless tobacco: Not on file    Alcohol use No    Drug use: Unknown    Sexual activity: Not on file     Review of Systems   Constitutional: Positive for activity change and fatigue. Negative for chills, diaphoresis and fever.   HENT: Negative for congestion, drooling, facial swelling and nosebleeds.    Eyes: Negative for photophobia, pain and visual disturbance.   Respiratory: Negative for cough, shortness of breath and wheezing.    Cardiovascular: Negative for chest pain and palpitations.   Gastrointestinal: Negative for abdominal distention, blood in stool and constipation.   Endocrine: Negative for cold intolerance and heat intolerance.   Genitourinary: Negative for dysuria and flank pain.   Musculoskeletal: Positive for gait problem. Negative for arthralgias.   Skin: Negative for color change and wound.   Neurological: Negative for seizures, weakness and numbness.   Hematological: Negative for adenopathy.   Psychiatric/Behavioral: Negative for agitation, confusion and decreased concentration.     Objective:     Vital Signs (Most Recent):  Temp: 98.7 °F (37.1 °C) (04/27/18 0704)  Pulse: 76 (04/27/18 0704)  Resp: (!) 24 (04/27/18  0704)  BP: 137/67 (18 0704)  SpO2: (!) 94 % (18 0704)    Vital Signs (24h Range):  Temp:  [98.1 °F (36.7 °C)-98.9 °F (37.2 °C)] 98.7 °F (37.1 °C)  Pulse:  [76-84] 76  Resp:  [14-27] 24  SpO2:  [88 %-98 %] 94 %  BP: ()/(54-78) 137/67     There is no height or weight on file to calculate BMI.    Physical Exam   Constitutional: She appears well-developed and well-nourished.   HENT:   Head: Normocephalic and atraumatic.   Right Ear: External ear normal.   Left Ear: External ear normal.   Eyes: Conjunctivae and EOM are normal. Pupils are equal, round, and reactive to light.   Neck: Normal range of motion. Neck supple. No thyromegaly present.   Cardiovascular: Normal rate, regular rhythm, normal heart sounds and intact distal pulses.    Pulmonary/Chest: Effort normal and breath sounds normal. No respiratory distress.   Abdominal: Soft. Bowel sounds are normal.   Musculoskeletal:   Right lower leg amputation   Lymphadenopathy:     She has no cervical adenopathy.   Neurological:   -  Mental Status:  AAOx3.  Follows commands.  Answers correct age and .  Recent and remote memory intact.   -  Speech and language:  no aphasia or dysarthria.    -  Vision:  no hemianopsia or ptosis.    -  Facial movement (CN VII): symmetrical.   -  Motor:  No pronator drift. RUE: 5/5.  LUE: 5/5.  RLE: 4/5.  LLE: 4/5.  -  Tone:  Normal.   -  Sensory:  Intact to light touch and pin prick.    Skin: Capillary refill takes less than 2 seconds.   permacath noted   Psychiatric: She has a normal mood and affect. Her behavior is normal. Judgment and thought content normal.   Vitals reviewed.    NEUROLOGICAL EXAMINATION:     CRANIAL NERVES     CN III, IV, VI   Pupils are equal, round, and reactive to light.  Extraocular motions are normal.       Diagnostic Results: Labs: Reviewed  X-Ray: Reviewed  CT: Reviewed  MRI: Reviewed    Assessment/Plan:     Martha Melvin is a 72 y.o. female being admitted to inpatient rehabilitation on  4/26/2018 for Critical illness myopathy with impaired mobility and ADLs.     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 Monitor blood glucoses            Christiano Lima MD  Department of Physical Medicine & Rehab   [facility]    Post Admission Assessment:    Martha Melvin is a 72 y.o. female being admitted to inpatient rehabilitation on 4/26/2018 for critical illness myopathy with impaired mobility and ADLs.   Patient is appropriate for inpatient rehabilitation and is expected to tolerate 3 hours of therapy a day or 15 hours of therapy a week.  Patient is expected to benefit from the following therapy services: Physical Therapy, Occupational Therapy, Speech Therapy, as well as Recreational Therapy  Impairments include: Impaired balance, Decreased Endurance, Impaired activity tolerance  Goals: Supervision to Mod I with Mobility, ADLs, Transfers using LRAD   Precautions:  Fall, Aspiration  ELOS: 10-14 days   Disposition: Home with family     I have had the opportunity to examine the patient within 24 hours of admission and have reviewed the Pre-Admission Assessment and find it consistent with my examination and evaluation of the patient. I confirm that this patient is appropriate for admission and treatment in this inpatient rehabilitation hospital, needs intense interdisciplinary rehabilitation care under my direction and is expected to achieve meaningful goals within a reasonable period of time that are consistent with the planned discharge disposition.     The patient will be admitted for inpatient comprehensive interdisciplinary rehabilitation to address the impairments and medical conditions listed above while assessing equipment needs and compensatory strategies, with  coordinated interdisciplinary services that will include physical therapy, occupational therapy, and close monitoring and treatment with 24-hour rehabilitative nursing. This interdisciplinary program will be performed under the direction of a physiatrist.

## 2018-04-27 NOTE — DISCHARGE SUMMARY
[facility] Hospital Medicine  Discharge Summary      Patient Name: Martha Melvin  MRN: [id number]  Admission Date: 4/15/2018  Hospital Length of Stay: 11 days  Discharge Date and Time:  04/26/2018 8:34 PM  Attending Physician: No att. providers found   Discharging Provider: Bobby Meyer MD  Primary Care Provider: GUILLERMO Mcclure MD  [facility] Medicine Team: [facility] HOSP MED 1 Bobby Meyer MD    HPI:   71 y/o F PMhx HFrEF, CKD stage V (makes urine, not on dialysis), moderate aortic stenosis, DM II, R leg amputation (2015), HTN who presented w/ SOB x 4 days. Patient was discharged from [facility] 10/14/17 for SOB 2/2 ADHF and she was subsequently discharged on an increased of lasix from 20mg to 80mg daily due to her renal failure. Daughter states patient ran out of lasix 80mg and asked PCP for rx refill. However, per daughter PCP refused to fill 80mg and therefore patient had been taking 20mg x 4 daily. Daughter states she last saw patient on 4/11 prior to admission and that patient was in good health at that time. She spoke with patient over the phone on 4/16 and at that time noted patient to be SOB which prompted her to call a family friend to check on patient. In conversation daughter was made aware that patient's lasix had run out. EMS was called and on route she pllaced on BIPAP and given albuterol. Upon arrival her saturations were at 82%.     In the ED patient given nitro paste, albuterol, as well as lasix. She was subsequently admitted to medicine. She was started on lasix 60mg IV tid as well as rocephin and azithromycin for suspected CAP. Nephrology was consulted for volume overload in the setting of CKD stage V, however, due to poor urine output despite diuresis w/ lasix and diuril nephrology requested trialysis. Following placement patient found to be in PEA arrest. ROSC achieved for <10min then patient again coded. ROSC achieved and patient was subsequently transferred to SICU where  patient coded a 3rd time. ROSC achieved and maintained, epi switched to levo and placed on vent. Patient started on urgent dialysis. Continued to be hypoxic therefore given nimbex, elevation of HOB w/ improvement in sats.     Per daughter patient had been aware of CKD V since 2015 and had refused dialysis since that time due to fear of having to be on dialysis.    Procedure(s) (LRB):  PERMCATH INSERTION-TUNNELED CVC (N/A)      Hospital Course:   Ms. Melvin presented with a four-day history of shortness of breath and was admitted with acute on chronic diastolic heart failure complicated by CKD V with progression to ERSD. She was initially managed with diuretics and BiPAP as well as azithromycin and rocephin for suspected CAP, but ultimately required initiation of HD. Following line placement she decompensated and went into PEA arrest. ROSC was quickly achieved and she was intubated, but in total she coded three times. With pressors and nimbex on board she underwent CRRT with significant improvement. She was extubated and transferred from the ICU to hospital medicine, where she continued her rapid improvement while arrangements were made for outpatient HD. Throughout this time she completed therapy for complicated ESBL UTI with zosyn. A tunneled catheter was placed by the nephrology access service without any complications. PT, OT, and ultimately PM&R evaluated and recommended acute rehab, for which she was transferred to [facility] for further therapy. On the day of discharge, she was hemodynamically stable and without complaint.     Consults:   Consults         Status Ordering Provider     Inpatient consult to Critical Care Medicine  Once     Provider:  (Not yet assigned)    Completed MAHENDRA ZUNIGA     Inpatient consult to Nephrology  Once     Provider:  (Not yet assigned)    Completed JERONIMO GARCIA     Inpatient consult to Physical Medicine Rehab  Once     Provider:  (Not yet assigned)    Completed RISSA  DWIGHT Wyatt new Assessment & Plan notes have been filed under this hospital service since the last note was generated.  Service: Hospital Medicine    Final Active Diagnoses:    Diagnosis Date Noted POA    PRINCIPAL PROBLEM:  History of cardiac arrest [Z86.74] 04/16/2018 Not Applicable    ESRD (end stage renal disease) [N18.6] 10/16/2017 Yes    Stage 5 chronic kidney disease not on chronic dialysis [N18.5] 04/26/2018 Yes    Impaired mobility and ADLs [Z74.09] 04/25/2018 Unknown    Discharge planning issues [Z02.9] 04/24/2018 Not Applicable    Diarrhea of presumed infectious origin [R19.7] 04/24/2018 Unknown    Pre-op examination [Z01.818]  Not Applicable    MANPREET (acute kidney injury) [N17.9] 04/19/2018 Yes    Aortic stenosis [I35.0] 04/17/2018 Yes    Elevated liver enzymes [R74.8] 04/17/2018 Yes    Anemia, chronic renal failure, stage 5 [N18.5, D63.1] 04/16/2018 Yes    Renal insufficiency [N28.9] 04/16/2018 Yes    Type 2 diabetes mellitus with stage 5 chronic kidney disease and hypertension [E11.22, I12.0, N18.5] 04/15/2018 Yes    Chronic diastolic heart failure [I50.32] 04/15/2018 Yes      Problems Resolved During this Admission:    Diagnosis Date Noted Date Resolved POA    UTI (urinary tract infection) [N39.0] 04/17/2018 04/24/2018 Yes    High anion gap metabolic acidosis [E87.2] 04/17/2018 04/22/2018 No    Pulmonary edema [J81.1] 04/17/2018 04/23/2018 Yes    Leukocytosis [D72.829] 04/17/2018 04/24/2018 Yes    Acute hypoxemic respiratory failure [J96.01] 04/15/2018 04/24/2018 Yes    Community acquired bacterial pneumonia [J15.9] 04/15/2018 04/23/2018 Yes       Discharged Condition: fair    Disposition: Rehab Facility    Follow Up:  Follow-up Information     [facility] - Vascular Surgery.    Specialty:  Vascular Surgery  Contact information:  Nicolás Senior  [facility] [address] [phone]  Additional information:  5th Floor [facility] [place]           [facility] - Cardiology.     Specialty:  Cardiology  Contact information:  Nicolás Senior  [facility] [address] [phone]  Additional information:  3rd floor               Patient Instructions:   No discharge procedures on file.    Significant Diagnostic Studies:    CBC:    Recent Labs  Lab 04/25/18 0622 04/26/18  0629   WBC 10.92 10.15   GRAN 51.7  5.7 53.0  5.4   HGB 7.8* 7.7*   HCT 24.9* 24.7*    208       Chem 10:    Recent Labs  Lab 04/25/18 0622 04/26/18  0629    138   K 4.1 4.3    104   CO2 21* 22*   BUN 41* 22   CREATININE 7.9* 5.6*   * 174*   CALCIUM 8.1* 8.6*       LFTs:    Recent Labs  Lab 04/25/18 0622 04/26/18 0629   ALKPHOS 154* 131   BILITOT 0.3 0.3   AST 27 23   ALT 92* 71*   ALBUMIN 2.4* 2.4*         Microbiology Results (last 7 days)     Procedure Component Value Units Date/Time    Clostridium difficile EIA [[id number]] Collected:  04/24/18 2258    Order Status:  Completed Specimen:  Stool from Stool Updated:  04/25/18 0331     C. diff Antigen Negative     C difficile Toxins A+B, EIA Negative     Comment: Testing not recommended for children <24 months old.       Clostridium difficile EIA [[id number]]     Order Status:  Canceled Specimen:  Stool from Stool     Blood culture [[id number]] Collected:  04/17/18 0115    Order Status:  Completed Specimen:  Blood from Line, Arterial, Left Updated:  04/22/18 1012     Blood Culture, Routine No growth after 5 days.    Blood culture [[id number]] Collected:  04/16/18 2226    Order Status:  Completed Specimen:  Blood from Line, Jugular, Internal Right Updated:  04/22/18 0612     Blood Culture, Routine No growth after 5 days.            Pending Diagnostic Studies:     Procedure Component Value Units Date/Time    APTT [[id number]] Collected:  04/16/18 2336    Order Status:  Sent Lab Status:  In process Updated:  04/16/18 2337    Specimen:  Blood from Blood     CBC auto differential [[id number]] Collected:  04/16/18 2336    Order Status:  Sent  Lab Status:  In process Updated:  04/16/18 2337    Specimen:  Blood from Blood     Comprehensive metabolic panel [[id number]] Collected:  04/16/18 2336    Order Status:  Sent Lab Status:  In process Updated:  04/16/18 2337    Specimen:  Blood from Blood     Magnesium [[id number]] Collected:  04/18/18 1916    Order Status:  Sent Lab Status:  In process Updated:  04/18/18 1916    Specimen:  Blood from Blood     Magnesium [[id number]] Collected:  04/18/18 1639    Order Status:  Sent Lab Status:  In process Updated:  04/18/18 1640    Specimen:  Blood from Blood     Renal function panel [[id number]] Collected:  04/18/18 1916    Order Status:  Sent Lab Status:  In process Updated:  04/18/18 1916    Specimen:  Blood from Blood     Renal function panel [[id number]] Collected:  04/18/18 1639    Order Status:  Sent Lab Status:  In process Updated:  04/18/18 1640    Specimen:  Blood from Blood     Troponin I [[id number]] Collected:  04/16/18 2336    Order Status:  Sent Lab Status:  In process Updated:  04/16/18 2337    Specimen:  Blood from Blood          Medications:  Reconciled Home Medications:      Medication List      STOP taking these medications    ibuprofen 400 MG tablet  Commonly known as:  ADVIL,MOTRIN        ASK your doctor about these medications    atorvastatin 20 MG tablet  Commonly known as:  LIPITOR  Take 20 mg by mouth once daily.     bisacodyl 5 mg EC tablet  Commonly known as:  DULCOLAX  Take 5 mg by mouth daily as needed for Constipation.     calcium carbonate 300 mg (750 mg) Chew  Commonly known as:  TUMS E-X  Take 1 tablet by mouth 3 (three) times daily with meals.     ergocalciferol 50,000 unit Cap  Commonly known as:  ERGOCALCIFEROL  Take 1 capsule (50,000 Units total) by mouth every 7 days.     ferrous sulfate 325 mg (65 mg iron) Tab tablet  Take 1 tablet (325 mg total) by mouth 3 (three) times daily with meals.     furosemide 80 MG tablet  Commonly known as:  LASIX  Take 1 tablet (80 mg total) by mouth  every morning.     insulin aspart U-100 100 unit/mL injection  Commonly known as:  NOVOLOG  Inject 8 Units into the skin 3 (three) times daily before meals.     insulin glargine 100 unit/mL injection  Commonly known as:  LANTUS  Inject 20 Units into the skin every evening.     loratadine 10 mg tablet  Commonly known as:  CLARITIN  Take 10 mg by mouth once daily.     NIFEdipine 30 MG Tbsr  Commonly known as:  ADALAT CC  Take 30 mg by mouth once daily.            Indwelling Lines/Drains at time of discharge:   Lines/Drains/Airways     Central Venous Catheter Line                 Trialysis (Dialysis) Catheter 04/16/18 2050 right internal jugular 9 days         Tunneled Central Line Insertion/Assessment - Double Lumen  04/25/18 0843 right internal jugular 1 day                Time spent on the discharge of patient: 45 minutes  Patient was seen and examined on the date of discharge and determined to be suitable for discharge.         Bobby Meyer MD  Department of Hospital Medicine  [facility]

## 2018-04-27 NOTE — PROGRESS NOTES
Occupational Therapy   Evaluation and Am Treatment    Martha Melvin  MRN: [id number]  Room/Bed: E261/E261 A     1817   OT Time Calculation   OT Start Time 917   OT Stop Time 1056   OT Total Time (min) 99 min   General   OT Date of Treatment 18   Chart Reviewed Yes   Onset of Illness/Injury or Date of Surgery 04/15/18   Diagnosis Critical illness myopathy   Additional Pertinent History Past Medical History:   Diagnosis Date    Arthritis     Diabetes mellitus     Hyperlipidemia     Hypertension     Renal disorder     poor kidney function     Past Surgical History:   Procedure Laterality Date    FOOT AMPUTATION Right         Date Referred to OT 18   Referring Physician Dr. Lima   Family/Caregiver Present No   Patient Found (position) Seated at edge of bed   Pt found with (permcath)   Precautions   General Precautions contact;fall   Orthopedic No   Required Braces or Orthoses No   Lines, Tubes, Drains Other (see comments)  (permacath)   BADL History   Bed Mobility/Transfers independent   Grooming independent   Bathing independent   Upper Body Dressing independent   Lower Body Dressing independent   Toileting independent   Home Management Skills independent   IADL History   Homemaking Responsibilities Yes   Meal Prep Responsibility Primary   Laundry Responsibility Primary   Cleaning Responsibility Primary   Bill Paying/Finance Responsibility Primary   Shopping Responsibility Primary   Driving License No  ()   Occupation Retired   Type of Occupation    Leisure and Hobbies cooking, cleaning, taking care of her grandchildren   IADL Comments Pt has not been driving for the past 2 years since her R BKA and her license is . Pt reports that her daughter or son provide transportation for her in that they pick her up from [place], [place] and bring her to the daughter's house in [place], [place] where she helps take care of her daughter's children and household.   Living Environment  "  Lives With alone   Living Arrangements house   Home Accessibility other (see comments)  (Thresholds within home)   Home Layout Able to live on 1st floor   Stair Railings at Home none   Transportation Available family or friend will provide   Living Environment Comment Pt lives alone in [place], [place] in [facility] with no steps to enter. Pt reports she has a threshold to enter her kitchen, bedroom, living room, and utility room. Pt has wc, RW, shower chair, and BSC as well as a grab bar in her bathrub. Pt reports that she only uses shower chair but sits on it outside of the tub and uses grab bars to pull herself into tub then sits down to bathe She reports (I) as PLOF; pt reports taking care of her daughter's household and children in [place], [place] where they provide transportation for her to and from her home. Pt reports that either her son or daughter will be staying with her at her home upon discharge.   Equipment Currently Used at Home shower chair;other (see comments)  (pt uses shower chair outside of the tub)   Subjective   Patient states "I just feel like dead weight sometimes."   Patient stated goals Return to PLOF   Pain/Comfort   Pain Rating 7/10   Location chest   Pain Addressed Reposition;Distraction;Nurse notified   Pain Comment Pt reports pain in chest whenever she coughs or moves, NSG notified and addressed situation prior to beginning session   Cognitive Status    Comprehension:    - Understands basic 75-89% - May need words repeated  (4)      Expression:    - Expresses self with assistance from staff member to occlude trach  (4)  - Expresses basic 75-89% of time - Needs to repeat words  (4)      Social Interaction:    - Interacts appropriately 90% of time - needs monitoring or encouragement for participation or interaction  (5)      Problem Solving:    - Solves basic problems 90% of the time. Ex: I need to go to bed-Im tired  (5)  - Solves basic problems 75-89% of the time  " (4)    Memory:      - Recognizes, recalls, or executes 50-74% of time 2 steps of 2 step request (3)                   Level of Consciousness (AVPU) alert   Arousal Level opens eyes spontaneously   Orientation oriented x 4   Able to Follow Commands (Communication) WFL   Speech clear/fluent;nods/gestures appropriately   Mood/Behavior cooperative;calm   Range of Motion (ROM)   Range of Motion Examination bilateral upper extremity ROM was WFL   Manual Muscle Testing (MMT)   Manual Muscle Testing Results other (see comments)   Additional Documentation (BUE grossly 4-/5)   Fine Motor Coordination   Left Hand, Finger to Nose normal performance   Right Hand, Finger to Nose normal performance   Left Hand Thumb/Finger Opposition Skills normal performance   Right Hand Thumb/Finger Opposition Skills normal performance   Tone   Right UE  normal   Left UE normal   Observation   Appearance Well nourished female   Posture Rounded shoulders   Skin warm;dry;intact   Bed Mobility   Bed Mobility yes   Rolling/Turning to Left Stand by assistance   Rolling/Turning Left Comments for safety   Rolling/Turning Right Stand by assistance   Rolling/Turning Right Comments for safety   Supine to Sit Moderate Assistance   Supine to Sit Comments for trunk   Sit to Supine Stand by Assistance   Sit to Supine Comments for safety   Transfers   Transfer yes   Sit to Stand   Sit <> Stand Assistance Moderate Assistance;Contact Guard Assistance   Sit <> Stand Assistive Device No Assistive Device   Trials/Comments Mod (A) for trunk elevation from EOB   Stand to Sit   Assistance Contact Guard Assistance   Assistive Device No Assistive Device   Trials/Comments CGA for steadying assist to w/c   Bed to Chair   Bed <> Chair Technique Stand Pivot   Bed <> Chair Transfer Assistance Moderate Assistance;Contact Guard Assistance   Bed <> Chair Assistive Device No Assistive Device   Trials/Comments assist to lower   Tub Bench Transfer   Tub Transfer Technique Stand  Pivot  (using shower chair (outside of tub) and grab bar to lift)   Tub Bench Transfer Assistance Maximum Assistance   Tub Transfer Assistive Device grab bar   Trials/Comments assist for lifting and lowering into/out of tub   Toilet Transfer   Toilet Transfer Technique Stand Pivot   Toilet Transfer Assistance Moderate Assistance;Contact Guard Assistance   Toilet Transfer Assistive Device bedside commode   Trials/Comments assist for trunk elevation   Feeding   Feeding Level of Assistance Supervision   Feeding Where Assessed Bed level   Feeding Comments for safety   Grooming   Grooming Level of Assistance Supervision   Hand Washing Level of Assistance Supervision   Face Washing Level of Assistance Supervision   Oral Hygeine Level of Assistance Supervision   Hair Grooming Level of Assistance Supervision   Grooming Where Assessed Sitting sinkside   Grooming Comments pt completed 4/4 steps   Bathing   Bathing Level of Assistance Contact guard  (light steadying during stance to wash/dry bottom)   Bathing Where Assessed Edge of bed   Bathing Comments pt completed 10/10 parts, assist for steadying to wash/dry buttocks and perineum   UE Dressing   UE Dressing Level of Assistance Supervision   UE Dressing Where Assessed Edge of bed   UE Dressing Comments (S) for safety sitting EOB to don pullover shirt (pt completed 4/4 steps)    LE Dressing   LE Dressing Yes   LE Dressing  Level of Assistance Minimum assistance   LE Dressing Level of Assistance Minimum assistance  (to pull pants over bottom)   Sock Level of Assistance Stand by assistance   Shoe Level of Assistance Stand by assistance   Prosthetic Level of Assistance Stand by assistance   LE Dressing Where Assessed Edge of bed   LE Dressing Comments assist with 1/8 parts to pull up pants over bottom   Toileting   Toileting Adaptive Equipment Other (Comment)  (BSC)   Toileting Level of Assistance Moderate assistance  (managing clothing over hips in stance)   Toileting Where  Assessed Bedside commode   Toileting Comments assist with 1/3 steps   Balance   Balance Yes   Dynamic Sitting Balance   Dynamic Sitting-Balance Support Unilateral upper extremity supported   Dynamic Sitting-Balance Reaching for objects;Reaching across midline   Dynamic Sitting-Comments SBA for safety   Dynamic Standing Balance   Dynamic Standing-Balance Support Unilateral upper extremity supported   Dynamic Standing-Balance Reaching for objects;Reaching across midline   Dynamic Standing-Comments CGA for steadying assist   Treatment   Treatment Pt educated on role of OT/POC; pt completed 5x20 reps on Reviews42 with 10 lbs to increase upper body strength in prep for transfers.   Activity Tolerance   Activity Tolerance Patient tolerated treatment well   After Treatment   Patient Position After Treatment Seated in wheelchair   Patient after treatment left (direct hand off to PT)   Assessment   Prognosis Good   Problem List Decreased Self Care skills;Decreased upper extremity strength;Decreased safe judgment during ADL;Decreased endurance;Decreased functional mobility;Decreased gross motor control;Decreased IADLs;Decreased trunk control for functional activities;Decreased Function of left upper extremity;Decreased Function of right upper extremity   Assessment Pt presents to therapy after recent hospitalization for cardiac arrest resulting in BUE/LE weakness, decreased endurance and impaired safety awareness during transfers. Pt would benefit from skilled OT services to maximize functional independence and safety with ADLs and functional mobility.   Level of Motivation/Participation Good   Barriers to Discharge Decreased caregiver support   Short Term Goals   Time For Goal Achievement 7 days   Pt Will Perform Grooming New goal;With modified indepdenence   Grooming - Met/Not Met Not Met   Pt Will Perform Bathing New goal;With stand by assistance;On shower seat/tub bench   Bathing - Met/Not Met Not Met   Pt Will Perform UE  Dressing New goal;With modified independence   UE Dressing - Met/Not Met Not Met   Pt Will Perform LE Dressing New goal;With stand by assistance   LE Dressing - Met/Not Met Not Met   Pt Will Perform Toileting New goal;With contact guard assistance   Toileting-Met/Not Met Not Met   Long Term Goals   Time For Goal Achievement 2 weeks   Pt Will Perform Supine To Sit Independently;New goal   Supine to Sit - Met/Not Met Not Met   Pt Will Perform Sit to Supine New goal;Independently   Supine to Sit - Met/Not Met Not Met   Pt Will Transfer Sit to Stand New goal;Supervision   Transfer Sit to stand - Met/Not Met Not Met   Pt Will Transfer Bed/Chair New goal;Supervision   Transfer Bed/Chair - Met/Not Met Not Met   Pt Will Transfer To Bedside Commode New goal;With stand by assist   Transfer Bedside Commode -Met/Not Met Not Met   Pt Will Transfer To Shower New goal;With stand by assist   Transfer to Shower-Met/Not Met Not Met   Pt Will Perform Eating New goal;Independently   Eating - Met/Not Met Not Met   Pt Will Perform Grooming New goal;Independently   Grooming - Met/Not Met Not Met   Pt Will Perform Bathing New goal;With supervision   Bathing - Met/Not Met Not Met   Pt Will Perform UE Dressing New goal;Independently   UE Dressing - Met/Not Met Not Met   Pt Will Perform LE Dressing New goal;With supervision   LE Dressing - Met/Not Met Not Met   Pt Will Perform Toileting New goal;With stand by assistance   Toileting-Met/Not Met Not Met   Discharge Recommendations   Equipment Needed After Discharge bath bench   Discharge Facility/Level Of Care Needs home health OT   Plan   Plan Self care retraining;Functional transfer training;UE thereex;Equipment Training;Family training;Safety training;Functional endurance training;Patient education;Cognitive Retraining;Community re-training;AROM;Functional Standing Activities;AAROM   Therapy Frequency 2 times/day   Occupational Therapy Follow-up   OT Follow-up? Yes   Treatment/Billable  Minutes   Evaluation 60   Self Care/Home Management 29   Therapeutic Exercise 10   Total Time 99       Radha Ulrich, OT  4/27/2018     Patient with wheelchair safety belt fastened and able to self release wheelchair safety belt. Pt left seated in w/c in therapy gym, directly handed off to PT (location) with call light and all necessities in reach, PT notified.     LEGEND:   CGA: Contact Guard Assist   EOB: Edge of Bed   HHA: Hand Held Assist   HOB: Head of Bed   (I): Independent-patient performs task in a timely manner   Max (A): Maximal Assist-patient performs 25-49% of task   Min (A): Minimal Assist- patient performs 75% or more of task   Mod (A): Moderate Assist- patient performs 50-74% of task   NA: Not applicable   NT: Not tested   OOB: Out of Bed   PTA: Prior to admit   QC: Quad Cane   RW: Rolling Walker   (S): Supervision- patient requires cues, coaxing, prompting   SBA: Stand By Assist   SC: Straight Cane   SW: Standard Walker   TBA: To be assessed   Total (A): Total Assist- patient performs less than 25% of task   WC: Wheelchair   WFL: Within Functional Limits   WNL: Within Normal Limits

## 2018-04-27 NOTE — PROGRESS NOTES
NURSE TECH FIM  REPORT    EATING  [] Pt. opens packages, cuts food, pours liquids, and feeds self.  Regular consistency (7)  [] Pt. needs dentures, swallow technique, special foods or drink consistency (6)  [] Pt. needs supervision (cueing), set up (open containers, cut food, etc. (5)  [] Pt. needs assist with occasional scooping or checking for food pocketing (75-99%) (4)  [] Pt. needs assist to load each bite on utensils, pt.brings food to mouth (50-74%) (3)  [] Pt. needs assist to scoop, bring food to mouth (hand over hand) (25-49%) (2)  [] Pt. Is receiving IV fluids for hydration or tube feedings  (0-24%) (1)      GROOMING   [] Pt. performs all tasks independently and safely (7)  [] Pt. obtains all articles.  Needs adaptive/assistive device (such as wash mitt) (6)  [] Pt. needs supervision (cueing), set up (helper obtains articles, applies toothpaste, plugs razor, hands items to patient, opens containers, adjusts water temperature) (5)       [] Pt. doing 3 out of 4,  or 4 out of 5 tasks.  Needs assist to put in or remove dentures.  Needs steadying assist.(Pt. Doing 75-99%. (4)                                                                 [] Pt. doing 2 out of 4, or 3 out of 5 tasks (50-74%) (3)  [] Pt. doing 1 out of 4, or 2 out of 5 tasks (25-49%) (2)  [] Pt. doing 0 out of 4, or 1 out of 5 tasks or needs assistance of 2 helpers (0-24%) (1)  [] Activity done with OT      BATHING  [] Pt. safely bathes whole body (10 parts of 10) (7)  [] Pt. doing 10 out of 10 body parts.  Uses adaptive devices (such as wash mitt, long handled sponge, etc.) (6)  []  Pt. doing 8-9 out of 10 body parts , or pt. needs steadying assistance. (75-99%) (4)  []  Pt. doing 10 out of 10 body parts buts needs supervision or set up (5)  [] Pt. doing 5-7 out of 10 body parts (50-74%) (3)  [] Pt. doing 3-4 of out 10 body parts (25-49%) (2)  [] Pt. doing 0-2 out of 10 body parts or needs assist of two helpers. (0-24%) (1)    [] Activity done  with OT      DRESSING UPPER BODY  [] Pt. dresses independently and undresses independently, obtaining clothes from          storage area (7)  [] Pt. needs adaptive devices (such as Velcro fastener, reacher, button hook, etc.)          Applies abdominal binder or any orthotic.  Uses  walker for steadying. (6)  [] Pt. needs supervision (cueing), set up (helper brings clothes or applies orthotics (such as abdominal binder, TLSO, cervical collar) (5)  [] Pt. doing 75-99%. (4)  [] Pt. doing 50-74%. (3)  [] Pt. doing 25-49%. (2)  [] Pt. doing 0-24%, or needs assistance of 2 helpers. (1)  [] Activity done with OT.      DRESSING LOWER BODY  [] Pt. independent with all parts of dressing lower body. (7)  [] Pt. needs adaptive devices ( such as reacher, long handled shoe horn, sock aid) (6)  [] Pt. needs supervision (cueing), set up(helper brings clothes or applies orthotic, such   as CHERISE hose, AFO) (5)  [] Pt. doing 75-99%.  Needs steadying assistance: buttoning pants, zipping a zipper,        fastening a belt, tying shoelaces, applying one sock/shoe. (4)  [] Pt. doing 50-74%. (3)  [] Pt. doing 25-49%. (2)  [] Pt. Doing 0-24%, or needs assistance of 2 helpers. (1)  [] Activity done with OT.      TOILETING  [] Pt. doing 3 out of 3 tasks independently (pulling down clothes, cleansing [place] area,  pulling up clothes) (7)  [] Pt. doing 3 out of 3 tasks, but needs assistive device (grab bars, etc.) (6)  [] Pt. needs supervision (cueing), or set up (obtaining supplies for kentrell care.) (5)  [] Pt. doing 3 out of 3 tasks, but needs steadying assistance with one or more parts. (75-90%) (4)  [] Pt. doing 2 out of 3 tasks (50-74%) (3)  [] Pt. Doing 1 out of 3 tasks (25-49%) (2)  [] Pt. needs assist (more than steadying) with all 3 tasks.  Needs assist of 2 helpers.    Incontinent of B/B today. (0-24%) (1)  [x] Activity did not occur.      BLADDER  [] Pt.  uses toilet (7)  [] Pt. is on dialysis - does not urinate (7)  [x] Pt. uses bedside  commode (5)  [] Pt. uses bedpan - staff does ____% of tasks(includes rolling on/off, holding bedpan in place, hygiene, and emptying pan)  [] Pt. uses urinal - staff empties (5)                     []  Pt. needs help with positioning the urinal (4)                     []  Pt. needs help holding the urinal (1)  [] Pt. has chappell catheter/suprapubic catheter/concom cath - staff empties (1)  [] Pt. is on ICP:                     []  Pt. does catheterization (6)                     []  Staff does catheterization (1)  [] Pt. is incontinent - staff does ____% of tasks (includes clothes management,  hygiene, and changing diaper)  [] Number of accidents during this shift __0__       BOWEL  [] Pt. uses toilet (7)  [] Pt. uses bedside commode (5)  [] Pt. uses bedpan - staff does ____% of tasks (includes rolling on/off, holding bedpan                                                          In place, hygiene, and emptying pan)  [] Pt. on bowel program:                    [] Pt.inserts suppository (6)                    [] Nurse inserts suppository (4)                    []  Nurse does dig. stim (1)  [] Pt. has colostomy - staff maintains care and empties (1)                    [] Pt. does ____% of care for colostomy  [] Pt. is incontinent - staff does ____% of tasks (includes clothes management,  hygiene, and changing diaper)  [] Number of accidents during this shift____  [x] No bowel movement this shift      TRANSFERS:  BED/CHAIR/WHEELCHAIR  [] Pt. transfers without assistive device into and out of wheelchair/bed/chair (7)  [] Pt. uses assistive/adaptive devices (grab bars, sliding board, walker, bed rails,  wheelchair armrests, etc.) (6)  [] Pt. needs supervision, cues, or head of bed raised by staff (5)  [x] Pt. needs steadying assist with any or all parts of transfer, or needs assist with one  leg during transfer (4)  [] Pt. needs lifting or lowering, or needs assist with 2 legs during transfer (3)  [] Pt. needs lifting and  lowering during transfer (2)  [] Pt. needs assist of 2 helpers or mechanical lift (1)  [] Activity did not occur   ________________________________________________________________  Includes lying to seated position at edge of bed.  Check assist level needed:  [] Used bedrails              []  Supervision                   []   Min. A  [] Mod A                          []  Max A                            []  Total A    If in wheelchair:  Check assist level needed:  [] Lock brakes                 []  Lifts/lowers footrests       []  Removes w/c arm                                                                                (for slide board transfers)    TRANSFER:  TOILET/BEDSIDE COMMODE   [] Pt. transfers from wheelchair or walking without assistive device to toilet.  Gets on and off a standard toilet. (7)  [] Pt. uses assistive/adaptive device (commode, grab bars, sliding board, walker prosthesis, orthotic, raised toilet seat) (6)  [] Pt. needs supervision, cues, or set up (needs assist to lock brakes, remove leg or arm rest, position sliding board) (5)  [] Pt. needs steadying assist with any or all parts of transfer or needs assist with one leg during transfer. (4)  [] Pt. needs lifting or lowering or needs assist with two legs during transfer. (3)  [] Pt. needs lifting and lowering during transfer. (2)  [] Pt. needs assist of 2 helpers or mechanical lift. (1)  [x] Activity did not occur.      TRANSFER:  SHOWER  [] Pt. transfers without assistive device into and out of shower. (7)  [] Pt. uses assistive/adaptive device (shower chair/bench, grab bars, sliding board,   walker, prothesis, orthotic, raised toilet seat. (6)  [] Pt. needs supervision, cues, or set up (needs assist to lock brakes, remove leg or armrest, position sliding board) (5)  [] Pt. needs steadying assist with any or all parts of transfer or needs assist with one      leg during transfer. (4)  [] Pt. needs lifting or lowering or needs assist with  two legs during transfer. (3)  [] Pt. needs lifting and lowering during transfer. (2)  [] Pt. needs assist of 2 helpers.  [place] pushes shower chair on wheels in and out of   shower. (1)  [x] Activity did not occur.

## 2018-04-27 NOTE — PROGRESS NOTES
Occupational Therapy  ADMIT [facility]    Matrha Melvin  MRN: [id number]  Room/Bed: E261/E261 A       04/27/18 0916   Transfers   Bed/Chair/WC 3   Toilet 3   Tub 2   Self Care   Eating 5   Grooming 5   Bathing 4   Dressing-Upper 5   Dressing-Lower 4   Toileting 3   Communication   Comprehension 4   Mode B   Expression 4   Mode B   Social Cognition   Social Interaction 5   Problem Solving 4   Memory 3       Radha Ulrich, OT  4/27/2018

## 2018-04-27 NOTE — PROGRESS NOTES
"Physical Therapy   PM Treatment    Martha Melvin   MRN: [id number]        04/27/18 1300   PT Time Calculation   PT Start Time 1300   PT Stop Time 1331   PT Total Time (min) 31 min   Treatment   Treatment Type Treatment   PT/PTA PT   PTA Visit Number 0   General   PT Received On 04/27/18   Family/Caregiver Present No   Patient Found (position) Seated in wheelchair   Pt found with (Seatbelt donned)   Precautions   General Precautions fall;contact   Orthopedic No   Required Braces or Orthoses Yes  (R Prosthetic )   Lines, Tubes, Drains Perineural Catheter   Subjective   Patient states Pt agreeable to treatment session.    Pain/Comfort   Pain Rating 1 no pain   Pain Rating Post-Intervention 1 no pain   Transfers   Transfer yes   Sit to Stand   Sit <> Stand Assistance Maximum Assistance   Sit <> Stand Assistive Device Rolling Walker   Other Activities   Comments -Pt found sitting up in wheel chair, speaking with Dr. Lima. PT and pt spoke with Dr. Lima about pt's current prosthetic leg and potential limitation it can impose on her ability to participate with therapy. Pt also spoke to Dr. Lima about her new limb and her daughters efforts.   -Pt asc/desc 5, 3" steps with Total A, second person present for safety, Min physical assistance. After ascending 5 steps pt's prosthetic leg became loose and was no longer properly donned. Re-donned pt's prosthetic leg with total A of 2 people and pt desc steps to return to w/c.   -Pt ambulated 45' with CGA and RW. Pt demonstrates the following gait deviations during ambulation:residual limb asc/desc inside of prosthetic while ambulating, decreased step length L>R, decreased weight shift ability, decreased knee flexion, forward trunk lean.   -Pt returned to room and donned an additional sock. Then ambulation ~20 feet in room with RW and CGA. Pt reports the prosthetic felt more secure with the addition of the second sock. Pt states she has extra socks at her house, but not at her " daughters. PT encouraged pt to ask her daughter to bring her extra socks to improve the fit of her prosthetic, pt verbalized understanding.    Discharge Recommendations   Equipment Needed After Discharge bath bench   Discharge Facility/Level Of Care Needs home health PT   Plan   Planned Therapy Intervention Continue with current plan;Bed mobility training;Transfer training;Gait training;ROM;Balance Training;Stretching;Strengthening;Neuromuscular Re-education;Wheelchair Management/Propulsion;Postural Re-education;Motor Coordination Training   Therapy Frequency 2 times/day;Monday-Friday;Saturday or Sunday   Plan of Care Expires on 05/27/18   Physical Therapy Follow-up   PT Follow-up? Yes   PT - Next Visit Date 04/28/18   Treatment/Billable Minutes   Gait training 15   Therapeutic Activity 16   Total Time 31     Ruslan Mitchell, PT, DPT  4/27/2018

## 2018-04-27 NOTE — PLAN OF CARE
Problem: Fall Risk (Adult)  Goal: Absence of Falls  Patient will demonstrate the desired outcomes by discharge/transition of care.   Outcome: Ongoing (interventions implemented as appropriate)  Clutter free environment, call light and personal items within reach, assistive devices at bedside, encouraged to call for assistance, frequent rounds, and safe transfer.

## 2018-04-27 NOTE — PROGRESS NOTES
"Physical Therapy   Evaluation    Martha Melvin   MRN: [id number]        04/27/18 1102   PT Time Calculation   PT Start Time 1102   PT Stop Time 1204   PT Total Time (min) 62 min   Treatment   Treatment Type Evaluation   General   PT Received On 04/27/18   Chart Reviewed Yes   Onset of Illness/Injury or Date of Surgery 04/15/18   Diagnosis Myopathy   Additional Pertinent History Past Medical History:   Diagnosis Date    Arthritis     Diabetes mellitus     Hyperlipidemia     Hypertension     Renal disorder     poor kidney function     R BKA.   Date Referred to PT 04/26/18   Referring Physician Dr. Lima   Family/Caregiver Present No   Patient Found (position) Seated in wheelchair   Pt found with (Seated in Gym)   Precautions   General Precautions fall;contact   Orthopedic No   Required Braces or Orthoses Yes  (R Prosthetic Leg)   Cervical Brace (R Prosthetic Leg )   Lines, Tubes, Drains Perineural Catheter   Previous Level of Function   Ambulation Skills independent   Transfer Skills independent   ADL Skills independent   Work/Leisure Activity independent   Living Environment   Lives With alone   Living Arrangements house   Home Accessibility other (see comments)  (Threshold inside house.)   Home Layout Able to live on 1st floor   Stair Railings at Home none   Transportation Available family or friend will provide   Living Environment Comment Pt lives alone in [place], [place]. She reports being independent with all ADLs/IADLS PTA and denies any recent falls/near falls. Pt reports her daughter lives in [place] and provides her with transportation as she does not drive. 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 Pt reports her family will be able to assist her upon discharge.    Equipment Currently Used at Home grab bar;shower chair     Subjective   Patient states "I was in the hospital 2 weeks and my limbs are weak" "My new leg was ready April " "4"   Patient stated goals "To do the things I was able to do before, which was everything."   Pain/Comfort   Pain Rating 1 no pain    Pt does complain of pain occasionally during evaluation. Pt reports chest pain when pushing. Pt rates pain as 8 when she has pain.    Pain Rating Post-Intervention 1 no pain     Cognitive Status   Level of Consciousness (AVPU) alert   Arousal Level opens eyes spontaneously   Orientation oriented x 4   Able to Follow Commands (Communication) WFL   Speech clear/fluent;nods/gestures appropriately   Mood/Behavior calm;cooperative   Sensory Examination   Additional Documentation yes   Sensory Comments Sensation intact to light touch, no abnormalities.    Range of Motion (ROM)   Range of Motion Examination LLE ROM was WFL  (RLE hip and knee ROM was WFL)   Manual Muscle Testing (MMT)   Manual Muscle Testing Results other (see comments)   Additional Documentation Yes       MMT: Hip, Rehab Eval   Left Flexion (3-/5) fair minus, left   Right Flexion (4-/5) good minus, right   Left ABduction (4/5) good, left   Right ABduction (4/5) good, right   Left ADduction (3/5) fair, left   Right ADduction (3+/5) fair plus, right       MMT: Knee, Rehab Eval   Left Flexion (3/5) fair, left   Left Extension (4/5) good, left       MMT: Ankle, Rehab Eval   Left Dorsiflexion (4/5) good, left   Tone   Right LE normal   Left LE normal   Observation   Appearance Pt presents with R prosthetic leg. Pts prosthesis does not properly fit, this was evident by pt having to frequently doff/erum prosthesis during evaluation. Based on observation the prosthesis appears to be too large and the linner appears stretched out. Pt describes the prosthetic as "wobbly". When pt donnes prosthetic leg she leaves her pant leg inside of the socket. Pt states it wont go over it, referring to the pant leg going over the prosthesis, it appears as if Mrs. Melvin is doing this to gain better fit. Pt reports her R BKA was in May of 2015 " and reports  her prosthesis has not fit well for the past two years. She states she recently switched prosthetic doctors and her new leg was ready April 4. She reports her daughter was going to call the shop and see about  the prosthesis for her.    Posture Normal for age   Bed Mobility   Bed Mobility yes   Rolling/Turning to Left Supervision  (X1 trial on the mat)   Rolling/Turning Right Supervision  (X1 trial on the mat)   Scooting/Bridging Contact Guard Assistance  (Bilaterally. Pt requires increased time to perform.)   Supine to Sit Contact Guard Assistance   Sit to Supine Contact Guard Assistance   Transfers   Transfer yes   Sit to Stand   Sit <> Stand Assistance Maximum Assistance   Sit <> Stand Assistive Device No Assistive Device   Trials/Comments Pt requires Max A for lifiting X2 trials during evaluation. Pt uses armrest on chairs to push up.    Stand to Sit   Assistance Minimum Assistance   Assistive Device No Assistive Device   Trials/Comments X2 Trials during evaluation.    Chair to Mat   Chair<> Mat Technique Squat Pivot   Chair<>Mat Assistance Maximum Assistance   Chair <> Mat Assistive Device No Assistive Device   Mat to Chair   Technique Squat Pivot   Assistance Maximum Assistance   Assistive Device No Assistive Device   Trials/Comments X1 trials. Pt requries physical assist for lifting and lowering.    Tub Bench Transfer   Technique Stand Pivot   Assistance Minimum Assistance   Assistive Device (Grab Bar)   Trials/Comments Transfer to shower chair, inside the shower. Pt sat on shower chair, pt did not lower completely to sit on the floor of the tub.    Car Transfer   Trials/Comments Unsafe to perform at this time   Wheelchair Activities   Wheelchair Parts Management Yes   All Wheelchair Parts Management Requires assistance for armrest and leg rest. Is able to unlock the breaks.   Propulsion Yes   Propulsion Type 1 Manual   Level 1 Level tile   Method 1 Right upper extremity;Left upper  extremity   Level of Assistance 1 Supervision   Description/ Details 1 Pt self propelled W/C 100 feet before stating Im tired now.    Gait   Gait Yes   Weight Bearing Status Full   Gait 1   Surface 1 Level tile   Gait Assistive Device No device   Other Apparatus 1 (R Prosthetic leg. )   Assistance 1 Total assistance;With assist of two   Gait Distance ~3 feet   Gait Pattern swing-to gait   Gait Deviation(s) decreased meg;increased time in double stance;decreased velocity of limb motion;decreased step length;decreased swing-to-stance ratio;increased stride width;decreased stride length;decreased toe-to-floor clearance;decreased weight-shifting ability;excessive knee flexion;forward lean   Impairments Contributing to Gait Deviations impaired balance;impaired coordination;decreased flexibility;impaired motor control;abnormal muscle tone;impaired postural control;decreased strength;other (see comments)  (Prosthesis in need of adjustments. )   Stairs   Stairs Yes   Stairs/Curb Step   Rails 1 Bilateral   Device 1 No device   Other Apparatus 1 (R  Prosthetic Leg )   Assistance 1 Total Assistance   Comment/# Steps 1 Pt asc/desc 5, 3 Steps with total A, second person required for safety. Pt required Minimal physical assistance to asc stairs.    Endurance Deficit   Endurance Deficit Yes   Endurance Deficit Description Pt requries frequent rest breaks during evaluation.   Balance   Balance Yes   Postural Control   Postural Control WFL  (In sitting)   Static Sitting Balance   Static Sitting-Balance Support Feet supported;Right upper extremity supported;Left upper extremity supported   Static Sitting-Level of Assistance Supervision   Static Sitting-Comment/# of Minutes Pt sat EOM for ~5 minutes during evaluation. Demonstrates good balance with no episodes of LOB.    Activity Tolerance   Activity Tolerance (Tolerated Evaluation well. )   After Treatment   Patient Position After Treatment Seated in wheelchair   Patient  "after treatment left (Seatbelt donned, Rehab tech escorted pt back to room.)   Treatment   Treatment -Chair <>BSC with Max A, Pt required physical assistance for lifting and lowering.   -Seated Therex: 2X15, ball squeezes holding 3" each, hip abduction with YTB  -Ambulation in // bars with BUE support. ~6 feet with Min A.    Assessment   Prognosis Good   Problem List Decreased strength;Decreased range of motion;Decreased endurance;Impaired balance;Decreased mobility;Decreased coordination;Decreased safety awareness;Orthopedic restrictions;Pain  (Prosthetic leg )   Assessment Mrs. Melvin is a 72 year old female who presents to [facility] with significant decline in functional mobility compared to her PLOF. At this time Mrs. Melvin requires Max A for all transfers and is unable to ambulate functionally without AD and Total A. Mrs. Melvin participated well in the evaluation and is self motivated. At this time Mrs. Melvin requires significant physical assistance for mobility and it is unclear if her family could provide the level of care needed. PT will continues to address pts needs as she progress while in IPR. She would benefit from continued skilled IPR PT services to improve current impairments and return safely to PLOF.    Level of Motivation/Participation Good   Barriers to Discharge Decreased caregiver support;Inaccessible home environment   Barriers to Discharge Comments Pts daughter lives about 45 minutes away from her.    Short Term Goals   Additional Documentation yes   Pt Will Perform Supine To Sit With stand by assist   Pt Will Perform Sit to Supine Supervision   Pt Will Transfer Sit to Stand New goal;With moderate assist   Pt Will Transfer Bed/Chair With moderate assist;New goal   Pt Will Ambulate With RW;With contact guard assistance; feet;New goal   Pt Will Propel Wheelchair > 150 feet;Supervision;New goal   Other Goal Transfer to BS with Mod A.   Long Term Goals   Additional Documentation yes   Time " "For Goal Achievement 2 weeks   Pt Will Perform Supine To Sit Modified Independently;New goal   Pt Will Perform Sit to Supine New goal;Modified Independently   Pt Will [place] New goal;Modified Indepent   Pt Will Transfer Sit to Stand New goal;With RW;With contact guard assistance   Pt Will Transfer Bed/Chair New goal;With minimal assist;Squat Pivot   Pt Will Ambulate New goal;151-200 feet;With RW;With stand by assist   Pt Will Go Up / Down Stairs 1 flight;New goal;With stand by assist;With Bilateral Rails   Pt Will Go Up / Down Curb Step 4" curb step;New goal;With RW;With contact guard assist   Pt Will Propel Wheelchair > 150 feet;Modfied Independently;New goal   Other Goal Transfer to BSC with Min A    Other Goal 2 Transfer to TTB with SBA.   Discharge Recommendations   Equipment Needed After Discharge bath bench   Discharge Facility/Level Of Care Needs home health PT   Plan   Planned Therapy Intervention Plan of care initiated;Bed mobility training;Transfer training;Gait training;Balance Training;ROM;Stretching;Strengthening;Neuromuscular Re-education;Postural Re-education;Manual therapy techniques;Joint Mobilization;Wheelchair Management/Propulsion   Therapy Frequency 2 times/day;Monday-Friday;Saturday or Sunday   Plan of Care Expires on 05/27/18   Physical Therapy Follow-up   PT Follow-up? Yes   PT - Next Visit Date 04/28/18   Treatment/Billable Minutes   Evaluation 62       Ruslan Mitchell, PT, DPT  4/27/2018        "

## 2018-04-27 NOTE — CONSULTS
"  [facility]  Adult Nutrition  Consult Note    SUMMARY     Recommendations    Recommendation/Intervention:     1. Continue Diabetic/Renal diet.   2. Pt to be re-educated on Diabetic/Renal diet.   3.RD to follow.     Goals: Po intake >75%.   Nutrition Goal Status: new  Communication of RD Recs:  (POC)    Reason for Assessment    Reason for Assessment: consult  Diagnosis:  (Myopathy)  Relevant Medical History: CKD, HTN, T2DM  Interdisciplinary Rounds: did not attend    General Information Comments: Pt reports a good appetite with a 100% PO intake. Pt believes that she may have lost approximately 10 lbs since admit. Pt recieved T2DM/Renal education prior to admission and agreed to have another diet education before discharge. Due to pending weights, Pt's energy and protein needs were calculated from last encounter weight.     Nutrition Discharge Planning: Adequate nutrition on Diabetic/Renal diet.     Nutrition Risk Screen    Nutrition Risk Screen: no indicators present    Nutrition/Diet History    Patient Reported Diet/Restrictions/Preferences: renal  Typical Food/Fluid Intake: adequate Po  Food Preferences: None reported  Do you have any cultural, spiritual, Orthodox conflicts, given your current situation?: none  Food Allergies: NKFA    Anthropometrics    Temp: 98.7 °F (37.1 °C)  Height: 5' 5.98" (167.6 cm)  Height (inches): 65.98 in  Ideal Body Weight (IBW), Female: 129.9 lb       Lab/Procedures/Meds    Pertinent Labs Comments: BUN 36, Cr 7.8, Glucose 193, Ca 8.4, Albumin 2.5, ALT 55, POCT Gluc 230   Pertinent Medications Reviewed: reviewed  Pertinent Medications Comments: Insulin, heparin, senna    Physical Findings/Assessment    Overall Physical Appearance: obese, amputee  Oral/Mouth Cavity: tooth/teeth missing  Skin: edema    Estimated/Assessed Needs    Weight Used For Calorie Calculations: 84 kg (185 lb 3 oz) (Last encounter weight)  Energy Calorie Requirements (kcal): 1652kcal/day  Energy " Need Method: [place] (PAL x 1.25)  Protein Requirements: 84-101g/day (1.0-1.2g/kg)  Weight Used For Protein Calculations: 84 kg (185 lb 3 oz) (Last encounter weight)  Fluid Requirements (mL): 1mL/kcal or per MD  Fluid Need Method: RDA Method  RDA Method (mL): 1652         Nutrition Prescription Ordered    Current Diet Order: 2000 calorie Diabetic/Renal    Evaluation of Received Nutrient/Fluid Intake    Energy Calories Required: meeting needs  Protein Required: meeting needs  Fluid Required: meeting needs  Comments: LBM 4/26/18  % Intake of Estimated Energy Needs: 75 - 100 %  % Meal Intake: 75 - 100 %    Nutrition Risk    Level of Risk/Frequency of Follow-up: low - moderate     Assessment and Plan  Nutrition Problem  Altered nutrition-related lab values     Related to (etiology):   Diabetes and CKD    Signs and Symptoms (as evidenced by):   BUN 36, Cr 7.8, Glucose 193, Ca 8.4, POCT Gluc 230    Interventions/Recommendations (treatment strategy):  Renal/Diabetic diet, Diabetic/Renal diet education before DC     Nutrition Diagnosis Status:   New        Monitor and Evaluation    Food and Nutrient Intake: energy intake, food and beverage intake  Food and Nutrient Adminstration: diet order  Knowledge/Beliefs/Attitudes: food and nutrition knowledge/skill, beliefs and attitudes  Physical Activity and Function: nutrition-related ADLs and IADLs  Anthropometric Measurements: weight, body mass index, weight change  Biochemical Data, Medical Tests and Procedures: electrolyte and renal panel, gastrointestinal profile, glucose/endocrine profile, inflammatory profile, lipid profile  Nutrition-Focused Physical Findings: overall appearance     Nutrition Follow-Up    RD Follow-up?: Yes

## 2018-04-28 LAB
POCT GLUCOSE: 117 MG/DL (ref 70–110)
POCT GLUCOSE: 179 MG/DL (ref 70–110)
POCT GLUCOSE: 276 MG/DL (ref 70–110)

## 2018-04-28 PROCEDURE — 97530 THERAPEUTIC ACTIVITIES: CPT

## 2018-04-28 PROCEDURE — 97535 SELF CARE MNGMENT TRAINING: CPT

## 2018-04-28 PROCEDURE — 97110 THERAPEUTIC EXERCISES: CPT

## 2018-04-28 PROCEDURE — 63600175 PHARM REV CODE 636 W HCPCS: Performed by: PHYSICAL MEDICINE & REHABILITATION

## 2018-04-28 PROCEDURE — 11800000 HC REHAB PRIVATE ROOM

## 2018-04-28 RX ADMIN — INSULIN ASPART 2 UNITS: 100 INJECTION, SOLUTION INTRAVENOUS; SUBCUTANEOUS at 12:04

## 2018-04-28 RX ADMIN — INSULIN ASPART 2 UNITS: 100 INJECTION, SOLUTION INTRAVENOUS; SUBCUTANEOUS at 05:04

## 2018-04-28 RX ADMIN — INSULIN DETEMIR 5 UNITS: 100 INJECTION, SOLUTION SUBCUTANEOUS at 09:04

## 2018-04-28 RX ADMIN — INSULIN ASPART 3 UNITS: 100 INJECTION, SOLUTION INTRAVENOUS; SUBCUTANEOUS at 09:04

## 2018-04-28 RX ADMIN — INSULIN ASPART 4 UNITS: 100 INJECTION, SOLUTION INTRAVENOUS; SUBCUTANEOUS at 09:04

## 2018-04-28 RX ADMIN — INSULIN ASPART 6 UNITS: 100 INJECTION, SOLUTION INTRAVENOUS; SUBCUTANEOUS at 12:04

## 2018-04-28 RX ADMIN — HEPARIN SODIUM 5000 UNITS: 5000 INJECTION, SOLUTION INTRAVENOUS; SUBCUTANEOUS at 05:04

## 2018-04-28 RX ADMIN — HEPARIN SODIUM 5000 UNITS: 5000 INJECTION, SOLUTION INTRAVENOUS; SUBCUTANEOUS at 09:04

## 2018-04-28 RX ADMIN — INSULIN ASPART 2 UNITS: 100 INJECTION, SOLUTION INTRAVENOUS; SUBCUTANEOUS at 09:04

## 2018-04-28 RX ADMIN — HEPARIN SODIUM 5000 UNITS: 5000 INJECTION, SOLUTION INTRAVENOUS; SUBCUTANEOUS at 03:04

## 2018-04-28 NOTE — PLAN OF CARE
Problem: Patient Care Overview  Goal: Plan of Care Review  Outcome: Ongoing (interventions implemented as appropriate)  Pt care plan reviewed and progress continues.  See associated flowsheets for relevant documentation. Frequent rounds made per protocol to maintain pt safety and meet pt needs.  Continuing to monitor and follow up as needed.      Problem: Diabetes, Type 2 (Adult)  Goal: Signs and Symptoms of Listed Potential Problems Will be Absent, Minimized or Managed (Diabetes, Type 2)  Signs and symptoms of listed potential problems will be absent, minimized or managed by discharge/transition of care (reference Diabetes, Type 2 (Adult) CPG).   Outcome: Ongoing (interventions implemented as appropriate)  No new signs or symptoms noted.      Problem: Infection, Risk/Actual (Adult)  Goal: Infection Prevention/Resolution  Patient will demonstrate the desired outcomes by discharge/transition of care.   Outcome: Ongoing (interventions implemented as appropriate)  No new signs or symptoms noted.  Contact isolation continues.    Problem: Fall Risk (Adult)  Goal: Absence of Falls  Patient will demonstrate the desired outcomes by discharge/transition of care.   Outcome: Ongoing (interventions implemented as appropriate)  Pt remains free from falls or trauma thus far this shift.      Problem: Pressure Ulcer Risk (Brandon Scale) (Adult,Obstetrics,Pediatric)  Goal: Skin Integrity  Patient will demonstrate the desired outcomes by discharge/transition of care.   Outcome: Ongoing (interventions implemented as appropriate)  Pt turns and repositions as needed to maintain skin integrity.  No breakdown noted.

## 2018-04-28 NOTE — PROGRESS NOTES
"Occupational Therapy   Treatment    Martha Melvin   MRN: [id number]        04/28/18 1300   OT Time Calculation   OT Start Time 1300   OT Stop Time 1345   OT Total Time (min) 45 min   General   OT Date of Treatment 04/28/18   Family/Caregiver Present No   Patient Found (position) Seated in bedside chair   Precautions   General Precautions fall;contact   Orthopedic No   Required Braces or Orthoses Yes  (R prosthetic )   Lines, Tubes, Drains Other (see comments)  (permacath)   Subjective   Patient states "I just feel so tired from dialysis."   Pain/Comfort   Pain Rating 6/10   Location chest   Pain Addressed Reposition;Distraction;Nurse notified   Pain Comment NSG notified   Sit to Stand   Sit <> Stand Assistance Moderate Assistance   Sit <> Stand Assistive Device Rolling Walker   Trials/Comments assistance for lifting, cues for safety with hand placement   Stand to Sit   Assistance Stand By Assistance   Assistive Device Rolling Walker   Trials/Comments cues for safety with hand placement   Bed to Chair   Bed <> Chair Technique Stand Pivot  (bedside chair to w/c)   Bed <> Chair Transfer Assistance Moderate Assistance   Bed <> Chair Assistive Device No Assistive Device   Trials/Comments assist for lifting   Grooming   Grooming Level of Assistance Supervision   Hand Washing Level of Assistance Supervision   Face Washing Level of Assistance Supervision   Oral Hygeine Level of Assistance Supervision   Hair Grooming Level of Assistance Supervision   Grooming Where Assessed Sitting sinkside   Grooming Comments pt completed 4/4 steps   UE Dressing   UE Dressing Level of Assistance Supervision   UE Dressing Where Assessed Other (Comment)  (bedside chair)   UE Dressing Comments pt completed 4/4 steps   LE Dressing   LE Dressing  Level of Assistance Minimum assistance   LE Dressing Level of Assistance Minimum assistance  (assist to manage over hips)   Sock Level of Assistance Supervision   Shoe Level of Assistance Supervision "   Prosthetic Level of Assistance Supervision   LE Dressing Where Assessed Other (Comment)  (bedside chair)   LE Dressing Comments assist with 1/8 steps   Additional Activities   Additional Activities Other (Comment)   Additional Activities Comments Pt instructed in B mike with 3 lbs on incline in shoulder flex/ext for 3x20 in prep for ADLs. Pt completed 5x20 tricep strengthening on rickshaw with 10 lbs in prep for functional transitions. Facilitated therapeutic activity to increase dynamic standing balance, coordination and overall endurance with pt standing at countertop with RW for 6 minutes reaching into cabinets to retrieve pegs then placed them into pegboard on countertop requiring CGA for safety.   Activity Tolerance   Activity Tolerance Patient limited by fatigue   After Treatment   Patient Position After Treatment Seated in wheelchair;Other (comment)  (son present)   Patient after treatment left call button in reach   Assessment   Prognosis Good   Problem List Decreased Self Care skills;Decreased upper extremity strength;Decreased safe judgment during ADL;Decreased sensation;Decreased functional mobility;Decreased gross motor control;Decreased IADLs   Assessment Pt presents today with fatigue but was able to participate in ADLs and functional activities. Pt demonstrating improved performance with transfers but still needs close hands on assistance for safety and guidance. Pt would continue to benefit from skilled OT services to maximize functional independence with ADLs and transfers.    Level of Motivation/Participation Good   Barriers to Discharge Decreased caregiver support   Discharge Recommendations   Equipment Needed After Discharge bath bench   Discharge Facility/Level Of Care Needs home health OT   Plan   Plan Continue with current plan   Therapy Frequency 2 times/day   Occupational Therapy Follow-up   OT Follow-up? Yes   Treatment/Billable Minutes   Self Care/Home Management 15   Therapeutic  Activity 15   Therapeutic Exercise 15   Total Time 45     Tina Sharma, LOTR  4/28/2018

## 2018-04-28 NOTE — PROGRESS NOTES
Pt returned from dialysis, tolerating transfer well.  Pt assisted to bed and meal heated and set up.  Visitor at bedside.  Pt denies complaint and is resting in bed.  See flowsheets for details of physical exam.

## 2018-04-29 LAB
POCT GLUCOSE: 166 MG/DL (ref 70–110)
POCT GLUCOSE: 175 MG/DL (ref 70–110)
POCT GLUCOSE: 178 MG/DL (ref 70–110)
POCT GLUCOSE: 181 MG/DL (ref 70–110)
POCT GLUCOSE: 237 MG/DL (ref 70–110)
POCT GLUCOSE: 284 MG/DL (ref 70–110)

## 2018-04-29 PROCEDURE — 97110 THERAPEUTIC EXERCISES: CPT

## 2018-04-29 PROCEDURE — 97530 THERAPEUTIC ACTIVITIES: CPT

## 2018-04-29 PROCEDURE — 11800000 HC REHAB PRIVATE ROOM

## 2018-04-29 PROCEDURE — 97116 GAIT TRAINING THERAPY: CPT

## 2018-04-29 PROCEDURE — 25000003 PHARM REV CODE 250: Performed by: INTERNAL MEDICINE

## 2018-04-29 PROCEDURE — 63600175 PHARM REV CODE 636 W HCPCS: Performed by: PHYSICAL MEDICINE & REHABILITATION

## 2018-04-29 PROCEDURE — 97535 SELF CARE MNGMENT TRAINING: CPT

## 2018-04-29 RX ADMIN — INSULIN ASPART 2 UNITS: 100 INJECTION, SOLUTION INTRAVENOUS; SUBCUTANEOUS at 05:04

## 2018-04-29 RX ADMIN — HEPARIN SODIUM 5000 UNITS: 5000 INJECTION, SOLUTION INTRAVENOUS; SUBCUTANEOUS at 02:04

## 2018-04-29 RX ADMIN — ACETAMINOPHEN 650 MG: 325 TABLET ORAL at 03:04

## 2018-04-29 RX ADMIN — HEPARIN SODIUM 5000 UNITS: 5000 INJECTION, SOLUTION INTRAVENOUS; SUBCUTANEOUS at 05:04

## 2018-04-29 RX ADMIN — INSULIN ASPART 2 UNITS: 100 INJECTION, SOLUTION INTRAVENOUS; SUBCUTANEOUS at 08:04

## 2018-04-29 RX ADMIN — INSULIN DETEMIR 5 UNITS: 100 INJECTION, SOLUTION SUBCUTANEOUS at 09:04

## 2018-04-29 RX ADMIN — INSULIN ASPART 2 UNITS: 100 INJECTION, SOLUTION INTRAVENOUS; SUBCUTANEOUS at 12:04

## 2018-04-29 RX ADMIN — INSULIN ASPART 1 UNITS: 100 INJECTION, SOLUTION INTRAVENOUS; SUBCUTANEOUS at 09:04

## 2018-04-29 RX ADMIN — HEPARIN SODIUM 5000 UNITS: 5000 INJECTION, SOLUTION INTRAVENOUS; SUBCUTANEOUS at 09:04

## 2018-04-29 RX ADMIN — STANDARDIZED SENNA CONCENTRATE AND DOCUSATE SODIUM 1 TABLET: 8.6; 5 TABLET, FILM COATED ORAL at 08:04

## 2018-04-29 NOTE — PROGRESS NOTES
Occupational Therapy   Treatment    Martha Melvin   MRN: [id number]      04/29/18 1100   OT Time Calculation   OT Start Time 1100  (1300)   OT Stop Time 1200  (1345)   OT Total Time (min) 60 min   General   OT Date of Treatment 04/29/18   Family/Caregiver Present Yes  (Nephew initially present during 2nd session)   Precautions   General Precautions fall;contact   Orthopedic No   Required Braces or Orthoses Yes  (R-prosthetic)   Lines, Tubes, Drains Other (see comments)  (permacath)   Pain/Comfort   Pain Rating 4/10   Location chest   Pain Addressed Reposition;Distraction;Cessation of Activity;Nurse notified   Bed Mobility   Bed Mobility yes   Rolling/Turning to Left Stand by assistance   Rolling/Turning Left Comments for safety   Rolling/Turning Right Stand by assistance   Rolling/Turning Right Comments for safety   Supine to Sit Stand by Assistance   Supine to Sit Comments for safety   Sit to Supine Stand by Assistance   Sit to Supine Comments for safety   Transfers   Transfer yes   Sit to Stand   Sit <> Stand Assistance Contact Guard Assistance   Sit <> Stand Assistive Device Rolling Walker   Trials/Comments steadying/safety   Stand to Sit   Assistance Stand By Assistance   Assistive Device Rolling Walker   Trials/Comments MOD verbal cues for safe hand placement   Bed to Chair   Bed <> Chair Technique Stand Pivot   Bed <> Chair Transfer Assistance Contact Guard Assistance   Bed <> Chair Assistive Device Rolling Walker   Trials/Comments for steadying/safety   Chair to Bed   Technique Stand Pivot   Assistance Moderate Assistance   Assistive Device Rolling Walker   Trials/Comments lift assist   Tub Bench Transfer   Tub Transfer Technique Stand Pivot   Tub Bench Transfer Assistance Minimum Assistance   Tub Transfer Assistive Device grab bar   Trials/Comments assist for RLE upon 2nd trial   Toilet Transfer   Toilet Transfer Technique Stand Pivot   Toilet Transfer Assistance Moderate Assistance   Toilet Transfer Assistive  Device Rolling Walker   Trials/Comments lift assist from toilet   Feeding   Feeding Level of Assistance Supervision   Feeding Where Assessed (bedside chair)   Grooming   Grooming Level of Assistance Supervision   Hand Washing Level of Assistance Supervision   Face Washing Level of Assistance Supervision   Oral Hygeine Level of Assistance Supervision   Hair Grooming Level of Assistance Supervision   Grooming Where Assessed Sitting sinkside   Bathing   Bathing Level of Assistance Contact guard   Bathing Where Assessed Edge of bed   Bathing Comments steadying assist for safety   UE Dressing   UE Dressing Level of Assistance Supervision   UE Dressing Where Assessed (seated in bedside chair)   LE Dressing   LE Dressing Yes   LE Dressing  Level of Assistance Contact guard   LE Dressing Level of Assistance Contact guard   Sock Level of Assistance Supervision   Shoe Level of Assistance Supervision   Prosthetic Level of Assistance Supervision   LE Dressing Where Assessed Other (Comment)   LE Dressing Comments steadying assist for safety   Toileting   Toileting Level of Assistance Contact guard   Toileting Where Assessed Toilet   Toileting Comments steadying assist for clothing management and increased time for pulling pants to waist   Additional Activities   Additional Activities Other (Comment)   Additional Activities Comments Pt. performed BUE dowel ther-ex seated EOM with verbal cues and demos on form for BUE bicep curls, revers curls, shoulder flex/ext, modified chest press, and horizontal ABD/ADD.  Performed BUE mike in stance with progressive reps starting at 10 and increasing by increments of 5 up to 25 followed by regressive reps decreasing from 25 to 10 by 5 increments as well; required seated rest breaks X 4 throughout and able to tolerate at MAX of 5-minutes in stance for task.   Activity Tolerance   Activity Tolerance Patient tolerated treatment well   After Treatment   Patient Position After Treatment Seated  at edge of bed   Patient after treatment left with all lines intact;call button in reach;nursing notified   Assessment   Prognosis Good   Problem List Decreased Self Care skills;Decreased upper extremity strength;Decreased safe judgment during ADL;Decreased upper extremity range of motion;Decreased endurance;Decreased functional mobility;Decreased gross motor control;Decreased IADLs;Decreased Function of right upper extremity;Decreased Function of left upper extremity;Decreased trunk control for functional activities   Assessment Pt. requires steadying assist throughout ADL tasks and consistent verbal cues for safe hand placement throughout functional transfers.  To benefit from continued OT services to increase independence in self-care and functional transfers.  OT to follow.    Short Term Goals   Time For Goal Achievement 7 days   Pt Will Perform Grooming With modified indepdenence   Grooming - Met/Not Met Not Met   Pt Will Perform Bathing With stand by assistance   Bathing - Met/Not Met Not Met   Pt Will Perform UE Dressing With modified independence   UE Dressing - Met/Not Met Not Met   Pt Will Perform LE Dressing With stand by assistance   LE Dressing - Met/Not Met Not Met   Pt Will Perform Toileting With contact guard assistance   Toileting-Met/Not Met Met   Long Term Goals   Time For Goal Achievement 2 weeks   Pt Will Perform Supine To Sit Independently   Supine to Sit - Met/Not Met Not Met   Pt Will Perform Sit to Supine Independently   Supine to Sit - Met/Not Met Not Met   Pt Will Transfer Sit to Stand Supervision   Transfer Sit to stand - Met/Not Met Not Met   Pt Will Transfer Bed/Chair Supervision   Transfer Bed/Chair - Met/Not Met Not Met   Pt Will Transfer To Bedside Commode With stand by assist   Transfer Bedside Commode -Met/Not Met Not Met   Pt Will Transfer To Shower With stand by assist   Transfer to Shower-Met/Not Met Not Met   Pt Will Perform Eating Independently   Eating - Met/Not Met Not Met    Pt Will Perform Grooming Independently   Grooming - Met/Not Met Not Met   Pt Will Perform Bathing With supervision   Bathing - Met/Not Met Not Met   Pt Will Perform UE Dressing Independently   UE Dressing - Met/Not Met Not Met   Pt Will Perform LE Dressing With supervision   LE Dressing - Met/Not Met Not Met   Pt Will Perform Toileting With stand by assistance   Toileting-Met/Not Met Not Met   Discharge Recommendations   Equipment Needed After Discharge bath bench   Discharge Facility/Level Of Care Needs home health OT   Plan   Plan Continue with current plan   Therapy Frequency 2 times/day;Saturday or Sunday   Plan of Care Expires on 05/23/18   Occupational Therapy Follow-up   OT Follow-up? Yes   Treatment/Billable Minutes   Self Care/Home Management 45   Therapeutic Exercise 60   Total Time 105   Left seated EOB with lines in tact, needs in reach, and nursing staff notified.    Tiffani Lacey, OT  4/29/2018.

## 2018-04-29 NOTE — PROGRESS NOTES
Physical Therapy   Treatment / Reassess    Martha Melvin   MRN: [id number]    04/29/18 1446   PT Time Calculation   PT Start Time 1446   PT Stop Time 1519   PT Total Time (min) 33 min   Treatment   Treatment Type Treatment   PT/PTA PT   PTA Visit Number 0   General   PT Received On 04/29/18   Family/Caregiver Present No   Patient Found (position) Seated in bedside chair   Precautions   General Precautions fall;contact   Orthopedic No   Required Braces or Orthoses Yes  (R LE prostesis)   Lines, Tubes, Drains Other (see comments)  (permacath)   Subjective   Patient states Pt agreeable to PM treatment session   Pain/Comfort   Pain Rating 1 9/10   Location - Side 1 Right   Location - Orientation 1 generalized   Location 1 chest   Pain Addressed 1 Nurse notified;Distraction;Reposition;Cessation of Activity  (nursing provided medication during session)   Pain Rating Post-Intervention 1 7/10   Bed Mobility   Bed Mobility yes   Rolling/Turning to Left Supervision   Rolling/Turning Right Supervision   Supine to Sit Minimum Assistance  (pt in a lot of pain during this tranfer)   Sit to Supine Contact Guard Assistance   Transfers   Transfer yes   Chair to Mat   Chair<> Mat Technique Stand Pivot   Chair<>Mat Assistance Moderate Assistance   Chair <> Mat Assistive Device Rolling Walker   Trials/Comments x1 trial, modA to assist with elevating hips from chair   Mat to Chair   Technique Stand Pivot   Assistance Moderate Assistance   Assistive Device Rolling Walker   Trials/Comments x1 trial, modA to assist with elevating hips from mat   Seated   Seated-Exercises Lower extremity   Seated-Exercise Type Glut sets;Long arc quads;ADduction   Seated-Exercise Comments 3x15 reps of each exercise on B LE   Other Activities   Comments Pt performed transfer to [facility] without prosthetic donned, due to it falling off, at maxA using squat pivot technique. She was able to perform pericare independently.    Activity Tolerance   Activity Tolerance  Patient tolerated treatment well   After Treatment   Patient Position After Treatment Seated in wheelchair   Patient after treatment left call button in reach;nursing notified   Assessment   Prognosis Good   Problem List Decreased strength;Decreased range of motion;Decreased endurance;Impaired balance;Decreased mobility;Decreased coordination;Orthopedic restrictions;Pain   Session Assessment progressing toward goals   Assessment Pt continues to make good progress in skilled PT services despite pain. She has met 4/7 of her short term goals. She will continue to benefit from skilled PT intervention in order to decrease her caregiver burden upon dc.    Level of Motivation/Participation good   Barriers to Discharge Decreased caregiver support   Short Term Goals   Supine to Sit-Met/Not Met Not Met   Sit to Supine - Met/Not Met Not Met   Transfer Sit to stand - Met/Not Met Met   Transfer Bed/Chair - Met/Not Met Met   Ambulate - Met/Not Met Met   Propel Wheelchair - Met/Not Met Met   Other Goal Not Met   Discharge Recommendations   Equipment Needed After Discharge bath bench   Discharge Facility/Level Of Care Needs home health PT   Plan   Planned Therapy Intervention Continue with current plan   Therapy Frequency 2 times/day;daily;Monday-Friday;Saturday or Sunday   Physical Therapy Follow-up   PT Follow-up? Yes   PT - Next Visit Date 04/30/18   Treatment/Billable Minutes   Therapeutic Activity 18   Therapeutic Exercise 15   Total Time 33   Hilda Santos, PT   4/29/2018

## 2018-04-29 NOTE — PROGRESS NOTES
Occupational Therapy   FIM (Greater than 72 Hours post-admission)    Martha Melvin   MRN: [id number]        04/29/18 1600   Transfers   Bed/Chair/WC 3   Toilet 3   Tub 4   Self Care   Eating 5   Grooming 5   Bathing 4   Dressing-Upper 5   Dressing-Lower 4   Toileting 4   Communication   Comprehension 4   Mode B   Expression 4   Mode B   Social Cognition   Social Interaction 5   Problem Solving 4   Memory 3   Tiffani Lacey, OT  4/29/2018

## 2018-04-29 NOTE — PROGRESS NOTES
Physical Therapy   PT Discharge FIM    Martha Melvin   MRN: [id number]      04/29/18 1700   Transfers   Bed/Chair/WC 3   Toilet 3   Tub 4   Locomotion   Distance Walked 3   Distance Wheelchair 3   Walk 4   Wheelchair 5   Mode W   Stairs 2   Hilda Santos, PT   4/29/2018

## 2018-04-29 NOTE — PROGRESS NOTES
"Physical Therapy   Treatment    Martha Melvin   MRN: [id number]    04/29/18 0815   PT Time Calculation   PT Start Time 0815   PT Stop Time 0900   PT Total Time (min) 45 min   Treatment   Treatment Type Treatment   PT/PTA PT   PTA Visit Number 0   General   PT Received On 04/29/18   Family/Caregiver Present No   Patient Found (position) Seated at edge of bed  (PCT present)   Precautions   General Precautions fall;contact   Orthopedic No   Required Braces or Orthoses Yes  (R prosthetic)   Lines, Tubes, Drains Other (see comments)  (permacath)   Subjective   Patient states "I'm feeling tired and weak today." "I only hurt when I cough."   Pain/Comfort   Pain Rating 1 no pain   Pain Rating Post-Intervention 1 no pain   Transfers   Transfer yes   Sit to Stand   Sit <> Stand Assistance Moderate Assistance   Sit <> Stand Assistive Device Rolling Walker   Trials/Comments Pt required modA for assistance with lifting weight from chair x multiple trials throughout session from wc   Stand to Sit   Assistance Contact Guard Assistance   Assistive Device Rolling Walker   Trials/Comments multiple trials throughout session to wc   Tub Bench Transfer   Technique Stand Pivot   Assistance Contact Guard Assistance   Assistive Device No Assistive Device   Trials/Comments Pt able to transfer at [facility] with the use of grab bars and shower chair inside tub.    Car Transfer   Car Transfer Technique Stand Pivot   Car Transfer Assistance Moderate Assistance   Car Transfer Assistive Device Rolling Walker   Trials/Comments [facility] with [facility] weight from car as well as with getting R LE into car due to "heaviness" of prosthesis.    Wheelchair Activities   Propulsion Yes   Propulsion Type 1 Manual   Level 1 Level tile   Method 1 Right upper extremity;Left upper extremity   Level of Assistance 1 Supervision   Description/ Details 1 Pt self propells wc for 150' before requiring a rest break.    Gait   Gait Yes " "  Weight Bearing Status full   Gait 1   Surface 1 Level tile   Gait Assistive Device Rolling walker   Other Apparatus 1 Other (Comment)  (R LE prosthesis)   Assistance 1 Contact Guard Assistance   Gait Distance Pt ambulated for 185'   Gait Pattern swing-to gait   Gait Deviation(s) decreased meg;increased time in double stance;decreased velocity of limb motion;decreased step length;decreased weight-shifting ability;forward lean  (decreased step length of L LE)   Impairments Contributing to Gait Deviations impaired balance;impaired coordination;decreased flexibility;impaired motor control;abnormal muscle tone;impaired postural control;decreased strength;other (see comments)  (improper fitting prosthesis)   Gait 2   Surface 2 Carpet;Ramp   Device 2 Rolling walker   Other Apparatus 2 Other (Comment)  (R LE prosthesis)   Assistance 2 Contact guard   Quality of Gait 2 decreased L step length   Comments/Distance (ft) 2 Pt ambulated up/down a 30' ramp   Gait Pattern Used swing-to gait   Gait Deviations Noted decreased meg;increased time in double stance;decreased step length;decreased weight-shifting ability   Impairments Contributing to Gait Deviations impaired balance;impaired motor control;impaired postural control;decreased strength;other (see comments)  (improper fitting prosthesis)   Stairs   Stairs Yes   Stairs/Curb Step   Rails 1 Bilateral   Device 1 No device   Other Apparatus 1 Other (Comment)  (R LE prosthesis)   Assistance 1 Contact guard   Comment/# Steps 1 Pt traversed 8, 3" steps   Other Activities   Comments Pt picked up an object from the ground and returned to standing safely at [facility].    Activity Tolerance   Activity Tolerance Patient tolerated treatment well   After Treatment   Patient Position After Treatment Seated in bedside chair   Patient after treatment left call button in reach   Assessment   Prognosis Good   Problem List Decreased strength;Decreased range of motion;Decreased " endurance;Impaired balance;Decreased mobility;Decreased coordination;Orthopedic restrictions;Pain   Session Assessment progressing toward goals   Assessment Pt tolerated treatment session very well despite c/o being tired and weak. She continues to struggle with decreased endurance, decreased strength, decreased balance, and decreased coordination. She will continue to benefit from skilled PT services for these reasons.    Level of Motivation/Participation good   Barriers to Discharge Decreased caregiver support   Discharge Recommendations   Equipment Needed After Discharge bath bench   Discharge Facility/Level Of Care Needs home health PT   Plan   Planned Therapy Intervention Continue with current plan   Therapy Frequency 2 times/day;daily;Monday-Friday;Saturday or Sunday   Physical Therapy Follow-up   PT Follow-up? Yes   PT - Next Visit Date 04/30/18   Treatment/Billable Minutes   Gait training 15   Therapeutic Activity 30   Total Time 45   Hilda Santos PT   4/29/2018

## 2018-04-30 LAB
ANION GAP SERPL CALC-SCNC: 17 MMOL/L
ANION GAP SERPL CALC-SCNC: 18 MMOL/L
BASOPHILS # BLD AUTO: 0.06 K/UL
BASOPHILS # BLD AUTO: 0.06 K/UL
BASOPHILS NFR BLD: 0.6 %
BASOPHILS NFR BLD: 0.6 %
BUN SERPL-MCNC: 39 MG/DL
BUN SERPL-MCNC: 40 MG/DL
CALCIUM SERPL-MCNC: 8.6 MG/DL
CALCIUM SERPL-MCNC: 8.6 MG/DL
CHLORIDE SERPL-SCNC: 98 MMOL/L
CHLORIDE SERPL-SCNC: 98 MMOL/L
CO2 SERPL-SCNC: 19 MMOL/L
CO2 SERPL-SCNC: 20 MMOL/L
CREAT SERPL-MCNC: 8.6 MG/DL
CREAT SERPL-MCNC: 8.6 MG/DL
DIFFERENTIAL METHOD: ABNORMAL
DIFFERENTIAL METHOD: ABNORMAL
EOSINOPHIL # BLD AUTO: 0.3 K/UL
EOSINOPHIL # BLD AUTO: 0.3 K/UL
EOSINOPHIL NFR BLD: 2.4 %
EOSINOPHIL NFR BLD: 2.8 %
ERYTHROCYTE [DISTWIDTH] IN BLOOD BY AUTOMATED COUNT: 19.5 %
ERYTHROCYTE [DISTWIDTH] IN BLOOD BY AUTOMATED COUNT: 19.6 %
EST. GFR  (AFRICAN AMERICAN): 4.9 ML/MIN/1.73 M^2
EST. GFR  (AFRICAN AMERICAN): 4.9 ML/MIN/1.73 M^2
EST. GFR  (NON AFRICAN AMERICAN): 4.2 ML/MIN/1.73 M^2
EST. GFR  (NON AFRICAN AMERICAN): 4.2 ML/MIN/1.73 M^2
GLUCOSE SERPL-MCNC: 140 MG/DL
GLUCOSE SERPL-MCNC: 141 MG/DL
HCT VFR BLD AUTO: 24.7 %
HCT VFR BLD AUTO: 25 %
HGB BLD-MCNC: 7.8 G/DL
HGB BLD-MCNC: 7.9 G/DL
IMM GRANULOCYTES # BLD AUTO: 0.02 K/UL
IMM GRANULOCYTES # BLD AUTO: 0.03 K/UL
IMM GRANULOCYTES NFR BLD AUTO: 0.2 %
IMM GRANULOCYTES NFR BLD AUTO: 0.3 %
LYMPHOCYTES # BLD AUTO: 3.2 K/UL
LYMPHOCYTES # BLD AUTO: 3.3 K/UL
LYMPHOCYTES NFR BLD: 31.4 %
LYMPHOCYTES NFR BLD: 32.3 %
MAGNESIUM SERPL-MCNC: 2.1 MG/DL
MCH RBC QN AUTO: 24.1 PG
MCH RBC QN AUTO: 24.2 PG
MCHC RBC AUTO-ENTMCNC: 31.6 G/DL
MCHC RBC AUTO-ENTMCNC: 31.6 G/DL
MCV RBC AUTO: 77 FL
MCV RBC AUTO: 77 FL
MONOCYTES # BLD AUTO: 1.2 K/UL
MONOCYTES # BLD AUTO: 1.2 K/UL
MONOCYTES NFR BLD: 11.7 %
MONOCYTES NFR BLD: 12.1 %
NEUTROPHILS # BLD AUTO: 5.4 K/UL
NEUTROPHILS # BLD AUTO: 5.4 K/UL
NEUTROPHILS NFR BLD: 52.7 %
NEUTROPHILS NFR BLD: 52.9 %
NRBC BLD-RTO: 0 /100 WBC
NRBC BLD-RTO: 0 /100 WBC
PHOSPHATE SERPL-MCNC: 7.3 MG/DL
PLATELET # BLD AUTO: 335 K/UL
PLATELET # BLD AUTO: 344 K/UL
PMV BLD AUTO: 11 FL
PMV BLD AUTO: 11.4 FL
POCT GLUCOSE: 142 MG/DL (ref 70–110)
POCT GLUCOSE: 161 MG/DL (ref 70–110)
POCT GLUCOSE: 313 MG/DL (ref 70–110)
POTASSIUM SERPL-SCNC: 3.7 MMOL/L
POTASSIUM SERPL-SCNC: 3.8 MMOL/L
RBC # BLD AUTO: 3.23 M/UL
RBC # BLD AUTO: 3.27 M/UL
SODIUM SERPL-SCNC: 135 MMOL/L
SODIUM SERPL-SCNC: 135 MMOL/L
WBC # BLD AUTO: 10.15 K/UL
WBC # BLD AUTO: 10.24 K/UL

## 2018-04-30 PROCEDURE — 99232 SBSQ HOSP IP/OBS MODERATE 35: CPT | Mod: ,,, | Performed by: PHYSICAL MEDICINE & REHABILITATION

## 2018-04-30 PROCEDURE — 25000003 PHARM REV CODE 250: Performed by: PHYSICAL MEDICINE & REHABILITATION

## 2018-04-30 PROCEDURE — 97150 GROUP THERAPEUTIC PROCEDURES: CPT

## 2018-04-30 PROCEDURE — 11800000 HC REHAB PRIVATE ROOM

## 2018-04-30 PROCEDURE — 63600175 PHARM REV CODE 636 W HCPCS: Performed by: PHYSICAL MEDICINE & REHABILITATION

## 2018-04-30 PROCEDURE — 80048 BASIC METABOLIC PNL TOTAL CA: CPT | Mod: 91

## 2018-04-30 PROCEDURE — 83735 ASSAY OF MAGNESIUM: CPT

## 2018-04-30 PROCEDURE — 85025 COMPLETE CBC W/AUTO DIFF WBC: CPT

## 2018-04-30 PROCEDURE — 84100 ASSAY OF PHOSPHORUS: CPT

## 2018-04-30 PROCEDURE — 97530 THERAPEUTIC ACTIVITIES: CPT

## 2018-04-30 PROCEDURE — 97116 GAIT TRAINING THERAPY: CPT

## 2018-04-30 PROCEDURE — 36415 COLL VENOUS BLD VENIPUNCTURE: CPT

## 2018-04-30 PROCEDURE — 80048 BASIC METABOLIC PNL TOTAL CA: CPT

## 2018-04-30 PROCEDURE — 97110 THERAPEUTIC EXERCISES: CPT

## 2018-04-30 RX ORDER — NIFEDIPINE 30 MG/1
30 TABLET, EXTENDED RELEASE ORAL DAILY
Status: DISCONTINUED | OUTPATIENT
Start: 2018-04-30 | End: 2018-04-30

## 2018-04-30 RX ORDER — ONDANSETRON 4 MG/1
4 TABLET, ORALLY DISINTEGRATING ORAL EVERY 6 HOURS PRN
Start: 2018-04-30 | End: 2018-09-05

## 2018-04-30 RX ORDER — IBUPROFEN 200 MG
24 TABLET ORAL
Refills: 12
Start: 2018-04-30 | End: 2018-09-05

## 2018-04-30 RX ORDER — ACETAMINOPHEN 325 MG/1
650 TABLET ORAL EVERY 6 HOURS PRN
Refills: 0
Start: 2018-04-30 | End: 2018-09-05

## 2018-04-30 RX ORDER — IBUPROFEN 200 MG
16 TABLET ORAL
Refills: 12
Start: 2018-04-30 | End: 2018-09-05

## 2018-04-30 RX ORDER — BISACODYL 10 MG
10 SUPPOSITORY, RECTAL RECTAL DAILY PRN
Refills: 0
Start: 2018-04-30 | End: 2018-11-09

## 2018-04-30 RX ORDER — RAMELTEON 8 MG/1
8 TABLET ORAL NIGHTLY PRN
Start: 2018-04-30 | End: 2018-09-05

## 2018-04-30 RX ORDER — AMOXICILLIN 250 MG
1 CAPSULE ORAL 2 TIMES DAILY
COMMUNITY
Start: 2018-04-30 | End: 2021-01-20

## 2018-04-30 RX ORDER — BENZONATATE 200 MG/1
200 CAPSULE ORAL 3 TIMES DAILY PRN
Start: 2018-04-30 | End: 2018-05-10

## 2018-04-30 RX ORDER — HEPARIN SODIUM 5000 [USP'U]/ML
5000 INJECTION, SOLUTION INTRAVENOUS; SUBCUTANEOUS EVERY 8 HOURS
Start: 2018-04-30 | End: 2018-09-05

## 2018-04-30 RX ADMIN — HEPARIN SODIUM 5000 UNITS: 5000 INJECTION, SOLUTION INTRAVENOUS; SUBCUTANEOUS at 05:04

## 2018-04-30 RX ADMIN — INSULIN ASPART 8 UNITS: 100 INJECTION, SOLUTION INTRAVENOUS; SUBCUTANEOUS at 12:04

## 2018-04-30 RX ADMIN — HEPARIN SODIUM 5000 UNITS: 5000 INJECTION, SOLUTION INTRAVENOUS; SUBCUTANEOUS at 09:04

## 2018-04-30 RX ADMIN — HEPARIN SODIUM 5000 UNITS: 5000 INJECTION, SOLUTION INTRAVENOUS; SUBCUTANEOUS at 01:04

## 2018-04-30 RX ADMIN — INSULIN ASPART 2 UNITS: 100 INJECTION, SOLUTION INTRAVENOUS; SUBCUTANEOUS at 08:04

## 2018-04-30 RX ADMIN — INSULIN ASPART 2 UNITS: 100 INJECTION, SOLUTION INTRAVENOUS; SUBCUTANEOUS at 09:04

## 2018-04-30 RX ADMIN — INSULIN DETEMIR 5 UNITS: 100 INJECTION, SOLUTION SUBCUTANEOUS at 09:04

## 2018-04-30 RX ADMIN — INSULIN ASPART 2 UNITS: 100 INJECTION, SOLUTION INTRAVENOUS; SUBCUTANEOUS at 12:04

## 2018-04-30 NOTE — NURSING
Ms. Melvin is complaining of pain in mid chest area that comes across both breast area, Dr. Lima at nurses station and notified. Denies shortness of breath, numbness or tingling.

## 2018-04-30 NOTE — PROGRESS NOTES
"Occupational Therapy  AM Treatment/Discharge Summary  Martha Melvin   MRN: [id number]   Room/Bed: E261/E261 A     04/30/18 1058   OT Time Calculation   OT Start Time 1058   OT Stop Time 1145   OT Total Time (min) 47 min   General   OT Date of Treatment 04/30/18   Family/Caregiver Present No   Patient Found (position) Seated in wheelchair   Precautions   General Precautions fall;contact   Orthopedic No   Required Braces or Orthoses Yes  (RLE prosthesis)   Lines, Tubes, Drains Other (see comments)  (permacath)   Subjective   Patient states "My chest still hurts."   Pain/Comfort   Pain Rating 8/10   Location chest   Pain Addressed Pre-medicate for activity;Reposition;Distraction;Nurse notified   Pain Comment NSG notified; NSG premedicated pt prior to session   Bed Mobility   Bed Mobility yes   Rolling/Turning to Left Stand by assistance   Rolling/Turning Left Comments for safety   Rolling/Turning Right Stand by assistance   Rolling/Turning Right Comments for safety   Supine to Sit Stand by Assistance   Supine to Sit Comments for safety   Sit to Supine Stand by Assistance   Sit to Supine Comments for safety   Transfers   Transfer yes   Sit to Stand   Sit <> Stand Assistance Moderate Assistance;Stand By Assistance   Sit <> Stand Assistive Device Rolling Walker   Trials/Comments x2 trials initially with Mod A for first trial but then SBA for second trial   Stand to Sit   Assistance Stand By Assistance   Assistive Device Rolling Walker   Trials/Comments for cues with safety   Chair to Bed   Technique Stand Pivot   Assistance Stand By Assistance   Assistive Device No Assistive Device   Trials/Comments SBA from w/c to bedside chair   Tub Bench Transfer   Tub Transfer Technique Stand Pivot   Tub Bench Transfer Assistance Minimum Assistance   Tub Transfer Assistive Device grab bar   Trials/Comments assist for RLE upon 2nd trial   Toilet Transfer   Toilet Transfer Technique Stand Pivot   Toilet Transfer Assistance Moderate " Assistance   Toilet Transfer Assistive Device Rolling Walker   Trials/Comments lift assist from toilet   Feeding   Feeding Level of Assistance Supervision   Feeding Comments for safety   Grooming   Grooming Level of Assistance Supervision   Hand Washing Level of Assistance Supervision   Face Washing Level of Assistance Supervision   Oral Hygeine Level of Assistance Supervision   Hair Grooming Level of Assistance Supervision   Grooming Where Assessed Sitting sinkside   Grooming Comments pt completed 4/4 steps   Bathing   Bathing Level of Assistance Contact guard   Bathing Where Assessed Edge of bed   Bathing Comments steadying assist for safety   UE Dressing   UE Dressing Level of Assistance Supervision   UE Dressing Comments pt completed 4/4 steps   LE Dressing   LE Dressing  Level of Assistance Contact guard   LE Dressing Level of Assistance Contact guard   Sock Level of Assistance Supervision   Shoe Level of Assistance Supervision  (TTB)   Prosthetic Level of Assistance Supervision   LE Dressing Where Assessed Other (Comment)   LE Dressing Comments steadying assist for safety   Toileting   Toileting Adaptive Equipment Other (Comment)  (BSC)   Toileting Level of Assistance Contact guard   Toileting Where Assessed Toilet   Toileting Comments steadying assist for clothing management and increased time for pulling pants to waist   Dynamic Standing Balance   Dynamic Standing-Balance Support No upper extremity supported   Dynamic Standing-Balance Reaching for objects;Reaching across midline   Dynamic Standing-Comments SBA with RW for safety   Additional Activities   Additional Activities Other (Comment)   Additional Activities Comments Facilitated therapeutic activities to increase standing endurance, dynamic standing balance and overall coordination: pt completed two trials of sit<>stands to continue increasing independence with functional transitions with RW: pt stood with RW for 9 minutes with RW needing SBA for  balance  to complete various reaching tasks at BITs screen; pt stood again with RW at tabletop for 3 minutes to complete fine motor task placing cloth onto horizontal bars with 8 clothespins requiring SBA. Pt instructed in and completed 3x20 B mike on incline with 4lbs requiring therapeutic rest breaks after each set to increase strength, range and endurance in prep for self care tasks. Pt also instructed in chest press and circumduction clockwise with B mike on flat surace with 5 lbs for added strengthening challenge.    Activity Tolerance   Activity Tolerance Patient tolerated treatment well   After Treatment   Patient Position After Treatment Seated in bedside chair   Patient after treatment left call button in reach;nursing notified   Assessment   Prognosis Good   Problem List Decreased Self Care skills;Decreased upper extremity strength;Decreased safe judgment during ADL;Decreased endurance;Decreased functional mobility;Decreased gross motor control;Decreased IADLs   Assessment Pt continues to make functional gains in strength and endurance which has improved her ability to complete transfers with less assistance and cues for safety. Pt would continue to benefit from skilled OT services to maximize independence and safety with ADLs and functional mobility. Pt to be d/c from [facility] facility to [facility].   Level of Motivation/Participation Good   Barriers to Discharge Decreased caregiver support   Short Term Goals   Time For Goal Achievement 7 days   Pt Will Perform Grooming With modified indepdenence   Grooming - Met/Not Met Not Met   Pt Will Perform Bathing With stand by assistance   Bathing - Met/Not Met Not Met   Pt Will Perform UE Dressing With modified independence   UE Dressing - Met/Not Met Not Met   Pt Will Perform LE Dressing With stand by assistance   LE Dressing - Met/Not Met Not Met   Pt Will Perform Toileting With contact guard assistance   Toileting-Met/Not Met Met   Long Term  Goals   Time For Goal Achievement 2 weeks   Pt Will Perform Supine To Sit Independently   Supine to Sit - Met/Not Met Not Met   Pt Will Perform Sit to Supine Independently   Supine to Sit - Met/Not Met Not Met   Pt Will Transfer Sit to Stand Supervision   Transfer Sit to stand - Met/Not Met Not Met   Pt Will Transfer Bed/Chair Supervision   Transfer Bed/Chair - Met/Not Met Not Met   Pt Will Transfer To Bedside Commode With stand by assist   Transfer Bedside Commode -Met/Not Met Not Met   Pt Will Transfer To Shower With stand by assist   Transfer to Shower-Met/Not Met Not Met   Pt Will Perform Eating Independently   Eating - Met/Not Met Not Met   Pt Will Perform Grooming Independently   Grooming - Met/Not Met Not Met   Pt Will Perform Bathing With supervision   Bathing - Met/Not Met Not Met   Pt Will Perform UE Dressing Independently   UE Dressing - Met/Not Met Not Met   Pt Will Perform LE Dressing With supervision   LE Dressing - Met/Not Met Not Met   Pt Will Perform Toileting With stand by assistance   Toileting-Met/Not Met Not Met   Discharge Recommendations   Equipment Needed After Discharge bath bench   Discharge Facility/Level Of Care Needs home health OT   Plan   Plan Alter current plan   Plan Comments discharge from [facility] to [facility]    Occupational Therapy Follow-up   OT Follow-up? No   Treatment/Billable Minutes   Therapeutic Activity 32   Therapeutic Exercise 15   Total Time 47       Radha Ulrich OT  4/30/2018     Patient with wheelchair safety belt fastened and able to self release wheelchair safety belt. Pt left seated in w/c next to bedside (location) with call light and all necessities in reach, nursing notified.     LEGEND:   CGA: Contact Guard Assist   EOB: Edgeof Bed   HHA: Hand Held Assist   HOB: Head of Bed   (I): Independent-patient performs task in a timely manner   Max (A): Maximal Assist-patient performs 25-49% of task   Min (A): Minimal Assist- patient  performs 75% or more of task   Mod (A): Moderate Assist- patient performs 50-74% of task   NA: Not applicable   NT: Not tested   OOB: Out of Bed   PTA: Prior to admit   QC: Quad Cane   RW: Rolling Walker   (S): Supervision- patient requires cues, coaxing, prompting   SBA: Stand By Assist   SC: Straight Cane   SW: Standard Walker   TBA: To be assessed   Total (A): Total Assist- patient performs less than 25% of task   WC: Wheelchair   WFL: Within Functional Limits   WNL: Within Normal Limits

## 2018-04-30 NOTE — HPI
Martha Melvin is a 72-year-old female with PMHx of HTN, HLD, DMII, CKD stage V, CHF, aortic stenosis, arthritis, R foot amputation (2015), and recent admission (10/14/17-10/18/17) for ADHF.  Patient presented to [facility] on 4/15/18 with worsening SOB x 4 days.  On arrival, found to have acute hypoxic respiratory failure, acute on chronic diastolic heart failure, pulmonary edema, CAP, and UTI.  Treated with Nitro paste and Albuterol in ED and started on IV Lasix, Rocephin, and Azithromycin.  Following admission, Nephrology consulted for volume overload despite diuresis, and trialysis placed for SLED.  Hospital course complicated by ICU transfer for PEA arrest (likely 2/2 hypoxia) on 4/17/18 requiring intubation and pressor support, ESRD requiring CRRT with transition to HD s/p permacath on 4/25/18 (Nephrology following), shock liver, multidrug resistant Klebsiella UTI treated with Zosyn x 7 days, and diarrhea (C.diff negative).  Extubated on 4/20/18.  Stepped down to floor on 4/21/18. Currently below functional baseline

## 2018-04-30 NOTE — PROGRESS NOTES
"Physical Therapy   Treatment    Martha Melvin   MRN: [id number]        04/30/18 0949   PT Time Calculation   PT Start Time 0944   PT Stop Time 1031   PT Total Time (min) 47 min   Treatment   Treatment Type Treatment   PT/PTA PT   PTA Visit Number 0   General   PT Received On 04/30/18   Family/Caregiver Present No   Patient Found (position) Seated in wheelchair   Precautions   General Precautions fall;contact   Orthopedic No   Required Braces or Orthoses Yes  (RLE prosthesis)   Lines, Tubes, Drains (permacath)   Subjective   Patient states "Just the soreness in my chest" Pt states the soreness has improved since initial eval.    Pain/Comfort   Pain Rating 1 no pain, Pt does report some increased discomfort when performing sit <>Stands.    Pain Rating Post-Intervention 1 no pain   Sit to Stand   Sit <> Stand Assistance Moderate Assistance   Sit <> Stand Assistive Device No Assistive Device;Rolling Walker   Trials/Comments Pt performed multiple trials throughout treatment session. Pt continues to require assistance for lifting.    Stand to Sit   Assistance Contact Guard Assistance   Assistive Device No Assistive Device;Rolling Walker   Trials/Comments Pt perfromed multiple trials throughout treatment session.    Wheelchair Activities   Propulsion Yes   Propulsion Type 1 Manual   Level 1 Level tile   Method 1 Right upper extremity;Left upper extremity   Level of Assistance 1 Supervision   Description/ Details 1 Pt self propelled w/c 200'    Gait   Gait Yes   Weight Bearing Status Full   Gait 1   Surface 1 Level tile   Gait Assistive Device Rolling walker   Other Apparatus 1 (RLE prosthesis )   Assistance 1 Contact Guard Assistance   Gait Distance Pt ambulated 165' then 193' with RW and CGA. Pt required a seated rest break between each trial.    Gait Pattern reciprocal   Gait Deviation(s) decreased meg;decreased step length;decreased velocity of limb motion;decreased stride length;decreased weight-shifting " "ability;forward lean  (decreased step length of LLE)   Impairments Contributing to Gait Deviations impaired balance;impaired coordination;decreased flexibility;impaired motor control;abnormal muscle tone;decreased strength;other (see comments)  (Improper fitting prosthesis. )   Stairs   Stairs Yes   Stairs/Curb Step   Rails 1 Bilateral   Device 1 No device   Other Apparatus 1 Other (Comment)  (RLE Prosthesis )   Assistance 1 Contact guard   Comment/# Steps 1 Pt asc/desc 8, 3" steps and asc 8, 3" steps before requiring a standing rest break. Pt was able to desc 8,3" steps after ~1 minute rest break.    Standing   Standing-Exercises Lower extremity   Standing-Exercise Comments Pt performed standing hip flexion and extension 3X10 BLE. Pt performed 2X5 mini squats with seated rest break between each trial.    Other Activities   Comments -Attempted to asc 6" curb with hand rail on the left, pt unable to safely complete at this time with Total A.   -Sit <>stand X4 trials from W/C, pushing up from chair with BUE and Mod A.    Assessment   Prognosis Good   Problem List Decreased strength;Decreased range of motion;Decreased endurance;Impaired balance;Decreased mobility;Decreased coordination;Decreased safety awareness;Impaired vision;Orthopedic restrictions;Pain   Session Assessment progressing toward goals   Assessment Pt continues to make good progress with PT, she is motivated and participates well in during therapy sessions. Mrs. Melvin would benefit from continued skilled IPR PT services to improve current impairments in order to improve her functional mobility and decrease her caregiver burden up discharge.    Level of Motivation/Participation Good   Barriers to Discharge Decreased caregiver support   Discharge Recommendations   Equipment Needed After Discharge bath bench   Discharge Facility/Level Of Care Needs home health PT   Plan   Planned Therapy Intervention Continue with current plan;Transfer training;Gait " training;Balance Training;ROM;Stretching;Strengthening;Neuromuscular Re-education;Motor Coordination Training;Postural Re-education;Orthotic Fitting/Training;Wheelchair Management/Propulsion   Therapy Frequency 2 times/day;Monday-Friday;Saturday or Sunday   Physical Therapy Follow-up   PT Follow-up? Yes   PT - Next Visit Date 04/30/18   Treatment/Billable Minutes   Gait training 10   Therapeutic Activity 20   Therapeutic Exercise 17   Total Time 47     Ruslan Mitchell, PT, DPT  4/30/2018

## 2018-04-30 NOTE — PROGRESS NOTES
"[facility]  Physical Medicine & Rehab  Progress Note    Patient Name: Martha Melvin  MRN: [id number]  Patient Class: IP- Rehab   Admission Date: 4/26/2018  Length of Stay: 4 days  Attending Physician: Christiano Lima MD  Primary Care Provider: GUILLERMO Mcclure MD    Subjective:     Principal Problem:Critical illness myopathy    Interval History 4/30/2018:  Patient is seen for follow-up rehab evaluation and recommendations: Feels sore around chest area from transfers. Slept well. No other complaints.  HPI, Past Medical, Family, and Social History remains the same as documented in the initial encounter.    Scheduled Medications:    epoetin bashir (PROCRIT) injection  50 Units/kg (Dosing Weight) Intravenous Every Mon, Wed, Fri    heparin (porcine)  5,000 Units Subcutaneous Q8H    insulin aspart U-100  2 Units Subcutaneous TIDWM    insulin detemir U-100  5 Units Subcutaneous QHS    senna-docusate 8.6-50 mg  1 tablet Oral BID       Diagnostic Results: Labs: Reviewed H/H 7.9/25 Bun/Cr 40/8.6    PRN Medications: acetaminophen, benzonatate, bisacodyl, dextrose 50%, dextrose 50%, glucagon (human recombinant), glucose, glucose, insulin aspart U-100, ondansetron, ramelteon    Review of Systems  Objective:     Vital Signs (Most Recent):  Temp: 98.4 °F (36.9 °C) (04/30/18 0650)  Pulse: 80 (04/30/18 0650)  Resp: 16 (04/30/18 0650)  BP: (!) 124/57 (Dr Lima notified , states, "okay to hold morining dose".) (04/30/18 0933)  SpO2: (!) 92 % (04/30/18 0650)    Vital Signs (24h Range):  Temp:  [98.4 °F (36.9 °C)] 98.4 °F (36.9 °C)  Pulse:  [80-87] 80  Resp:  [16-17] 16  SpO2:  [92 %-98 %] 92 %  BP: (124-174)/(57-79) 124/57     Physical Exam   Constitutional: She is oriented to person, place, and time. She appears well-nourished.   HENT:   Head: Normocephalic.   Cardiovascular: Normal rate.    Pulmonary/Chest: Effort normal.   Musculoskeletal: Normal range of motion.   Right prosthesis   Neurological: She is oriented " to person, place, and time.   Psychiatric: She has a normal mood and affect.     NEUROLOGICAL EXAMINATION:     MENTAL STATUS   Oriented to person, place, and time.       Assessment/Plan:      Martha Melvin is a 72 y.o. female admitted to inpatient rehabilitation on 4/26/2018 for Critical illness myopathy with impaired mobility and ADLs. Patient remains appropriate for PT, OT, and as required Speech therapy. Patient continues to require 24 hour nursing care as well as daily Physician assessment.    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 Monitor blood glucoses            DISCHARGE PLANNING:  Tentative Discharge Date:     No future appointments.    Zora Mejia MD  Department of Physical Medicine & Rehab   [facility]

## 2018-04-30 NOTE — DISCHARGE SUMMARY
[facility]  Physical Medicine & Rehab  Discharge Summary    Patient Name: Matrha Melvin  MRN: [id number]  Admission Date: 4/26/2018  Hospital Length of Stay: 4 days  Discharge Date and Time: No discharge date for patient encounter.  Attending Physician: Christiano Lima MD  Discharging Provider: Zora Mejia MD  Primary Care Physician: GUILLERMO Mcclure MD    HPI: Martha Melvin is a 72-year-old female with PMHx of HTN, HLD, DMII, CKD stage V, CHF, aortic stenosis, arthritis, R foot amputation (2015), and recent admission (10/14/17-10/18/17) for ADHF.  Patient presented to [facility] on 4/15/18 with worsening SOB x 4 days.  On arrival, found to have acute hypoxic respiratory failure, acute on chronic diastolic heart failure, pulmonary edema, CAP, and UTI.  Treated with Nitro paste and Albuterol in ED and started on IV Lasix, Rocephin, and Azithromycin.  Following admission, Nephrology consulted for volume overload despite diuresis, and trialysis placed for SLED.  Hospital course complicated by ICU transfer for PEA arrest (likely 2/2 hypoxia) on 4/17/18 requiring intubation and pressor support, ESRD requiring CRRT with transition to HD s/p permacath on 4/25/18 (Nephrology following), shock liver, multidrug resistant Klebsiella UTI treated with Zosyn x 7 days, and diarrhea (C.diff negative).  Extubated on 4/20/18.  Stepped down to floor on 4/21/18. Currently below functional baseline    * No surgery found *     Indwelling Lines/Drains at time of discharge:   Lines/Drains/Airways     Central Venous Catheter Line                 Trialysis (Dialysis) Catheter 04/16/18 2050 right internal jugular 13 days         Tunneled Central Line Insertion/Assessment - Double Lumen  04/25/18 0843 right internal jugular 5 days                Hospital Course: Planned [facility] opening on 5/1/18. Patient transferred on 5/1/18 to new unit to continue current rehab plan and medical management. Chart to continue in  [facility].       Consults         Status Ordering Provider     Inpatient consult to Registered Dietitian/Nutritionist  Once     Provider:  (Not yet assigned)    Completed MARISA VERA     IP consult to dietary  Once     Provider:  (Not yet assigned)    Completed PETER REDDY          Significant Diagnostic Studies:     Final Active Diagnoses:    Diagnosis Date Noted POA    PRINCIPAL PROBLEM:  Critical illness myopathy [G72.81] 04/26/2018 Yes    ESRD (end stage renal disease) [N18.6] 10/16/2017 Yes    Diabetes mellitus due to underlying condition with chronic kidney disease, with long-term current use of insulin [E08.22, Z79.4] 10/16/2017 Not Applicable      Problems Resolved During this Admission:    Diagnosis Date Noted Date Resolved POA       Discharged Condition: good    Diet: Regular, thin liquids    Activity: as tolerated    Disposition: admitted to new [facility]    Follow Up:    Patient Instructions:   No discharge procedures on file.  Medications:  See new current active chart      Zora Mejia MD  Department of Physical Medicine & Rehab  [facility]

## 2018-04-30 NOTE — PROGRESS NOTES
Physical Therapy   Treatment/W/c Group    Martha Melvin   MRN: [id number]                  04/30/18 1415   PT Time Calculation   PT Start Time 1415   PT Stop Time 1500   PT Total Time (min) 45 min   Treatment   Treatment Type Treatment;Other (see comments)  (W/c Group)   PT/PTA PT   General   PT Received On 04/30/18   Family/Caregiver Present No   Patient Found (position) Seated in wheelchair;Other (comment)  (in room)   Pt found with (R LE prosthesis and seat belt)   Precautions   General Precautions fall;contact   Orthopedic No   Required Braces or Orthoses Yes   RLE Brace (prosthesis)   Subjective   Patient states she has some chest pain.   Pain/Comfort   Pain Rating 1 7/10   Location - Side 1 Bilateral   Location - Orientation 1 generalized   Location 1 chest   Pain Addressed 1 Pre-medicate for activity;Distraction   Pain Rating Post-Intervention 1 7/10   Activity Tolerance   Activity Tolerance Patient tolerated treatment well   After Treatment   Patient Position After Treatment Seated in wheelchair;Other (comment)  (seat belt in place)   Patient after treatment left call button in reach   Assessment   Prognosis Good   Plan   Planned Therapy Intervention Continue with current plan   Therapy Frequency 2 times/day;daily;Monday-Friday;Saturday or Sunday   Physical Therapy Follow-up   PT Follow-up? Yes   Treatment/Billable Minutes   Therapeutic Activity Group 45   Total Time 45         Objective:  Patient participated in a wheelchair management and mobility 45 min group session.  The patients were involved in group activities with emphasis on education, patients propulsion of wheelchair, management of all wheelchair parts and endurance building. The patient performed six minutes of a wheelchair endurance activity with a distance of __200 feet__ with ___supervision__ assist . Patient performed donning and doffing of leg rests with __minimal___ assist and locks brakes with stand by assist.  Patient propelled wheelchair  weaving in and out of canes for _50__ feet x 2 trials with _supervision_ assist.  Patient also practiced parallel parking while in the wheelchair x 2 trials with __supervision__ assist. Pt also practiced lateral weight shifting  for pressure relief strategies. Pt performed one timed trial of 50 feet in w/c ( 23 seconds). These activities were performed to improve pts functional independence with wheelchair mobility in both household and community distances.  Endurance 9 (6-20) on RPE scale  · Locomotion: Wheelchair (FIM)=5  6: Modified [place]- Patient operates a manual or motorized wheelchair independently for a minimum of 150 feet     5: Supervision: The patient requires standby supervision, curing or coaxing to go a minimum of 150 feet (50 meters) in a wheelchair     4: Minimal Contact Guard Assistance- The patient performed 75% or more of locomotion effort to go a minimum of 150 feet     3: Moderate Assistance- The patient performs 50-74% locomotion effort to go a minimum of 150 feet     2: Maximum Assistance: The patient performed 25-49% of locomotion effort to go a minimum of 50 feet and requires the assistance of only 1 person     1: Total Assistance: The patient performs less than 25% of effort, or requires the assistance of two people, or wheels less than 50 feet           Servando Ortiz, PT 4/30/2018

## 2018-04-30 NOTE — PROGRESS NOTES
"Physical Therapy   PM Treatment Session and Discharge Summary    Martha Melvin   MRN: [id number]          04/30/18 1308   PT Time Calculation   PT Start Time 1308   PT Stop Time 1333   PT Total Time (min) 25 min   Treatment   Treatment Type Treatment   PT/PTA PT   PTA Visit Number 0   General   PT Received On 04/30/18   Family/Caregiver Present No   Patient Found (position) Seated in bedside chair   Precautions   General Precautions fall;contact   Orthopedic No   Required Braces or Orthoses Yes  (RLE prosthesis)   Subjective   Patient states "Lunch was Okay"   Pain/Comfort   Pain Rating 1 no pain   Pain Rating Post-Intervention 1 no pain   Other Activities   Comments -Bed Side chair> W/C with Mod A, for lifting from chair and no AD.  -Pt ambulated 200' with RW and CGA. Pt states "My leg is coming off" after ~180 feet, pt was able to return to w/c and re-erum her prosthesis.   -Left toe taps onto 6" step with CGA and use of B hand rails. 4X10, standing rest break between each trial. Pt required a seated rest break after 2 trials.  Attempted toe taps on the R, pt demonstrates circumduction and is unable to perform with proper mechanics 2* improper fitting prosthesis.   -Seated therex: 2x10 holding 3" each. Hip adduction and hip abduction.   -Pt self propelled w/c 111' with supervision.    Activity Tolerance   Activity Tolerance Patient tolerated treatment well   After Treatment   Patient Position After Treatment Seated in wheelchair   Patient after treatment left (PT escorted pt back to room)   Assessment   Prognosis Good   Problem List Decreased strength;Decreased range of motion;Decreased endurance;Impaired balance;Decreased mobility;Decreased safety awareness;Impaired vision;Orthopedic restrictions;Pain   Session Assessment progressing toward goals   Assessment Pt continues to make good progress with PT, she is motivated and participates well in during therapy sessions.  Mrs. Melvin has met 4 of her 6 short term " goals.  Mrs. Melvin would benefit from continued skilled IPR PT services to improve current impairments in order to improve her functional mobility and decrease her caregiver burden up discharge.  Pt discharged from [facility] to [facility].    Level of Motivation/Participation Good   Barriers to Discharge Decreased caregiver support   Short Term Goals   Supine to Sit-Met/Not Met Not Met   Sit to Supine - Met/Not Met Not Met   Transfer Sit to stand - Met/Not Met Met   Transfer Bed/Chair - Met/Not Met Met   Ambulate - Met/Not Met Met   Propel Wheelchair - Met/Not Met Met   Discharge Recommendations   Equipment Needed After Discharge bath bench   Discharge Facility/Level Of Care Needs ([facility].)   Plan   Planned Therapy Intervention (Discharge from [facility] to [facility])   Physical Therapy Follow-up   PT Follow-up? No     Ruslan Mitchell, PT, DPT  4/30/2018

## 2018-04-30 NOTE — SUBJECTIVE & OBJECTIVE
"Interval History 4/30/2018:  Patient is seen for follow-up rehab evaluation and recommendations: Feels sore around chest area from transfers. Slept well. No other complaints.  HPI, Past Medical, Family, and Social History remains the same as documented in the initial encounter.    Scheduled Medications:    epoetin bashir (PROCRIT) injection  50 Units/kg (Dosing Weight) Intravenous Every Mon, Wed, Fri    heparin (porcine)  5,000 Units Subcutaneous Q8H    insulin aspart U-100  2 Units Subcutaneous TIDWM    insulin detemir U-100  5 Units Subcutaneous QHS    senna-docusate 8.6-50 mg  1 tablet Oral BID       Diagnostic Results: Labs: Reviewed H/H 7.9/25 Bun/Cr 40/8.6    PRN Medications: acetaminophen, benzonatate, bisacodyl, dextrose 50%, dextrose 50%, glucagon (human recombinant), glucose, glucose, insulin aspart U-100, ondansetron, ramelteon    Review of Systems  Objective:     Vital Signs (Most Recent):  Temp: 98.4 °F (36.9 °C) (04/30/18 0650)  Pulse: 80 (04/30/18 0650)  Resp: 16 (04/30/18 0650)  BP: (!) 124/57 (Dr Lima notified , states, "okay to hold morining dose".) (04/30/18 0933)  SpO2: (!) 92 % (04/30/18 0650)    Vital Signs (24h Range):  Temp:  [98.4 °F (36.9 °C)] 98.4 °F (36.9 °C)  Pulse:  [80-87] 80  Resp:  [16-17] 16  SpO2:  [92 %-98 %] 92 %  BP: (124-174)/(57-79) 124/57     Physical Exam   Constitutional: She is oriented to person, place, and time. She appears well-nourished.   HENT:   Head: Normocephalic.   Cardiovascular: Normal rate.    Pulmonary/Chest: Effort normal.   Musculoskeletal: Normal range of motion.   Right prosthesis   Neurological: She is oriented to person, place, and time.   Psychiatric: She has a normal mood and affect.     NEUROLOGICAL EXAMINATION:     MENTAL STATUS   Oriented to person, place, and time.     "

## 2018-04-30 NOTE — HOSPITAL COURSE
Planned [facility] opening on 5/1/18. Patient transferred on 5/1/18 to new unit to continue current rehab plan and medical management. Chart to continue in [facility] [employer].

## 2018-04-30 NOTE — PROGRESS NOTES
"Physical Therapy   PM Treatment     Martha Melvin   MRN: [id number]      04/30/18 1037   PT Time Calculation   PT Start Time 1037   PT Stop Time 1057   PT Total Time (min) 20 min   Treatment   Treatment Type Treatment   PT/PTA PT   PTA Visit Number 0   General   PT Received On 04/30/18   Family/Caregiver Present No   Patient Found (position) Seated in wheelchair   Pt found with (RLE prosthesis, Seat belt donned)   Precautions   General Precautions fall;contact   Orthopedic No   Required Braces or Orthoses Yes  (RLE prosthesis)   Subjective   Patient states Pt agreeable to PT treatment session   Pain/Comfort   Pain Rating 1 no pain   Pain Rating Post-Intervention 1 no pain   Transfers   Transfer yes   Chair to Mat   Chair<> Mat Technique Stand Pivot   Chair<>Mat Assistance Moderate Assistance   Chair <> Mat Assistive Device No Assistive Device   Trials/Comments X1 trial to mat, pt requires assist for lifting    Mat to Chair   Technique Stand Pivot   Assistance Moderate Assistance   Assistive Device No Assistive Device   Trials/Comments X1 trial to mat, pt requires assist for lifting.     Other Activities   Comments Pt participated in the following dynamic sitting balance activities to improve pt's balance, with close SBA/CGA  -Bouncing basketball with PT tech for 2.5 minutes, and 5'17"  -Tossing basketball with PT tech for 2X1.5 minutes.   Pt required a rest break between each trial.    Activity Tolerance   Activity Tolerance Patient tolerated treatment well   After Treatment   Patient Position After Treatment Seated in wheelchair   Patient after treatment left (Pt waited in gym until OT treatment session. Seatbelt donned)   Assessment   Prognosis Good   Problem List Decreased strength;Decreased range of motion;Decreased endurance;Impaired balance;Decreased mobility;Decreased coordination;Orthopedic restrictions;Pain   Session Assessment progressing toward goals   Discharge Recommendations   Equipment Needed After " Discharge bath bench   Discharge Facility/Level Of Care Needs home health PT   Plan   Planned Therapy Intervention Continue with current plan;Bed mobility training;Transfer training;Gait training;Balance Training;ROM;Stretching;Strengthening;Neuromuscular Re-education;Motor Coordination Training;Wheelchair Management/Propulsion;Orthotic Fitting/Training   Therapy Frequency 2 times/day;Saturday or Sunday;Monday-Friday   Physical Therapy Follow-up   PT Follow-up? Yes   PT - Next Visit Date 04/30/18       Ruslan Mitchell PT, DPT  4/30/2018

## 2018-04-30 NOTE — PLAN OF CARE
Problem: Pressure Ulcer Risk (Brandon Scale) (Adult,Obstetrics,Pediatric)  Goal: Skin Integrity  Patient will demonstrate the desired outcomes by discharge/transition of care.   Outcome: Ongoing (interventions implemented as appropriate)  Skin integrity maintained, will continue to assess and monitor.

## 2018-05-01 VITALS
HEIGHT: 66 IN | HEART RATE: 84 BPM | SYSTOLIC BLOOD PRESSURE: 147 MMHG | RESPIRATION RATE: 18 BRPM | DIASTOLIC BLOOD PRESSURE: 70 MMHG | OXYGEN SATURATION: 95 % | TEMPERATURE: 98 F

## 2018-05-01 LAB — POCT GLUCOSE: 171 MG/DL (ref 70–110)

## 2018-05-01 PROCEDURE — 25000003 PHARM REV CODE 250: Performed by: INTERNAL MEDICINE

## 2018-05-01 PROCEDURE — 63600175 PHARM REV CODE 636 W HCPCS: Performed by: PHYSICAL MEDICINE & REHABILITATION

## 2018-05-01 RX ADMIN — INSULIN ASPART 2 UNITS: 100 INJECTION, SOLUTION INTRAVENOUS; SUBCUTANEOUS at 06:05

## 2018-05-01 RX ADMIN — BENZONATATE 200 MG: 100 CAPSULE ORAL at 06:05

## 2018-05-01 RX ADMIN — INSULIN ASPART 2 UNITS: 100 INJECTION, SOLUTION INTRAVENOUS; SUBCUTANEOUS at 07:05

## 2018-05-01 RX ADMIN — HEPARIN SODIUM 5000 UNITS: 5000 INJECTION, SOLUTION INTRAVENOUS; SUBCUTANEOUS at 05:05

## 2018-05-01 RX ADMIN — ACETAMINOPHEN 650 MG: 325 TABLET ORAL at 06:05

## 2018-05-01 RX ADMIN — STANDARDIZED SENNA CONCENTRATE AND DOCUSATE SODIUM 1 TABLET: 8.6; 5 TABLET, FILM COATED ORAL at 06:05

## 2018-05-01 NOTE — NURSING
Pt continuing rehab services at [facility]. Sent with vss and report called to MERON [place] with verbaoized understanding standing expressed.

## 2018-05-01 NOTE — PROGRESS NOTES
Pt discharged from [facility].  Pt transported via [facility] to [facility] to continue acute rehab. CM following to assist with discharge needs.

## 2018-05-01 NOTE — PLAN OF CARE
Problem: Patient Care Overview  Goal: Plan of Care Review  Outcome: Ongoing (interventions implemented as appropriate)  Pt care plan reviewed and progress continues.  See associated flowsheets for relevant documentation. Frequent rounds made per protocol to maintain pt safety and meet pt needs.  Continuing to monitor and follow up as needed.      Problem: Diabetes, Type 2 (Adult)  Goal: Signs and Symptoms of Listed Potential Problems Will be Absent, Minimized or Managed (Diabetes, Type 2)  Signs and symptoms of listed potential problems will be absent, minimized or managed by discharge/transition of care (reference Diabetes, Type 2 (Adult) CPG).   Outcome: Ongoing (interventions implemented as appropriate)  No new signs or symptoms noted.      Problem: Infection, Risk/Actual (Adult)  Goal: Infection Prevention/Resolution  Patient will demonstrate the desired outcomes by discharge/transition of care.   Outcome: Ongoing (interventions implemented as appropriate)  No new signs or symptoms noted.  Contact isolation maintained.    Problem: Fall Risk (Adult)  Goal: Absence of Falls  Patient will demonstrate the desired outcomes by discharge/transition of care.   Outcome: Ongoing (interventions implemented as appropriate)  Pt remains free from falls or trauma thus far this shift.      Problem: Pressure Ulcer Risk (Brandon Scale) (Adult,Obstetrics,Pediatric)  Goal: Skin Integrity  Patient will demonstrate the desired outcomes by discharge/transition of care.   Outcome: Ongoing (interventions implemented as appropriate)  Pt turns and repositions as needed to maintain skin integrity.  No breakdown noted.

## 2018-05-01 NOTE — PT/OT/SLP DISCHARGE
Physical Therapy Discharge Summary    Name: Martha Melvin  MRN: [id number]   Principal Problem: History of cardiac arrest     Patient Discharged from acute Physical Therapy on 18.  Please refer to prior PT noted date on 18 for functional status.     Assessment:     Patient appropriate for care in another setting.    Objective:     GOALS:    Physical Therapy Goals        Problem: Physical Therapy Goal    Goal Priority Disciplines Outcome Goal Variances Interventions   Physical Therapy Goal     PT/OT, PT Ongoing (interventions implemented as appropriate)     Description:  Goals to be met by: 18    Patient will increase functional independence with mobility by performin.Supine to and from sit stand by assistance - Met   Updated: Supine to sit with Mod I  2. Gait  x 50 feet with Stand-by Assistance using Rolling Walker PRN, with supp O2 PRN.   3. Transfer sit to stand with RW and Stand-by Assistance  4. Patient tolerate 10 minutes of standing activities including exercises and or ADLs                       Reasons for Discontinuation of Therapy Services  Transfer to alternate level of care.      Plan:     Patient Discharged to: Inpatient Rehab.    Christopher Christy III, PT  2018

## 2018-05-31 ENCOUNTER — TELEPHONE (OUTPATIENT)
Dept: NEPHROLOGY | Facility: CLINIC | Age: 73
End: 2018-05-31

## 2018-05-31 NOTE — TELEPHONE ENCOUNTER
----- Message from Leidy Farrell sent at 5/31/2018  3:38 PM CDT -----  Contact: Felicita / Daughter [phone]  NP   Called to schedule an appointment.     Felicita does not recall the doctor PT was referred to.     No referral in Epic.

## 2018-06-01 ENCOUNTER — TELEPHONE (OUTPATIENT)
Dept: VASCULAR SURGERY | Facility: CLINIC | Age: 73
End: 2018-06-01

## 2018-06-01 DIAGNOSIS — N18.6 ESRD (END STAGE RENAL DISEASE) ON DIALYSIS: Primary | ICD-10-CM

## 2018-06-01 DIAGNOSIS — Z01.818 PRE-OP EVALUATION: ICD-10-CM

## 2018-06-01 DIAGNOSIS — Z99.2 ESRD (END STAGE RENAL DISEASE) ON DIALYSIS: Primary | ICD-10-CM

## 2018-06-15 ENCOUNTER — HOSPITAL ENCOUNTER (OUTPATIENT)
Dept: VASCULAR SURGERY | Facility: CLINIC | Age: 73
Discharge: HOME | End: 2018-06-15
Attending: SURGERY
Payer: MEDICARE

## 2018-06-15 ENCOUNTER — INITIAL CONSULT (OUTPATIENT)
Dept: VASCULAR SURGERY | Facility: CLINIC | Age: 73
End: 2018-06-15
Payer: MEDICARE

## 2018-06-15 VITALS
HEART RATE: 81 BPM | DIASTOLIC BLOOD PRESSURE: 92 MMHG | BODY MASS INDEX: 30.85 KG/M2 | SYSTOLIC BLOOD PRESSURE: 180 MMHG | WEIGHT: 185.19 LBS | HEIGHT: 65 IN | TEMPERATURE: 98 F

## 2018-06-15 DIAGNOSIS — N18.6 ESRD (END STAGE RENAL DISEASE): ICD-10-CM

## 2018-06-15 DIAGNOSIS — Z01.818 PRE-OP EVALUATION: ICD-10-CM

## 2018-06-15 DIAGNOSIS — N18.6 END STAGE RENAL DISEASE: Primary | ICD-10-CM

## 2018-06-15 PROBLEM — N17.9 AKI (ACUTE KIDNEY INJURY): Status: RESOLVED | Noted: 2018-04-19 | Resolved: 2018-06-15

## 2018-06-15 PROBLEM — Z75.8 DISCHARGE PLANNING ISSUES: Status: RESOLVED | Noted: 2018-04-24 | Resolved: 2018-06-15

## 2018-06-15 PROBLEM — G72.81 CRITICAL ILLNESS MYOPATHY: Status: RESOLVED | Noted: 2018-04-26 | Resolved: 2018-06-15

## 2018-06-15 PROCEDURE — 99999 PR PBB SHADOW E&M-EST. PATIENT-LVL III: CPT | Mod: PBBFAC,,, | Performed by: SURGERY

## 2018-06-15 PROCEDURE — 99203 OFFICE O/P NEW LOW 30 MIN: CPT | Mod: S$GLB,,, | Performed by: SURGERY

## 2018-06-15 PROCEDURE — 99499 UNLISTED E&M SERVICE: CPT | Mod: S$GLB,,, | Performed by: SURGERY

## 2018-06-15 RX ORDER — LIDOCAINE HYDROCHLORIDE 10 MG/ML
1 INJECTION, SOLUTION EPIDURAL; INFILTRATION; INTRACAUDAL; PERINEURAL ONCE
Status: CANCELLED | OUTPATIENT
Start: 2018-06-15 | End: 2018-06-15

## 2018-06-15 RX ORDER — MUPIROCIN 20 MG/G
OINTMENT TOPICAL
Status: CANCELLED | OUTPATIENT
Start: 2018-06-15

## 2018-06-15 NOTE — LETTER
Shirley 15, 2018      Tomas Ruelas MD  [address]  [place] [address]           [facility]  [address]  [place] [address]  Phone: [phone]  Fax: [phone]          Patient: Martha Melvin   MR Number: [id number]   YOB: 1945   Date of Visit: 6/15/2018       Dear Dr. Tomas Ruelas:    Thank you for referring Martha Melvin to me for evaluation. Attached you will find relevant portions of my assessment and plan of care.    If you have questions, please do not hesitate to call me. I look forward to following Martha Melvin along with you.    Sincerely,    AYESHA Mesa III, MD    Enclosure  CC:  No Recipients    If you would like to receive this communication electronically, please contact [email or web address] or [phone] to request more information on [employer] Link access.    For providers and/or their staff who would like to refer a patient to [facility], please contact us through our one-stop-shop provider referral line, [facility], at [phone].    If you feel you have received this communication in error or would no longer like to receive these types of communications, please e-mail [email or web address]

## 2018-06-15 NOTE — PROGRESS NOTES
Patient Martha Melvin, MRN [id number], was dependent on dialysis (ICD10 Z99.2) at the time of this visit on 6/15/18. This addendum is made to the medical record on 06/15/2018.

## 2018-06-15 NOTE — PROGRESS NOTES
REFERRING PHYSICIAN:  Tomas Ruelas MD    HISTORY OF PRESENT ILLNESS:  A 72-year-old female with end-stage renal disease   x5 weeks, dialyzing via right IJ PermCath who is here for dialysis access.  She   is right-handed.  She has no other history of central venous access other than   the IJ PermCath.    PAST MEDICAL HISTORY:    1.  History of cardiac arrest during her initial fluid overload at initiation of   dialysis.   2.  Diabetes mellitus.   3.  End-stage renal disease, dialyzing Tuesday, Thursday, Saturday.  4.  Hypertension.  5.  Hyperlipidemia.  6.  History of right below-knee amputation in 2005 secondary to a nonhealing   diabetic ulcer.    FAMILY HISTORY:  Nil.    SOCIAL HISTORY:  She is a nonsmoker.    MEDICATIONS:  Includes a statin.  See EPIC for full list.    ALLERGIES:  MORPHINE.    REVIEW OF SYSTEMS:  Denies postprandial pain or DVT.  All other systems   including eyes, ENT, respiratory, musculoskeletal, psychiatric, lymph, allergy   and immune are negative.    PHYSICAL EXAMINATION:  VITAL SIGNS:  See nursing note.  GENERAL:  She is in no acute distress.  RESPIRATORY:  Normal effort.  Clear to auscultation.  CARDIAC:  Regular rate and rhythm, nondisplaced PMI.  No murmur.  VASCULAR:  2+ radial and brachial pulses.  EXTREMITIES:  Left forearm has some ballotable vein at the wrist, although it is   not nearly felt as robust as ultrasound suggests.  NEUROLOGIC:  Cranial nerves VII-XII are intact.    IMAGING:  Vein mapping from 04/24/2018 suggest some thrombus in the right   cephalic vein, left cephalic vein at the wrist is listed at 5.2, 4.8 at the   middle forearm with branching in the forearm.  A very robust forearm basilic   vein at 5.9 is noted on the left.    ASSESSMENT:  The patient with end-stage renal disease, this vein mapping from 6   weeks ago should be an excellent forearm fistula candidate either a Carolyn or   forearm brachiobasilic fistula, but by physical exam these veins do not appear    quite as robust.      I told her that I surely preferred to do an autogenous fistula, but will use a   prosthetic graft if her veins on immediate preoperative ultrasound did not look   sufficient for autogenous fistula.    PLAN:  1.  Left Carolyn versus forearm basilic vein transposition versus prosthetic AV   graft, 06/27/2018.    2.  We will make final decision based on ultrasound after regional block is   placed.  She understands this.  3.  Regional block.    The patient understands the risks and rationale of procedure and wishes to   proceed.      VALERIO/IN  dd: 06/15/2018 10:40:08 (CDT)  td: 06/16/2018 01:39:13 (CDT)  Doc ID   #[id number]  Job ID #[id number]    CC:

## 2018-06-25 ENCOUNTER — ANESTHESIA EVENT (OUTPATIENT)
Dept: SURGERY | Facility: HOSPITAL | Age: 73
End: 2018-06-25
Payer: MEDICARE

## 2018-06-25 RX ORDER — FELODIPINE 5 MG/1
5 TABLET, EXTENDED RELEASE ORAL DAILY
COMMUNITY
End: 2018-09-05

## 2018-06-25 NOTE — ANESTHESIA PREPROCEDURE EVALUATION
06/25/2018  Martha Melvin is a 72 y.o., female.    Anesthesia Evaluation    I have reviewed the Patient Summary Reports.    I have reviewed the Nursing Notes.   I have reviewed the Medications.     Review of Systems  Anesthesia Hx:  No problems with previous Anesthesia  History of prior surgery of interest to airway management or planning: Previous anesthesia: General Denies Family Hx of Anesthesia complications.   Denies Personal Hx of Anesthesia complications.   Cardiovascular:   Exercise tolerance: good Hypertension    Renal/:   Chronic Renal Disease, ESRD, Dialysis  Kidney Function/Disease, Chronic Kidney Disease (CKD) , CKD Stage V (ESRD) (GFR <15 or on Dialysis)  Dialysis Dependent, T-TH-S , last dialysis was plans on dialysis 6/26/18   Endocrine:   Diabetes        Physical Exam  General:  Well nourished    Airway/Jaw/Neck:  Airway Findings: Mouth Opening: Normal Tongue: Normal  General Airway Assessment: Adult  Mallampati: II  Improves to II with phonation.  TM Distance: Normal, at least 6 cm  Jaw/Neck Findings:  Neck ROM: Normal ROM      Dental:  Dental Findings: In tact   Chest/Lungs:  Chest/Lungs Findings: Clear to auscultation, Normal Respiratory Rate     Heart/Vascular:  Heart Findings: Rate: Normal  Rhythm: Regular Rhythm  Sounds: Normal        Mental Status:  Mental Status Findings:  Cooperative, Alert and Oriented         Anesthesia Plan  Type of Anesthesia, risks & benefits discussed:  Anesthesia Type:  general, MAC, regional  Patient's Preference:   Intra-op Monitoring Plan: standard ASA monitors  Intra-op Monitoring Plan Comments:   Post Op Pain Control Plan: multimodal analgesia  Post Op Pain Control Plan Comments:   Induction:   IV  Beta Blocker:         Informed Consent: Patient understands risks and agrees with Anesthesia plan.  Questions answered. Anesthesia consent signed with  patient.  ASA Score: 3     Day of Surgery Review of History & Physical:    H&P update referred to the surgeon.         Ready For Surgery From Anesthesia Perspective.

## 2018-06-26 ENCOUNTER — TELEPHONE (OUTPATIENT)
Dept: VASCULAR SURGERY | Facility: CLINIC | Age: 73
End: 2018-06-26

## 2018-06-27 ENCOUNTER — SURGERY (OUTPATIENT)
Age: 73
End: 2018-06-27

## 2018-06-27 ENCOUNTER — ANESTHESIA (OUTPATIENT)
Dept: SURGERY | Facility: HOSPITAL | Age: 73
End: 2018-06-27
Payer: MEDICARE

## 2018-06-27 ENCOUNTER — HOSPITAL ENCOUNTER (OUTPATIENT)
Facility: HOSPITAL | Age: 73
Discharge: HOME | End: 2018-06-27
Attending: SURGERY | Admitting: SURGERY
Payer: MEDICARE

## 2018-06-27 VITALS
TEMPERATURE: 98 F | WEIGHT: 185 LBS | HEIGHT: 65 IN | RESPIRATION RATE: 18 BRPM | BODY MASS INDEX: 30.82 KG/M2 | HEART RATE: 73 BPM | DIASTOLIC BLOOD PRESSURE: 75 MMHG | OXYGEN SATURATION: 96 % | SYSTOLIC BLOOD PRESSURE: 136 MMHG

## 2018-06-27 DIAGNOSIS — N18.6 END STAGE RENAL DISEASE: ICD-10-CM

## 2018-06-27 LAB
POCT GLUCOSE: 146 MG/DL (ref 70–110)
POCT GLUCOSE: 163 MG/DL (ref 70–110)

## 2018-06-27 PROCEDURE — 63600175 PHARM REV CODE 636 W HCPCS: Performed by: ANESTHESIOLOGY

## 2018-06-27 PROCEDURE — 71000045 HC DOSC ROUTINE RECOVERY EA ADD'L HR: Performed by: SURGERY

## 2018-06-27 PROCEDURE — 36819 AV FUSE UPPR ARM BASILIC: CPT | Mod: ,,, | Performed by: SURGERY

## 2018-06-27 PROCEDURE — D9220A PRA ANESTHESIA: Mod: ANES,,, | Performed by: ANESTHESIOLOGY

## 2018-06-27 PROCEDURE — 82962 GLUCOSE BLOOD TEST: CPT | Performed by: SURGERY

## 2018-06-27 PROCEDURE — 25000003 PHARM REV CODE 250: Performed by: SURGERY

## 2018-06-27 PROCEDURE — 37000008 HC ANESTHESIA 1ST 15 MINUTES: Performed by: SURGERY

## 2018-06-27 PROCEDURE — 25000003 PHARM REV CODE 250: Performed by: STUDENT IN AN ORGANIZED HEALTH CARE EDUCATION/TRAINING PROGRAM

## 2018-06-27 PROCEDURE — 25000003 PHARM REV CODE 250: Performed by: NURSE ANESTHETIST, CERTIFIED REGISTERED

## 2018-06-27 PROCEDURE — D9220A PRA ANESTHESIA: Mod: CRNA,,, | Performed by: NURSE ANESTHETIST, CERTIFIED REGISTERED

## 2018-06-27 PROCEDURE — 36000707: Performed by: SURGERY

## 2018-06-27 PROCEDURE — 63600175 PHARM REV CODE 636 W HCPCS: Performed by: NURSE ANESTHETIST, CERTIFIED REGISTERED

## 2018-06-27 PROCEDURE — 27200750 HC INSULATED NEEDLE/ STIMUPLEX: Performed by: ANESTHESIOLOGY

## 2018-06-27 PROCEDURE — 37000009 HC ANESTHESIA EA ADD 15 MINS: Performed by: SURGERY

## 2018-06-27 PROCEDURE — 36000709 HC OR TIME LEV III EA ADD 15 MIN: Performed by: SURGERY

## 2018-06-27 PROCEDURE — 71000015 HC POSTOP RECOV 1ST HR: Performed by: SURGERY

## 2018-06-27 PROCEDURE — 71000044 HC DOSC ROUTINE RECOVERY FIRST HOUR: Performed by: SURGERY

## 2018-06-27 PROCEDURE — 63600175 PHARM REV CODE 636 W HCPCS: Performed by: STUDENT IN AN ORGANIZED HEALTH CARE EDUCATION/TRAINING PROGRAM

## 2018-06-27 PROCEDURE — 36000706: Performed by: SURGERY

## 2018-06-27 PROCEDURE — 63600175 PHARM REV CODE 636 W HCPCS: Performed by: SURGERY

## 2018-06-27 PROCEDURE — 36000708 HC OR TIME LEV III 1ST 15 MIN: Performed by: SURGERY

## 2018-06-27 RX ORDER — FENTANYL CITRATE 50 UG/ML
INJECTION, SOLUTION INTRAMUSCULAR; INTRAVENOUS
Status: DISCONTINUED | OUTPATIENT
Start: 2018-06-27 | End: 2018-06-27

## 2018-06-27 RX ORDER — PHENYLEPHRINE HYDROCHLORIDE 10 MG/ML
INJECTION INTRAVENOUS
Status: DISCONTINUED | OUTPATIENT
Start: 2018-06-27 | End: 2018-06-27

## 2018-06-27 RX ORDER — LIDOCAINE HYDROCHLORIDE 10 MG/ML
INJECTION, SOLUTION INTRAVENOUS
Status: DISCONTINUED | OUTPATIENT
Start: 2018-06-27 | End: 2018-06-27

## 2018-06-27 RX ORDER — CEFAZOLIN SODIUM 1 G/3ML
2 INJECTION, POWDER, FOR SOLUTION INTRAMUSCULAR; INTRAVENOUS
Status: COMPLETED | OUTPATIENT
Start: 2018-06-27 | End: 2018-06-27

## 2018-06-27 RX ORDER — HYDROMORPHONE HYDROCHLORIDE 1 MG/ML
0.2 INJECTION, SOLUTION INTRAMUSCULAR; INTRAVENOUS; SUBCUTANEOUS EVERY 5 MIN PRN
Status: DISCONTINUED | OUTPATIENT
Start: 2018-06-27 | End: 2018-06-27 | Stop reason: HOSPADM

## 2018-06-27 RX ORDER — HEPARIN SODIUM 1000 [USP'U]/ML
INJECTION, SOLUTION INTRAVENOUS; SUBCUTANEOUS
Status: DISCONTINUED | OUTPATIENT
Start: 2018-06-27 | End: 2018-06-27 | Stop reason: HOSPADM

## 2018-06-27 RX ORDER — HEPARIN SODIUM 1000 [USP'U]/ML
INJECTION, SOLUTION INTRAVENOUS; SUBCUTANEOUS
Status: DISCONTINUED | OUTPATIENT
Start: 2018-06-27 | End: 2018-06-27

## 2018-06-27 RX ORDER — HYDROCODONE BITARTRATE AND ACETAMINOPHEN 5; 325 MG/1; MG/1
1 TABLET ORAL EVERY 4 HOURS PRN
Status: DISCONTINUED | OUTPATIENT
Start: 2018-06-27 | End: 2018-06-27 | Stop reason: HOSPADM

## 2018-06-27 RX ORDER — MUPIROCIN 20 MG/G
OINTMENT TOPICAL
Status: DISCONTINUED | OUTPATIENT
Start: 2018-06-27 | End: 2018-06-27 | Stop reason: HOSPADM

## 2018-06-27 RX ORDER — PROPOFOL 10 MG/ML
VIAL (ML) INTRAVENOUS CONTINUOUS PRN
Status: DISCONTINUED | OUTPATIENT
Start: 2018-06-27 | End: 2018-06-27

## 2018-06-27 RX ORDER — ESMOLOL HYDROCHLORIDE 10 MG/ML
INJECTION INTRAVENOUS
Status: DISCONTINUED | OUTPATIENT
Start: 2018-06-27 | End: 2018-06-27

## 2018-06-27 RX ORDER — KETAMINE HCL IN 0.9 % NACL 50 MG/5 ML
SYRINGE (ML) INTRAVENOUS
Status: DISCONTINUED | OUTPATIENT
Start: 2018-06-27 | End: 2018-06-27

## 2018-06-27 RX ORDER — SODIUM CHLORIDE 9 MG/ML
INJECTION, SOLUTION INTRAVENOUS CONTINUOUS PRN
Status: DISCONTINUED | OUTPATIENT
Start: 2018-06-27 | End: 2018-06-27

## 2018-06-27 RX ORDER — LIDOCAINE HYDROCHLORIDE 10 MG/ML
1 INJECTION, SOLUTION EPIDURAL; INFILTRATION; INTRACAUDAL; PERINEURAL ONCE
Status: COMPLETED | OUTPATIENT
Start: 2018-06-27 | End: 2018-06-27

## 2018-06-27 RX ORDER — HYDROCODONE BITARTRATE AND ACETAMINOPHEN 5; 325 MG/1; MG/1
1 TABLET ORAL EVERY 6 HOURS PRN
Qty: 40 TABLET | Refills: 0 | Status: SHIPPED | OUTPATIENT
Start: 2018-06-27 | End: 2018-09-05

## 2018-06-27 RX ORDER — SODIUM CHLORIDE 0.9 % (FLUSH) 0.9 %
3 SYRINGE (ML) INJECTION
Status: DISCONTINUED | OUTPATIENT
Start: 2018-06-27 | End: 2018-06-27 | Stop reason: HOSPADM

## 2018-06-27 RX ORDER — MIDAZOLAM HYDROCHLORIDE 1 MG/ML
INJECTION, SOLUTION INTRAMUSCULAR; INTRAVENOUS
Status: DISCONTINUED | OUTPATIENT
Start: 2018-06-27 | End: 2018-06-27

## 2018-06-27 RX ADMIN — Medication 10 MG: at 08:06

## 2018-06-27 RX ADMIN — HYDROCODONE BITARTRATE AND ACETAMINOPHEN 1 TABLET: 5; 325 TABLET ORAL at 11:06

## 2018-06-27 RX ADMIN — PHENYLEPHRINE HYDROCHLORIDE 100 MCG: 10 INJECTION INTRAVENOUS at 09:06

## 2018-06-27 RX ADMIN — HEPARIN SODIUM 3000 UNITS: 1000 INJECTION, SOLUTION INTRAVENOUS; SUBCUTANEOUS at 09:06

## 2018-06-27 RX ADMIN — HYDROMORPHONE HYDROCHLORIDE 0.2 MG: 1 INJECTION, SOLUTION INTRAMUSCULAR; INTRAVENOUS; SUBCUTANEOUS at 11:06

## 2018-06-27 RX ADMIN — FENTANYL CITRATE 25 MCG: 50 INJECTION, SOLUTION INTRAMUSCULAR; INTRAVENOUS at 08:06

## 2018-06-27 RX ADMIN — SODIUM CHLORIDE: 0.9 INJECTION, SOLUTION INTRAVENOUS at 07:06

## 2018-06-27 RX ADMIN — MUPIROCIN: 20 OINTMENT TOPICAL at 06:06

## 2018-06-27 RX ADMIN — LIDOCAINE HYDROCHLORIDE 1 MG: 10 INJECTION, SOLUTION EPIDURAL; INFILTRATION; INTRACAUDAL; PERINEURAL at 06:06

## 2018-06-27 RX ADMIN — HEPARIN SODIUM 10000 UNITS: 1000 INJECTION, SOLUTION INTRAVENOUS; SUBCUTANEOUS at 08:06

## 2018-06-27 RX ADMIN — MIDAZOLAM HYDROCHLORIDE 1 MG: 1 INJECTION, SOLUTION INTRAMUSCULAR; INTRAVENOUS at 08:06

## 2018-06-27 RX ADMIN — PHENYLEPHRINE HYDROCHLORIDE 100 MCG: 10 INJECTION INTRAVENOUS at 08:06

## 2018-06-27 RX ADMIN — ESMOLOL HYDROCHLORIDE 20 MG: 10 INJECTION INTRAVENOUS at 08:06

## 2018-06-27 RX ADMIN — PROPOFOL 50 MCG/KG/MIN: 10 INJECTION, EMULSION INTRAVENOUS at 08:06

## 2018-06-27 RX ADMIN — MIDAZOLAM HYDROCHLORIDE 1 MG: 1 INJECTION, SOLUTION INTRAMUSCULAR; INTRAVENOUS at 07:06

## 2018-06-27 RX ADMIN — LIDOCAINE HYDROCHLORIDE 20 MG: 10 INJECTION, SOLUTION INTRAVENOUS at 08:06

## 2018-06-27 RX ADMIN — CEFAZOLIN 2 G: 330 INJECTION, POWDER, FOR SOLUTION INTRAMUSCULAR; INTRAVENOUS at 08:06

## 2018-06-27 NOTE — H&P (VIEW-ONLY)
REFERRING PHYSICIAN:  Tomas Ruelas MD    HISTORY OF PRESENT ILLNESS:  A 72-year-old female with end-stage renal disease   x5 weeks, dialyzing via right IJ PermCath who is here for dialysis access.  She   is right-handed.  She has no other history of central venous access other than   the IJ PermCath.    PAST MEDICAL HISTORY:    1.  History of cardiac arrest during her initial fluid overload at initiation of   dialysis.   2.  Diabetes mellitus.   3.  End-stage renal disease, dialyzing Tuesday, Thursday, Saturday.  4.  Hypertension.  5.  Hyperlipidemia.  6.  History of right below-knee amputation in 2005 secondary to a nonhealing   diabetic ulcer.    FAMILY HISTORY:  Nil.    SOCIAL HISTORY:  She is a nonsmoker.    MEDICATIONS:  Includes a statin.  See EPIC for full list.    ALLERGIES:  MORPHINE.    REVIEW OF SYSTEMS:  Denies postprandial pain or DVT.  All other systems   including eyes, ENT, respiratory, musculoskeletal, psychiatric, lymph, allergy   and immune are negative.    PHYSICAL EXAMINATION:  VITAL SIGNS:  See nursing note.  GENERAL:  She is in no acute distress.  RESPIRATORY:  Normal effort.  Clear to auscultation.  CARDIAC:  Regular rate and rhythm, nondisplaced PMI.  No murmur.  VASCULAR:  2+ radial and brachial pulses.  EXTREMITIES:  Left forearm has some ballotable vein at the wrist, although it is   not nearly felt as robust as ultrasound suggests.  NEUROLOGIC:  Cranial nerves VII-XII are intact.    IMAGING:  Vein mapping from 04/24/2018 suggest some thrombus in the right   cephalic vein, left cephalic vein at the wrist is listed at 5.2, 4.8 at the   middle forearm with branching in the forearm.  A very robust forearm basilic   vein at 5.9 is noted on the left.    ASSESSMENT:  The patient with end-stage renal disease, this vein mapping from 6   weeks ago should be an excellent forearm fistula candidate either a Carolyn or   forearm brachiobasilic fistula, but by physical exam these veins do not appear    quite as robust.      I told her that I surely preferred to do an autogenous fistula, but will use a   prosthetic graft if her veins on immediate preoperative ultrasound did not look   sufficient for autogenous fistula.    PLAN:  1.  Left Carolyn versus forearm basilic vein transposition versus prosthetic AV   graft, 06/27/2018.    2.  We will make final decision based on ultrasound after regional block is   placed.  She understands this.  3.  Regional block.    The patient understands the risks and rationale of procedure and wishes to   proceed.      VALERIO/IN  dd: 06/15/2018 10:40:08 (CDT)  td: 06/16/2018 01:39:13 (CDT)  Doc ID   #[id number]  Job ID #[id number]    CC:

## 2018-06-27 NOTE — PLAN OF CARE
Patient Vss, AAOx3, no signs of distress noted.  Pain under control and tolerable. No complaints of nausea. Daughter at bedside.  Patient able to tolerate fluids by mouth.  Discharge instructions explained and given to patient and daughter. Prescription given to patient with discharge instructions.  Both parties stated understanding,  Bandage dry and intact on left arm. Left arm placed in sling. Patient tolerated well.  Patient dressed and in wheelchair ready for discharge.

## 2018-06-27 NOTE — TRANSFER OF CARE
"Anesthesia Transfer of Care Note    Patient: Martha Melvin    Procedure(s) Performed: Procedure(s) (LRB):  TRANSPOSITION, VEIN, BASILIC (Left)    Patient location: [facility]    Anesthesia Type: general    Transport from OR: Transported from OR on 6-10 L/min O2 by face mask with adequate spontaneous ventilation    Post pain: adequate analgesia    Post assessment: no apparent anesthetic complications and tolerated procedure well    Post vital signs: stable    Level of consciousness: awake    Nausea/Vomiting: no vomiting    Complications: none    Transfer of care protocol was followed      Last vitals:   Visit Vitals  BP (!) 174/84   Pulse 80   Temp 36.6 °C (97.8 °F) (Oral)   Resp 20   Ht 5' 5" (1.651 m)   Wt 83.9 kg (185 lb)   Breastfeeding? No   BMI 30.79 kg/m²     "

## 2018-06-27 NOTE — ANESTHESIA PROCEDURE NOTES
Supraclavicular Brachial Plexus Single Injection Block    Patient location during procedure: OR    Reason for block: primary anesthetic   Diagnosis: left arm pain   Start time: 6/27/2018 8:06 AM  Timeout: 6/27/2018 8:05 AM   End time: 6/27/2018 8:14 AM  Staffing  Anesthesiologist: GREGORIA TROTTER  Other anesthesia staff: AUTUMN MEDIEROS  Performed: other anesthesia staff   Preanesthetic Checklist  Completed: patient identified, site marked, surgical consent, pre-op evaluation, timeout performed, IV checked, risks and benefits discussed and monitors and equipment checked  Peripheral Block  Patient position: supine  Prep: ChloraPrep  Patient monitoring: heart rate, cardiac monitor, continuous pulse ox, continuous capnometry and frequent blood pressure checks  Block type: supraclavicular  Laterality: left  Injection technique: single shot  Needle  Needle type: Stimuplex   Needle gauge: 22 G  Needle length: 2 in  Needle localization: anatomical landmarks and ultrasound guidance   -ultrasound image captured on disc.  Assessment  Injection assessment: negative aspiration, negative parasthesia and local visualized surrounding nerve  Paresthesia pain: none  Heart rate change: no  Slow fractionated injection: yes  Medications:  Bolus administered: 30 mL of 1.5 mepivacaine  Epinephrine added: 3.75 mcg/mL (1/300,000)  Additional Notes  Intercostal brachial field block performed with mepivacaine 1.5% 10ml for additional coverage.    VSS.  See anesthesia record.  Patient tolerated procedure well.

## 2018-06-27 NOTE — ANESTHESIA POSTPROCEDURE EVALUATION
"Anesthesia Post Evaluation    Patient: Martha Melvin    Procedure(s) Performed: Procedure(s) (LRB):  TRANSPOSITION, VEIN, BASILIC (Left)    Final Anesthesia Type: general  Patient location during evaluation: [facility]  Patient participation: Yes- Able to Participate  Level of consciousness: awake and alert  Post-procedure vital signs: reviewed and stable  Pain management: adequate  Airway patency: patent  PONV status at discharge: No PONV  Anesthetic complications: no      Cardiovascular status: blood pressure returned to baseline  Respiratory status: unassisted  Hydration status: euvolemic  Follow-up not needed.        Visit Vitals  BP (!) 120/59 (BP Location: Left arm, Patient Position: Lying)   Pulse 80   Temp 36.6 °C (97.8 °F) (Oral)   Resp 16   Ht 5' 5" (1.651 m)   Wt 83.9 kg (185 lb)   SpO2 98%   Breastfeeding? No   BMI 30.79 kg/m²       Pain/Walker Score: Pain Assessment Performed: Yes (6/27/2018 10:45 AM)  Presence of Pain: denies (6/27/2018 10:45 AM)  Walker Score: 8 (6/27/2018 10:45 AM)      "

## 2018-06-27 NOTE — PLAN OF CARE
Pt arrived in DOSC #[id number].  Patient is drowsy, connected to monitors.  No signs of distress or complaints of pain.  No signs or complaint of nausea. Vss.

## 2018-06-27 NOTE — OP NOTE
[facility]  Vascular Surgery  Operative Note    SUMMARY     Date of Procedure: 6/27/2018     Procedure: Left forearm basilic vein transposition AVF creation    Surgeon(s) and Role:     * Shira Hodges MD - Fellow     * AYESHA Mesa III, MD - Primary    Assisting Surgeon: None    Pre-Operative Diagnosis: End stage renal disease [N18.6]    Post-Operative Diagnosis: Post-Op Diagnosis Codes:     * End stage renal disease [N18.6]    Anesthesia: Regional    Indication for operation:  73 yo F with ESRD on HD via R IJ permacath in need of HD access.    Description of the Findings of the Procedure: Smallish basilic vein (~3.5m), radial artery 2.5mm, soft thrill    Complications: No    Estimated Blood Loss (EBL):< 10 mL           Implants: * No implants in log *    Specimens:   Specimen (12h ago through future)    None                  Condition: Good    Disposition: PACU - hemodynamically stable.    Operation in detail: After an informed consent was obtained the patient taken to the operating room and placed in the supine position. The patient received a regional nerve block by anesthesia prior to the procedure. The left upper extremity was prepped and draped in the usual sterile fashion. Ultrasound was used to evaluate the left upper extremity forearm veins.  Left basilic vein was noted to be adequate for AVF construction.   Patients forearm was supinated and flexed to expose the posteriomedial aspect of the patient's forearm and basilic vein.  A longitudinal incision was made from just distal to AC fossa over the basilic vein. The vein was dissected free from the surrounding tissue and small branch points were ligated with 4-0 and 3-0 silk ties. Basilic vein was dilated with heparinized saline and was noted to distend well.  Additional branch points of the basilic vein were ligated with 4-0 silk suture. The anterior margin of the basilic vein was marked to make correct orientation discernible  using a sterile marker.  A longitudinal incision was made over patient's distal anterior arm.  The radial artery was identified and dissected free from the surrounding tissue and isolated with a vessel loop. A Impra tunneler was used to tunnel the basilic vein to the radial artery with care taken to avoid twisting or kinking of vein.  Patient was given 5000 U of IV heparin. The distal end of the basilic vein was then ligated with silk suture and the proximal end was beveled for the upcoming anastomosis. A longitudinal arteriotomy was made in radial artery and the radial artery and vein were flushed with heparinized saline. Next the vein and artery were anastomosed in a end-to-side fashion using a running 6-0 Prolene suture.The vein course was inspected in relation to the surrounding tissues ensure no kinking or twisting.  A soft thrill was appreciated at th proximal fistula and strong palpable radial artery was felt distally. Hemostasis was obtained and the basilic harvest wound was closed using interrupted 2-0 Vicryl sutures over basilic vein, then 3-0 Monocryl sutures followed by a running 4-0 Monocryl subcuticular suture for the skin. The radial artery incision was closed with interrupted 3-0 Monocryl with skin closed with 4-0 Monocryl sutures. Sterile dry dressing was applied. The patient tolerated the procedure well and was transferred to pacu for further recovery.

## 2018-06-27 NOTE — BRIEF OP NOTE
[facility]  Brief Operative Note     SUMMARY     Surgery Date: 6/27/2018     Surgeon(s) and Role:     * Shira Hodges MD - Fellow     * AYESHA Mesa III, MD - Primary    Assisting Surgeon: None    Pre-op Diagnosis:  End stage renal disease [N18.6]    Post-op Diagnosis:  Post-Op Diagnosis Codes:     * End stage renal disease [N18.6]    PROCEDURE:   L forearm basilic vein transposition AVF creation    Anesthesia: Regional    Description of the findings of the procedure: Smallish basilic vein (~3.5m), radial artery 2.5mm, soft thrill    Findings/Key Components: as above    Estimated Blood Loss: <10cc         Specimens:   Specimen (12h ago through future)    None          Discharge Note    SUMMARY     Admit Date: 6/27/2018    Discharge Date and Time: 6/27/18    Hospital Course (synopsis of major diagnoses, care, treatment, and services provided during the course of the hospital stay): successful outpatient procedure    Final Diagnosis: Post-Op Diagnosis Codes:     * End stage renal disease [N18.6]    Disposition: home    Follow Up/Patient Instructions: Diet: renal  Act: ad lindsey  FU: 2 week with QUANTITATIVE AVF duplex     Medications: pre-op+ analgesic

## 2018-07-09 RX ORDER — FUROSEMIDE 80 MG/1
80 TABLET ORAL EVERY MORNING
Qty: 30 TABLET | Refills: 2 | Status: SHIPPED | OUTPATIENT
Start: 2018-07-09 | End: 2018-11-19

## 2018-07-12 DIAGNOSIS — N18.6 ESRD (END STAGE RENAL DISEASE): Primary | ICD-10-CM

## 2018-07-13 ENCOUNTER — HOSPITAL ENCOUNTER (OUTPATIENT)
Dept: VASCULAR SURGERY | Facility: CLINIC | Age: 73
Discharge: HOME | End: 2018-07-13
Attending: SURGERY
Payer: MEDICARE

## 2018-07-13 ENCOUNTER — OFFICE VISIT (OUTPATIENT)
Dept: VASCULAR SURGERY | Facility: CLINIC | Age: 73
End: 2018-07-13
Payer: MEDICARE

## 2018-07-13 VITALS
WEIGHT: 187.38 LBS | HEIGHT: 65 IN | BODY MASS INDEX: 31.22 KG/M2 | TEMPERATURE: 98 F | HEART RATE: 86 BPM | SYSTOLIC BLOOD PRESSURE: 193 MMHG | DIASTOLIC BLOOD PRESSURE: 94 MMHG

## 2018-07-13 DIAGNOSIS — Z99.2 ESRD (END STAGE RENAL DISEASE) ON DIALYSIS: Primary | ICD-10-CM

## 2018-07-13 DIAGNOSIS — N18.6 END STAGE RENAL DISEASE: ICD-10-CM

## 2018-07-13 DIAGNOSIS — N18.6 ESRD (END STAGE RENAL DISEASE) ON DIALYSIS: Primary | ICD-10-CM

## 2018-07-13 DIAGNOSIS — N18.6 ESRD (END STAGE RENAL DISEASE): Primary | ICD-10-CM

## 2018-07-13 PROCEDURE — 93990 DOPPLER FLOW TESTING: CPT | Mod: NTX,S$GLB,, | Performed by: SURGERY

## 2018-07-13 PROCEDURE — 99999 PR PBB SHADOW E&M-EST. PATIENT-LVL III: CPT | Mod: PBBFAC,TXP,, | Performed by: SURGERY

## 2018-07-13 PROCEDURE — 99024 POSTOP FOLLOW-UP VISIT: CPT | Mod: NTX,S$GLB,, | Performed by: SURGERY

## 2018-07-13 NOTE — PROGRESS NOTES
REFERRING PHYSICIAN:  Tomas Ruelas MD    HISTORY OF PRESENT ILLNESS:  A 72-year-old female with end-stage renal disease,   dialyzing via right IJ PermCath (Tu/Th/Sa) who is here for 2 week f/u after left forearm Basilic vein transposition.    She is right-handed.  She has no other history of central venous access other than the IJ PermCath.    Vascular surgery:  1. Left forearm Basilic vein transposition 06/27/18    No c/o    PAST MEDICAL HISTORY:    1.  History of cardiac arrest during her initial fluid overload at initiation of   dialysis.   2.  Diabetes mellitus.   3.  End-stage renal disease, dialyzing Tuesday, Thursday, Saturday.  4.  Hypertension.  5.  Hyperlipidemia.  6.  History of right below-knee amputation in 2005 secondary to a nonhealing   diabetic ulcer.    FAMILY HISTORY:  Nil.    SOCIAL HISTORY:  She is a nonsmoker.    MEDICATIONS:  Includes a statin.  See EPIC for full list.    ALLERGIES:  MORPHINE.    REVIEW OF SYSTEMS:  Denies postprandial pain or DVT.  All other systems   including eyes, ENT, respiratory, musculoskeletal, psychiatric, lymph, allergy   and immune are negative.    PHYSICAL EXAMINATION:  VITAL SIGNS:  See nursing note.  GENERAL:  She is in no acute distress.  RESPIRATORY:  Normal effort.  Clear to auscultation.  CARDIAC:  Regular rate and rhythm, nondisplaced PMI.  No murmur.  VASCULAR:  2+ radial and brachial pulses.  EXTREMITIES:  Left forearm dorsal incision healing well. AV fistula with good thrill, slightly pulsatile in the proximal forearm segment.   NEUROLOGIC:  Cranial nerves VII-XII are intact.    IMAGING:   Quantitative DUS  No significant areas of stenosis  Flow volume 750 cc/min    ASSESSMENT:    The patient with end-stage renal disease s/p left forearm basilic vein transposition.   Doing well post-op. Fistula maturing as expected.     PLAN:  1.  Cont HD via Right IJ permacath for now to allow AVF to mature.   2.  Left forearm basilic vein transposition re-assess with  quantitative DUS in 4 weeks.       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    FU 4 wks with repeat Quant duplex    GLADYS Mesa III, MD, FACS  Professor and Chief, Vascular and Endovascular Surgery

## 2018-08-06 ENCOUNTER — TELEPHONE (OUTPATIENT)
Dept: TRANSPLANT | Facility: CLINIC | Age: 73
End: 2018-08-06

## 2018-08-13 ENCOUNTER — TELEPHONE (OUTPATIENT)
Dept: ADMINISTRATIVE | Facility: OTHER | Age: 73
End: 2018-08-13

## 2018-08-13 NOTE — TELEPHONE ENCOUNTER
----- Message from Eugene Burrows sent at 8/13/2018 12:38 PM CDT -----  Contact: Quiana with [facility] said pt needs an appt so that she can be cleared for cannulation. Please call to schedule at [phone]

## 2018-09-04 ENCOUNTER — TELEPHONE (OUTPATIENT)
Dept: PHYSICAL MEDICINE AND REHAB | Facility: CLINIC | Age: 73
End: 2018-09-04

## 2018-09-04 DIAGNOSIS — I10 HYPERTENSION, UNSPECIFIED TYPE: Primary | ICD-10-CM

## 2018-09-04 RX ORDER — AMLODIPINE BESYLATE 5 MG/1
TABLET ORAL
Qty: 30 TABLET | Refills: 2 | Status: SHIPPED | OUTPATIENT
Start: 2018-09-04 | End: 2018-09-05 | Stop reason: SDUPTHER

## 2018-09-04 NOTE — TELEPHONE ENCOUNTER
----- Message from Tonya Llamas sent at 9/4/2018 12:55 PM CDT -----  Contact: Raul [phone]  Naa needs a refill on amlodipine 5mg sent to [facility]. Patient is out and would like to get before the storm. Thank you!

## 2018-09-04 NOTE — TELEPHONE ENCOUNTER
Informed the pharmacy that the patient will have to get the refill from PCP or last  That prescribed it

## 2018-09-04 NOTE — TELEPHONE ENCOUNTER
----- Message from John Carcamo sent at 9/4/2018  3:48 PM CDT -----  Needs Advice    Reason for call: Luzmaria states pt's PCP is not willing to approve script for amlodipine 5mg sent, and she's asking if Dr. Lima will approve the medication. Luzmaria states pt has been out of the medication for 2 days.  Communication Preference: Luzmaria (daughter) @ [phone]  Additional Information:

## 2018-09-05 ENCOUNTER — OFFICE VISIT (OUTPATIENT)
Dept: INTERNAL MEDICINE | Facility: CLINIC | Age: 73
End: 2018-09-05
Payer: MEDICARE

## 2018-09-05 ENCOUNTER — HOSPITAL ENCOUNTER (OUTPATIENT)
Dept: RADIOLOGY | Facility: HOSPITAL | Age: 73
Discharge: HOME | End: 2018-09-05
Attending: INTERNAL MEDICINE
Payer: MEDICARE

## 2018-09-05 DIAGNOSIS — R01.1 HEART MURMUR: ICD-10-CM

## 2018-09-05 DIAGNOSIS — D63.1 ANEMIA, CHRONIC RENAL FAILURE, STAGE 5: ICD-10-CM

## 2018-09-05 DIAGNOSIS — I10 HYPERTENSION, UNSPECIFIED TYPE: ICD-10-CM

## 2018-09-05 DIAGNOSIS — Z11.59 NEED FOR HEPATITIS C SCREENING TEST: ICD-10-CM

## 2018-09-05 DIAGNOSIS — Z00.00 ANNUAL PHYSICAL EXAM: ICD-10-CM

## 2018-09-05 DIAGNOSIS — Z12.31 ENCOUNTER FOR SCREENING MAMMOGRAM FOR HIGH-RISK PATIENT: ICD-10-CM

## 2018-09-05 DIAGNOSIS — N18.5 ANEMIA, CHRONIC RENAL FAILURE, STAGE 5: ICD-10-CM

## 2018-09-05 DIAGNOSIS — Z79.4 DIABETES MELLITUS DUE TO UNDERLYING CONDITION WITH CHRONIC KIDNEY DISEASE ON CHRONIC DIALYSIS, WITH LONG-TERM CURRENT USE OF INSULIN: Primary | ICD-10-CM

## 2018-09-05 DIAGNOSIS — E08.22 DIABETES MELLITUS DUE TO UNDERLYING CONDITION WITH CHRONIC KIDNEY DISEASE ON CHRONIC DIALYSIS, WITH LONG-TERM CURRENT USE OF INSULIN: Primary | ICD-10-CM

## 2018-09-05 DIAGNOSIS — N18.6 DIABETES MELLITUS DUE TO UNDERLYING CONDITION WITH CHRONIC KIDNEY DISEASE ON CHRONIC DIALYSIS, WITH LONG-TERM CURRENT USE OF INSULIN: Primary | ICD-10-CM

## 2018-09-05 DIAGNOSIS — I50.32 CHRONIC DIASTOLIC HEART FAILURE: ICD-10-CM

## 2018-09-05 DIAGNOSIS — Z89.619 HISTORY OF LEG AMPUTATION: ICD-10-CM

## 2018-09-05 DIAGNOSIS — E78.5 HYPERLIPIDEMIA, UNSPECIFIED HYPERLIPIDEMIA TYPE: ICD-10-CM

## 2018-09-05 DIAGNOSIS — Z99.2 DIABETES MELLITUS DUE TO UNDERLYING CONDITION WITH CHRONIC KIDNEY DISEASE ON CHRONIC DIALYSIS, WITH LONG-TERM CURRENT USE OF INSULIN: Primary | ICD-10-CM

## 2018-09-05 PROBLEM — G72.9 MYOPATHY: Status: ACTIVE | Noted: 2018-05-01

## 2018-09-05 PROCEDURE — 99999 PR PBB SHADOW E&M-EST. PATIENT-LVL IV: CPT | Mod: PBBFAC,,, | Performed by: INTERNAL MEDICINE

## 2018-09-05 PROCEDURE — 99214 OFFICE O/P EST MOD 30 MIN: CPT | Mod: PBBFAC,PO | Performed by: INTERNAL MEDICINE

## 2018-09-05 PROCEDURE — 1101F PT FALLS ASSESS-DOCD LE1/YR: CPT | Mod: CPTII,,, | Performed by: INTERNAL MEDICINE

## 2018-09-05 PROCEDURE — 77067 SCR MAMMO BI INCL CAD: CPT | Mod: TC,PO,NTX

## 2018-09-05 PROCEDURE — 99205 OFFICE O/P NEW HI 60 MIN: CPT | Mod: S$PBB,,, | Performed by: INTERNAL MEDICINE

## 2018-09-05 PROCEDURE — 77067 SCR MAMMO BI INCL CAD: CPT | Mod: 26,NTX,, | Performed by: RADIOLOGY

## 2018-09-05 RX ORDER — AMLODIPINE BESYLATE 10 MG/1
10 TABLET ORAL DAILY
Qty: 90 TABLET | Refills: 1 | Status: SHIPPED | OUTPATIENT
Start: 2018-09-05 | End: 2019-03-20

## 2018-09-05 RX ORDER — INSULIN ASPART 100 [IU]/ML
8 INJECTION, SOLUTION INTRAVENOUS; SUBCUTANEOUS
Qty: 15 ML | Refills: 1 | Status: SHIPPED | OUTPATIENT
Start: 2018-09-05 | End: 2018-09-08 | Stop reason: SDUPTHER

## 2018-09-05 RX ORDER — ATORVASTATIN CALCIUM 20 MG/1
20 TABLET, FILM COATED ORAL DAILY
Qty: 90 TABLET | Refills: 1 | Status: SHIPPED | OUTPATIENT
Start: 2018-09-05 | End: 2018-09-08 | Stop reason: SDUPTHER

## 2018-09-05 NOTE — PROGRESS NOTES
Subjective:      Martha Melvin is a 72 y.o. female who presents for annual exam. She is present with her daughter.    HTN- has been managed by Nephrologist, has not established a PCP since Dr. Mcclure. There was no medication reconciliation after hospitalization earlier this year. BP elevated, on amlodipine and lasix. Denies CP, minimal SOB.    DM2- reports compliance with antidiabetic agents, uses Novolog 8 units tid and Lantus 20 units nightly. AM BG that she last recalls was 160's. She does not know recent HbA1c.     ESRD- Tu-Th-Sat dialysis at [facility], [facility], receives some injections during dialysis but patient does not know the use of the medications. Has very little urine output.    Family History:  family history includes Cancer in her father, mother, and sister; Diabetes in her mother; Hypertension in her mother.    Health Maintenance:  Health Maintenance       Date Due Completion Date    Hepatitis C Screening 1945 ---    Lipid Panel 1945 ---    Foot Exam 12/03/1957 ---    Eye Exam 12/03/1957 ---    TETANUS VACCINE 12/03/1965 ---    Mammogram 12/03/1987 ---    DEXA SCAN 12/03/1987 ---    Colonoscopy 12/03/1997 ---    Zoster Vaccine 12/03/2007 ---    Influenza Vaccine 08/01/2018 10/17/2017    Hemoglobin A1c 10/16/2018 4/16/2018    Pneumococcal (65+) (2 of 2 - PPSV23) 10/17/2018 10/17/2017        Eye exam: not done recently  Dental Exam: due    Colonoscopy: due  MMG: due  DEXA scan: not done    UTD with vaccinations    Hepatitis C testing: due  Body mass index is 31.59 kg/m².    ADL's: independent  Memory: no issues  Mental health: no anxiety  Advance Directives:<no information>  Nutrition: normal  Home Safety: no complaints    Meds:   Current Outpatient Medications:     amLODIPine (NORVASC) 10 MG tablet, Take 1 tablet (10 mg total) by mouth once daily., Disp: 90 tablet, Rfl: 1    atorvastatin (LIPITOR) 20 MG tablet, Take 1 tablet (20 mg total) by mouth once daily., Disp: 90 tablet, Rfl: 1     "bisacodyl (DULCOLAX) 10 mg Supp, Place 1 suppository (10 mg total) rectally daily as needed., Disp: , Rfl: 0    bisacodyl (DULCOLAX) 5 mg EC tablet, Take 5 mg by mouth daily as needed for Constipation., Disp: , Rfl:     calcium carbonate (TUMS E-X) 300 mg (750 mg) Chew, Take 1 tablet by mouth 3 (three) times daily with meals., Disp: 90 tablet, Rfl: 2    furosemide (LASIX) 80 MG tablet, Take 1 tablet (80 mg total) by mouth every morning., Disp: 30 tablet, Rfl: 2    insulin glargine (LANTUS) 100 unit/mL injection, Inject 30 Units into the skin every evening. , Disp: , Rfl:     loratadine (CLARITIN) 10 mg tablet, Take 10 mg by mouth once daily., Disp: , Rfl:     senna-docusate 8.6-50 mg (PERICOLACE) 8.6-50 mg per tablet, Take 1 tablet by mouth 2 (two) times daily., Disp: , Rfl:     sevelamer carbonate (RENVELA) 800 mg Tab, Take 1 tablet (800 mg total) by mouth 3 (three) times a day with meals., Disp: 90 tablet, Rfl: 3    blood sugar diagnostic Strp, Test 2 (two) times daily.as directed, Disp: 100 each, Rfl: 5    blood-glucose meter Misc, Use to test blood sugar twice daily, Disp: 1 each, Rfl: 0    insulin aspart U-100 (NOVOLOG FLEXPEN U-100 INSULIN) 100 unit/mL InPn pen, Inject 8 Units into the skin 3 (three) times daily with meals., Disp: 15 mL, Rfl: 1    lancets (TRUEPLUS LANCETS) 30 gauge Misc, Use to test blood sugar twice daily, Disp: 100 each, Rfl: 11    pen needle, diabetic 32 gauge x 1/6" Ndle, use 3 (three) times daily as needed., Disp: 300 each, Rfl: 1    PMHx:   Past Medical History:   Diagnosis Date    Arthritis     Diabetes mellitus     Hyperlipidemia     Hypertension     Renal disorder     poor kidney function       PSHx:     Past Surgical History:   Procedure Laterality Date    LEG AMPUTATION Right     PERMCATH INSERTION-TUNNELED CVC N/A 4/25/2018    Performed by Armaan Sidhu MD at [facility] CATH LAB    TRANSPOSITION OF BASILIC VEIN Left 6/27/2018    Procedure: TRANSPOSITION, VEIN, " BASILIC;  Surgeon: AYESHA Mesa III, MD;  Location: [facility] OR [address] [place] [address];  Service: Cardiovascular;  Laterality: Left;  forearm     TRANSPOSITION, VEIN, BASILIC Left 6/27/2018    Performed by AYESHA Mesa III, MD at [facility] OR [address] [place] [address]       SocHx:   Social History     Socioeconomic History    Marital status: Single     Spouse name: None    Number of children: None    Years of education: None    Highest education level: None   Social Needs    Financial resource strain: None    Food insecurity - worry: None    Food insecurity - inability: None    Transportation needs - medical: None    Transportation needs - non-medical: None   Occupational History    None   Tobacco Use    Smoking status: Never Smoker   Substance and Sexual Activity    Alcohol use: No    Drug use: None    Sexual activity: None   Other Topics Concern    None   Social History Narrative    None       Review of Systems   Constitutional: Negative for chills, diaphoresis, fatigue and fever.   HENT: Negative for congestion, ear pain, postnasal drip, rhinorrhea, sinus pressure and sore throat.    Eyes: Negative for redness and visual disturbance.   Respiratory: Negative for cough, chest tightness and shortness of breath.    Cardiovascular: Negative for chest pain, palpitations and leg swelling.   Gastrointestinal: Negative for abdominal pain, blood in stool, constipation, diarrhea, nausea and vomiting.   Endocrine: Negative for polydipsia and polyuria.   Genitourinary: Negative for decreased urine volume, dysuria, frequency and hematuria.   Musculoskeletal: Negative for arthralgias, back pain and myalgias.   Skin: Negative for rash.   Neurological: Negative for dizziness, weakness, numbness and headaches.   Hematological: Negative for adenopathy.   Psychiatric/Behavioral: Negative for dysphoric mood. The patient is not nervous/anxious.              Objective:       Physical Exam   Constitutional: She is oriented to person, place, and time.  Vital signs are normal. She appears well-developed and well-nourished. No distress.   HENT:   Head: Normocephalic and atraumatic.   Right Ear: Hearing, tympanic membrane, external ear and ear canal normal. Tympanic membrane is not erythematous and not bulging.   Left Ear: Hearing, tympanic membrane, external ear and ear canal normal. Tympanic membrane is not erythematous and not bulging.   Nose: Nose normal.   Mouth/Throat: Uvula is midline, oropharynx is clear and moist and mucous membranes are normal. No oropharyngeal exudate or posterior oropharyngeal erythema.   Eyes: Conjunctivae, EOM and lids are normal. Pupils are equal, round, and reactive to light. No scleral icterus.   Neck: Normal range of motion. Neck supple. No thyroid mass and no thyromegaly present.   Cardiovascular: Normal rate, regular rhythm, normal heart sounds and intact distal pulses.   No murmur heard.  Pulmonary/Chest: Effort normal and breath sounds normal. She has no wheezes.   Abdominal: Soft. Bowel sounds are normal. She exhibits no distension. There is no hepatosplenomegaly. There is no tenderness. There is no rigidity, no rebound and no guarding.   Musculoskeletal: Normal range of motion. She exhibits no edema.   Lymphadenopathy:     She has no cervical adenopathy.        Right: No supraclavicular adenopathy present.        Left: No supraclavicular adenopathy present.   Neurological: She is alert and oriented to person, place, and time. She has normal reflexes. Coordination and gait normal.   Skin: Skin is warm, dry and intact. No rash noted. She is not diaphoretic.   Psychiatric: She has a normal mood and affect.   Vitals reviewed.        Assessment:       1. Diabetes mellitus due to underlying condition with chronic kidney disease on chronic dialysis, with long-term current use of insulin    2. Anemia, chronic renal failure, stage 5    3. Hypertension, unspecified type    4. Chronic diastolic heart failure    5. Heart murmur    6.  "Hyperlipidemia, unspecified hyperlipidemia type    7. Annual physical exam    8. Need for hepatitis C screening test    9. Encounter for screening mammogram for high-risk patient    10. History of leg amputation        Plan:       1. Diabetes mellitus due to underlying condition with chronic kidney disease on chronic dialysis, with long-term current use of insulin  - CBC auto differential; Future  - Hemoglobin A1c; Future  - Microalbumin/creatinine urine ratio; Future  - insulin aspart U-100 (NOVOLOG FLEXPEN U-100 INSULIN) 100 unit/mL InPn pen; Inject 8 Units into the skin 3 (three) times daily with meals.  Dispense: 15 mL; Refill: 1  - blood sugar diagnostic Strp; Test 2 (two) times daily.as directed  Dispense: 100 each; Refill: 5  - pen needle, diabetic 32 gauge x 1/6" Ndle; use 3 (three) times daily as needed.  Dispense: 300 each; Refill: 1    2. Anemia, chronic renal failure, stage 5  - CBC auto differential; Future  - Ferritin; Future  - Iron and TIBC; Future  - TSH; Future  - Vitamin D; Future    3. Hypertension, unspecified type  - Comprehensive metabolic panel; Future  - amLODIPine (NORVASC) 10 MG tablet; Take 1 tablet (10 mg total) by mouth once daily.  Dispense: 90 tablet; Refill: 1    4. Chronic diastolic heart failure  - check BNP  - 2D echo with color flow doppler; Future    5. Heart murmur  - 2D echo with color flow doppler; Future    6. Hyperlipidemia, unspecified hyperlipidemia type  - Lipid panel;   - stable, continue Lipitor    7. Annual physical exam  - CBC auto differential; Future  - Comprehensive metabolic panel; Future  - Ferritin; Future  - Iron and TIBC; Future  - Hemoglobin A1c; Future  - Lipid panel; Future  - Microalbumin/creatinine urine ratio; Future  - TSH; Future  - Urinalysis; Future  - Ambulatory consult to Optometry    9. Need for hepatitis C screening test  - Hepatitis C antibody; Future    9. Encounter for screening mammogram for high-risk patient  - Mammo Digital Screening Bilat " with CAD; Future    10. H/o leg amputation  - stable, uses prosthesis      RTC in 1 week for BP check/HTN    Katiuska Villalba MD        Over 50% of 40 minute visit was spent reviewing medical records, education and discussion of patient's medical conditions and medical management.

## 2018-09-07 ENCOUNTER — TELEPHONE (OUTPATIENT)
Dept: VASCULAR SURGERY | Facility: CLINIC | Age: 73
End: 2018-09-07

## 2018-09-07 VITALS
HEIGHT: 65 IN | RESPIRATION RATE: 16 BRPM | SYSTOLIC BLOOD PRESSURE: 154 MMHG | DIASTOLIC BLOOD PRESSURE: 96 MMHG | BODY MASS INDEX: 31.63 KG/M2 | TEMPERATURE: 99 F | HEART RATE: 84 BPM | WEIGHT: 189.81 LBS

## 2018-09-07 PROBLEM — Z74.09 IMPAIRED MOBILITY AND ADLS: Status: RESOLVED | Noted: 2018-04-25 | Resolved: 2018-09-07

## 2018-09-07 PROBLEM — E78.5 HYPERLIPIDEMIA: Status: ACTIVE | Noted: 2018-09-07

## 2018-09-07 PROBLEM — N18.6 ESRD (END STAGE RENAL DISEASE): Status: RESOLVED | Noted: 2017-10-16 | Resolved: 2018-09-07

## 2018-09-07 PROBLEM — G72.9 MYOPATHY: Status: RESOLVED | Noted: 2018-05-01 | Resolved: 2018-09-07

## 2018-09-07 PROBLEM — Z89.619 HISTORY OF LEG AMPUTATION: Status: ACTIVE | Noted: 2018-09-07

## 2018-09-07 PROBLEM — Z78.9 IMPAIRED MOBILITY AND ADLS: Status: RESOLVED | Noted: 2018-04-25 | Resolved: 2018-09-07

## 2018-09-07 NOTE — TELEPHONE ENCOUNTER
Contacted patient to reschedule appt with Dr. Mesa and with vascular lab. Appts rescheduled, pt verified. Appt letter placed in mail.   ----- Message from Letty Kemp sent at 9/7/2018 11:14 AM CDT -----  Pt called stating she need to speak with nurse regarding her fistula.    She says she's been calling for 2 weeks and no one returned her call.    Please call [phone]    Thanks

## 2018-09-08 DIAGNOSIS — Z99.2 DIABETES MELLITUS DUE TO UNDERLYING CONDITION WITH CHRONIC KIDNEY DISEASE ON CHRONIC DIALYSIS, WITH LONG-TERM CURRENT USE OF INSULIN: ICD-10-CM

## 2018-09-08 DIAGNOSIS — E08.22 DIABETES MELLITUS DUE TO UNDERLYING CONDITION WITH CHRONIC KIDNEY DISEASE ON CHRONIC DIALYSIS, WITH LONG-TERM CURRENT USE OF INSULIN: ICD-10-CM

## 2018-09-08 DIAGNOSIS — Z79.4 DIABETES MELLITUS DUE TO UNDERLYING CONDITION WITH CHRONIC KIDNEY DISEASE ON CHRONIC DIALYSIS, WITH LONG-TERM CURRENT USE OF INSULIN: ICD-10-CM

## 2018-09-08 DIAGNOSIS — N18.6 DIABETES MELLITUS DUE TO UNDERLYING CONDITION WITH CHRONIC KIDNEY DISEASE ON CHRONIC DIALYSIS, WITH LONG-TERM CURRENT USE OF INSULIN: ICD-10-CM

## 2018-09-08 PROBLEM — R19.7 DIARRHEA OF PRESUMED INFECTIOUS ORIGIN: Status: RESOLVED | Noted: 2018-04-24 | Resolved: 2018-09-08

## 2018-09-08 PROBLEM — R74.8 ELEVATED LIVER ENZYMES: Status: RESOLVED | Noted: 2018-04-17 | Resolved: 2018-09-08

## 2018-09-08 RX ORDER — INSULIN ASPART 100 [IU]/ML
10 INJECTION, SOLUTION INTRAVENOUS; SUBCUTANEOUS
Qty: 15 ML | Refills: 1
Start: 2018-09-08 | End: 2019-07-16 | Stop reason: SDUPTHER

## 2018-09-08 RX ORDER — ATORVASTATIN CALCIUM 40 MG/1
40 TABLET, FILM COATED ORAL DAILY
Qty: 90 TABLET | Refills: 1
Start: 2018-09-08 | End: 2019-05-06 | Stop reason: SDUPTHER

## 2018-09-08 RX ORDER — INSULIN GLARGINE 100 [IU]/ML
34 INJECTION, SOLUTION SUBCUTANEOUS NIGHTLY
Qty: 31 ML | Refills: 1
Start: 2018-09-08 | End: 2019-01-21

## 2018-09-12 ENCOUNTER — TELEPHONE (OUTPATIENT)
Dept: INTERNAL MEDICINE | Facility: CLINIC | Age: 73
End: 2018-09-12

## 2018-09-12 NOTE — TELEPHONE ENCOUNTER
----- Message from Katiuska Villalba MD sent at 9/8/2018 12:33 AM CDT -----  Thyroid function, liver function and blood counts are normal. Test for hepatitis C is negative.     Hemoglobin A1c is above goal of < 7.0. Advise increasing Lantus to 34 units nightly and Novolog to 10 units three times daily before meals. Call office with blood sugar readings in the next few days.     Total cholesterol and LDL cholesterol levels are high so advise increasing Lipitor to 40mg daily.    - orders were changed (no print), may send to pharmacy if needed

## 2018-09-12 NOTE — TELEPHONE ENCOUNTER
Called pt; not available; left voicemail with my name and call back number to go over labs and instructions.

## 2018-09-14 ENCOUNTER — HOSPITAL ENCOUNTER (OUTPATIENT)
Dept: VASCULAR SURGERY | Facility: CLINIC | Age: 73
Discharge: HOME | End: 2018-09-14
Attending: SURGERY
Payer: MEDICARE

## 2018-09-14 ENCOUNTER — OFFICE VISIT (OUTPATIENT)
Dept: VASCULAR SURGERY | Facility: CLINIC | Age: 73
End: 2018-09-14
Payer: MEDICARE

## 2018-09-14 VITALS
WEIGHT: 184 LBS | HEART RATE: 84 BPM | SYSTOLIC BLOOD PRESSURE: 164 MMHG | HEIGHT: 65 IN | TEMPERATURE: 99 F | DIASTOLIC BLOOD PRESSURE: 81 MMHG | BODY MASS INDEX: 30.66 KG/M2

## 2018-09-14 DIAGNOSIS — N18.6 ESRD (END STAGE RENAL DISEASE) ON DIALYSIS: ICD-10-CM

## 2018-09-14 DIAGNOSIS — T82.858A STENOSIS OF ARTERIOVENOUS DIALYSIS FISTULA, INITIAL ENCOUNTER: ICD-10-CM

## 2018-09-14 DIAGNOSIS — Z99.2 ESRD (END STAGE RENAL DISEASE) ON DIALYSIS: ICD-10-CM

## 2018-09-14 PROCEDURE — 99213 OFFICE O/P EST LOW 20 MIN: CPT | Mod: PBBFAC,TXP | Performed by: SURGERY

## 2018-09-14 PROCEDURE — 93990 DOPPLER FLOW TESTING: CPT | Mod: PBBFAC,NTX | Performed by: SURGERY

## 2018-09-14 PROCEDURE — 93990 DOPPLER FLOW TESTING: CPT | Mod: 26,S$PBB,NTX, | Performed by: SURGERY

## 2018-09-14 PROCEDURE — 99999 PR PBB SHADOW E&M-EST. PATIENT-LVL III: CPT | Mod: PBBFAC,TXP,, | Performed by: SURGERY

## 2018-09-14 PROCEDURE — 99024 POSTOP FOLLOW-UP VISIT: CPT | Mod: NTX,,, | Performed by: SURGERY

## 2018-09-14 RX ORDER — MUPIROCIN 20 MG/G
OINTMENT TOPICAL
Status: CANCELLED | OUTPATIENT
Start: 2018-09-14

## 2018-09-14 RX ORDER — SODIUM CHLORIDE 0.9 % (FLUSH) 0.9 %
5 SYRINGE (ML) INJECTION
Status: CANCELLED | OUTPATIENT
Start: 2018-09-14

## 2018-09-14 RX ORDER — LIDOCAINE HYDROCHLORIDE 10 MG/ML
1 INJECTION, SOLUTION EPIDURAL; INFILTRATION; INTRACAUDAL; PERINEURAL ONCE
Status: CANCELLED | OUTPATIENT
Start: 2018-09-14 | End: 2018-09-14

## 2018-09-14 NOTE — H&P (VIEW-ONLY)
REFERRING PHYSICIAN:  Tomas Ruelas MD    HISTORY OF PRESENT ILLNESS:  A 72-year-old female with end-stage renal disease,   dialyzing via right IJ PermCath () who is here for 2 week f/u after left forearm Basilic vein transposition.    She is right-handed.  She has no other history of central venous access other than the IJ PermCath.    Vascular surgery:  1. Left forearm Basilic vein transposition 18    This is a 2 month f/u (1 month late).  Using the permacath    PAST MEDICAL HISTORY:    1.  History of cardiac arrest during her initial fluid overload at initiation of   dialysis.   2.  Diabetes mellitus.   3.  End-stage renal disease, dialyzing Tuesday, Thursday, Saturday.  4.  Hypertension.  5.  Hyperlipidemia.  6.  History of right below-knee amputation in  secondary to a nonhealing   diabetic ulcer.    FAMILY HISTORY:  Nil.    SOCIAL HISTORY:  She is a nonsmoker.    MEDICATIONS:  Includes a statin.  See EPIC for full list.    ALLERGIES:  MORPHINE.    REVIEW OF SYSTEMS:  Denies postprandial pain or DVT.  All other systems   including eyes, ENT, respiratory, musculoskeletal, psychiatric, lymph, allergy   and immune are negative.    PHYSICAL EXAMINATION:  VITAL SIGNS:  See nursing note.  GENERAL:  She is in no acute distress.  RESPIRATORY:  Normal effort.  Clear to auscultation.  CARDIAC:  Regular rate and rhythm, nondisplaced PMI.  No murmur.  VASCULAR:  2+ radial and brachial pulses.  EXTREMITIES:  Left forearm dorsal incision healed well. AV fistula with soft thrill, worse pulsatile in the proximal forearm segment.   Radial artery not palpable  NEUROLOGIC:  Cranial nerves VII-XII are intact.    IMAGIN. Outflow stenosis at ACF (476), flow rate decreased to 395 (750)  2. Diam 3.7, 4.6    ASSESSMENT:   Poorly matured AVF, suspect prox stenosis and well as distal    PLAN:  1.   L transradial fistualgram and intervention 18  2. Cath lab    Entire forearm needs to be prepped    I have  explained the risks, benefits and alternatives for this procedure in detail.  The patients voices understanding and all questions have be answered, and agrees to proceed with the procedure.    GLADYS Mesa III, MD, FACS  Professor and Chief, Vascular and Endovascular Surgery

## 2018-09-19 ENCOUNTER — HOSPITAL ENCOUNTER (OUTPATIENT)
Facility: HOSPITAL | Age: 73
Discharge: HOME | End: 2018-09-19
Attending: SURGERY | Admitting: SURGERY
Payer: MEDICARE

## 2018-09-19 VITALS
OXYGEN SATURATION: 98 % | DIASTOLIC BLOOD PRESSURE: 72 MMHG | TEMPERATURE: 97 F | RESPIRATION RATE: 18 BRPM | HEIGHT: 65 IN | WEIGHT: 183 LBS | SYSTOLIC BLOOD PRESSURE: 151 MMHG | BODY MASS INDEX: 30.49 KG/M2 | HEART RATE: 73 BPM

## 2018-09-19 DIAGNOSIS — T82.858A STENOSIS OF ARTERIOVENOUS DIALYSIS FISTULA, INITIAL ENCOUNTER: Primary | ICD-10-CM

## 2018-09-19 DIAGNOSIS — T82.858A STENOSIS OF OTHER VASCULAR PROSTHETIC DEVICES, IMPLANTS AND GRAFTS, INITIAL ENCOUNTER: ICD-10-CM

## 2018-09-19 DIAGNOSIS — T82.590A DIALYSIS AV FISTULA MALFUNCTION: ICD-10-CM

## 2018-09-19 LAB
PERIPHERAL PTA: YES
POCT GLUCOSE: 116 MG/DL (ref 70–110)

## 2018-09-19 PROCEDURE — 36902 INTRO CATH DIALYSIS CIRCUIT: CPT | Mod: 78,,, | Performed by: SURGERY

## 2018-09-19 PROCEDURE — 25000003 PHARM REV CODE 250

## 2018-09-19 PROCEDURE — 63600175 PHARM REV CODE 636 W HCPCS

## 2018-09-19 PROCEDURE — 99152 MOD SED SAME PHYS/QHP 5/>YRS: CPT | Mod: ,,, | Performed by: SURGERY

## 2018-09-19 PROCEDURE — 27000014 CATH LAB PROCEDURE

## 2018-09-19 PROCEDURE — 82962 GLUCOSE BLOOD TEST: CPT

## 2018-09-19 RX ORDER — LIDOCAINE HYDROCHLORIDE 10 MG/ML
1 INJECTION, SOLUTION EPIDURAL; INFILTRATION; INTRACAUDAL; PERINEURAL ONCE
Status: DISCONTINUED | OUTPATIENT
Start: 2018-09-19 | End: 2018-09-19 | Stop reason: HOSPADM

## 2018-09-19 RX ORDER — SODIUM CHLORIDE 0.9 % (FLUSH) 0.9 %
5 SYRINGE (ML) INJECTION
Status: DISCONTINUED | OUTPATIENT
Start: 2018-09-19 | End: 2018-09-19 | Stop reason: HOSPADM

## 2018-09-19 RX ORDER — CEFAZOLIN SODIUM 1 G/3ML
2 INJECTION, POWDER, FOR SOLUTION INTRAMUSCULAR; INTRAVENOUS
Status: DISCONTINUED | OUTPATIENT
Start: 2018-09-19 | End: 2018-09-19 | Stop reason: HOSPADM

## 2018-09-19 RX ORDER — MUPIROCIN 20 MG/G
OINTMENT TOPICAL
Status: DISCONTINUED | OUTPATIENT
Start: 2018-09-19 | End: 2018-09-19 | Stop reason: HOSPADM

## 2018-09-19 NOTE — NURSING
TR band removed to left wrist per MD orders, no bleed or hematoma.  Gauze and tegaderm dressing placed.  Will continue to monitor.

## 2018-09-19 NOTE — NURSING
Discharge instructions and medlist given.  Patient and daughter verbalized understanding.  Off unit via wheelchair with escort.

## 2018-09-19 NOTE — PLAN OF CARE
Problem: Patient Care Overview  Goal: Plan of Care Review  Outcome: Ongoing (interventions implemented as appropriate)  Report received from MERON Bailey. Patient s/p fistulagram. TR band L radial cdi. No bleeding or hematoma noted. Post procedure protocol discussed with patient. Call light in reach. Will monitor.

## 2018-09-19 NOTE — BRIEF OP NOTE
[facility]  Brief Operative Note     SUMMARY     Surgery Date: 9/19/2018     Surgeon(s) and Role:     * AYESHA Mesa III, MD - Primary    Assisting Surgeon: IRMA Bueno MD    Pre-op Diagnosis:  Stenosis of arteriovenous dialysis fistula, initial encounter [T82.311E]    Post-op Diagnosis:  same    PROCEDURES:    1. PTA, proximal (5x20) and distal (6x20) AVF  2. US guidedTrans-radial access  3. Conscious Sedation  4. Fistulagram    Anesthesia: Monitor Anesthesia Care    Description of the findings of the procedure: ~80% prox and distal AVF stenosis, <10% residual after    Findings/Key Components: as above    Estimated Blood Loss: <3cc         Specimens:   Specimen (12h ago, onward)    None          Discharge Note    SUMMARY     Admit Date: 9/19/2018    Discharge Date and Time: 9/19/18    Hospital Course (synopsis of major diagnoses, care, treatment, and services provided during the course of the hospital stay): successful outpatient procedure    Final Diagnosis:  Stenosis, AVF    Disposition: home    Follow Up/Patient Instructions: Diet: renal  Act: ad lindsey  FU: 9/28/18 with QUANTITATIVE AVF duplex     Medications: pre-op

## 2018-09-19 NOTE — OP NOTE
DATE OF PROCEDURE:  09/19/2018    PREOPERATIVE DIAGNOSIS:  Stenosis, left AV fistula.    POSTOPERATIVE DIAGNOSIS:  Stenosis, left AV fistula.    PROCEDURES:  1.  Angioplasty, proximal (5 x 20 and distal 6 x 20, Wesson) angioplasty, left   AV fistula.  2.  Fistulogram.  3.  Ultrasound-guided left radial artery access.  4.  Conscious sedation.    SURGEON:  GLADYS Mesa III, M.D.    ASSISTANT:  Uri Bueno M.D. (RES)    ANESTHESIA:  Local with RN-directed sedation.    INDICATIONS:  A 72-year-old female with a left forearm basilic vein   transposition AV fistula with low flows and a suggestion of high-grade proximal   stenosis.    DESCRIPTION OF PROCEDURE:  The patient was brought in the Cath Lab and placed in   the supine position.  After sterile prep and drape and infiltration of 1%   lidocaine, ultrasound guidance was used to access the terminal left radial   artery with a micropuncture set.  Fistulogram was then performed, which   demonstrated a 75% to 80% proximal stenosis near the arterial anastomosis.    Remainder of the fistula was obtained without stenosis until almost at the elbow   where there was a focal greater than 80% stenosis.  The remainder of the   basilic vein up and into the axillary vein patent without stenosis.    4000 units of heparin were given.  The micropuncture catheter was replaced with   a transradial skinny 5-6 sheath.  The patient was cannulated with a Glidewire   and three fistulogram pictures shot.  A 5 x 20 Wesson balloon was used to dilate   the proximal lesion.  Of note, there was significant waste that fully effaced   at 15 atmospheres.  This was held for 2 minutes.  Completion fistulogram in this   area revealed complete resolution of the stenosis and brisk flow.    Attention was then directed towards the stenosis near the elbow.  This was   dilated with a 6 x 20 Wesson balloon.  There was significant wasting that then   fully effaced at 17 atmospheres, held for 2  minutes.  Upon completion,   fistulogram revealed less than 10% residual stenosis and brisk flow.    All catheters and guidewires were removed and hemostasis was achieved with a TR   band.  The patient was then taken to the Recovery Room in stable condition.    I continuously monitored the patient's cardiopulmonary function throughout this   case.  See nursing notes for dosing of Versed and fentanyl.  Total sedation time   was 47 minutes.      WS/IN  dd: 09/19/2018 12:39:28 (CDT)  td: 09/19/2018 17:55:55 (CDT)  Doc ID   #[id number]  Job ID #[id number]    CC:

## 2018-09-21 ENCOUNTER — OFFICE VISIT (OUTPATIENT)
Dept: OPTOMETRY | Facility: CLINIC | Age: 73
End: 2018-09-21
Payer: MEDICARE

## 2018-09-21 DIAGNOSIS — H25.13 NUCLEAR SCLEROSIS, BILATERAL: ICD-10-CM

## 2018-09-21 DIAGNOSIS — E11.9 TYPE 2 DIABETES MELLITUS WITHOUT RETINOPATHY: Primary | ICD-10-CM

## 2018-09-21 DIAGNOSIS — H52.4 PRESBYOPIA: ICD-10-CM

## 2018-09-21 DIAGNOSIS — Z13.5 GLAUCOMA SCREENING: ICD-10-CM

## 2018-09-21 PROCEDURE — 99212 OFFICE O/P EST SF 10 MIN: CPT | Mod: PBBFAC,PO | Performed by: OPTOMETRIST

## 2018-09-21 PROCEDURE — 92015 DETERMINE REFRACTIVE STATE: CPT | Mod: ,,, | Performed by: OPTOMETRIST

## 2018-09-21 PROCEDURE — 92004 COMPRE OPH EXAM NEW PT 1/>: CPT | Mod: S$PBB,,, | Performed by: OPTOMETRIST

## 2018-09-21 PROCEDURE — 99999 PR PBB SHADOW E&M-EST. PATIENT-LVL II: CPT | Mod: PBBFAC,,, | Performed by: OPTOMETRIST

## 2018-09-21 NOTE — PROGRESS NOTES
HPI     NUNO: 2 years ago  Pt states va is blurry all distances. States did have Rx glasses in the   past--has been years. Denies f/f    No gtts    DM  CAT OU    No Fhx    LBS= 156 this morning  Hemoglobin A1C       Date                     Value               Ref Range             Status                09/05/2018               7.4 (H)             4.0 - 5.6 %           Final                 04/16/2018               8.0 (H)             4.0 - 5.6 %           Final                 10/14/2017               8.3 (H)             4.0 - 5.6 %           Final            ----------      Last edited by Renny Jones, OD on 9/21/2018  3:28 PM. (History)        ROS     Positive for: Endocrine (DM)    Negative for: Constitutional, Gastrointestinal, Neurological, Skin,   Genitourinary, Musculoskeletal, HENT, Cardiovascular, Eyes, Respiratory,   Psychiatric, Allergic/Imm, Heme/Lymph    Last edited by Renny Jones, OD on 9/21/2018  3:34 PM. (History)        Assessment /Plan     For exam results, see Encounter Report.    Type 2 diabetes mellitus without retinopathy    Nuclear sclerosis, bilateral    Glaucoma screening    Presbyopia      1. Reduced VA 2 to cat OU--pt wishes surgery  2. DM- WITHOUT RETINOPATHY.  Advised yearly DFE    PLAN:    Surgical consult--Dr Marshall

## 2018-09-21 NOTE — LETTER
September 21, 2018      Katiuska Villalba MD  [address]  [place] [address]           [place] [facility]  [address]  [place] [address]  Phone: [phone]  Fax: [phone]          Patient: Martha Melvin   MR Number: [id number]   YOB: 1945   Date of Visit: 9/21/2018       Dear Dr. Katiuska Villalba:    Thank you for referring Martha Melvin to me for evaluation. Attached you will find relevant portions of my assessment and plan of care.    If you have questions, please do not hesitate to call me. I look forward to following Martha Melvin along with you.    Sincerely,    Renny Jones, OD    Enclosure  CC:  No Recipients    If you would like to receive this communication electronically, please contact [email or web address] or [phone] to request more information on [employer] access.    For providers and/or their staff who would like to refer a patient to [facility], please contact us through our one-stop-shop provider referral line, [facility] , at [phone].    If you feel you have received this communication in error or would no longer like to receive these types of communications, please e-mail [email or web address]

## 2018-10-02 ENCOUNTER — TELEPHONE (OUTPATIENT)
Dept: TRANSPLANT | Facility: CLINIC | Age: 73
End: 2018-10-02

## 2018-10-25 ENCOUNTER — TELEPHONE (OUTPATIENT)
Dept: VASCULAR SURGERY | Facility: CLINIC | Age: 73
End: 2018-10-25

## 2018-10-25 DIAGNOSIS — Z99.2 ESRD (END STAGE RENAL DISEASE) ON DIALYSIS: Primary | ICD-10-CM

## 2018-10-25 DIAGNOSIS — N18.6 ESRD (END STAGE RENAL DISEASE) ON DIALYSIS: Primary | ICD-10-CM

## 2018-10-25 NOTE — TELEPHONE ENCOUNTER
Patient returning nurse's call. States she needs FU appt with Dr. Mesa. Appts scheduled, pt verified. Appt letter placed in mail.

## 2018-10-25 NOTE — TELEPHONE ENCOUNTER
Attempted to contact patient to schedule FU appt with Dr. Mesa. Voice message left for patient. Will reattempt. ----- Message from Mala Nava sent at 10/25/2018  9:20 AM CDT -----  Contact: Pt  Pt states she had a procedure done on 09/19 and no post op appt was scheduled, she would like to scheduled an post op appt and an ultrasound. No U/S orders are in the system    Pt contact number [phone]  Thanks

## 2018-11-09 ENCOUNTER — OFFICE VISIT (OUTPATIENT)
Dept: VASCULAR SURGERY | Facility: CLINIC | Age: 73
End: 2018-11-09
Payer: MEDICARE

## 2018-11-09 ENCOUNTER — HOSPITAL ENCOUNTER (OUTPATIENT)
Dept: VASCULAR SURGERY | Facility: CLINIC | Age: 73
Discharge: HOME | End: 2018-11-09
Attending: STUDENT IN AN ORGANIZED HEALTH CARE EDUCATION/TRAINING PROGRAM
Payer: MEDICARE

## 2018-11-09 VITALS
WEIGHT: 194 LBS | HEIGHT: 65 IN | HEART RATE: 91 BPM | DIASTOLIC BLOOD PRESSURE: 76 MMHG | TEMPERATURE: 99 F | BODY MASS INDEX: 32.32 KG/M2 | SYSTOLIC BLOOD PRESSURE: 140 MMHG

## 2018-11-09 DIAGNOSIS — N18.6 ESRD (END STAGE RENAL DISEASE) ON DIALYSIS: Primary | ICD-10-CM

## 2018-11-09 DIAGNOSIS — T82.858A STENOSIS OF ARTERIOVENOUS DIALYSIS FISTULA, INITIAL ENCOUNTER: ICD-10-CM

## 2018-11-09 DIAGNOSIS — Z99.2 ESRD (END STAGE RENAL DISEASE) ON DIALYSIS: Primary | ICD-10-CM

## 2018-11-09 PROCEDURE — 99214 OFFICE O/P EST MOD 30 MIN: CPT | Mod: S$GLB,,, | Performed by: SURGERY

## 2018-11-09 PROCEDURE — 93990 DOPPLER FLOW TESTING: CPT | Mod: S$GLB,,, | Performed by: SURGERY

## 2018-11-09 PROCEDURE — 99999 PR PBB SHADOW E&M-EST. PATIENT-LVL III: CPT | Mod: PBBFAC,,, | Performed by: SURGERY

## 2018-11-09 PROCEDURE — 1101F PT FALLS ASSESS-DOCD LE1/YR: CPT | Mod: CPTII,S$GLB,, | Performed by: SURGERY

## 2018-11-09 NOTE — PROGRESS NOTES
REFERRING PHYSICIAN:  Tomas Ruelas MD    HISTORY OF PRESENT ILLNESS:  A 72-year-old female with end-stage renal disease,   dialyzing via right IJ PermCath (Tu/Th/Sa) who is here for 2 week f/u after left forearm Basilic vein transposition.    She is right-handed.  She has no other history of central venous access other than the IJ PermCath.    Vascular surgery:    1. Trans-radial PTA, L AVF 9/19/18  1. Left forearm Basilic vein transposition 06/27/18    She states that she did not get a f/u appt after the fistulagram. Still Using the permacath    PAST MEDICAL HISTORY:    1.  History of cardiac arrest during her initial fluid overload at initiation of   dialysis.   2.  Diabetes mellitus.   3.  End-stage renal disease, dialyzing Tuesday, Thursday, Saturday.  4.  Hypertension.  5.  Hyperlipidemia.  6.  History of right below-knee amputation in 2005 secondary to a nonhealing   diabetic ulcer.    FAMILY HISTORY:  Nil.    SOCIAL HISTORY:  She is a nonsmoker.    MEDICATIONS:  Includes a statin.  See EPIC for full list.    ALLERGIES:  MORPHINE.    REVIEW OF SYSTEMS:  Denies postprandial pain or DVT.  All other systems   including eyes, ENT, respiratory, musculoskeletal, psychiatric, lymph, allergy   and immune are negative.    PHYSICAL EXAMINATION:  VITAL SIGNS:  See nursing note.  GENERAL:  She is in no acute distress.  RESPIRATORY:  Normal effort.  Clear to auscultation.  CARDIAC:  Regular rate and rhythm, nondisplaced PMI.  No murmur.  VASCULAR:  2+ radial and brachial pulses.  EXTREMITIES:  L forearm basilic transposition AV fistula with soft thrill,      NEUROLOGIC:  Cranial nerves VII-XII are intact.    IMAGING: US    1. No stenosis, flow 530 ml/min  2. Angela 4.9 prox, 6.0 mid    fistualgram reviewed    ASSESSMENT:   Marginally matured AVF    PLAN:  1.   May cannulate with small needles  2. FU 4 wks for permacath removal    GLADYS Mesa III, MD, FACS  Professor and Chief, Vascular and Endovascular  Surgery

## 2018-11-19 RX ORDER — FUROSEMIDE 80 MG/1
80 TABLET ORAL EVERY MORNING
Qty: 30 TABLET | Refills: 2 | Status: CANCELLED | OUTPATIENT
Start: 2018-11-19

## 2018-11-20 RX ORDER — SEVELAMER CARBONATE 800 MG/1
TABLET, FILM COATED ORAL
Qty: 30 TABLET | Refills: 0 | Status: SHIPPED | OUTPATIENT
Start: 2018-11-20 | End: 2019-01-21

## 2018-11-20 RX ORDER — FUROSEMIDE 80 MG/1
80 TABLET ORAL EVERY MORNING
Qty: 30 TABLET | Refills: 0 | Status: SHIPPED | OUTPATIENT
Start: 2018-11-20 | End: 2019-01-21 | Stop reason: SDUPTHER

## 2018-11-30 ENCOUNTER — OFFICE VISIT (OUTPATIENT)
Dept: VASCULAR SURGERY | Facility: CLINIC | Age: 73
End: 2018-11-30
Payer: MEDICARE

## 2018-11-30 VITALS
RESPIRATION RATE: 20 BRPM | HEIGHT: 65 IN | BODY MASS INDEX: 32.58 KG/M2 | HEART RATE: 92 BPM | TEMPERATURE: 99 F | WEIGHT: 195.56 LBS | DIASTOLIC BLOOD PRESSURE: 76 MMHG | SYSTOLIC BLOOD PRESSURE: 163 MMHG

## 2018-11-30 DIAGNOSIS — Z99.2 ESRD (END STAGE RENAL DISEASE) ON DIALYSIS: ICD-10-CM

## 2018-11-30 DIAGNOSIS — N18.6 ESRD (END STAGE RENAL DISEASE) ON DIALYSIS: ICD-10-CM

## 2018-11-30 DIAGNOSIS — N18.6 ESRD (END STAGE RENAL DISEASE): Primary | ICD-10-CM

## 2018-11-30 DIAGNOSIS — Z99.2 ESRD (END STAGE RENAL DISEASE) ON DIALYSIS: Primary | ICD-10-CM

## 2018-11-30 DIAGNOSIS — N18.6 ESRD (END STAGE RENAL DISEASE) ON DIALYSIS: Primary | ICD-10-CM

## 2018-11-30 PROCEDURE — 36589 REMOVAL TUNNELED CV CATH: CPT | Mod: S$GLB,,, | Performed by: SURGERY

## 2018-11-30 PROCEDURE — 99999 PR PBB SHADOW E&M-EST. PATIENT-LVL III: CPT | Mod: PBBFAC,,, | Performed by: SURGERY

## 2018-11-30 PROCEDURE — 1101F PT FALLS ASSESS-DOCD LE1/YR: CPT | Mod: CPTII,S$GLB,, | Performed by: SURGERY

## 2018-11-30 NOTE — PROGRESS NOTES
REFERRING PHYSICIAN:  Tomas Ruelas MD    HISTORY OF PRESENT ILLNESS:  A 72-year-old female with end-stage renal disease,   dialyzing via right IJ PermCath () who is here for 2 week f/u after left forearm Basilic vein transposition.    She is right-handed.  She has no other history of central venous access other than the IJ PermCath.    Vascular surgery:    1. Trans-radial PTA, L AVF 18  1. Left forearm Basilic vein transposition 18    Now returns, has used the AVF exclusively for last 2 weeks    PAST MEDICAL HISTORY:    1.  History of cardiac arrest during her initial fluid overload at initiation of   dialysis.   2.  Diabetes mellitus.   3.  End-stage renal disease, dialyzing Tuesday, Thursday, Saturday.  4.  Hypertension.  5.  Hyperlipidemia.  6.  History of right below-knee amputation in  secondary to a nonhealing   diabetic ulcer.    FAMILY HISTORY:  Nil.    SOCIAL HISTORY:  She is a nonsmoker.    MEDICATIONS:  Includes a statin.  See EPIC for full list.    ALLERGIES:  MORPHINE.    REVIEW OF SYSTEMS:  Denies postprandial pain or DVT.  All other systems   including eyes, ENT, respiratory, musculoskeletal, psychiatric, lymph, allergy   and immune are negative.    PHYSICAL EXAMINATION:  VITAL SIGNS:  See nursing note.  GENERAL:  She is in no acute distress.  RESPIRATORY:  Normal effort.  Clear to auscultation.  CARDIAC:  Regular rate and rhythm, nondisplaced PMI.  No murmur.  VASCULAR:  2+ radial and brachial pulses.  EXTREMITIES:  L forearm basilic transposition AV fistula with soft thrill,    stable  NEUROLOGIC:  Cranial nerves VII-XII are intact.    IMAGIN18    1. No stenosis, flow 530 ml/min  2. Angela 4.9 prox, 6.0 mid    fistualgram reviewed    ASSESSMENT:   AVF (L FOREARM basilic transposition) being using x 2 wks    PLAN:  1.   D/C permacath  2. FU 10 wks with AVF duplex if clinically indicated    PROCEDURE NOTE:   After sterile prep and drape and infiltration with lidocaine, the  permacath cuff was freed up using blunt and sharp dissection, the permacath removed, and manual compression used for hemostasis.   No complications    GLADYS Mesa III, MD, FACS  Professor and Chief, Vascular and Endovascular Surgery

## 2018-12-17 ENCOUNTER — TELEPHONE (OUTPATIENT)
Dept: VASCULAR SURGERY | Facility: CLINIC | Age: 73
End: 2018-12-17

## 2018-12-17 NOTE — TELEPHONE ENCOUNTER
Attempted to contact patient in response to message. Voice message left for patient. Will reattempt. ----- Message from Elida Dorado sent at 12/17/2018  9:16 AM CST -----  Contact: Patient   Needs Advice    Reason for call: Patient needs to be seen regarding an evaluation of her port per her dialysis doctor        Communication Preference: [phone]    Additional Information: please contact the patient as I was unable to accommodate her needs

## 2018-12-27 ENCOUNTER — HOSPITAL ENCOUNTER (INPATIENT)
Facility: HOSPITAL | Age: 73
LOS: 8 days | Discharge: HOME | DRG: 629 | End: 2019-01-04
Attending: EMERGENCY MEDICINE | Admitting: HOSPITALIST
Payer: MEDICARE

## 2018-12-27 DIAGNOSIS — E11.621 DIABETIC ULCER OF TOE OF LEFT FOOT ASSOCIATED WITH TYPE 2 DIABETES MELLITUS, WITH NECROSIS OF BONE: Primary | ICD-10-CM

## 2018-12-27 DIAGNOSIS — I96 NECROTIC TOES: ICD-10-CM

## 2018-12-27 DIAGNOSIS — L97.524 DIABETIC ULCER OF TOE OF LEFT FOOT ASSOCIATED WITH TYPE 2 DIABETES MELLITUS, WITH NECROSIS OF BONE: Primary | ICD-10-CM

## 2018-12-27 DIAGNOSIS — M86.9 OSTEOMYELITIS OF TOE: ICD-10-CM

## 2018-12-27 DIAGNOSIS — N18.6 ESRD (END STAGE RENAL DISEASE): ICD-10-CM

## 2018-12-27 DIAGNOSIS — E11.628 DIABETIC FOOT INFECTION: ICD-10-CM

## 2018-12-27 DIAGNOSIS — N18.6 ESRD (END STAGE RENAL DISEASE) ON DIALYSIS: ICD-10-CM

## 2018-12-27 DIAGNOSIS — L08.9 DIABETIC FOOT INFECTION: ICD-10-CM

## 2018-12-27 DIAGNOSIS — Z99.2 ESRD (END STAGE RENAL DISEASE) ON DIALYSIS: ICD-10-CM

## 2018-12-27 DIAGNOSIS — L97.526 DIABETIC ULCER OF TOE OF LEFT FOOT ASSOCIATED WITH TYPE 2 DIABETES MELLITUS, WITH BONE INVOLVEMENT WITHOUT EVIDENCE OF NECROSIS: ICD-10-CM

## 2018-12-27 DIAGNOSIS — I70.229 CRITICAL LOWER LIMB ISCHEMIA: ICD-10-CM

## 2018-12-27 DIAGNOSIS — M79.672 LEFT FOOT PAIN: ICD-10-CM

## 2018-12-27 DIAGNOSIS — E11.621 DIABETIC ULCER OF TOE OF LEFT FOOT ASSOCIATED WITH TYPE 2 DIABETES MELLITUS, WITH BONE INVOLVEMENT WITHOUT EVIDENCE OF NECROSIS: ICD-10-CM

## 2018-12-27 LAB
ALBUMIN SERPL BCP-MCNC: 3.5 G/DL
ALP SERPL-CCNC: 72 U/L
ALT SERPL W/O P-5'-P-CCNC: 14 U/L
ANION GAP SERPL CALC-SCNC: 15 MMOL/L
AST SERPL-CCNC: 25 U/L
BASOPHILS # BLD AUTO: 0.01 K/UL
BASOPHILS NFR BLD: 0.1 %
BILIRUB SERPL-MCNC: 0.5 MG/DL
BUN SERPL-MCNC: 38 MG/DL
CALCIUM SERPL-MCNC: 9.1 MG/DL
CHLORIDE SERPL-SCNC: 103 MMOL/L
CO2 SERPL-SCNC: 22 MMOL/L
CREAT SERPL-MCNC: 5.5 MG/DL
CRP SERPL-MCNC: 62.2 MG/L
DIFFERENTIAL METHOD: ABNORMAL
EOSINOPHIL # BLD AUTO: 0.2 K/UL
EOSINOPHIL NFR BLD: 2.4 %
ERYTHROCYTE [DISTWIDTH] IN BLOOD BY AUTOMATED COUNT: 16.1 %
ERYTHROCYTE [SEDIMENTATION RATE] IN BLOOD BY WESTERGREN METHOD: 66 MM/HR
EST. GFR  (AFRICAN AMERICAN): 8 ML/MIN/1.73 M^2
EST. GFR  (NON AFRICAN AMERICAN): 7 ML/MIN/1.73 M^2
GLUCOSE SERPL-MCNC: 145 MG/DL
HCT VFR BLD AUTO: 30.9 %
HGB BLD-MCNC: 9.8 G/DL
LYMPHOCYTES # BLD AUTO: 2.2 K/UL
LYMPHOCYTES NFR BLD: 24.3 %
MCH RBC QN AUTO: 25.3 PG
MCHC RBC AUTO-ENTMCNC: 31.7 G/DL
MCV RBC AUTO: 80 FL
MONOCYTES # BLD AUTO: 0.9 K/UL
MONOCYTES NFR BLD: 10.6 %
NEUTROPHILS # BLD AUTO: 5.5 K/UL
NEUTROPHILS NFR BLD: 62.5 %
PLATELET # BLD AUTO: 212 K/UL
PMV BLD AUTO: 9.8 FL
POCT GLUCOSE: 220 MG/DL (ref 70–110)
POTASSIUM SERPL-SCNC: 4.2 MMOL/L
PROT SERPL-MCNC: 8.6 G/DL
RBC # BLD AUTO: 3.87 M/UL
SODIUM SERPL-SCNC: 140 MMOL/L
WBC # BLD AUTO: 8.86 K/UL

## 2018-12-27 PROCEDURE — 87070 CULTURE OTHR SPECIMN AEROBIC: CPT

## 2018-12-27 PROCEDURE — 87075 CULTR BACTERIA EXCEPT BLOOD: CPT

## 2018-12-27 PROCEDURE — 87076 CULTURE ANAEROBE IDENT EACH: CPT

## 2018-12-27 PROCEDURE — 80053 COMPREHEN METABOLIC PANEL: CPT

## 2018-12-27 PROCEDURE — 86140 C-REACTIVE PROTEIN: CPT

## 2018-12-27 PROCEDURE — 63600175 PHARM REV CODE 636 W HCPCS: Performed by: NURSE PRACTITIONER

## 2018-12-27 PROCEDURE — 94761 N-INVAS EAR/PLS OXIMETRY MLT: CPT

## 2018-12-27 PROCEDURE — 85652 RBC SED RATE AUTOMATED: CPT

## 2018-12-27 PROCEDURE — 99285 EMERGENCY DEPT VISIT HI MDM: CPT | Mod: 25

## 2018-12-27 PROCEDURE — 85025 COMPLETE CBC W/AUTO DIFF WBC: CPT

## 2018-12-27 PROCEDURE — 11000001 HC ACUTE MED/SURG PRIVATE ROOM

## 2018-12-27 RX ORDER — ENOXAPARIN SODIUM 100 MG/ML
40 INJECTION SUBCUTANEOUS EVERY 24 HOURS
Status: DISCONTINUED | OUTPATIENT
Start: 2018-12-27 | End: 2018-12-27

## 2018-12-27 RX ORDER — IBUPROFEN 200 MG
16 TABLET ORAL
Status: DISCONTINUED | OUTPATIENT
Start: 2018-12-27 | End: 2019-01-04 | Stop reason: HOSPADM

## 2018-12-27 RX ORDER — ONDANSETRON 2 MG/ML
4 INJECTION INTRAMUSCULAR; INTRAVENOUS EVERY 8 HOURS PRN
Status: DISCONTINUED | OUTPATIENT
Start: 2018-12-27 | End: 2019-01-04 | Stop reason: HOSPADM

## 2018-12-27 RX ORDER — ACETAMINOPHEN 325 MG/1
650 TABLET ORAL EVERY 4 HOURS PRN
Status: DISCONTINUED | OUTPATIENT
Start: 2018-12-27 | End: 2019-01-04 | Stop reason: HOSPADM

## 2018-12-27 RX ORDER — INSULIN ASPART 100 [IU]/ML
0-5 INJECTION, SOLUTION INTRAVENOUS; SUBCUTANEOUS
Status: DISCONTINUED | OUTPATIENT
Start: 2018-12-27 | End: 2019-01-04 | Stop reason: HOSPADM

## 2018-12-27 RX ORDER — GLUCAGON 1 MG
1 KIT INJECTION
Status: DISCONTINUED | OUTPATIENT
Start: 2018-12-27 | End: 2019-01-04 | Stop reason: HOSPADM

## 2018-12-27 RX ORDER — ENOXAPARIN SODIUM 100 MG/ML
30 INJECTION SUBCUTANEOUS EVERY 24 HOURS
Status: DISCONTINUED | OUTPATIENT
Start: 2018-12-27 | End: 2019-01-03

## 2018-12-27 RX ORDER — SODIUM CHLORIDE 0.9 % (FLUSH) 0.9 %
5 SYRINGE (ML) INJECTION
Status: DISCONTINUED | OUTPATIENT
Start: 2018-12-27 | End: 2019-01-04 | Stop reason: HOSPADM

## 2018-12-27 RX ORDER — IBUPROFEN 200 MG
24 TABLET ORAL
Status: DISCONTINUED | OUTPATIENT
Start: 2018-12-27 | End: 2019-01-04 | Stop reason: HOSPADM

## 2018-12-27 RX ADMIN — ENOXAPARIN SODIUM 30 MG: 100 INJECTION SUBCUTANEOUS at 11:12

## 2018-12-27 RX ADMIN — INSULIN ASPART 1 UNITS: 100 INJECTION, SOLUTION INTRAVENOUS; SUBCUTANEOUS at 11:12

## 2018-12-27 NOTE — Clinical Note
Angiography performed of the distal left anterior tibial artery. Angiography performed via hand injection with 1 mL of contrast.

## 2018-12-27 NOTE — Clinical Note
Angiography of the suprarenal and abdominal aorta performed with the catheter   power injection 40 mL contrast at 20 mL/s.

## 2018-12-27 NOTE — Clinical Note
Av fistulagram performed on the fistula used for dialysis access in the left forearm. 2cc injection of contrast to visualize the proximal fistula, 2cc injection to visualize the mid fistula, 2cc to visualize the distal venous end, and 2cc to visualize the arterial anastomsis.

## 2018-12-27 NOTE — Clinical Note
Angiography performed of the distal left anterior tibial artery. Angiography performed via hand injection with 4 mL of contrast.

## 2018-12-27 NOTE — Clinical Note
Angiography performed of the middle left anterior tibial artery. Angiography performed via hand injection with 1 mL of contrast.

## 2018-12-27 NOTE — Clinical Note
Angiography of the bilateral lower extremity performed with the catheter   and hand injected with 35 mL of contrast.

## 2018-12-27 NOTE — Clinical Note
33 ml injected throughout the case. 117 mL total wasted during the case. 150 mL total used in the case.

## 2018-12-27 NOTE — Clinical Note
Angiography performed of the ostial left popliteal artery. Angiography performed via hand injection with 5 mL of contrast.

## 2018-12-27 NOTE — Clinical Note
Angiography performed of the distal left superficial femoral artery. Angiography performed via hand injection with 5 mL of contrast.

## 2018-12-27 NOTE — Clinical Note
Angiography performed of the distal left anterior tibial artery. Angiography performed via hand injection with 3 mL of contrast.

## 2018-12-28 PROBLEM — E11.621 DIABETIC ULCER OF TOE OF LEFT FOOT: Status: ACTIVE | Noted: 2018-12-28

## 2018-12-28 PROBLEM — E11.628 DIABETIC FOOT INFECTION: Status: ACTIVE | Noted: 2018-12-28

## 2018-12-28 PROBLEM — D63.8 ANEMIA OF CHRONIC DISEASE: Status: ACTIVE | Noted: 2017-10-16

## 2018-12-28 PROBLEM — L97.526 DIABETIC ULCER OF TOE OF LEFT FOOT ASSOCIATED WITH TYPE 2 DIABETES MELLITUS, WITH BONE INVOLVEMENT WITHOUT EVIDENCE OF NECROSIS: Status: ACTIVE | Noted: 2018-12-27

## 2018-12-28 PROBLEM — L08.9 DIABETIC FOOT INFECTION: Status: ACTIVE | Noted: 2018-12-28

## 2018-12-28 PROBLEM — L97.529 DIABETIC ULCER OF TOE OF LEFT FOOT: Status: ACTIVE | Noted: 2018-12-28

## 2018-12-28 LAB
ESTIMATED AVG GLUCOSE: 166 MG/DL
HBA1C MFR BLD HPLC: 7.4 %
POCT GLUCOSE: 170 MG/DL (ref 70–110)
POCT GLUCOSE: 177 MG/DL (ref 70–110)
POCT GLUCOSE: 198 MG/DL (ref 70–110)
POCT GLUCOSE: 236 MG/DL (ref 70–110)

## 2018-12-28 PROCEDURE — 36415 COLL VENOUS BLD VENIPUNCTURE: CPT

## 2018-12-28 PROCEDURE — 25000003 PHARM REV CODE 250: Performed by: HOSPITALIST

## 2018-12-28 PROCEDURE — 63600175 PHARM REV CODE 636 W HCPCS: Performed by: NURSE PRACTITIONER

## 2018-12-28 PROCEDURE — 25000003 PHARM REV CODE 250: Performed by: NURSE PRACTITIONER

## 2018-12-28 PROCEDURE — 11000001 HC ACUTE MED/SURG PRIVATE ROOM

## 2018-12-28 PROCEDURE — 63600175 PHARM REV CODE 636 W HCPCS: Performed by: HOSPITALIST

## 2018-12-28 PROCEDURE — 83036 HEMOGLOBIN GLYCOSYLATED A1C: CPT

## 2018-12-28 PROCEDURE — S5571 INSULIN DISPOS PEN 3 ML: HCPCS | Performed by: NURSE PRACTITIONER

## 2018-12-28 PROCEDURE — 94761 N-INVAS EAR/PLS OXIMETRY MLT: CPT

## 2018-12-28 PROCEDURE — 99222 1ST HOSP IP/OBS MODERATE 55: CPT | Mod: ,,, | Performed by: PODIATRIST

## 2018-12-28 PROCEDURE — 99222 PR INITIAL HOSPITAL CARE,LEVL II: ICD-10-PCS | Mod: ,,, | Performed by: PODIATRIST

## 2018-12-28 PROCEDURE — 87070 CULTURE OTHR SPECIMN AEROBIC: CPT

## 2018-12-28 RX ORDER — SODIUM CHLORIDE 9 MG/ML
INJECTION, SOLUTION INTRAVENOUS ONCE
Status: COMPLETED | OUTPATIENT
Start: 2018-12-28 | End: 2018-12-31

## 2018-12-28 RX ORDER — SEVELAMER CARBONATE 800 MG/1
800 TABLET, FILM COATED ORAL
Status: DISCONTINUED | OUTPATIENT
Start: 2018-12-28 | End: 2019-01-04 | Stop reason: HOSPADM

## 2018-12-28 RX ORDER — AMLODIPINE BESYLATE 5 MG/1
10 TABLET ORAL DAILY
Status: DISCONTINUED | OUTPATIENT
Start: 2018-12-28 | End: 2019-01-04 | Stop reason: HOSPADM

## 2018-12-28 RX ORDER — BISACODYL 5 MG
5 TABLET, DELAYED RELEASE (ENTERIC COATED) ORAL DAILY PRN
Status: DISCONTINUED | OUTPATIENT
Start: 2018-12-28 | End: 2019-01-04 | Stop reason: HOSPADM

## 2018-12-28 RX ORDER — ATORVASTATIN CALCIUM 40 MG/1
40 TABLET, FILM COATED ORAL DAILY
Status: DISCONTINUED | OUTPATIENT
Start: 2018-12-28 | End: 2019-01-04 | Stop reason: HOSPADM

## 2018-12-28 RX ORDER — FUROSEMIDE 40 MG/1
80 TABLET ORAL EVERY MORNING
Status: DISCONTINUED | OUTPATIENT
Start: 2018-12-28 | End: 2019-01-04 | Stop reason: HOSPADM

## 2018-12-28 RX ORDER — SODIUM CHLORIDE 9 MG/ML
INJECTION, SOLUTION INTRAVENOUS
Status: DISCONTINUED | OUTPATIENT
Start: 2018-12-28 | End: 2019-01-04 | Stop reason: HOSPADM

## 2018-12-28 RX ORDER — CLINDAMYCIN PHOSPHATE 150 MG/ML
900 INJECTION, SOLUTION INTRAVENOUS ONCE
Status: DISCONTINUED | OUTPATIENT
Start: 2018-12-28 | End: 2018-12-28

## 2018-12-28 RX ADMIN — SEVELAMER CARBONATE 800 MG: 800 TABLET, FILM COATED ORAL at 04:12

## 2018-12-28 RX ADMIN — ENOXAPARIN SODIUM 30 MG: 100 INJECTION SUBCUTANEOUS at 11:12

## 2018-12-28 RX ADMIN — INSULIN ASPART 1 UNITS: 100 INJECTION, SOLUTION INTRAVENOUS; SUBCUTANEOUS at 08:12

## 2018-12-28 RX ADMIN — INSULIN DETEMIR 20 UNITS: 100 INJECTION, SOLUTION SUBCUTANEOUS at 02:12

## 2018-12-28 RX ADMIN — SEVELAMER CARBONATE 800 MG: 800 TABLET, FILM COATED ORAL at 11:12

## 2018-12-28 RX ADMIN — PIPERACILLIN AND TAZOBACTAM 4.5 G: 4; .5 INJECTION, POWDER, LYOPHILIZED, FOR SOLUTION INTRAVENOUS; PARENTERAL at 11:12

## 2018-12-28 RX ADMIN — FUROSEMIDE 80 MG: 40 TABLET ORAL at 06:12

## 2018-12-28 RX ADMIN — INSULIN DETEMIR 20 UNITS: 100 INJECTION, SOLUTION SUBCUTANEOUS at 08:12

## 2018-12-28 RX ADMIN — VANCOMYCIN HYDROCHLORIDE 1250 MG: 10 INJECTION, POWDER, LYOPHILIZED, FOR SOLUTION INTRAVENOUS at 11:12

## 2018-12-28 NOTE — ED PROVIDER NOTES
Encounter Date: 12/27/2018    SCRIBE #1 NOTE: I, Jania Rm, am scribing for, and in the presence of,  Dr. Lefort. I have scribed the entire note.       History     Chief Complaint   Patient presents with    Foot Pain     pt with black toes tolt foot and swelling.     Martha Melvin is a 73 y.o. female who  has a past medical history of Arthritis, Diabetes mellitus, Hyperlipidemia, Hypertension, and Renal disorder.    The patient presents to the ED due to foot discoloration. The patient states that her left little toe has a black colored corn. Now with increased drainage.  Mild pain reported, not worse to touch.  No trauma. Past BKA of the right LE. Patient denies having any fever.          The history is provided by the patient.     Review of patient's allergies indicates:   Allergen Reactions    Morphine      Past Medical History:   Diagnosis Date    Arthritis     Diabetes mellitus     Hyperlipidemia     Hypertension     Renal disorder     poor kidney function     Past Surgical History:   Procedure Laterality Date    LEG AMPUTATION Right     PERMCATH INSERTION-TUNNELED CVC N/A 4/25/2018    Performed by Armaan Sidhu MD at [facility] CATH LAB    TRANSPOSITION, VEIN, BASILIC Left 6/27/2018    Performed by AYESHA Mesa III, MD at [facility] OR [facility] F[facility]     Family History   Problem Relation Age of Onset    Cancer Mother     Diabetes Mother     Hypertension Mother     Cancer Father     Cancer Sister      Social History     Tobacco Use    Smoking status: Never Smoker   Substance Use Topics    Alcohol use: No    Drug use: Not on file     Review of Systems   Constitutional: Negative for chills and fever.   HENT: Negative for congestion, rhinorrhea and sore throat.    Eyes: Negative for redness and visual disturbance.   Respiratory: Negative for cough, shortness of breath and wheezing.    Cardiovascular: Negative for chest pain and palpitations.   Gastrointestinal: Negative for abdominal pain,  diarrhea, nausea and vomiting.   Genitourinary: Negative for dysuria and hematuria.   Musculoskeletal: Negative for back pain, myalgias and neck pain.   Skin: Positive for color change and wound. Negative for rash.   Neurological: Negative for dizziness, weakness and light-headedness.   Psychiatric/Behavioral: Negative for confusion.       Physical Exam     Initial Vitals [12/27/18 1808]   BP Pulse Resp Temp SpO2   131/61 78 18 98.4 °F (36.9 °C) 99 %      MAP       --         Physical Exam    Nursing note and vitals reviewed.  Constitutional: She appears well-developed and well-nourished. She is not diaphoretic. No distress.   HENT:   Head: Normocephalic and atraumatic.   Mouth/Throat: Oropharynx is clear and moist.   Eyes: Conjunctivae and EOM are normal. Pupils are equal, round, and reactive to light.   Neck: Normal range of motion. Neck supple.   Cardiovascular: Normal rate, regular rhythm and normal heart sounds. Exam reveals no gallop and no friction rub.    No murmur heard.  Pulmonary/Chest: Breath sounds normal. She has no wheezes. She has no rhonchi. She has no rales.   Abdominal: Soft. Bowel sounds are normal. There is no tenderness. There is no rebound and no guarding.   Musculoskeletal: Normal range of motion. She exhibits edema. She exhibits no tenderness.   Left foot 5th digit has skin defect of 1cm on lateral aspect.   Bony crepitus, edema, no erythema.  Lesion draining scant purulent discharge.  Pitting edema to the dorsum of the left foot up to the mid shin.  Worse around 5th metatarsal.  Black discoloration of the second and third distal phalanx on the left foot.  Foul smelling.  No tenderness.   Right BKA stump looks well healed.    Palpable thrill in left forearm.   Lymphadenopathy:     She has no cervical adenopathy.   Neurological: She is alert and oriented to person, place, and time. She has normal strength.   Skin: Skin is warm and dry. Capillary refill takes less than 2 seconds. No rash  noted.         ED Course   Procedures  Labs Reviewed   CBC W/ AUTO DIFFERENTIAL - Abnormal; Notable for the following components:       Result Value    RBC 3.87 (*)     Hemoglobin 9.8 (*)     Hematocrit 30.9 (*)     MCV 80 (*)     MCH 25.3 (*)     MCHC 31.7 (*)     RDW 16.1 (*)     All other components within normal limits   COMPREHENSIVE METABOLIC PANEL - Abnormal; Notable for the following components:    CO2 22 (*)     Glucose 145 (*)     BUN, Bld 38 (*)     Creatinine 5.5 (*)     Total Protein 8.6 (*)     eGFR if  8 (*)     eGFR if non  7 (*)     All other components within normal limits   SEDIMENTATION RATE - Abnormal; Notable for the following components:    Sed Rate 66 (*)     All other components within normal limits   C-REACTIVE PROTEIN - Abnormal; Notable for the following components:    CRP 62.2 (*)     All other components within normal limits   CULTURE, AEROBIC  (SPECIFY SOURCE)   CULTURE, ANAEROBIC          Imaging Results          X-Ray Foot Complete Left (Final result)  Result time 12/27/18 21:25:33    Final result by Pawel Wick MD (12/27/18 21:25:33)                 Impression:      Deformity of the left 5th proximal phalanx.  The findings may represent remote trauma or evolving osteitis.  Short-term follow-up is suggested.    No evidence of an acute fracture or dislocation of the left foot.      Electronically signed by: Pawel Wick MD  Date:    12/27/2018  Time:    21:25             Narrative:    EXAMINATION:  XR FOOT COMPLETE 3 VIEW LEFT    CLINICAL HISTORY:  Pain in left foot    TECHNIQUE:  AP, lateral and oblique views of the left foot were performed.    COMPARISON:  None    FINDINGS:  There is diffuse demineralization of the osseous structures.  There is an deformity involving the head of the 5th proximal phalanx.  There is expected joint space narrowing throughout the left foot.  The Lisfranc articulation appears intact.  There is diffuse soft tissue swelling  throughout the left foot.  No radiopaque foreign body is present.  There is no evidence of acute fracture or dislocation of the left foot.                                 Medical Decision Making:   Initial Assessment:   Stable, nontoxic appearing  Differential Diagnosis:   Osteo, thrombus, PAD, fx, cellulitis  Clinical Tests:   Lab Tests: Ordered and Reviewed  Radiological Study: Ordered and Reviewed  ED Management:  No trauma reported, thus workup suggestive of infectious bony erosion responsible for fx in pt with history of BKA with complications of DM.  Does not appear to be complicated by septic process.  Will admit for iv abx and further management.    10:20 PM  Case discussed with Hospital Medicine.                      Clinical Impression:     1. Diabetic ulcer of toe of left foot associated with type 2 diabetes mellitus, with necrosis of bone    2. Left foot pain    3. ESRD (end stage renal disease)            Disposition:   Disposition: Admitted  Condition: Stable     I, Dr. Guy Lefort, personally performed the services described in this documentation. All medical record entries made by the scribe were at my direction and in my presence. I have reviewed the chart and agree that the record reflects my personal performance and is accurate and complete. Guy Lefort, MD.  10:43 PM 12/27/2018                   Guy J. Lefort, MD  12/27/18 2245       Guy J. Lefort, MD  12/27/18 2494

## 2018-12-28 NOTE — ASSESSMENT & PLAN NOTE
Chronic diastolic heart failure  Aortic stenosis  History of cardiac arrest  Hyperlipidemia    BP stable, no evidence of volume overload   -continue amlodipine, atorvastatin and lasix

## 2018-12-28 NOTE — PROGRESS NOTES
"[facility] Hospital Medicine  Progress Note    Patient Name: Martha Melvin  MRN: [id number]  Patient Class: IP- Inpatient   Admission Date: 12/27/2018  Length of Stay: 1 days  Attending Physician: John Butler DO  Primary Care Provider: Katiuska Villalba MD        Subjective:     Principal Problem:Diabetic foot infection    HPI:  Martha Melvin is a 72 yo  female with HTN, HLD, Type 2 diabetes mellitus, hx of cardiac arrest, chronic diastolic HF, moderate aortic stenosis, ESRD (HD TTS), s/p right BKA (2005) secondary to nonhealing diabetic ulcer. She lives in [place], [place]. Her primary care physician is Dr. Katiuska Villalba. The patient cannot recall her nephrologist name.  She reports she has not been established with podiatry.     Pt presented to [facility] 11/27/18 with complaint of purulent drainage from left foot ulcer.  Patient states symptoms initially started as a callus on the 5th digit of the left foot. She reports noticing gradually increase in edema with mild erythema. Pt noted drainage from site today. She also reports "black toes" (3rd and 4th digit) for several months. Patient reports she was seen by her PCP 3-4 months ago. She did not have discoloration to toes at the time. Pt has never been evaluated by podiatry. She denies fever/chills, n/v, chest pain and SOB. Initial labs remarkable for H/H 9.8/30.9, BUN/CR 38/5.5, K within normal limits. Left foot xray show deformity of the left 5th proximal phalanx which may represent remote trauma or evolving osteitis. Pt afebrile, no leukocytosis, VSS. Pt admitted to [facility] hospital medicine for further evaluation.     Hospital Course:  Pt seen at bedside, she has black toes on the left foot.  Will await podiatry and will order US of LE for arterial to check blood flow    Past Medical History:   Diagnosis Date    Arthritis     Diabetes mellitus     Hyperlipidemia     Hypertension     Renal disorder     poor kidney " "function       Past Surgical History:   Procedure Laterality Date    LEG AMPUTATION Right     PERMCATH INSERTION-TUNNELED CVC N/A 4/25/2018    Performed by [facility]an Sidhu MD at [facility] CATH LAB    TRANSPOSITION, VEIN, BASILIC Left 6/27/2018    Performed by AYESHA Mesa III, MD at [facility] OR [address] [place] [address]       Review of patient's allergies indicates:   Allergen Reactions    Morphine        No current facility-administered medications on file prior to encounter.      Current Outpatient Medications on File Prior to Encounter   Medication Sig    amLODIPine (NORVASC) 10 MG tablet Take 1 tablet (10 mg total) by mouth once daily.    atorvastatin (LIPITOR) 40 MG tablet Take 1 tablet (40 mg total) by mouth once daily.    bisacodyl (DULCOLAX) 5 mg EC tablet Take 5 mg by mouth daily as needed for Constipation.    blood sugar diagnostic Strp Test 2 (two) times daily.as directed    blood-glucose meter Misc Use to test blood sugar twice daily    calcium carbonate (TUMS E-X) 300 mg (750 mg) Chew Take 1 tablet by mouth 3 (three) times daily with meals.    furosemide (LASIX) 80 MG tablet Take 1 tablet (80 mg total) by mouth every morning.    insulin aspart U-100 (NOVOLOG FLEXPEN U-100 INSULIN) 100 unit/mL InPn pen Inject 10 Units into the skin 3 (three) times daily with meals. (Patient taking differently: Inject 4 Units into the skin 3 (three) times daily with meals. )    insulin glargine (LANTUS) 100 unit/mL injection Inject 34 Units into the skin every evening. (Patient taking differently: Inject 20 Units into the skin every evening. )    lancets (TRUEPLUS LANCETS) 30 gauge Misc Use to test blood sugar twice daily    loratadine (CLARITIN) 10 mg tablet Take 10 mg by mouth once daily.    pen needle, diabetic (NOVOFINE PLUS) 32 gauge x 1/6" Ndle use 3 (three) times daily as needed.    senna-docusate 8.6-50 mg (PERICOLACE) 8.6-50 mg per tablet Take 1 tablet by mouth 2 (two) times daily. (Patient taking differently: Take " 1 tablet by mouth 2 (two) times daily as needed. )    sevelamer carbonate (RENVELA) 800 mg Tab Take 1 tablet (800 mg total) by mouth 3 (three) times a day with meals.     Family History     Problem Relation (Age of Onset)    Cancer Mother, Father, Sister    Diabetes Mother    Hypertension Mother        Tobacco Use    Smoking status: Never Smoker    Smokeless tobacco: Never Used   Substance and Sexual Activity    Alcohol use: No    Drug use: Not on file    Sexual activity: Not on file     Review of Systems   Constitutional: Negative for chills, diaphoresis and fever.   Eyes: Negative for photophobia.   Respiratory: Negative for cough, chest tightness, shortness of breath and wheezing.    Cardiovascular: Negative for chest pain, palpitations and leg swelling.   Gastrointestinal: Negative for abdominal pain, diarrhea, nausea and vomiting.   Genitourinary: Negative for dysuria, flank pain, frequency and hematuria.   Musculoskeletal: Negative for back pain and myalgias.   Skin: Positive for color change (left foot toes ) and wound (left foot, 5th toe ).   Neurological: Negative for dizziness, syncope, light-headedness and headaches.   Psychiatric/Behavioral: Negative for confusion.     Objective:     Vital Signs (Most Recent):  Temp: 97.8 °F (36.6 °C) (12/28/18 1546)  Pulse: 77 (12/28/18 1546)  Resp: 18 (12/28/18 1546)  BP: (!) 142/73 (12/28/18 1546)  SpO2: (patient off unit) (12/28/18 1524) Vital Signs (24h Range):  Temp:  [97 °F (36.1 °C)-98.4 °F (36.9 °C)] 97.8 °F (36.6 °C)  Pulse:  [73-80] 77  Resp:  [16-18] 18  SpO2:  [92 %-99 %] 96 %  BP: (130-163)/(61-99) 142/73     Weight: 80 kg (176 lb 5.9 oz)  Body mass index is 29.35 kg/m².    Physical Exam   Constitutional: She is oriented to person, place, and time. She appears well-developed and well-nourished.   HENT:   Head: Normocephalic and atraumatic.   Eyes: Conjunctivae are normal. Pupils are equal, round, and reactive to light.   Neck: Normal range of motion.  No JVD present.   Cardiovascular: Normal rate, regular rhythm, normal heart sounds and intact distal pulses.   Pulmonary/Chest: Effort normal. No respiratory distress. She has no wheezes.   Abdominal: Soft. Bowel sounds are normal. She exhibits no distension. There is no tenderness. There is no guarding.   Musculoskeletal: Normal range of motion. She exhibits edema (L foot ). She exhibits no tenderness.   R BKA    Neurological: She is alert and oriented to person, place, and time.   Skin: Skin is warm and dry. Capillary refill takes less than 2 seconds.   +necrosis to Left foot 3rd/4th toes, ulcer to left foot 5th digit, no drainage noted    Psychiatric: She has a normal mood and affect. Her behavior is normal.         CRANIAL NERVES     CN III, IV, VI   Pupils are equal, round, and reactive to light.       Significant Labs:   BMP:   Recent Labs   Lab 12/27/18 2040   *      K 4.2      CO2 22*   BUN 38*   CREATININE 5.5*   CALCIUM 9.1     CBC:   Recent Labs   Lab 12/27/18 2040   WBC 8.86   HGB 9.8*   HCT 30.9*          Significant Imaging: I have reviewed all pertinent imaging results/findings within the past 24 hours.    Assessment/Plan:      * Diabetic foot infection    Diabetic ulcer of toe of left foot associated with type 2 diabetes mellitus, with necrosis of bone  History of leg amputation  Diabetes Mellitus with long term insulin use     A1C 7.4 (9/5/2018)     -wound cultures pending   -L foot xray reviewed   -MRI L foot r/o osteo   -consult podiatry   -taking insulin Aspart-10 units TID and Lantus 20 units QHS, continue   -SSI, Accucheck AC&HS, diabetic diet          ESRD (end stage renal disease) on dialysis    Anemia of chronic disease    HD TTS, H/H at baseline   -consult nephrology (pt does not know name of her nephrologist)      HTN (hypertension)    Chronic diastolic heart failure  Aortic stenosis  History of cardiac arrest  Hyperlipidemia    BP stable, no evidence of volume  overload   -continue amlodipine, atorvastatin and lasix        VTE Risk Mitigation (From admission, onward)        Ordered     enoxaparin injection 30 mg  Daily      12/27/18 2321     IP VTE HIGH RISK PATIENT  Once      12/27/18 2310              John Butler DO  Department of Hospital Medicine   [facility]

## 2018-12-28 NOTE — NURSING
VN morning rounds: Pt resting comfortably in bed. NAD noted. Allowed time for questions. Pt denies any pain at this present time. Fall risk protocol discussed with pt. Pt aware and agreeable. Will cont to be available and intervene as needed.

## 2018-12-28 NOTE — PLAN OF CARE
Problem: Adult Inpatient Plan of Care  Goal: Plan of Care Review  Outcome: Ongoing (interventions implemented as appropriate)  Pt on RA with documented sats.Will continue to monitor.

## 2018-12-28 NOTE — CONSULTS
Nephrology Consult  H&P  Date of service 12/28/18      Consult Requested By: John Butler DO  Reason for Consult: ESRD    SUBJECTIVE:     History of Present Illness:  Patient is a 73 y.o. female presents with diabetic foot infection    Review of Systems   Constitutional: Negative for chills and fever.   Eyes: Negative for blurred vision and double vision.   Respiratory: Negative for cough and shortness of breath.    Cardiovascular: Negative for chest pain and leg swelling.   Gastrointestinal: Negative for blood in stool, diarrhea, nausea and vomiting.   Genitourinary: Negative for dysuria, frequency and urgency.   Musculoskeletal: Negative for myalgias and neck pain.        Foot pain   Skin: Negative for itching and rash.   Neurological: Negative for dizziness.   Endo/Heme/Allergies: Does not bruise/bleed easily.   Psychiatric/Behavioral: Negative for depression.       Past Medical History:   Diagnosis Date    Arthritis     Diabetes mellitus     Hyperlipidemia     Hypertension     Renal disorder     poor kidney function     Past Surgical History:   Procedure Laterality Date    LEG AMPUTATION Right     PERMCATH INSERTION-TUNNELED CVC N/A 4/25/2018    Performed by Armaan Sidhu MD at [facility] CATH LAB    TRANSPOSITION, VEIN, BASILIC Left 6/27/2018    Performed by AYESAH Mesa III, MD at [facility] OR 2ND FLR     Family History   Problem Relation Age of Onset    Cancer Mother     Diabetes Mother     Hypertension Mother     Cancer Father     Cancer Sister      Social History     Tobacco Use    Smoking status: Never Smoker    Smokeless tobacco: Never Used   Substance Use Topics    Alcohol use: No    Drug use: Not on file       Review of patient's allergies indicates:   Allergen Reactions    Morphine             OBJECTIVE:     Vital Signs (Most Recent)  Vitals:    12/28/18 0755 12/28/18 0850 12/28/18 1139 12/28/18 1202   BP:  130/65 (!) 137/99    BP Location:  Right arm Right arm    Patient Position:   Lying Lying    Pulse:  74 80    Resp:  16 16    Temp:  97 °F (36.1 °C) 97.7 °F (36.5 °C)    TempSrc:  Oral Oral    SpO2: (!) 94%   96%   Weight:       Height:             Date 12/28/18 0700 - 12/29/18 0659   Shift 8255-7689 1056-5262 3112-0448 24 Hour Total   INTAKE   IV Piggyback 250   250   Shift Total(mL/kg) 250(3.1)   250(3.1)   OUTPUT   Shift Total(mL/kg)       Weight (kg) 80 80 80 80           Medications:   sodium chloride 0.9%   Intravenous Once    amLODIPine  10 mg Oral Daily    atorvastatin  40 mg Oral Daily    enoxaparin  30 mg Subcutaneous Daily    [START ON 12/29/2018] epoetin bashir (PROCRIT) injection  10,000 Units Intravenous Every Tues, Thurs, Sat    furosemide  80 mg Oral QAM    insulin detemir U-100  20 Units Subcutaneous QHS    piperacillin-tazobactam (ZOSYN) IVPB  4.5 g Intravenous Q12H    sevelamer carbonate  800 mg Oral TID WM           Physical Exam   Constitutional: She is oriented to person, place, and time and well-developed, well-nourished, and in no distress. No distress.   HENT:   Head: Normocephalic and atraumatic.   Mouth/Throat: Oropharynx is clear and moist.   Eyes: EOM are normal. No scleral icterus.   Neck: Neck supple. No JVD present.   Cardiovascular: Normal rate and regular rhythm. Exam reveals no friction rub.   No murmur heard.  Pulmonary/Chest: Effort normal and breath sounds normal. No respiratory distress. She has no wheezes. She has no rales.   Abdominal: Soft. Bowel sounds are normal. She exhibits no distension. There is no tenderness.   Musculoskeletal: She exhibits no edema.   Neurological: She is alert and oriented to person, place, and time.   Skin: Skin is warm and dry. No rash noted. She is not diaphoretic. No erythema.   Psychiatric: Affect normal.       Laboratory:  Recent Labs   Lab 12/27/18 2040   WBC 8.86   HGB 9.8*   HCT 30.9*      MONO 10.6  0.9     Recent Labs   Lab 12/27/18 2040      K 4.2      CO2 22*   BUN 38*   CREATININE  5.5*   CALCIUM 9.1       Diagnostic Results:  X-Ray: Reviewed  US: Reviewed  Echo: Reviewed  ASSESSMENT/PLAN:     1. ESRD (N18.6 Z99.2) - usual HD on TTS   Electrolytes and volume acceptable   Plan for HD tomorrow  2. HTN (I10) - at goal  3. Anemia of chronic kidney disease treated with CHACHO (N18.9 D63.1) -   EPogen 10K with each HD  Recent Labs   Lab 12/27/18 2040   HGB 9.8*   HCT 30.9*          Recheck iron  Lab Results   Component Value Date    IRON 72 09/05/2018    TIBC 280 09/05/2018    FERRITIN 336 (H) 09/05/2018       4. 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    Lab Results   Component Value Date    .1 (H) 10/14/2017    CALCIUM 9.1 12/27/2018    PHOS 7.3 (H) 04/30/2018     Lab Results   Component Value Date    WSEEAHAK99US 13 (L) 09/05/2018       Lab Results   Component Value Date    CO2 22 (L) 12/27/2018       5. Hemodialysis Access (Z99.2 V45.11)- LLA AVF  6. Nutrition/Hypoalbuminemia (E88.09) -  Recent Labs   Lab 12/27/18 2040   ALBUMIN 3.5     Renal vitamins daily          Thank you for consult, will follow  With any question please call [phone]  Kiara Burden    [facility]  ANNA Etienne MD, FACP,   PRASHANT Hernandez MD,   WILMAN Burden MD  ETy Yoder, NP  [address]  [place], [address] [phone]

## 2018-12-28 NOTE — PROGRESS NOTES
Wound care originally consulted for left foot toe necrosis and 5th toe wound. Notified Dr. Butler that these wounds would best be addressed by podiatry due to left 5th toe diabetic wound infection. Wound has unstable callus with small opening with purulent drainage. Wound swabbed with povidone iodine, per Dr. Butler orders  Pictures taken.

## 2018-12-28 NOTE — ED NOTES
Pt presents to ED with c/o L foot pain. She states that she had a corn on her pinky toe and it fell off and now its burning when rubbing against her shoes. Pt foot is swollen and she has multiple black toes.

## 2018-12-28 NOTE — SUBJECTIVE & OBJECTIVE
"Past Medical History:   Diagnosis Date    Arthritis     Diabetes mellitus     Hyperlipidemia     Hypertension     Renal disorder     poor kidney function       Past Surgical History:   Procedure Laterality Date    LEG AMPUTATION Right     PERMCATH INSERTION-TUNNELED CVC N/A 4/25/2018    Performed by Armaan Sidhu MD at [facility] CATH LAB    TRANSPOSITION, VEIN, BASILIC Left 6/27/2018    Performed by AYESHA Mesa III, MD at [facility] OR [address] [place] [address]       Review of patient's allergies indicates:   Allergen Reactions    Morphine        No current facility-administered medications on file prior to encounter.      Current Outpatient Medications on File Prior to Encounter   Medication Sig    amLODIPine (NORVASC) 10 MG tablet Take 1 tablet (10 mg total) by mouth once daily.    atorvastatin (LIPITOR) 40 MG tablet Take 1 tablet (40 mg total) by mouth once daily.    bisacodyl (DULCOLAX) 5 mg EC tablet Take 5 mg by mouth daily as needed for Constipation.    blood sugar diagnostic Strp Test 2 (two) times daily.as directed    blood-glucose meter Misc Use to test blood sugar twice daily    calcium carbonate (TUMS E-X) 300 mg (750 mg) Chew Take 1 tablet by mouth 3 (three) times daily with meals.    furosemide (LASIX) 80 MG tablet Take 1 tablet (80 mg total) by mouth every morning.    insulin aspart U-100 (NOVOLOG FLEXPEN U-100 INSULIN) 100 unit/mL InPn pen Inject 10 Units into the skin 3 (three) times daily with meals. (Patient taking differently: Inject 4 Units into the skin 3 (three) times daily with meals. )    insulin glargine (LANTUS) 100 unit/mL injection Inject 34 Units into the skin every evening. (Patient taking differently: Inject 20 Units into the skin every evening. )    lancets (TRUEPLUS LANCETS) 30 gauge Misc Use to test blood sugar twice daily    loratadine (CLARITIN) 10 mg tablet Take 10 mg by mouth once daily.    pen needle, diabetic (NOVOFINE PLUS) 32 gauge x 1/6" Ndle use 3 (three) times daily as " needed.    senna-docusate 8.6-50 mg (PERICOLACE) 8.6-50 mg per tablet Take 1 tablet by mouth 2 (two) times daily. (Patient taking differently: Take 1 tablet by mouth 2 (two) times daily as needed. )    sevelamer carbonate (RENVELA) 800 mg Tab Take 1 tablet (800 mg total) by mouth 3 (three) times a day with meals.     Family History     Problem Relation (Age of Onset)    Cancer Mother, Father, Sister    Diabetes Mother    Hypertension Mother        Tobacco Use    Smoking status: Never Smoker    Smokeless tobacco: Never Used   Substance and Sexual Activity    Alcohol use: No    Drug use: Not on file    Sexual activity: Not on file     Review of Systems   Constitutional: Negative for chills, diaphoresis and fever.   Eyes: Negative for photophobia.   Respiratory: Negative for cough, chest tightness, shortness of breath and wheezing.    Cardiovascular: Negative for chest pain, palpitations and leg swelling.   Gastrointestinal: Negative for abdominal pain, diarrhea, nausea and vomiting.   Genitourinary: Negative for dysuria, flank pain, frequency and hematuria.   Musculoskeletal: Negative for back pain and myalgias.   Skin: Positive for color change (left foot toes ) and wound (left foot, 5th toe ).   Neurological: Negative for dizziness, syncope, light-headedness and headaches.   Psychiatric/Behavioral: Negative for confusion.     Objective:     Vital Signs (Most Recent):  Temp: 98.3 °F (36.8 °C) (12/28/18 0025)  Pulse: 75 (12/28/18 0025)  Resp: 17 (12/28/18 0025)  BP: (!) 141/65 (12/28/18 0025)  SpO2: 98 % (12/28/18 0304) Vital Signs (24h Range):  Temp:  [98.1 °F (36.7 °C)-98.4 °F (36.9 °C)] 98.3 °F (36.8 °C)  Pulse:  [73-78] 75  Resp:  [17-18] 17  SpO2:  [92 %-99 %] 98 %  BP: (131-163)/(61-77) 141/65     Weight: 80 kg (176 lb 5.9 oz)  Body mass index is 29.35 kg/m².    Physical Exam   Constitutional: She is oriented to person, place, and time. She appears well-developed and well-nourished.   HENT:   Head:  Normocephalic and atraumatic.   Eyes: Conjunctivae are normal. Pupils are equal, round, and reactive to light.   Neck: Normal range of motion. No JVD present.   Cardiovascular: Normal rate, regular rhythm, normal heart sounds and intact distal pulses.   Pulmonary/Chest: Effort normal. No respiratory distress. She has no wheezes.   Abdominal: Soft. Bowel sounds are normal. She exhibits no distension. There is no tenderness. There is no guarding.   Musculoskeletal: Normal range of motion. She exhibits edema (L foot ). She exhibits no tenderness.   R BKA    Neurological: She is alert and oriented to person, place, and time.   Skin: Skin is warm and dry. Capillary refill takes less than 2 seconds.   +necrosis to Left foot 3rd/4th toes, ulcer to left foot 5th digit, no drainage noted    Psychiatric: She has a normal mood and affect. Her behavior is normal.         CRANIAL NERVES     CN III, IV, VI   Pupils are equal, round, and reactive to light.       Significant Labs:   BMP:   Recent Labs   Lab 12/27/18 2040   *      K 4.2      CO2 22*   BUN 38*   CREATININE 5.5*   CALCIUM 9.1     CBC:   Recent Labs   Lab 12/27/18 2040   WBC 8.86   HGB 9.8*   HCT 30.9*          Significant Imaging: I have reviewed all pertinent imaging results/findings within the past 24 hours.

## 2018-12-28 NOTE — H&P
"[facility]  History & Physical    Patient Name: Martha Melvin  MRN: [id number]  Admission Date: 12/27/2018  Attending Physician: Ravindra Powell MD   Primary Care Provider: Katiuska Villalba MD         Patient information was obtained from patient and ER records.     Subjective:     Principal Problem:Diabetic foot infection    Chief Complaint:   Chief Complaint   Patient presents with    Foot Pain     pt with black toes tolt foot and swelling.        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 She lives in [place], [place]. Her primary care physician is Dr. Katiuska Villalba. The patient cannot recall her nephrologist name.  She reports she has not been established with podiatry.     Pt presented to [facility] 11/27/18 with complaint of purulent drainage from left foot ulcer.  Patient states symptoms initially started as a callus on the 5th digit of the left foot. She reports noticing gradually increase in edema with mild erythema. Pt noted drainage from site today. She also reports "black toes" (3rd and 4th digit) for several months. Patient reports she was seen by her PCP 3-4 months ago. She did not have discoloration to toes at the time. Pt has never been evaluated by podiatry. She denies fever/chills, n/v, chest pain and SOB. Initial labs remarkable for H/H 9.8/30.9, BUN/CR 38/5.5, K within normal limits. Left foot xray show deformity of the left 5th proximal phalanx which may represent remote trauma or evolving osteitis. Pt afebrile, no leukocytosis, VSS. Pt admitted to [facility] for further evaluation.     Past Medical History:   Diagnosis Date    Arthritis     Diabetes mellitus     Hyperlipidemia     Hypertension     Renal disorder     poor kidney function " "      Past Surgical History:   Procedure Laterality Date    LEG AMPUTATION Right     PERMCATH INSERTION-TUNNELED CVC N/A 4/25/2018    Performed by Armaan Sidhu MD at [facility] CATH LAB    TRANSPOSITION, VEIN, BASILIC Left 6/27/2018    Performed by AYESHA Mesa III, MD at [facility] OR [address] [place] [address]       Review of patient's allergies indicates:   Allergen Reactions    Morphine        No current facility-administered medications on file prior to encounter.      Current Outpatient Medications on File Prior to Encounter   Medication Sig    amLODIPine (NORVASC) 10 MG tablet Take 1 tablet (10 mg total) by mouth once daily.    atorvastatin (LIPITOR) 40 MG tablet Take 1 tablet (40 mg total) by mouth once daily.    bisacodyl (DULCOLAX) 5 mg EC tablet Take 5 mg by mouth daily as needed for Constipation.    blood sugar diagnostic Strp Test 2 (two) times daily.as directed    blood-glucose meter Misc Use to test blood sugar twice daily    calcium carbonate (TUMS E-X) 300 mg (750 mg) Chew Take 1 tablet by mouth 3 (three) times daily with meals.    furosemide (LASIX) 80 MG tablet Take 1 tablet (80 mg total) by mouth every morning.    insulin aspart U-100 (NOVOLOG FLEXPEN U-100 INSULIN) 100 unit/mL InPn pen Inject 10 Units into the skin 3 (three) times daily with meals. (Patient taking differently: Inject 4 Units into the skin 3 (three) times daily with meals. )    insulin glargine (LANTUS) 100 unit/mL injection Inject 34 Units into the skin every evening. (Patient taking differently: Inject 20 Units into the skin every evening. )    lancets (TRUEPLUS LANCETS) 30 gauge Misc Use to test blood sugar twice daily    loratadine (CLARITIN) 10 mg tablet Take 10 mg by mouth once daily.    pen needle, diabetic (NOVOFINE PLUS) 32 gauge x 1/6" Ndle use 3 (three) times daily as needed.    senna-docusate 8.6-50 mg (PERICOLACE) 8.6-50 mg per tablet Take 1 tablet by mouth 2 (two) times daily. (Patient taking differently: Take 1 tablet " by mouth 2 (two) times daily as needed. )    sevelamer carbonate (RENVELA) 800 mg Tab Take 1 tablet (800 mg total) by mouth 3 (three) times a day with meals.     Family History     Problem Relation (Age of Onset)    Cancer Mother, Father, Sister    Diabetes Mother    Hypertension Mother        Tobacco Use    Smoking status: Never Smoker    Smokeless tobacco: Never Used   Substance and Sexual Activity    Alcohol use: No    Drug use: Not on file    Sexual activity: Not on file     Review of Systems   Constitutional: Negative for chills, diaphoresis and fever.   Eyes: Negative for photophobia.   Respiratory: Negative for cough, chest tightness, shortness of breath and wheezing.    Cardiovascular: Negative for chest pain, palpitations and leg swelling.   Gastrointestinal: Negative for abdominal pain, diarrhea, nausea and vomiting.   Genitourinary: Negative for dysuria, flank pain, frequency and hematuria.   Musculoskeletal: Negative for back pain and myalgias.   Skin: Positive for color change (left foot toes ) and wound (left foot, 5th toe ).   Neurological: Negative for dizziness, syncope, light-headedness and headaches.   Psychiatric/Behavioral: Negative for confusion.     Objective:     Vital Signs (Most Recent):  Temp: 98.3 °F (36.8 °C) (12/28/18 0025)  Pulse: 75 (12/28/18 0025)  Resp: 17 (12/28/18 0025)  BP: (!) 141/65 (12/28/18 0025)  SpO2: 98 % (12/28/18 0304) Vital Signs (24h Range):  Temp:  [98.1 °F (36.7 °C)-98.4 °F (36.9 °C)] 98.3 °F (36.8 °C)  Pulse:  [73-78] 75  Resp:  [17-18] 17  SpO2:  [92 %-99 %] 98 %  BP: (131-163)/(61-77) 141/65     Weight: 80 kg (176 lb 5.9 oz)  Body mass index is 29.35 kg/m².    Physical Exam   Constitutional: She is oriented to person, place, and time. She appears well-developed and well-nourished.   HENT:   Head: Normocephalic and atraumatic.   Eyes: Conjunctivae are normal. Pupils are equal, round, and reactive to light.   Neck: Normal range of motion. No JVD present.    Cardiovascular: Normal rate, regular rhythm, normal heart sounds and intact distal pulses.   Pulmonary/Chest: Effort normal. No respiratory distress. She has no wheezes.   Abdominal: Soft. Bowel sounds are normal. She exhibits no distension. There is no tenderness. There is no guarding.   Musculoskeletal: Normal range of motion. She exhibits edema (L foot ). She exhibits no tenderness.   R BKA    Neurological: She is alert and oriented to person, place, and time.   Skin: Skin is warm and dry. Capillary refill takes less than 2 seconds.   +necrosis to Left foot 3rd/4th toes, ulcer to left foot 5th digit, no drainage noted    Psychiatric: She has a normal mood and affect. Her behavior is normal.         CRANIAL NERVES     CN III, IV, VI   Pupils are equal, round, and reactive to light.       Significant Labs:   BMP:   Recent Labs   Lab 12/27/18 2040   *      K 4.2      CO2 22*   BUN 38*   CREATININE 5.5*   CALCIUM 9.1     CBC:   Recent Labs   Lab 12/27/18 2040   WBC 8.86   HGB 9.8*   HCT 30.9*          Significant Imaging: I have reviewed all pertinent imaging results/findings within the past 24 hours.    Assessment/Plan:     * Diabetic foot infection    Diabetic ulcer of toe of left foot associated with type 2 diabetes mellitus, with necrosis of bone  History of leg amputation  Diabetes Mellitus with long term insulin use     A1C 7.4 (9/5/2018)     -wound cultures pending   -L foot xray reviewed   -MRI L foot r/o osteo   -consult podiatry   -taking insulin Aspart-10 units TID and Lantus 20 units QHS, continue   -SSI, Accucheck AC&HS, diabetic diet          ESRD (end stage renal disease) on dialysis    Anemia of chronic disease    HD TTS, H/H at baseline   -consult nephrology (pt does not know name of her nephrologist)      HTN (hypertension)    Chronic diastolic heart failure  Aortic stenosis  History of cardiac arrest  Hyperlipidemia    BP stable, no evidence of volume overload    -continue amlodipine, atorvastatin and lasix        VTE Risk Mitigation (From admission, onward)        Ordered     enoxaparin injection 30 mg  Daily      12/27/18 2321     IP VTE HIGH RISK PATIENT  Once      12/27/18 2310             Eduarda Lobato NP  Department of Hospital Medicine   [facility]

## 2018-12-28 NOTE — PROGRESS NOTES
Introduced as VN for night shift that will be working with floor nurse and nursing assistant.  Family member at bedside.  Educated patient on VN's role in patient care. Plan of care reviewed with patient. Education per flowsheet.  Opportunity given for questions and questions answered.  Instructed to call for assistance.  Will cont to monitor.

## 2018-12-28 NOTE — ASSESSMENT & PLAN NOTE
Diabetic ulcer of toe of left foot associated with type 2 diabetes mellitus, with necrosis of bone  History of leg amputation  Diabetes Mellitus with long term insulin use     A1C 7.4 (9/5/2018)     -wound cultures pending   -L foot xray reviewed   -MRI L foot r/o osteo   -consult podiatry   -taking insulin Aspart-10 units TID and Lantus 20 units QHS, continue   -SSI, Accucheck AC&HS, diabetic diet

## 2018-12-28 NOTE — HPI
Martha Melvin is a 73 y.o. female who  has a past medical history of Arthritis, Diabetes mellitus, Hyperlipidemia, Hypertension, and Renal disorder.    Patient Admited for infected foot ulcer.  Consulted to Podiatry for evaluation.  Patient complains of left 5th toe ulcer that she said started on christmasday when she stubbed her pinky toe.  States she has a corn on her toe that has now ulcerated, and came to the ER when she noticed pus.  She also complains of blackness to toes, that she states has been there for years, but recently has been getting worse.  Patient is followed by vascular surgery for her AV graft.  Patient has history of right BKA.  Currently ernie F/c/N/v

## 2018-12-28 NOTE — HOSPITAL COURSE
Pt seen at bedside, she has black toes on the left foot.  Will await podiatry and will order US of LE for arterial to check blood flow  12/29, MRI positive for most likely osteo of firt toe and fifth toe of lefdt foot.  Consulting ID. Continue current abx  12/30 no events overnight.  Doing ok, cards will f/u with pt , discussed case.  Giving an extra dose of vanc/discussed with Pharm D  12/31, no qcute clinical changes, HD today, continue iv abx, awaiting cards for evaluation for LE blood supply/flow a.  Awaiting ID for recs for abx  1/1 per card for possible revascularization on 1/2. Continue Vanc and Zosyn x 6 weeks  1/3 ok to d/c per Card and podiatry standpoint. Awaits ID rec's. For possible d/c in Am. Appreciates ID rec's Cipro, Flagyl and Vancomycin. Cipro 500 mg orally q day; After dialysis on dialysis days. Metronidazole orally 500 mg orally q 6 hours. And Vancomycin with dialysis based upon the level. All for 6 weeks. MRI prior to stopping antibiotics for confirmation of resolution.  1/4 for discharge today on bax and FU podiatry with MRI

## 2018-12-28 NOTE — SUBJECTIVE & OBJECTIVE
"Past Medical History:   Diagnosis Date    Arthritis     Diabetes mellitus     Hyperlipidemia     Hypertension     Renal disorder     poor kidney function       Past Surgical History:   Procedure Laterality Date    LEG AMPUTATION Right     PERMCATH INSERTION-TUNNELED CVC N/A 4/25/2018    Performed by Armaan Sidhu MD at [facility] CATH LAB    TRANSPOSITION, VEIN, BASILIC Left 6/27/2018    Performed by AYESHA Mesa III, MD at [facility] OR [address] [place] [address]       Review of patient's allergies indicates:   Allergen Reactions    Morphine        No current facility-administered medications on file prior to encounter.      Current Outpatient Medications on File Prior to Encounter   Medication Sig    amLODIPine (NORVASC) 10 MG tablet Take 1 tablet (10 mg total) by mouth once daily.    atorvastatin (LIPITOR) 40 MG tablet Take 1 tablet (40 mg total) by mouth once daily.    bisacodyl (DULCOLAX) 5 mg EC tablet Take 5 mg by mouth daily as needed for Constipation.    blood sugar diagnostic Strp Test 2 (two) times daily.as directed    blood-glucose meter Misc Use to test blood sugar twice daily    calcium carbonate (TUMS E-X) 300 mg (750 mg) Chew Take 1 tablet by mouth 3 (three) times daily with meals.    furosemide (LASIX) 80 MG tablet Take 1 tablet (80 mg total) by mouth every morning.    insulin aspart U-100 (NOVOLOG FLEXPEN U-100 INSULIN) 100 unit/mL InPn pen Inject 10 Units into the skin 3 (three) times daily with meals. (Patient taking differently: Inject 4 Units into the skin 3 (three) times daily with meals. )    insulin glargine (LANTUS) 100 unit/mL injection Inject 34 Units into the skin every evening. (Patient taking differently: Inject 20 Units into the skin every evening. )    lancets (TRUEPLUS LANCETS) 30 gauge Misc Use to test blood sugar twice daily    loratadine (CLARITIN) 10 mg tablet Take 10 mg by mouth once daily.    pen needle, diabetic (NOVOFINE PLUS) 32 gauge x 1/6" Ndle use 3 (three) times daily as " needed.    senna-docusate 8.6-50 mg (PERICOLACE) 8.6-50 mg per tablet Take 1 tablet by mouth 2 (two) times daily. (Patient taking differently: Take 1 tablet by mouth 2 (two) times daily as needed. )    sevelamer carbonate (RENVELA) 800 mg Tab Take 1 tablet (800 mg total) by mouth 3 (three) times a day with meals.     Family History     Problem Relation (Age of Onset)    Cancer Mother, Father, Sister    Diabetes Mother    Hypertension Mother        Tobacco Use    Smoking status: Never Smoker    Smokeless tobacco: Never Used   Substance and Sexual Activity    Alcohol use: No    Drug use: Not on file    Sexual activity: Not on file     Review of Systems   Constitutional: Negative for chills, diaphoresis and fever.   Eyes: Negative for photophobia.   Respiratory: Negative for cough, chest tightness, shortness of breath and wheezing.    Cardiovascular: Negative for chest pain, palpitations and leg swelling.   Gastrointestinal: Negative for abdominal pain, diarrhea, nausea and vomiting.   Genitourinary: Negative for dysuria, flank pain, frequency and hematuria.   Musculoskeletal: Negative for back pain and myalgias.   Skin: Positive for color change (left foot toes ) and wound (left foot, 5th toe ).   Neurological: Negative for dizziness, syncope, light-headedness and headaches.   Psychiatric/Behavioral: Negative for confusion.     Objective:     Vital Signs (Most Recent):  Temp: 97.8 °F (36.6 °C) (12/28/18 1546)  Pulse: 77 (12/28/18 1546)  Resp: 18 (12/28/18 1546)  BP: (!) 142/73 (12/28/18 1546)  SpO2: (patient off unit) (12/28/18 1524) Vital Signs (24h Range):  Temp:  [97 °F (36.1 °C)-98.4 °F (36.9 °C)] 97.8 °F (36.6 °C)  Pulse:  [73-80] 77  Resp:  [16-18] 18  SpO2:  [92 %-99 %] 96 %  BP: (130-163)/(61-99) 142/73     Weight: 80 kg (176 lb 5.9 oz)  Body mass index is 29.35 kg/m².    Physical Exam   Constitutional: She is oriented to person, place, and time. She appears well-developed and well-nourished.   HENT:    Head: Normocephalic and atraumatic.   Eyes: Conjunctivae are normal. Pupils are equal, round, and reactive to light.   Neck: Normal range of motion. No JVD present.   Cardiovascular: Normal rate, regular rhythm, normal heart sounds and intact distal pulses.   Pulmonary/Chest: Effort normal. No respiratory distress. She has no wheezes.   Abdominal: Soft. Bowel sounds are normal. She exhibits no distension. There is no tenderness. There is no guarding.   Musculoskeletal: Normal range of motion. She exhibits edema (L foot ). She exhibits no tenderness.   R BKA    Neurological: She is alert and oriented to person, place, and time.   Skin: Skin is warm and dry. Capillary refill takes less than 2 seconds.   +necrosis to Left foot 3rd/4th toes, ulcer to left foot 5th digit, no drainage noted    Psychiatric: She has a normal mood and affect. Her behavior is normal.         CRANIAL NERVES     CN III, IV, VI   Pupils are equal, round, and reactive to light.       Significant Labs:   BMP:   Recent Labs   Lab 12/27/18 2040   *      K 4.2      CO2 22*   BUN 38*   CREATININE 5.5*   CALCIUM 9.1     CBC:   Recent Labs   Lab 12/27/18 2040   WBC 8.86   HGB 9.8*   HCT 30.9*          Significant Imaging: I have reviewed all pertinent imaging results/findings within the past 24 hours.

## 2018-12-28 NOTE — SUBJECTIVE & OBJECTIVE
Scheduled Meds:   sodium chloride 0.9%   Intravenous Once    amLODIPine  10 mg Oral Daily    atorvastatin  40 mg Oral Daily    enoxaparin  30 mg Subcutaneous Daily    [START ON 12/29/2018] epoetin bashir (PROCRIT) injection  10,000 Units Intravenous Every Tues, Thurs, Sat    furosemide  80 mg Oral QAM    insulin detemir U-100  20 Units Subcutaneous QHS    piperacillin-tazobactam (ZOSYN) IVPB  4.5 g Intravenous Q12H    sevelamer carbonate  800 mg Oral TID WM     Continuous Infusions:  PRN Meds:sodium chloride 0.9%, acetaminophen, bisacodyl, dextrose 50%, dextrose 50%, glucagon (human recombinant), glucose, glucose, influenza, insulin aspart U-100, ondansetron, sodium chloride 0.9%    Review of patient's allergies indicates:   Allergen Reactions    Morphine         Past Medical History:   Diagnosis Date    Arthritis     Diabetes mellitus     Hyperlipidemia     Hypertension     Renal disorder     poor kidney function     Past Surgical History:   Procedure Laterality Date    LEG AMPUTATION Right     PERMCATH INSERTION-TUNNELED CVC N/A 4/25/2018    Performed by Armaan Sidhu MD at [facility] CATH LAB    TRANSPOSITION, VEIN, BASILIC Left 6/27/2018    Performed by AYESHA Mesa III, MD at [facility] OR [address] [place] [address]       Family History     Problem Relation (Age of Onset)    Cancer Mother, Father, Sister    Diabetes Mother    Hypertension Mother        Tobacco Use    Smoking status: Never Smoker    Smokeless tobacco: Never Used   Substance and Sexual Activity    Alcohol use: No    Drug use: Not on file    Sexual activity: Not on file     Review of Systems   Constitutional: Negative for chills and fever.   Cardiovascular: Negative for leg swelling.   Gastrointestinal: Negative for nausea and vomiting.   Musculoskeletal: Negative for arthralgias and joint swelling.   Skin: Positive for color change and wound. Negative for rash.   Psychiatric/Behavioral: Negative for agitation and confusion.     Objective:      Vital Signs (Most Recent):  Temp: 97.8 °F (36.6 °C) (12/28/18 1546)  Pulse: 77 (12/28/18 1546)  Resp: 18 (12/28/18 1546)  BP: (!) 142/73 (12/28/18 1546)  SpO2: (patient off unit) (12/28/18 1524) Vital Signs (24h Range):  Temp:  [97 °F (36.1 °C)-98.4 °F (36.9 °C)] 97.8 °F (36.6 °C)  Pulse:  [73-80] 77  Resp:  [16-18] 18  SpO2:  [92 %-99 %] 96 %  BP: (130-163)/(61-99) 142/73     Weight: 80 kg (176 lb 5.9 oz)  Body mass index is 29.35 kg/m².    Foot Exam    Right Foot/Ankle     Comments  BKA    Left Foot/Ankle      Inspection and Palpation  Ecchymosis: none  Tenderness: none   Swelling: none     Neurovascular  Dorsalis pedis: 1+  Posterior tibial: 1+            Laboratory:  A1C:   Recent Labs   Lab 09/05/18  1000 12/28/18  0451   HGBA1C 7.4* 7.4*     CBC:   Recent Labs   Lab 12/27/18 2040   WBC 8.86   RBC 3.87*   HGB 9.8*   HCT 30.9*      MCV 80*   MCH 25.3*   MCHC 31.7*     CMP:   Recent Labs   Lab 12/27/18 2040   *   CALCIUM 9.1   ALBUMIN 3.5   PROT 8.6*      K 4.2   CO2 22*      BUN 38*   CREATININE 5.5*   ALKPHOS 72   ALT 14   AST 25   BILITOT 0.5     CRP:   Recent Labs   Lab 12/27/18 2040   CRP 62.2*     ESR:   Recent Labs   Lab 12/27/18 2040   SEDRATE 66*     Wound Cultures: No results for input(s): LABAERO in the last 4320 hours.  Microbiology Results (last 7 days)     Procedure Component Value Units Date/Time    Aerobic culture [[id number]]     Order Status:  No result Specimen:  Wound from Toe, Left Foot     Aerobic culture (Specify Source) **CANNOT BE ORDERED AS STAT** [[id number]] Collected:  12/27/18 2045    Order Status:  Sent Specimen:  Wound from Foot, Left Updated:  12/28/18 0126    Culture, Anaerobe [[id number]] Collected:  12/27/18 2045    Order Status:  Sent Specimen:  Wound from Foot, Left Updated:  12/28/18 0126        Specimen (12h ago, onward)    None          Diagnostic Results:  X-ray: Deformity of the left 5th proximal phalanx.  The findings may represent remote  trauma or evolving osteitis.  Short-term follow-up is suggested.    No evidence of an acute fracture or dislocation of the left foot.    MRI: Prominent marrow edema throughout the 5th phalanges, concerning for osteomyelitis.    Marrow edema in the distal phalanx of the great toe, which may be reactive versus early osteomyelitis.    Arterial US:  ordered    Clinical Findings:  Wound on left 5th toe measures 0.4x0.4x0.5  With fibrogranular base and hyperkeratotic margins.  Positive PTB, scant purulent-serous discharge.  Negative malodor, erythema    Blood blister on left 3rd toe

## 2018-12-28 NOTE — PLAN OF CARE
This  put name on white board and explained blue discharge folder to patient. Discharge planning brochure and/or business card given to patient.  Patient verbalized understanding.    TN met with patient. States that prior to arrival at ED she was living with her daughter due to coming to HD in [place]. Pt states that she does HD Tuesday, Thursday, and Saturday. States that while at home she has a wheelchair and rolling walker to use if needed. Daughter and sons bring pt to and from appointments. DX: Diabetic foot infection. Pt does also have prosthetic limb to lower extremity that she can put on and take off. Pt to get podiatry and wound consult. Will continue to follow.      Future Appointments   Date Time Provider Department Center   1/11/2019  9:00 AM VASCULAR, LAB [facility] MP [facility]   1/11/2019  9:30 AM AYESHA Mesa III, MD [facility] EFRA [facility]        12/28/18 1129   Discharge Assessment   Assessment Type Discharge Planning Assessment   Confirmed/corrected address and phone number on facesheet? Yes   Assessment information obtained from? Patient;Medical Record   Expected Length of Stay (days) 3   Communicated expected length of stay with patient/caregiver yes   Prior to hospitilization cognitive status: Alert/Oriented   Prior to hospitalization functional status: Independent;Assistive Equipment   Current cognitive status: Alert/Oriented   Current Functional Status: Independent;Assistive Equipment   Facility Arrived From: ED to inpatient   Lives With child(preston), adult   Able to Return to Prior Arrangements yes   Is patient able to care for self after discharge? Yes  (Has daughter to help if needed)   Patient's perception of discharge disposition home or selfcare   Readmission Within the Last 30 Days no previous admission in last 30 days   Patient currently being followed by outpatient case management? No   Patient currently receives any other outside agency services? No   Equipment Currently  Used at Home wheelchair;walker, rolling   Do you have any problems affording any of your prescribed medications? No   Is the patient taking medications as prescribed? yes   Does the patient have transportation home? Yes   Transportation Anticipated family or friend will provide   Dialysis Name and Scheduled days Tuesday, Thursday, and Saturday (HD)   Does the patient receive services at the [facility]? No   Discharge Plan A Home with family;Home   Patient/Family in Agreement with Plan yes

## 2018-12-28 NOTE — PLAN OF CARE
Problem: Adult Inpatient Plan of Care  Goal: Plan of Care Review  Outcome: Ongoing (interventions implemented as appropriate)  Pt AAOx4. Pt with no complaints of pain or discomfort. Left arm limb alert. Pt with black toes of the left foot. Right BKA. Glucose monitoring continued. Insulin given per MD orders. Bed locked in lowest position, bed alarm set and call bell within reach. Pt verbalized understanding to call for any needs or assistance. Will continue to monitor.

## 2018-12-28 NOTE — PROGRESS NOTES
Pharmacist Renal Dose Adjustment Note    Martha Melvin is a 73 y.o. female being treated with the medication Lovenox.    Patient Data:    Vital Signs (Most Recent):  Temp: 98.4 °F (36.9 °C) (12/27/18 1808)  Pulse: 76 (12/27/18 2302)  Resp: 18 (12/27/18 1808)  BP: 139/72 (12/27/18 2302)  SpO2: 97 % (12/27/18 2302) Vital Signs (72h Range):  Temp:  [98.4 °F (36.9 °C)]   Pulse:  [73-78]   Resp:  [18]   BP: (131-163)/(61-77)   SpO2:  [92 %-99 %]      Recent Labs   Lab 12/27/18 2040   CREATININE 5.5*     Serum creatinine: 5.5 mg/dL (H) 12/27/18 2040  Estimated creatinine clearance: 10 mL/min (A)    Medication: Lovenox 40 mg SQ daily will be changed to Lovenox 30 mg SQ daily.    Pharmacist's Name: Palomo Roth  Pharmacist's Extension: [phone]

## 2018-12-29 LAB
ALBUMIN SERPL BCP-MCNC: 3 G/DL
ANION GAP SERPL CALC-SCNC: 13 MMOL/L
BASOPHILS # BLD AUTO: 0.02 K/UL
BASOPHILS NFR BLD: 0.2 %
BUN SERPL-MCNC: 56 MG/DL
CALCIUM SERPL-MCNC: 8.6 MG/DL
CHLORIDE SERPL-SCNC: 100 MMOL/L
CO2 SERPL-SCNC: 23 MMOL/L
CREAT SERPL-MCNC: 6.5 MG/DL
DIFFERENTIAL METHOD: ABNORMAL
EOSINOPHIL # BLD AUTO: 0.3 K/UL
EOSINOPHIL NFR BLD: 2.9 %
ERYTHROCYTE [DISTWIDTH] IN BLOOD BY AUTOMATED COUNT: 15.7 %
EST. GFR  (AFRICAN AMERICAN): 7 ML/MIN/1.73 M^2
EST. GFR  (NON AFRICAN AMERICAN): 6 ML/MIN/1.73 M^2
GLUCOSE SERPL-MCNC: 267 MG/DL
GRAM STN SPEC: NORMAL
GRAM STN SPEC: NORMAL
HCT VFR BLD AUTO: 33 %
HGB BLD-MCNC: 10.5 G/DL
LYMPHOCYTES # BLD AUTO: 1.6 K/UL
LYMPHOCYTES NFR BLD: 18.4 %
MCH RBC QN AUTO: 25.4 PG
MCHC RBC AUTO-ENTMCNC: 31.8 G/DL
MCV RBC AUTO: 80 FL
MONOCYTES # BLD AUTO: 1.2 K/UL
MONOCYTES NFR BLD: 13.6 %
NEUTROPHILS # BLD AUTO: 5.6 K/UL
NEUTROPHILS NFR BLD: 64.9 %
PHOSPHATE SERPL-MCNC: 3.9 MG/DL
PLATELET # BLD AUTO: 218 K/UL
PMV BLD AUTO: 9.6 FL
POCT GLUCOSE: 114 MG/DL (ref 70–110)
POCT GLUCOSE: 194 MG/DL (ref 70–110)
POCT GLUCOSE: 211 MG/DL (ref 70–110)
POTASSIUM SERPL-SCNC: 3.9 MMOL/L
RBC # BLD AUTO: 4.13 M/UL
SODIUM SERPL-SCNC: 136 MMOL/L
VANCOMYCIN SERPL-MCNC: 5.3 UG/ML
WBC # BLD AUTO: 8.66 K/UL

## 2018-12-29 PROCEDURE — 87340 HEPATITIS B SURFACE AG IA: CPT

## 2018-12-29 PROCEDURE — 94761 N-INVAS EAR/PLS OXIMETRY MLT: CPT

## 2018-12-29 PROCEDURE — 87206 SMEAR FLUORESCENT/ACID STAI: CPT

## 2018-12-29 PROCEDURE — 80069 RENAL FUNCTION PANEL: CPT

## 2018-12-29 PROCEDURE — 88311 TISSUE SPECIMEN TO PATHOLOGY - SURGERY: ICD-10-PCS | Mod: 26,,, | Performed by: PATHOLOGY

## 2018-12-29 PROCEDURE — 85025 COMPLETE CBC W/AUTO DIFF WBC: CPT

## 2018-12-29 PROCEDURE — 87070 CULTURE OTHR SPECIMN AEROBIC: CPT

## 2018-12-29 PROCEDURE — 88305 TISSUE EXAM BY PATHOLOGIST: CPT | Mod: 26,,, | Performed by: PATHOLOGY

## 2018-12-29 PROCEDURE — 87015 SPECIMEN INFECT AGNT CONCNTJ: CPT

## 2018-12-29 PROCEDURE — 87075 CULTR BACTERIA EXCEPT BLOOD: CPT

## 2018-12-29 PROCEDURE — 88311 DECALCIFY TISSUE: CPT | Mod: 26,,, | Performed by: PATHOLOGY

## 2018-12-29 PROCEDURE — 25000003 PHARM REV CODE 250: Performed by: HOSPITALIST

## 2018-12-29 PROCEDURE — 87205 SMEAR GRAM STAIN: CPT

## 2018-12-29 PROCEDURE — 86706 HEP B SURFACE ANTIBODY: CPT

## 2018-12-29 PROCEDURE — 87116 MYCOBACTERIA CULTURE: CPT

## 2018-12-29 PROCEDURE — 80100016 HC MAINTENANCE HEMODIALYSIS

## 2018-12-29 PROCEDURE — 63600175 PHARM REV CODE 636 W HCPCS: Performed by: HOSPITALIST

## 2018-12-29 PROCEDURE — 25000003 PHARM REV CODE 250: Performed by: NURSE PRACTITIONER

## 2018-12-29 PROCEDURE — 87102 FUNGUS ISOLATION CULTURE: CPT

## 2018-12-29 PROCEDURE — 36415 COLL VENOUS BLD VENIPUNCTURE: CPT

## 2018-12-29 PROCEDURE — 86704 HEP B CORE ANTIBODY TOTAL: CPT

## 2018-12-29 PROCEDURE — 11000001 HC ACUTE MED/SURG PRIVATE ROOM

## 2018-12-29 PROCEDURE — 99232 SBSQ HOSP IP/OBS MODERATE 35: CPT | Mod: ,,, | Performed by: PODIATRIST

## 2018-12-29 PROCEDURE — 87176 TISSUE HOMOGENIZATION CULTR: CPT

## 2018-12-29 PROCEDURE — 80202 ASSAY OF VANCOMYCIN: CPT

## 2018-12-29 PROCEDURE — 25000003 PHARM REV CODE 250: Performed by: INTERNAL MEDICINE

## 2018-12-29 PROCEDURE — 99232 PR SUBSEQUENT HOSPITAL CARE,LEVL II: ICD-10-PCS | Mod: ,,, | Performed by: PODIATRIST

## 2018-12-29 PROCEDURE — 88305 TISSUE SPECIMEN TO PATHOLOGY - SURGERY: ICD-10-PCS | Mod: 26,,, | Performed by: PATHOLOGY

## 2018-12-29 PROCEDURE — 63600175 PHARM REV CODE 636 W HCPCS: Mod: JG | Performed by: INTERNAL MEDICINE

## 2018-12-29 PROCEDURE — 87076 CULTURE ANAEROBE IDENT EACH: CPT

## 2018-12-29 PROCEDURE — 88305 TISSUE EXAM BY PATHOLOGIST: CPT | Performed by: PATHOLOGY

## 2018-12-29 RX ORDER — VANCOMYCIN HCL IN 5 % DEXTROSE 1G/250ML
1000 PLASTIC BAG, INJECTION (ML) INTRAVENOUS ONCE
Status: COMPLETED | OUTPATIENT
Start: 2018-12-29 | End: 2018-12-29

## 2018-12-29 RX ADMIN — VANCOMYCIN HYDROCHLORIDE 1000 MG: 1 INJECTION, POWDER, FOR SOLUTION INTRAVENOUS at 08:12

## 2018-12-29 RX ADMIN — FUROSEMIDE 80 MG: 40 TABLET ORAL at 06:12

## 2018-12-29 RX ADMIN — SEVELAMER CARBONATE 800 MG: 800 TABLET, FILM COATED ORAL at 08:12

## 2018-12-29 RX ADMIN — PIPERACILLIN AND TAZOBACTAM 4.5 G: 4; .5 INJECTION, POWDER, LYOPHILIZED, FOR SOLUTION INTRAVENOUS; PARENTERAL at 02:12

## 2018-12-29 RX ADMIN — SODIUM CHLORIDE 400 ML: 0.9 INJECTION, SOLUTION INTRAVENOUS at 01:12

## 2018-12-29 RX ADMIN — ERYTHROPOIETIN 10000 UNITS: 10000 INJECTION, SOLUTION INTRAVENOUS; SUBCUTANEOUS at 01:12

## 2018-12-29 RX ADMIN — SEVELAMER CARBONATE 800 MG: 800 TABLET, FILM COATED ORAL at 04:12

## 2018-12-29 RX ADMIN — INSULIN DETEMIR 20 UNITS: 100 INJECTION, SOLUTION SUBCUTANEOUS at 10:12

## 2018-12-29 RX ADMIN — ATORVASTATIN CALCIUM 40 MG: 40 TABLET, FILM COATED ORAL at 08:12

## 2018-12-29 RX ADMIN — AMLODIPINE BESYLATE 10 MG: 5 TABLET ORAL at 08:12

## 2018-12-29 NOTE — CONSULTS
[facility]  Podiatry  Consult Note    Patient Name: Martha Melvin  MRN: [id number]  Admission Date: 12/27/2018  Hospital Length of Stay: 1 days  Attending Physician: John Butler DO  Primary Care Provider: Katiuska Villalba MD     Inpatient consult to Podiatry  Consult performed by: Juice Crystal MD  Consult ordered by: Eduarda Lobato NP        Subjective:     History of Present Illness:  Martha Melvin is a 73 y.o. female who  has a past medical history of Arthritis, Diabetes mellitus, Hyperlipidemia, Hypertension, and Renal disorder.    Patient Admited for infected foot ulcer.  Consulted to Podiatry for evaluation.  Patient complains of left 5th toe ulcer that she said started on christmasday when she stubbed her pinky toe.  States she has a corn on her toe that has now ulcerated, and came to the ER when she noticed pus.  She also complains of blackness to toes, that she states has been there for years, but recently has been getting worse.  Patient is followed by vascular surgery for her AV graft.  Patient has history of right BKA.  Currently deneis F/c/N/v        Scheduled Meds:   sodium chloride 0.9%   Intravenous Once    amLODIPine  10 mg Oral Daily    atorvastatin  40 mg Oral Daily    enoxaparin  30 mg Subcutaneous Daily    [START ON 12/29/2018] epoetin bashir (PROCRIT) injection  10,000 Units Intravenous Every Tues, Thurs, Sat    furosemide  80 mg Oral QAM    insulin detemir U-100  20 Units Subcutaneous QHS    piperacillin-tazobactam (ZOSYN) IVPB  4.5 g Intravenous Q12H    sevelamer carbonate  800 mg Oral TID WM     Continuous Infusions:  PRN Meds:sodium chloride 0.9%, acetaminophen, bisacodyl, dextrose 50%, dextrose 50%, glucagon (human recombinant), glucose, glucose, influenza, insulin aspart U-100, ondansetron, sodium chloride 0.9%    Review of patient's allergies indicates:   Allergen Reactions    Morphine         Past Medical History:   Diagnosis Date    Arthritis      Diabetes mellitus     Hyperlipidemia     Hypertension     Renal disorder     poor kidney function     Past Surgical History:   Procedure Laterality Date    LEG AMPUTATION Right     PERMCATH INSERTION-TUNNELED CVC N/A 4/25/2018    Performed by Armaan Sidhu MD at [facility] CATH LAB    TRANSPOSITION, VEIN, BASILIC Left 6/27/2018    Performed by AYESHA Mesa III, MD at [facility] OR 2ND FLR       Family History     Problem Relation (Age of Onset)    Cancer Mother, Father, Sister    Diabetes Mother    Hypertension Mother        Tobacco Use    Smoking status: Never Smoker    Smokeless tobacco: Never Used   Substance and Sexual Activity    Alcohol use: No    Drug use: Not on file    Sexual activity: Not on file     Review of Systems   Constitutional: Negative for chills and fever.   Cardiovascular: Negative for leg swelling.   Gastrointestinal: Negative for nausea and vomiting.   Musculoskeletal: Negative for arthralgias and joint swelling.   Skin: Positive for color change and wound. Negative for rash.   Psychiatric/Behavioral: Negative for agitation and confusion.     Objective:     Vital Signs (Most Recent):  Temp: 97.8 °F (36.6 °C) (12/28/18 1546)  Pulse: 77 (12/28/18 1546)  Resp: 18 (12/28/18 1546)  BP: (!) 142/73 (12/28/18 1546)  SpO2: (patient off unit) (12/28/18 1524) Vital Signs (24h Range):  Temp:  [97 °F (36.1 °C)-98.4 °F (36.9 °C)] 97.8 °F (36.6 °C)  Pulse:  [73-80] 77  Resp:  [16-18] 18  SpO2:  [92 %-99 %] 96 %  BP: (130-163)/(61-99) 142/73     Weight: 80 kg (176 lb 5.9 oz)  Body mass index is 29.35 kg/m².    Foot Exam    Right Foot/Ankle     Comments  BKA    Left Foot/Ankle      Inspection and Palpation  Ecchymosis: none  Tenderness: none   Swelling: none     Neurovascular  Dorsalis pedis: 1+  Posterior tibial: 1+            Laboratory:  A1C:   Recent Labs   Lab 09/05/18  1000 12/28/18  0451   HGBA1C 7.4* 7.4*     CBC:   Recent Labs   Lab 12/27/18  2040   WBC 8.86   RBC 3.87*   HGB 9.8*   HCT 30.9*       MCV 80*   MCH 25.3*   MCHC 31.7*     CMP:   Recent Labs   Lab 12/27/18 2040   *   CALCIUM 9.1   ALBUMIN 3.5   PROT 8.6*      K 4.2   CO2 22*      BUN 38*   CREATININE 5.5*   ALKPHOS 72   ALT 14   AST 25   BILITOT 0.5     CRP:   Recent Labs   Lab 12/27/18 2040   CRP 62.2*     ESR:   Recent Labs   Lab 12/27/18 2040   SEDRATE 66*     Wound Cultures: No results for input(s): LABAERO in the last 4320 hours.  Microbiology Results (last 7 days)     Procedure Component Value Units Date/Time    Aerobic culture [[id number]]     Order Status:  No result Specimen:  Wound from Toe, Left Foot     Aerobic culture (Specify Source) **CANNOT BE ORDERED AS STAT** [[id number]] Collected:  12/27/18 2045    Order Status:  Sent Specimen:  Wound from Foot, Left Updated:  12/28/18 0126    Culture, Anaerobe [[id number]] Collected:  12/27/18 2045    Order Status:  Sent Specimen:  Wound from Foot, Left Updated:  12/28/18 0126        Specimen (12h ago, onward)    None          Diagnostic Results:  X-ray: Deformity of the left 5th proximal phalanx.  The findings may represent remote trauma or evolving osteitis.  Short-term follow-up is suggested.    No evidence of an acute fracture or dislocation of the left foot.    MRI: Prominent marrow edema throughout the 5th phalanges, concerning for osteomyelitis.    Marrow edema in the distal phalanx of the great toe, which may be reactive versus early osteomyelitis.    Arterial US:  ordered    Clinical Findings:  Wound on left 5th toe measures 0.4x0.4x0.5  With fibrogranular base and hyperkeratotic margins.  Positive PTB, scant purulent-serous discharge.  Negative malodor, erythema    Blood blister on left 3rd toe                Assessment/Plan:     * Diabetic foot infection    As above     Diabetic ulcer of toe of left foot associated with type 2 diabetes mellitus, with bone involvement without evidence of necrosis    Martha Melvin is a 73 y.o. female with infected foot  ulcer that PTB, MRI suspicious of osteo  -Discussed treatment options of osteomyelitis amputation vs IV antibiotics.  Patient elects for conservative treatment.  Will plan on obtaining bone biopsy this weekend.  Consulted ID for abx recommendations.    -obtained culture swabs  -Ordered SACHI's and arterial US, if results are concerning recommend consult to  Interventional Cardiology.  -dressed with betadine gauze dressings.  Nursing to do daily dressing changes, orders in.  -podiatry will follow.           Chronic diastolic heart failure    Per primary           Thank you for your consult. I will follow-up with patient. Please contact us if you have any additional questions.    Juice Guerra MD  Podiatry  [facility]

## 2018-12-29 NOTE — PROGRESS NOTES
Cued into patient's room.  Introduced as VN for night shift that will be working with floor nurse and nursing assistant.  Educated patient on VN's role in patient care. Plan of care reviewed with patient. Education per flowsheet.  Opportunity given for questions and questions answered.  Ramin RN and family member at bedside.  Instructed to call for assistance.  Will cont to monitor.

## 2018-12-29 NOTE — PLAN OF CARE
Problem: Adult Inpatient Plan of Care  Goal: Plan of Care Review  Outcome: Ongoing (interventions implemented as appropriate)  Pt is AAOx3. No complaints of nausea, vomiting, or diarrhea. No complaints of pain. Left arm fistula is +/+. Right AKA. Left dressing dressing is intact and pedal pulses present. Diabetic 2000 calorie diet. ACHS and last bs was 236. Safety precautions maintained. Tolerated all medications well.

## 2018-12-29 NOTE — PROGRESS NOTES
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 She lives in [place], [place]. Her primary care physician is Dr. Katiuska Villalba. The patient cannot recall her nephrologist name.  She reports she has not been established with podiatry.     Pt presented to [facility] 11/27/18 with complaint of purulent drainage from left foot ulcer.  Patient states symptoms initially started as a callus on the 5th digit of the left foot. She reports noticing gradually increase in edema with mild erythema. Pt noted drainage from site today. She also reports "black toes" (3rd and 4th digit) for several months. Patient reports she was seen by her PCP 3-4 months ago. She did not have discoloration to toes at the time. Pt has never been evaluated by podiatry. She denies fever/chills, n/v, chest pain and SOB. Initial labs remarkable for H/H 9.8/30.9, BUN/CR 38/5.5, K within normal limits. Left foot xray show deformity of the left 5th proximal phalanx which may represent remote trauma or evolving osteitis. Pt afebrile, no leukocytosis, VSS. Pt admitted to [facility] medicine for further evaluation.     Hospital Course:  Pt seen at bedside, she has black toes on the left foot.  Will await podiatry and will order US of LE for arterial to check blood flow  12/29, MRI positive for most likely osteo of firt toe and fifth toe of lefdt foot.  Consulting ID. Continue current abx    Past Medical History: " "  Diagnosis Date    Arthritis     Diabetes mellitus     Hyperlipidemia     Hypertension     Renal disorder     poor kidney function       Past Surgical History:   Procedure Laterality Date    LEG AMPUTATION Right     PERMCATH INSERTION-TUNNELED CVC N/A 4/25/2018    Performed by Armaan Sidhu MD at [facility] CATH LAB    TRANSPOSITION, VEIN, BASILIC Left 6/27/2018    Performed by AYESHA Mesa III, MD at [facility] OR [address] [place] [address]       Review of patient's allergies indicates:   Allergen Reactions    Morphine        No current facility-administered medications on file prior to encounter.      Current Outpatient Medications on File Prior to Encounter   Medication Sig    amLODIPine (NORVASC) 10 MG tablet Take 1 tablet (10 mg total) by mouth once daily.    atorvastatin (LIPITOR) 40 MG tablet Take 1 tablet (40 mg total) by mouth once daily.    bisacodyl (DULCOLAX) 5 mg EC tablet Take 5 mg by mouth daily as needed for Constipation.    blood sugar diagnostic Strp Test 2 (two) times daily.as directed    blood-glucose meter Misc Use to test blood sugar twice daily    calcium carbonate (TUMS E-X) 300 mg (750 mg) Chew Take 1 tablet by mouth 3 (three) times daily with meals.    furosemide (LASIX) 80 MG tablet Take 1 tablet (80 mg total) by mouth every morning.    insulin aspart U-100 (NOVOLOG FLEXPEN U-100 INSULIN) 100 unit/mL InPn pen Inject 10 Units into the skin 3 (three) times daily with meals. (Patient taking differently: Inject 4 Units into the skin 3 (three) times daily with meals. )    insulin glargine (LANTUS) 100 unit/mL injection Inject 34 Units into the skin every evening. (Patient taking differently: Inject 20 Units into the skin every evening. )    lancets (TRUEPLUS LANCETS) 30 gauge Misc Use to test blood sugar twice daily    loratadine (CLARITIN) 10 mg tablet Take 10 mg by mouth once daily.    pen needle, diabetic (NOVOFINE PLUS) 32 gauge x 1/6" Ndle use 3 (three) times daily as needed.    " senna-docusate 8.6-50 mg (PERICOLACE) 8.6-50 mg per tablet Take 1 tablet by mouth 2 (two) times daily. (Patient taking differently: Take 1 tablet by mouth 2 (two) times daily as needed. )    sevelamer carbonate (RENVELA) 800 mg Tab Take 1 tablet (800 mg total) by mouth 3 (three) times a day with meals.     Family History     Problem Relation (Age of Onset)    Cancer Mother, Father, Sister    Diabetes Mother    Hypertension Mother        Tobacco Use    Smoking status: Never Smoker    Smokeless tobacco: Never Used   Substance and Sexual Activity    Alcohol use: No    Drug use: Not on file    Sexual activity: Not on file     Review of Systems   Constitutional: Negative for chills, diaphoresis and fever.   Eyes: Negative for photophobia.   Respiratory: Negative for cough, chest tightness, shortness of breath and wheezing.    Cardiovascular: Negative for chest pain, palpitations and leg swelling.   Gastrointestinal: Negative for abdominal pain, diarrhea, nausea and vomiting.   Genitourinary: Negative for dysuria, flank pain, frequency and hematuria.   Musculoskeletal: Negative for back pain and myalgias.   Skin: Positive for color change (left foot toes ) and wound (left foot, 5th toe ).   Neurological: Negative for dizziness, syncope, light-headedness and headaches.   Psychiatric/Behavioral: Negative for confusion.     Objective:     Vital Signs (Most Recent):  Temp: 98.9 °F (37.2 °C) (12/29/18 1427)  Pulse: 78 (12/29/18 1427)  Resp: 19 (12/29/18 1427)  BP: 132/67 (12/29/18 1427)  SpO2: 100 % (12/29/18 1427) Vital Signs (24h Range):  Temp:  [97.8 °F (36.6 °C)-99.4 °F (37.4 °C)] 98.9 °F (37.2 °C)  Pulse:  [71-81] 78  Resp:  [18-19] 19  SpO2:  [95 %-100 %] 100 %  BP: (120-175)/(67-91) 132/67     Weight: 87.1 kg (192 lb 0.3 oz)  Body mass index is 31.95 kg/m².    Physical Exam   Constitutional: She is oriented to person, place, and time. She appears well-developed and well-nourished.   HENT:   Head: Normocephalic and  atraumatic.   Eyes: Conjunctivae are normal. Pupils are equal, round, and reactive to light.   Neck: Normal range of motion. No JVD present.   Cardiovascular: Normal rate, regular rhythm, normal heart sounds and intact distal pulses.   Pulmonary/Chest: Effort normal. No respiratory distress. She has no wheezes.   Abdominal: Soft. Bowel sounds are normal. She exhibits no distension. There is no tenderness. There is no guarding.   Musculoskeletal: Normal range of motion. She exhibits edema (L foot ). She exhibits no tenderness.   R BKA    Neurological: She is alert and oriented to person, place, and time.   Skin: Skin is warm and dry. Capillary refill takes less than 2 seconds.   +necrosis to Left foot 3rd/4th toes, ulcer to left foot 5th digit, no drainage noted    Psychiatric: She has a normal mood and affect. Her behavior is normal.         CRANIAL NERVES     CN III, IV, VI   Pupils are equal, round, and reactive to light.       Significant Labs:   BMP:   Recent Labs   Lab 12/29/18  0954   *      K 3.9      CO2 23   BUN 56*   CREATININE 6.5*   CALCIUM 8.6*     CBC:   Recent Labs   Lab 12/27/18  2040 12/29/18  0954   WBC 8.86 8.66   HGB 9.8* 10.5*   HCT 30.9* 33.0*    218       Significant Imaging: I have reviewed all pertinent imaging results/findings within the past 24 hours.    Assessment/Plan:      * Diabetic foot infection    Diabetic ulcer of toe of left foot associated with type 2 diabetes mellitus, with necrosis of bone  History of leg amputation  Diabetes Mellitus with long term insulin use     A1C 7.4 (9/5/2018)     -wound cultures pending   -L foot xray reviewed   -MRI L foot r/o osteo   -consult podiatry   -taking insulin Aspart-10 units TID and Lantus 20 units QHS, continue   -SSI, Accucheck AC&HS, diabetic diet          Diabetic ulcer of toe of left foot associated with type 2 diabetes mellitus, with bone involvement without evidence of necrosis      MRI positive for  osteo.  Will continue IV ABX  Will consult ID     ESRD (end stage renal disease) on dialysis    Anemia of chronic disease    HD TTS, H/H at baseline   -consult nephrology (pt does not know name of her nephrologist)      HTN (hypertension)    Chronic diastolic heart failure  Aortic stenosis  History of cardiac arrest  Hyperlipidemia    BP stable, no evidence of volume overload   -continue amlodipine, atorvastatin and lasix        VTE Risk Mitigation (From admission, onward)        Ordered     enoxaparin injection 30 mg  Daily      12/27/18 2321     IP VTE HIGH RISK PATIENT  Once      12/27/18 2310              John Butler DO  Department of Hospital Medicine   [facility]

## 2018-12-29 NOTE — PROGRESS NOTES
[facility]  Nephrology  Progress Note    Patient Name: Martha Melvin  MRN: [id number]  Admission Date: 12/27/2018  Hospital Length of Stay: 2 days  Attending Provider: John Butler DO   Primary Care Physician: Katiuska Villalba MD  Principal Problem:Diabetic foot infection    Subjective:     HPI: Ms. Melvin is a 74 yo AAF with ESRD admitted with osteomyelitis of left 5th digit. She receives iHD TTS at [facility] under the care of Dr. Ruelas. Nephrology following for ESRD management.     Interval History:   Patient seen and examined on dialysis this morning; tolerating well. Denies any SOB or LE edema. /78. , .  170, AP -170.   Chart review reveals patient refuses amputation; ID consulted. Patient is hoping to be home for New Year's. Patient reports pain has been resolved since yesterday.     Review of patient's allergies indicates:   Allergen Reactions    Morphine      Current Facility-Administered Medications   Medication Frequency    0.9%  NaCl infusion PRN    0.9%  NaCl infusion Once    acetaminophen tablet 650 mg Q4H PRN    amLODIPine tablet 10 mg Daily    atorvastatin tablet 40 mg Daily    bisacodyl EC tablet 5 mg Daily PRN    dextrose 50% injection 12.5 g PRN    dextrose 50% injection 25 g PRN    enoxaparin injection 30 mg Daily    epoetin bashir injection 10,000 Units Every Tues, Thurs, Sat    furosemide tablet 80 mg QAM    glucagon (human recombinant) injection 1 mg PRN    glucose chewable tablet 16 g PRN    glucose chewable tablet 24 g PRN    influenza (FLUZONE HIGH-DOSE) vaccine 0.5 mL vaccine x 1 dose    insulin aspart U-100 pen 0-5 Units QID (AC + HS) PRN    insulin detemir U-100 pen 20 Units QHS    ondansetron injection 4 mg Q8H PRN    piperacillin-tazobactam 4.5 g in dextrose 5 % 100 mL IVPB (ready to mix system) Q12H    sevelamer carbonate tablet 800 mg TID WM    sodium chloride 0.9% flush 5 mL PRN       Objective:     Vital Signs (Most  Recent):  Temp: 99.4 °F (37.4 °C) (12/29/18 0733)  Pulse: 77 (12/29/18 0733)  Resp: 18 (12/29/18 0733)  BP: (!) 145/69 (12/29/18 0733)  SpO2: 96 % (12/29/18 0827)  O2 Device (Oxygen Therapy): room air (12/29/18 0827) Vital Signs (24h Range):  Temp:  [97.8 °F (36.6 °C)-99.4 °F (37.4 °C)] 99.4 °F (37.4 °C)  Pulse:  [77-81] 77  Resp:  [18] 18  SpO2:  [95 %-98 %] 96 %  BP: (141-175)/(69-80) 145/69     Weight: 87.1 kg (192 lb 0.3 oz) (12/29/18 0501)  Body mass index is 31.95 kg/m².  Body surface area is 2 meters squared.    I/O last 3 completed shifts:  In: 830 [P.O.:480; IV Piggyback:350]  Out: 600 [Urine:600]    Physical Exam   Constitutional: She is oriented to person, place, and time. She appears well-developed and well-nourished. No distress.   HENT:   Head: Normocephalic and atraumatic.   Eyes: Conjunctivae and EOM are normal.   Cardiovascular: Normal rate and regular rhythm.   L forearm AVF   Pulmonary/Chest: Effort normal and breath sounds normal. She has no rales.   Abdominal: Soft. She exhibits no distension.   Musculoskeletal: She exhibits edema (trace). She exhibits no deformity.   Neurological: She is alert and oriented to person, place, and time.   Skin: Skin is warm and dry. She is not diaphoretic.   Psychiatric: She has a normal mood and affect. Her behavior is normal.       Significant Labs:  CBC:   Recent Labs   Lab 12/29/18  0954   WBC 8.66   RBC 4.13   HGB 10.5*   HCT 33.0*      MCV 80*   MCH 25.4*   MCHC 31.8*     CMP:   Recent Labs   Lab 12/27/18 2040 12/29/18  0954   * 267*   CALCIUM 9.1 8.6*   ALBUMIN 3.5 3.0*   PROT 8.6*  --     136   K 4.2 3.9   CO2 22* 23    100   BUN 38* 56*   CREATININE 5.5* 6.5*   ALKPHOS 72  --    ALT 14  --    AST 25  --    BILITOT 0.5  --      All labs within the past 24 hours have been reviewed.     Significant Imaging:  Labs: Reviewed    Assessment/Plan:     ESRD  - receives iHD TTS via L forearm AVF at [facility]. Treatment duration 3  hours  - last HD on Thursday  - euvolemic on exam today; no significant electrolyte abnormalities  - HD x 3 hours today per home schedule  - renally dose medications  - continue home lasix    Anemia of CKD  - hgb at goal today; can continue home CHACHO for now  - will trend labs daily and adjust CHACHO if hgb > 11    CKD-MBD  - corrected Ca and phos within normal limits today  - continue home sevelamer  - daily labs    Osteomyelitis  - will f/u ID recs  - hopefully if outpatient IV abx are needed; patient can be discharged on a course of abx that can be given during HD in her dialysis unit       Thank you for your consult. I will follow-up with patient. Please contact us if you have any additional questions.       Matilda Pascal MD  Nephrology  [facility], [facility]  Office: [phone]  [facility] covering for:  [facility]  ANNA Etienne MD, FACPRASHANT URBAN MD,   MD KUSUM Teague, NP  [address]  [place], [address]  [phone]  After hours answering service: [phone]

## 2018-12-29 NOTE — SUBJECTIVE & OBJECTIVE
Interval History:   Patient seen and examined on dialysis this morning; tolerating well. Denies any SOB or LE edema. /78. , .  170, AP -170.   Chart review reveals patient refuses amputation; ID consulted. Patient is hoping to be home for New Year's. Patient reports pain has been resolved since yesterday.     Review of patient's allergies indicates:   Allergen Reactions    Morphine      Current Facility-Administered Medications   Medication Frequency    0.9%  NaCl infusion PRN    0.9%  NaCl infusion Once    acetaminophen tablet 650 mg Q4H PRN    amLODIPine tablet 10 mg Daily    atorvastatin tablet 40 mg Daily    bisacodyl EC tablet 5 mg Daily PRN    dextrose 50% injection 12.5 g PRN    dextrose 50% injection 25 g PRN    enoxaparin injection 30 mg Daily    epoetin bashir injection 10,000 Units Every Tues, Thurs, Sat    furosemide tablet 80 mg QAM    glucagon (human recombinant) injection 1 mg PRN    glucose chewable tablet 16 g PRN    glucose chewable tablet 24 g PRN    influenza (FLUZONE HIGH-DOSE) vaccine 0.5 mL vaccine x 1 dose    insulin aspart U-100 pen 0-5 Units QID (AC + HS) PRN    insulin detemir U-100 pen 20 Units QHS    ondansetron injection 4 mg Q8H PRN    piperacillin-tazobactam 4.5 g in dextrose 5 % 100 mL IVPB (ready to mix system) Q12H    sevelamer carbonate tablet 800 mg TID WM    sodium chloride 0.9% flush 5 mL PRN       Objective:     Vital Signs (Most Recent):  Temp: 99.4 °F (37.4 °C) (12/29/18 0733)  Pulse: 77 (12/29/18 0733)  Resp: 18 (12/29/18 0733)  BP: (!) 145/69 (12/29/18 0733)  SpO2: 96 % (12/29/18 0827)  O2 Device (Oxygen Therapy): room air (12/29/18 0827) Vital Signs (24h Range):  Temp:  [97.8 °F (36.6 °C)-99.4 °F (37.4 °C)] 99.4 °F (37.4 °C)  Pulse:  [77-81] 77  Resp:  [18] 18  SpO2:  [95 %-98 %] 96 %  BP: (141-175)/(69-80) 145/69     Weight: 87.1 kg (192 lb 0.3 oz) (12/29/18 0501)  Body mass index is 31.95 kg/m².  Body surface area is 2 meters  squared.    I/O last 3 completed shifts:  In: 830 [P.O.:480; IV Piggyback:350]  Out: 600 [Urine:600]    Physical Exam   Constitutional: She is oriented to person, place, and time. She appears well-developed and well-nourished. No distress.   HENT:   Head: Normocephalic and atraumatic.   Eyes: Conjunctivae and EOM are normal.   Cardiovascular: Normal rate and regular rhythm.   L forearm AVF   Pulmonary/Chest: Effort normal and breath sounds normal. She has no rales.   Abdominal: Soft. She exhibits no distension.   Musculoskeletal: She exhibits edema (trace). She exhibits no deformity.   Neurological: She is alert and oriented to person, place, and time.   Skin: Skin is warm and dry. She is not diaphoretic.   Psychiatric: She has a normal mood and affect. Her behavior is normal.       Significant Labs:  CBC:   Recent Labs   Lab 12/29/18  0954   WBC 8.66   RBC 4.13   HGB 10.5*   HCT 33.0*      MCV 80*   MCH 25.4*   MCHC 31.8*     CMP:   Recent Labs   Lab 12/27/18  2040 12/29/18  0954   * 267*   CALCIUM 9.1 8.6*   ALBUMIN 3.5 3.0*   PROT 8.6*  --     136   K 4.2 3.9   CO2 22* 23    100   BUN 38* 56*   CREATININE 5.5* 6.5*   ALKPHOS 72  --    ALT 14  --    AST 25  --    BILITOT 0.5  --      All labs within the past 24 hours have been reviewed.     Significant Imaging:  Labs: Reviewed

## 2018-12-29 NOTE — HPI
Ms. Melvin is a 74 yo AAF with ESRD admitted with osteomyelitis of left 5th digit. She receives iHD TTS at [facility] under the care of Dr. Ruelas. Nephrology following for ESRD management.

## 2018-12-29 NOTE — SUBJECTIVE & OBJECTIVE
"Past Medical History:   Diagnosis Date    Arthritis     Diabetes mellitus     Hyperlipidemia     Hypertension     Renal disorder     poor kidney function       Past Surgical History:   Procedure Laterality Date    LEG AMPUTATION Right     PERMCATH INSERTION-TUNNELED CVC N/A 4/25/2018    Performed by Armaan Sidhu MD at [facility] CATH LAB    TRANSPOSITION, VEIN, BASILIC Left 6/27/2018    Performed by AYESHA Mesa III, MD at [facility] OR [address] [place] [address]       Review of patient's allergies indicates:   Allergen Reactions    Morphine        No current facility-administered medications on file prior to encounter.      Current Outpatient Medications on File Prior to Encounter   Medication Sig    amLODIPine (NORVASC) 10 MG tablet Take 1 tablet (10 mg total) by mouth once daily.    atorvastatin (LIPITOR) 40 MG tablet Take 1 tablet (40 mg total) by mouth once daily.    bisacodyl (DULCOLAX) 5 mg EC tablet Take 5 mg by mouth daily as needed for Constipation.    blood sugar diagnostic Strp Test 2 (two) times daily.as directed    blood-glucose meter Misc Use to test blood sugar twice daily    calcium carbonate (TUMS E-X) 300 mg (750 mg) Chew Take 1 tablet by mouth 3 (three) times daily with meals.    furosemide (LASIX) 80 MG tablet Take 1 tablet (80 mg total) by mouth every morning.    insulin aspart U-100 (NOVOLOG FLEXPEN U-100 INSULIN) 100 unit/mL InPn pen Inject 10 Units into the skin 3 (three) times daily with meals. (Patient taking differently: Inject 4 Units into the skin 3 (three) times daily with meals. )    insulin glargine (LANTUS) 100 unit/mL injection Inject 34 Units into the skin every evening. (Patient taking differently: Inject 20 Units into the skin every evening. )    lancets (TRUEPLUS LANCETS) 30 gauge Misc Use to test blood sugar twice daily    loratadine (CLARITIN) 10 mg tablet Take 10 mg by mouth once daily.    pen needle, diabetic (NOVOFINE PLUS) 32 gauge x 1/6" Ndle use 3 (three) times daily as " needed.    senna-docusate 8.6-50 mg (PERICOLACE) 8.6-50 mg per tablet Take 1 tablet by mouth 2 (two) times daily. (Patient taking differently: Take 1 tablet by mouth 2 (two) times daily as needed. )    sevelamer carbonate (RENVELA) 800 mg Tab Take 1 tablet (800 mg total) by mouth 3 (three) times a day with meals.     Family History     Problem Relation (Age of Onset)    Cancer Mother, Father, Sister    Diabetes Mother    Hypertension Mother        Tobacco Use    Smoking status: Never Smoker    Smokeless tobacco: Never Used   Substance and Sexual Activity    Alcohol use: No    Drug use: Not on file    Sexual activity: Not on file     Review of Systems   Constitutional: Negative for chills, diaphoresis and fever.   Eyes: Negative for photophobia.   Respiratory: Negative for cough, chest tightness, shortness of breath and wheezing.    Cardiovascular: Negative for chest pain, palpitations and leg swelling.   Gastrointestinal: Negative for abdominal pain, diarrhea, nausea and vomiting.   Genitourinary: Negative for dysuria, flank pain, frequency and hematuria.   Musculoskeletal: Negative for back pain and myalgias.   Skin: Positive for color change (left foot toes ) and wound (left foot, 5th toe ).   Neurological: Negative for dizziness, syncope, light-headedness and headaches.   Psychiatric/Behavioral: Negative for confusion.     Objective:     Vital Signs (Most Recent):  Temp: 98.9 °F (37.2 °C) (12/29/18 1427)  Pulse: 78 (12/29/18 1427)  Resp: 19 (12/29/18 1427)  BP: 132/67 (12/29/18 1427)  SpO2: 100 % (12/29/18 1427) Vital Signs (24h Range):  Temp:  [97.8 °F (36.6 °C)-99.4 °F (37.4 °C)] 98.9 °F (37.2 °C)  Pulse:  [71-81] 78  Resp:  [18-19] 19  SpO2:  [95 %-100 %] 100 %  BP: (120-175)/(67-91) 132/67     Weight: 87.1 kg (192 lb 0.3 oz)  Body mass index is 31.95 kg/m².    Physical Exam   Constitutional: She is oriented to person, place, and time. She appears well-developed and well-nourished.   HENT:   Head:  Normocephalic and atraumatic.   Eyes: Conjunctivae are normal. Pupils are equal, round, and reactive to light.   Neck: Normal range of motion. No JVD present.   Cardiovascular: Normal rate, regular rhythm, normal heart sounds and intact distal pulses.   Pulmonary/Chest: Effort normal. No respiratory distress. She has no wheezes.   Abdominal: Soft. Bowel sounds are normal. She exhibits no distension. There is no tenderness. There is no guarding.   Musculoskeletal: Normal range of motion. She exhibits edema (L foot ). She exhibits no tenderness.   R BKA    Neurological: She is alert and oriented to person, place, and time.   Skin: Skin is warm and dry. Capillary refill takes less than 2 seconds.   +necrosis to Left foot 3rd/4th toes, ulcer to left foot 5th digit, no drainage noted    Psychiatric: She has a normal mood and affect. Her behavior is normal.         CRANIAL NERVES     CN III, IV, VI   Pupils are equal, round, and reactive to light.       Significant Labs:   BMP:   Recent Labs   Lab 12/29/18  0954   *      K 3.9      CO2 23   BUN 56*   CREATININE 6.5*   CALCIUM 8.6*     CBC:   Recent Labs   Lab 12/27/18  2040 12/29/18  0954   WBC 8.86 8.66   HGB 9.8* 10.5*   HCT 30.9* 33.0*    218       Significant Imaging: I have reviewed all pertinent imaging results/findings within the past 24 hours.

## 2018-12-29 NOTE — PLAN OF CARE
Problem: Adult Inpatient Plan of Care  Goal: Plan of Care Review  Pt on RA, no respiratory distress noted. Will continue to monitor.

## 2018-12-29 NOTE — ASSESSMENT & PLAN NOTE
Martha Melvin is a 73 y.o. female with infected foot ulcer that PTB, MRI suspicious of osteo  -Discussed treatment options of osteomyelitis amputation vs IV antibiotics.  Patient elects for conservative treatment.  Will plan on obtaining bone biopsy this weekend.  Consulted ID for abx recommendations.    -Ordered SACHI's and arterial US, if results are concerning recommend consult to  Interventional Cardiology.  -dressed with betadine gauze dressings.  Nursing to do daily dressing changes, orders in.  -podiatry will follow.

## 2018-12-30 LAB
ALBUMIN SERPL BCP-MCNC: 3 G/DL
ANION GAP SERPL CALC-SCNC: 14 MMOL/L
BACTERIA SPEC AEROBE CULT: NORMAL
BUN SERPL-MCNC: 39 MG/DL
CALCIUM SERPL-MCNC: 9.1 MG/DL
CHLORIDE SERPL-SCNC: 96 MMOL/L
CO2 SERPL-SCNC: 26 MMOL/L
CREAT SERPL-MCNC: 5.3 MG/DL
ERYTHROCYTE [DISTWIDTH] IN BLOOD BY AUTOMATED COUNT: 15.8 %
EST. GFR  (AFRICAN AMERICAN): 9 ML/MIN/1.73 M^2
EST. GFR  (NON AFRICAN AMERICAN): 8 ML/MIN/1.73 M^2
GLUCOSE SERPL-MCNC: 191 MG/DL
HCT VFR BLD AUTO: 33.3 %
HGB BLD-MCNC: 10.4 G/DL
MCH RBC QN AUTO: 24.8 PG
MCHC RBC AUTO-ENTMCNC: 31.2 G/DL
MCV RBC AUTO: 80 FL
PHOSPHATE SERPL-MCNC: 4.1 MG/DL
PLATELET # BLD AUTO: 242 K/UL
PMV BLD AUTO: 10.2 FL
POCT GLUCOSE: 161 MG/DL (ref 70–110)
POCT GLUCOSE: 165 MG/DL (ref 70–110)
POCT GLUCOSE: 193 MG/DL (ref 70–110)
POCT GLUCOSE: 220 MG/DL (ref 70–110)
POTASSIUM SERPL-SCNC: 4.1 MMOL/L
RBC # BLD AUTO: 4.19 M/UL
SODIUM SERPL-SCNC: 136 MMOL/L
VANCOMYCIN SERPL-MCNC: 13.1 UG/ML
WBC # BLD AUTO: 9.71 K/UL

## 2018-12-30 PROCEDURE — 99024 PR POST-OP FOLLOW-UP VISIT: ICD-10-PCS | Mod: ,,, | Performed by: PODIATRIST

## 2018-12-30 PROCEDURE — 94761 N-INVAS EAR/PLS OXIMETRY MLT: CPT

## 2018-12-30 PROCEDURE — 63600175 PHARM REV CODE 636 W HCPCS: Performed by: NURSE PRACTITIONER

## 2018-12-30 PROCEDURE — 80202 ASSAY OF VANCOMYCIN: CPT

## 2018-12-30 PROCEDURE — 25000003 PHARM REV CODE 250: Performed by: NURSE PRACTITIONER

## 2018-12-30 PROCEDURE — 11000001 HC ACUTE MED/SURG PRIVATE ROOM

## 2018-12-30 PROCEDURE — 85027 COMPLETE CBC AUTOMATED: CPT

## 2018-12-30 PROCEDURE — 36415 COLL VENOUS BLD VENIPUNCTURE: CPT

## 2018-12-30 PROCEDURE — 25000003 PHARM REV CODE 250: Performed by: HOSPITALIST

## 2018-12-30 PROCEDURE — 80069 RENAL FUNCTION PANEL: CPT

## 2018-12-30 PROCEDURE — 99024 POSTOP FOLLOW-UP VISIT: CPT | Mod: ,,, | Performed by: PODIATRIST

## 2018-12-30 PROCEDURE — 63600175 PHARM REV CODE 636 W HCPCS: Performed by: HOSPITALIST

## 2018-12-30 RX ORDER — NAPROXEN SODIUM 220 MG/1
81 TABLET, FILM COATED ORAL DAILY
Status: DISCONTINUED | OUTPATIENT
Start: 2018-12-31 | End: 2019-01-04 | Stop reason: HOSPADM

## 2018-12-30 RX ADMIN — ATORVASTATIN CALCIUM 40 MG: 40 TABLET, FILM COATED ORAL at 08:12

## 2018-12-30 RX ADMIN — AMLODIPINE BESYLATE 10 MG: 5 TABLET ORAL at 08:12

## 2018-12-30 RX ADMIN — PIPERACILLIN AND TAZOBACTAM 4.5 G: 4; .5 INJECTION, POWDER, LYOPHILIZED, FOR SOLUTION INTRAVENOUS; PARENTERAL at 01:12

## 2018-12-30 RX ADMIN — INSULIN ASPART 1 UNITS: 100 INJECTION, SOLUTION INTRAVENOUS; SUBCUTANEOUS at 08:12

## 2018-12-30 RX ADMIN — SEVELAMER CARBONATE 800 MG: 800 TABLET, FILM COATED ORAL at 08:12

## 2018-12-30 RX ADMIN — FUROSEMIDE 80 MG: 40 TABLET ORAL at 06:12

## 2018-12-30 RX ADMIN — ENOXAPARIN SODIUM 30 MG: 100 INJECTION SUBCUTANEOUS at 12:12

## 2018-12-30 RX ADMIN — SEVELAMER CARBONATE 800 MG: 800 TABLET, FILM COATED ORAL at 12:12

## 2018-12-30 RX ADMIN — INSULIN DETEMIR 20 UNITS: 100 INJECTION, SOLUTION SUBCUTANEOUS at 08:12

## 2018-12-30 RX ADMIN — SEVELAMER CARBONATE 800 MG: 800 TABLET, FILM COATED ORAL at 05:12

## 2018-12-30 RX ADMIN — VANCOMYCIN HYDROCHLORIDE 500 MG: 500 INJECTION, POWDER, LYOPHILIZED, FOR SOLUTION INTRAVENOUS at 01:12

## 2018-12-30 RX ADMIN — PIPERACILLIN AND TAZOBACTAM 4.5 G: 4; .5 INJECTION, POWDER, LYOPHILIZED, FOR SOLUTION INTRAVENOUS; PARENTERAL at 12:12

## 2018-12-30 NOTE — PLAN OF CARE
Problem: Adult Inpatient Plan of Care  Goal: Plan of Care Review  Outcome: Ongoing (interventions implemented as appropriate)  Patient aaox4, safety measures implemented, call light in reach, bed in lowest position, patient refused bed alarm, no complaints of pain or discomfort, will continue to monitor.

## 2018-12-30 NOTE — PLAN OF CARE
Problem: Adult Inpatient Plan of Care  Goal: Plan of Care Review  Outcome: Ongoing (interventions implemented as appropriate)  Pt on RA sats 98%

## 2018-12-30 NOTE — ASSESSMENT & PLAN NOTE
Martha Melvin is a 73 y.o. female with infected foot ulcer that PTB, MRI suspicious of osteo  -performed left proximal phalanx bone biopsy, see note below  -ID consulted, appreciate recs.  -arterial US abnormal, Interventional cardiology consulted  -dressed with betadine gauze dressings.  Nursing to do daily dressing changes, orders in.  -podiatry will follow.    Procedure note:   Surgeon:  Dr. Chirinos  Assisting Surgeon: Juice Crystal DPM, PGY2  Pre op diagnosis: osteomyelitis   Post op diagnosis: same    Anesthesia: as below  Hemostasis: direct pressure.   EBL: < 1ml      Procedure: bone biopsy    Findings:  After consent was signed and witnessed 1ml of 1% lidocaine was given locally.  Toe was dressed and prepped in a sterile manner.  A Jamshedi kit was used to biopsy the left 5th proximal phalanx.  Soft bone was noted.  Patient tolerated procedure well, and hemostasis was controlled with compression.  Foot was dressed with betadine, 4x4's, kerlix, and ace.

## 2018-12-30 NOTE — NURSING
VN note: VN cued into pt's room for introduction. VN informed pt that VN would be working closely along side bedside nurse, PCT, and the rest of care team and making rounds throughout the shift. Pt verbalized understanding. Allowed time for questions. VN will continue to be available to patient and intervene prn.

## 2018-12-30 NOTE — PROGRESS NOTES
[facility]  Podiatry  Progress Note    Patient Name: Martha Melvin  MRN: [id number]  Admission Date: 12/27/2018  Hospital Length of Stay: 2 days  Attending Physician: John Butler DO  Primary Care Provider: Katiuska Villalba MD   Interval Hx:  Patient Seen at bedside for dressing change.  Patient denies F/C/N/V.  Dressings clean, dry, and intact.  Scheduled Meds:   sodium chloride 0.9%   Intravenous Once    amLODIPine  10 mg Oral Daily    atorvastatin  40 mg Oral Daily    enoxaparin  30 mg Subcutaneous Daily    epoetin bashir (PROCRIT) injection  10,000 Units Intravenous Every Tues, Thurs, Sat    furosemide  80 mg Oral QAM    insulin detemir U-100  20 Units Subcutaneous QHS    piperacillin-tazobactam (ZOSYN) IVPB  4.5 g Intravenous Q12H    sevelamer carbonate  800 mg Oral TID WM     Continuous Infusions:  PRN Meds:sodium chloride 0.9%, acetaminophen, bisacodyl, dextrose 50%, dextrose 50%, glucagon (human recombinant), glucose, glucose, influenza, insulin aspart U-100, ondansetron, sodium chloride 0.9%    Review of patient's allergies indicates:   Allergen Reactions    Morphine         Past Medical History:   Diagnosis Date    Arthritis     Diabetes mellitus     Hyperlipidemia     Hypertension     Renal disorder     poor kidney function     Past Surgical History:   Procedure Laterality Date    LEG AMPUTATION Right     PERMCATH INSERTION-TUNNELED CVC N/A 4/25/2018    Performed by [facility]an Sidhu MD at [facility] CATH LAB    TRANSPOSITION, VEIN, BASILIC Left 6/27/2018    Performed by AYESHA Mesa III, MD at [facility] OR [address] [place] [address]       Family History     Problem Relation (Age of Onset)    Cancer Mother, Father, Sister    Diabetes Mother    Hypertension Mother        Tobacco Use    Smoking status: Never Smoker    Smokeless tobacco: Never Used   Substance and Sexual Activity    Alcohol use: No    Drug use: Not on file    Sexual activity: Not on file     Review of Systems    Constitutional: Negative for chills and fever.   Cardiovascular: Negative for leg swelling.   Gastrointestinal: Negative for nausea and vomiting.   Musculoskeletal: Negative for arthralgias and joint swelling.   Skin: Positive for color change and wound. Negative for rash.   Psychiatric/Behavioral: Negative for agitation and confusion.     Objective:     Vital Signs (Most Recent):  Temp: 98.9 °F (37.2 °C) (12/29/18 1946)  Pulse: 83 (12/29/18 1946)  Resp: 18 (12/29/18 1946)  BP: (!) 114/55 (12/29/18 1946)  SpO2: 100 % (12/29/18 1942) Vital Signs (24h Range):  Temp:  [97.2 °F (36.2 °C)-99.4 °F (37.4 °C)] 98.9 °F (37.2 °C)  Pulse:  [71-83] 83  Resp:  [16-19] 18  SpO2:  [95 %-100 %] 100 %  BP: (103-175)/(55-91) 114/55     Weight: 87.1 kg (192 lb 0.3 oz)  Body mass index is 31.95 kg/m².    Foot Exam    Right Foot/Ankle     Comments  BKA    Left Foot/Ankle      Inspection and Palpation  Ecchymosis: none  Tenderness: none   Swelling: none     Neurovascular  Dorsalis pedis: 1+  Posterior tibial: 1+            Laboratory:  A1C:   Recent Labs   Lab 09/05/18  1000 12/28/18  0451   HGBA1C 7.4* 7.4*     CBC:   Recent Labs   Lab 12/29/18  0954   WBC 8.66   RBC 4.13   HGB 10.5*   HCT 33.0*      MCV 80*   MCH 25.4*   MCHC 31.8*     CMP:   Recent Labs   Lab 12/27/18 2040 12/29/18  0954   * 267*   CALCIUM 9.1 8.6*   ALBUMIN 3.5 3.0*   PROT 8.6*  --     136   K 4.2 3.9   CO2 22* 23    100   BUN 38* 56*   CREATININE 5.5* 6.5*   ALKPHOS 72  --    ALT 14  --    AST 25  --    BILITOT 0.5  --      CRP:   Recent Labs   Lab 12/27/18 2040   CRP 62.2*     ESR:   Recent Labs   Lab 12/27/18 2040   SEDRATE 66*     Wound Cultures:   Recent Labs   Lab 12/27/18 2045 12/28/18  1604   LABAERO No significant isolate to date Insufficient incubation, culture in progress     Microbiology Results (last 7 days)     Procedure Component Value Units Date/Time    Aerobic culture [[id number]] Collected:  12/29/18 1506    Order  Status:  Sent Specimen:  Bone from Toe, Left Foot Updated:  12/29/18 1836    Culture, Anaerobe [[id number]] Collected:  12/29/18 1506    Order Status:  Sent Specimen:  Bone from Toe, Left Foot Updated:  12/29/18 1836    Gram stain [[id number]] Collected:  12/29/18 1506    Order Status:  Sent Specimen:  Bone from Toe, Left Foot Updated:  12/29/18 1836    AFB Culture & Smear [[id number]] Collected:  12/29/18 1506    Order Status:  Sent Specimen:  Bone from Toe, Left Foot Updated:  12/29/18 1836    Fungus culture [[id number]] Collected:  12/29/18 1506    Order Status:  Sent Specimen:  Bone from Toe, Left Foot Updated:  12/29/18 1836    Aerobic culture [[id number]] Collected:  12/28/18 1604    Order Status:  Completed Specimen:  Wound from Toe, Left Foot Updated:  12/29/18 1311     Aerobic Bacterial Culture Insufficient incubation, culture in progress    Aerobic culture (Specify Source) **CANNOT BE ORDERED AS STAT** [[id number]] Collected:  12/27/18 2045    Order Status:  Completed Specimen:  Wound from Foot, Left Updated:  12/29/18 0742     Aerobic Bacterial Culture No significant isolate to date    Culture, Anaerobe [[id number]] Collected:  12/27/18 2045    Order Status:  Sent Specimen:  Wound from Foot, Left Updated:  12/28/18 0126        Specimen (12h ago, onward)    Start     Ordered    12/29/18 1456  Specimen to Pathology - Surgery  Once     Comments:  Left 5th proximal phalanx.  Suspicious of osteomyelitis     Start Status   12/29/18 1456 Collected (12/29/18 1504)       12/29/18 1504          Diagnostic Results:  X-ray: Deformity of the left 5th proximal phalanx.  The findings may represent remote trauma or evolving osteitis.  Short-term follow-up is suggested.    No evidence of an acute fracture or dislocation of the left foot.    MRI: Prominent marrow edema throughout the 5th phalanges, concerning for osteomyelitis.    Marrow edema in the distal phalanx of the great toe, which may be reactive versus early  osteomyelitis.    Arterial US:  Elevated velocities within the right posterior tibial artery suggestive of possible greater than 50% focal stenosis.    Atypical waveforms without significant elevated velocity noted in the distal SFA and popliteal artery on the left which continued throughout the lower leg.  Focal stenosis not excluded.  Further evaluation could be obtained with CTA as clinically warranted.    Clinical Findings:  Wound on left 5th toe measures 0.4x0.4x0.5  With fibrogranular base and hyperkeratotic margins.  Positive PTB, scant purulent-serous discharge.  Negative malodor, erythema    Blood blister on left 3rd toe                  Assessment/Plan:     * Diabetic foot infection    As above     Diabetic ulcer of toe of left foot associated with type 2 diabetes mellitus, with bone involvement without evidence of necrosis    Martha Melvin is a 73 y.o. female with infected foot ulcer that PTB, MRI suspicious of osteo  -performed left proximal phalanx bone biopsy, see note below  -ID consulted, appreciate recs.  -arterial US abnormal, Interventional cardiology consulted  -dressed with betadine gauze dressings.  Nursing to do daily dressing changes, orders in.  -podiatry will follow.    Procedure note:   Surgeon:  Dr. Chirinos  Assisting Surgeon: Juice Crystal DPM, PGY2  Pre op diagnosis: osteomyelitis   Post op diagnosis: same    Anesthesia: as below  Hemostasis: direct pressure.   EBL: < 1ml      Procedure: bone biopsy    Findings:  After consent was signed and witnessed 1ml of 1% lidocaine was given locally.  Toe was dressed and prepped in a sterile manner.  A Jamshedi kit was used to biopsy the left 5th proximal phalanx.  Soft bone was noted.  Patient tolerated procedure well, and hemostasis was controlled with compression.  Foot was dressed with betadine, 4x4's, kerlix, and ace.             Chronic diastolic heart failure    Per primary       Diabetes mellitus due to underlying condition with chronic  kidney disease, with long-term current use of insulin    Per primary           Juice Guerra MD  Podiatry  [facility]

## 2018-12-30 NOTE — PROGRESS NOTES
[facility]  Nephrology  Progress Note    Patient Name: Martha Melvin  MRN: [id number]  Admission Date: 12/27/2018  Hospital Length of Stay: 3 days  Attending Provider: John Butler DO   Primary Care Physician: Katiuska Villalba MD  Principal Problem:Diabetic foot infection    Subjective:     HPI: Ms. Melvin is a 72 yo AAF with ESRD admitted with osteomyelitis of left 5th digit. She receives iHD TTS at [facility] under the care of Dr. Ruelas. Nephrology following for ESRD management.     Interval History:   No events overnight. Patient doing well this afternoon. Tells me she is waiting to get the final antibiotic recommendations from ID and then she can be discharged. Asked for HD tomorrow instead of Tuesday as she is on a Mon-Thurs-Sat schedule this week due to the holiday. No SOB or edema.     Review of patient's allergies indicates:   Allergen Reactions    Morphine      Current Facility-Administered Medications   Medication Frequency    0.9%  NaCl infusion PRN    0.9%  NaCl infusion Once    acetaminophen tablet 650 mg Q4H PRN    amLODIPine tablet 10 mg Daily    atorvastatin tablet 40 mg Daily    bisacodyl EC tablet 5 mg Daily PRN    dextrose 50% injection 12.5 g PRN    dextrose 50% injection 25 g PRN    enoxaparin injection 30 mg Daily    epoetin bashir injection 10,000 Units Every Tues, Thurs, Sat    furosemide tablet 80 mg QAM    glucagon (human recombinant) injection 1 mg PRN    glucose chewable tablet 16 g PRN    glucose chewable tablet 24 g PRN    influenza (FLUZONE HIGH-DOSE) vaccine 0.5 mL vaccine x 1 dose    insulin aspart U-100 pen 0-5 Units QID (AC + HS) PRN    insulin detemir U-100 pen 20 Units QHS    ondansetron injection 4 mg Q8H PRN    piperacillin-tazobactam 4.5 g in dextrose 5 % 100 mL IVPB (ready to mix system) Q12H    sevelamer carbonate tablet 800 mg TID WM    sodium chloride 0.9% flush 5 mL PRN       Objective:     Vital Signs (Most Recent):  Temp: 98.6  °F (37 °C) (12/30/18 1130)  Pulse: 81 (12/30/18 1130)  Resp: 18 (12/30/18 1130)  BP: 137/64 (12/30/18 1130)  SpO2: 98 % (12/30/18 0742)  O2 Device (Oxygen Therapy): room air (12/30/18 0742) Vital Signs (24h Range):  Temp:  [97.2 °F (36.2 °C)-99.2 °F (37.3 °C)] 98.6 °F (37 °C)  Pulse:  [80-93] 81  Resp:  [17-18] 18  SpO2:  [93 %-100 %] 98 %  BP: (104-152)/(55-73) 137/64     Weight: 86.6 kg (190 lb 14.7 oz) (12/30/18 0440)  Body mass index is 31.77 kg/m².  Body surface area is 1.99 meters squared.    I/O last 3 completed shifts:  In: 1340 [P.O.:840; Other:400; IV Piggyback:100]  Out: 2600 [Urine:800; Other:1800]    Physical Exam   Constitutional: She is oriented to person, place, and time. She appears well-developed and well-nourished. No distress.   HENT:   Head: Normocephalic and atraumatic.   Eyes: Conjunctivae and EOM are normal.   Cardiovascular: Normal rate and regular rhythm.   L forearm AVF   Pulmonary/Chest: Effort normal and breath sounds normal. She has no rales.   Abdominal: Soft. She exhibits no distension.   Musculoskeletal: She exhibits no edema or deformity.   Neurological: She is alert and oriented to person, place, and time.   Skin: Skin is warm and dry. She is not diaphoretic.   Psychiatric: She has a normal mood and affect. Her behavior is normal.       Significant Labs:  CBC:   Recent Labs   Lab 12/30/18  0538   WBC 9.71   RBC 4.19   HGB 10.4*   HCT 33.3*      MCV 80*   MCH 24.8*   MCHC 31.2*     CMP:   Recent Labs   Lab 12/27/18  2040  12/30/18  0538   *   < > 191*   CALCIUM 9.1   < > 9.1   ALBUMIN 3.5   < > 3.0*   PROT 8.6*  --   --       < > 136   K 4.2   < > 4.1   CO2 22*   < > 26      < > 96   BUN 38*   < > 39*   CREATININE 5.5*   < > 5.3*   ALKPHOS 72  --   --    ALT 14  --   --    AST 25  --   --    BILITOT 0.5  --   --     < > = values in this interval not displayed.     All labs within the past 24 hours have been reviewed.     Significant Imaging:  Labs:  Reviewed    Assessment/Plan:     ESRD  - receives [facility]HD TTS via L forearm [facility]. Treatment duration 3 hours  - received HD yesterday with UF of 1.4L  - no need for HD today  - possibly being discharged tomorrow. Outpatient dialysis schedule is M-Th-Sa this week  - will arrange for HD tomorrow instead of Tuesday  - renally dose medications  - continue home lasix     Anemia of CKD  - hgb at goal today; can continue home CHACHO for now  - will trend labs daily and adjust CHACHO if hgb > 11     CKD-MBD  - corrected Ca and phos within normal limits today  - continue home sevelamer  - daily labs     HTN  - BP controlled today on amlodipine and lasix  - continue        Thank you for your consult. I will follow-up with patient. Please contact us if you have any additional questions.         Matilda Pascal MD  Nephrology  [facility], [facility]  Office: [phone]  [facility] covering for:  [facility]  ANNA Etienne MD, FACWILMAR,   PRASHANT Hernandez MD,   MD KUSUM Teague, DREW  [address]  [place], [address]  [phone]  After hours answering service: [phone]

## 2018-12-30 NOTE — SUBJECTIVE & OBJECTIVE
Interval Hx:  Patient Seen at bedside for dressing change.  Patient denies F/C/N/V.  Dressings clean, dry, and intact.    Scheduled Meds:   sodium chloride 0.9%   Intravenous Once    amLODIPine  10 mg Oral Daily    atorvastatin  40 mg Oral Daily    enoxaparin  30 mg Subcutaneous Daily    epoetin bashir (PROCRIT) injection  10,000 Units Intravenous Every Tues, Thurs, Sat    furosemide  80 mg Oral QAM    insulin detemir U-100  20 Units Subcutaneous QHS    piperacillin-tazobactam (ZOSYN) IVPB  4.5 g Intravenous Q12H    sevelamer carbonate  800 mg Oral TID WM     Continuous Infusions:  PRN Meds:sodium chloride 0.9%, acetaminophen, bisacodyl, dextrose 50%, dextrose 50%, glucagon (human recombinant), glucose, glucose, influenza, insulin aspart U-100, ondansetron, sodium chloride 0.9%    Review of patient's allergies indicates:   Allergen Reactions    Morphine         Past Medical History:   Diagnosis Date    Arthritis     Diabetes mellitus     Hyperlipidemia     Hypertension     Renal disorder     poor kidney function     Past Surgical History:   Procedure Laterality Date    LEG AMPUTATION Right     PERMCATH INSERTION-TUNNELED CVC N/A 4/25/2018    Performed by Armaan Sidhu MD at [facility] CATH LAB    TRANSPOSITION, VEIN, BASILIC Left 6/27/2018    Performed by AYESHA Mesa III, MD at [facility] OR [address] [place] [address]       Family History     Problem Relation (Age of Onset)    Cancer Mother, Father, Sister    Diabetes Mother    Hypertension Mother        Tobacco Use    Smoking status: Never Smoker    Smokeless tobacco: Never Used   Substance and Sexual Activity    Alcohol use: No    Drug use: Not on file    Sexual activity: Not on file     Review of Systems   Constitutional: Negative for chills and fever.   Cardiovascular: Negative for leg swelling.   Gastrointestinal: Negative for nausea and vomiting.   Musculoskeletal: Negative for arthralgias and joint swelling.   Skin: Positive for color change and wound.  Negative for rash.   Psychiatric/Behavioral: Negative for agitation and confusion.     Objective:     Vital Signs (Most Recent):  Temp: 98.7 °F (37.1 °C) (12/30/18 0804)  Pulse: 84 (12/30/18 0804)  Resp: 18 (12/30/18 0804)  BP: (!) 108/58 (12/30/18 0804)  SpO2: 98 % (12/30/18 0742) Vital Signs (24h Range):  Temp:  [97.2 °F (36.2 °C)-99.2 °F (37.3 °C)] 98.7 °F (37.1 °C)  Pulse:  [71-93] 84  Resp:  [16-19] 18  SpO2:  [93 %-100 %] 98 %  BP: (103-152)/(55-91) 108/58     Weight: 86.6 kg (190 lb 14.7 oz)  Body mass index is 31.77 kg/m².    Foot Exam    Right Foot/Ankle     Comments  BKA    Left Foot/Ankle      Inspection and Palpation  Ecchymosis: none  Tenderness: none   Swelling: none     Neurovascular  Dorsalis pedis: 1+  Posterior tibial: 1+            Laboratory:  A1C:   Recent Labs   Lab 09/05/18  1000 12/28/18  0451   HGBA1C 7.4* 7.4*     CBC:   Recent Labs   Lab 12/30/18  0538   WBC 9.71   RBC 4.19   HGB 10.4*   HCT 33.3*      MCV 80*   MCH 24.8*   MCHC 31.2*     CMP:   Recent Labs   Lab 12/27/18 2040 12/30/18  0538   *   < > 191*   CALCIUM 9.1   < > 9.1   ALBUMIN 3.5   < > 3.0*   PROT 8.6*  --   --       < > 136   K 4.2   < > 4.1   CO2 22*   < > 26      < > 96   BUN 38*   < > 39*   CREATININE 5.5*   < > 5.3*   ALKPHOS 72  --   --    ALT 14  --   --    AST 25  --   --    BILITOT 0.5  --   --     < > = values in this interval not displayed.     CRP:   Recent Labs   Lab 12/27/18 2040   CRP 62.2*     ESR:   Recent Labs   Lab 12/27/18 2040   SEDRATE 66*     Wound Cultures:   Recent Labs   Lab 12/27/18 2045 12/28/18  1604 12/29/18  1506   LABAERO No significant isolate to date Skin mirella,  no predominant organism No growth     Microbiology Results (last 7 days)     Procedure Component Value Units Date/Time    Aerobic culture [[id number]] Collected:  12/29/18 1506    Order Status:  Completed Specimen:  Bone from Toe, Left Foot Updated:  12/30/18 0729     Aerobic Bacterial Culture No growth     Aerobic culture [[id number]] Collected:  12/28/18 1604    Order Status:  Completed Specimen:  Wound from Toe, Left Foot Updated:  12/30/18 0716     Aerobic Bacterial Culture Skin mirella,  no predominant organism    Gram stain [[id number]] Collected:  12/29/18 1506    Order Status:  Completed Specimen:  Bone from Toe, Left Foot Updated:  12/29/18 2228     Gram Stain Result Rare WBC's      Few Gram positive cocci    Culture, Anaerobe [[id number]] Collected:  12/29/18 1506    Order Status:  Sent Specimen:  Bone from Toe, Left Foot Updated:  12/29/18 1836    AFB Culture & Smear [[id number]] Collected:  12/29/18 1506    Order Status:  Sent Specimen:  Bone from Toe, Left Foot Updated:  12/29/18 1836    Fungus culture [[id number]] Collected:  12/29/18 1506    Order Status:  Sent Specimen:  Bone from Toe, Left Foot Updated:  12/29/18 1836    Aerobic culture (Specify Source) **CANNOT BE ORDERED AS STAT** [[id number]] Collected:  12/27/18 2045    Order Status:  Completed Specimen:  Wound from Foot, Left Updated:  12/29/18 0742     Aerobic Bacterial Culture No significant isolate to date    Culture, Anaerobe [[id number]] Collected:  12/27/18 2045    Order Status:  Sent Specimen:  Wound from Foot, Left Updated:  12/28/18 0126        Specimen (12h ago, onward)    None          Diagnostic Results:  X-ray: Deformity of the left 5th proximal phalanx.  The findings may represent remote trauma or evolving osteitis.  Short-term follow-up is suggested.    No evidence of an acute fracture or dislocation of the left foot.    MRI: Prominent marrow edema throughout the 5th phalanges, concerning for osteomyelitis.    Marrow edema in the distal phalanx of the great toe, which may be reactive versus early osteomyelitis.    Arterial US:  Elevated velocities within the right posterior tibial artery suggestive of possible greater than 50% focal stenosis.    Atypical waveforms without significant elevated velocity noted in the distal SFA and popliteal  artery on the left which continued throughout the lower leg.  Focal stenosis not excluded.  Further evaluation could be obtained with CTA as clinically warranted.    Clinical Findings:  Wound on left 5th toe measures 0.4x0.4x0.5  With fibrogranular base and hyperkeratotic margins.  Positive PTB, scant serous discharge.  Negative purulence malodor, erythema    Blood blister on left 3rd toe    12/29

## 2018-12-30 NOTE — SUBJECTIVE & OBJECTIVE
Interval Hx:  Patient Seen at bedside for dressing change.  Patient denies F/C/N/V.  Dressings clean, dry, and intact.  Scheduled Meds:   sodium chloride 0.9%   Intravenous Once    amLODIPine  10 mg Oral Daily    atorvastatin  40 mg Oral Daily    enoxaparin  30 mg Subcutaneous Daily    epoetin bashir (PROCRIT) injection  10,000 Units Intravenous Every Tues, Thurs, Sat    furosemide  80 mg Oral QAM    insulin detemir U-100  20 Units Subcutaneous QHS    piperacillin-tazobactam (ZOSYN) IVPB  4.5 g Intravenous Q12H    sevelamer carbonate  800 mg Oral TID WM     Continuous Infusions:  PRN Meds:sodium chloride 0.9%, acetaminophen, bisacodyl, dextrose 50%, dextrose 50%, glucagon (human recombinant), glucose, glucose, influenza, insulin aspart U-100, ondansetron, sodium chloride 0.9%    Review of patient's allergies indicates:   Allergen Reactions    Morphine         Past Medical History:   Diagnosis Date    Arthritis     Diabetes mellitus     Hyperlipidemia     Hypertension     Renal disorder     poor kidney function     Past Surgical History:   Procedure Laterality Date    LEG AMPUTATION Right     PERMCATH INSERTION-TUNNELED CVC N/A 4/25/2018    Performed by [facility]an [facility]hu [facility] at [facility] CATH LAB    TRANSPOSITION, VEIN, BASILIC Left 6/27/2018    Performed by AYESHA Mesa III, MD at [facility] OR [address] [place] [address]       Family History     Problem Relation (Age of Onset)    Cancer Mother, Father, Sister    Diabetes Mother    Hypertension Mother        Tobacco Use    Smoking status: Never Smoker    Smokeless tobacco: Never Used   Substance and Sexual Activity    Alcohol use: No    Drug use: Not on file    Sexual activity: Not on file     Review of Systems   Constitutional: Negative for chills and fever.   Cardiovascular: Negative for leg swelling.   Gastrointestinal: Negative for nausea and vomiting.   Musculoskeletal: Negative for arthralgias and joint swelling.   Skin: Positive for color change and wound. Negative  for rash.   Psychiatric/Behavioral: Negative for agitation and confusion.     Objective:     Vital Signs (Most Recent):  Temp: 98.9 °F (37.2 °C) (12/29/18 1946)  Pulse: 83 (12/29/18 1946)  Resp: 18 (12/29/18 1946)  BP: (!) 114/55 (12/29/18 1946)  SpO2: 100 % (12/29/18 1942) Vital Signs (24h Range):  Temp:  [97.2 °F (36.2 °C)-99.4 °F (37.4 °C)] 98.9 °F (37.2 °C)  Pulse:  [71-83] 83  Resp:  [16-19] 18  SpO2:  [95 %-100 %] 100 %  BP: (103-175)/(55-91) 114/55     Weight: 87.1 kg (192 lb 0.3 oz)  Body mass index is 31.95 kg/m².    Foot Exam    Right Foot/Ankle     Comments  BKA    Left Foot/Ankle      Inspection and Palpation  Ecchymosis: none  Tenderness: none   Swelling: none     Neurovascular  Dorsalis pedis: 1+  Posterior tibial: 1+            Laboratory:  A1C:   Recent Labs   Lab 09/05/18  1000 12/28/18  0451   HGBA1C 7.4* 7.4*     CBC:   Recent Labs   Lab 12/29/18  0954   WBC 8.66   RBC 4.13   HGB 10.5*   HCT 33.0*      MCV 80*   MCH 25.4*   MCHC 31.8*     CMP:   Recent Labs   Lab 12/27/18 2040 12/29/18  0954   * 267*   CALCIUM 9.1 8.6*   ALBUMIN 3.5 3.0*   PROT 8.6*  --     136   K 4.2 3.9   CO2 22* 23    100   BUN 38* 56*   CREATININE 5.5* 6.5*   ALKPHOS 72  --    ALT 14  --    AST 25  --    BILITOT 0.5  --      CRP:   Recent Labs   Lab 12/27/18 2040   CRP 62.2*     ESR:   Recent Labs   Lab 12/27/18 2040   SEDRATE 66*     Wound Cultures:   Recent Labs   Lab 12/27/18 2045 12/28/18  1604   LABAERO No significant isolate to date Insufficient incubation, culture in progress     Microbiology Results (last 7 days)     Procedure Component Value Units Date/Time    Aerobic culture [[id number]] Collected:  12/29/18 1506    Order Status:  Sent Specimen:  Bone from Toe, Left Foot Updated:  12/29/18 1836    Culture, Anaerobe [[id number]] Collected:  12/29/18 1506    Order Status:  Sent Specimen:  Bone from Toe, Left Foot Updated:  12/29/18 1836    Gram stain [[id number]] Collected:  12/29/18 1506     Order Status:  Sent Specimen:  Bone from Toe, Left Foot Updated:  12/29/18 1836    AFB Culture & Smear [[id number]] Collected:  12/29/18 1506    Order Status:  Sent Specimen:  Bone from Toe, Left Foot Updated:  12/29/18 1836    Fungus culture [[id number]] Collected:  12/29/18 1506    Order Status:  Sent Specimen:  Bone from Toe, Left Foot Updated:  12/29/18 1836    Aerobic culture [[id number]] Collected:  12/28/18 1604    Order Status:  Completed Specimen:  Wound from Toe, Left Foot Updated:  12/29/18 1311     Aerobic Bacterial Culture Insufficient incubation, culture in progress    Aerobic culture (Specify Source) **CANNOT BE ORDERED AS STAT** [[id number]] Collected:  12/27/18 2045    Order Status:  Completed Specimen:  Wound from Foot, Left Updated:  12/29/18 0742     Aerobic Bacterial Culture No significant isolate to date    Culture, Anaerobe [[id number]] Collected:  12/27/18 2045    Order Status:  Sent Specimen:  Wound from Foot, Left Updated:  12/28/18 0126        Specimen (12h ago, onward)    Start     Ordered    12/29/18 1456  Specimen to Pathology - Surgery  Once     Comments:  Left 5th proximal phalanx.  Suspicious of osteomyelitis     Start Status   12/29/18 1456 Collected (12/29/18 1504)       12/29/18 1504          Diagnostic Results:  X-ray: Deformity of the left 5th proximal phalanx.  The findings may represent remote trauma or evolving osteitis.  Short-term follow-up is suggested.    No evidence of an acute fracture or dislocation of the left foot.    MRI: Prominent marrow edema throughout the 5th phalanges, concerning for osteomyelitis.    Marrow edema in the distal phalanx of the great toe, which may be reactive versus early osteomyelitis.    Arterial US:  Elevated velocities within the right posterior tibial artery suggestive of possible greater than 50% focal stenosis.    Atypical waveforms without significant elevated velocity noted in the distal SFA and popliteal artery on the left which continued  throughout the lower leg.  Focal stenosis not excluded.  Further evaluation could be obtained with CTA as clinically warranted.    Clinical Findings:  Wound on left 5th toe measures 0.4x0.4x0.5  With fibrogranular base and hyperkeratotic margins.  Positive PTB, scant purulent-serous discharge.  Negative malodor, erythema    Blood blister on left 3rd toe

## 2018-12-30 NOTE — PROGRESS NOTES
Progress notes reviewed. Evening rounds completed. Introduced self as VN for this shift. Educated pt on VN's role in patient's care.  Plan of care reviewed with patient. Opportunity given for pt's questions. Patient not agreeable with bed alarm settings. Safety precautions explained. Patient verbalized understanding.  Instructed to call for assistance. Patient verbalizes understanding.  VN to continue to monitor.

## 2018-12-30 NOTE — SUBJECTIVE & OBJECTIVE
"Past Medical History:   Diagnosis Date    Arthritis     Diabetes mellitus     Hyperlipidemia     Hypertension     Renal disorder     poor kidney function       Past Surgical History:   Procedure Laterality Date    LEG AMPUTATION Right     PERMCATH INSERTION-TUNNELED CVC N/A 4/25/2018    Performed by Armaan Sidhu MD at [facility] CATH LAB    TRANSPOSITION, VEIN, BASILIC Left 6/27/2018    Performed by AYESHA Mesa III, MD at [facility] OR [address] [place] [address]       Review of patient's allergies indicates:   Allergen Reactions    Morphine        No current facility-administered medications on file prior to encounter.      Current Outpatient Medications on File Prior to Encounter   Medication Sig    amLODIPine (NORVASC) 10 MG tablet Take 1 tablet (10 mg total) by mouth once daily.    atorvastatin (LIPITOR) 40 MG tablet Take 1 tablet (40 mg total) by mouth once daily.    bisacodyl (DULCOLAX) 5 mg EC tablet Take 5 mg by mouth daily as needed for Constipation.    blood sugar diagnostic Strp Test 2 (two) times daily.as directed    blood-glucose meter Misc Use to test blood sugar twice daily    calcium carbonate (TUMS E-X) 300 mg (750 mg) Chew Take 1 tablet by mouth 3 (three) times daily with meals.    furosemide (LASIX) 80 MG tablet Take 1 tablet (80 mg total) by mouth every morning.    insulin aspart U-100 (NOVOLOG FLEXPEN U-100 INSULIN) 100 unit/mL InPn pen Inject 10 Units into the skin 3 (three) times daily with meals. (Patient taking differently: Inject 4 Units into the skin 3 (three) times daily with meals. )    insulin glargine (LANTUS) 100 unit/mL injection Inject 34 Units into the skin every evening. (Patient taking differently: Inject 20 Units into the skin every evening. )    lancets (TRUEPLUS LANCETS) 30 gauge Misc Use to test blood sugar twice daily    loratadine (CLARITIN) 10 mg tablet Take 10 mg by mouth once daily.    pen needle, diabetic (NOVOFINE PLUS) 32 gauge x 1/6" Ndle use 3 (three) times daily as " needed.    senna-docusate 8.6-50 mg (PERICOLACE) 8.6-50 mg per tablet Take 1 tablet by mouth 2 (two) times daily. (Patient taking differently: Take 1 tablet by mouth 2 (two) times daily as needed. )    sevelamer carbonate (RENVELA) 800 mg Tab Take 1 tablet (800 mg total) by mouth 3 (three) times a day with meals.     Family History     Problem Relation (Age of Onset)    Cancer Mother, Father, Sister    Diabetes Mother    Hypertension Mother        Tobacco Use    Smoking status: Never Smoker    Smokeless tobacco: Never Used   Substance and Sexual Activity    Alcohol use: No    Drug use: Not on file    Sexual activity: Not on file     Review of Systems   Constitutional: Negative for chills, diaphoresis and fever.   Eyes: Negative for photophobia.   Respiratory: Negative for cough, chest tightness, shortness of breath and wheezing.    Cardiovascular: Negative for chest pain, palpitations and leg swelling.   Gastrointestinal: Negative for abdominal pain, diarrhea, nausea and vomiting.   Genitourinary: Negative for dysuria, flank pain, frequency and hematuria.   Musculoskeletal: Negative for back pain and myalgias.   Skin: Positive for color change (left foot toes ) and wound (left foot, 5th toe ).   Neurological: Negative for dizziness, syncope, light-headedness and headaches.   Psychiatric/Behavioral: Negative for confusion.     Objective:     Vital Signs (Most Recent):  Temp: 98.6 °F (37 °C) (12/30/18 1130)  Pulse: 81 (12/30/18 1130)  Resp: 18 (12/30/18 1130)  BP: 137/64 (12/30/18 1130)  SpO2: 98 % (12/30/18 0742) Vital Signs (24h Range):  Temp:  [97.2 °F (36.2 °C)-99.2 °F (37.3 °C)] 98.6 °F (37 °C)  Pulse:  [78-93] 81  Resp:  [17-19] 18  SpO2:  [93 %-100 %] 98 %  BP: (104-152)/(55-73) 137/64     Weight: 86.6 kg (190 lb 14.7 oz)  Body mass index is 31.77 kg/m².    Physical Exam   Constitutional: She is oriented to person, place, and time. She appears well-developed and well-nourished.   HENT:   Head:  Normocephalic and atraumatic.   Eyes: Conjunctivae are normal. Pupils are equal, round, and reactive to light.   Neck: Normal range of motion. No JVD present.   Cardiovascular: Normal rate, regular rhythm, normal heart sounds and intact distal pulses.   Pulmonary/Chest: Effort normal. No respiratory distress. She has no wheezes.   Abdominal: Soft. Bowel sounds are normal. She exhibits no distension. There is no tenderness. There is no guarding.   Musculoskeletal: Normal range of motion. She exhibits edema (L foot ). She exhibits no tenderness.   R BKA    Neurological: She is alert and oriented to person, place, and time.   Skin: Skin is warm and dry. Capillary refill takes less than 2 seconds.   +necrosis to Left foot 3rd/4th toes, ulcer to left foot 5th digit, no drainage noted    Psychiatric: She has a normal mood and affect. Her behavior is normal.         CRANIAL NERVES     CN III, IV, VI   Pupils are equal, round, and reactive to light.       Significant Labs:   BMP:   Recent Labs   Lab 12/30/18  0538   *      K 4.1   CL 96   CO2 26   BUN 39*   CREATININE 5.3*   CALCIUM 9.1     CBC:   Recent Labs   Lab 12/29/18  0954 12/30/18  0538   WBC 8.66 9.71   HGB 10.5* 10.4*   HCT 33.0* 33.3*    242       Significant Imaging: I have reviewed all pertinent imaging results/findings within the past 24 hours.

## 2018-12-30 NOTE — ASSESSMENT & PLAN NOTE
Martha Melvin is a 73 y.o. female with infected foot ulcer that PTB, MRI suspicious of osteo  -bone biopsy results pending  -ID on board, appreciate recs.   -Plan for local wound care and abx treatment per ID recs.  -arterial US abnormal, Interventional cardiology consulted  -dressed with betadine 4x4's, cast padding, and ace.  Nursing to do daily dressing changes, orders in.  -podiatry will follow.    DC Instructions:  Patient is to follow up with podiatry within 1 week of discharge.    Weight bearing status:  Partial weight bearing to heel only for short transfers  Offloading device: Darco shoe

## 2018-12-30 NOTE — SUBJECTIVE & OBJECTIVE
Interval History:   No events overnight. Patient doing well this afternoon. Tells me she is waiting to get the final antibiotic recommendations from ID and then she can be discharged. Asked for HD tomorrow instead of Tuesday as she is on a Mon-Thurs-Sat schedule this week due to the holiday. No SOB or edema.     Review of patient's allergies indicates:   Allergen Reactions    Morphine      Current Facility-Administered Medications   Medication Frequency    0.9%  NaCl infusion PRN    0.9%  NaCl infusion Once    acetaminophen tablet 650 mg Q4H PRN    amLODIPine tablet 10 mg Daily    atorvastatin tablet 40 mg Daily    bisacodyl EC tablet 5 mg Daily PRN    dextrose 50% injection 12.5 g PRN    dextrose 50% injection 25 g PRN    enoxaparin injection 30 mg Daily    epoetin bashir injection 10,000 Units Every Tues, Thurs, Sat    furosemide tablet 80 mg QAM    glucagon (human recombinant) injection 1 mg PRN    glucose chewable tablet 16 g PRN    glucose chewable tablet 24 g PRN    influenza (FLUZONE HIGH-DOSE) vaccine 0.5 mL vaccine x 1 dose    insulin aspart U-100 pen 0-5 Units QID (AC + HS) PRN    insulin detemir U-100 pen 20 Units QHS    ondansetron injection 4 mg Q8H PRN    piperacillin-tazobactam 4.5 g in dextrose 5 % 100 mL IVPB (ready to mix system) Q12H    sevelamer carbonate tablet 800 mg TID WM    sodium chloride 0.9% flush 5 mL PRN       Objective:     Vital Signs (Most Recent):  Temp: 98.6 °F (37 °C) (12/30/18 1130)  Pulse: 81 (12/30/18 1130)  Resp: 18 (12/30/18 1130)  BP: 137/64 (12/30/18 1130)  SpO2: 98 % (12/30/18 0742)  O2 Device (Oxygen Therapy): room air (12/30/18 0742) Vital Signs (24h Range):  Temp:  [97.2 °F (36.2 °C)-99.2 °F (37.3 °C)] 98.6 °F (37 °C)  Pulse:  [80-93] 81  Resp:  [17-18] 18  SpO2:  [93 %-100 %] 98 %  BP: (104-152)/(55-73) 137/64     Weight: 86.6 kg (190 lb 14.7 oz) (12/30/18 2360)  Body mass index is 31.77 kg/m².  Body surface area is 1.99 meters squared.    I/O last  3 completed shifts:  In: 1340 [P.O.:840; Other:400; IV Piggyback:100]  Out: 2600 [Urine:800; Other:1800]    Physical Exam   Constitutional: She is oriented to person, place, and time. She appears well-developed and well-nourished. No distress.   HENT:   Head: Normocephalic and atraumatic.   Eyes: Conjunctivae and EOM are normal.   Cardiovascular: Normal rate and regular rhythm.   L forearm AVF   Pulmonary/Chest: Effort normal and breath sounds normal. She has no rales.   Abdominal: Soft. She exhibits no distension.   Musculoskeletal: She exhibits no edema or deformity.   Neurological: She is alert and oriented to person, place, and time.   Skin: Skin is warm and dry. She is not diaphoretic.   Psychiatric: She has a normal mood and affect. Her behavior is normal.       Significant Labs:  CBC:   Recent Labs   Lab 12/30/18  0538   WBC 9.71   RBC 4.19   HGB 10.4*   HCT 33.3*      MCV 80*   MCH 24.8*   MCHC 31.2*     CMP:   Recent Labs   Lab 12/27/18  2040  12/30/18  0538   *   < > 191*   CALCIUM 9.1   < > 9.1   ALBUMIN 3.5   < > 3.0*   PROT 8.6*  --   --       < > 136   K 4.2   < > 4.1   CO2 22*   < > 26      < > 96   BUN 38*   < > 39*   CREATININE 5.5*   < > 5.3*   ALKPHOS 72  --   --    ALT 14  --   --    AST 25  --   --    BILITOT 0.5  --   --     < > = values in this interval not displayed.     All labs within the past 24 hours have been reviewed.     Significant Imaging:  Labs: Reviewed

## 2018-12-30 NOTE — PROGRESS NOTES
[facility]  Podiatry  Progress Note    Patient Name: Martha Melvin  MRN: [id number]  Admission Date: 12/27/2018  Hospital Length of Stay: 3 days  Attending Physician: John Butler DO  Primary Care Provider: Katiuska Villalba MD   Interval Hx:  Patient Seen at bedside for dressing change.  Patient denies F/C/N/V.  Dressings clean, dry, and intact.    Scheduled Meds:   sodium chloride 0.9%   Intravenous Once    amLODIPine  10 mg Oral Daily    atorvastatin  40 mg Oral Daily    enoxaparin  30 mg Subcutaneous Daily    epoetin bashir (PROCRIT) injection  10,000 Units Intravenous Every Tues, Thurs, Sat    furosemide  80 mg Oral QAM    insulin detemir U-100  20 Units Subcutaneous QHS    piperacillin-tazobactam (ZOSYN) IVPB  4.5 g Intravenous Q12H    sevelamer carbonate  800 mg Oral TID WM     Continuous Infusions:  PRN Meds:sodium chloride 0.9%, acetaminophen, bisacodyl, dextrose 50%, dextrose 50%, glucagon (human recombinant), glucose, glucose, influenza, insulin aspart U-100, ondansetron, sodium chloride 0.9%    Review of patient's allergies indicates:   Allergen Reactions    Morphine         Past Medical History:   Diagnosis Date    Arthritis     Diabetes mellitus     Hyperlipidemia     Hypertension     Renal disorder     poor kidney function     Past Surgical History:   Procedure Laterality Date    LEG AMPUTATION Right     PERMCATH INSERTION-TUNNELED CVC N/A 4/25/2018    Performed by Armaan Sidhu MD at [facility] CATH LAB    TRANSPOSITION, VEIN, BASILIC Left 6/27/2018    Performed by AYESHA Mesa III, MD at [facility] OR [address] [place] [address]       Family History     Problem Relation (Age of Onset)    Cancer Mother, Father, Sister    Diabetes Mother    Hypertension Mother        Tobacco Use    Smoking status: Never Smoker    Smokeless tobacco: Never Used   Substance and Sexual Activity    Alcohol use: No    Drug use: Not on file    Sexual activity: Not on file     Review of Systems    Constitutional: Negative for chills and fever.   Cardiovascular: Negative for leg swelling.   Gastrointestinal: Negative for nausea and vomiting.   Musculoskeletal: Negative for arthralgias and joint swelling.   Skin: Positive for color change and wound. Negative for rash.   Psychiatric/Behavioral: Negative for agitation and confusion.     Objective:     Vital Signs (Most Recent):  Temp: 98.7 °F (37.1 °C) (12/30/18 0804)  Pulse: 84 (12/30/18 0804)  Resp: 18 (12/30/18 0804)  BP: (!) 108/58 (12/30/18 0804)  SpO2: 98 % (12/30/18 0742) Vital Signs (24h Range):  Temp:  [97.2 °F (36.2 °C)-99.2 °F (37.3 °C)] 98.7 °F (37.1 °C)  Pulse:  [71-93] 84  Resp:  [16-19] 18  SpO2:  [93 %-100 %] 98 %  BP: (103-152)/(55-91) 108/58     Weight: 86.6 kg (190 lb 14.7 oz)  Body mass index is 31.77 kg/m².    Foot Exam    Right Foot/Ankle     Comments  BKA    Left Foot/Ankle      Inspection and Palpation  Ecchymosis: none  Tenderness: none   Swelling: none     Neurovascular  Dorsalis pedis: 1+  Posterior tibial: 1+            Laboratory:  A1C:   Recent Labs   Lab 09/05/18  1000 12/28/18  0451   HGBA1C 7.4* 7.4*     CBC:   Recent Labs   Lab 12/30/18  0538   WBC 9.71   RBC 4.19   HGB 10.4*   HCT 33.3*      MCV 80*   MCH 24.8*   MCHC 31.2*     CMP:   Recent Labs   Lab 12/27/18  2040  12/30/18  0538   *   < > 191*   CALCIUM 9.1   < > 9.1   ALBUMIN 3.5   < > 3.0*   PROT 8.6*  --   --       < > 136   K 4.2   < > 4.1   CO2 22*   < > 26      < > 96   BUN 38*   < > 39*   CREATININE 5.5*   < > 5.3*   ALKPHOS 72  --   --    ALT 14  --   --    AST 25  --   --    BILITOT 0.5  --   --     < > = values in this interval not displayed.     CRP:   Recent Labs   Lab 12/27/18 2040   CRP 62.2*     ESR:   Recent Labs   Lab 12/27/18 2040   SEDRATE 66*     Wound Cultures:   Recent Labs   Lab 12/27/18  2045 12/28/18  1604 12/29/18  1506   LABAERO No significant isolate to date Skin mirella,  no predominant organism No growth      Microbiology Results (last 7 days)     Procedure Component Value Units Date/Time    Aerobic culture [[id number]] Collected:  12/29/18 1506    Order Status:  Completed Specimen:  Bone from Toe, Left Foot Updated:  12/30/18 0729     Aerobic Bacterial Culture No growth    Aerobic culture [[id number]] Collected:  12/28/18 1604    Order Status:  Completed Specimen:  Wound from Toe, Left Foot Updated:  12/30/18 0716     Aerobic Bacterial Culture Skin mirella,  no predominant organism    Gram stain [[id number]] Collected:  12/29/18 1506    Order Status:  Completed Specimen:  Bone from Toe, Left Foot Updated:  12/29/18 2228     Gram Stain Result Rare WBC's      Few Gram positive cocci    Culture, Anaerobe [[id number]] Collected:  12/29/18 1506    Order Status:  Sent Specimen:  Bone from Toe, Left Foot Updated:  12/29/18 1836    AFB Culture & Smear [[id number]] Collected:  12/29/18 1506    Order Status:  Sent Specimen:  Bone from Toe, Left Foot Updated:  12/29/18 1836    Fungus culture [[id number]] Collected:  12/29/18 1506    Order Status:  Sent Specimen:  Bone from Toe, Left Foot Updated:  12/29/18 1836    Aerobic culture (Specify Source) **CANNOT BE ORDERED AS STAT** [[id number]] Collected:  12/27/18 2045    Order Status:  Completed Specimen:  Wound from Foot, Left Updated:  12/29/18 0742     Aerobic Bacterial Culture No significant isolate to date    Culture, Anaerobe [[id number]] Collected:  12/27/18 2045    Order Status:  Sent Specimen:  Wound from Foot, Left Updated:  12/28/18 0126        Specimen (12h ago, onward)    None          Diagnostic Results:  X-ray: Deformity of the left 5th proximal phalanx.  The findings may represent remote trauma or evolving osteitis.  Short-term follow-up is suggested.    No evidence of an acute fracture or dislocation of the left foot.    MRI: Prominent marrow edema throughout the 5th phalanges, concerning for osteomyelitis.    Marrow edema in the distal phalanx of the great toe, which may  be reactive versus early osteomyelitis.    Arterial US:  Elevated velocities within the right posterior tibial artery suggestive of possible greater than 50% focal stenosis.    Atypical waveforms without significant elevated velocity noted in the distal SFA and popliteal artery on the left which continued throughout the lower leg.  Focal stenosis not excluded.  Further evaluation could be obtained with CTA as clinically warranted.    Clinical Findings:  Wound on left 5th toe measures 0.4x0.4x0.5  With fibrogranular base and hyperkeratotic margins.  Positive PTB, scant serous discharge.  Negative purulence malodor, erythema    Blood blister on left 3rd toe    12/29                  Assessment/Plan:     * Diabetic foot infection    As above     Diabetic ulcer of toe of left foot associated with type 2 diabetes mellitus, with bone involvement without evidence of necrosis    Martha Melvin is a 73 y.o. female with infected foot ulcer that PTB, MRI suspicious of osteo  -bone biopsy results pending  -ID on board, appreciate recs.   -Plan for local wound care and abx treatment per ID recs.  -arterial US abnormal, Interventional cardiology consulted  -dressed with betadine 4x4's, cast padding, and ace.  Nursing to do daily dressing changes, orders in.  -podiatry will follow.    DC Instructions:  Patient is to follow up with podiatry within 1 week of discharge.    Weight bearing status:  Partial weight bearing to heel only for short transfers  Offloading device: Darco shoe         History of leg amputation    Per primary       Chronic diastolic heart failure    Per primary       Diabetes mellitus due to underlying condition with chronic kidney disease, with long-term current use of insulin    Per primary           Juice Guerra MD  Podiatry  [facility]

## 2018-12-30 NOTE — PROGRESS NOTES
"[facility] Hospital Medicine  Progress Note    Patient Name: Martha Melvin  MRN: [id number]  Patient Class: IP- Inpatient   Admission Date: 12/27/2018  Length of Stay: 3 days  Attending Physician: John Butler DO  Primary Care Provider: Katiuska Villalba MD        Subjective:     Principal Problem:Diabetic foot infection    HPI:  Martha Melvin is a 72 yo  female with HTN, HLD, Type 2 diabetes mellitus, hx of cardiac arrest, chronic diastolic HF, moderate aortic stenosis, ESRD (HD TTS), s/p right BKA (2005) secondary to nonhealing diabetic ulcer. She lives in [place], [place]. Her primary care physician is Dr. Katiuska Villalba. The patient cannot recall her nephrologist name.  She reports she has not been established with podiatry.     Pt presented to [facility] 11/27/18 with complaint of purulent drainage from left foot ulcer.  Patient states symptoms initially started as a callus on the 5th digit of the left foot. She reports noticing gradually increase in edema with mild erythema. Pt noted drainage from site today. She also reports "black toes" (3rd and 4th digit) for several months. Patient reports she was seen by her PCP 3-4 months ago. She did not have discoloration to toes at the time. Pt has never been evaluated by podiatry. She denies fever/chills, n/v, chest pain and SOB. Initial labs remarkable for H/H 9.8/30.9, BUN/CR 38/5.5, K within normal limits. Left foot xray show deformity of the left 5th proximal phalanx which may represent remote trauma or evolving osteitis. Pt afebrile, no leukocytosis, VSS. Pt admitted to [facility] medicine for further evaluation.     Hospital Course:  Pt seen at bedside, she has black toes on the left foot.  Will await podiatry and will order US of LE for arterial to check blood flow  12/29, MRI positive for most likely osteo of firt toe and fifth toe of lefdt foot.  Consulting ID. Continue current abx  12/30 no events overnight.  Doing " ok, cards will f/u with pt , discussed case.  Giving an extra dose of vanc/discussed with Pharm D    Past Medical History:   Diagnosis Date    Arthritis     Diabetes mellitus     Hyperlipidemia     Hypertension     Renal disorder     poor kidney function       Past Surgical History:   Procedure Laterality Date    LEG AMPUTATION Right     PERMCATH INSERTION-TUNNELED CVC N/A 4/25/2018    Performed by [facility]hu [facility] at [facility] CATH LAB    TRANSPOSITION, VEIN, BASILIC Left 6/27/2018    Performed by AYESHA Mesa III, MD at [facility] OR [address] [place] [address]       Review of patient's allergies indicates:   Allergen Reactions    Morphine        No current facility-administered medications on file prior to encounter.      Current Outpatient Medications on File Prior to Encounter   Medication Sig    amLODIPine (NORVASC) 10 MG tablet Take 1 tablet (10 mg total) by mouth once daily.    atorvastatin (LIPITOR) 40 MG tablet Take 1 tablet (40 mg total) by mouth once daily.    bisacodyl (DULCOLAX) 5 mg EC tablet Take 5 mg by mouth daily as needed for Constipation.    blood sugar diagnostic Strp Test 2 (two) times daily.as directed    blood-glucose meter Misc Use to test blood sugar twice daily    calcium carbonate (TUMS E-X) 300 mg (750 mg) Chew Take 1 tablet by mouth 3 (three) times daily with meals.    furosemide (LASIX) 80 MG tablet Take 1 tablet (80 mg total) by mouth every morning.    insulin aspart U-100 (NOVOLOG FLEXPEN U-100 INSULIN) 100 unit/mL InPn pen Inject 10 Units into the skin 3 (three) times daily with meals. (Patient taking differently: Inject 4 Units into the skin 3 (three) times daily with meals. )    insulin glargine (LANTUS) 100 unit/mL injection Inject 34 Units into the skin every evening. (Patient taking differently: Inject 20 Units into the skin every evening. )    lancets (TRUEPLUS LANCETS) 30 gauge Misc Use to test blood sugar twice daily    loratadine (CLARITIN) 10 mg tablet Take 10 mg by mouth  "once daily.    pen needle, diabetic (NOVOFINE PLUS) 32 gauge x 1/6" Ndle use 3 (three) times daily as needed.    senna-docusate 8.6-50 mg (PERICOLACE) 8.6-50 mg per tablet Take 1 tablet by mouth 2 (two) times daily. (Patient taking differently: Take 1 tablet by mouth 2 (two) times daily as needed. )    sevelamer carbonate (RENVELA) 800 mg Tab Take 1 tablet (800 mg total) by mouth 3 (three) times a day with meals.     Family History     Problem Relation (Age of Onset)    Cancer Mother, Father, Sister    Diabetes Mother    Hypertension Mother        Tobacco Use    Smoking status: Never Smoker    Smokeless tobacco: Never Used   Substance and Sexual Activity    Alcohol use: No    Drug use: Not on file    Sexual activity: Not on file     Review of Systems   Constitutional: Negative for chills, diaphoresis and fever.   Eyes: Negative for photophobia.   Respiratory: Negative for cough, chest tightness, shortness of breath and wheezing.    Cardiovascular: Negative for chest pain, palpitations and leg swelling.   Gastrointestinal: Negative for abdominal pain, diarrhea, nausea and vomiting.   Genitourinary: Negative for dysuria, flank pain, frequency and hematuria.   Musculoskeletal: Negative for back pain and myalgias.   Skin: Positive for color change (left foot toes ) and wound (left foot, 5th toe ).   Neurological: Negative for dizziness, syncope, light-headedness and headaches.   Psychiatric/Behavioral: Negative for confusion.     Objective:     Vital Signs (Most Recent):  Temp: 98.6 °F (37 °C) (12/30/18 1130)  Pulse: 81 (12/30/18 1130)  Resp: 18 (12/30/18 1130)  BP: 137/64 (12/30/18 1130)  SpO2: 98 % (12/30/18 0742) Vital Signs (24h Range):  Temp:  [97.2 °F (36.2 °C)-99.2 °F (37.3 °C)] 98.6 °F (37 °C)  Pulse:  [78-93] 81  Resp:  [17-19] 18  SpO2:  [93 %-100 %] 98 %  BP: (104-152)/(55-73) 137/64     Weight: 86.6 kg (190 lb 14.7 oz)  Body mass index is 31.77 kg/m².    Physical Exam   Constitutional: She is " oriented to person, place, and time. She appears well-developed and well-nourished.   HENT:   Head: Normocephalic and atraumatic.   Eyes: Conjunctivae are normal. Pupils are equal, round, and reactive to light.   Neck: Normal range of motion. No JVD present.   Cardiovascular: Normal rate, regular rhythm, normal heart sounds and intact distal pulses.   Pulmonary/Chest: Effort normal. No respiratory distress. She has no wheezes.   Abdominal: Soft. Bowel sounds are normal. She exhibits no distension. There is no tenderness. There is no guarding.   Musculoskeletal: Normal range of motion. She exhibits edema (L foot ). She exhibits no tenderness.   R BKA    Neurological: She is alert and oriented to person, place, and time.   Skin: Skin is warm and dry. Capillary refill takes less than 2 seconds.   +necrosis to Left foot 3rd/4th toes, ulcer to left foot 5th digit, no drainage noted    Psychiatric: She has a normal mood and affect. Her behavior is normal.         CRANIAL NERVES     CN III, IV, VI   Pupils are equal, round, and reactive to light.       Significant Labs:   BMP:   Recent Labs   Lab 12/30/18  0538   *      K 4.1   CL 96   CO2 26   BUN 39*   CREATININE 5.3*   CALCIUM 9.1     CBC:   Recent Labs   Lab 12/29/18  0954 12/30/18  0538   WBC 8.66 9.71   HGB 10.5* 10.4*   HCT 33.0* 33.3*    242       Significant Imaging: I have reviewed all pertinent imaging results/findings within the past 24 hours.    Assessment/Plan:      * Diabetic foot infection    Diabetic ulcer of toe of left foot associated with type 2 diabetes mellitus, with necrosis of bone  History of leg amputation  Diabetes Mellitus with long term insulin use     A1C 7.4 (9/5/2018)     -wound cultures pending   -L foot xray reviewed   -MRI L foot r/o osteo   -consult podiatry   -taking insulin Aspart-10 units TID and Lantus 20 units QHS, continue   -SSI, Accucheck AC&HS, diabetic diet          Diabetic ulcer of toe of left foot  associated with type 2 diabetes mellitus, with bone involvement without evidence of necrosis      MRI positive for osteo.  Will continue IV ABX  Will consult ID     ESRD (end stage renal disease) on dialysis    Anemia of chronic disease    HD TTS, H/H at baseline   -consult nephrology (pt does not know name of her nephrologist)      HTN (hypertension)    Chronic diastolic heart failure  Aortic stenosis  History of cardiac arrest  Hyperlipidemia    BP stable, no evidence of volume overload   -continue amlodipine, atorvastatin and lasix        VTE Risk Mitigation (From admission, onward)        Ordered     enoxaparin injection 30 mg  Daily      12/27/18 2321     IP VTE HIGH RISK PATIENT  Once      12/27/18 2310              John Butler DO  Department of Hospital Medicine   [facility]

## 2018-12-31 LAB
ALBUMIN SERPL BCP-MCNC: 3 G/DL
ANION GAP SERPL CALC-SCNC: 12 MMOL/L
ANION GAP SERPL CALC-SCNC: 15 MMOL/L
BACTERIA SPEC AEROBE CULT: NORMAL
BUN SERPL-MCNC: 22 MG/DL
BUN SERPL-MCNC: 53 MG/DL
CALCIUM SERPL-MCNC: 8.8 MG/DL
CALCIUM SERPL-MCNC: 9.4 MG/DL
CHLORIDE SERPL-SCNC: 100 MMOL/L
CHLORIDE SERPL-SCNC: 98 MMOL/L
CHOLEST SERPL-MCNC: 124 MG/DL
CHOLEST/HDLC SERPL: 3.6 {RATIO}
CO2 SERPL-SCNC: 22 MMOL/L
CO2 SERPL-SCNC: 25 MMOL/L
CREAT SERPL-MCNC: 4.2 MG/DL
CREAT SERPL-MCNC: 6.9 MG/DL
ERYTHROCYTE [DISTWIDTH] IN BLOOD BY AUTOMATED COUNT: 15.8 %
ERYTHROCYTE [DISTWIDTH] IN BLOOD BY AUTOMATED COUNT: 16.2 %
EST. GFR  (AFRICAN AMERICAN): 12 ML/MIN/1.73 M^2
EST. GFR  (AFRICAN AMERICAN): 6 ML/MIN/1.73 M^2
EST. GFR  (NON AFRICAN AMERICAN): 10 ML/MIN/1.73 M^2
EST. GFR  (NON AFRICAN AMERICAN): 5 ML/MIN/1.73 M^2
GLUCOSE SERPL-MCNC: 105 MG/DL
GLUCOSE SERPL-MCNC: 147 MG/DL
HBV CORE AB SERPL QL IA: NEGATIVE
HBV SURFACE AG SERPL QL IA: NEGATIVE
HCT VFR BLD AUTO: 32.9 %
HCT VFR BLD AUTO: 34.8 %
HDLC SERPL-MCNC: 34 MG/DL
HDLC SERPL: 27.4 %
HGB BLD-MCNC: 10.3 G/DL
HGB BLD-MCNC: 10.9 G/DL
LDLC SERPL CALC-MCNC: 72.4 MG/DL
MCH RBC QN AUTO: 24.8 PG
MCH RBC QN AUTO: 24.9 PG
MCHC RBC AUTO-ENTMCNC: 31.3 G/DL
MCHC RBC AUTO-ENTMCNC: 31.3 G/DL
MCV RBC AUTO: 79 FL
MCV RBC AUTO: 80 FL
NONHDLC SERPL-MCNC: 90 MG/DL
PHOSPHATE SERPL-MCNC: 5.1 MG/DL
PLATELET # BLD AUTO: 212 K/UL
PLATELET # BLD AUTO: 225 K/UL
PMV BLD AUTO: 9.8 FL
PMV BLD AUTO: 9.9 FL
POCT GLUCOSE: 100 MG/DL (ref 70–110)
POCT GLUCOSE: 101 MG/DL (ref 70–110)
POCT GLUCOSE: 139 MG/DL (ref 70–110)
POCT GLUCOSE: 202 MG/DL (ref 70–110)
POTASSIUM SERPL-SCNC: 3.9 MMOL/L
POTASSIUM SERPL-SCNC: 4.2 MMOL/L
RBC # BLD AUTO: 4.15 M/UL
RBC # BLD AUTO: 4.37 M/UL
SODIUM SERPL-SCNC: 134 MMOL/L
SODIUM SERPL-SCNC: 138 MMOL/L
TRIGL SERPL-MCNC: 88 MG/DL
VANCOMYCIN SERPL-MCNC: 18.4 UG/ML
WBC # BLD AUTO: 7.13 K/UL
WBC # BLD AUTO: 7.57 K/UL

## 2018-12-31 PROCEDURE — 75716 ARTERY X-RAYS ARMS/LEGS: CPT | Mod: 26,59,, | Performed by: INTERNAL MEDICINE

## 2018-12-31 PROCEDURE — C1894 INTRO/SHEATH, NON-LASER: HCPCS | Performed by: INTERNAL MEDICINE

## 2018-12-31 PROCEDURE — 75625 CONTRAST EXAM ABDOMINL AORTA: CPT | Mod: 26,,, | Performed by: INTERNAL MEDICINE

## 2018-12-31 PROCEDURE — 25000003 PHARM REV CODE 250: Performed by: NURSE PRACTITIONER

## 2018-12-31 PROCEDURE — 63600175 PHARM REV CODE 636 W HCPCS: Performed by: INTERNAL MEDICINE

## 2018-12-31 PROCEDURE — 85027 COMPLETE CBC AUTOMATED: CPT | Mod: 91

## 2018-12-31 PROCEDURE — 80048 BASIC METABOLIC PNL TOTAL CA: CPT

## 2018-12-31 PROCEDURE — 85027 COMPLETE CBC AUTOMATED: CPT

## 2018-12-31 PROCEDURE — 25500020 PHARM REV CODE 255: Performed by: INTERNAL MEDICINE

## 2018-12-31 PROCEDURE — 36245 INS CATH ABD/L-EXT ART 1ST: CPT | Mod: RT | Performed by: INTERNAL MEDICINE

## 2018-12-31 PROCEDURE — 93005 ELECTROCARDIOGRAM TRACING: CPT

## 2018-12-31 PROCEDURE — 75716 ARTERY X-RAYS ARMS/LEGS: CPT | Mod: 59 | Performed by: INTERNAL MEDICINE

## 2018-12-31 PROCEDURE — 75625 CONTRAST EXAM ABDOMINL AORTA: CPT | Performed by: INTERNAL MEDICINE

## 2018-12-31 PROCEDURE — 93010 EKG 12-LEAD: ICD-10-PCS | Mod: ,,, | Performed by: INTERNAL MEDICINE

## 2018-12-31 PROCEDURE — 93010 ELECTROCARDIOGRAM REPORT: CPT | Mod: ,,, | Performed by: INTERNAL MEDICINE

## 2018-12-31 PROCEDURE — 63600175 PHARM REV CODE 636 W HCPCS: Performed by: NURSE PRACTITIONER

## 2018-12-31 PROCEDURE — 80061 LIPID PANEL: CPT

## 2018-12-31 PROCEDURE — 63600175 PHARM REV CODE 636 W HCPCS: Performed by: HOSPITALIST

## 2018-12-31 PROCEDURE — 25000003 PHARM REV CODE 250: Performed by: HOSPITALIST

## 2018-12-31 PROCEDURE — 94761 N-INVAS EAR/PLS OXIMETRY MLT: CPT

## 2018-12-31 PROCEDURE — 36245 INS CATH ABD/L-EXT ART 1ST: CPT | Mod: RT,,, | Performed by: INTERNAL MEDICINE

## 2018-12-31 PROCEDURE — 25000003 PHARM REV CODE 250: Performed by: INTERNAL MEDICINE

## 2018-12-31 PROCEDURE — 80202 ASSAY OF VANCOMYCIN: CPT

## 2018-12-31 PROCEDURE — 36245 PR INS CATH ABD/L-EXT ART 1ST ORDER: ICD-10-PCS | Mod: RT,,, | Performed by: INTERNAL MEDICINE

## 2018-12-31 PROCEDURE — 99152 MOD SED SAME PHYS/QHP 5/>YRS: CPT | Mod: ,,, | Performed by: INTERNAL MEDICINE

## 2018-12-31 PROCEDURE — 80100016 HC MAINTENANCE HEMODIALYSIS

## 2018-12-31 PROCEDURE — 21400001 HC TELEMETRY ROOM

## 2018-12-31 PROCEDURE — 36415 COLL VENOUS BLD VENIPUNCTURE: CPT

## 2018-12-31 PROCEDURE — C1769 GUIDE WIRE: HCPCS | Performed by: INTERNAL MEDICINE

## 2018-12-31 PROCEDURE — 80069 RENAL FUNCTION PANEL: CPT

## 2018-12-31 PROCEDURE — 99152 MOD SED SAME PHYS/QHP 5/>YRS: CPT | Performed by: INTERNAL MEDICINE

## 2018-12-31 PROCEDURE — 99153 MOD SED SAME PHYS/QHP EA: CPT | Performed by: INTERNAL MEDICINE

## 2018-12-31 PROCEDURE — 99152 PR MOD CONSCIOUS SEDATION, SAME PHYS, 5+ YRS, FIRST 15 MIN: ICD-10-PCS | Mod: ,,, | Performed by: INTERNAL MEDICINE

## 2018-12-31 PROCEDURE — 75625 PR  ANGIO AORTOGRAM ABD SERIAL: ICD-10-PCS | Mod: 26,,, | Performed by: INTERNAL MEDICINE

## 2018-12-31 PROCEDURE — 75716 PR  ANGIO EXTERMITY BILAT: ICD-10-PCS | Mod: 26,59,, | Performed by: INTERNAL MEDICINE

## 2018-12-31 RX ORDER — DIPHENHYDRAMINE HCL 50 MG
50 CAPSULE ORAL ONCE
Status: DISCONTINUED | OUTPATIENT
Start: 2018-12-31 | End: 2019-01-02 | Stop reason: HOSPADM

## 2018-12-31 RX ORDER — HEPARIN SODIUM 1000 [USP'U]/ML
INJECTION, SOLUTION INTRAVENOUS; SUBCUTANEOUS
Status: DISCONTINUED | OUTPATIENT
Start: 2018-12-31 | End: 2018-12-31 | Stop reason: HOSPADM

## 2018-12-31 RX ORDER — MIDAZOLAM HYDROCHLORIDE 1 MG/ML
INJECTION, SOLUTION INTRAMUSCULAR; INTRAVENOUS
Status: DISCONTINUED | OUTPATIENT
Start: 2018-12-31 | End: 2018-12-31 | Stop reason: HOSPADM

## 2018-12-31 RX ORDER — LIDOCAINE HYDROCHLORIDE 10 MG/ML
INJECTION, SOLUTION EPIDURAL; INFILTRATION; INTRACAUDAL; PERINEURAL
Status: DISCONTINUED | OUTPATIENT
Start: 2018-12-31 | End: 2018-12-31 | Stop reason: HOSPADM

## 2018-12-31 RX ORDER — IODIXANOL 320 MG/ML
INJECTION, SOLUTION INTRAVASCULAR
Status: DISCONTINUED | OUTPATIENT
Start: 2018-12-31 | End: 2018-12-31 | Stop reason: HOSPADM

## 2018-12-31 RX ORDER — FENTANYL CITRATE 50 UG/ML
INJECTION, SOLUTION INTRAMUSCULAR; INTRAVENOUS
Status: DISCONTINUED | OUTPATIENT
Start: 2018-12-31 | End: 2018-12-31 | Stop reason: HOSPADM

## 2018-12-31 RX ORDER — DIPHENHYDRAMINE HCL 25 MG
50 CAPSULE ORAL ONCE
Status: COMPLETED | OUTPATIENT
Start: 2018-12-31 | End: 2018-12-31

## 2018-12-31 RX ORDER — HEPARIN SODIUM 200 [USP'U]/100ML
INJECTION, SOLUTION INTRAVENOUS
Status: DISCONTINUED | OUTPATIENT
Start: 2018-12-31 | End: 2019-01-04 | Stop reason: HOSPADM

## 2018-12-31 RX ORDER — SODIUM CHLORIDE 9 MG/ML
INJECTION, SOLUTION INTRAVENOUS CONTINUOUS
Status: DISCONTINUED | OUTPATIENT
Start: 2018-12-31 | End: 2019-01-01

## 2018-12-31 RX ORDER — VERAPAMIL HYDROCHLORIDE 2.5 MG/ML
INJECTION, SOLUTION INTRAVENOUS
Status: DISCONTINUED | OUTPATIENT
Start: 2018-12-31 | End: 2018-12-31 | Stop reason: HOSPADM

## 2018-12-31 RX ADMIN — INSULIN DETEMIR 20 UNITS: 100 INJECTION, SOLUTION SUBCUTANEOUS at 09:12

## 2018-12-31 RX ADMIN — SODIUM CHLORIDE: 0.9 INJECTION, SOLUTION INTRAVENOUS at 09:12

## 2018-12-31 RX ADMIN — ENOXAPARIN SODIUM 30 MG: 100 INJECTION SUBCUTANEOUS at 12:12

## 2018-12-31 RX ADMIN — DIPHENHYDRAMINE HYDROCHLORIDE 50 MG: 25 CAPSULE ORAL at 01:12

## 2018-12-31 RX ADMIN — SEVELAMER CARBONATE 800 MG: 800 TABLET, FILM COATED ORAL at 06:12

## 2018-12-31 RX ADMIN — ATORVASTATIN CALCIUM 40 MG: 40 TABLET, FILM COATED ORAL at 08:12

## 2018-12-31 RX ADMIN — INSULIN ASPART 1 UNITS: 100 INJECTION, SOLUTION INTRAVENOUS; SUBCUTANEOUS at 09:12

## 2018-12-31 RX ADMIN — VANCOMYCIN HYDROCHLORIDE 500 MG: 500 INJECTION, POWDER, LYOPHILIZED, FOR SOLUTION INTRAVENOUS at 10:12

## 2018-12-31 RX ADMIN — PIPERACILLIN AND TAZOBACTAM 4.5 G: 4; .5 INJECTION, POWDER, LYOPHILIZED, FOR SOLUTION INTRAVENOUS; PARENTERAL at 12:12

## 2018-12-31 NOTE — CONSULTS
"[facility]  Cardiology  Consult Note    Patient Name: Martha Melvin  MRN: [id number]  Admission Date: 12/27/2018  Hospital Length of Stay: 4 days  Code Status: Full Code   Attending Provider: John Butler DO   Consulting Provider: Zeferino Be NP  Primary Care Physician: Katiuska Villalba MD  Principal Problem:Diabetic foot infection    Patient information was obtained from patient, past medical records and ER records.     Inpatient consult to Interventional Cardiology  Consult performed by: Zeferino Be NP  Consult ordered by: Juice Crystal MD        Subjective:     Chief Complaint:  Toe Wound      HPI:   Martha Melvin is a 74 yo female with HTN, HLD, Type 2 diabetes mellitus, hx of cardiac arrest, chronic diastolic HF, moderate aortic stenosis, ESRD (HD TTS), s/p right BKA (2005) secondary to nonhealing diabetic ulcer. Patient presented to the ED with complaints of purulent drainage from left foot ulcer.  Patient  reports that her wound initially started as a callus on the 5th digit of the left foot. She reports noticing gradually increase in edema with mild erythema.  She also reports "black toes" (3rd and 4th digit) for several months. She does not follow with podiatry as an outpatient. She denies fever/chills, n/v, chest pain and SOB. Patient is NPO for AO with runoff today.     Arterial US:  Right lower extremity: Patient is status post right below-the-knee amputation.  Normal triphasic arterial waveforms are present throughout the common femoral and superficial femoral arteries.  More biphasic waveforms noted in the popliteal artery.  Biphasic to triphasic waveforms are present in the anterior tibial artery.  High velocity monophasic to biphasic waveforms noted in the posterior tibial artery.    The peak systolic velocities on the right are as follows, in centimeters/second:    Common femoral artery: 134    Superficial femoral artery, proximal: 83    Superficial femoral " artery, mid portion: 140    Superficial femoral artery, distal: 96    Popliteal artery: 84    Anterior tibial artery: 126    Posterior tibial artery: 240    Left lower extremity: Normal triphasic arterial waveforms are present in the common femoral and proximal and mid portions of the superficial femoral arteries.  More monophasic to biphasic waveforms present in the distal SFA and popliteal artery which continued throughout the anterior and posterior tibial arteries.  No significant focal elevated segmental velocities demonstrated.    The peak systolic velocities on the left are as follows, in centimeters/second:    Common femoral artery: 147    Superficial femoral artery, proximal: 105    Superficial femoral artery, mid portion: 125    Superficial femoral artery, distal: 179    Popliteal artery: 94    Anterior tibial artery: 81    Posterior tibial artery: 114    Peroneal artery: Not evaluated secondary to overlying bandaging material.    Dorsalis Pedis:   Impression       Status post right below-the-knee amputation.    Elevated velocities within the right posterior tibial artery suggestive of possible greater than 50% focal stenosis.    Atypical waveforms without significant elevated velocity noted in the distal SFA and popliteal artery on the left which continued throughout the lower leg.  Focal stenosis not excluded.  Further evaluation could be obtained with CTA as clinically warranted.           Past Medical History:   Diagnosis Date    Arthritis     Diabetes mellitus     Hyperlipidemia     Hypertension     Renal disorder     poor kidney function       Past Surgical History:   Procedure Laterality Date    LEG AMPUTATION Right     PERMCATH INSERTION-TUNNELED CVC N/A 4/25/2018    Performed by Ar[facility]an Sidhu MD at [facility] CATH LAB    TRANSPOSITION, VEIN, BASILIC Left 6/27/2018    Performed by AYESHA Mesa III, MD at [facility] OR [address] [place] [address]       Review of patient's allergies indicates:   Allergen Reactions  "   Morphine        No current facility-administered medications on file prior to encounter.      Current Outpatient Medications on File Prior to Encounter   Medication Sig    amLODIPine (NORVASC) 10 MG tablet Take 1 tablet (10 mg total) by mouth once daily.    atorvastatin (LIPITOR) 40 MG tablet Take 1 tablet (40 mg total) by mouth once daily.    bisacodyl (DULCOLAX) 5 mg EC tablet Take 5 mg by mouth daily as needed for Constipation.    blood sugar diagnostic Strp Test 2 (two) times daily.as directed    blood-glucose meter Misc Use to test blood sugar twice daily    calcium carbonate (TUMS E-X) 300 mg (750 mg) Chew Take 1 tablet by mouth 3 (three) times daily with meals.    furosemide (LASIX) 80 MG tablet Take 1 tablet (80 mg total) by mouth every morning.    insulin aspart U-100 (NOVOLOG FLEXPEN U-100 INSULIN) 100 unit/mL InPn pen Inject 10 Units into the skin 3 (three) times daily with meals. (Patient taking differently: Inject 4 Units into the skin 3 (three) times daily with meals. )    insulin glargine (LANTUS) 100 unit/mL injection Inject 34 Units into the skin every evening. (Patient taking differently: Inject 20 Units into the skin every evening. )    lancets (TRUEPLUS LANCETS) 30 gauge Misc Use to test blood sugar twice daily    loratadine (CLARITIN) 10 mg tablet Take 10 mg by mouth once daily.    pen needle, diabetic (NOVOFINE PLUS) 32 gauge x 1/6" Ndle use 3 (three) times daily as needed.    senna-docusate 8.6-50 mg (PERICOLACE) 8.6-50 mg per tablet Take 1 tablet by mouth 2 (two) times daily. (Patient taking differently: Take 1 tablet by mouth 2 (two) times daily as needed. )    sevelamer carbonate (RENVELA) 800 mg Tab Take 1 tablet (800 mg total) by mouth 3 (three) times a day with meals.     Family History     Problem Relation (Age of Onset)    Cancer Mother, Father, Sister    Diabetes Mother    Hypertension Mother        Tobacco Use    Smoking status: Never Smoker    Smokeless tobacco: " Never Used   Substance and Sexual Activity    Alcohol use: No    Drug use: Not on file    Sexual activity: Not on file     Review of Systems   Constitution: Negative. Negative for diaphoresis, weakness and malaise/fatigue.   HENT: Negative.    Eyes: Negative.    Cardiovascular: Positive for claudication. Negative for chest pain, dyspnea on exertion, leg swelling, near-syncope, orthopnea, palpitations, paroxysmal nocturnal dyspnea and syncope.   Respiratory: Negative for cough and shortness of breath.    Endocrine: Negative.    Hematologic/Lymphatic: Negative.    Skin: Positive for color change, dry skin and poor wound healing.   Musculoskeletal: Positive for arthritis and myalgias.   Gastrointestinal: Negative.    Genitourinary: Negative.    Neurological: Negative.    Psychiatric/Behavioral: Negative.    Allergic/Immunologic: Negative.      Objective:     Vital Signs (Most Recent):  Temp: 97.9 °F (36.6 °C) (12/31/18 0858)  Pulse: 94 (12/31/18 0858)  Resp: 18 (12/31/18 0858)  BP: (!) 144/73 (12/31/18 0858)  SpO2: 96 % (12/31/18 0813) Vital Signs (24h Range):  Temp:  [97.9 °F (36.6 °C)-98.9 °F (37.2 °C)] 97.9 °F (36.6 °C)  Pulse:  [80-94] 94  Resp:  [18-20] 18  SpO2:  [94 %-97 %] 96 %  BP: (130-144)/(60-74) 144/73     Weight: 83 kg (182 lb 15.7 oz)  Body mass index is 30.45 kg/m².    SpO2: 96 %  O2 Device (Oxygen Therapy): room air      Intake/Output Summary (Last 24 hours) at 12/31/2018 0926  Last data filed at 12/31/2018 0000  Gross per 24 hour   Intake 3740 ml   Output 1000 ml   Net 2740 ml       Lines/Drains/Airways     Drain                 Hemodialysis AV Fistula -- days          Peripheral Intravenous Line                 Peripheral IV - Single Lumen 12/29/18 0013 Right Forearm 2 days                Physical Exam   Constitutional: She is oriented to person, place, and time. No distress.   HENT:   Head: Normocephalic and atraumatic.   Eyes: Right eye exhibits no discharge. Left eye exhibits no discharge.    Neck: No JVD present.   Cardiovascular: Normal rate and regular rhythm. Exam reveals no gallop and no friction rub.   Murmur heard.  Pulmonary/Chest: Effort normal and breath sounds normal. She has no rales.   Abdominal: Soft. Bowel sounds are normal.   Musculoskeletal: She exhibits edema.   Neurological: She is alert and oriented to person, place, and time.   Skin: Skin is warm and dry. She is not diaphoretic.   Psychiatric: She has a normal mood and affect. Her behavior is normal.       Significant Labs:   Blood Culture: No results for input(s): LABBLOO in the last 48 hours., BMP:   Recent Labs   Lab 12/29/18  0954 12/30/18  0538 12/31/18  0446   * 191* 147*    136 138   K 3.9 4.1 3.9    96 98   CO2 23 26 25   BUN 56* 39* 53*   CREATININE 6.5* 5.3* 6.9*   CALCIUM 8.6* 9.1 8.8   , CMP   Recent Labs   Lab 12/29/18  0954 12/30/18  0538 12/31/18  0446    136 138   K 3.9 4.1 3.9    96 98   CO2 23 26 25   * 191* 147*   BUN 56* 39* 53*   CREATININE 6.5* 5.3* 6.9*   CALCIUM 8.6* 9.1 8.8   ALBUMIN 3.0* 3.0* 3.0*   ANIONGAP 13 14 15   ESTGFRAFRICA 7* 9* 6*   EGFRNONAA 6* 8* 5*   , CBC   Recent Labs   Lab 12/29/18  0954 12/30/18  0538 12/31/18  0446   WBC 8.66 9.71 7.57   HGB 10.5* 10.4* 10.3*   HCT 33.0* 33.3* 32.9*    242 225   , INR No results for input(s): INR, PROTIME in the last 48 hours., Lipid Panel   Recent Labs   Lab 12/31/18  0446   CHOL 124   HDL 34*   LDLCALC 72.4   TRIG 88   CHOLHDL 27.4   , Troponin No results for input(s): TROPONINI in the last 48 hours. and All pertinent lab results from the last 24 hours have been reviewed.    Significant Imaging: Echocardiogram:   2D echo with color flow doppler:   Results for orders placed or performed during the hospital encounter of 04/15/18   2D echo with color flow doppler   Result Value Ref Range    QEF 55 55 - 65    Diastolic Dysfunction Yes (A)     Aortic Valve Stenosis MODERATE (A)     Est. PA Systolic Pressure 56.29  (A)     Tricuspid Valve Regurgitation MILD      Assessment and Plan:     Diabetic ulcer of toe of left foot associated with type 2 diabetes mellitus, with bone involvement without evidence of necrosis    Patient presented with several mo history of necrotic toes now with purulent drainage.     Arterial US:  Right lower extremity: Patient is status post right below-the-knee amputation.  Normal triphasic arterial waveforms are present throughout the common femoral and superficial femoral arteries.  More biphasic waveforms noted in the popliteal artery.  Biphasic to triphasic waveforms are present in the anterior tibial artery.  High velocity monophasic to biphasic waveforms noted in the posterior tibial artery.    The peak systolic velocities on the right are as follows, in centimeters/second:    Common femoral artery: 134    Superficial femoral artery, proximal: 83    Superficial femoral artery, mid portion: 140    Superficial femoral artery, distal: 96    Popliteal artery: 84    Anterior tibial artery: 126    Posterior tibial artery: 240    Left lower extremity: Normal triphasic arterial waveforms are present in the common femoral and proximal and mid portions of the superficial femoral arteries.  More monophasic to biphasic waveforms present in the distal SFA and popliteal artery which continued throughout the anterior and posterior tibial arteries.  No significant focal elevated segmental velocities demonstrated.    The peak systolic velocities on the left are as follows, in centimeters/second:    Common femoral artery: 147    Superficial femoral artery, proximal: 105    Superficial femoral artery, mid portion: 125    Superficial femoral artery, distal: 179    Popliteal artery: 94    Anterior tibial artery: 81    Posterior tibial artery: 114    Peroneal artery: Not evaluated secondary to overlying bandaging material.    Dorsalis Pedis:   Impression        Status post right below-the-knee amputation.    Elevated  velocities within the right posterior tibial artery suggestive of possible greater than 50% focal stenosis.    Atypical waveforms without significant elevated velocity noted in the distal SFA and popliteal artery on the left which continued throughout the lower leg.  Focal stenosis not excluded.  Further evaluation could be obtained with CTA as clinically warranted.       MRI positive for osteo.  ID and podiatry following     NPO for AO with runoff  Continue asa, statin and will likely add Plavix         Aortic stenosis    Moderate aortic stenosis, SABRINA = 1.15 cm2, AVAi = 0.57 cm2/m2, peak velocity = 3.01 m/s, mean gradient = 21 mmHg    Followed by primary cardiologist     Asymptomatic      Chronic diastolic heart failure    Euvolemic on exam; continue HD for volume status   Continue asa, BB; no ACEI/ARB given renal dysfunction          VTE Risk Mitigation (From admission, onward)        Ordered     enoxaparin injection 30 mg  Daily      12/27/18 2321     IP VTE HIGH RISK PATIENT  Once      12/27/18 2310          Thank you for your consult. I will follow-up with patient. Please contact us if you have any additional questions.    Zeferino Be, DREW  Cardiology   [facility]

## 2018-12-31 NOTE — PROGRESS NOTES
"Vancomycin Dosing and Monitoring Pharmacy Protocol    Martha Melvin is a 73 y.o. female    Height: 5' 5" (1.651 m)   Wt Readings from Last 3 Encounters:   12/31/18 83 kg (182 lb 15.7 oz)   11/30/18 88.7 kg (195 lb 8.8 oz)   11/09/18 88 kg (194 lb 0.1 oz)       Temp Readings from Last 3 Encounters:   12/31/18 98 °F (36.7 °C) (Oral)   11/30/18 98.5 °F (36.9 °C) (Oral)   11/09/18 98.8 °F (37.1 °C) (Oral)      Lab Results   Component Value Date/Time    WBC 7.57 12/31/2018 04:46 AM    WBC 9.71 12/30/2018 05:38 AM    WBC 8.66 12/29/2018 09:54 AM      Lab Results   Component Value Date/Time    CREATININE 6.9 (H) 12/31/2018 04:46 AM    CREATININE 5.3 (H) 12/30/2018 05:38 AM    CREATININE 6.5 (H) 12/29/2018 09:54 AM      Lab Results   Component Value Date/Time    LACTATE 0.6 04/20/2018 05:40 AM    LACTATE 1.8 04/17/2018 04:27 AM    LACTATE 9.5 (HH) 04/16/2018 10:24 PM       Serum creatinine: 6.9 mg/dL (H) 12/31/18 0446  Estimated creatinine clearance: 8 mL/min (A)    Antibiotics (From admission, onward)      Start     Stop Route Frequency Ordered    12/31/18 1700  vancomycin (VANCOCIN) 500 mg in dextrose 5 % 100 mL IVPB      -- IV Once 12/31/18 1127    12/28/18 1215  piperacillin-tazobactam 4.5 g in dextrose 5 % 100 mL IVPB (ready to mix system)      -- IV Every 12 hours (non-standard times) 12/28/18 1002          Antifungals (From admission, onward)      None            Microbiology Results (last 7 days)       Procedure Component Value Units Date/Time    AFB Culture & Smear [[id number]] Collected:  12/29/18 1506    Order Status:  Completed Specimen:  Bone from Toe, Left Foot Updated:  12/30/18 2127     AFB Culture & Smear Culture in progress    Aerobic culture [[id number]] Collected:  12/29/18 1506    Order Status:  Completed Specimen:  Bone from Toe, Left Foot Updated:  12/30/18 0729     Aerobic Bacterial Culture No growth    Aerobic culture [[id number]] Collected:  12/28/18 1604    Order Status:  Completed Specimen:  Wound " from Toe, Left Foot Updated:  18 0716     Aerobic Bacterial Culture Skin mirella,  no predominant organism    Gram stain [[id number]] Collected:  18 150    Order Status:  Completed Specimen:  Bone from Toe, Left Foot Updated:  18     Gram Stain Result Rare WBC's      Few Gram positive cocci    Culture, Anaerobe [[id number]] Collected:  18 1506    Order Status:  Sent Specimen:  Bone from Toe, Left Foot Updated:  18    Fungus culture [[id number]] Collected:  18    Order Status:  Sent Specimen:  Bone from Toe, Left Foot Updated:  18    Aerobic culture (Specify Source) **CANNOT BE ORDERED AS STAT** [[id number]] Collected:  18    Order Status:  Completed Specimen:  Wound from Foot, Left Updated:  18 0742     Aerobic Bacterial Culture No significant isolate to date    Culture, Anaerobe [[id number]] Collected:  18    Order Status:  Sent Specimen:  Wound from Foot, Left Updated:  18 0126            Indication/Target trough:   S&s ! 20mcg/ml     Hemodialysis:   MWF    Dosing Weight:   Wt Readings from Last 1 Encounters:   18 83 kg (182 lb 15.7 oz)       Last Vancomycin dose: 500 mg   Date/Time given:           Vancomycin level:  No results for input(s): VANCOMYCIN-TROUGH in the last 72 hours.  Recent Labs   Lab Result Units 18  0446   Vancomycin, Random ug/mL 18.4       Per Protocol Initial/Adjustments Dosin. Initial/Adjustment Dose: HEMODIALYSIS DOSE: Give maintenance dose of 500mg after next dialysis session  2. Vancomycin Random Level will be drawn on  0400date/time     Pharmacy will continue to follow.    Please contact if you have any further questions. Thank you.    Salvatore Dorado, PharmD  [phone]

## 2018-12-31 NOTE — PLAN OF CARE
Problem: Adult Inpatient Plan of Care  Goal: Plan of Care Review  Outcome: Ongoing (interventions implemented as appropriate)  Patient aaox4, safety measures implemented, call light in reach, bed in lowest position, patient refuses bed alarm, patient up and independent to bathroom, no complaints of pain or nausea, will continue to monitor.

## 2018-12-31 NOTE — SUBJECTIVE & OBJECTIVE
Interval History:   No events overnight. UOP 1L yesterday. Patient seen and examined on dialysis this morning; tolerating well. /79. ; .  200; AP -230.  Patient going for LE angio with runoff after dialysis today; wasn't aware procedure was planned. ID note reviewed and patient will need 6 weeks of IV vancomycin and zosyn. Patient denies any complaints today; ready to go home as she has plans for [facility]. No SOB; normal UOP.     Review of patient's allergies indicates:   Allergen Reactions    Morphine      Current Facility-Administered Medications   Medication Frequency    0.9%  NaCl infusion PRN    0.9%  NaCl infusion Once    acetaminophen tablet 650 mg Q4H PRN    amLODIPine tablet 10 mg Daily    aspirin chewable tablet 81 mg Daily    atorvastatin tablet 40 mg Daily    bisacodyl EC tablet 5 mg Daily PRN    dextrose 50% injection 12.5 g PRN    dextrose 50% injection 25 g PRN    diphenhydrAMINE capsule 50 mg Once    enoxaparin injection 30 mg Daily    epoetin bashir injection 10,000 Units Every Tues, Thurs, Sat    furosemide tablet 80 mg QAM    glucagon (human recombinant) injection 1 mg PRN    glucose chewable tablet 16 g PRN    glucose chewable tablet 24 g PRN    influenza (FLUZONE HIGH-DOSE) vaccine 0.5 mL vaccine x 1 dose    insulin aspart U-100 pen 0-5 Units QID (AC + HS) PRN    insulin detemir U-100 pen 20 Units QHS    ondansetron injection 4 mg Q8H PRN    piperacillin-tazobactam 4.5 g in dextrose 5 % 100 mL IVPB (ready to mix system) Q12H    sevelamer carbonate tablet 800 mg TID WM    sodium chloride 0.9% flush 5 mL PRN    vancomycin (VANCOCIN) 500 mg in dextrose 5 % 100 mL IVPB Once       Objective:     Vital Signs (Most Recent):  Temp: 98 °F (36.7 °C) (12/31/18 0942)  Pulse: 80 (12/31/18 1152)  Resp: 18 (12/31/18 0942)  BP: 123/82 (12/31/18 1152)  SpO2: 96 % (12/31/18 0813)  O2 Device (Oxygen Therapy): (pt off unit) (12/31/18 1153) Vital Signs (24h Range):  Temp:   [97.9 °F (36.6 °C)-98.9 °F (37.2 °C)] 98 °F (36.7 °C)  Pulse:  [] 80  Resp:  [18-20] 18  SpO2:  [94 %-97 %] 96 %  BP: (111-159)/(60-99) 123/82     Weight: 83 kg (182 lb 15.7 oz) (12/31/18 0451)  Body mass index is 30.45 kg/m².  Body surface area is 1.95 meters squared.    I/O last 3 completed shifts:  In: 4340 [P.O.:600; I.V.:3440; IV Piggyback:300]  Out: 1200 [Urine:1200]    Physical Exam   Constitutional: She is oriented to person, place, and time. She appears well-developed and well-nourished. No distress.   HENT:   Head: Normocephalic and atraumatic.   Eyes: Conjunctivae and EOM are normal.   Cardiovascular: Normal rate and regular rhythm.   L forearm AVF   Pulmonary/Chest: Effort normal and breath sounds normal. She has no rales.   Abdominal: Soft. She exhibits no distension.   Musculoskeletal: She exhibits no edema or deformity.   Neurological: She is alert and oriented to person, place, and time.   Skin: Skin is warm and dry. She is not diaphoretic.   Psychiatric: She has a normal mood and affect. Her behavior is normal.       Significant Labs:  CBC:   Recent Labs   Lab 12/31/18  0446   WBC 7.57   RBC 4.15   HGB 10.3*   HCT 32.9*      MCV 79*   MCH 24.8*   MCHC 31.3*     CMP:   Recent Labs   Lab 12/27/18 2040  12/31/18  0446   *   < > 147*   CALCIUM 9.1   < > 8.8   ALBUMIN 3.5   < > 3.0*   PROT 8.6*  --   --       < > 138   K 4.2   < > 3.9   CO2 22*   < > 25      < > 98   BUN 38*   < > 53*   CREATININE 5.5*   < > 6.9*   ALKPHOS 72  --   --    ALT 14  --   --    AST 25  --   --    BILITOT 0.5  --   --     < > = values in this interval not displayed.     All labs within the past 24 hours have been reviewed.     Significant Imaging:  Labs: Reviewed

## 2018-12-31 NOTE — SUBJECTIVE & OBJECTIVE
"Past Medical History:   Diagnosis Date    Arthritis     Diabetes mellitus     Hyperlipidemia     Hypertension     Renal disorder     poor kidney function       Past Surgical History:   Procedure Laterality Date    LEG AMPUTATION Right     PERMCATH INSERTION-TUNNELED CVC N/A 4/25/2018    Performed by Armaan Sidhu MD at [facility] CATH LAB    TRANSPOSITION, VEIN, BASILIC Left 6/27/2018    Performed by AYESHA Mesa III, MD at [facility] OR [address] [place] [address]       Review of patient's allergies indicates:   Allergen Reactions    Morphine        No current facility-administered medications on file prior to encounter.      Current Outpatient Medications on File Prior to Encounter   Medication Sig    amLODIPine (NORVASC) 10 MG tablet Take 1 tablet (10 mg total) by mouth once daily.    atorvastatin (LIPITOR) 40 MG tablet Take 1 tablet (40 mg total) by mouth once daily.    bisacodyl (DULCOLAX) 5 mg EC tablet Take 5 mg by mouth daily as needed for Constipation.    blood sugar diagnostic Strp Test 2 (two) times daily.as directed    blood-glucose meter Misc Use to test blood sugar twice daily    calcium carbonate (TUMS E-X) 300 mg (750 mg) Chew Take 1 tablet by mouth 3 (three) times daily with meals.    furosemide (LASIX) 80 MG tablet Take 1 tablet (80 mg total) by mouth every morning.    insulin aspart U-100 (NOVOLOG FLEXPEN U-100 INSULIN) 100 unit/mL InPn pen Inject 10 Units into the skin 3 (three) times daily with meals. (Patient taking differently: Inject 4 Units into the skin 3 (three) times daily with meals. )    insulin glargine (LANTUS) 100 unit/mL injection Inject 34 Units into the skin every evening. (Patient taking differently: Inject 20 Units into the skin every evening. )    lancets (TRUEPLUS LANCETS) 30 gauge Misc Use to test blood sugar twice daily    loratadine (CLARITIN) 10 mg tablet Take 10 mg by mouth once daily.    pen needle, diabetic (NOVOFINE PLUS) 32 gauge x 1/6" Ndle use 3 (three) times daily as " needed.    senna-docusate 8.6-50 mg (PERICOLACE) 8.6-50 mg per tablet Take 1 tablet by mouth 2 (two) times daily. (Patient taking differently: Take 1 tablet by mouth 2 (two) times daily as needed. )    sevelamer carbonate (RENVELA) 800 mg Tab Take 1 tablet (800 mg total) by mouth 3 (three) times a day with meals.     Family History     Problem Relation (Age of Onset)    Cancer Mother, Father, Sister    Diabetes Mother    Hypertension Mother        Tobacco Use    Smoking status: Never Smoker    Smokeless tobacco: Never Used   Substance and Sexual Activity    Alcohol use: No    Drug use: Not on file    Sexual activity: Not on file     Review of Systems   Constitutional: Negative for chills, diaphoresis and fever.   Eyes: Negative for photophobia.   Respiratory: Negative for cough, chest tightness, shortness of breath and wheezing.    Cardiovascular: Negative for chest pain, palpitations and leg swelling.   Gastrointestinal: Negative for abdominal pain, diarrhea, nausea and vomiting.   Genitourinary: Negative for dysuria, flank pain, frequency and hematuria.   Musculoskeletal: Negative for back pain and myalgias.   Skin: Positive for color change (left foot toes ) and wound (left foot, 5th toe ).   Neurological: Negative for dizziness, syncope, light-headedness and headaches.   Psychiatric/Behavioral: Negative for confusion.     Objective:     Vital Signs (Most Recent):  Temp: 98 °F (36.7 °C) (12/31/18 0942)  Pulse: 80 (12/31/18 1152)  Resp: 18 (12/31/18 0942)  BP: 123/82 (12/31/18 1152)  SpO2: 96 % (12/31/18 0813) Vital Signs (24h Range):  Temp:  [97.9 °F (36.6 °C)-98.9 °F (37.2 °C)] 98 °F (36.7 °C)  Pulse:  [] 80  Resp:  [18-20] 18  SpO2:  [94 %-97 %] 96 %  BP: (111-159)/(60-99) 123/82     Weight: 83 kg (182 lb 15.7 oz)  Body mass index is 30.45 kg/m².    Physical Exam   Constitutional: She is oriented to person, place, and time. She appears well-developed and well-nourished.   HENT:   Head:  Normocephalic and atraumatic.   Eyes: Conjunctivae are normal. Pupils are equal, round, and reactive to light.   Neck: Normal range of motion. No JVD present.   Cardiovascular: Normal rate, regular rhythm, normal heart sounds and intact distal pulses.   Pulmonary/Chest: Effort normal. No respiratory distress. She has no wheezes.   Abdominal: Soft. Bowel sounds are normal. She exhibits no distension. There is no tenderness. There is no guarding.   Musculoskeletal: Normal range of motion. She exhibits edema (L foot ). She exhibits no tenderness.   R BKA    Neurological: She is alert and oriented to person, place, and time.   Skin: Skin is warm and dry. Capillary refill takes less than 2 seconds.   +necrosis to Left foot 3rd/4th toes, ulcer to left foot 5th digit, no drainage noted    Psychiatric: She has a normal mood and affect. Her behavior is normal.         CRANIAL NERVES     CN III, IV, VI   Pupils are equal, round, and reactive to light.       Significant Labs:   BMP:   Recent Labs   Lab 12/31/18  0446   *      K 3.9   CL 98   CO2 25   BUN 53*   CREATININE 6.9*   CALCIUM 8.8     CBC:   Recent Labs   Lab 12/30/18  0538 12/31/18  0446   WBC 9.71 7.57   HGB 10.4* 10.3*   HCT 33.3* 32.9*    225       Significant Imaging: I have reviewed all pertinent imaging results/findings within the past 24 hours.

## 2018-12-31 NOTE — CONSULTS
Infectious Disease Consult Note:    Consulting Physician:  Dr. Butler    Reason for Consult: Diabetic foot ulcer antibiotic recommendations      Assessment and Plan:    Ms. Daniel has a history of diabetes, HTM and ESRD on dialysis who presented with left toe discoloration and drainage from a callous. She is s/p Right BKA as well.     1. Left, diabetic 5th toe ulcer with evidence of underlying osteomyelitis.  2. Left great toe with evidence of underlying distal phalanx with evidence of trauma.  3. Left 2nd toe blood blister with evidence of trauma.  4. Evaluated by podiatry - No plans for surgery - Local wound care only.   5. Ulcer and Bone biopsy cultures are negative at 48 hours.  6. Recommend completion of 6-week antibiotic course with Zosyn and Vancomycin. Unfortunately without organisms to target, course must remain broad.  7. Will sign off. Please call for any questions.    Emma Peters MD  [facility] Infectious Diseases Staff    ___________________________________________________________________________________      Active Hospital Problems    Diagnosis  POA    *Diabetic foot infection [E11.628, L08.9]  Yes    Diabetic ulcer of toe of left foot [E11.621, L97.529]  Yes    Diabetic ulcer of toe of left foot associated with type 2 diabetes mellitus, with bone involvement without evidence of necrosis [E11.621, L97.526]  Yes    Hyperlipidemia [E78.5]  Yes    History of leg amputation [Z89.619]  Not Applicable    ESRD (end stage renal disease) on dialysis [N18.6, Z99.2]  Not Applicable    Aortic stenosis [I35.0]  Yes    History of cardiac arrest [Z86.74]  Not Applicable    Chronic diastolic heart failure [I50.32]  Yes    Diabetes mellitus due to underlying condition with chronic kidney disease, with long-term current use of insulin [E08.22, Z79.4]  Not Applicable    Anemia of chronic disease [D63.8]  Yes    HTN (hypertension) [I10]  Yes      Resolved Hospital Problems   No resolved problems to  display.         Chief Complaint: Foot discoloration and drainage from a callous    History of Present Illness:  Ms. Melvin is a 73 year-old lady with a history of DM, HTN and ESRD on dialysis who presented with a history of foot discoloration and drainage from a callous on her left 5th toe for several months. She has no other complaints and is already s/p right BKA.      Review of Symptoms:  Constitutional: Denies fevers, weight loss, chills, or weakness.  Eyes: Denies changes in vision.  Cardiovascular: Denies chest pain,  Respiratory: Denies shortness of breath,   GI: Denies nausea/vomitting,.  : Denies dysuria, i  Musculoskeletal: Denies joint pain or myalgias.      Past Medical History:  Past Medical History:   Diagnosis Date    Arthritis     Diabetes mellitus     Hyperlipidemia     Hypertension     Renal disorder     poor kidney function       Past Surgical History:  Past Surgical History:   Procedure Laterality Date    LEG AMPUTATION Right     PERMCATH INSERTION-TUNNELED CVC N/A 4/25/2018    Performed by Armaan Sidhu MD at [facility] CATH LAB    TRANSPOSITION, VEIN, BASILIC Left 6/27/2018    Performed by AYESHA Mesa III, MD at [facility] OR [facility]       Family History:  Family History   Problem Relation Age of Onset    Cancer Mother     Diabetes Mother     Hypertension Mother     Cancer Father     Cancer Sister          Social History:  Social History     Socioeconomic History    Marital status: Single     Spouse name: Not on file    Number of children: Not on file    Years of education: Not on file    Highest education level: Not on file   Social Needs    Financial resource strain: Not on file    Food insecurity - worry: Not on file    Food insecurity - inability: Not on file    Transportation needs - medical: Not on file    Transportation needs - non-medical: Not on file   Occupational History    Not on file   Tobacco Use    Smoking status: Never Smoker    Smokeless tobacco: Never  Used   Substance and Sexual Activity    Alcohol use: No    Drug use: Not on file    Sexual activity: Not on file   Other Topics Concern    Not on file   Social History Narrative    Not on file       Allergies:  Review of patient's allergies indicates:   Allergen Reactions    Morphine        Pertinent Medications:  Antibiotics:   Antibiotics (From admission, onward)    Start     Stop Route Frequency Ordered    12/28/18 1215  piperacillin-tazobactam 4.5 g in dextrose 5 % 100 mL IVPB (ready to mix system)      -- IV Every 12 hours (non-standard times) 12/28/18 1002          Physical Exam:  VS (24h):   Vitals:    12/31/18 0858   BP: (!) 144/73   Pulse: 94   Resp: 18   Temp: 97.9 °F (36.6 °C)     Temp:  [97.9 °F (36.6 °C)-98.9 °F (37.2 °C)]       General: Afebrile, alert, comfortable, no acute distress.   HEENT: RONAK. EOMI, no scleral icterus. No sinus tenderness.  Pulmonary: Non labored,clear to auscultation A/P/L. No wheezing, crackles, or rhonchi.  Cardiac: normal S1 & S2 w/o rubs/murmurs/gallops. No JVD.   Abdominal: Non-tender, non-distended.Bowel sounds present x 4. No appreciable hepatosplenomegaly.  Extremities: Left foot with great to nail trauma, 2nd toe with blood blister secondary to trauma and left 5th digit with large callous with minimal drainage at this point. No evidence of gangrene. Right LE prosthesis in place. No peripheral edema. 2+ pulses.  Left arm dialysis access with thrill  Skin: No jaundice, rashes, or visible lesions.  Neuro:  Alert and oriented x 4.  CN II-XII grossly intact, sensation intact x 4.     Lines:  Peripheral IVs       Labs:  CBC:   Lab Results   Component Value Date    WBC 7.57 12/31/2018    WBC 9.71 12/30/2018    WBC 8.66 12/29/2018    WBC 8.86 12/27/2018    WBC 10.28 09/05/2018    HGB 10.3 (L) 12/31/2018    HCT 32.9 (L) 12/31/2018    MCV 79 (L) 12/31/2018     12/31/2018       BMP:   Recent Labs   Lab 12/31/18  0446   *      K 3.9   CL 98   CO2 25   BUN  53*   CREATININE 6.9*   CALCIUM 8.8       LFT:   Lab Results   Component Value Date    ALT 14 12/27/2018    AST 25 12/27/2018    ALKPHOS 72 12/27/2018    BILITOT 0.5 12/27/2018         Microbiology x 7d:   Microbiology Results (last 7 days)     Procedure Component Value Units Date/Time    AFB Culture & Smear [[id number]] Collected:  12/29/18 1506    Order Status:  Completed Specimen:  Bone from Toe, Left Foot Updated:  12/30/18 2127     AFB Culture & Smear Culture in progress    Aerobic culture [[id number]] Collected:  12/29/18 1506    Order Status:  Completed Specimen:  Bone from Toe, Left Foot Updated:  12/30/18 0729     Aerobic Bacterial Culture No growth    Aerobic culture [[id number]] Collected:  12/28/18 1604    Order Status:  Completed Specimen:  Wound from Toe, Left Foot Updated:  12/30/18 0716     Aerobic Bacterial Culture Skin mirella,  no predominant organism    Gram stain [[id number]] Collected:  12/29/18 1506    Order Status:  Completed Specimen:  Bone from Toe, Left Foot Updated:  12/29/18 2228     Gram Stain Result Rare WBC's      Few Gram positive cocci    Culture, Anaerobe [[id number]] Collected:  12/29/18 1506    Order Status:  Sent Specimen:  Bone from Toe, Left Foot Updated:  12/29/18 1836    Fungus culture [[id number]] Collected:  12/29/18 1506    Order Status:  Sent Specimen:  Bone from Toe, Left Foot Updated:  12/29/18 1836    Aerobic culture (Specify Source) **CANNOT BE ORDERED AS STAT** [[id number]] Collected:  12/27/18 2045    Order Status:  Completed Specimen:  Wound from Foot, Left Updated:  12/29/18 0742     Aerobic Bacterial Culture No significant isolate to date    Culture, Anaerobe [[id number]] Collected:  12/27/18 2045    Order Status:  Sent Specimen:  Wound from Foot, Left Updated:  12/28/18 0126            Imaging:  MRI and foot films reviewed.

## 2018-12-31 NOTE — ASSESSMENT & PLAN NOTE
Euvolemic on exam; continue HD for volume status   Continue asa, BB; no ACEI/ARB given renal dysfunction

## 2018-12-31 NOTE — ASSESSMENT & PLAN NOTE
Moderate aortic stenosis, SABRINA = 1.15 cm2, AVAi = 0.57 cm2/m2, peak velocity = 3.01 m/s, mean gradient = 21 mmHg    Followed by primary cardiologist     Asymptomatic

## 2018-12-31 NOTE — PROCEDURES
: Mike Mckay MD  Pre-procedure diagnosis: CLI  Post-procedure diagnosis: CLI/PAD    Post Procedure Note: Peripheral angiogram     The pt was brought to the cath lab and under sterile technique, Right radial access was obtained without difficulty. Images were obtained in multiple views and severe proximal LPop stenosis noted, diffusely diseased LAT (distally supplies a DP), LPT , LPeroneal patent and distally supplies collaterals to a plantar arch. Please see full report for details. The pt tolerated the procedure well without complications. Radial band device used with successful hemostasis.     Vitals:    12/31/18 1237 12/31/18 1252 12/31/18 1300 12/31/18 1322   BP: 102/70 103/66 (!) 143/89 120/65   BP Location: Right arm Right arm Right arm    Patient Position: Lying Lying Lying    Pulse: 80 80 80 78   Resp:   18 18   Temp:   97.9 °F (36.6 °C) 97.9 °F (36.6 °C)   TempSrc:   Oral    SpO2:    97%   Weight:       Height:             Gen: NAD  Ext: 2+ radial pulse, no evidence of hematoma  Estimated blood loss: < 50 cc    Plan:  -Post cath care per protocol  -ASA/plavix/statin  -Will plan for staged intervention or LPop and LAT

## 2018-12-31 NOTE — NURSING
VN note: VN cued into pt's room for introduction.   Patient not in the room at this time.  She is downstairs for testing.  VN will continue to be available to patient and intervene prn.

## 2018-12-31 NOTE — HOSPITAL COURSE
12/31/2018 Per HPI   1/1/2019 Abdominal aortagram with run off with following results:    · Severe proximal LPop stenosis  · Left Peroneal without significant stenosis and distally supplies collaterals to a plantar arch  · LPTA   · Diffusely diseased YASMEEN which distally supplies a DP  · Will schedule for staged intervention of LPop and YASMEEN for CLI  · Continue aggressive wound care  · TCOM's  1/2/2019 CBC and BMP WNL unchanged yesterday. NPO for LLE revascularization today   1/3/2019 Underwent LLE revascularization yesterday with PTA with DCB to proximal LPop and IVUS guided PTA to entirety of LAT into DP with good results. Fistulogram also performed due to access issue with HD yesterday with no significant AV stenosis and stenosis distal to anastamosis on arterial side with good flow noted in AVF. Perclose closure device used in LCFA access and manual pressure with suture placed in fistula with successful hemostasis. Pt hypotensive during procedure therefore placed in ICU for close monitoring. HR and BP stable overnight with no further hypotension. LCFA site stable with no active bleeding, hematoma or ecchymosis. Will get OOB today and transfer to floor

## 2018-12-31 NOTE — PROGRESS NOTES
"Vancomycin Dosing and Monitoring Pharmacy Protocol    Martha Melvin is a 73 y.o. female    Height: 5' 5" (1.651 m)   Wt Readings from Last 3 Encounters:   12/31/18 83 kg (182 lb 15.7 oz)   11/30/18 88.7 kg (195 lb 8.8 oz)   11/09/18 88 kg (194 lb 0.1 oz)       Temp Readings from Last 3 Encounters:   12/31/18 98 °F (36.7 °C) (Oral)   11/30/18 98.5 °F (36.9 °C) (Oral)   11/09/18 98.8 °F (37.1 °C) (Oral)      Lab Results   Component Value Date/Time    WBC 7.57 12/31/2018 04:46 AM    WBC 9.71 12/30/2018 05:38 AM    WBC 8.66 12/29/2018 09:54 AM      Lab Results   Component Value Date/Time    CREATININE 6.9 (H) 12/31/2018 04:46 AM    CREATININE 5.3 (H) 12/30/2018 05:38 AM    CREATININE 6.5 (H) 12/29/2018 09:54 AM      Lab Results   Component Value Date/Time    LACTATE 0.6 04/20/2018 05:40 AM    LACTATE 1.8 04/17/2018 04:27 AM    LACTATE 9.5 (HH) 04/16/2018 10:24 PM       Serum creatinine: 6.9 mg/dL (H) 12/31/18 0446  Estimated creatinine clearance: 8 mL/min (A)    Antibiotics (From admission, onward)      Start     Stop Route Frequency Ordered    12/31/18 1700  vancomycin (VANCOCIN) 500 mg in dextrose 5 % 100 mL IVPB      -- IV Once 12/31/18 1127    12/28/18 1215  piperacillin-tazobactam 4.5 g in dextrose 5 % 100 mL IVPB (ready to mix system)      -- IV Every 12 hours (non-standard times) 12/28/18 1002          Antifungals (From admission, onward)      None            Microbiology Results (last 7 days)       Procedure Component Value Units Date/Time    AFB Culture & Smear [[id number]] Collected:  12/29/18 1506    Order Status:  Completed Specimen:  Bone from Toe, Left Foot Updated:  12/30/18 2127     AFB Culture & Smear Culture in progress    Aerobic culture [[id number]] Collected:  12/29/18 1506    Order Status:  Completed Specimen:  Bone from Toe, Left Foot Updated:  12/30/18 0729     Aerobic Bacterial Culture No growth    Aerobic culture [[id number]] Collected:  12/28/18 1604    Order Status:  Completed Specimen:  Wound " from Toe, Left Foot Updated:  18 0716     Aerobic Bacterial Culture Skin mirella,  no predominant organism    Gram stain [[id number]] Collected:  18 150    Order Status:  Completed Specimen:  Bone from Toe, Left Foot Updated:  18     Gram Stain Result Rare WBC's      Few Gram positive cocci    Culture, Anaerobe [[id number]] Collected:  18 1506    Order Status:  Sent Specimen:  Bone from Toe, Left Foot Updated:  18    Fungus culture [[id number]] Collected:  18    Order Status:  Sent Specimen:  Bone from Toe, Left Foot Updated:  18    Aerobic culture (Specify Source) **CANNOT BE ORDERED AS STAT** [[id number]] Collected:  18    Order Status:  Completed Specimen:  Wound from Foot, Left Updated:  18 0742     Aerobic Bacterial Culture No significant isolate to date    Culture, Anaerobe [[id number]] Collected:  18    Order Status:  Sent Specimen:  Wound from Foot, Left Updated:  18 0126            Indication/Target trough:   S&s ! 20mcg/ml     Hemodialysis:   MWF    Dosing Weight:   Wt Readings from Last 1 Encounters:   18 83 kg (182 lb 15.7 oz)       Last Vancomycin dose: 500 mg   Date/Time given:           Vancomycin level:  No results for input(s): VANCOMYCIN-TROUGH in the last 72 hours.  Recent Labs   Lab Result Units 18  0446   Vancomycin, Random ug/mL 18.4       Per Protocol Initial/Adjustments Dosin. Initial/Adjustment Dose: HEMODIALYSIS DOSE: Give maintenance dose of 500mg after next dialysis session  2. Vancomycin Random Level will be drawn on  0400date/time     Pharmacy will continue to follow.    Please contact if you have any further questions. Thank you.    Salvatore Dorado, PharmD  [phone]

## 2018-12-31 NOTE — INTERVAL H&P NOTE
The patient has been examined and the H&P has been reviewed:    I concur with the findings and no changes have occurred since H&P was written.    Aortogram with and selective angiography of lower extremities    Anesthesia/Surgery risks, benefits and alternative options discussed and understood by patient/family.          Active Hospital Problems    Diagnosis  POA    *Diabetic foot infection [E11.628, L08.9]  Yes    Osteomyelitis of toe [M86.9]  Unknown    Diabetic ulcer of toe of left foot [E11.621, L97.529]  Yes    Diabetic ulcer of toe of left foot associated with type 2 diabetes mellitus, with bone involvement without evidence of necrosis [E11.621, L97.526]  Yes    Hyperlipidemia [E78.5]  Yes    History of leg amputation [Z89.619]  Not Applicable    ESRD (end stage renal disease) on dialysis [N18.6, Z99.2]  Not Applicable    Aortic stenosis [I35.0]  Yes    History of cardiac arrest [Z86.74]  Not Applicable    Chronic diastolic heart failure [I50.32]  Yes    Diabetes mellitus due to underlying condition with chronic kidney disease, with long-term current use of insulin [E08.22, Z79.4]  Not Applicable    Anemia of chronic disease [D63.8]  Yes    HTN (hypertension) [I10]  Yes      Resolved Hospital Problems   No resolved problems to display.

## 2018-12-31 NOTE — PLAN OF CARE
17:00 Patient transferred to floor room 485 on cardiac monitor.  VSS. No c/o pain.   Patient attached to tele monitor upon arrival in room.  Right radial site with vasc band in place with approx 4ml of air in band. No bleeding or hematoma noted around site and site reviewed with MERON Dykes at bedside. +2 elijah radial pulses palpated. Left foot pulses dopplered. All questions answered.

## 2018-12-31 NOTE — PLAN OF CARE
Problem: Adult Inpatient Plan of Care  Goal: Plan of Care Review  Outcome: Ongoing (interventions implemented as appropriate)  Patient AAO x 4. VSS. NAD. No complaints of pain or nausea this shift. UP ad lindsey. IV fluids infusing per mar. Glucose monitoring, last . Safety maintained. Will continue to monitor.

## 2018-12-31 NOTE — PROGRESS NOTES
All of patient's belongings (clothes, duffle bag, phone , cane, prosthesis, flowers, z flex boot) brought down to room 485 by Celestine BUTLER.

## 2018-12-31 NOTE — PLAN OF CARE
Problem: Adult Inpatient Plan of Care  Goal: Plan of Care Review  Outcome: Ongoing (interventions implemented as appropriate)  Pt on RA with documented sats.  Will continue to monitor.

## 2018-12-31 NOTE — PLAN OF CARE
Problem: Adult Inpatient Plan of Care  Goal: Plan of Care Review  Patient on room air with documented SATS and in no apparent distress. Will continue to monitor.

## 2018-12-31 NOTE — PLAN OF CARE
While talking with pt transportation came to take pt down to HD. Pt plan is to return home with daughter. States that she has daughter and son to take her to and from appointments. Will continue to assess discharge needs.    Future Appointments   Date Time Provider Department Center   1/11/2019  9:00 AM VASCULAR, LAB [facility] [address]   1/11/2019  9:30 AM AYESHA Mesa III, MD [facility]        12/31/18 1024   Discharge Reassessment   Assessment Type Discharge Planning Reassessment   Provided patient/caregiver education on the expected discharge date and the discharge plan Yes   Do you have any problems affording any of your prescribed medications? No   Discharge Plan A Home;Home with family   Patient choice form signed by patient/caregiver N/A   Anticipated Discharge Disposition Home   Can the patient answer the patient profile reliably? Yes, cognitively intact   How does the patient rate their overall health at the present time? Good   Describe the patient's ability to walk at the present time. Walks with the help of equipment   How often would a person be available to care for the patient? Whenever needed   Number of comorbid conditions (as recorded on the chart) Five or more   During the past month, has the patient often been bothered by feeling down, depressed or hopeless? No   During the past month, has the patient often been bothered by little interest or pleasure in doing things? No

## 2018-12-31 NOTE — PLAN OF CARE
15:00 Patient transferred to recovery cath lab slot 2 via stretcher with side rails up x2 .  Pt drowsy but able to follow commands. Pt is stable when connecting to cardiac monitors.  VSS. Right radial vasc band in place with 11ml of air in band c.d.i. no drainage or noted. No redness, bruising, or hematoma noted around site. +2 elijah radial pulses palpated.  dopplered elijah pedal pulses.  Skin normal in color and warm to touch, <3 sec cap refill.  Fall risk precautions given and patient acknowledges.  AIDET completed to pt.  Will continue to monitor patient.

## 2018-12-31 NOTE — NURSING
VN Notes..... Floor nurse called to see if the patient will still have her procedures today since she ate breakfast.  Per Zeferino Be NP have patient go to dialysis today keep her NPO and they will try to make her the last case of the day. Floor nurse Mary advised.

## 2018-12-31 NOTE — ASSESSMENT & PLAN NOTE
Patient presented with several mo history of necrotic toes now with purulent drainage.     Arterial US:  Right lower extremity: Patient is status post right below-the-knee amputation.  Normal triphasic arterial waveforms are present throughout the common femoral and superficial femoral arteries.  More biphasic waveforms noted in the popliteal artery.  Biphasic to triphasic waveforms are present in the anterior tibial artery.  High velocity monophasic to biphasic waveforms noted in the posterior tibial artery.    The peak systolic velocities on the right are as follows, in centimeters/second:    Common femoral artery: 134    Superficial femoral artery, proximal: 83    Superficial femoral artery, mid portion: 140    Superficial femoral artery, distal: 96    Popliteal artery: 84    Anterior tibial artery: 126    Posterior tibial artery: 240    Left lower extremity: Normal triphasic arterial waveforms are present in the common femoral and proximal and mid portions of the superficial femoral arteries.  More monophasic to biphasic waveforms present in the distal SFA and popliteal artery which continued throughout the anterior and posterior tibial arteries.  No significant focal elevated segmental velocities demonstrated.    The peak systolic velocities on the left are as follows, in centimeters/second:    Common femoral artery: 147    Superficial femoral artery, proximal: 105    Superficial femoral artery, mid portion: 125    Superficial femoral artery, distal: 179    Popliteal artery: 94    Anterior tibial artery: 81    Posterior tibial artery: 114    Peroneal artery: Not evaluated secondary to overlying bandaging material.    Dorsalis Pedis:   Impression        Status post right below-the-knee amputation.    Elevated velocities within the right posterior tibial artery suggestive of possible greater than 50% focal stenosis.    Atypical waveforms without significant elevated velocity noted in the distal SFA and popliteal  artery on the left which continued throughout the lower leg.  Focal stenosis not excluded.  Further evaluation could be obtained with CTA as clinically warranted.       MRI positive for osteo.  ID and podiatry following     NPO for AO with runoff  Continue asa, statin and will likely add Plavix

## 2018-12-31 NOTE — PROGRESS NOTES
[facility]  Nephrology  Progress Note    Patient Name: Martha Melvin  MRN: [id number]  Admission Date: 12/27/2018  Hospital Length of Stay: 4 days  Attending Provider: John Butler DO   Primary Care Physician: Katiuska Villalba MD  Principal Problem:Diabetic foot infection    Subjective:     HPI: Ms. Melvin is a 74 yo AAF with ESRD admitted with osteomyelitis of left 5th digit. She receives iHD TTS at [facility] under the care of Dr. Ruelas. Nephrology following for ESRD management.     Interval History:   No events overnight. UOP 1L yesterday. Patient seen and examined on dialysis this morning; tolerating well. /79. ; .  200; AP -230.  Patient going for LE angio with runoff after dialysis today; wasn't aware procedure was planned. ID note reviewed and patient will need 6 weeks of IV vancomycin and zosyn. Patient denies any complaints today; ready to go home as she has plans for [facility]. No SOB; normal UOP.     Review of patient's allergies indicates:   Allergen Reactions    Morphine      Current Facility-Administered Medications   Medication Frequency    0.9%  NaCl infusion PRN    0.9%  NaCl infusion Once    acetaminophen tablet 650 mg Q4H PRN    amLODIPine tablet 10 mg Daily    aspirin chewable tablet 81 mg Daily    atorvastatin tablet 40 mg Daily    bisacodyl EC tablet 5 mg Daily PRN    dextrose 50% injection 12.5 g PRN    dextrose 50% injection 25 g PRN    diphenhydrAMINE capsule 50 mg Once    enoxaparin injection 30 mg Daily    epoetin bashir injection 10,000 Units Every Tues, Thurs, Sat    furosemide tablet 80 mg QAM    glucagon (human recombinant) injection 1 mg PRN    glucose chewable tablet 16 g PRN    glucose chewable tablet 24 g PRN    influenza (FLUZONE HIGH-DOSE) vaccine 0.5 mL vaccine x 1 dose    insulin aspart U-100 pen 0-5 Units QID (AC + HS) PRN    insulin detemir U-100 pen 20 Units QHS    ondansetron injection 4 mg Q8H PRN     piperacillin-tazobactam 4.5 g in dextrose 5 % 100 mL IVPB (ready to mix system) Q12H    sevelamer carbonate tablet 800 mg TID WM    sodium chloride 0.9% flush 5 mL PRN    vancomycin (VANCOCIN) 500 mg in dextrose 5 % 100 mL IVPB Once       Objective:     Vital Signs (Most Recent):  Temp: 98 °F (36.7 °C) (12/31/18 0942)  Pulse: 80 (12/31/18 1152)  Resp: 18 (12/31/18 0942)  BP: 123/82 (12/31/18 1152)  SpO2: 96 % (12/31/18 0813)  O2 Device (Oxygen Therapy): (pt off unit) (12/31/18 1153) Vital Signs (24h Range):  Temp:  [97.9 °F (36.6 °C)-98.9 °F (37.2 °C)] 98 °F (36.7 °C)  Pulse:  [] 80  Resp:  [18-20] 18  SpO2:  [94 %-97 %] 96 %  BP: (111-159)/(60-99) 123/82     Weight: 83 kg (182 lb 15.7 oz) (12/31/18 0451)  Body mass index is 30.45 kg/m².  Body surface area is 1.95 meters squared.    I/O last 3 completed shifts:  In: 4340 [P.O.:600; I.V.:3440; IV Piggyback:300]  Out: 1200 [Urine:1200]    Physical Exam   Constitutional: She is oriented to person, place, and time. She appears well-developed and well-nourished. No distress.   HENT:   Head: Normocephalic and atraumatic.   Eyes: Conjunctivae and EOM are normal.   Cardiovascular: Normal rate and regular rhythm.   L forearm AVF   Pulmonary/Chest: Effort normal and breath sounds normal. She has no rales.   Abdominal: Soft. She exhibits no distension.   Musculoskeletal: She exhibits no edema or deformity.   Neurological: She is alert and oriented to person, place, and time.   Skin: Skin is warm and dry. She is not diaphoretic.   Psychiatric: She has a normal mood and affect. Her behavior is normal.       Significant Labs:  CBC:   Recent Labs   Lab 12/31/18  0446   WBC 7.57   RBC 4.15   HGB 10.3*   HCT 32.9*      MCV 79*   MCH 24.8*   MCHC 31.3*     CMP:   Recent Labs   Lab 12/27/18 2040 12/31/18 0446   *   < > 147*   CALCIUM 9.1   < > 8.8   ALBUMIN 3.5   < > 3.0*   PROT 8.6*  --   --       < > 138   K 4.2   < > 3.9   CO2 22*   < > 25       < > 98   BUN 38*   < > 53*   CREATININE 5.5*   < > 6.9*   ALKPHOS 72  --   --    ALT 14  --   --    AST 25  --   --    BILITOT 0.5  --   --     < > = values in this interval not displayed.     All labs within the past 24 hours have been reviewed.     Significant Imaging:  Labs: Reviewed    Assessment/Plan:     ESRD  - receives [facility]HD TTS via L forearm AVF [facility]. Treatment duration 3 hours. Outpatient dialysis schedule M-Th-Sa this week for the holiday.   - wasn't aware patient was going for angio today. Ideally would have arranged for HD to be performed AFTER angio; especially as patient continues to have residual renal function and IV contrast may result in loss of UOP  - patient already in HD this morning. Will f/u angio report - if IV contrast is used, will arrange for HD again today after procedure to help remove contrast and prevent renal injury  - ID recs noted: patient will need vanc and zosyn x 6 weeks. Will arrange for IV vanc to be given with HD outpatient but unable to give zosyn on HD putpatient. Can another agent be considered?   - renally dose medications  - continue home lasix     Anemia of CKD  - hgb at goal today; continue home CHACHO   - will trend labs daily and adjust CHACHO if hgb > 11     CKD-MBD  - corrected Ca and phos within acceptable limits today  - continue home sevelamer  - daily labs  - renal diet     HTN  - BP controlled today on amlodipine and lasix  - continue        Thank you for your consult. I will follow-up with patient. Please contact us if you have any additional questions.         Matilda Pascal MD  Nephrology  [facility], [facility]  Office: [phone]  [facility] covering for:  [facility]  ANNA Etienne MD, PRASHANT CARRANZA MD,   MD KUSUM Teague, NP  [address]  [place], [address] [phone]  After hours answering service: [phone]

## 2018-12-31 NOTE — PROGRESS NOTES
"[facility] Hospital Medicine  Progress Note    Patient Name: Martha Melvin  MRN: [id number]  Patient Class: IP- Inpatient   Admission Date: 12/27/2018  Length of Stay: 4 days  Attending Physician: John Butler DO  Primary Care Provider: Katiuska Villalba MD        Subjective:     Principal Problem:Diabetic foot infection    HPI:  Martha Melvin is a 72 yo  female with HTN, HLD, Type 2 diabetes mellitus, hx of cardiac arrest, chronic diastolic HF, moderate aortic stenosis, ESRD (HD TTS), s/p right BKA (2005) secondary to nonhealing diabetic ulcer. She lives in [place], [place]. Her primary care physician is Dr. Katiuska Villalba. The patient cannot recall her nephrologist name.  She reports she has not been established with podiatry.     Pt presented to [facility] 11/27/18 with complaint of purulent drainage from left foot ulcer.  Patient states symptoms initially started as a callus on the 5th digit of the left foot. She reports noticing gradually increase in edema with mild erythema. Pt noted drainage from site today. She also reports "black toes" (3rd and 4th digit) for several months. Patient reports she was seen by her PCP 3-4 months ago. She did not have discoloration to toes at the time. Pt has never been evaluated by podiatry. She denies fever/chills, n/v, chest pain and SOB. Initial labs remarkable for H/H 9.8/30.9, BUN/CR 38/5.5, K within normal limits. Left foot xray show deformity of the left 5th proximal phalanx which may represent remote trauma or evolving osteitis. Pt afebrile, no leukocytosis, VSS. Pt admitted to [facility] medicine for further evaluation.     Hospital Course:  Pt seen at bedside, she has black toes on the left foot.  Will await podiatry and will order US of LE for arterial to check blood flow  12/29, MRI positive for most likely osteo of firt toe and fifth toe of lefdt foot.  Consulting ID. Continue current abx  12/30 no events overnight.  Doing " ok, cards will f/u with pt , discussed case.  Giving an extra dose of vanc/discussed with Pharm D  12/31, no qcute clinical changes, HD today, continue iv abx, awaiting cards for evaluation for LE blood supply/flow a.  Awaiting ID for recs for abx    Past Medical History:   Diagnosis Date    Arthritis     Diabetes mellitus     Hyperlipidemia     Hypertension     Renal disorder     poor kidney function       Past Surgical History:   Procedure Laterality Date    LEG AMPUTATION Right     PERMCATH INSERTION-TUNNELED CVC N/A 4/25/2018    Performed by Ar[facility]an Sidhu MD at [facility] CATH LAB    TRANSPOSITION, VEIN, BASILIC Left 6/27/2018    Performed by AYESHA Mesa III, MD at [facility] OR [address] [place] [address]       Review of patient's allergies indicates:   Allergen Reactions    Morphine        No current facility-administered medications on file prior to encounter.      Current Outpatient Medications on File Prior to Encounter   Medication Sig    amLODIPine (NORVASC) 10 MG tablet Take 1 tablet (10 mg total) by mouth once daily.    atorvastatin (LIPITOR) 40 MG tablet Take 1 tablet (40 mg total) by mouth once daily.    bisacodyl (DULCOLAX) 5 mg EC tablet Take 5 mg by mouth daily as needed for Constipation.    blood sugar diagnostic Strp Test 2 (two) times daily.as directed    blood-glucose meter Misc Use to test blood sugar twice daily    calcium carbonate (TUMS E-X) 300 mg (750 mg) Chew Take 1 tablet by mouth 3 (three) times daily with meals.    furosemide (LASIX) 80 MG tablet Take 1 tablet (80 mg total) by mouth every morning.    insulin aspart U-100 (NOVOLOG FLEXPEN U-100 INSULIN) 100 unit/mL InPn pen Inject 10 Units into the skin 3 (three) times daily with meals. (Patient taking differently: Inject 4 Units into the skin 3 (three) times daily with meals. )    insulin glargine (LANTUS) 100 unit/mL injection Inject 34 Units into the skin every evening. (Patient taking differently: Inject 20 Units into the skin every  "evening. )    lancets (TRUEPLUS LANCETS) 30 gauge Misc Use to test blood sugar twice daily    loratadine (CLARITIN) 10 mg tablet Take 10 mg by mouth once daily.    pen needle, diabetic (NOVOFINE PLUS) 32 gauge x 1/6" Ndle use 3 (three) times daily as needed.    senna-docusate 8.6-50 mg (PERICOLACE) 8.6-50 mg per tablet Take 1 tablet by mouth 2 (two) times daily. (Patient taking differently: Take 1 tablet by mouth 2 (two) times daily as needed. )    sevelamer carbonate (RENVELA) 800 mg Tab Take 1 tablet (800 mg total) by mouth 3 (three) times a day with meals.     Family History     Problem Relation (Age of Onset)    Cancer Mother, Father, Sister    Diabetes Mother    Hypertension Mother        Tobacco Use    Smoking status: Never Smoker    Smokeless tobacco: Never Used   Substance and Sexual Activity    Alcohol use: No    Drug use: Not on file    Sexual activity: Not on file     Review of Systems   Constitutional: Negative for chills, diaphoresis and fever.   Eyes: Negative for photophobia.   Respiratory: Negative for cough, chest tightness, shortness of breath and wheezing.    Cardiovascular: Negative for chest pain, palpitations and leg swelling.   Gastrointestinal: Negative for abdominal pain, diarrhea, nausea and vomiting.   Genitourinary: Negative for dysuria, flank pain, frequency and hematuria.   Musculoskeletal: Negative for back pain and myalgias.   Skin: Positive for color change (left foot toes ) and wound (left foot, 5th toe ).   Neurological: Negative for dizziness, syncope, light-headedness and headaches.   Psychiatric/Behavioral: Negative for confusion.     Objective:     Vital Signs (Most Recent):  Temp: 98 °F (36.7 °C) (12/31/18 0942)  Pulse: 80 (12/31/18 1152)  Resp: 18 (12/31/18 0942)  BP: 123/82 (12/31/18 1152)  SpO2: 96 % (12/31/18 0813) Vital Signs (24h Range):  Temp:  [97.9 °F (36.6 °C)-98.9 °F (37.2 °C)] 98 °F (36.7 °C)  Pulse:  [] 80  Resp:  [18-20] 18  SpO2:  [94 %-97 %] 96 " %  BP: (111-159)/(60-99) 123/82     Weight: 83 kg (182 lb 15.7 oz)  Body mass index is 30.45 kg/m².    Physical Exam   Constitutional: She is oriented to person, place, and time. She appears well-developed and well-nourished.   HENT:   Head: Normocephalic and atraumatic.   Eyes: Conjunctivae are normal. Pupils are equal, round, and reactive to light.   Neck: Normal range of motion. No JVD present.   Cardiovascular: Normal rate, regular rhythm, normal heart sounds and intact distal pulses.   Pulmonary/Chest: Effort normal. No respiratory distress. She has no wheezes.   Abdominal: Soft. Bowel sounds are normal. She exhibits no distension. There is no tenderness. There is no guarding.   Musculoskeletal: Normal range of motion. She exhibits edema (L foot ). She exhibits no tenderness.   R BKA    Neurological: She is alert and oriented to person, place, and time.   Skin: Skin is warm and dry. Capillary refill takes less than 2 seconds.   +necrosis to Left foot 3rd/4th toes, ulcer to left foot 5th digit, no drainage noted    Psychiatric: She has a normal mood and affect. Her behavior is normal.         CRANIAL NERVES     CN III, IV, VI   Pupils are equal, round, and reactive to light.       Significant Labs:   BMP:   Recent Labs   Lab 12/31/18  0446   *      K 3.9   CL 98   CO2 25   BUN 53*   CREATININE 6.9*   CALCIUM 8.8     CBC:   Recent Labs   Lab 12/30/18  0538 12/31/18  0446   WBC 9.71 7.57   HGB 10.4* 10.3*   HCT 33.3* 32.9*    225       Significant Imaging: I have reviewed all pertinent imaging results/findings within the past 24 hours.    Assessment/Plan:      * Diabetic foot infection    Diabetic ulcer of toe of left foot associated with type 2 diabetes mellitus, with necrosis of bone  History of leg amputation  Diabetes Mellitus with long term insulin use     A1C 7.4 (9/5/2018)     -wound cultures pending   -L foot xray reviewed   -MRI L foot r/o osteo   -consult podiatry   -taking insulin  Aspart-10 units TID and Lantus 20 units QHS, continue   -SSI, Accucheck AC&HS, diabetic diet          Diabetic ulcer of toe of left foot associated with type 2 diabetes mellitus, with bone involvement without evidence of necrosis      MRI positive for osteo.  Will continue IV ABX  Will consult ID     ESRD (end stage renal disease) on dialysis    Anemia of chronic disease    HD TTS, H/H at baseline   -consult nephrology (pt does not know name of her nephrologist)      HTN (hypertension)    Chronic diastolic heart failure  Aortic stenosis  History of cardiac arrest  Hyperlipidemia    BP stable, no evidence of volume overload   -continue amlodipine, atorvastatin and lasix        VTE Risk Mitigation (From admission, onward)        Ordered     enoxaparin injection 30 mg  Daily      12/27/18 2321     IP VTE HIGH RISK PATIENT  Once      12/27/18 2310              John Butler DO  Department of Hospital Medicine   [facility]

## 2018-12-31 NOTE — HPI
"Martha Melvin is a 72 yo female with HTN, HLD, Type 2 diabetes mellitus, hx of cardiac arrest, chronic diastolic HF, moderate aortic stenosis, ESRD (HD TTS), s/p right BKA (2005) secondary to nonhealing diabetic ulcer. Patient presented to the ED with complaints of purulent drainage from left foot ulcer.  Patient reports that her wound initially started as a callus on the 5th digit of the left foot. She reports noticing gradually increase in edema with mild erythema.  She also reports "black toes" (3rd and 4th digit) for several months. She does not follow with podiatry as an outpatient. She denies fever/chills, n/v, chest pain and SOB. Patient is NPO for AO with runoff today.     Arterial US:  Right lower extremity: Patient is status post right below-the-knee amputation.  Normal triphasic arterial waveforms are present throughout the common femoral and superficial femoral arteries.  More biphasic waveforms noted in the popliteal artery.  Biphasic to triphasic waveforms are present in the anterior tibial artery.  High velocity monophasic to biphasic waveforms noted in the posterior tibial artery.    The peak systolic velocities on the right are as follows, in centimeters/second:    Common femoral artery: 134    Superficial femoral artery, proximal: 83    Superficial femoral artery, mid portion: 140    Superficial femoral artery, distal: 96    Popliteal artery: 84    Anterior tibial artery: 126    Posterior tibial artery: 240    Left lower extremity: Normal triphasic arterial waveforms are present in the common femoral and proximal and mid portions of the superficial femoral arteries.  More monophasic to biphasic waveforms present in the distal SFA and popliteal artery which continued throughout the anterior and posterior tibial arteries.  No significant focal elevated segmental velocities demonstrated.    The peak systolic velocities on the left are as follows, in centimeters/second:    Common femoral artery: " 147    Superficial femoral artery, proximal: 105    Superficial femoral artery, mid portion: 125    Superficial femoral artery, distal: 179    Popliteal artery: 94    Anterior tibial artery: 81    Posterior tibial artery: 114    Peroneal artery: Not evaluated secondary to overlying bandaging material.    Dorsalis Pedis:   Impression       Status post right below-the-knee amputation.    Elevated velocities within the right posterior tibial artery suggestive of possible greater than 50% focal stenosis.    Atypical waveforms without significant elevated velocity noted in the distal SFA and popliteal artery on the left which continued throughout the lower leg.  Focal stenosis not excluded.  Further evaluation could be obtained with CTA as clinically warranted.

## 2018-12-31 NOTE — SUBJECTIVE & OBJECTIVE
"Past Medical History:   Diagnosis Date    Arthritis     Diabetes mellitus     Hyperlipidemia     Hypertension     Renal disorder     poor kidney function       Past Surgical History:   Procedure Laterality Date    LEG AMPUTATION Right     PERMCATH INSERTION-TUNNELED CVC N/A 4/25/2018    Performed by Armaan Sidhu MD at [facility] CATH LAB    TRANSPOSITION, VEIN, BASILIC Left 6/27/2018    Performed by AYESHA Mesa III, MD at [facility] OR [address] [place] [address]       Review of patient's allergies indicates:   Allergen Reactions    Morphine        No current facility-administered medications on file prior to encounter.      Current Outpatient Medications on File Prior to Encounter   Medication Sig    amLODIPine (NORVASC) 10 MG tablet Take 1 tablet (10 mg total) by mouth once daily.    atorvastatin (LIPITOR) 40 MG tablet Take 1 tablet (40 mg total) by mouth once daily.    bisacodyl (DULCOLAX) 5 mg EC tablet Take 5 mg by mouth daily as needed for Constipation.    blood sugar diagnostic Strp Test 2 (two) times daily.as directed    blood-glucose meter Misc Use to test blood sugar twice daily    calcium carbonate (TUMS E-X) 300 mg (750 mg) Chew Take 1 tablet by mouth 3 (three) times daily with meals.    furosemide (LASIX) 80 MG tablet Take 1 tablet (80 mg total) by mouth every morning.    insulin aspart U-100 (NOVOLOG FLEXPEN U-100 INSULIN) 100 unit/mL InPn pen Inject 10 Units into the skin 3 (three) times daily with meals. (Patient taking differently: Inject 4 Units into the skin 3 (three) times daily with meals. )    insulin glargine (LANTUS) 100 unit/mL injection Inject 34 Units into the skin every evening. (Patient taking differently: Inject 20 Units into the skin every evening. )    lancets (TRUEPLUS LANCETS) 30 gauge Misc Use to test blood sugar twice daily    loratadine (CLARITIN) 10 mg tablet Take 10 mg by mouth once daily.    pen needle, diabetic (NOVOFINE PLUS) 32 gauge x 1/6" Ndle use 3 (three) times daily as " needed.    senna-docusate 8.6-50 mg (PERICOLACE) 8.6-50 mg per tablet Take 1 tablet by mouth 2 (two) times daily. (Patient taking differently: Take 1 tablet by mouth 2 (two) times daily as needed. )    sevelamer carbonate (RENVELA) 800 mg Tab Take 1 tablet (800 mg total) by mouth 3 (three) times a day with meals.     Family History     Problem Relation (Age of Onset)    Cancer Mother, Father, Sister    Diabetes Mother    Hypertension Mother        Tobacco Use    Smoking status: Never Smoker    Smokeless tobacco: Never Used   Substance and Sexual Activity    Alcohol use: No    Drug use: Not on file    Sexual activity: Not on file     Review of Systems   Constitution: Negative. Negative for diaphoresis, weakness and malaise/fatigue.   HENT: Negative.    Eyes: Negative.    Cardiovascular: Positive for claudication. Negative for chest pain, dyspnea on exertion, leg swelling, near-syncope, orthopnea, palpitations, paroxysmal nocturnal dyspnea and syncope.   Respiratory: Negative for cough and shortness of breath.    Endocrine: Negative.    Hematologic/Lymphatic: Negative.    Skin: Positive for color change, dry skin and poor wound healing.   Musculoskeletal: Positive for arthritis and myalgias.   Gastrointestinal: Negative.    Genitourinary: Negative.    Neurological: Negative.    Psychiatric/Behavioral: Negative.    Allergic/Immunologic: Negative.      Objective:     Vital Signs (Most Recent):  Temp: 97.9 °F (36.6 °C) (12/31/18 0858)  Pulse: 94 (12/31/18 0858)  Resp: 18 (12/31/18 0858)  BP: (!) 144/73 (12/31/18 0858)  SpO2: 96 % (12/31/18 0813) Vital Signs (24h Range):  Temp:  [97.9 °F (36.6 °C)-98.9 °F (37.2 °C)] 97.9 °F (36.6 °C)  Pulse:  [80-94] 94  Resp:  [18-20] 18  SpO2:  [94 %-97 %] 96 %  BP: (130-144)/(60-74) 144/73     Weight: 83 kg (182 lb 15.7 oz)  Body mass index is 30.45 kg/m².    SpO2: 96 %  O2 Device (Oxygen Therapy): room air      Intake/Output Summary (Last 24 hours) at 12/31/2018 0926  Last data  filed at 12/31/2018 0000  Gross per 24 hour   Intake 3740 ml   Output 1000 ml   Net 2740 ml       Lines/Drains/Airways     Drain                 Hemodialysis AV Fistula -- days          Peripheral Intravenous Line                 Peripheral IV - Single Lumen 12/29/18 0013 Right Forearm 2 days                Physical Exam   Constitutional: She is oriented to person, place, and time. No distress.   HENT:   Head: Normocephalic and atraumatic.   Eyes: Right eye exhibits no discharge. Left eye exhibits no discharge.   Neck: No JVD present.   Cardiovascular: Normal rate and regular rhythm. Exam reveals no gallop and no friction rub.   Murmur heard.  Pulmonary/Chest: Effort normal and breath sounds normal. She has no rales.   Abdominal: Soft. Bowel sounds are normal.   Musculoskeletal: She exhibits edema.   Neurological: She is alert and oriented to person, place, and time.   Skin: Skin is warm and dry. She is not diaphoretic.   Psychiatric: She has a normal mood and affect. Her behavior is normal.       Significant Labs:   Blood Culture: No results for input(s): LABBLOO in the last 48 hours., BMP:   Recent Labs   Lab 12/29/18  0954 12/30/18  0538 12/31/18  0446   * 191* 147*    136 138   K 3.9 4.1 3.9    96 98   CO2 23 26 25   BUN 56* 39* 53*   CREATININE 6.5* 5.3* 6.9*   CALCIUM 8.6* 9.1 8.8   , CMP   Recent Labs   Lab 12/29/18  0954 12/30/18  0538 12/31/18  0446    136 138   K 3.9 4.1 3.9    96 98   CO2 23 26 25   * 191* 147*   BUN 56* 39* 53*   CREATININE 6.5* 5.3* 6.9*   CALCIUM 8.6* 9.1 8.8   ALBUMIN 3.0* 3.0* 3.0*   ANIONGAP 13 14 15   ESTGFRAFRICA 7* 9* 6*   EGFRNONAA 6* 8* 5*   , CBC   Recent Labs   Lab 12/29/18  0954 12/30/18  0538 12/31/18  0446   WBC 8.66 9.71 7.57   HGB 10.5* 10.4* 10.3*   HCT 33.0* 33.3* 32.9*    242 225   , INR No results for input(s): INR, PROTIME in the last 48 hours., Lipid Panel   Recent Labs   Lab 12/31/18  0446   CHOL 124   HDL 34*   LDLCALC  72.4   TRIG 88   CHOLHDL 27.4   , Troponin No results for input(s): TROPONINI in the last 48 hours. and All pertinent lab results from the last 24 hours have been reviewed.    Significant Imaging: Echocardiogram:   2D echo with color flow doppler:   Results for orders placed or performed during the hospital encounter of 04/15/18   2D echo with color flow doppler   Result Value Ref Range    QEF 55 55 - 65    Diastolic Dysfunction Yes (A)     Aortic Valve Stenosis MODERATE (A)     Est. PA Systolic Pressure 56.29 (A)     Tricuspid Valve Regurgitation MILD

## 2018-12-31 NOTE — PLAN OF CARE
2 mL of air removed from right radial vasc band.  No hematoma or bleeding noted.  +2 elijah radial pulses palpated. Skin normal in color, warm to touch, < 3 sec cap refill.   Will continue to monitor pt.

## 2019-01-01 LAB
ALBUMIN SERPL BCP-MCNC: 3 G/DL
ANION GAP SERPL CALC-SCNC: 12 MMOL/L
BUN SERPL-MCNC: 34 MG/DL
CALCIUM SERPL-MCNC: 8.9 MG/DL
CHLORIDE SERPL-SCNC: 99 MMOL/L
CO2 SERPL-SCNC: 24 MMOL/L
CREAT SERPL-MCNC: 5.8 MG/DL
ERYTHROCYTE [DISTWIDTH] IN BLOOD BY AUTOMATED COUNT: 16 %
EST. GFR  (AFRICAN AMERICAN): 8 ML/MIN/1.73 M^2
EST. GFR  (NON AFRICAN AMERICAN): 7 ML/MIN/1.73 M^2
GLUCOSE SERPL-MCNC: 108 MG/DL
HCT VFR BLD AUTO: 33.7 %
HGB BLD-MCNC: 10.5 G/DL
MCH RBC QN AUTO: 24.8 PG
MCHC RBC AUTO-ENTMCNC: 31.2 G/DL
MCV RBC AUTO: 80 FL
PHOSPHATE SERPL-MCNC: 4.8 MG/DL
PLATELET # BLD AUTO: 224 K/UL
PMV BLD AUTO: 9.9 FL
POCT GLUCOSE: 123 MG/DL (ref 70–110)
POCT GLUCOSE: 142 MG/DL (ref 70–110)
POCT GLUCOSE: 219 MG/DL (ref 70–110)
POCT GLUCOSE: 249 MG/DL (ref 70–110)
POTASSIUM SERPL-SCNC: 4.6 MMOL/L
RBC # BLD AUTO: 4.23 M/UL
SODIUM SERPL-SCNC: 135 MMOL/L
WBC # BLD AUTO: 7.41 K/UL

## 2019-01-01 PROCEDURE — 99233 PR SUBSEQUENT HOSPITAL CARE,LEVL III: ICD-10-PCS | Mod: ,,, | Performed by: INTERNAL MEDICINE

## 2019-01-01 PROCEDURE — 25000003 PHARM REV CODE 250: Performed by: HOSPITALIST

## 2019-01-01 PROCEDURE — 80100016 HC MAINTENANCE HEMODIALYSIS

## 2019-01-01 PROCEDURE — 25000003 PHARM REV CODE 250: Performed by: INTERNAL MEDICINE

## 2019-01-01 PROCEDURE — 36415 COLL VENOUS BLD VENIPUNCTURE: CPT

## 2019-01-01 PROCEDURE — 85027 COMPLETE CBC AUTOMATED: CPT

## 2019-01-01 PROCEDURE — 99233 SBSQ HOSP IP/OBS HIGH 50: CPT | Mod: ,,, | Performed by: INTERNAL MEDICINE

## 2019-01-01 PROCEDURE — 80069 RENAL FUNCTION PANEL: CPT

## 2019-01-01 PROCEDURE — 25000003 PHARM REV CODE 250: Performed by: NURSE PRACTITIONER

## 2019-01-01 PROCEDURE — 94761 N-INVAS EAR/PLS OXIMETRY MLT: CPT

## 2019-01-01 PROCEDURE — 63600175 PHARM REV CODE 636 W HCPCS: Performed by: HOSPITALIST

## 2019-01-01 PROCEDURE — 63600175 PHARM REV CODE 636 W HCPCS: Performed by: NURSE PRACTITIONER

## 2019-01-01 PROCEDURE — 63600175 PHARM REV CODE 636 W HCPCS: Mod: JG | Performed by: INTERNAL MEDICINE

## 2019-01-01 PROCEDURE — 21400001 HC TELEMETRY ROOM

## 2019-01-01 RX ADMIN — ENOXAPARIN SODIUM 30 MG: 100 INJECTION SUBCUTANEOUS at 12:01

## 2019-01-01 RX ADMIN — FUROSEMIDE 80 MG: 40 TABLET ORAL at 06:01

## 2019-01-01 RX ADMIN — PIPERACILLIN AND TAZOBACTAM 4.5 G: 4; .5 INJECTION, POWDER, LYOPHILIZED, FOR SOLUTION INTRAVENOUS; PARENTERAL at 12:01

## 2019-01-01 RX ADMIN — SEVELAMER CARBONATE 800 MG: 800 TABLET, FILM COATED ORAL at 09:01

## 2019-01-01 RX ADMIN — PIPERACILLIN AND TAZOBACTAM 4.5 G: 4; .5 INJECTION, POWDER, LYOPHILIZED, FOR SOLUTION INTRAVENOUS; PARENTERAL at 03:01

## 2019-01-01 RX ADMIN — ENOXAPARIN SODIUM 30 MG: 100 INJECTION SUBCUTANEOUS at 11:01

## 2019-01-01 RX ADMIN — ASPIRIN 81 MG 81 MG: 81 TABLET ORAL at 09:01

## 2019-01-01 RX ADMIN — ERYTHROPOIETIN 10000 UNITS: 10000 INJECTION, SOLUTION INTRAVENOUS; SUBCUTANEOUS at 02:01

## 2019-01-01 RX ADMIN — AMLODIPINE BESYLATE 10 MG: 5 TABLET ORAL at 09:01

## 2019-01-01 RX ADMIN — INSULIN DETEMIR 20 UNITS: 100 INJECTION, SOLUTION SUBCUTANEOUS at 08:01

## 2019-01-01 RX ADMIN — ATORVASTATIN CALCIUM 40 MG: 40 TABLET, FILM COATED ORAL at 09:01

## 2019-01-01 RX ADMIN — PIPERACILLIN AND TAZOBACTAM 4.5 G: 4; .5 INJECTION, POWDER, LYOPHILIZED, FOR SOLUTION INTRAVENOUS; PARENTERAL at 11:01

## 2019-01-01 RX ADMIN — SODIUM CHLORIDE 1000 ML: 0.9 INJECTION, SOLUTION INTRAVENOUS at 02:01

## 2019-01-01 RX ADMIN — SEVELAMER CARBONATE 800 MG: 800 TABLET, FILM COATED ORAL at 03:01

## 2019-01-01 NOTE — PROGRESS NOTES
[facility]  Nephrology  Progress Note    Patient Name: Martha Melvin  MRN: [id number]  Admission Date: 12/27/2018  Hospital Length of Stay: 5 days  Attending Provider: Florinda Davenport*   Primary Care Physician: Katiuska Villalba MD  Principal Problem:Diabetic foot infection    Subjective:     HPI: Ms. Melvin is a 72 yo AAF with ESRD admitted with osteomyelitis of left 5th digit. She receives iHD TTS at [facility] under the care of Dr. Ruelas. Nephrology following for ESRD management.     Interval History:   S/p angio with runoff yesterday evening. Planning for revascularization tomorrow. No events overnight. Brought patient to HD this morning for short-run to help remove contrast load given yesterday.   Patient seen and examined on HD. Tolerating well. ; .  160; AP -280.   Patient denies any SOB or LE edema.     Review of patient's allergies indicates:   Allergen Reactions    Morphine      Current Facility-Administered Medications   Medication Frequency    0.9%  NaCl infusion PRN    acetaminophen tablet 650 mg Q4H PRN    amLODIPine tablet 10 mg Daily    aspirin chewable tablet 81 mg Daily    atorvastatin tablet 40 mg Daily    bisacodyl EC tablet 5 mg Daily PRN    dextrose 50% injection 12.5 g PRN    dextrose 50% injection 25 g PRN    diphenhydrAMINE capsule 50 mg Once    enoxaparin injection 30 mg Daily    epoetin bashir injection 10,000 Units Every Tues, Thurs, Sat    furosemide tablet 80 mg QAM    glucagon (human recombinant) injection 1 mg PRN    glucose chewable tablet 16 g PRN    glucose chewable tablet 24 g PRN    heparin infusion 1,000 units/500 ml in 0.9% NaCl (pressure line flush) Continuous PRN    influenza (FLUZONE HIGH-DOSE) vaccine 0.5 mL vaccine x 1 dose    insulin aspart U-100 pen 0-5 Units QID (AC + HS) PRN    insulin detemir U-100 pen 20 Units QHS    ondansetron injection 4 mg Q8H PRN    piperacillin-tazobactam 4.5 g in dextrose 5 %  100 mL IVPB (ready to mix system) Q12H    sevelamer carbonate tablet 800 mg TID WM    sodium chloride 0.9% flush 5 mL PRN       Objective:     Vital Signs (Most Recent):  Temp: 98.5 °F (36.9 °C) (01/01/19 1236)  Pulse: 73 (01/01/19 1412)  Resp: 18 (01/01/19 1236)  BP: (!) 123/59 (01/01/19 1412)  SpO2: (!) 93 % (01/01/19 1207)  O2 Device (Oxygen Therapy): room air (01/01/19 1236) Vital Signs (24h Range):  Temp:  [96.7 °F (35.9 °C)-99.8 °F (37.7 °C)] 98.5 °F (36.9 °C)  Pulse:  [70-84] 73  Resp:  [14-23] 18  SpO2:  [91 %-100 %] 93 %  BP: (105-153)/(44-86) 123/59     Weight: 82.9 kg (182 lb 12.2 oz) (01/01/19 0334)  Body mass index is 30.41 kg/m².  Body surface area is 1.95 meters squared.    I/O last 3 completed shifts:  In: 1483.3 [P.O.:180; I.V.:503.3; Other:500; IV Piggyback:300]  Out: 2500 [Other:2500]    Physical Exam   Constitutional: She is oriented to person, place, and time. She appears well-developed and well-nourished. No distress.   HENT:   Head: Normocephalic and atraumatic.   Eyes: Conjunctivae and EOM are normal.   Cardiovascular: Normal rate and regular rhythm.   L forearm AVF   Pulmonary/Chest: Effort normal and breath sounds normal. She has no rales.   Abdominal: Soft. She exhibits no distension.   Musculoskeletal: She exhibits no edema or tenderness.   Neurological: She is alert and oriented to person, place, and time.   Skin: Skin is warm and dry. She is not diaphoretic.   Psychiatric: She has a normal mood and affect. Her behavior is normal.       Significant Labs:  CBC:   Recent Labs   Lab 01/01/19  0656   WBC 7.41   RBC 4.23   HGB 10.5*   HCT 33.7*      MCV 80*   MCH 24.8*   MCHC 31.2*     CMP:   Recent Labs   Lab 12/27/18 2040 01/01/19  0656   *   < > 108   CALCIUM 9.1   < > 8.9   ALBUMIN 3.5   < > 3.0*   PROT 8.6*  --   --       < > 135*   K 4.2   < > 4.6   CO2 22*   < > 24      < > 99   BUN 38*   < > 34*   CREATININE 5.5*   < > 5.8*   ALKPHOS 72  --   --    ALT 14   --   --    AST 25  --   --    BILITOT 0.5  --   --     < > = values in this interval not displayed.     All labs within the past 24 hours have been reviewed.     Significant Imaging:  Labs: Reviewed    Assessment/Plan:     ESRD  - receives iHD TTS via L forearm AVF at [facility]. Treatment duration 3 hours. Outpatient dialysis schedule M-Th-Sa this week for the holiday. +residual renal function  - received HD on Monday morning. Unfortunately patient went for angio with runoff after dialysis yesterday. Procedure wasn't planned with dialysis unit or myself  - arranged for extra dialysis treatment this morning to help remove contrast agent; especially as patient continues to have normal UOP  - revascularization plans for tomorrow noted; will arrange for HD either after procedure tomorrow or first thing the following day  - ID recs noted for 6 weeks of vancomycin and zosyn. We can arrange for vanc to be given outpatient with HD but zosyn is not an option. Sent Epic message to Dr. Peters asking for alternative agents; still awaiting response.   - renally dose medications  - continue home lasix     Anemia of CKD  - hgb at goal today; continue home CHACHO   - will trend labs daily and adjust CHACHO if hgb > 11     CKD-MBD  - corrected Ca and phos at goal today  - continue home sevelamer  - daily labs  - renal diet     HTN  - BP controlled today on amlodipine and lasix  - continue        Thank you for your consult. I will follow-up with patient. Please contact us if you have any additional questions.         Matilda Pascal MD  Nephrology  [facility], [facility]  Office: [phone]  [facility] covering for:  [facility]  ANNA Etienne MD, FACPRASHANT URBAN MD,   MD KUSUM Teague, DREW  [address]  [place], [address]  [phone]  After hours answering service: [phone]

## 2019-01-01 NOTE — SUBJECTIVE & OBJECTIVE
Interval History:   S/p angio with runoff yesterday evening. Planning for revascularization tomorrow. No events overnight. Brought patient to HD this morning for short-run to help remove contrast load given yesterday.   Patient seen and examined on HD. Tolerating well. ; .  160; AP -280.   Patient denies any SOB or LE edema.     Review of patient's allergies indicates:   Allergen Reactions    Morphine      Current Facility-Administered Medications   Medication Frequency    0.9%  NaCl infusion PRN    acetaminophen tablet 650 mg Q4H PRN    amLODIPine tablet 10 mg Daily    aspirin chewable tablet 81 mg Daily    atorvastatin tablet 40 mg Daily    bisacodyl EC tablet 5 mg Daily PRN    dextrose 50% injection 12.5 g PRN    dextrose 50% injection 25 g PRN    diphenhydrAMINE capsule 50 mg Once    enoxaparin injection 30 mg Daily    epoetin bashir injection 10,000 Units Every Tues, Thurs, Sat    furosemide tablet 80 mg QAM    glucagon (human recombinant) injection 1 mg PRN    glucose chewable tablet 16 g PRN    glucose chewable tablet 24 g PRN    heparin infusion 1,000 units/500 ml in 0.9% NaCl (pressure line flush) Continuous PRN    influenza (FLUZONE HIGH-DOSE) vaccine 0.5 mL vaccine x 1 dose    insulin aspart U-100 pen 0-5 Units QID (AC + HS) PRN    insulin detemir U-100 pen 20 Units QHS    ondansetron injection 4 mg Q8H PRN    piperacillin-tazobactam 4.5 g in dextrose 5 % 100 mL IVPB (ready to mix system) Q12H    sevelamer carbonate tablet 800 mg TID WM    sodium chloride 0.9% flush 5 mL PRN       Objective:     Vital Signs (Most Recent):  Temp: 98.5 °F (36.9 °C) (01/01/19 1236)  Pulse: 73 (01/01/19 1412)  Resp: 18 (01/01/19 1236)  BP: (!) 123/59 (01/01/19 1412)  SpO2: (!) 93 % (01/01/19 1207)  O2 Device (Oxygen Therapy): room air (01/01/19 1236) Vital Signs (24h Range):  Temp:  [96.7 °F (35.9 °C)-99.8 °F (37.7 °C)] 98.5 °F (36.9 °C)  Pulse:  [70-84] 73  Resp:  [14-23] 18  SpO2:  [91  %-100 %] 93 %  BP: (105-153)/(44-86) 123/59     Weight: 82.9 kg (182 lb 12.2 oz) (01/01/19 0334)  Body mass index is 30.41 kg/m².  Body surface area is 1.95 meters squared.    I/O last 3 completed shifts:  In: 1483.3 [P.O.:180; I.V.:503.3; Other:500; IV Piggyback:300]  Out: 2500 [Other:2500]    Physical Exam   Constitutional: She is oriented to person, place, and time. She appears well-developed and well-nourished. No distress.   HENT:   Head: Normocephalic and atraumatic.   Eyes: Conjunctivae and EOM are normal.   Cardiovascular: Normal rate and regular rhythm.   L forearm AVF   Pulmonary/Chest: Effort normal and breath sounds normal. She has no rales.   Abdominal: Soft. She exhibits no distension.   Musculoskeletal: She exhibits no edema or tenderness.   Neurological: She is alert and oriented to person, place, and time.   Skin: Skin is warm and dry. She is not diaphoretic.   Psychiatric: She has a normal mood and affect. Her behavior is normal.       Significant Labs:  CBC:   Recent Labs   Lab 01/01/19  0656   WBC 7.41   RBC 4.23   HGB 10.5*   HCT 33.7*      MCV 80*   MCH 24.8*   MCHC 31.2*     CMP:   Recent Labs   Lab 12/27/18  2040  01/01/19  0656   *   < > 108   CALCIUM 9.1   < > 8.9   ALBUMIN 3.5   < > 3.0*   PROT 8.6*  --   --       < > 135*   K 4.2   < > 4.6   CO2 22*   < > 24      < > 99   BUN 38*   < > 34*   CREATININE 5.5*   < > 5.8*   ALKPHOS 72  --   --    ALT 14  --   --    AST 25  --   --    BILITOT 0.5  --   --     < > = values in this interval not displayed.     All labs within the past 24 hours have been reviewed.     Significant Imaging:  Labs: Reviewed

## 2019-01-01 NOTE — NURSING
VN cued into pt's room for introduction. VN informed pt that VN would be working along side bedside nurse and PCT throughout shift. At present no distress noted. Discussed plan of care with patient, ie tomorrow's procedure in cath lab and to be NPO past midnight to prepare for tomorrow. Patient verbalized clear understanding using teach back method. Patient presently being transported via hospital bed to dialysis. Will cont to be available to patient and intervene prn.

## 2019-01-01 NOTE — PLAN OF CARE
Problem: Adult Inpatient Plan of Care  Goal: Plan of Care Review  Outcome: Ongoing (interventions implemented as appropriate)  Plan of care reviewed with patient, understanding verbalized. Pt remains SR on tele. No complaints overnight. IV abx administered  Per order. BG monitored throughout shift and SSI administered per orders.  Instructed to call for any assistance, understanding verbalized.  Bed alarm on, call light in reach, fall precautions in place.

## 2019-01-01 NOTE — SUBJECTIVE & OBJECTIVE
Interval History: awake and alert. Sitting up in bed watching TV. Family by bedside.   Discussed case with cardiology and plan for intervention (revascularisation tomorrow 1/2. Continue Vanc and Zosyn x 6 weeks per ID.     Review of Systems   Constitutional: Negative for chills, diaphoresis and fever.   Eyes: Negative for photophobia.   Respiratory: Negative for cough and shortness of breath.    Cardiovascular: Negative for leg swelling.   Gastrointestinal: Negative for abdominal pain and nausea.   Musculoskeletal: Negative for back pain and myalgias.   Skin: Positive for color change (left foot toes ) and wound (left foot, 5th toe ).   Neurological: Negative for dizziness, syncope, light-headedness and headaches.   Psychiatric/Behavioral: Negative for confusion.     Objective:     Vital Signs (Most Recent):  Temp: 98.7 °F (37.1 °C) (01/01/19 1144)  Pulse: 79 (01/01/19 1200)  Resp: 18 (01/01/19 1144)  BP: (!) 124/50 (01/01/19 1144)  SpO2: (!) 93 % (01/01/19 1207) Vital Signs (24h Range):  Temp:  [96.7 °F (35.9 °C)-99.8 °F (37.7 °C)] 98.7 °F (37.1 °C)  Pulse:  [70-84] 79  Resp:  [14-23] 18  SpO2:  [91 %-100 %] 93 %  BP: (103-153)/(50-89) 124/50     Weight: 82.9 kg (182 lb 12.2 oz)  Body mass index is 30.41 kg/m².    Intake/Output Summary (Last 24 hours) at 1/1/2019 1250  Last data filed at 1/1/2019 0830  Gross per 24 hour   Intake 1005 ml   Output 2500 ml   Net -1495 ml      Physical Exam   Constitutional: She is oriented to person, place, and time. She appears well-developed and well-nourished.   HENT:   Head: Normocephalic and atraumatic.   Neck: Neck supple.   Cardiovascular: Normal rate, regular rhythm and normal heart sounds.   No murmur heard.  Pulmonary/Chest: Effort normal. No respiratory distress. She has no wheezes.   Abdominal: Soft. Bowel sounds are normal. She exhibits no distension. There is no tenderness. There is no guarding.   Musculoskeletal: Normal range of motion. She exhibits edema (L foot ). She  exhibits no tenderness.   R BKA    Neurological: She is alert and oriented to person, place, and time.   Skin: Skin is warm and dry. Capillary refill takes less than 2 seconds.   +necrosis to Left foot 3rd/4th toes, ulcer to left foot 5th digit, no drainage noted    Psychiatric: She has a normal mood and affect. Her behavior is normal.         Significant Labs:   CBC:   Recent Labs   Lab 12/31/18 0446 12/31/18  1730 01/01/19  0656   WBC 7.57 7.13 7.41   HGB 10.3* 10.9* 10.5*   HCT 32.9* 34.8* 33.7*    212 224     CMP:   Recent Labs   Lab 12/31/18 0446 12/31/18  1730 01/01/19  0656    134* 135*   K 3.9 4.2 4.6   CL 98 100 99   CO2 25 22* 24   * 105 108   BUN 53* 22 34*   CREATININE 6.9* 4.2* 5.8*   CALCIUM 8.8 9.4 8.9   ALBUMIN 3.0*  --  3.0*   ANIONGAP 15 12 12   EGFRNONAA 5* 10* 7*     Cardiac Markers: No results for input(s): CKMB, MYOGLOBIN, BNP, TROPISTAT in the last 48 hours.  Magnesium: No results for input(s): MG in the last 48 hours.  Respiratory Culture: No results for input(s): GSRESP, RESPIRATORYC in the last 48 hours.  Troponin: No results for input(s): TROPONINI in the last 48 hours.    Significant Imaging: none

## 2019-01-01 NOTE — NURSING
Patient arrived to  via stretcher with cath lab RN. Tele monitor remains intact. Right radial vasc band remains intact with only 2cc air remaining to be removed. No bleeding, ecchymosis or edema noted to site or around site. Good capillary refill to finger tips noted. Good radial pulse noted. Left foot toes blackish in color. +2 doppler pedal pulses noted. Patient AAO x4. Family at bedside. Right BKA noted.  LFA dialysis fistula noted with positive thrill and bruit. IV site c/d/i. Will cont to monitor pt and intervene prn.

## 2019-01-01 NOTE — PROGRESS NOTES
Seen this am   Doing well       No acute issues overnight  Reviewed angiogram from 12/31/2018        She will need revascularization of L POP and L AT  Antibiotics per ID  Close follow with podiatry        Vitals:    01/01/19 0830 01/01/19 1144 01/01/19 1200 01/01/19 1207   BP:  (!) 124/50     Pulse:  81 79    Resp:  18     Temp:  98.7 °F (37.1 °C)     TempSrc:  Oral     SpO2: (!) 93%   (!) 93%   Weight:       Height:             LABS  CBC  Recent Labs   Lab 12/31/18  0446 12/31/18  1730 01/01/19  0656   WBC 7.57 7.13 7.41   RBC 4.15 4.37 4.23   HGB 10.3* 10.9* 10.5*   HCT 32.9* 34.8* 33.7*    212 224   MCV 79* 80* 80*   MCH 24.8* 24.9* 24.8*   MCHC 31.3* 31.3* 31.2*     BMP  Recent Labs   Lab 12/31/18 0446 12/31/18  1730 01/01/19  0656    134* 135*   K 3.9 4.2 4.6   CO2 25 22* 24   CL 98 100 99   BUN 53* 22 34*   CREATININE 6.9* 4.2* 5.8*   * 105 108       POCT-Glucose  POCT Glucose   Date Value Ref Range Status   01/01/2019 249 (H) 70 - 110 mg/dL Final   01/01/2019 123 (H) 70 - 110 mg/dL Final   12/31/2018 202 (H) 70 - 110 mg/dL Final   12/31/2018 101 70 - 110 mg/dL Final   12/31/2018 100 70 - 110 mg/dL Final   12/31/2018 139 (H) 70 - 110 mg/dL Final   12/30/2018 220 (H) 70 - 110 mg/dL Final   12/30/2018 193 (H) 70 - 110 mg/dL Final   12/30/2018 165 (H) 70 - 110 mg/dL Final   12/30/2018 161 (H) 70 - 110 mg/dL Final   12/29/2018 211 (H) 70 - 110 mg/dL Final   12/29/2018 194 (H) 70 - 110 mg/dL Final       Recent Labs   Lab 12/30/18 0538 12/31/18 0446 12/31/18  1730 01/01/19  0656   CALCIUM 9.1 8.8 9.4 8.9   PHOS 4.1 5.1*  --  4.8*     LFT  Recent Labs   Lab 12/27/18 2040 12/30/18  0538 12/31/18 0446 01/01/19  0656   PROT 8.6*  --   --   --   --    ALBUMIN 3.5   < > 3.0* 3.0* 3.0*   BILITOT 0.5  --   --   --   --    AST 25  --   --   --   --    ALKPHOS 72  --   --   --   --    ALT 14  --   --   --   --     < > = values in this interval not displayed.     GFR     COAGS  No results for  input(s): PT, INR, APTT in the last 168 hours.  CE  No results for input(s): TROPONINI, CKTOTAL, CKMB in the last 168 hours.  ABGs  No results for input(s): PH, PCO2, PO2, HCO3, POCSATURATED, BE in the last 24 hours.  BNP  No results for input(s): BNP in the last 168 hours.    LAST HbA1c  Lab Results   Component Value Date    HGBA1C 7.4 (H) 12/28/2018       Lipid panel  Lab Results   Component Value Date    CHOL 124 12/31/2018    CHOL 241 (H) 09/05/2018     Lab Results   Component Value Date    HDL 34 (L) 12/31/2018    HDL 43 09/05/2018     Lab Results   Component Value Date    LDLCALC 72.4 12/31/2018    LDLCALC 164.2 (H) 09/05/2018     Lab Results   Component Value Date    TRIG 88 12/31/2018    TRIG 169 (H) 09/05/2018     Lab Results   Component Value Date    CHOLHDL 27.4 12/31/2018    CHOLHDL 17.8 (L) 09/05/2018              Imp:      PAD    S/p R BKA   Presenting L foot CLI    5th and 3rd toe wounds       L POP + AT high grade lesions + L PT    L peroneal provides collaterals to PT forming an intact plantar arch          HTN  HLP  DM II  ESRD        Rec:      Aspirin + plavix  Statin  Acei      Will plan for revascularization tomorrrow        Thanks        ZN

## 2019-01-02 LAB
ALBUMIN SERPL BCP-MCNC: 3.2 G/DL
ANION GAP SERPL CALC-SCNC: 14 MMOL/L
ANION GAP SERPL CALC-SCNC: 14 MMOL/L
BACTERIA SPEC ANAEROBE CULT: NORMAL
BACTERIA SPEC ANAEROBE CULT: NORMAL
BUN SERPL-MCNC: 31 MG/DL
BUN SERPL-MCNC: 31 MG/DL
CALCIUM SERPL-MCNC: 9 MG/DL
CALCIUM SERPL-MCNC: 9.1 MG/DL
CHLORIDE SERPL-SCNC: 100 MMOL/L
CHLORIDE SERPL-SCNC: 99 MMOL/L
CO2 SERPL-SCNC: 22 MMOL/L
CO2 SERPL-SCNC: 23 MMOL/L
CREAT SERPL-MCNC: 5.7 MG/DL
CREAT SERPL-MCNC: 5.8 MG/DL
ERYTHROCYTE [DISTWIDTH] IN BLOOD BY AUTOMATED COUNT: 16.3 %
EST. GFR  (AFRICAN AMERICAN): 8 ML/MIN/1.73 M^2
EST. GFR  (AFRICAN AMERICAN): 8 ML/MIN/1.73 M^2
EST. GFR  (NON AFRICAN AMERICAN): 7 ML/MIN/1.73 M^2
EST. GFR  (NON AFRICAN AMERICAN): 7 ML/MIN/1.73 M^2
GLUCOSE SERPL-MCNC: 148 MG/DL
GLUCOSE SERPL-MCNC: 149 MG/DL
HCT VFR BLD AUTO: 33.5 %
HGB BLD-MCNC: 10.5 G/DL
MCH RBC QN AUTO: 25.1 PG
MCHC RBC AUTO-ENTMCNC: 31.3 G/DL
MCV RBC AUTO: 80 FL
PHOSPHATE SERPL-MCNC: 4.4 MG/DL
PLATELET # BLD AUTO: 222 K/UL
PMV BLD AUTO: 9.9 FL
POCT GLUCOSE: 134 MG/DL (ref 70–110)
POCT GLUCOSE: 144 MG/DL (ref 70–110)
POCT GLUCOSE: 94 MG/DL (ref 70–110)
POCT GLUCOSE: 96 MG/DL (ref 70–110)
POTASSIUM SERPL-SCNC: 4.1 MMOL/L
POTASSIUM SERPL-SCNC: 4.2 MMOL/L
RBC # BLD AUTO: 4.18 M/UL
SODIUM SERPL-SCNC: 136 MMOL/L
SODIUM SERPL-SCNC: 136 MMOL/L
VANCOMYCIN SERPL-MCNC: 16.8 UG/ML
WBC # BLD AUTO: 8.02 K/UL

## 2019-01-02 PROCEDURE — C2623 CATH, TRANSLUMIN, DRUG-COAT: HCPCS | Performed by: INTERNAL MEDICINE

## 2019-01-02 PROCEDURE — C1769 GUIDE WIRE: HCPCS | Performed by: INTERNAL MEDICINE

## 2019-01-02 PROCEDURE — 94761 N-INVAS EAR/PLS OXIMETRY MLT: CPT

## 2019-01-02 PROCEDURE — 37229 HC TIB/PER REVASC W/ATHER: CPT | Mod: LT | Performed by: INTERNAL MEDICINE

## 2019-01-02 PROCEDURE — C1894 INTRO/SHEATH, NON-LASER: HCPCS | Performed by: INTERNAL MEDICINE

## 2019-01-02 PROCEDURE — 37224 PR FEM/POPL REVAS W/TLA: ICD-10-PCS | Mod: 59,LT,, | Performed by: INTERNAL MEDICINE

## 2019-01-02 PROCEDURE — C1887 CATHETER, GUIDING: HCPCS | Performed by: INTERNAL MEDICINE

## 2019-01-02 PROCEDURE — 25000003 PHARM REV CODE 250: Performed by: NURSE PRACTITIONER

## 2019-01-02 PROCEDURE — 25000003 PHARM REV CODE 250: Performed by: INTERNAL MEDICINE

## 2019-01-02 PROCEDURE — 63600175 PHARM REV CODE 636 W HCPCS: Performed by: INTERNAL MEDICINE

## 2019-01-02 PROCEDURE — 20000000 HC ICU ROOM

## 2019-01-02 PROCEDURE — 63600175 PHARM REV CODE 636 W HCPCS: Performed by: HOSPITALIST

## 2019-01-02 PROCEDURE — 75710 PR  ANGIO EXTREMITY UNILAT: ICD-10-PCS | Mod: 26,59,, | Performed by: INTERNAL MEDICINE

## 2019-01-02 PROCEDURE — 80202 ASSAY OF VANCOMYCIN: CPT

## 2019-01-02 PROCEDURE — 80100016 HC MAINTENANCE HEMODIALYSIS

## 2019-01-02 PROCEDURE — 25000003 PHARM REV CODE 250: Performed by: HOSPITALIST

## 2019-01-02 PROCEDURE — 93010 ELECTROCARDIOGRAM REPORT: CPT | Mod: ,,, | Performed by: INTERNAL MEDICINE

## 2019-01-02 PROCEDURE — 93010 EKG 12-LEAD: ICD-10-PCS | Mod: ,,, | Performed by: INTERNAL MEDICINE

## 2019-01-02 PROCEDURE — 75710 ARTERY X-RAYS ARM/LEG: CPT | Mod: 26,59,, | Performed by: INTERNAL MEDICINE

## 2019-01-02 PROCEDURE — 37229 PR TIB/PER REVASC W/ATHERECTOMY: CPT | Mod: LT,,, | Performed by: INTERNAL MEDICINE

## 2019-01-02 PROCEDURE — 93005 ELECTROCARDIOGRAM TRACING: CPT

## 2019-01-02 PROCEDURE — 99152 MOD SED SAME PHYS/QHP 5/>YRS: CPT | Performed by: INTERNAL MEDICINE

## 2019-01-02 PROCEDURE — C1760 CLOSURE DEV, VASC: HCPCS | Performed by: INTERNAL MEDICINE

## 2019-01-02 PROCEDURE — C1725 CATH, TRANSLUMIN NON-LASER: HCPCS | Performed by: INTERNAL MEDICINE

## 2019-01-02 PROCEDURE — 85027 COMPLETE CBC AUTOMATED: CPT

## 2019-01-02 PROCEDURE — 80048 BASIC METABOLIC PNL TOTAL CA: CPT

## 2019-01-02 PROCEDURE — 99153 MOD SED SAME PHYS/QHP EA: CPT | Performed by: INTERNAL MEDICINE

## 2019-01-02 PROCEDURE — C1753 CATH, INTRAVAS ULTRASOUND: HCPCS | Performed by: INTERNAL MEDICINE

## 2019-01-02 PROCEDURE — 25500020 PHARM REV CODE 255: Performed by: INTERNAL MEDICINE

## 2019-01-02 PROCEDURE — 75710 ARTERY X-RAYS ARM/LEG: CPT | Mod: 26,59 | Performed by: INTERNAL MEDICINE

## 2019-01-02 PROCEDURE — 99152 MOD SED SAME PHYS/QHP 5/>YRS: CPT | Mod: ,,, | Performed by: INTERNAL MEDICINE

## 2019-01-02 PROCEDURE — 37224 HC FEM/POPL REVAS W/TLA: CPT | Mod: 59,LT | Performed by: INTERNAL MEDICINE

## 2019-01-02 PROCEDURE — 37229 PR TIB/PER REVASC W/ATHERECTOMY: ICD-10-PCS | Mod: LT,,, | Performed by: INTERNAL MEDICINE

## 2019-01-02 PROCEDURE — 37224 PR FEM/POPL REVAS W/TLA: CPT | Mod: 59,LT,, | Performed by: INTERNAL MEDICINE

## 2019-01-02 PROCEDURE — 36415 COLL VENOUS BLD VENIPUNCTURE: CPT

## 2019-01-02 PROCEDURE — 99152 PR MOD CONSCIOUS SEDATION, SAME PHYS, 5+ YRS, FIRST 15 MIN: ICD-10-PCS | Mod: ,,, | Performed by: INTERNAL MEDICINE

## 2019-01-02 PROCEDURE — 80069 RENAL FUNCTION PANEL: CPT

## 2019-01-02 PROCEDURE — 27201423 OPTIME MED/SURG SUP & DEVICES STERILE SUPPLY: Performed by: INTERNAL MEDICINE

## 2019-01-02 RX ORDER — SODIUM CHLORIDE 9 MG/ML
INJECTION, SOLUTION INTRAVENOUS
Status: DISCONTINUED | OUTPATIENT
Start: 2019-01-02 | End: 2019-01-04 | Stop reason: HOSPADM

## 2019-01-02 RX ORDER — DIPHENHYDRAMINE HCL 50 MG
50 CAPSULE ORAL ONCE
Status: COMPLETED | OUTPATIENT
Start: 2019-01-02 | End: 2019-01-02

## 2019-01-02 RX ORDER — CLOPIDOGREL BISULFATE 75 MG/1
TABLET ORAL
Status: DISCONTINUED | OUTPATIENT
Start: 2019-01-02 | End: 2019-01-02 | Stop reason: HOSPADM

## 2019-01-02 RX ORDER — HEPARIN SODIUM 1000 [USP'U]/ML
INJECTION, SOLUTION INTRAVENOUS; SUBCUTANEOUS
Status: DISCONTINUED | OUTPATIENT
Start: 2019-01-02 | End: 2019-01-02 | Stop reason: HOSPADM

## 2019-01-02 RX ORDER — LIDOCAINE HYDROCHLORIDE 10 MG/ML
INJECTION, SOLUTION EPIDURAL; INFILTRATION; INTRACAUDAL; PERINEURAL
Status: DISCONTINUED | OUTPATIENT
Start: 2019-01-02 | End: 2019-01-02 | Stop reason: HOSPADM

## 2019-01-02 RX ORDER — VERAPAMIL HYDROCHLORIDE 2.5 MG/ML
INJECTION, SOLUTION INTRAVENOUS
Status: DISCONTINUED | OUTPATIENT
Start: 2019-01-02 | End: 2019-01-02 | Stop reason: HOSPADM

## 2019-01-02 RX ORDER — FENTANYL CITRATE 50 UG/ML
INJECTION, SOLUTION INTRAMUSCULAR; INTRAVENOUS
Status: DISCONTINUED | OUTPATIENT
Start: 2019-01-02 | End: 2019-01-02 | Stop reason: HOSPADM

## 2019-01-02 RX ORDER — IODIXANOL 320 MG/ML
INJECTION, SOLUTION INTRAVASCULAR
Status: DISCONTINUED | OUTPATIENT
Start: 2019-01-02 | End: 2019-01-02 | Stop reason: HOSPADM

## 2019-01-02 RX ORDER — MIDAZOLAM HYDROCHLORIDE 1 MG/ML
INJECTION, SOLUTION INTRAMUSCULAR; INTRAVENOUS
Status: DISCONTINUED | OUTPATIENT
Start: 2019-01-02 | End: 2019-01-02 | Stop reason: HOSPADM

## 2019-01-02 RX ORDER — HEPARIN SODIUM 200 [USP'U]/100ML
INJECTION, SOLUTION INTRAVENOUS
Status: DISCONTINUED | OUTPATIENT
Start: 2019-01-02 | End: 2019-01-04 | Stop reason: HOSPADM

## 2019-01-02 RX ORDER — CLOPIDOGREL BISULFATE 75 MG/1
300 TABLET ORAL ONCE
Status: DISCONTINUED | OUTPATIENT
Start: 2019-01-02 | End: 2019-01-04 | Stop reason: HOSPADM

## 2019-01-02 RX ADMIN — AMLODIPINE BESYLATE 10 MG: 5 TABLET ORAL at 10:01

## 2019-01-02 RX ADMIN — DIPHENHYDRAMINE HYDROCHLORIDE 50 MG: 50 CAPSULE ORAL at 06:01

## 2019-01-02 RX ADMIN — SEVELAMER CARBONATE 800 MG: 800 TABLET, FILM COATED ORAL at 12:01

## 2019-01-02 RX ADMIN — SEVELAMER CARBONATE 800 MG: 800 TABLET, FILM COATED ORAL at 06:01

## 2019-01-02 RX ADMIN — FUROSEMIDE 80 MG: 40 TABLET ORAL at 08:01

## 2019-01-02 RX ADMIN — PIPERACILLIN AND TAZOBACTAM 4.5 G: 4; .5 INJECTION, POWDER, LYOPHILIZED, FOR SOLUTION INTRAVENOUS; PARENTERAL at 12:01

## 2019-01-02 RX ADMIN — ASPIRIN 81 MG 81 MG: 81 TABLET ORAL at 08:01

## 2019-01-02 RX ADMIN — SEVELAMER CARBONATE 800 MG: 800 TABLET, FILM COATED ORAL at 08:01

## 2019-01-02 RX ADMIN — ATORVASTATIN CALCIUM 40 MG: 40 TABLET, FILM COATED ORAL at 08:01

## 2019-01-02 NOTE — NURSING
VN cued into patients room for rounding. VN informed pt that VN will be working alongside bedside nurse and PCT throughout the day shift. Pt verbalized understanding that VN is available for any questions and education, and nurse and PCT will continue hourly rounding at bedside.     Pt resting in bed, denies any pain at this time. Stated she came back from dialysis because her access is infiltrated. Allotted time given for questions - all questions answered. Will continue to cue in to patients room and monitor.

## 2019-01-02 NOTE — PROCEDURES
: Mike Mckay MD  Pre-procedure diagnosis: CLI  Post-procedure diagnosis: CLI    Post Procedure Note: Peripheral PTA    The pt was brought to the cath lab and under sterile technique, LCFA antegrade access was obtained without difficulty. Successful PTA with DCB to proximal LPop and IVUS guided PTA to entirety of LAT into DP with excellent results. Fistulogram performed and no significant AV stenosis. Distal to anastamosis on arterial side there is a stenosis. Good flow noted in AVF. Please see full report for details. The pt tolerated the procedure well without complications. Perclose closure device used in LCFA access and manual pressure with suture placed in fistula with successful hemostasis. Pt hypotensive during procedure.     Vitals:    01/02/19 0800 01/02/19 0827 01/02/19 1200 01/02/19 1213   BP:  (!) 156/87  130/82   BP Location:  Right arm  Right leg   Patient Position:  Lying  Lying   Pulse: 73 80 73 72   Resp:  17  18   Temp:  98.3 °F (36.8 °C)  98 °F (36.7 °C)   TempSrc:  Oral  Oral   SpO2:       Weight:       Height:             Gen: NAD  Ext: 2+ fem pulse, no evidence of hematoma  Estimated blood loss: < 50 cc    Plan:  -Post cath care per protocol  -ASA/plavix/statin  -Monitor in ICU overnight and for HD as pt was hypotensive during procedure

## 2019-01-02 NOTE — PROGRESS NOTES
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 She lives in [place], [place]. Her primary care physician is Dr. Katiuska Villalba. The patient cannot recall her nephrologist name.  She reports she has not been established with podiatry.     Pt presented to [facility] 11/27/18 with complaint of purulent drainage from left foot ulcer.  Patient states symptoms initially started as a callus on the 5th digit of the left foot. She reports noticing gradually increase in edema with mild erythema. Pt noted drainage from site today. She also reports "black toes" (3rd and 4th digit) for several months. Patient reports she was seen by her PCP 3-4 months ago. She did not have discoloration to toes at the time. Pt has never been evaluated by podiatry. She denies fever/chills, n/v, chest pain and SOB. Initial labs remarkable for H/H 9.8/30.9, BUN/CR 38/5.5, K within normal limits. Left foot xray show deformity of the left 5th proximal phalanx which may represent remote trauma or evolving osteitis. Pt afebrile, no leukocytosis, VSS. Pt admitted to [facility] for further evaluation.     Hospital Course:  Pt seen at bedside, she has black toes on the left foot.  Will await podiatry and will order US of LE for arterial to check blood flow  12/29, MRI positive for most likely osteo of firt toe and fifth toe of lefdt foot.  Consulting ID. Continue current abx  12/30 no events " overnight.  Doing ok, cards will f/u with pt , discussed case.  Giving an extra dose of vanc/discussed with Pharm D  12/31, no qcute clinical changes, HD today, continue iv abx, awaiting cards for evaluation for LE blood supply/flow a.  Awaiting ID for recs for abx  1/1 per card for possible revascularization on 1/2. Continue Vanc and Zosyn x 6 weeks    Interval History: awake and alert. Sitting up in bed watching TV. Family by bedside.   Discussed case with cardiology and plan for intervention (revascularisation tomorrow 1/2. Continue Vanc and Zosyn x 6 weeks per ID.     Review of Systems   Constitutional: Negative for chills, diaphoresis and fever.   Eyes: Negative for photophobia.   Respiratory: Negative for cough and shortness of breath.    Cardiovascular: Negative for leg swelling.   Gastrointestinal: Negative for abdominal pain and nausea.   Musculoskeletal: Negative for back pain and myalgias.   Skin: Positive for color change (left foot toes ) and wound (left foot, 5th toe ).   Neurological: Negative for dizziness, syncope, light-headedness and headaches.   Psychiatric/Behavioral: Negative for confusion.     Objective:     Vital Signs (Most Recent):  Temp: 98.7 °F (37.1 °C) (01/01/19 1144)  Pulse: 79 (01/01/19 1200)  Resp: 18 (01/01/19 1144)  BP: (!) 124/50 (01/01/19 1144)  SpO2: (!) 93 % (01/01/19 1207) Vital Signs (24h Range):  Temp:  [96.7 °F (35.9 °C)-99.8 °F (37.7 °C)] 98.7 °F (37.1 °C)  Pulse:  [70-84] 79  Resp:  [14-23] 18  SpO2:  [91 %-100 %] 93 %  BP: (103-153)/(50-89) 124/50     Weight: 82.9 kg (182 lb 12.2 oz)  Body mass index is 30.41 kg/m².    Intake/Output Summary (Last 24 hours) at 1/1/2019 1250  Last data filed at 1/1/2019 0830  Gross per 24 hour   Intake 1005 ml   Output 2500 ml   Net -1495 ml      Physical Exam   Constitutional: She is oriented to person, place, and time. She appears well-developed and well-nourished.   HENT:   Head: Normocephalic and atraumatic.   Neck: Neck supple.    Cardiovascular: Normal rate, regular rhythm and normal heart sounds.   No murmur heard.  Pulmonary/Chest: Effort normal. No respiratory distress. She has no wheezes.   Abdominal: Soft. Bowel sounds are normal. She exhibits no distension. There is no tenderness. There is no guarding.   Musculoskeletal: Normal range of motion. She exhibits edema (L foot ). She exhibits no tenderness.   R BKA    Neurological: She is alert and oriented to person, place, and time.   Skin: Skin is warm and dry. Capillary refill takes less than 2 seconds.   +necrosis to Left foot 3rd/4th toes, ulcer to left foot 5th digit, no drainage noted    Psychiatric: She has a normal mood and affect. Her behavior is normal.         Significant Labs:   CBC:   Recent Labs   Lab 12/31/18 0446 12/31/18  1730 01/01/19  0656   WBC 7.57 7.13 7.41   HGB 10.3* 10.9* 10.5*   HCT 32.9* 34.8* 33.7*    212 224     CMP:   Recent Labs   Lab 12/31/18 0446 12/31/18  1730 01/01/19  0656    134* 135*   K 3.9 4.2 4.6   CL 98 100 99   CO2 25 22* 24   * 105 108   BUN 53* 22 34*   CREATININE 6.9* 4.2* 5.8*   CALCIUM 8.8 9.4 8.9   ALBUMIN 3.0*  --  3.0*   ANIONGAP 15 12 12   EGFRNONAA 5* 10* 7*     Cardiac Markers: No results for input(s): CKMB, MYOGLOBIN, BNP, TROPISTAT in the last 48 hours.  Magnesium: No results for input(s): MG in the last 48 hours.  Respiratory Culture: No results for input(s): GSRESP, RESPIRATORYC in the last 48 hours.  Troponin: No results for input(s): TROPONINI in the last 48 hours.    Significant Imaging: none    Assessment/Plan:      * Diabetic foot infection    History of leg amputation      -wound cultures strep Gordonii, staph epidermidis, Gemella mobilorum. Sensitivity pending  - continue Vanc/Zosyn x 6 weeks (end date 2/8)  -L foot xray reviewed Deformity of the left 5th proximal phalanx  -MRI L foot concerning for osteo  -consult podiatry            Osteomyelitis of toe    Diabetic ulcer of toe of left foot  associated with type 2 diabetes mellitus, with necrosis of bone  History of leg amputation  Diabetes Mellitus with long term insulin use   HbA1C 7.4 (9/5/2018)   -wound cultures strep Gordonii, staph epidermidis, Gemella mobilorum. Sensitivity pending  - continue Vanc/Zosyn x 6 weeks (end date 2/8)  -L foot xray reviewed Deformity of the left 5th proximal phalanx  -MRI L foot concerning for osteo  -consult podiatry   - Lantus 20 units QHS,  - low dose SSI,   - Accucheck AC&HS   - diabetic diet          Diabetic ulcer of toe of left foot associated with type 2 diabetes mellitus, with bone involvement without evidence of necrosis      MRI positive for osteo.  Will continue IV ABX  Will consult ID     History of leg amputation    Non erythema  stable       ESRD (end stage renal disease) on dialysis    Anemia of chronic disease  HD TTS,   H/H at baseline   -consult nephrology.       Aortic stenosis           Chronic diastolic heart failure           Anemia of chronic disease    H/H stable       HTN (hypertension)    Chronic diastolic heart failure  Aortic stenosis  History of cardiac arrest  Hyperlipidemia  -continue amlodipine, atorvastatin and lasix      Diabetes mellitus due to underlying condition with chronic kidney disease, with long-term current use of insulin             VTE Risk Mitigation (From admission, onward)        Ordered     heparin infusion 1,000 units/500 ml in 0.9% NaCl (pressure line flush)  Intra-op continuous PRN      12/31/18 1430     enoxaparin injection 30 mg  Daily      12/27/18 2321     IP VTE HIGH RISK PATIENT  Once      12/27/18 2310              Florindaneto Davenport MD  Department of Hospital Medicine   [facility]

## 2019-01-02 NOTE — ASSESSMENT & PLAN NOTE
Chronic diastolic heart failure  Aortic stenosis  History of cardiac arrest  Hyperlipidemia  -continue amlodipine, atorvastatin and lasix

## 2019-01-02 NOTE — ASSESSMENT & PLAN NOTE
History of leg amputation      -wound cultures strep Gordonii, staph epidermidis, Gemella mobilorum. Sensitivity pending  - continue Vanc/Zosyn x 6 weeks (end date 2/8)  -L foot xray reviewed Deformity of the left 5th proximal phalanx  -MRI L foot concerning for osteo  -consult podiatry

## 2019-01-02 NOTE — PROGRESS NOTES
Progress Note  Nephrology      Consult Requested By: Florinda Davenport*  Reason for Consult: ESRD    SUBJECTIVE:     Review of Systems   Constitutional: Negative for chills and fever.   Respiratory: Negative for cough and shortness of breath.    Cardiovascular: Negative for chest pain and leg swelling.   Gastrointestinal: Negative for nausea.     Patient Active Problem List   Diagnosis    Diabetes mellitus due to underlying condition with chronic kidney disease, with long-term current use of insulin    HTN (hypertension)    Anemia of chronic disease    Heart murmur    Type 2 diabetes mellitus with stage 5 chronic kidney disease and hypertension    Chronic diastolic heart failure    Anemia, chronic renal failure, stage 5    History of cardiac arrest    Aortic stenosis    ESRD (end stage renal disease) on dialysis    Hyperlipidemia    History of leg amputation    Stenosis of arteriovenous dialysis fistula    Diabetic ulcer of toe of left foot associated with type 2 diabetes mellitus, with bone involvement without evidence of necrosis    Diabetic foot infection    Diabetic ulcer of toe of left foot    Osteomyelitis of toe       OBJECTIVE:     Medications:   amLODIPine  10 mg Oral Daily    aspirin  81 mg Oral Daily    atorvastatin  40 mg Oral Daily    diphenhydrAMINE  50 mg Oral Once    enoxaparin  30 mg Subcutaneous Daily    epoetin bashir (PROCRIT) injection  10,000 Units Intravenous Every Tues, Thurs, Sat    furosemide  80 mg Oral QAM    insulin detemir U-100  20 Units Subcutaneous QHS    piperacillin-tazobactam (ZOSYN) IVPB  4.5 g Intravenous Q12H    sevelamer carbonate  800 mg Oral TID WM    vancomycin (VANCOCIN) IVPB  500 mg Intravenous Once      heparin (porcine)       Vitals:    01/02/19 1213   BP: 130/82   Pulse: 72   Resp: 18   Temp: 98 °F (36.7 °C)     I/O last 3 completed shifts:  In: 1805 [P.O.:1005; Other:400; IV Piggyback:400]  Out: 770 [Urine:220; Other:550]  Physical  Exam   Constitutional: She is oriented to person, place, and time. She appears well-developed and well-nourished. No distress.   HENT:   Head: Normocephalic and atraumatic.   Mouth/Throat: Oropharynx is clear and moist.   Eyes: EOM are normal. No scleral icterus.   Neck: Normal range of motion.   Cardiovascular: Normal rate and regular rhythm. Exam reveals no friction rub.   No murmur heard.  Pulmonary/Chest: Effort normal and breath sounds normal. She has no rales.   Abdominal: Soft. Bowel sounds are normal. She exhibits no distension. There is no tenderness.   Musculoskeletal: Normal range of motion. She exhibits no edema.   Lymphadenopathy:     She has no cervical adenopathy.   Neurological: She is alert and oriented to person, place, and time.   Skin: Skin is warm and dry. No erythema.   Psychiatric: Thought content normal.     Laboratory:  Recent Labs   Lab 12/27/18  2040 12/29/18  0954  12/31/18  1730 01/01/19  0656 01/02/19  0334   WBC 8.86 8.66   < > 7.13 7.41 8.02   HGB 9.8* 10.5*   < > 10.9* 10.5* 10.5*   HCT 30.9* 33.0*   < > 34.8* 33.7* 33.5*    218   < > 212 224 222   MONO 10.6  0.9 13.6  1.2*  --   --   --   --     < > = values in this interval not displayed.     Recent Labs   Lab 12/31/18  0446  01/01/19  0656 01/02/19  0331 01/02/19  0334      < > 135* 136 136   K 3.9   < > 4.6 4.2 4.1   CL 98   < > 99 100 99   CO2 25   < > 24 22* 23   BUN 53*   < > 34* 31* 31*   CREATININE 6.9*   < > 5.8* 5.7* 5.8*   CALCIUM 8.8   < > 8.9 9.1 9.0   PHOS 5.1*  --  4.8*  --  4.4    < > = values in this interval not displayed.     Labs reviewed  Diagnostic Results:  X-Ray: Reviewed  US: Reviewed  Echo: Reviewed      ASSESSMENT/PLAN:   1. ESRD (N18.6 Z99.2) - usual HD on TTS   Only had 15 minutes of HD today after that arterial needle infiltrated and pulled out a clot  Possibly was cannulation technic but fistula has faint thrill near anastomosis --> discussed with Dr. Fink possible fistulogram vs US  today during or after PVD angio.     2. HTN (I10) - at goal  3. Anemia of chronic kidney disease treated with CHACHO (N18.9 D63.1) -   EPogen 10K with each HD    Recent Labs   Lab 12/31/18  1730 01/01/19  0656 01/02/19  0334   WBC 7.13 7.41 8.02   HGB 10.9* 10.5* 10.5*   HCT 34.8* 33.7* 33.5*    224 222     Lab Results   Component Value Date    IRON 72 09/05/2018    TIBC 280 09/05/2018    FERRITIN 336 (H) 09/05/2018     4. 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    Lab Results   Component Value Date    IDMZGTYY15TY 13 (L) 09/05/2018     Lab Results   Component Value Date    CO2 23 01/02/2019        5. Hemodialysis Access (Z99.2 V45.11)- LLA AVF --> discussed with Dr. Fink --> possible fistulogram today together with PVD study  6. Nutrition/Hypoalbuminemia (E88.09) -  Lab Results   Component Value Date    LABPROT 11.6 04/16/2018    ALBUMIN 3.2 (L) 01/02/2019     Nepro with meals TID. Renal vitamins daily          Thank you for allowing me to participate in the care of your patients  With any question please call [phone]  Kiara Burden    [facility]  ANNA Etienne MD, FACP,   PRASHANT Hernandez MD,   MD KUSUM Teague, NP  [address]  [place], [address] [phone]

## 2019-01-02 NOTE — PROGRESS NOTES
Received report from cath lab and floor rn. Received pt from cath lab and hooked up to monitor. No distress noted. All sites soft and without hematoma. Pt has no complaints. Will continue to monitor.

## 2019-01-02 NOTE — PROGRESS NOTES
"Vancomycin Dosing and Monitoring Pharmacy Protocol    Martha Melvin is a 73 y.o. female    Height: 5' 5" (1.651 m)   Wt Readings from Last 3 Encounters:   01/02/19 83.6 kg (184 lb 4.9 oz)   11/30/18 88.7 kg (195 lb 8.8 oz)   11/09/18 88 kg (194 lb 0.1 oz)       Temp Readings from Last 3 Encounters:   01/02/19 98.3 °F (36.8 °C) (Oral)   11/30/18 98.5 °F (36.9 °C) (Oral)   11/09/18 98.8 °F (37.1 °C) (Oral)      Lab Results   Component Value Date/Time    WBC 8.02 01/02/2019 03:34 AM    WBC 7.41 01/01/2019 06:56 AM    WBC 7.13 12/31/2018 05:30 PM      Lab Results   Component Value Date/Time    CREATININE 5.8 (H) 01/02/2019 03:34 AM    CREATININE 5.7 (H) 01/02/2019 03:31 AM    CREATININE 5.8 (H) 01/01/2019 06:56 AM      Lab Results   Component Value Date/Time    LACTATE 0.6 04/20/2018 05:40 AM    LACTATE 1.8 04/17/2018 04:27 AM    LACTATE 9.5 (HH) 04/16/2018 10:24 PM       Serum creatinine: 5.8 mg/dL (H) 01/02/19 0334  Estimated creatinine clearance: 9.5 mL/min (A)    Antibiotics (From admission, onward)      Start     Stop Route Frequency Ordered    01/02/19 1700  vancomycin (VANCOCIN) 500 mg in dextrose 5 % 100 mL IVPB      -- IV Once 01/02/19 0747    12/28/18 1215  piperacillin-tazobactam 4.5 g in dextrose 5 % 100 mL IVPB (ready to mix system)      -- IV Every 12 hours (non-standard times) 12/28/18 1002          Antifungals (From admission, onward)      None            Microbiology Results (last 7 days)       Procedure Component Value Units Date/Time    AFB Culture & Smear [[id number]] Collected:  12/29/18 1506    Order Status:  Completed Specimen:  Bone from Toe, Left Foot Updated:  12/31/18 1536     AFB Culture & Smear Culture in progress     AFB CULTURE STAIN No acid fast bacilli seen.    Aerobic culture [[id number]] Collected:  12/29/18 1506    Order Status:  Completed Specimen:  Bone from Toe, Left Foot Updated:  12/31/18 1441     Aerobic Bacterial Culture --     GEMELLA (S.) MORBILLORUM  Few  Susceptibility " testing not routinely performed       Aerobic Bacterial Culture --     STREPTOCOCCUS GORDONII  Few  Susceptibility testing not routinely performed       Aerobic Bacterial Culture --     STAPHYLOCOCCUS EPIDERMIDIS  Rare      Culture, Anaerobe [[id number]] Collected:  18 1506    Order Status:  Completed Specimen:  Bone from Toe, Left Foot Updated:  18 1349     Anaerobic Culture Culture in progress    Culture, Anaerobe [[id number]] Collected:  18    Order Status:  Completed Specimen:  Wound from Foot, Left Updated:  18 1333     Anaerobic Culture Culture in progress    Aerobic culture (Specify Source) **CANNOT BE ORDERED AS STAT** [[id number]] Collected:  18    Order Status:  Completed Specimen:  Wound from Foot, Left Updated:  18 1219     Aerobic Bacterial Culture Skin mirella,  no predominant organism    Aerobic culture [[id number]] Collected:  18 1604    Order Status:  Completed Specimen:  Wound from Toe, Left Foot Updated:  18 0716     Aerobic Bacterial Culture Skin mirella,  no predominant organism    Gram stain [[id number]] Collected:  18 1506    Order Status:  Completed Specimen:  Bone from Toe, Left Foot Updated:  18 2228     Gram Stain Result Rare WBC's      Few Gram positive cocci    Fungus culture [[id number]] Collected:  18 1506    Order Status:  Sent Specimen:  Bone from Toe, Left Foot Updated:  18 1836            Indication/Target trough:   S&s infection     Hemodialysis:   TTSa    Dosing Weight:   Wt Readings from Last 1 Encounters:   19 83.6 kg (184 lb 4.9 oz)       Last Vancomycin dose: 500 mg   Date/Time given:  0400          Vancomycin level:  No results for input(s): VANCOMYCIN-TROUGH in the last 72 hours.  Recent Labs   Lab Result Units 19  0334   Vancomycin, Random ug/mL 16.8       Per Protocol Initial/Adjustments Dosin. Initial/Adjustment Dose: vancomycin 500mg IV x1   2. Vancomycin Random Level will be  drawn on 1/3 0400date/time     Pharmacy will continue to follow.    Please contact if you have any further questions. Thank you.    Salvatore Dorado, PharmD  [phone]

## 2019-01-02 NOTE — PROGRESS NOTES
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 She lives in [place], [place]. Her primary care physician is Dr. Katiuska Villalba. The patient cannot recall her nephrologist name.  She reports she has not been established with podiatry.     Pt presented to [facility] 11/27/18 with complaint of purulent drainage from left foot ulcer.  Patient states symptoms initially started as a callus on the 5th digit of the left foot. She reports noticing gradually increase in edema with mild erythema. Pt noted drainage from site today. She also reports "black toes" (3rd and 4th digit) for several months. Patient reports she was seen by her PCP 3-4 months ago. She did not have discoloration to toes at the time. Pt has never been evaluated by podiatry. She denies fever/chills, n/v, chest pain and SOB. Initial labs remarkable for H/H 9.8/30.9, BUN/CR 38/5.5, K within normal limits. Left foot xray show deformity of the left 5th proximal phalanx which may represent remote trauma or evolving osteitis. Pt afebrile, no leukocytosis, VSS. Pt admitted to [facility] hospital medicine for further evaluation.     Hospital Course:  Pt seen at bedside, she has black toes on the left foot.  Will await podiatry and will order US of LE for arterial to check blood flow  12/29, MRI positive for most likely osteo of firt toe and fifth toe of lefdt foot.  Consulting ID. Continue current abx  12/30 no events " overnight.  Doing ok, cards will f/u with pt , discussed case.  Giving an extra dose of vanc/discussed with Pharm D  12/31, no qcute clinical changes, HD today, continue iv abx, awaiting cards for evaluation for LE blood supply/flow a.  Awaiting ID for recs for abx  1/1 per card for possible revascularization on 1/2. Continue Vanc and Zosyn x 6 weeks    Interval History: awake and alert. No new complaint. Schedule  for revascularization  Continue Vanc and Zosyn x 6 weeks per ID.      Review of Systems   Constitutional: Negative for chills, diaphoresis and fever.   Respiratory: Negative for cough and shortness of breath.    Cardiovascular: Negative for leg swelling.   Gastrointestinal: Negative for abdominal pain and nausea.   Musculoskeletal: Positive for arthralgias and myalgias. Negative for back pain.   Skin: Positive for color change and wound.   Neurological: Negative for dizziness, syncope, light-headedness and headaches.   Psychiatric/Behavioral: Negative for confusion.     Objective:     Vital Signs (Most Recent):  Temp: 98 °F (36.7 °C) (01/02/19 1213)  Pulse: 72 (01/02/19 1213)  Resp: 18 (01/02/19 1213)  BP: 130/82 (01/02/19 1213)  SpO2: (!) 93 % (01/02/19 0505) Vital Signs (24h Range):  Temp:  [97.5 °F (36.4 °C)-98.3 °F (36.8 °C)] 98 °F (36.7 °C)  Pulse:  [70-84] 72  Resp:  [18] 18  SpO2:  [93 %-97 %] 93 %  BP: (110-172)/(52-82) 130/82     Weight: 83.6 kg (184 lb 4.9 oz)  Body mass index is 30.67 kg/m².    Intake/Output Summary (Last 24 hours) at 1/2/2019 1411  Last data filed at 1/2/2019 0439  Gross per 24 hour   Intake 1050 ml   Output 770 ml   Net 280 ml      Physical Exam   Constitutional: She is oriented to person, place, and time. She appears well-developed and well-nourished.   HENT:   Head: Normocephalic and atraumatic.   Neck: Neck supple.   Cardiovascular: Normal rate, regular rhythm and normal heart sounds.   No murmur heard.  Pulmonary/Chest: Effort normal. No respiratory distress. She has no  wheezes.   Abdominal: Soft. Bowel sounds are normal. She exhibits no distension. There is no tenderness. There is no guarding.   Musculoskeletal: Normal range of motion. She exhibits edema (L foot ). She exhibits no tenderness.   R BKA    Neurological: She is alert and oriented to person, place, and time.   Skin: Skin is warm and dry. Capillary refill takes less than 2 seconds.   +necrosis to Left foot 3rd/4th toes, ulcer to left foot 5th digit, no drainage noted    Psychiatric: She has a normal mood and affect. Her behavior is normal.         Significant Labs:   CBC:   Recent Labs   Lab 12/31/18  1730 01/01/19  0656 01/02/19  0334   WBC 7.13 7.41 8.02   HGB 10.9* 10.5* 10.5*   HCT 34.8* 33.7* 33.5*    224 222     CMP:   Recent Labs   Lab 01/01/19  0656 01/02/19  0331 01/02/19  0334   * 136 136   K 4.6 4.2 4.1   CL 99 100 99   CO2 24 22* 23    148* 149*   BUN 34* 31* 31*   CREATININE 5.8* 5.7* 5.8*   CALCIUM 8.9 9.1 9.0   ALBUMIN 3.0*  --  3.2*   ANIONGAP 12 14 14   EGFRNONAA 7* 7* 7*     Cardiac Markers: No results for input(s): CKMB, MYOGLOBIN, BNP, TROPISTAT in the last 48 hours.  Magnesium: No results for input(s): MG in the last 48 hours.  Respiratory Culture: No results for input(s): GSRESP, RESPIRATORYC in the last 48 hours.  Troponin: No results for input(s): TROPONINI in the last 48 hours.    Significant Imaging: none    Assessment/Plan:      * Diabetic foot infection    History of leg amputation      -wound cultures strep Gordonii, staph epidermidis, Gemella mobilorum. Sensitivity pending  - continue Vanc/Zosyn x 6 weeks (end date 2/8)  -L foot xray reviewed Deformity of the left 5th proximal phalanx  -MRI L foot concerning for osteo  -consult podiatry            Osteomyelitis of toe    Diabetic ulcer of toe of left foot associated with type 2 diabetes mellitus, with necrosis of bone  History of leg amputation  Diabetes Mellitus with long term insulin use   HbA1C 7.4 (9/5/2018)   -wound  cultures strep Gordonii, staph epidermidis, Gemella mobilorum. Sensitivity pending  - continue Vanc/Zosyn x 6 weeks (end date 2/8)  -L foot xray reviewed Deformity of the left 5th proximal phalanx  -MRI L foot concerning for osteo  -consult podiatry   - Lantus 20 units QHS,  - low dose SSI,   - Accucheck AC&HS   - diabetic diet          Diabetic ulcer of toe of left foot associated with type 2 diabetes mellitus, with bone involvement without evidence of necrosis      MRI positive for osteo.  Will continue IV ABX  Will consult ID     History of leg amputation    Non erythema  stable       ESRD (end stage renal disease) on dialysis    Anemia of chronic disease  HD TTS,   H/H at baseline   -consult nephrology.       Aortic stenosis           Chronic diastolic heart failure           Anemia of chronic disease    H/H stable       HTN (hypertension)    Chronic diastolic heart failure  Aortic stenosis  History of cardiac arrest  Hyperlipidemia  -continue amlodipine, atorvastatin and lasix      Diabetes mellitus due to underlying condition with chronic kidney disease, with long-term current use of insulin             VTE Risk Mitigation (From admission, onward)        Ordered     heparin infusion 1,000 units/500 ml in 0.9% NaCl (pressure line flush)  Intra-op continuous PRN      01/02/19 1339     heparin infusion 1,000 units/500 ml in 0.9% NaCl (pressure line flush)  Intra-op continuous PRN      12/31/18 1430     enoxaparin injection 30 mg  Daily      12/27/18 2321     IP VTE HIGH RISK PATIENT  Once      12/27/18 2310              Florinda CARA Davenport MD  Department of Hospital Medicine   [facility]

## 2019-01-02 NOTE — SUBJECTIVE & OBJECTIVE
Interval History: awake and alert. No new complaint. Schedule  for revascularization  Continue Vanc and Zosyn x 6 weeks per ID.      Review of Systems   Constitutional: Negative for chills, diaphoresis and fever.   Respiratory: Negative for cough and shortness of breath.    Cardiovascular: Negative for leg swelling.   Gastrointestinal: Negative for abdominal pain and nausea.   Musculoskeletal: Positive for arthralgias and myalgias. Negative for back pain.   Skin: Positive for color change and wound.   Neurological: Negative for dizziness, syncope, light-headedness and headaches.   Psychiatric/Behavioral: Negative for confusion.     Objective:     Vital Signs (Most Recent):  Temp: 98 °F (36.7 °C) (01/02/19 1213)  Pulse: 72 (01/02/19 1213)  Resp: 18 (01/02/19 1213)  BP: 130/82 (01/02/19 1213)  SpO2: (!) 93 % (01/02/19 0505) Vital Signs (24h Range):  Temp:  [97.5 °F (36.4 °C)-98.3 °F (36.8 °C)] 98 °F (36.7 °C)  Pulse:  [70-84] 72  Resp:  [18] 18  SpO2:  [93 %-97 %] 93 %  BP: (110-172)/(52-82) 130/82     Weight: 83.6 kg (184 lb 4.9 oz)  Body mass index is 30.67 kg/m².    Intake/Output Summary (Last 24 hours) at 1/2/2019 1411  Last data filed at 1/2/2019 0439  Gross per 24 hour   Intake 1050 ml   Output 770 ml   Net 280 ml      Physical Exam   Constitutional: She is oriented to person, place, and time. She appears well-developed and well-nourished.   HENT:   Head: Normocephalic and atraumatic.   Neck: Neck supple.   Cardiovascular: Normal rate, regular rhythm and normal heart sounds.   No murmur heard.  Pulmonary/Chest: Effort normal. No respiratory distress. She has no wheezes.   Abdominal: Soft. Bowel sounds are normal. She exhibits no distension. There is no tenderness. There is no guarding.   Musculoskeletal: Normal range of motion. She exhibits edema (L foot ). She exhibits no tenderness.   R BKA    Neurological: She is alert and oriented to person, place, and time.   Skin: Skin is warm and dry. Capillary refill  takes less than 2 seconds.   +necrosis to Left foot 3rd/4th toes, ulcer to left foot 5th digit, no drainage noted    Psychiatric: She has a normal mood and affect. Her behavior is normal.         Significant Labs:   CBC:   Recent Labs   Lab 12/31/18  1730 01/01/19  0656 01/02/19  0334   WBC 7.13 7.41 8.02   HGB 10.9* 10.5* 10.5*   HCT 34.8* 33.7* 33.5*    224 222     CMP:   Recent Labs   Lab 01/01/19  0656 01/02/19  0331 01/02/19  0334   * 136 136   K 4.6 4.2 4.1   CL 99 100 99   CO2 24 22* 23    148* 149*   BUN 34* 31* 31*   CREATININE 5.8* 5.7* 5.8*   CALCIUM 8.9 9.1 9.0   ALBUMIN 3.0*  --  3.2*   ANIONGAP 12 14 14   EGFRNONAA 7* 7* 7*     Cardiac Markers: No results for input(s): CKMB, MYOGLOBIN, BNP, TROPISTAT in the last 48 hours.  Magnesium: No results for input(s): MG in the last 48 hours.  Respiratory Culture: No results for input(s): GSRESP, RESPIRATORYC in the last 48 hours.  Troponin: No results for input(s): TROPONINI in the last 48 hours.    Significant Imaging: none

## 2019-01-02 NOTE — ASSESSMENT & PLAN NOTE
Diabetic ulcer of toe of left foot associated with type 2 diabetes mellitus, with necrosis of bone  History of leg amputation  Diabetes Mellitus with long term insulin use   HbA1C 7.4 (9/5/2018)   -wound cultures strep Gordonii, staph epidermidis, Gemella mobilorum. Sensitivity pending  - continue Vanc/Zosyn x 6 weeks (end date 2/8)  -L foot xray reviewed Deformity of the left 5th proximal phalanx  -MRI L foot concerning for osteo  -consult podiatry   - Lantus 20 units QHS,  - low dose SSI,   - Accucheck AC&HS   - diabetic diet

## 2019-01-02 NOTE — PLAN OF CARE
Problem: Adult Inpatient Plan of Care  Goal: Plan of Care Review  Outcome: Ongoing (interventions implemented as appropriate)  Plan of care reviewed patient verbalized understanding. Blood glucose monitoring per sliding scale administered insulin. Medications administered. Left arm fistula bruit and thrill present. Cardiac monitoring NSR. Wound dressing left foot clean, dry, and intact. Safety maintained bed in lowest position, call bell within reach, bed alarm on. Will continue to monitor.

## 2019-01-03 ENCOUNTER — TELEPHONE (OUTPATIENT)
Dept: PODIATRY | Facility: CLINIC | Age: 74
End: 2019-01-03

## 2019-01-03 PROBLEM — I73.9 PAD (PERIPHERAL ARTERY DISEASE): Status: ACTIVE | Noted: 2019-01-03

## 2019-01-03 LAB
ALBUMIN SERPL BCP-MCNC: 3 G/DL
ANION GAP SERPL CALC-SCNC: 14 MMOL/L
BUN SERPL-MCNC: 36 MG/DL
CALCIUM SERPL-MCNC: 8.8 MG/DL
CHLORIDE SERPL-SCNC: 99 MMOL/L
CO2 SERPL-SCNC: 22 MMOL/L
CREAT SERPL-MCNC: 6.9 MG/DL
ERYTHROCYTE [DISTWIDTH] IN BLOOD BY AUTOMATED COUNT: 16.5 %
EST. GFR  (AFRICAN AMERICAN): 6 ML/MIN/1.73 M^2
EST. GFR  (NON AFRICAN AMERICAN): 5 ML/MIN/1.73 M^2
GLUCOSE SERPL-MCNC: 129 MG/DL
HCT VFR BLD AUTO: 34 %
HGB BLD-MCNC: 10.6 G/DL
MCH RBC QN AUTO: 25.2 PG
MCHC RBC AUTO-ENTMCNC: 31.2 G/DL
MCV RBC AUTO: 81 FL
PHOSPHATE SERPL-MCNC: 6.3 MG/DL
PLATELET # BLD AUTO: 194 K/UL
PMV BLD AUTO: 10.2 FL
POCT GLUCOSE: 109 MG/DL (ref 70–110)
POCT GLUCOSE: 137 MG/DL (ref 70–110)
POCT GLUCOSE: 181 MG/DL (ref 70–110)
POCT GLUCOSE: 200 MG/DL (ref 70–110)
POTASSIUM SERPL-SCNC: 4.2 MMOL/L
RBC # BLD AUTO: 4.2 M/UL
SODIUM SERPL-SCNC: 135 MMOL/L
VANCOMYCIN SERPL-MCNC: 23.8 UG/ML
WBC # BLD AUTO: 8.97 K/UL

## 2019-01-03 PROCEDURE — 80100016 HC MAINTENANCE HEMODIALYSIS

## 2019-01-03 PROCEDURE — 99233 SBSQ HOSP IP/OBS HIGH 50: CPT | Mod: ,,, | Performed by: INTERNAL MEDICINE

## 2019-01-03 PROCEDURE — 80202 ASSAY OF VANCOMYCIN: CPT

## 2019-01-03 PROCEDURE — 99233 SBSQ HOSP IP/OBS HIGH 50: CPT | Mod: ,,, | Performed by: NURSE PRACTITIONER

## 2019-01-03 PROCEDURE — 94761 N-INVAS EAR/PLS OXIMETRY MLT: CPT

## 2019-01-03 PROCEDURE — 25000003 PHARM REV CODE 250: Performed by: HOSPITALIST

## 2019-01-03 PROCEDURE — 85027 COMPLETE CBC AUTOMATED: CPT

## 2019-01-03 PROCEDURE — 63600175 PHARM REV CODE 636 W HCPCS: Performed by: HOSPITALIST

## 2019-01-03 PROCEDURE — 63600175 PHARM REV CODE 636 W HCPCS: Performed by: NURSE PRACTITIONER

## 2019-01-03 PROCEDURE — 63600175 PHARM REV CODE 636 W HCPCS: Mod: JG | Performed by: INTERNAL MEDICINE

## 2019-01-03 PROCEDURE — 80069 RENAL FUNCTION PANEL: CPT

## 2019-01-03 PROCEDURE — S5571 INSULIN DISPOS PEN 3 ML: HCPCS | Performed by: NURSE PRACTITIONER

## 2019-01-03 PROCEDURE — 36415 COLL VENOUS BLD VENIPUNCTURE: CPT

## 2019-01-03 PROCEDURE — 11000001 HC ACUTE MED/SURG PRIVATE ROOM

## 2019-01-03 PROCEDURE — 63600175 PHARM REV CODE 636 W HCPCS: Performed by: FAMILY MEDICINE

## 2019-01-03 PROCEDURE — 25000003 PHARM REV CODE 250: Performed by: NURSE PRACTITIONER

## 2019-01-03 PROCEDURE — 99233 PR SUBSEQUENT HOSPITAL CARE,LEVL III: ICD-10-PCS | Mod: ,,, | Performed by: NURSE PRACTITIONER

## 2019-01-03 PROCEDURE — 99231 SBSQ HOSP IP/OBS SF/LOW 25: CPT | Mod: ,,, | Performed by: PODIATRIST

## 2019-01-03 PROCEDURE — 25000003 PHARM REV CODE 250: Performed by: INTERNAL MEDICINE

## 2019-01-03 PROCEDURE — 99231 PR SUBSEQUENT HOSPITAL CARE,LEVL I: ICD-10-PCS | Mod: ,,, | Performed by: PODIATRIST

## 2019-01-03 PROCEDURE — 99233 PR SUBSEQUENT HOSPITAL CARE,LEVL III: ICD-10-PCS | Mod: ,,, | Performed by: INTERNAL MEDICINE

## 2019-01-03 RX ORDER — SODIUM CHLORIDE 9 MG/ML
INJECTION, SOLUTION INTRAVENOUS ONCE
Status: COMPLETED | OUTPATIENT
Start: 2019-01-03 | End: 2019-01-03

## 2019-01-03 RX ORDER — SODIUM CHLORIDE 9 MG/ML
INJECTION, SOLUTION INTRAVENOUS
Status: DISCONTINUED | OUTPATIENT
Start: 2019-01-03 | End: 2019-01-04 | Stop reason: HOSPADM

## 2019-01-03 RX ORDER — LIDOCAINE HYDROCHLORIDE 20 MG/ML
JELLY TOPICAL
Status: DISCONTINUED | OUTPATIENT
Start: 2019-01-03 | End: 2019-01-03 | Stop reason: RX

## 2019-01-03 RX ORDER — CLOPIDOGREL BISULFATE 75 MG/1
75 TABLET ORAL DAILY
Status: DISCONTINUED | OUTPATIENT
Start: 2019-01-03 | End: 2019-01-04 | Stop reason: HOSPADM

## 2019-01-03 RX ORDER — LIDOCAINE HYDROCHLORIDE 40 MG/ML
4 SOLUTION TOPICAL
Status: DISCONTINUED | OUTPATIENT
Start: 2019-01-03 | End: 2019-01-04 | Stop reason: HOSPADM

## 2019-01-03 RX ORDER — HEPARIN SODIUM 5000 [USP'U]/ML
5000 INJECTION, SOLUTION INTRAVENOUS; SUBCUTANEOUS EVERY 8 HOURS
Status: DISCONTINUED | OUTPATIENT
Start: 2019-01-03 | End: 2019-01-04 | Stop reason: HOSPADM

## 2019-01-03 RX ADMIN — PIPERACILLIN AND TAZOBACTAM 4.5 G: 4; .5 INJECTION, POWDER, LYOPHILIZED, FOR SOLUTION INTRAVENOUS; PARENTERAL at 03:01

## 2019-01-03 RX ADMIN — PIPERACILLIN AND TAZOBACTAM 4.5 G: 4; .5 INJECTION, POWDER, LYOPHILIZED, FOR SOLUTION INTRAVENOUS; PARENTERAL at 01:01

## 2019-01-03 RX ADMIN — AMLODIPINE BESYLATE 10 MG: 5 TABLET ORAL at 08:01

## 2019-01-03 RX ADMIN — ERYTHROPOIETIN 10000 UNITS: 10000 INJECTION, SOLUTION INTRAVENOUS; SUBCUTANEOUS at 08:01

## 2019-01-03 RX ADMIN — FUROSEMIDE 80 MG: 40 TABLET ORAL at 08:01

## 2019-01-03 RX ADMIN — LIDOCAINE HYDROCHLORIDE 4 ML: 40 SOLUTION TOPICAL at 02:01

## 2019-01-03 RX ADMIN — INSULIN DETEMIR 20 UNITS: 100 INJECTION, SOLUTION SUBCUTANEOUS at 09:01

## 2019-01-03 RX ADMIN — SEVELAMER CARBONATE 800 MG: 800 TABLET, FILM COATED ORAL at 05:01

## 2019-01-03 RX ADMIN — SEVELAMER CARBONATE 800 MG: 800 TABLET, FILM COATED ORAL at 03:01

## 2019-01-03 RX ADMIN — HEPARIN SODIUM 5000 UNITS: 5000 INJECTION, SOLUTION INTRAVENOUS; SUBCUTANEOUS at 03:01

## 2019-01-03 RX ADMIN — ATORVASTATIN CALCIUM 40 MG: 40 TABLET, FILM COATED ORAL at 08:01

## 2019-01-03 RX ADMIN — CLOPIDOGREL BISULFATE 75 MG: 75 TABLET ORAL at 03:01

## 2019-01-03 RX ADMIN — HEPARIN SODIUM 5000 UNITS: 5000 INJECTION, SOLUTION INTRAVENOUS; SUBCUTANEOUS at 09:01

## 2019-01-03 RX ADMIN — SODIUM CHLORIDE: 0.9 INJECTION, SOLUTION INTRAVENOUS at 02:01

## 2019-01-03 RX ADMIN — ASPIRIN 81 MG 81 MG: 81 TABLET ORAL at 08:01

## 2019-01-03 RX ADMIN — PIPERACILLIN AND TAZOBACTAM 4.5 G: 4; .5 INJECTION, POWDER, LYOPHILIZED, FOR SOLUTION INTRAVENOUS; PARENTERAL at 11:01

## 2019-01-03 RX ADMIN — SEVELAMER CARBONATE 800 MG: 800 TABLET, FILM COATED ORAL at 08:01

## 2019-01-03 NOTE — TELEPHONE ENCOUNTER
----- Message from Muna Maxwell sent at 1/3/2019  9:19 AM CST -----  Contact: Tia/ [facility] - [phone]  Rep called in asking to schedule patient next week for a wound hospital follow up.    Please call.

## 2019-01-03 NOTE — PHYSICIAN QUERY
"PT Name: Martha Melvin  MR #: [id number]     PHYSICIAN QUERY -  ACUITY OF CONDITION CLARIFICATION      CDS/: Marlin Root RN              Contact information: [phone]  This form is a permanent document in the medical record.     Query Date: January 3, 2019    By submitting this query, we are merely seeking further clarification of documentation to reflect the severity of illness of your patient. Please utilize your independent clinical judgment when addressing the question(s) below.    The Medical record reflects the following:     Indicators   Supporting Clinical Findings Location in Medical Record   x Documentation of condition Osteomyelitis of toe   Diabetic ulcer of toe of left foot associated with type 2 diabetes mellitus, with necrosis of bone   History of leg amputation   Diabetes Mellitus with long term insulin use   HbA1C 7.4 (9/5/2018)   -wound cultures strep Gordonii, staph epidermidis, Gemella mobilorum. Sensitivity pending  1/2 Hosp med note    Lab Value(s)     x Radiology Findings L foot xray reviewed Deformity of the left 5th proximal phalanx   -MRI L foot concerning for osteo  1/2 Hosp med note   x Treatment                                 Medication ID consult  MRI foot  Foot xray  Podiatry consult  Angiogram of lower extremities  Foot biopsy  Wound care 12/28 NSG orders   x Other Pt presented to [facility] 11/27/18 with complaint of purulent drainage from left foot ulcer.  Patient states symptoms initially started as a callus on the 5th digit of the left foot. She reports  noticing gradually increase in edema with mild erythema. Pt noted drainage from site today. She also reports "black toes" (3rd and 4th digit) for several months. Patient reports she was seen by her PCP 3-4 months ago. She did not have discoloration to  toes at the time. Pt has never been evaluated by podiatry.      12/27      Provider, please specify the acuity/chronicity of Osteomyelitis:    [  x ] Acute   [   ] " Chronic   [   ] Subacute   [   ] Acute and/on chronic   [   ]  Clinically Undetermined       Please document in your progress notes daily for the duration of treatment until resolved, and include in your discharge summary.

## 2019-01-03 NOTE — SUBJECTIVE & OBJECTIVE
Interval Hx:  Patient Seen at bedside for dressing change.  Patient denies F/C/N/V.  Dressings clean, dry, and intact. S/p angiogram.    Scheduled Meds:   amLODIPine  10 mg Oral Daily    aspirin  81 mg Oral Daily    atorvastatin  40 mg Oral Daily    clopidogrel  300 mg Oral Once    enoxaparin  30 mg Subcutaneous Daily    epoetin bashir (PROCRIT) injection  10,000 Units Intravenous Every Tues, Thurs, Sat    furosemide  80 mg Oral QAM    insulin detemir U-100  20 Units Subcutaneous QHS    piperacillin-tazobactam (ZOSYN) IVPB  4.5 g Intravenous Q12H    sevelamer carbonate  800 mg Oral TID WM    vancomycin (VANCOCIN) IVPB  500 mg Intravenous Once    vancomycin (VANCOCIN) IVPB  500 mg Intravenous Every Tues, Thurs, Sat     Continuous Infusions:   sodium chloride 0.9% 10 mL/hr (01/02/19 1344)    heparin (porcine)      heparin (porcine)       PRN Meds:sodium chloride 0.9%, sodium chloride 0.9%, acetaminophen, bisacodyl, dextrose 50%, dextrose 50%, glucagon (human recombinant), glucose, glucose, heparin (porcine), heparin (porcine), influenza, insulin aspart U-100, ondansetron, sodium chloride 0.9%    Review of patient's allergies indicates:   Allergen Reactions    Morphine         Past Medical History:   Diagnosis Date    Arthritis     Diabetes mellitus     Hyperlipidemia     Hypertension     Renal disorder     poor kidney function     Past Surgical History:   Procedure Laterality Date    LEG AMPUTATION Right     PERMCATH INSERTION-TUNNELED CVC N/A 4/25/2018    Performed by Armaan Sidhu MD at [facility] CATH LAB    TRANSPOSITION, VEIN, BASILIC Left 6/27/2018    Performed by AYESHA Mesa III, MD at [facility] OR [facility] FLR       Family History     Problem Relation (Age of Onset)    Cancer Mother, Father, Sister    Diabetes Mother    Hypertension Mother        Tobacco Use    Smoking status: Never Smoker    Smokeless tobacco: Never Used   Substance and Sexual Activity    Alcohol use: No    Drug use: Not  on file    Sexual activity: Not on file     Review of Systems   Constitutional: Negative for chills and fever.   Cardiovascular: Negative for leg swelling.   Gastrointestinal: Negative for nausea and vomiting.   Musculoskeletal: Negative for arthralgias and joint swelling.   Skin: Positive for color change and wound. Negative for rash.   Psychiatric/Behavioral: Negative for agitation and confusion.     Objective:     Vital Signs (Most Recent):  Temp: 98.3 °F (36.8 °C) (01/03/19 0730)  Pulse: 72 (01/03/19 0600)  Resp: 20 (01/03/19 0600)  BP: (!) 175/79 (01/03/19 0600)  SpO2: 100 % (01/03/19 0600) Vital Signs (24h Range):  Temp:  [96.8 °F (36 °C)-98.3 °F (36.8 °C)] 98.3 °F (36.8 °C)  Pulse:  [67-80] 72  Resp:  [] 20  SpO2:  [90 %-100 %] 100 %  BP: (118-183)/(60-87) 175/79     Weight: 83.6 kg (184 lb 4.9 oz)  Body mass index is 30.67 kg/m².    Foot Exam    Right Foot/Ankle     Comments  BKA    Left Foot/Ankle      Inspection and Palpation  Ecchymosis: none  Tenderness: none   Swelling: none     Neurovascular  Dorsalis pedis: 1+  Posterior tibial: 1+            Laboratory:  A1C:   Recent Labs   Lab 09/05/18  1000 12/28/18  0451   HGBA1C 7.4* 7.4*     CBC:   Recent Labs   Lab 01/03/19  0300   WBC 8.97   RBC 4.20   HGB 10.6*   HCT 34.0*      MCV 81*   MCH 25.2*   MCHC 31.2*     CMP:   Recent Labs   Lab 12/27/18  2040  01/03/19  0300   *   < > 129*   CALCIUM 9.1   < > 8.8   ALBUMIN 3.5   < > 3.0*   PROT 8.6*  --   --       < > 135*   K 4.2   < > 4.2   CO2 22*   < > 22*      < > 99   BUN 38*   < > 36*   CREATININE 5.5*   < > 6.9*   ALKPHOS 72  --   --    ALT 14  --   --    AST 25  --   --    BILITOT 0.5  --   --     < > = values in this interval not displayed.     CRP:   Recent Labs   Lab 12/27/18 2040   CRP 62.2*     ESR:   Recent Labs   Lab 12/27/18 2040   SEDRATE 66*     Wound Cultures:   Recent Labs   Lab 12/27/18 2045 12/28/18  1604 12/29/18  1506   LABAERO Skin mirella,  no  predominant organism Skin mirella,  no predominant organism GEMELLA (S.) MORBILLORUM  Few  Susceptibility testing not routinely performed    STREPTOCOCCUS GORDONII  Few  Susceptibility testing not routinely performed    STAPHYLOCOCCUS EPIDERMIDIS  Rare       Microbiology Results (last 7 days)     Procedure Component Value Units Date/Time    Culture, Anaerobe [[id number]] Collected:  12/29/18 1506    Order Status:  Completed Specimen:  Bone from Toe, Left Foot Updated:  01/02/19 1438     Anaerobic Culture --     GEMELLA (S.) MORBILLORUM  Many      Culture, Anaerobe [[id number]] Collected:  12/27/18 2045    Order Status:  Completed Specimen:  Wound from Foot, Left Updated:  01/02/19 1316     Anaerobic Culture --     FINEGOLDIA MAGNA  Moderate      Fungus culture [[id number]] Collected:  12/29/18 1506    Order Status:  Completed Specimen:  Bone from Toe, Left Foot Updated:  01/02/19 0912     Fungus (Mycology) Culture Culture in progress    AFB Culture & Smear [[id number]] Collected:  12/29/18 1506    Order Status:  Completed Specimen:  Bone from Toe, Left Foot Updated:  12/31/18 1536     AFB Culture & Smear Culture in progress     AFB CULTURE STAIN No acid fast bacilli seen.    Aerobic culture [[id number]] Collected:  12/29/18 1506    Order Status:  Completed Specimen:  Bone from Toe, Left Foot Updated:  12/31/18 1441     Aerobic Bacterial Culture --     GEMELLA (S.) MORBILLORUM  Few  Susceptibility testing not routinely performed       Aerobic Bacterial Culture --     STREPTOCOCCUS GORDONII  Few  Susceptibility testing not routinely performed       Aerobic Bacterial Culture --     STAPHYLOCOCCUS EPIDERMIDIS  Rare      Aerobic culture (Specify Source) **CANNOT BE ORDERED AS STAT** [[id number]] Collected:  12/27/18 2045    Order Status:  Completed Specimen:  Wound from Foot, Left Updated:  12/31/18 1219     Aerobic Bacterial Culture Skin mirella,  no predominant organism    Aerobic culture [[id number]] Collected:  12/28/18 8924     Order Status:  Completed Specimen:  Wound from Toe, Left Foot Updated:  12/30/18 0716     Aerobic Bacterial Culture Skin mirella,  no predominant organism    Gram stain [[id number]] Collected:  12/29/18 1506    Order Status:  Completed Specimen:  Bone from Toe, Left Foot Updated:  12/29/18 2228     Gram Stain Result Rare WBC's      Few Gram positive cocci        Specimen (12h ago, onward)    None          Diagnostic Results:  X-ray: Deformity of the left 5th proximal phalanx.  The findings may represent remote trauma or evolving osteitis.  Short-term follow-up is suggested.    No evidence of an acute fracture or dislocation of the left foot.    MRI: Prominent marrow edema throughout the 5th phalanges, concerning for osteomyelitis.    Marrow edema in the distal phalanx of the great toe, which may be reactive versus early osteomyelitis.    Arterial US:  Elevated velocities within the right posterior tibial artery suggestive of possible greater than 50% focal stenosis.    Atypical waveforms without significant elevated velocity noted in the distal SFA and popliteal artery on the left which continued throughout the lower leg.  Focal stenosis not excluded.  Further evaluation could be obtained with CTA as clinically warranted.    Clinical Findings:  Wound on left 5th toe measures 0.4x0.4x0.5  With fibrogranular base and hyperkeratotic margins.  Positive PTB, 1cc purulent discharge.  Negative malodor, erythema    Blood blister on left 3rd toe, no drainage

## 2019-01-03 NOTE — PROGRESS NOTES
V/O DR Burden Apply Lidocaine 4% topical soln to AVF 30 min before HD for pain. Discontinue Lidocaine 2% jelly- backorder

## 2019-01-03 NOTE — ASSESSMENT & PLAN NOTE
-Podiatry on board for wound care  -ID on board for abx recommendations  -will need follow up in Podiatry/wound care clinic

## 2019-01-03 NOTE — PHYSICIAN QUERY
PT Name: Martha Melvin  MR #: [id number]     Physician Query Form - Documentation Clarification      CDS/: Marlin Root RN                Contact information: [phone]    This form is a permanent document in the medical record.     Query Date: January 3, 2019    By submitting this query, we are merely seeking further clarification of documentation. Please utilize your independent clinical judgment when addressing the question(s) below.    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 Necrotic toes. Diabetic ulcer of toe of left foot associated with type 2 diabetes mellitus, with bone involvement without evidence   of necrosis. Critical lower limb ischemia   12/31 Cardiac catherization    Diabetic ulcer of toe of left foot associated with type 2 diabetes mellitus, with necrosis of bone    Procedure:bone biopsy       Findings:  After consent was signed and witnessed 1ml of 1% lidocaine was given locally.  Toe was dressed and prepped in a sterile manner.  A Jamshedi kit was used to biopsy the left 5th proximal phalanx.  Soft bone was noted.  Patient tolerated procedure  well, and hemostasis was controlled with compression.  Foot was dressed with betadine, 4x4's, kerlix, and ace.     12/27 ED MD note        12/29 Podiatry MD note                                                                            Doctor, Please clarify if Diabetic ulcer of toe of left foot is:     [  x  ] With necrosis of bone    [   ] Without necrosis of bone    [   ] other______________________________________                                                                                                        [  ] Clinically Undetermined

## 2019-01-03 NOTE — PLAN OF CARE
"Left groin site soft and dressing CDI, no new oozing noted, no hematoma at site. Oozing  noted at the left fistula site that was accessed today but unable to perform dialysis due to poor access. Patient stated," Its been hard and hurting since yesterday". Old drainage noted at the fistula dressing. DREW Salter notified and instructed to hold Lovenox dosage for tonight. Will continue to monitor closely.  "

## 2019-01-03 NOTE — PROGRESS NOTES
[facility]  Cardiology  Progress Note    Patient Name: Martha Melvin  MRN: [id number]  Admission Date: 12/27/2018  Hospital Length of Stay: 7 days  Code Status: Full Code   Attending Physician: Florinda Davenport*   Primary Care Physician: Katiuska Villalba MD  Expected Discharge Date:   Principal Problem:Diabetic foot infection    Subjective:     Hospital Course:   12/31/2018 Per HPI   1/1/2019 Abdominal aortagram with run off with following results:    · Severe proximal LPop stenosis  · Left Peroneal without significant stenosis and distally supplies collaterals to a plantar arch  · LPTA   · Diffusely diseased YASMEEN which distally supplies a DP  · Will schedule for staged intervention of LPop and YASMEEN for CLI  · Continue aggressive wound care  · TCOM's  1/2/2019 CBC and BMP WNL unchanged yesterday. NPO for LLE revascularization today   1/3/2019 Underwent LLE revascularization yesterday with PTA with DCB to proximal LPop and IVUS guided PTA to entirety of LAT into DP with good results. Fistulogram also performed due to access issue with HD yesterday with no significant AV stenosis and stenosis distal to anastamosis on arterial side with good flow noted in AVF. Perclose closure device used in LCFA access and manual pressure with suture placed in fistula with successful hemostasis. Pt hypotensive during procedure therefore placed in ICU for close monitoring. HR and BP stable overnight with no further hypotension. LCFA site stable with no active bleeding, hematoma or ecchymosis. Will get OOB today and transfer to floor                 Review of Systems   Constitution: Negative for chills, decreased appetite, diaphoresis, fever and weakness.   Cardiovascular: Negative for chest pain, claudication, cyanosis, dyspnea on exertion, irregular heartbeat, leg swelling, near-syncope, orthopnea, palpitations, paroxysmal nocturnal dyspnea and syncope.   Respiratory: Negative for cough, hemoptysis,  shortness of breath and wheezing.    Gastrointestinal: Negative for bloating, abdominal pain, constipation, diarrhea, melena, nausea and vomiting.   Neurological: Negative for dizziness.     Objective:     Vital Signs (Most Recent):  Temp: 98.3 °F (36.8 °C) (01/03/19 0730)  Pulse: 74 (01/03/19 1000)  Resp: (!) 87 (01/03/19 1000)  BP: (!) 169/77 (01/03/19 1000)  SpO2: (!) 94 % (01/03/19 1000) Vital Signs (24h Range):  Temp:  [96.8 °F (36 °C)-98.3 °F (36.8 °C)] 98.3 °F (36.8 °C)  Pulse:  [67-75] 74  Resp:  [] 87  SpO2:  [90 %-100 %] 94 %  BP: (118-183)/(60-82) 169/77     Weight: 83.6 kg (184 lb 4.9 oz)  Body mass index is 30.67 kg/m².     SpO2: (!) 94 %  O2 Device (Oxygen Therapy): room air      Intake/Output Summary (Last 24 hours) at 1/3/2019 1048  Last data filed at 1/3/2019 0200  Gross per 24 hour   Intake 100 ml   Output --   Net 100 ml       Lines/Drains/Airways     Drain                 Hemodialysis AV Fistula -- days          Peripheral Intravenous Line                 Peripheral IV - Single Lumen 01/02/19 0132 Anterior;Right Forearm 1 day                Physical Exam   Constitutional: She is oriented to person, place, and time. She appears well-developed and well-nourished. No distress.   Cardiovascular: Normal rate and regular rhythm. Exam reveals no gallop.   Murmur heard.  Pulmonary/Chest: Effort normal and breath sounds normal. No respiratory distress. She has no wheezes.   Abdominal: Soft. Bowel sounds are normal. She exhibits no distension. There is no tenderness.   Musculoskeletal: She exhibits edema (swelling to left forearm HD access ).   Neurological: She is alert and oriented to person, place, and time.   Skin: Skin is warm and dry.   Left CFA site soft with no active bleeding, hematoma or ecchymosis        Significant Labs:     Recent Labs   Lab 01/03/19  0300   *   K 4.2   CL 99   CO2 22*   BUN 36*   CREATININE 6.9*     Recent Labs   Lab 01/03/19  0300   WBC 8.97   RBC 4.20   HGB  10.6*   HCT 34.0*      MCV 81*   MCH 25.2*   MCHC 31.2*       Significant Imaging: Echocardiogram:   2D echo with color flow doppler:   Results for orders placed or performed during the hospital encounter of 04/15/18   2D echo with color flow doppler   Result Value Ref Range    QEF 55 55 - 65    Diastolic Dysfunction Yes (A)     Aortic Valve Stenosis MODERATE (A)     Est. PA Systolic Pressure 56.29 (A)     Tricuspid Valve Regurgitation MILD      Assessment and Plan:     Brief HPI: Seen this morning on AM NP rounds while resting in bed. Denies any complaints currently. Reviewed angiogram intervention from yesterday. Reviewed POC with patient as detailed below-verbalized understanding and agrees with POC     PAD (peripheral artery disease)    -abdominal aortogram on 12/31 with severe LLE PAD with successful revascularization of left popliteal and AT with DCB and good flow to foot  -continue ASA and Plavix therapy  -will need serial LLE arterial ultrasounds in 1 month, 3 months, 6 months and 12 months-will arrange upon discharge; follow up with cardiology clinic in 2-3 weeks upon discharge      Osteomyelitis of toe    -Podiatry on board for wound care  -ID on board for abx recommendations  -will need follow up in Podiatry/wound care clinic      Hyperlipidemia    -FLP with LDL 72 upon admission  -placed on Lipitor 40mg po daily      Aortic stenosis    -last echo with moderate AS with  SABRINA = 1.15 cm2, AVAi = 0.57 cm2/m2, peak velocity = 3.01 m/s, mean gradient = 21 mmHg  -will need cardiology follow up either at [facility] or with primary cardiologist   -asymptomatic  -recommend repeat echo for re evaluation of AS; can be done as an outpatient unless expected to remain admitted for extended period of time      Chronic diastolic heart failure    -euvolemic on exam; continue HD for volume status   -continue asa; anticipate initiation of BB +/- ACEI/ARB     HTN (hypertension)    -SBP 160s-170s with hypotension post  procedure yesterday  -on Norvasc 10mg po daily; if BP remains elevated and no recurrent hypotension then will recommend initiation of ACEI/ARB vs BB for better control          VTE Risk Mitigation (From admission, onward)        Ordered     heparin (porcine) injection 5,000 Units  Every 8 hours      01/03/19 0848     heparin infusion 1,000 units/500 ml in 0.9% NaCl (pressure line flush)  Intra-op continuous PRN      01/02/19 1339     heparin infusion 1,000 units/500 ml in 0.9% NaCl (pressure line flush)  Intra-op continuous PRN      12/31/18 1430     IP VTE HIGH RISK PATIENT  Once      12/27/18 2310          MALIHA Sullivan, ANP  Cardiology  [facility]

## 2019-01-03 NOTE — PROGRESS NOTES
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 She lives in [place], [place]. Her primary care physician is Dr. Katiuska Villalba. The patient cannot recall her nephrologist name.  She reports she has not been established with podiatry.     Pt presented to [facility] 11/27/18 with complaint of purulent drainage from left foot ulcer.  Patient states symptoms initially started as a callus on the 5th digit of the left foot. She reports noticing gradually increase in edema with mild erythema. Pt noted drainage from site today. She also reports "black toes" (3rd and 4th digit) for several months. Patient reports she was seen by her PCP 3-4 months ago. She did not have discoloration to toes at the time. Pt has never been evaluated by podiatry. She denies fever/chills, n/v, chest pain and SOB. Initial labs remarkable for H/H 9.8/30.9, BUN/CR 38/5.5, K within normal limits. Left foot xray show deformity of the left 5th proximal phalanx which may represent remote trauma or evolving osteitis. Pt afebrile, no leukocytosis, VSS. Pt admitted to [facility] for further evaluation.     Hospital Course:  Pt seen at bedside, she has black toes on the left foot.  Will await podiatry and will order US of LE for arterial to check blood flow  12/29, MRI positive for most likely osteo of firt toe and fifth toe of lefdt foot.  Consulting ID. Continue current abx  12/30 no events " overnight.  Doing ok, cards will f/u with pt , discussed case.  Giving an extra dose of vanc/discussed with Pharm D  12/31, no qcute clinical changes, HD today, continue iv abx, awaiting cards for evaluation for LE blood supply/flow a.  Awaiting ID for recs for abx  1/1 per card for possible revascularization on 1/2. Continue Vanc and Zosyn x 6 weeks  1/3 ok to d/c per Card and podiatry standpoint. Awaits ID rec's       For possible d/c in Am    Interval History: awake and alert. S/p L POP + AT revascularization on 1/2. No complaint.   Card by bedside appreciates rec's ok to DC from cardiac standpoint    Continue Vanc and Zosyn x 6 weeks per ID.      Patient okay to discharge home from Podiatry stand point. Dressing to remain clean, dry and intact until recommended follow up in clinic within 1 week of discharge. She is to ambulate with darco shoe and rest and elevate foot      Review of Systems   Constitutional: Negative for chills, diaphoresis and fever.   Respiratory: Negative for cough and shortness of breath.    Cardiovascular: Negative for leg swelling.   Gastrointestinal: Negative for abdominal pain and nausea.   Musculoskeletal: Positive for arthralgias and myalgias. Negative for back pain.   Skin: Positive for color change and wound.   Neurological: Negative for dizziness, syncope, light-headedness and headaches.   Psychiatric/Behavioral: Negative for confusion.     Objective:     Vital Signs (Most Recent):  Temp: 98.3 °F (36.8 °C) (01/03/19 0730)  Pulse: 72 (01/03/19 0600)  Resp: 20 (01/03/19 0600)  BP: (!) 175/79 (01/03/19 0600)  SpO2: 100 % (01/03/19 0600) Vital Signs (24h Range):  Temp:  [96.8 °F (36 °C)-98.3 °F (36.8 °C)] 98.3 °F (36.8 °C)  Pulse:  [67-80] 72  Resp:  [] 20  SpO2:  [90 %-100 %] 100 %  BP: (118-183)/(60-87) 175/79     Weight: 83.6 kg (184 lb 4.9 oz)  Body mass index is 30.67 kg/m².    Intake/Output Summary (Last 24 hours) at 1/3/2019 0751  Last data filed at 1/3/2019 0200  Gross per  24 hour   Intake 600 ml   Output 400 ml   Net 200 ml      Physical Exam   Constitutional: She is oriented to person, place, and time. She appears well-developed and well-nourished.   HENT:   Head: Normocephalic and atraumatic.   Neck: Neck supple.   Cardiovascular: Normal rate, regular rhythm and normal heart sounds.   No murmur heard.  Pulmonary/Chest: Effort normal. No respiratory distress. She has no wheezes.   Abdominal: Soft. Bowel sounds are normal. She exhibits no distension. There is no tenderness. There is no guarding.   Musculoskeletal: Normal range of motion. She exhibits edema (L foot ). She exhibits no tenderness.   R BKA    Neurological: She is alert and oriented to person, place, and time.   Skin: Skin is warm and dry. Capillary refill takes less than 2 seconds.   +necrosis to Left foot 3rd/4th toes, ulcer to left foot 5th digit, no drainage noted    Psychiatric: She has a normal mood and affect. Her behavior is normal.         Significant Labs:   CBC:   Recent Labs   Lab 01/02/19  0334 01/03/19  0300   WBC 8.02 8.97   HGB 10.5* 10.6*   HCT 33.5* 34.0*    194     CMP:   Recent Labs   Lab 01/02/19  0331 01/02/19  0334 01/03/19  0300    136 135*   K 4.2 4.1 4.2    99 99   CO2 22* 23 22*   * 149* 129*   BUN 31* 31* 36*   CREATININE 5.7* 5.8* 6.9*   CALCIUM 9.1 9.0 8.8   ALBUMIN  --  3.2* 3.0*   ANIONGAP 14 14 14   EGFRNONAA 7* 7* 5*     Cardiac Markers: No results for input(s): CKMB, MYOGLOBIN, BNP, TROPISTAT in the last 48 hours.  Magnesium: No results for input(s): MG in the last 48 hours.  Respiratory Culture: No results for input(s): GSRESP, RESPIRATORYC in the last 48 hours.  Troponin: No results for input(s): TROPONINI in the last 48 hours.    Significant Imaging: none    Assessment/Plan:      * Diabetic foot infection    History of leg amputation      -wound cultures strep Gordonii, staph epidermidis, Gemella mobilorum. Sensitivity pending  - continue Vanc/Zosyn x 6 weeks  (end date 2/8)  -L foot xray reviewed Deformity of the left 5th proximal phalanx  -MRI L foot concerning for osteo  -consult podiatry            Osteomyelitis of toe    Diabetic ulcer of toe of left foot associated with type 2 diabetes mellitus, with necrosis of bone  History of leg amputation  Diabetes Mellitus with long term insulin use   HbA1C 7.4 (9/5/2018)   -wound cultures strep Gordonii, staph epidermidis, Gemella mobilorum. Sensitivity pending  - continue Vanc/Zosyn x 6 weeks (end date 2/8)  -L foot xray reviewed Deformity of the left 5th proximal phalanx  -MRI L foot concerning for osteo  -consult podiatry   - Lantus 20 units QHS,  - low dose SSI,   - Accucheck AC&HS   - diabetic diet   - ok to d/c for card and podiatry standpoint  - awaits ID final ID rec's      Diabetic ulcer of toe of left foot associated with type 2 diabetes mellitus, with bone involvement without evidence of necrosis      MRI positive for osteo.  Will continue IV ABX  Will consult ID     History of leg amputation    Non erythema  stable       ESRD (end stage renal disease) on dialysis    Anemia of chronic disease  HD TTS,   H/H at baseline   -consult nephrology.       Aortic stenosis           Chronic diastolic heart failure           Anemia of chronic disease    H/H stable       HTN (hypertension)    Chronic diastolic heart failure  Aortic stenosis  History of cardiac arrest  Hyperlipidemia  -continue amlodipine, atorvastatin and lasix      Diabetes mellitus due to underlying condition with chronic kidney disease, with long-term current use of insulin             VTE Risk Mitigation (From admission, onward)        Ordered     heparin (porcine) injection 5,000 Units  Every 8 hours      01/03/19 0848     heparin infusion 1,000 units/500 ml in 0.9% NaCl (pressure line flush)  Intra-op continuous PRN      01/02/19 1339     heparin infusion 1,000 units/500 ml in 0.9% NaCl (pressure line flush)  Intra-op continuous PRN      12/31/18 3352      IP VTE HIGH RISK PATIENT  Once      12/27/18 7179          Critical care time spent on the evaluation and treatment of severe organ dysfunction, review of pertinent labs and imaging studies, discussions with consulting providers and discussions with patient/family: 60 minutes.    Florinda Davenport MD  Department of Hospital Medicine   [facility]

## 2019-01-03 NOTE — PLAN OF CARE
Pt in HD ; Tn reviewed notes - As per ID notes:Continue Vanc and Zosyn x 6 weeks.  Tn and Dr. Bartholomew discussed case with Dr. Merlos this am regarding antibiotics recommendations; pt is a HD patient and will not be able to receive Zosyn with HD only Vanc and would therefore need a central line for home IV if ID unable to change antibiotics.   Dr. Merlos to discuss with ID      Per admit TN note:  she was living with her daughter due to coming to HD in [place]. Pt states that she does HD Tuesday, Thursday, and Saturday. States that while at home she has a wheelchair and rolling walker to use if needed. Daughter and sons bring pt to and from appointments        Tn to continue to follow.         01/03/19 1135   Discharge Reassessment   Assessment Type Discharge Planning Reassessment

## 2019-01-03 NOTE — SUBJECTIVE & OBJECTIVE
Interval History: awake and alert. S/p L POP + AT revascularization on 1/2. No complaint.   Card by bedside appreciates rec's ok to DC from cardiac standpoint    Continue Vanc and Zosyn x 6 weeks per ID.      Patient okay to discharge home from Podiatry stand point. Dressing to remain clean, dry and intact until recommended follow up in clinic within 1 week of discharge. She is to ambulate with darco shoe and rest and elevate foot      Review of Systems   Constitutional: Negative for chills, diaphoresis and fever.   Respiratory: Negative for cough and shortness of breath.    Cardiovascular: Negative for leg swelling.   Gastrointestinal: Negative for abdominal pain and nausea.   Musculoskeletal: Positive for arthralgias and myalgias. Negative for back pain.   Skin: Positive for color change and wound.   Neurological: Negative for dizziness, syncope, light-headedness and headaches.   Psychiatric/Behavioral: Negative for confusion.     Objective:     Vital Signs (Most Recent):  Temp: 98.3 °F (36.8 °C) (01/03/19 0730)  Pulse: 72 (01/03/19 0600)  Resp: 20 (01/03/19 0600)  BP: (!) 175/79 (01/03/19 0600)  SpO2: 100 % (01/03/19 0600) Vital Signs (24h Range):  Temp:  [96.8 °F (36 °C)-98.3 °F (36.8 °C)] 98.3 °F (36.8 °C)  Pulse:  [67-80] 72  Resp:  [] 20  SpO2:  [90 %-100 %] 100 %  BP: (118-183)/(60-87) 175/79     Weight: 83.6 kg (184 lb 4.9 oz)  Body mass index is 30.67 kg/m².    Intake/Output Summary (Last 24 hours) at 1/3/2019 0753  Last data filed at 1/3/2019 0200  Gross per 24 hour   Intake 600 ml   Output 400 ml   Net 200 ml      Physical Exam   Constitutional: She is oriented to person, place, and time. She appears well-developed and well-nourished.   HENT:   Head: Normocephalic and atraumatic.   Neck: Neck supple.   Cardiovascular: Normal rate, regular rhythm and normal heart sounds.   No murmur heard.  Pulmonary/Chest: Effort normal. No respiratory distress. She has no wheezes.   Abdominal: Soft. Bowel sounds  are normal. She exhibits no distension. There is no tenderness. There is no guarding.   Musculoskeletal: Normal range of motion. She exhibits edema (L foot ). She exhibits no tenderness.   R BKA    Neurological: She is alert and oriented to person, place, and time.   Skin: Skin is warm and dry. Capillary refill takes less than 2 seconds.   +necrosis to Left foot 3rd/4th toes, ulcer to left foot 5th digit, no drainage noted    Psychiatric: She has a normal mood and affect. Her behavior is normal.         Significant Labs:   CBC:   Recent Labs   Lab 01/02/19  0334 01/03/19  0300   WBC 8.02 8.97   HGB 10.5* 10.6*   HCT 33.5* 34.0*    194     CMP:   Recent Labs   Lab 01/02/19  0331 01/02/19  0334 01/03/19  0300    136 135*   K 4.2 4.1 4.2    99 99   CO2 22* 23 22*   * 149* 129*   BUN 31* 31* 36*   CREATININE 5.7* 5.8* 6.9*   CALCIUM 9.1 9.0 8.8   ALBUMIN  --  3.2* 3.0*   ANIONGAP 14 14 14   EGFRNONAA 7* 7* 5*     Cardiac Markers: No results for input(s): CKMB, MYOGLOBIN, BNP, TROPISTAT in the last 48 hours.  Magnesium: No results for input(s): MG in the last 48 hours.  Respiratory Culture: No results for input(s): GSRESP, RESPIRATORYC in the last 48 hours.  Troponin: No results for input(s): TROPONINI in the last 48 hours.    Significant Imaging: none

## 2019-01-03 NOTE — PROGRESS NOTES
Left groin site oozed through dressing. Removed dressing and applied direct manual pressure to site for 15 minutes and reapplied gauze/tegaderm dressing and applied sand bag. Notified night rn and will continue to monitor.

## 2019-01-03 NOTE — ASSESSMENT & PLAN NOTE
-euvolemic on exam; continue HD for volume status   -continue asa; anticipate initiation of BB +/- ACEI/ARB

## 2019-01-03 NOTE — PLAN OF CARE
[facility] Infectious Diseases  Re-consulted for alternative to Zosyn now that anaerobic ID and sensitivities are available. Bone cultures are positive for Staph epi, Gemilla and Strep gordonii. Wound also positive for Finegoldia. Zosyn would be a very good choice, but due to availability challenges, recommend Cipro, Flagyl and Vancomycin. Cipro 500 mg orally q day; After dialysis on dialysis days. Metronidazole orally 500 mg orally q 6 hours. And Vancomycin with dialysis based upon the level. All for 6 weeks. MRI prior to stopping antibiotics for confirmation of resolution.    Please call for any questions.    Emma Peters MD  [facility] Infectious Diseases Staff

## 2019-01-03 NOTE — ASSESSMENT & PLAN NOTE
-last echo with moderate AS with  SABRINA = 1.15 cm2, AVAi = 0.57 cm2/m2, peak velocity = 3.01 m/s, mean gradient = 21 mmHg  -will need cardiology follow up either at [facility] or with primary cardiologist   -asymptomatic  -recommend repeat echo for re evaluation of AS; can be done as an outpatient unless expected to remain admitted for extended period of time

## 2019-01-03 NOTE — ASSESSMENT & PLAN NOTE
Diabetic ulcer of toe of left foot associated with type 2 diabetes mellitus, with necrosis of bone  History of leg amputation  Diabetes Mellitus with long term insulin use   HbA1C 7.4 (9/5/2018)   -wound cultures strep Gordonii, staph epidermidis, Gemella mobilorum. Sensitivity pending  - continue Vanc/Zosyn x 6 weeks (end date 2/8)  -L foot xray reviewed Deformity of the left 5th proximal phalanx  -MRI L foot concerning for osteo  -consult podiatry   - Lantus 20 units QHS,  - low dose SSI,   - Accucheck AC&HS   - diabetic diet   - ok to d/c for card and podiatry standpoint  - awaits ID final ID rec's

## 2019-01-03 NOTE — PLAN OF CARE
Problem: Adult Inpatient Plan of Care  Goal: Plan of Care Review  Pt on RA. Pt with no apparent distress noted. Will continue to monitor.

## 2019-01-03 NOTE — ASSESSMENT & PLAN NOTE
-SBP 160s-170s with hypotension post procedure yesterday  -on Norvasc 10mg po daily; if BP remains elevated and no recurrent hypotension then will recommend initiation of ACEI/ARB vs BB for better control

## 2019-01-03 NOTE — ASSESSMENT & PLAN NOTE
Martha Melvin is a 73 y.o. female with infected foot ulcer that PTB, MRI suspicious of osteo  -continue abx per ID recs  -Plan for local wound care and abx treatment per ID recs.  -s/p angio per interventional cardiology, appreciate assistance  -dressed with betadine 4x4's, kelrix, and ace.  Nursing to do daily dressing changes, orders in.  -podiatry will follow.    DC Instructions:  Patient is to follow up with podiatry within 1 week of discharge.    Weight bearing status:  Partial weight bearing to heel only for short transfers  Offloading device: Darco shoe

## 2019-01-03 NOTE — ASSESSMENT & PLAN NOTE
Diabetic ulcer of toe of left foot associated with type 2 diabetes mellitus, with necrosis of bone  History of leg amputation  Diabetes Mellitus with long term insulin use   HbA1C 7.4 (9/5/2018)   -wound cultures strep Gordonii, staph epidermidis, Gemella mobilorum. Sensitivity pending  - continue Vanc/Zosyn x 6 weeks (end date 2/8)  -L foot xray reviewed Deformity of the left 5th proximal phalanx  -MRI L foot concerning for osteo  -consult podiatry   - Lantus 20 units QHS,  - low dose SSI,   - Accucheck AC&HS   - diabetic diet   - ok to d/c for card and podiatry standpoint  - awaits ID final ID rec's.  recommend Cipro, Flagyl and Vancomycin.   Cipro 500 mg orally q day; After dialysis on dialysis days.   Metronidazole orally 500 mg orally q 6 hours.   And Vancomycin with dialysis based upon the level. All for 6 weeks.   MRI prior to stopping antibiotics for confirmation of resolution.

## 2019-01-03 NOTE — PROGRESS NOTES
[facility]  Podiatry  Progress Note    Patient Name: Martha Melvin  MRN: [id number]  Admission Date: 12/27/2018  Hospital Length of Stay: 7 days  Attending Physician: Florinda Davneport*  Primary Care Provider: Katiuska Villalba MD   Interval Hx:  Patient Seen at bedside for dressing change.  Patient denies F/C/N/V.  Dressings clean, dry, and intact. S/p angiogram.    Scheduled Meds:   amLODIPine  10 mg Oral Daily    aspirin  81 mg Oral Daily    atorvastatin  40 mg Oral Daily    clopidogrel  300 mg Oral Once    enoxaparin  30 mg Subcutaneous Daily    epoetin bashir (PROCRIT) injection  10,000 Units Intravenous Every Tues, Thurs, Sat    furosemide  80 mg Oral QAM    insulin detemir U-100  20 Units Subcutaneous QHS    piperacillin-tazobactam (ZOSYN) IVPB  4.5 g Intravenous Q12H    sevelamer carbonate  800 mg Oral TID WM    vancomycin (VANCOCIN) IVPB  500 mg Intravenous Once    vancomycin (VANCOCIN) IVPB  500 mg Intravenous Every Tues, Thurs, Sat     Continuous Infusions:   sodium chloride 0.9% 10 mL/hr (01/02/19 1344)    heparin (porcine)      heparin (porcine)       PRN Meds:sodium chloride 0.9%, sodium chloride 0.9%, acetaminophen, bisacodyl, dextrose 50%, dextrose 50%, glucagon (human recombinant), glucose, glucose, heparin (porcine), heparin (porcine), influenza, insulin aspart U-100, ondansetron, sodium chloride 0.9%    Review of patient's allergies indicates:   Allergen Reactions    Morphine         Past Medical History:   Diagnosis Date    Arthritis     Diabetes mellitus     Hyperlipidemia     Hypertension     Renal disorder     poor kidney function     Past Surgical History:   Procedure Laterality Date    LEG AMPUTATION Right     PERMCATH INSERTION-TUNNELED CVC N/A 4/25/2018    Performed by Armaan Sidhu MD at [facility] CATH LAB    TRANSPOSITION, VEIN, BASILIC Left 6/27/2018    Performed by AYESHA Mesa III, MD at [facility] OR [facility] FLR       Family History     Problem  Relation (Age of Onset)    Cancer Mother, Father, Sister    Diabetes Mother    Hypertension Mother        Tobacco Use    Smoking status: Never Smoker    Smokeless tobacco: Never Used   Substance and Sexual Activity    Alcohol use: No    Drug use: Not on file    Sexual activity: Not on file     Review of Systems   Constitutional: Negative for chills and fever.   Cardiovascular: Negative for leg swelling.   Gastrointestinal: Negative for nausea and vomiting.   Musculoskeletal: Negative for arthralgias and joint swelling.   Skin: Positive for color change and wound. Negative for rash.   Psychiatric/Behavioral: Negative for agitation and confusion.     Objective:     Vital Signs (Most Recent):  Temp: 98.3 °F (36.8 °C) (01/03/19 0730)  Pulse: 72 (01/03/19 0600)  Resp: 20 (01/03/19 0600)  BP: (!) 175/79 (01/03/19 0600)  SpO2: 100 % (01/03/19 0600) Vital Signs (24h Range):  Temp:  [96.8 °F (36 °C)-98.3 °F (36.8 °C)] 98.3 °F (36.8 °C)  Pulse:  [67-80] 72  Resp:  [] 20  SpO2:  [90 %-100 %] 100 %  BP: (118-183)/(60-87) 175/79     Weight: 83.6 kg (184 lb 4.9 oz)  Body mass index is 30.67 kg/m².    Foot Exam    Right Foot/Ankle     Comments  BKA    Left Foot/Ankle      Inspection and Palpation  Ecchymosis: none  Tenderness: none   Swelling: none     Neurovascular  Dorsalis pedis: 1+  Posterior tibial: 1+            Laboratory:  A1C:   Recent Labs   Lab 09/05/18  1000 12/28/18  0451   HGBA1C 7.4* 7.4*     CBC:   Recent Labs   Lab 01/03/19  0300   WBC 8.97   RBC 4.20   HGB 10.6*   HCT 34.0*      MCV 81*   MCH 25.2*   MCHC 31.2*     CMP:   Recent Labs   Lab 12/27/18  2040  01/03/19  0300   *   < > 129*   CALCIUM 9.1   < > 8.8   ALBUMIN 3.5   < > 3.0*   PROT 8.6*  --   --       < > 135*   K 4.2   < > 4.2   CO2 22*   < > 22*      < > 99   BUN 38*   < > 36*   CREATININE 5.5*   < > 6.9*   ALKPHOS 72  --   --    ALT 14  --   --    AST 25  --   --    BILITOT 0.5  --   --     < > = values in this  interval not displayed.     CRP:   Recent Labs   Lab 12/27/18 2040   CRP 62.2*     ESR:   Recent Labs   Lab 12/27/18 2040   SEDRATE 66*     Wound Cultures:   Recent Labs   Lab 12/27/18 2045 12/28/18  1604 12/29/18  1506   LABAERO Skin mierlla,  no predominant organism Skin mirella,  no predominant organism GEMELLA (S.) MORBILLORUM  Few  Susceptibility testing not routinely performed    STREPTOCOCCUS GORDONII  Few  Susceptibility testing not routinely performed    STAPHYLOCOCCUS EPIDERMIDIS  Rare       Microbiology Results (last 7 days)     Procedure Component Value Units Date/Time    Culture, Anaerobe [[id number]] Collected:  12/29/18 1506    Order Status:  Completed Specimen:  Bone from Toe, Left Foot Updated:  01/02/19 1438     Anaerobic Culture --     GEMELLA (S.) MORBILLORUM  Many      Culture, Anaerobe [[id number]] Collected:  12/27/18 2045    Order Status:  Completed Specimen:  Wound from Foot, Left Updated:  01/02/19 1316     Anaerobic Culture --     FINEGOLDIA MAGNA  Moderate      Fungus culture [[id number]] Collected:  12/29/18 1506    Order Status:  Completed Specimen:  Bone from Toe, Left Foot Updated:  01/02/19 0912     Fungus (Mycology) Culture Culture in progress    AFB Culture & Smear [[id number]] Collected:  12/29/18 1506    Order Status:  Completed Specimen:  Bone from Toe, Left Foot Updated:  12/31/18 1536     AFB Culture & Smear Culture in progress     AFB CULTURE STAIN No acid fast bacilli seen.    Aerobic culture [[id number]] Collected:  12/29/18 1506    Order Status:  Completed Specimen:  Bone from Toe, Left Foot Updated:  12/31/18 1441     Aerobic Bacterial Culture --     GEMELLA (S.) MORBILLORUM  Few  Susceptibility testing not routinely performed       Aerobic Bacterial Culture --     STREPTOCOCCUS GORDONII  Few  Susceptibility testing not routinely performed       Aerobic Bacterial Culture --     STAPHYLOCOCCUS EPIDERMIDIS  Rare      Aerobic culture (Specify Source) **CANNOT BE ORDERED AS  STAT** [[id number]] Collected:  12/27/18 2045    Order Status:  Completed Specimen:  Wound from Foot, Left Updated:  12/31/18 1219     Aerobic Bacterial Culture Skin mirella,  no predominant organism    Aerobic culture [[id number]] Collected:  12/28/18 1604    Order Status:  Completed Specimen:  Wound from Toe, Left Foot Updated:  12/30/18 0716     Aerobic Bacterial Culture Skin mirella,  no predominant organism    Gram stain [[id number]] Collected:  12/29/18 1506    Order Status:  Completed Specimen:  Bone from Toe, Left Foot Updated:  12/29/18 2228     Gram Stain Result Rare WBC's      Few Gram positive cocci        Specimen (12h ago, onward)    None          Diagnostic Results:  X-ray: Deformity of the left 5th proximal phalanx.  The findings may represent remote trauma or evolving osteitis.  Short-term follow-up is suggested.    No evidence of an acute fracture or dislocation of the left foot.    MRI: Prominent marrow edema throughout the 5th phalanges, concerning for osteomyelitis.    Marrow edema in the distal phalanx of the great toe, which may be reactive versus early osteomyelitis.    Arterial US:  Elevated velocities within the right posterior tibial artery suggestive of possible greater than 50% focal stenosis.    Atypical waveforms without significant elevated velocity noted in the distal SFA and popliteal artery on the left which continued throughout the lower leg.  Focal stenosis not excluded.  Further evaluation could be obtained with CTA as clinically warranted.    Clinical Findings:  Wound on left 5th toe measures 0.4x0.4x0.5  With fibrogranular base and hyperkeratotic margins.  Positive PTB, 1cc purulent discharge.  Negative malodor, erythema    Blood blister on left 3rd toe, no drainage                        Assessment/Plan:     * Diabetic foot infection    As above     Diabetic ulcer of toe of left foot associated with type 2 diabetes mellitus, with bone involvement without evidence of necrosis     Martha Melvin is a 73 y.o. female with infected foot ulcer that PTB, MRI suspicious of osteo  -continue abx per ID recs  -Plan for local wound care and abx treatment per ID recs.  -s/p angio per interventional cardiology, appreciate assistance  -dressed with betadine 4x4's, kelrix, and ace.  Nursing to do daily dressing changes, orders in.  -podiatry will follow.    DC Instructions:  Patient is to follow up with podiatry within 1 week of discharge.    Weight bearing status:  Partial weight bearing to heel only for short transfers  Offloading device: Darco shoe         History of leg amputation    Per primary       Chronic diastolic heart failure    Per primary       Diabetes mellitus due to underlying condition with chronic kidney disease, with long-term current use of insulin    Per primary           Juice Guerra MD  Podiatry  [facility]

## 2019-01-03 NOTE — ASSESSMENT & PLAN NOTE
-abdominal aortogram on 12/31 with severe LLE PAD with successful revascularization of left popliteal and AT with DCB and good flow to foot  -continue ASA and Plavix therapy  -will need serial LLE arterial ultrasounds in 1 month, 3 months, 6 months and 12 months-will arrange upon discharge; follow up with cardiology clinic in 2-3 weeks upon discharge

## 2019-01-03 NOTE — SUBJECTIVE & OBJECTIVE
Review of Systems   Constitution: Negative for chills, decreased appetite, diaphoresis, fever and weakness.   Cardiovascular: Negative for chest pain, claudication, cyanosis, dyspnea on exertion, irregular heartbeat, leg swelling, near-syncope, orthopnea, palpitations, paroxysmal nocturnal dyspnea and syncope.   Respiratory: Negative for cough, hemoptysis, shortness of breath and wheezing.    Gastrointestinal: Negative for bloating, abdominal pain, constipation, diarrhea, melena, nausea and vomiting.   Neurological: Negative for dizziness.     Objective:     Vital Signs (Most Recent):  Temp: 98.3 °F (36.8 °C) (01/03/19 0730)  Pulse: 74 (01/03/19 1000)  Resp: (!) 87 (01/03/19 1000)  BP: (!) 169/77 (01/03/19 1000)  SpO2: (!) 94 % (01/03/19 1000) Vital Signs (24h Range):  Temp:  [96.8 °F (36 °C)-98.3 °F (36.8 °C)] 98.3 °F (36.8 °C)  Pulse:  [67-75] 74  Resp:  [] 87  SpO2:  [90 %-100 %] 94 %  BP: (118-183)/(60-82) 169/77     Weight: 83.6 kg (184 lb 4.9 oz)  Body mass index is 30.67 kg/m².     SpO2: (!) 94 %  O2 Device (Oxygen Therapy): room air      Intake/Output Summary (Last 24 hours) at 1/3/2019 1048  Last data filed at 1/3/2019 0200  Gross per 24 hour   Intake 100 ml   Output --   Net 100 ml       Lines/Drains/Airways     Drain                 Hemodialysis AV Fistula -- days          Peripheral Intravenous Line                 Peripheral IV - Single Lumen 01/02/19 0132 Anterior;Right Forearm 1 day                Physical Exam   Constitutional: She is oriented to person, place, and time. She appears well-developed and well-nourished. No distress.   Cardiovascular: Normal rate and regular rhythm. Exam reveals no gallop.   Murmur heard.  Pulmonary/Chest: Effort normal and breath sounds normal. No respiratory distress. She has no wheezes.   Abdominal: Soft. Bowel sounds are normal. She exhibits no distension. There is no tenderness.   Musculoskeletal: She exhibits edema (swelling to left forearm HD access ).    Neurological: She is alert and oriented to person, place, and time.   Skin: Skin is warm and dry.   Left CFA site soft with no active bleeding, hematoma or ecchymosis        Significant Labs:     Recent Labs   Lab 01/03/19  0300   *   K 4.2   CL 99   CO2 22*   BUN 36*   CREATININE 6.9*     Recent Labs   Lab 01/03/19  0300   WBC 8.97   RBC 4.20   HGB 10.6*   HCT 34.0*      MCV 81*   MCH 25.2*   MCHC 31.2*       Significant Imaging: Echocardiogram:   2D echo with color flow doppler:   Results for orders placed or performed during the hospital encounter of 04/15/18   2D echo with color flow doppler   Result Value Ref Range    QEF 55 55 - 65    Diastolic Dysfunction Yes (A)     Aortic Valve Stenosis MODERATE (A)     Est. PA Systolic Pressure 56.29 (A)     Tricuspid Valve Regurgitation MILD

## 2019-01-04 ENCOUNTER — TELEPHONE (OUTPATIENT)
Dept: PODIATRY | Facility: CLINIC | Age: 74
End: 2019-01-04

## 2019-01-04 VITALS
BODY MASS INDEX: 31.96 KG/M2 | SYSTOLIC BLOOD PRESSURE: 143 MMHG | HEART RATE: 82 BPM | DIASTOLIC BLOOD PRESSURE: 72 MMHG | OXYGEN SATURATION: 98 % | TEMPERATURE: 97 F | HEIGHT: 65 IN | WEIGHT: 191.81 LBS | RESPIRATION RATE: 18 BRPM

## 2019-01-04 LAB
ALBUMIN SERPL BCP-MCNC: 2.8 G/DL
ANION GAP SERPL CALC-SCNC: 13 MMOL/L
BUN SERPL-MCNC: 29 MG/DL
CALCIUM SERPL-MCNC: 8.3 MG/DL
CHLORIDE SERPL-SCNC: 99 MMOL/L
CO2 SERPL-SCNC: 21 MMOL/L
CREAT SERPL-MCNC: 5.8 MG/DL
ERYTHROCYTE [DISTWIDTH] IN BLOOD BY AUTOMATED COUNT: 16.1 %
EST. GFR  (AFRICAN AMERICAN): 8 ML/MIN/1.73 M^2
EST. GFR  (NON AFRICAN AMERICAN): 7 ML/MIN/1.73 M^2
GLUCOSE SERPL-MCNC: 126 MG/DL
HBV SURFACE AB SER QL IA: POSITIVE
HBV SURFACE AB SERPL IA-ACNC: 49 MIU/ML
HCT VFR BLD AUTO: 29.7 %
HGB BLD-MCNC: 9.1 G/DL
MCH RBC QN AUTO: 24.7 PG
MCHC RBC AUTO-ENTMCNC: 30.6 G/DL
MCV RBC AUTO: 81 FL
PHOSPHATE SERPL-MCNC: 3.5 MG/DL
PLATELET # BLD AUTO: 180 K/UL
PMV BLD AUTO: 9.5 FL
POCT GLUCOSE: 125 MG/DL (ref 70–110)
POCT GLUCOSE: 193 MG/DL (ref 70–110)
POTASSIUM SERPL-SCNC: 4.1 MMOL/L
RBC # BLD AUTO: 3.68 M/UL
SODIUM SERPL-SCNC: 133 MMOL/L
WBC # BLD AUTO: 9.58 K/UL

## 2019-01-04 PROCEDURE — 90662 IIV NO PRSV INCREASED AG IM: CPT | Performed by: HOSPITALIST

## 2019-01-04 PROCEDURE — 36415 COLL VENOUS BLD VENIPUNCTURE: CPT

## 2019-01-04 PROCEDURE — 25000003 PHARM REV CODE 250: Performed by: NURSE PRACTITIONER

## 2019-01-04 PROCEDURE — 63600175 PHARM REV CODE 636 W HCPCS: Performed by: FAMILY MEDICINE

## 2019-01-04 PROCEDURE — 94761 N-INVAS EAR/PLS OXIMETRY MLT: CPT

## 2019-01-04 PROCEDURE — 80069 RENAL FUNCTION PANEL: CPT

## 2019-01-04 PROCEDURE — 90471 IMMUNIZATION ADMIN: CPT | Performed by: HOSPITALIST

## 2019-01-04 PROCEDURE — 63600175 PHARM REV CODE 636 W HCPCS: Performed by: HOSPITALIST

## 2019-01-04 PROCEDURE — G0008 ADMIN INFLUENZA VIRUS VAC: HCPCS | Performed by: HOSPITALIST

## 2019-01-04 PROCEDURE — 25000003 PHARM REV CODE 250: Performed by: INTERNAL MEDICINE

## 2019-01-04 PROCEDURE — 85027 COMPLETE CBC AUTOMATED: CPT

## 2019-01-04 RX ORDER — CIPROFLOXACIN 500 MG/1
500 TABLET ORAL DAILY
Qty: 35 TABLET | Refills: 0 | Status: SHIPPED | OUTPATIENT
Start: 2019-01-04 | End: 2019-02-08

## 2019-01-04 RX ORDER — CLOPIDOGREL BISULFATE 75 MG/1
75 TABLET ORAL DAILY
Qty: 30 TABLET | Refills: 11 | Status: SHIPPED | OUTPATIENT
Start: 2019-01-04 | End: 2021-01-20

## 2019-01-04 RX ORDER — METRONIDAZOLE 500 MG/1
500 TABLET ORAL EVERY 6 HOURS
Qty: 140 TABLET | Refills: 0 | Status: SHIPPED | OUTPATIENT
Start: 2019-01-04 | End: 2019-02-08

## 2019-01-04 RX ORDER — NAPROXEN SODIUM 220 MG/1
81 TABLET, FILM COATED ORAL DAILY
Refills: 0 | COMMUNITY
Start: 2019-01-04 | End: 2019-01-25

## 2019-01-04 RX ORDER — METRONIDAZOLE 500 MG/1
500 TABLET ORAL EVERY 6 HOURS
Qty: 20 TABLET | Refills: 0 | Status: CANCELLED | OUTPATIENT
Start: 2019-01-04 | End: 2019-01-14

## 2019-01-04 RX ADMIN — CLOPIDOGREL BISULFATE 75 MG: 75 TABLET ORAL at 09:01

## 2019-01-04 RX ADMIN — SEVELAMER CARBONATE 800 MG: 800 TABLET, FILM COATED ORAL at 09:01

## 2019-01-04 RX ADMIN — ATORVASTATIN CALCIUM 40 MG: 40 TABLET, FILM COATED ORAL at 09:01

## 2019-01-04 RX ADMIN — AMLODIPINE BESYLATE 10 MG: 5 TABLET ORAL at 09:01

## 2019-01-04 RX ADMIN — INFLUENZA A VIRUS A/MICHIGAN/45/2015 X-275 (H1N1) ANTIGEN (FORMALDEHYDE INACTIVATED), INFLUENZA A VIRUS A/SINGAPORE/INFIMH-16-0019/2016 IVR-186 (H3N2) ANTIGEN (FORMALDEHYDE INACTIVATED), AND INFLUENZA B VIRUS B/MARYLAND/15/2016 BX-69A (A B/COLORADO/6/2017-LIKE VIRUS) ANTIGEN (FORMALDEHYDE INACTIVATED) 0.5 ML: 60; 60; 60 INJECTION, SUSPENSION INTRAMUSCULAR at 02:01

## 2019-01-04 RX ADMIN — HEPARIN SODIUM 5000 UNITS: 5000 INJECTION, SOLUTION INTRAVENOUS; SUBCUTANEOUS at 05:01

## 2019-01-04 RX ADMIN — ASPIRIN 81 MG 81 MG: 81 TABLET ORAL at 09:01

## 2019-01-04 RX ADMIN — FUROSEMIDE 80 MG: 40 TABLET ORAL at 06:01

## 2019-01-04 NOTE — PLAN OF CARE
made phone contact with Jayla CHANCE at [facility] [phone]. Jayla confirmed patient HD schedule and provided  with facility's fax number.   faxed face sheet, HD flow-sheets, and script for vancomycin to facility [phone]. Pt to receive abx while at HD.

## 2019-01-04 NOTE — PROGRESS NOTES
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 She lives in [place], [place]. Her primary care physician is Dr. Katiuska Villalba. The patient cannot recall her nephrologist name.  She reports she has not been established with podiatry.     Pt presented to [facility] 11/27/18 with complaint of purulent drainage from left foot ulcer.  Patient states symptoms initially started as a callus on the 5th digit of the left foot. She reports noticing gradually increase in edema with mild erythema. Pt noted drainage from site today. She also reports "black toes" (3rd and 4th digit) for several months. Patient reports she was seen by her PCP 3-4 months ago. She did not have discoloration to toes at the time. Pt has never been evaluated by podiatry. She denies fever/chills, n/v, chest pain and SOB. Initial labs remarkable for H/H 9.8/30.9, BUN/CR 38/5.5, K within normal limits. Left foot xray show deformity of the left 5th proximal phalanx which may represent remote trauma or evolving osteitis. Pt afebrile, no leukocytosis, VSS. Pt admitted to [facility] for further evaluation.     Hospital Course:  Pt seen at bedside, she has black toes on the left foot.  Will await podiatry and will order US of LE for arterial to check blood flow  12/29, MRI positive for most likely osteo of firt toe and fifth toe of lefdt foot.  Consulting ID. Continue current abx  12/30 no events " overnight.  Doing ok, cards will f/u with pt , discussed case.  Giving an extra dose of vanc/discussed with Pharm D  12/31, no qcute clinical changes, HD today, continue iv abx, awaiting cards for evaluation for LE blood supply/flow a.  Awaiting ID for recs for abx  1/1 per card for possible revascularization on 1/2. Continue Vanc and Zosyn x 6 weeks  1/3 ok to d/c per Card and podiatry standpoint. Awaits ID rec's. For possible d/c in Am. Appreciates ID rec's Cipro, Flagyl and Vancomycin. Cipro 500 mg orally q day; After dialysis on dialysis days. Metronidazole orally 500 mg orally q 6 hours. And Vancomycin with dialysis based upon the level. All for 6 weeks. MRI prior to stopping antibiotics for confirmation of resolution.  1/4 for discharge today on bax and FU podiatry with MRI         Interval History: awake and alert. S/p L POP + AT revascularization on 1/2.   Card by bedside appreciates rec's ok to d/c from cardiac standpoint    Continue Zosyn (switch to Cipro and Flagyl) and Vanc with HD x 6 weeks per ID.      Patient okay to discharge home from Podiatry stand point. Dressing to remain clean, dry and intact until recommended follow up in clinic within 1 week of discharge. She is to ambulate with darco shoe and rest and elevate foot  D/c home today    Review of Systems   Constitutional: Negative for chills, diaphoresis and fever.   Respiratory: Negative for cough and shortness of breath.    Cardiovascular: Negative for leg swelling.   Gastrointestinal: Negative for abdominal pain and nausea.   Musculoskeletal: Positive for arthralgias and myalgias. Negative for back pain.   Skin: Positive for color change and wound.   Neurological: Negative for dizziness, syncope, light-headedness and headaches.   Psychiatric/Behavioral: Negative for confusion.     Objective:     Vital Signs (Most Recent):  Temp: 96.6 °F (35.9 °C) (01/04/19 0557)  Pulse: 81 (01/04/19 0557)  Resp: 18 (01/04/19 0557)  BP: (!) 154/61 (01/04/19  0557)  SpO2: 98 % (01/04/19 0305) Vital Signs (24h Range):  Temp:  [96.6 °F (35.9 °C)-98.8 °F (37.1 °C)] 96.6 °F (35.9 °C)  Pulse:  [65-86] 81  Resp:  [] 18  SpO2:  [94 %-98 %] 98 %  BP: (102-169)/(58-85) 154/61     Weight: 87 kg (191 lb 12.8 oz)  Body mass index is 31.92 kg/m².    Intake/Output Summary (Last 24 hours) at 1/4/2019 0729  Last data filed at 1/4/2019 0600  Gross per 24 hour   Intake 650 ml   Output 800 ml   Net -150 ml      Physical Exam   Constitutional: She is oriented to person, place, and time. She appears well-developed and well-nourished.   HENT:   Head: Normocephalic and atraumatic.   Neck: Neck supple.   Cardiovascular: Normal rate, regular rhythm and normal heart sounds.   No murmur heard.  Pulmonary/Chest: Effort normal. No respiratory distress. She has no wheezes.   Abdominal: Soft. Bowel sounds are normal. She exhibits no distension. There is no tenderness. There is no guarding.   Musculoskeletal: Normal range of motion. She exhibits edema (L foot ). She exhibits no tenderness.   R BKA    Neurological: She is alert and oriented to person, place, and time.   Skin: Skin is warm and dry. Capillary refill takes less than 2 seconds.   +necrosis to Left foot 3rd/4th toes, ulcer to left foot 5th digit, no drainage noted    Psychiatric: She has a normal mood and affect. Her behavior is normal.         Significant Labs:   CBC:   Recent Labs   Lab 01/03/19 0300 01/04/19  0335   WBC 8.97 9.58   HGB 10.6* 9.1*   HCT 34.0* 29.7*    180     CMP:   Recent Labs   Lab 01/03/19  0300 01/04/19  0335   * 133*   K 4.2 4.1   CL 99 99   CO2 22* 21*   * 126*   BUN 36* 29*   CREATININE 6.9* 5.8*   CALCIUM 8.8 8.3*   ALBUMIN 3.0* 2.8*   ANIONGAP 14 13   EGFRNONAA 5* 7*     Cardiac Markers: No results for input(s): CKMB, MYOGLOBIN, BNP, TROPISTAT in the last 48 hours.  Magnesium: No results for input(s): MG in the last 48 hours.  Respiratory Culture: No results for input(s): GSRESP,  RESPIRATORYC in the last 48 hours.  Troponin: No results for input(s): TROPONINI in the last 48 hours.    Significant Imaging: none    Assessment/Plan:      * Diabetic foot infection    History of leg amputation      -wound cultures strep Gordonii, staph epidermidis, Gemella mobilorum. Sensitivity pending  - continue Vanc/Zosyn x 6 weeks (end date 2/8)  -L foot xray reviewed Deformity of the left 5th proximal phalanx  -MRI L foot concerning for osteo  -consult podiatry            Osteomyelitis of toe    Diabetic ulcer of toe of left foot associated with type 2 diabetes mellitus, with necrosis of bone  History of leg amputation  Diabetes Mellitus with long term insulin use   HbA1C 7.4 (9/5/2018)   -wound cultures strep Gordonii, staph epidermidis, Gemella mobilorum. Sensitivity pending  - continue Vanc/Zosyn x 6 weeks (end date 2/8)  -L foot xray reviewed Deformity of the left 5th proximal phalanx  -MRI L foot concerning for osteo  -consult podiatry   - Lantus 20 units QHS,  - low dose SSI,   - Accucheck AC&HS   - diabetic diet   - ok to d/c for card and podiatry standpoint  - awaits ID final ID rec's.  recommend Cipro, Flagyl and Vancomycin.   Cipro 500 mg orally q day; After dialysis on dialysis days.   Metronidazole orally 500 mg orally q 6 hours.   And Vancomycin with dialysis based upon the level. All for 6 weeks.   MRI prior to stopping antibiotics for confirmation of resolution.         Diabetic ulcer of toe of left foot associated with type 2 diabetes mellitus, with bone involvement without evidence of necrosis      MRI positive for osteo.  Will continue IV ABX  Will consult ID     History of leg amputation    Non erythema  stable       ESRD (end stage renal disease) on dialysis    Anemia of chronic disease  HD TTS,   H/H at baseline   -consult nephrology.       Aortic stenosis           Chronic diastolic heart failure           Anemia of chronic disease    H/H stable       HTN (hypertension)    Chronic  diastolic heart failure  Aortic stenosis  History of cardiac arrest  Hyperlipidemia  -continue amlodipine, atorvastatin and lasix      Diabetes mellitus due to underlying condition with chronic kidney disease, with long-term current use of insulin             VTE Risk Mitigation (From admission, onward)        Ordered     heparin (porcine) injection 5,000 Units  Every 8 hours      01/03/19 0848     heparin infusion 1,000 units/500 ml in 0.9% NaCl (pressure line flush)  Intra-op continuous PRN      01/02/19 1339     heparin infusion 1,000 units/500 ml in 0.9% NaCl (pressure line flush)  Intra-op continuous PRN      12/31/18 1430     IP VTE HIGH RISK PATIENT  Once      12/27/18 2310              Florindaneto Davenport MD  Department of Hospital Medicine   [facility]

## 2019-01-04 NOTE — PLAN OF CARE
Problem: Adult Inpatient Plan of Care  Goal: Plan of Care Review  Outcome: Outcome(s) achieved Date Met: 01/04/19 01/04/19 1279   Plan of Care Review   Plan of Care Reviewed With patient   Patient awake, alert and oriented. VSS No complaints of pain. AV fistula + bruitt and thrill. Wound CDI. Patient being discharged on antibiotics and home health will follow pt. Bed alarm remains on, call light in reach.

## 2019-01-04 NOTE — PLAN OF CARE
Problem: Adult Inpatient Plan of Care  Goal: Plan of Care Review  Outcome: Ongoing (interventions implemented as appropriate)  Pt resting in bed without complaints. Vss. Will transfer to floor when bed arrives with transport. Report called to floor.

## 2019-01-04 NOTE — PLAN OF CARE
Removed IV, tip in tact. Removed tele, no ectopy on tele. Went over discharge instructions, verbalized understating.

## 2019-01-04 NOTE — PLAN OF CARE
Appts scheduled.  The Medical Team to provide home health.  Pt to attend dialysis Tues, Thurs, and Sat.  Vancomycin prescription sent to [facility].     01/04/19 1351   Final Note   Assessment Type Final Discharge Note   Anticipated Discharge Disposition Home-Health   What phone number can be called within the next 1-3 days to see how you are doing after discharge? [phone]   Hospital Follow Up  Appt(s) scheduled? Yes   Discharge plans and expectations educations in teach back method with documentation complete? Yes   Right Care Referral Info   Post Acute Recommendation Home-care

## 2019-01-04 NOTE — TELEPHONE ENCOUNTER
----- Message from Leigh Sierra sent at 1/4/2019  1:46 PM CST -----  Contact: [phone]/ Self   Nurse called because patient needs a 1 week hospital follow up appointment. Please advise.

## 2019-01-04 NOTE — SUBJECTIVE & OBJECTIVE
Interval History: awake and alert. S/p L POP + AT revascularization on 1/2.   Card by bedside appreciates rec's ok to d/c from cardiac standpoint    Continue Zosyn (switch to Cipro and Flagyl) and Vanc with HD x 6 weeks per ID.      Patient okay to discharge home from Podiatry stand point. Dressing to remain clean, dry and intact until recommended follow up in clinic within 1 week of discharge. She is to ambulate with darco shoe and rest and elevate foot  D/c home today    Review of Systems   Constitutional: Negative for chills, diaphoresis and fever.   Respiratory: Negative for cough and shortness of breath.    Cardiovascular: Negative for leg swelling.   Gastrointestinal: Negative for abdominal pain and nausea.   Musculoskeletal: Positive for arthralgias and myalgias. Negative for back pain.   Skin: Positive for color change and wound.   Neurological: Negative for dizziness, syncope, light-headedness and headaches.   Psychiatric/Behavioral: Negative for confusion.     Objective:     Vital Signs (Most Recent):  Temp: 96.6 °F (35.9 °C) (01/04/19 0557)  Pulse: 81 (01/04/19 0557)  Resp: 18 (01/04/19 0557)  BP: (!) 154/61 (01/04/19 0557)  SpO2: 98 % (01/04/19 0305) Vital Signs (24h Range):  Temp:  [96.6 °F (35.9 °C)-98.8 °F (37.1 °C)] 96.6 °F (35.9 °C)  Pulse:  [65-86] 81  Resp:  [] 18  SpO2:  [94 %-98 %] 98 %  BP: (102-169)/(58-85) 154/61     Weight: 87 kg (191 lb 12.8 oz)  Body mass index is 31.92 kg/m².    Intake/Output Summary (Last 24 hours) at 1/4/2019 0729  Last data filed at 1/4/2019 0600  Gross per 24 hour   Intake 650 ml   Output 800 ml   Net -150 ml      Physical Exam   Constitutional: She is oriented to person, place, and time. She appears well-developed and well-nourished.   HENT:   Head: Normocephalic and atraumatic.   Neck: Neck supple.   Cardiovascular: Normal rate, regular rhythm and normal heart sounds.   No murmur heard.  Pulmonary/Chest: Effort normal. No respiratory distress. She has no  wheezes.   Abdominal: Soft. Bowel sounds are normal. She exhibits no distension. There is no tenderness. There is no guarding.   Musculoskeletal: Normal range of motion. She exhibits edema (L foot ). She exhibits no tenderness.   R BKA    Neurological: She is alert and oriented to person, place, and time.   Skin: Skin is warm and dry. Capillary refill takes less than 2 seconds.   +necrosis to Left foot 3rd/4th toes, ulcer to left foot 5th digit, no drainage noted    Psychiatric: She has a normal mood and affect. Her behavior is normal.         Significant Labs:   CBC:   Recent Labs   Lab 01/03/19 0300 01/04/19  0335   WBC 8.97 9.58   HGB 10.6* 9.1*   HCT 34.0* 29.7*    180     CMP:   Recent Labs   Lab 01/03/19 0300 01/04/19  0335   * 133*   K 4.2 4.1   CL 99 99   CO2 22* 21*   * 126*   BUN 36* 29*   CREATININE 6.9* 5.8*   CALCIUM 8.8 8.3*   ALBUMIN 3.0* 2.8*   ANIONGAP 14 13   EGFRNONAA 5* 7*     Cardiac Markers: No results for input(s): CKMB, MYOGLOBIN, BNP, TROPISTAT in the last 48 hours.  Magnesium: No results for input(s): MG in the last 48 hours.  Respiratory Culture: No results for input(s): GSRESP, RESPIRATORYC in the last 48 hours.  Troponin: No results for input(s): TROPONINI in the last 48 hours.    Significant Imaging: none

## 2019-01-04 NOTE — DISCHARGE SUMMARY
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 She lives in [place], [place]. Her primary care physician is Dr. Katiuska Villalba. The patient cannot recall her nephrologist name.  She reports she has not been established with podiatry.     Pt presented to [facility] 11/27/18 with complaint of purulent drainage from left foot ulcer.  Patient states symptoms initially started as a callus on the 5th digit of the left foot. She reports noticing gradually increase in edema with mild erythema. Pt noted drainage from site today. She also reports "black toes" (3rd and 4th digit) for several months. Patient reports she was seen by her PCP 3-4 months ago. She did not have discoloration to toes at the time. Pt has never been evaluated by podiatry. She denies fever/chills, n/v, chest pain and SOB. Initial labs remarkable for H/H 9.8/30.9, BUN/CR 38/5.5, K within normal limits. Left foot xray show deformity of the left 5th proximal phalanx which may represent remote trauma or evolving osteitis. Pt afebrile, no leukocytosis, VSS. Pt admitted to [facility] for further evaluation.     Procedure(s) (LRB):  PTA, PERIPHERAL VESSEL (N/A)  Atherectomy, Peripheral Blood Vessel (N/A)  ULTRASOUND, INTRAVASCULAR (N/A)  Fistulogram, Arteriovenous (Left)      Hospital Course:   Pt seen at bedside, she has black toes on the left foot.  Will await podiatry and will order US " of LE for arterial to check blood flow  12/29, MRI positive for most likely osteo of firt toe and fifth toe of lefdt foot.  Consulting ID. Continue current abx  12/30 no events overnight.  Doing ok, cards will f/u with pt , discussed case.  Giving an extra dose of vanc/discussed with Pharm D  12/31, no qcute clinical changes, HD today, continue iv abx, awaiting cards for evaluation for LE blood supply/flow a.  Awaiting ID for recs for abx  1/1 per card for possible revascularization on 1/2. Continue Vanc and Zosyn x 6 weeks  1/3 ok to d/c per Card and podiatry standpoint. Awaits ID rec's. For possible d/c in Am. Appreciates ID rec's Cipro, Flagyl and Vancomycin. Cipro 500 mg orally q day; After dialysis on dialysis days. Metronidazole orally 500 mg orally q 6 hours. And Vancomycin with dialysis based upon the level. All for 6 weeks. MRI prior to stopping antibiotics for confirmation of resolution.  1/4 for discharge today on bax and FU podiatry with MRI          Consults:   Consults (From admission, onward)        Status Ordering Provider     Inpatient consult to Infectious Diseases  Once     Provider:  Emma Peters MD    Completed [facility]     Inpatient consult to Infectious Diseases  Once     Provider:  Emma Peters MD    Completed [facility]     Inpatient consult to Interventional Cardiology  Once     Provider:  Kenny Fink MD    Completed [facility]     Inpatient consult to [facility] ([facility] Etienne Nimkevych)  Once     Provider:  (Not yet assigned)    Acknowledged FARRAH HALE     Inpatient consult to Podiatry  Once     Provider:  (Not yet assigned)    Completed FARRAH HALE          PAD (peripheral artery disease)           Osteomyelitis of toe    Diabetic ulcer of toe of left foot associated with type 2 diabetes mellitus, with necrosis of bone  History of leg amputation  Diabetes Mellitus with long term insulin use   HbA1C 7.4 (9/5/2018)    -wound cultures strep Gordonii, staph epidermidis, Gemella mobilorum. Sensitivity pending  - continue Vanc/Zosyn x 6 weeks (end date 2/8)  -L foot xray reviewed Deformity of the left 5th proximal phalanx  -MRI L foot concerning for osteo  -consult podiatry   - Lantus 20 units QHS,  - low dose SSI,   - Accucheck AC&HS   - diabetic diet   - ok to d/c for card and podiatry standpoint  - awaits ID final ID rec's.  recommend Cipro, Flagyl and Vancomycin.   Cipro 500 mg orally q day; After dialysis on dialysis days.   Metronidazole orally 500 mg orally q 6 hours.   And Vancomycin with dialysis based upon the level. All for 6 weeks.   MRI prior to stopping antibiotics for confirmation of resolution.         ESRD (end stage renal disease) on dialysis    Anemia of chronic disease  HD TTS,   H/H at baseline   -consult nephrology.       Anemia of chronic disease    H/H stable       HTN (hypertension)    Chronic diastolic heart failure  Aortic stenosis  History of cardiac arrest  Hyperlipidemia  -continue amlodipine, atorvastatin and lasix      Diabetes mellitus due to underlying condition with chronic kidney disease, with long-term current use of insulin             Final Active Diagnoses:    Diagnosis Date Noted POA    PRINCIPAL PROBLEM:  Diabetic foot infection [E11.628, L08.9] 12/28/2018 Yes    PAD (peripheral artery disease) [I73.9] 01/03/2019 Yes    Osteomyelitis of toe [M86.9]  Yes    Diabetic ulcer of toe of left foot [E11.621, L97.529] 12/28/2018 Yes    Diabetic ulcer of toe of left foot associated with type 2 diabetes mellitus, with bone involvement without evidence of necrosis [E11.621, L97.526] 12/27/2018 Yes    Hyperlipidemia [E78.5] 09/07/2018 Yes    History of leg amputation [Z89.619] 09/07/2018 Not Applicable    ESRD (end stage renal disease) on dialysis [N18.6, Z99.2] 06/27/2018 Not Applicable    Aortic stenosis [I35.0] 04/17/2018 Yes    History of cardiac arrest [Z86.74] 04/16/2018 Not Applicable     Chronic diastolic heart failure [I50.32] 04/15/2018 Yes    Diabetes mellitus due to underlying condition with chronic kidney disease, with long-term current use of insulin [E08.22, Z79.4] 10/16/2017 Not Applicable    Anemia of chronic disease [D63.8] 10/16/2017 Yes    HTN (hypertension) [I10] 10/14/2017 Yes      Problems Resolved During this Admission:       Discharged Condition: stable    Disposition: [place]Health [place] [facility]    Follow Up:  Follow-up Information     Flaco Morris DPM.    Specialty:  Podiatry  Why:  message left for follow up in a week  Contact information:  [address]  [address]  [place] [address]  [phone]                 Patient Instructions:      MRI Foot (Forefoot) Left Without Contrast   Standing Status: Future Standing Exp. Date: 01/04/20     Order Specific Question Answer Comments   Does the patient have a pacemaker or a defibrilator? No    Does the patient have a cerebral aneurysm or surgical clip, pump, nerve or brain stimulator, middle or inner ear prosthesis, or other metal implant or  been injured by a metal object(i.e. bullet, bb, shrapnel)? No    Is the patient claustrophobic? No    Will the patient require sedation? No    Does the patient have any of the following conditions? Diabetes, History of Renal Disease or Hypertension requiring medical therapy? Yes    May the Radiologist modify the order per protocol to meet the clinical needs of the patient? Yes    Is this part of a Research Study? No    Does the patient have on a skin patch for medication with aluminized backing? No      Sedimentation rate   Standing Status: Future Standing Exp. Date: 01/04/20     C-reactive protein   Standing Status: Future Standing Exp. Date: 01/04/20     Ambulatory referral to Home Health   Referral Priority: Routine Referral Type: Home Health   Referral Reason: Specialty Services Required   Requested Specialty: Home Health Services   Number of Visits Requested: 1     Diet diabetic      Leave dressing on - Keep it clean, dry, and intact until clinic visit   Order Comments:    Dressing to remain clean, dry and intact until recommended follow up in clinic within 1 week of discharge. She is to ambulate with darco shoe and rest and elevate foot     Activity as tolerated       Significant Diagnostic Studies:     Pending Diagnostic Studies:     Procedure Component Value Units Date/Time     Lower Extremity Arteries Bilateral [[id number]]     Order Status:  Sent Lab Status:  No result          Medications:  Reconciled Home Medications:      Medication List      START taking these medications    aspirin 81 MG Chew  Take 1 tablet (81 mg total) by mouth once daily.     ciprofloxacin HCl 500 MG tablet  Commonly known as:  CIPRO  Take 1 tablet (500 mg total) by mouth once daily.     clopidogrel 75 mg tablet  Commonly known as:  PLAVIX  Take 1 tablet (75 mg total) by mouth once daily.     dextrose 5 % SolP 100 mL with vancomycin 1,000 mg SolR 500 mg  Inject 500 mg into the vein every Mon, Wed, Fri.     metroNIDAZOLE 500 MG tablet  Commonly known as:  FLAGYL  Take 1 tablet (500 mg total) by mouth every 6 (six) hours.        CHANGE how you take these medications    insulin aspart U-100 100 unit/mL Inpn pen  Commonly known as:  NovoLOG Flexpen U-100 Insulin  Inject 10 Units into the skin 3 (three) times daily with meals.  What changed:  how much to take     insulin glargine 100 unit/mL injection  Commonly known as:  LANTUS  Inject 34 Units into the skin every evening.  What changed:  how much to take     senna-docusate 8.6-50 mg 8.6-50 mg per tablet  Commonly known as:  PERICOLACE  Take 1 tablet by mouth 2 (two) times daily.  What changed:    · when to take this  · reasons to take this        CONTINUE taking these medications    amLODIPine 10 MG tablet  Commonly known as:  NORVASC  Take 1 tablet (10 mg total) by mouth once daily.     atorvastatin 40 MG tablet  Commonly known as:  LIPITOR  Take 1 tablet (40 mg  "total) by mouth once daily.     bisacodyl 5 mg EC tablet  Commonly known as:  DULCOLAX  Take 5 mg by mouth daily as needed for Constipation.     calcium carbonate 300 mg (750 mg) Chew  Commonly known as:  TUMS E-X  Take 1 tablet by mouth 3 (three) times daily with meals.     furosemide 80 MG tablet  Commonly known as:  LASIX  Take 1 tablet (80 mg total) by mouth every morning.     loratadine 10 mg tablet  Commonly known as:  CLARITIN  Take 10 mg by mouth once daily.     NOVOFINE PLUS 32 gauge x 1/6" Ndle  Generic drug:  pen needle, diabetic  use 3 (three) times daily as needed.     RENVELA 800 mg Tab  Generic drug:  sevelamer carbonate  Take 1 tablet (800 mg total) by mouth 3 (three) times a day with meals.     TRUE METRIX GLUCOSE METER Misc  Generic drug:  blood-glucose meter  Use to test blood sugar twice daily     TRUE METRIX GLUCOSE TEST STRIP Strp  Generic drug:  blood sugar diagnostic  Test 2 (two) times daily.as directed     TRUEPLUS LANCETS 30 gauge Misc  Generic drug:  lancets  Use to test blood sugar twice daily            Indwelling Lines/Drains at time of discharge:   Lines/Drains/Airways     Drain                 Hemodialysis AV Fistula -- days                Time spent on the discharge of patient: 55 minutes  Patient was seen and examined on the date of discharge and determined to be suitable for discharge.         Florinda Davenport MD  Department of Hospital Medicine  [facility]  "

## 2019-01-04 NOTE — PLAN OF CARE
Problem: Adult Inpatient Plan of Care  Goal: Plan of Care Review   01/04/19 0233   Plan of Care Review   Plan of Care Reviewed With patient   Pt reported no pain.  L femoral insertion site soft, no hematoma, dsg DCI.  L foot dsg DCI, heel floated. , pt slides at 201, no coverage given. Pt very pleasant.  Pt reported that she cooked caroline dinner and fell that's how she hurt her L foot.      Tele: NSR,  HR 80,  No alarms.     Bed in lowest position, wheels locked, non skid socks, ID band worn, personal items and call bell with in reach, bed alarm set.

## 2019-01-04 NOTE — PROGRESS NOTES
The Medical Team called asking about Vancomycin and I let them know that it will be given in dialysis.  She thought pt was discharging to [place] but she is discharging to daughter's home in [place].  Address sent to .

## 2019-01-07 ENCOUNTER — TELEPHONE (OUTPATIENT)
Dept: CARDIOLOGY | Facility: CLINIC | Age: 74
End: 2019-01-07

## 2019-01-07 DIAGNOSIS — I73.9 PAD (PERIPHERAL ARTERY DISEASE): Primary | ICD-10-CM

## 2019-01-07 NOTE — TELEPHONE ENCOUNTER
Reached out to pt     Left detailed VM advising pt about scheduled US date and time as well as kyle date and time for clinical f/u     Pt was advised to give a call back to verify date works for her

## 2019-01-07 NOTE — TELEPHONE ENCOUNTER
----- Message from MALIHA Rinaldi ANP sent at 1/7/2019  8:26 AM CST -----  Contact: Katey  Please arrange for her to have a follow up appt in the clinic. She was discharged on Friday. She needs an arterial ultrasound of her left leg prior to the appt (order placed). She can either see Dr Mckay or myself    Thanks  Katey

## 2019-01-07 NOTE — PHYSICIAN QUERY
PT Name: Martha Melvin  MR #: [id number]    Physician Query Form - Pathology Findings Clarification     CDS/: Marlin Root RN              Contact information: [phone]  This form is a permanent document in the medical record.     Query Date: January 7, 2019      By submitting this query, we are merely seeking further clarification of documentation.  Please utilize your independent clinical judgment when addressing the question(s) below.      The medical record contains the following:     Findings Supporting Clinical Information Location in Medical Record   Acute on chronic osteomyelitis SPECIMEN  1) Left fifth proximal phalanx, suspicious of osteomyelitis.      FINAL PATHOLOGIC DIAGNOSIS  SPECIMEN FROM LEFT 5TH PROXIMAL PHALANX:    BONE WITH CHRONIC AND ACUTE OSTEOMYELITIS 12/28 Pathology report     Please document the clinical significance of the Pathologists findings of Acute on Chronic osteomyelitis.    [ x  ] I agree with the Pathology Findings   [   ] I do not agree with the Pathology Findings   [   ] Other/Clarification of Findings:   [   ] Clinically Insignificant   [  ] Clinically Undetermined       Please document in your progress notes daily for the duration of treatment until resolved and include in your discharge summary.

## 2019-01-08 ENCOUNTER — PATIENT OUTREACH (OUTPATIENT)
Dept: ADMINISTRATIVE | Facility: CLINIC | Age: 74
End: 2019-01-08

## 2019-01-08 NOTE — PATIENT INSTRUCTIONS
Diabetes: Inspecting Your Feet    Diabetes increases your chances of developing foot problems. So inspect your feet every day. This helps you find small skin irritations before they become serious ulcers or infections. If you have trouble seeing the bottoms of your feet, use a mirror or ask a family member or friend to help.  How to check your feet  Below are tips to help you look for foot problems. Try to check your feet at the same time each day, such as when you get out of bed in the morning:  · Check the top of each foot. The tops of toes, back of the heel, and outer edge of the foot can get a lot of rubbing from poor-fitting shoes.  · Check the bottom of each foot. Daily wear and tear often leads to problems at pressure spots.  · Check the toes and nails. Fungal infections often occur between toes. Toenail problems can also be a sign of fungal infections or lead to breaks in the skin.  · Check your shoes, too. Loose objects inside a shoe can injure the foot. Use your hand to feel inside your shoes for things like marie, loose stitching, or rough areas that could irritate your skin.  Warning signs  Look for any color changes in the foot. Redness with streaks can signal a severe infection, which needs immediate medical attention. Tell your healthcare provider right away if you have any of these problems:  · Swelling, sometimes with color changes, may be a sign of poor blood flow or infection. Symptoms include tenderness and an increase in the size of your foot.  · Warm or hot areas on your feet may be signs of infection. A foot that is cold may not be getting enough blood.  · Sensations such as burning, tingling, or pins and needles can be signs of a problem. Also check for areas that may be numb.  · Hot spots are caused by friction or pressure. Look for hot spots in areas that get a lot of rubbing. Hot spots can turn into blisters, calluses, or sores.  · Cracks and sores are caused by dry or irritated  skin. They are a sign that the skin is breaking down, which can lead to infection.  · Toenail problems to watch for include nails growing into the skin (ingrown toenail) and causing redness or pain. Thick, yellow, or discolored nails can signal a fungal infection.  · Drainage and odor can develop from untreated sores and ulcers. Call your healthcare provider right away if you notice white or yellow drainage, bleeding, or unpleasant odor.   Date Last Reviewed: 6/1/2016  © 5362-0404 [employer]. [address] [place] [address]. All rights reserved. This information is not intended as a substitute for professional medical care. Always follow your healthcare professional's instructions.

## 2019-01-11 ENCOUNTER — OFFICE VISIT (OUTPATIENT)
Dept: VASCULAR SURGERY | Facility: CLINIC | Age: 74
End: 2019-01-11
Attending: SURGERY
Payer: MEDICARE

## 2019-01-11 ENCOUNTER — OFFICE VISIT (OUTPATIENT)
Dept: PODIATRY | Facility: CLINIC | Age: 74
End: 2019-01-11
Payer: MEDICARE

## 2019-01-11 ENCOUNTER — HOSPITAL ENCOUNTER (OUTPATIENT)
Dept: VASCULAR SURGERY | Facility: CLINIC | Age: 74
Discharge: HOME | End: 2019-01-11
Attending: SURGERY
Payer: MEDICARE

## 2019-01-11 VITALS
DIASTOLIC BLOOD PRESSURE: 74 MMHG | SYSTOLIC BLOOD PRESSURE: 132 MMHG | BODY MASS INDEX: 30.82 KG/M2 | WEIGHT: 185 LBS | HEART RATE: 87 BPM | HEIGHT: 65 IN

## 2019-01-11 VITALS
HEIGHT: 65 IN | DIASTOLIC BLOOD PRESSURE: 66 MMHG | WEIGHT: 185 LBS | SYSTOLIC BLOOD PRESSURE: 142 MMHG | HEART RATE: 84 BPM | TEMPERATURE: 98 F | BODY MASS INDEX: 30.82 KG/M2

## 2019-01-11 DIAGNOSIS — Z99.2 ESRD (END STAGE RENAL DISEASE) ON DIALYSIS: ICD-10-CM

## 2019-01-11 DIAGNOSIS — I73.9 PAD (PERIPHERAL ARTERY DISEASE): ICD-10-CM

## 2019-01-11 DIAGNOSIS — N18.5 TYPE 2 DIABETES MELLITUS WITH STAGE 5 CHRONIC KIDNEY DISEASE AND HYPERTENSION: ICD-10-CM

## 2019-01-11 DIAGNOSIS — I12.0 TYPE 2 DIABETES MELLITUS WITH STAGE 5 CHRONIC KIDNEY DISEASE AND HYPERTENSION: ICD-10-CM

## 2019-01-11 DIAGNOSIS — E11.621 DIABETIC ULCER OF TOE OF LEFT FOOT ASSOCIATED WITH TYPE 2 DIABETES MELLITUS, WITH BONE INVOLVEMENT WITHOUT EVIDENCE OF NECROSIS: Primary | ICD-10-CM

## 2019-01-11 DIAGNOSIS — M86.9 OSTEOMYELITIS OF TOE: ICD-10-CM

## 2019-01-11 DIAGNOSIS — N18.6 ESRD (END STAGE RENAL DISEASE) ON DIALYSIS: Primary | ICD-10-CM

## 2019-01-11 DIAGNOSIS — Z99.2 ESRD (END STAGE RENAL DISEASE) ON DIALYSIS: Primary | ICD-10-CM

## 2019-01-11 DIAGNOSIS — L97.526 DIABETIC ULCER OF TOE OF LEFT FOOT ASSOCIATED WITH TYPE 2 DIABETES MELLITUS, WITH BONE INVOLVEMENT WITHOUT EVIDENCE OF NECROSIS: Primary | ICD-10-CM

## 2019-01-11 DIAGNOSIS — E11.22 TYPE 2 DIABETES MELLITUS WITH STAGE 5 CHRONIC KIDNEY DISEASE AND HYPERTENSION: ICD-10-CM

## 2019-01-11 DIAGNOSIS — N18.6 ESRD (END STAGE RENAL DISEASE) ON DIALYSIS: ICD-10-CM

## 2019-01-11 PROCEDURE — 99999 PR PBB SHADOW E&M-EST. PATIENT-LVL IV: CPT | Mod: PBBFAC,,, | Performed by: PODIATRIST

## 2019-01-11 PROCEDURE — 11042 WOUND DEBRIDEMENT: ICD-10-PCS | Mod: S$GLB,,, | Performed by: PODIATRIST

## 2019-01-11 PROCEDURE — 3045F PR MOST RECENT HEMOGLOBIN A1C LEVEL 7.0-9.0%: CPT | Mod: CPTII,S$GLB,, | Performed by: PODIATRIST

## 2019-01-11 PROCEDURE — 1101F PR PT FALLS ASSESS DOC 0-1 FALLS W/OUT INJ PAST YR: ICD-10-PCS | Mod: CPTII,S$GLB,, | Performed by: PODIATRIST

## 2019-01-11 PROCEDURE — 99999 PR PBB SHADOW E&M-EST. PATIENT-LVL IV: ICD-10-PCS | Mod: PBBFAC,,, | Performed by: SURGERY

## 2019-01-11 PROCEDURE — 1101F PT FALLS ASSESS-DOCD LE1/YR: CPT | Mod: CPTII,S$GLB,, | Performed by: PODIATRIST

## 2019-01-11 PROCEDURE — 1101F PR PT FALLS ASSESS DOC 0-1 FALLS W/OUT INJ PAST YR: ICD-10-PCS | Mod: CPTII,S$GLB,, | Performed by: SURGERY

## 2019-01-11 PROCEDURE — 3045F PR MOST RECENT HEMOGLOBIN A1C LEVEL 7.0-9.0%: ICD-10-PCS | Mod: CPTII,S$GLB,, | Performed by: PODIATRIST

## 2019-01-11 PROCEDURE — 93990 PR DUPLEX HEMODIALYSIS ACCESS: ICD-10-PCS | Mod: S$GLB,,, | Performed by: SURGERY

## 2019-01-11 PROCEDURE — 99214 OFFICE O/P EST MOD 30 MIN: CPT | Mod: S$GLB,,, | Performed by: SURGERY

## 2019-01-11 PROCEDURE — 99214 PR OFFICE/OUTPT VISIT, EST, LEVL IV, 30-39 MIN: ICD-10-PCS | Mod: S$GLB,,, | Performed by: SURGERY

## 2019-01-11 PROCEDURE — 99999 PR PBB SHADOW E&M-EST. PATIENT-LVL IV: ICD-10-PCS | Mod: PBBFAC,,, | Performed by: PODIATRIST

## 2019-01-11 PROCEDURE — 99999 PR PBB SHADOW E&M-EST. PATIENT-LVL IV: CPT | Mod: PBBFAC,,, | Performed by: SURGERY

## 2019-01-11 PROCEDURE — 93990 DOPPLER FLOW TESTING: CPT | Mod: S$GLB,,, | Performed by: SURGERY

## 2019-01-11 PROCEDURE — 87070 CULTURE OTHR SPECIMN AEROBIC: CPT

## 2019-01-11 PROCEDURE — 1101F PT FALLS ASSESS-DOCD LE1/YR: CPT | Mod: CPTII,S$GLB,, | Performed by: SURGERY

## 2019-01-11 PROCEDURE — 11042 DBRDMT SUBQ TIS 1ST 20SQCM/<: CPT | Mod: S$GLB,,, | Performed by: PODIATRIST

## 2019-01-11 PROCEDURE — 99213 PR OFFICE/OUTPT VISIT, EST, LEVL III, 20-29 MIN: ICD-10-PCS | Mod: 25,S$GLB,, | Performed by: PODIATRIST

## 2019-01-11 PROCEDURE — 99213 OFFICE O/P EST LOW 20 MIN: CPT | Mod: 25,S$GLB,, | Performed by: PODIATRIST

## 2019-01-11 NOTE — PROCEDURES
"Wound Debridement  Date/Time: 1/11/2019 3:56 PM  Performed by: Flaco Morris DPM  Authorized by: Flaco Morris DPM     Time out: Immediately prior to procedure a "time out" was called to verify the correct patient, procedure, equipment, support staff and site/side marked as required.    Consent Done?:  Yes (Verbal)    Preparation: Patient was prepped and draped in usual sterile fashion    Local anesthesia used?: No      Wound Details:    Location:  Left foot    Location:  Left 5th Toe    Type of Debridement:  Excisional       Length (cm):  0.7       Area (sq cm):  0.56       Width (cm):  0.8       Percent Debrided (%):  100       Depth (cm):  0.4       Total Area Debrided (sq cm):  0.56    Depth of debridement:  Subcutaneous tissue    Tissue debrided:  Subcutaneous    Devitalized tissue debrided:  Biofilm, Callus, Fibrin and Slough    Instruments:  Curette    Bleeding:  Moderate  Hemostasis Achieved: Yes    Method Used:  Pressure and Silver Nitrate  Patient tolerance:  Patient tolerated the procedure well with no immediate complications     Macrina, aquacel foam, cast padding x 2 secured with coban. Darco shoe dispensed.      "

## 2019-01-11 NOTE — PROGRESS NOTES
REFERRING PHYSICIAN:  Tomas Ruelas MD    HISTORY OF PRESENT ILLNESS:  A 72-year-old female with end-stage renal disease,   dialyzing via right IJ PermCath (Tu/Th/Sa) who is here for f/u after left forearm Basilic vein transposition.    She is right-handed.  She has no other history of central venous access other than the IJ PermCath.    Vascular surgery:    1. Trans-radial PTA, L AVF 9/19/18  1. Left forearm Basilic vein transposition 06/27/18    This is a 6 wk f/u, prompted by intermittent cannulation issus.    PAST MEDICAL HISTORY:    1.  History of cardiac arrest during her initial fluid overload at initiation of   dialysis.   2.  Diabetes mellitus.   3.  End-stage renal disease, dialyzing Tuesday, Thursday, Saturday.  4.  Hypertension.  5.  Hyperlipidemia.  6.  History of right below-knee amputation in 2005 secondary to a nonhealing   diabetic ulcer.    FAMILY HISTORY:  Nil.    SOCIAL HISTORY:  She is a nonsmoker.    MEDICATIONS:  Includes a statin.  See EPIC for full list.    ALLERGIES:  MORPHINE.    REVIEW OF SYSTEMS:  Denies postprandial pain or DVT.  All other systems   including eyes, ENT, respiratory, musculoskeletal, psychiatric, lymph, allergy   and immune are negative.    PHYSICAL EXAMINATION:  VITAL SIGNS:  See nursing note.  GENERAL:  She is in no acute distress.  RESPIRATORY:  Normal effort.  Clear to auscultation.  CARDIAC:  Regular rate and rhythm, nondisplaced PMI.  No murmur.  VASCULAR:  L radial pulse is palpable with AVF compression, non-palp at rest  EXTREMITIES:  L forearm basilic transposition AV fistula with soft thrill distally, no proximal pulsatility.    NEUROLOGIC:  Cranial nerves VII-XII are intact.    IMAGING: No stenosis, flow 622 (500's prior)    fistualgram reviewed - have proximal and distal stenosis which was Rxed.  Small radial artery    ASSESSMENT:   AVF does not appear to have any stenosis by clinical exam or PE, with a stable flow rate.    PLAN:  1.  Observe  2.  FU 3 months  with QUANTITATIVE AVF duplex if clinically indicated    If there are persistent cannulation issues, would proceed with a transradial fistulagram    GLADYS Mesa III, MD, FACS  Professor and Chief, Vascular and Endovascular Surgery

## 2019-01-12 NOTE — PROGRESS NOTES
"Subjective:      Patient ID: Martha Melvin is a 73 y.o. female.    Chief Complaint: Follow-up    Martha Melvin is a 73 y.o. female who  has a past medical history of Arthritis, Diabetes mellitus, Hyperlipidemia, Hypertension, and Renal disorder.  Right BKA.    Patient was recently hospitalized for osteomyelitis of left 5th digit where she underwent bone biopsy, revascularized,. and is currently on 6 week course of abx that are being dosed after dialysis.  Denies F/C/N/V    Vitals:    01/11/19 1518   BP: 132/74   Pulse: 87   Weight: 83.9 kg (185 lb)   Height: 5' 5" (1.651 m)   PainSc: 0-No pain      Past Medical History:   Diagnosis Date    Arthritis     Diabetes mellitus     Hyperlipidemia     Hypertension     Renal disorder     poor kidney function       Past Surgical History:   Procedure Laterality Date    AORTOGRAM, WITH SERIALOGRAPHY N/A 12/31/2018    Performed [facility] at [facility] CATH LAB/EP    Atherectomy, Peripheral Blood Vessel N/A 1/2/2019    Performed by Kenny Fink MD at [facility] CATH LAB/EP    Fistulogram, Arteriovenous Left 1/2/2019    Performed by [facility]ink [facility] at [facility] CATH LAB/EP    LEG AMPUTATION Right     PERMCATH INSERTION-TUNNELED CVC N/A 4/25/2018    Performed [facility]hu [facility] C[facility] LAB    PTA, PERIPHERAL VESSEL N/A 1/2/2019    Performed by [facility] at [facility] CATH LAB/EP    TRANSPOSITION, VEIN, BASILIC Left 6/27/2018    Performed by AYESHA Mesa III, MD at [facility] OR 2ND FLR    ULTRASOUND, INTRAVASCULAR N/A 1/2/2019    Performed by Kenny Fink MD at [facility] CATH LAB/EP       Family History   Problem Relation Age of Onset    Cancer Mother     Diabetes Mother     Hypertension Mother     Cancer Father     Cancer Sister        Social History     Socioeconomic History    Marital status: Single     Spouse name: None    Number of children: None    Years of education: None    Highest education level: None   Social Needs    Financial " resource strain: None    Food insecurity - worry: None    Food insecurity - inability: None    Transportation needs - medical: None    Transportation needs - non-medical: None   Occupational History    None   Tobacco Use    Smoking status: Never Smoker    Smokeless tobacco: Never Used   Substance and Sexual Activity    Alcohol use: No    Drug use: None    Sexual activity: None   Other Topics Concern    None   Social History Narrative    None       Current Outpatient Medications   Medication Sig Dispense Refill    amLODIPine (NORVASC) 10 MG tablet Take 1 tablet (10 mg total) by mouth once daily. 90 tablet 1    aspirin 81 MG Chew Take 1 tablet (81 mg total) by mouth once daily.  0    atorvastatin (LIPITOR) 40 MG tablet Take 1 tablet (40 mg total) by mouth once daily. 90 tablet 1    bisacodyl (DULCOLAX) 5 mg EC tablet Take 5 mg by mouth daily as needed for Constipation.      blood sugar diagnostic Strp Test 2 (two) times daily.as directed 100 each 5    blood-glucose meter Misc Use to test blood sugar twice daily 1 each 0    calcium carbonate (TUMS E-X) 300 mg (750 mg) Chew Take 1 tablet by mouth 3 (three) times daily with meals. 90 tablet 2    ciprofloxacin HCl (CIPRO) 500 MG tablet Take 1 tablet (500 mg total) by mouth once daily. 35 tablet 0    clopidogrel (PLAVIX) 75 mg tablet Take 1 tablet (75 mg total) by mouth once daily. 30 tablet 11    dextrose 5 % SolP 100 mL with vancomycin 1,000 mg SolR 500 mg Inject 500 mg into the vein every Mon, Wed, Fri. 15 ampule 0    furosemide (LASIX) 80 MG tablet Take 1 tablet (80 mg total) by mouth every morning. 30 tablet 0    insulin aspart U-100 (NOVOLOG FLEXPEN U-100 INSULIN) 100 unit/mL InPn pen Inject 10 Units into the skin 3 (three) times daily with meals. (Patient taking differently: Inject 4 Units into the skin 3 (three) times daily with meals. ) 15 mL 1    insulin glargine (LANTUS) 100 unit/mL injection Inject 34 Units into the skin every evening.  "(Patient taking differently: Inject 20 Units into the skin every evening. ) 31 mL 1    lancets (TRUEPLUS LANCETS) 30 gauge Misc Use to test blood sugar twice daily 100 each 11    loratadine (CLARITIN) 10 mg tablet Take 10 mg by mouth once daily.      metroNIDAZOLE (FLAGYL) 500 MG tablet Take 1 tablet (500 mg total) by mouth every 6 (six) hours. 140 tablet 0    pen needle, diabetic (NOVOFINE PLUS) 32 gauge x 1/6" Ndle use 3 (three) times daily as needed. 300 each 1    senna-docusate 8.6-50 mg (PERICOLACE) 8.6-50 mg per tablet Take 1 tablet by mouth 2 (two) times daily. (Patient taking differently: Take 1 tablet by mouth 2 (two) times daily as needed. )      sevelamer carbonate (RENVELA) 800 mg Tab Take 1 tablet (800 mg total) by mouth 3 (three) times a day with meals. 30 tablet 0     No current facility-administered medications for this visit.        Review of patient's allergies indicates:   Allergen Reactions    Morphine          Review of Systems   Constitution: Negative for chills and fever.   HENT: Negative for congestion.    Cardiovascular: Negative for chest pain.   Respiratory: Negative for cough and shortness of breath.    Skin: Positive for nail changes and poor wound healing. Negative for color change, dry skin, suspicious lesions and unusual hair distribution.   Musculoskeletal: Negative for falls, joint pain, joint swelling, muscle weakness and myalgias.   Gastrointestinal: Negative for nausea and vomiting.   Neurological: Positive for numbness and sensory change. Negative for loss of balance, paresthesias and tremors.   Psychiatric/Behavioral: Negative for altered mental status. The patient is not nervous/anxious.            Objective:      Physical Exam   Constitutional: She is oriented to person, place, and time. She appears well-developed and well-nourished. No distress.   HENT:   Head: Normocephalic and atraumatic.   Neck: No tracheal deviation present.   Cardiovascular:   Pulses:       " Dorsalis pedis pulses are 1+ on the left side.        Posterior tibial pulses are 1+ on the left side.   No hair growth bilateral lower extremity. Skin temp warm to cool bilateral foot.   Musculoskeletal: Normal range of motion. She exhibits no edema, tenderness or deformity.   No pain with ROM or MMT left lower extremity.       Feet:   Right Foot: amputated  Left Foot:   Protective Sensation: 10 sites tested. 7 sites sensed.   Skin Integrity: Positive for ulcer and dry skin. Negative for erythema or warmth.   Neurological: She is alert and oriented to person, place, and time. She has normal strength. She displays no atrophy and no tremor. A sensory deficit is present.   Reflex Scores:       Patellar reflexes are 2+ on the left side.       Achilles reflexes are 2+ on the left side.  Vibratory sensation diminished.  5/5 muscle strength  LLE   Skin: Skin is dry. Capillary refill takes 2 to 3 seconds. No rash noted. She is not diaphoretic. No cyanosis or erythema. No pallor. Nails show no clubbing.   Ulcer Location: dorsal left fifth toe  Measurements:0.6x0.6x0.2cm pre-debridement  Periwound: Intact and hyperkeratotic  Drainage: serosanguinous.  Pus: None.  Malodor: None.  Base:  30% granular. 70% fibrin with moderate biofilm and slough  Signs of infection: None.    Otherwise no open wounds, interdigital macerations, or calluses.  .       Psychiatric: She has a normal mood and affect. Her behavior is normal.     Right BKA          Assessment:       Encounter Diagnoses   Name Primary?    Diabetic ulcer of toe of left foot associated with type 2 diabetes mellitus, with bone involvement without evidence of necrosis Yes    PAD (peripheral artery disease)     Osteomyelitis of toe     Type 2 diabetes mellitus with stage 5 chronic kidney disease and hypertension          Plan:       Martha was seen today for follow-up.    Diagnoses and all orders for this visit:    Diabetic ulcer of toe of left foot associated with type  2 diabetes mellitus, with bone involvement without evidence of necrosis  -     Wound Debridement  -     Aerobic culture (Specify Source)    PAD (peripheral artery disease)    Osteomyelitis of toe    Type 2 diabetes mellitus with stage 5 chronic kidney disease and hypertension      I counseled the patient on her conditions, their implications and medical management.    Debridement and dressing per attached note    Cultures obtained in clinic    Rest and elevate foot.    RTC 1 week    Assisted by Juice Crystal, PGY2    I have personally taken the history and examined this patient and agree with the resident's note as stated as above.   Flaco JIMENEZM, FACFAS

## 2019-01-15 LAB — BACTERIA SPEC AEROBE CULT: NO GROWTH

## 2019-01-16 LAB
POC ACTIVATED CLOTTING TIME K: 219 SEC (ref 74–137)
POC ACTIVATED CLOTTING TIME K: 241 SEC (ref 74–137)
POC ACTIVATED CLOTTING TIME K: 241 SEC (ref 74–137)
SAMPLE: ABNORMAL

## 2019-01-21 RX ORDER — FUROSEMIDE 80 MG/1
80 TABLET ORAL EVERY MORNING
Qty: 30 TABLET | Refills: 0 | Status: CANCELLED | OUTPATIENT
Start: 2019-01-21

## 2019-01-21 RX ORDER — INSULIN GLARGINE 100 [IU]/ML
34 INJECTION, SOLUTION SUBCUTANEOUS NIGHTLY
Qty: 31 ML | Refills: 1 | Status: SHIPPED | OUTPATIENT
Start: 2019-01-21 | End: 2019-02-05

## 2019-01-22 RX ORDER — SEVELAMER CARBONATE 800 MG/1
TABLET, FILM COATED ORAL
Qty: 90 TABLET | Refills: 0 | Status: SHIPPED | OUTPATIENT
Start: 2019-01-22 | End: 2021-01-20 | Stop reason: SDUPTHER

## 2019-01-22 NOTE — TELEPHONE ENCOUNTER
Called pt; not available; LM for pt to call us and schedule follow up visit. Left my name and number for questions or concerns

## 2019-01-25 ENCOUNTER — OFFICE VISIT (OUTPATIENT)
Dept: PODIATRY | Facility: CLINIC | Age: 74
End: 2019-01-25
Payer: MEDICARE

## 2019-01-25 VITALS
HEART RATE: 81 BPM | HEIGHT: 65 IN | DIASTOLIC BLOOD PRESSURE: 70 MMHG | BODY MASS INDEX: 30.82 KG/M2 | SYSTOLIC BLOOD PRESSURE: 119 MMHG | WEIGHT: 185 LBS

## 2019-01-25 DIAGNOSIS — L97.526 DIABETIC ULCER OF TOE OF LEFT FOOT ASSOCIATED WITH TYPE 2 DIABETES MELLITUS, WITH BONE INVOLVEMENT WITHOUT EVIDENCE OF NECROSIS: Primary | ICD-10-CM

## 2019-01-25 DIAGNOSIS — E11.22 TYPE 2 DIABETES MELLITUS WITH STAGE 5 CHRONIC KIDNEY DISEASE AND HYPERTENSION: ICD-10-CM

## 2019-01-25 DIAGNOSIS — I12.0 TYPE 2 DIABETES MELLITUS WITH STAGE 5 CHRONIC KIDNEY DISEASE AND HYPERTENSION: ICD-10-CM

## 2019-01-25 DIAGNOSIS — N18.5 TYPE 2 DIABETES MELLITUS WITH STAGE 5 CHRONIC KIDNEY DISEASE AND HYPERTENSION: ICD-10-CM

## 2019-01-25 DIAGNOSIS — I73.9 PAD (PERIPHERAL ARTERY DISEASE): ICD-10-CM

## 2019-01-25 DIAGNOSIS — E11.621 DIABETIC ULCER OF TOE OF LEFT FOOT ASSOCIATED WITH TYPE 2 DIABETES MELLITUS, WITH BONE INVOLVEMENT WITHOUT EVIDENCE OF NECROSIS: Primary | ICD-10-CM

## 2019-01-25 DIAGNOSIS — M86.9 OSTEOMYELITIS OF TOE: ICD-10-CM

## 2019-01-25 PROCEDURE — 99999 PR PBB SHADOW E&M-EST. PATIENT-LVL IV: CPT | Mod: PBBFAC,,, | Performed by: PODIATRIST

## 2019-01-25 PROCEDURE — 99213 OFFICE O/P EST LOW 20 MIN: CPT | Mod: 25,S$GLB,, | Performed by: PODIATRIST

## 2019-01-25 PROCEDURE — 11044 DBRDMT BONE 1ST 20 SQ CM/<: CPT | Mod: S$GLB,,, | Performed by: PODIATRIST

## 2019-01-25 PROCEDURE — 99213 PR OFFICE/OUTPT VISIT, EST, LEVL III, 20-29 MIN: ICD-10-PCS | Mod: 25,S$GLB,, | Performed by: PODIATRIST

## 2019-01-25 PROCEDURE — 1101F PT FALLS ASSESS-DOCD LE1/YR: CPT | Mod: CPTII,S$GLB,, | Performed by: PODIATRIST

## 2019-01-25 PROCEDURE — 3045F PR MOST RECENT HEMOGLOBIN A1C LEVEL 7.0-9.0%: ICD-10-PCS | Mod: CPTII,S$GLB,, | Performed by: PODIATRIST

## 2019-01-25 PROCEDURE — 11044 WOUND DEBRIDEMENT: ICD-10-PCS | Mod: S$GLB,,, | Performed by: PODIATRIST

## 2019-01-25 PROCEDURE — 99999 PR PBB SHADOW E&M-EST. PATIENT-LVL IV: ICD-10-PCS | Mod: PBBFAC,,, | Performed by: PODIATRIST

## 2019-01-25 PROCEDURE — 3045F PR MOST RECENT HEMOGLOBIN A1C LEVEL 7.0-9.0%: CPT | Mod: CPTII,S$GLB,, | Performed by: PODIATRIST

## 2019-01-25 PROCEDURE — 1101F PR PT FALLS ASSESS DOC 0-1 FALLS W/OUT INJ PAST YR: ICD-10-PCS | Mod: CPTII,S$GLB,, | Performed by: PODIATRIST

## 2019-01-25 NOTE — PROCEDURES
"Wound Debridement  Date/Time: 1/25/2019 1:26 PM  Performed by: Flaco Morris DPM  Authorized by: Flaco Morris DPM     Time out: Immediately prior to procedure a "time out" was called to verify the correct patient, procedure, equipment, support staff and site/side marked as required.    Consent Done?:  Yes (Verbal)    Preparation: Patient was prepped and draped in usual sterile fashion    Local anesthesia used?: No      Wound Details:    Location:  Left foot    Location:  Left 5th Toe    Type of Debridement:  Excisional       Length (cm):  0.6       Area (sq cm):  0.36       Width (cm):  0.6       Percent Debrided (%):  100       Depth (cm):  0.4       Total Area Debrided (sq cm):  0.36    Depth of debridement:  Bone    Tissue debrided:  Bone, Subcutaneous, Epidermis and Dermis    Devitalized tissue debrided:  Biofilm, Callus, Fibrin and Slough    Instruments:  Curette    Bleeding:  Minimal  Hemostasis Achieved: Yes    Method Used:  Pressure  Patient tolerance:  Patient tolerated the procedure well with no immediate complications     Macrina, yasmani foam, cast padding x 2 secured with coban.      "

## 2019-01-27 NOTE — PROGRESS NOTES
"Subjective:      Patient ID: Martha Melvin is a 73 y.o. female.    Chief Complaint: Post-op Evaluation (2wk f/u)    Martha Melvin is a 73 y.o. female who  has a past medical history of Arthritis, Diabetes mellitus, Hyperlipidemia, Hypertension, and Renal disorder.  Right BKA.    Patient was recently hospitalized for osteomyelitis of left 5th digit where she underwent bone biopsy, revascularized,. and is currently on 6 week course of abx that are being dosed after dialysis.  Denies F/C/N/V    1/25/19: Wound check. Dressing remained intact since last visit. No new complaints.    Vitals:    01/25/19 1313   BP: 119/70   Pulse: 81   Weight: 83.9 kg (185 lb)   Height: 5' 5" (1.651 m)   PainSc: 0-No pain      Past Medical History:   Diagnosis Date    Arthritis     Diabetes mellitus     Hyperlipidemia     Hypertension     Renal disorder     poor kidney function       Past Surgical History:   Procedure Laterality Date    AORTOGRAM, WITH SERIALOGRAPHY N/A 12/31/2018    Performed by Mike [facility]ckay MD at [facility] CATH LAB/EP    Atherectomy, Peripheral Blood Vessel N/A 1/2/2019    Performed by Kenny Fink MD at [facility] CATH LAB/EP    Fistulogram, Arteriovenous Left 1/2/2019    Performed by Kenny Fink MD at [facility] CATH LAB/EP    LEG AMPUTATION Right     PERMCATH INSERTION-TUNNELED CVC N/A 4/25/2018    Performed by [facility]hu MD at [facility] CATH LAB    PTA, PERIPHERAL VESSEL N/A 1/2/2019    Performed by Kenny Fink MD at [facility] CATH LAB/EP    TRANSPOSITION, VEIN, BASILIC Left 6/27/2018    Performed by AYESHA Mesa III, MD at [facility] OR 2ND FLR    ULTRASOUND, INTRAVASCULAR N/A 1/2/2019    Performed by Kenny Fink MD at [facility] CATH LAB/EP       Family History   Problem Relation Age of Onset    Cancer Mother     Diabetes Mother     Hypertension Mother     Cancer Father     Cancer Sister        Social History     Socioeconomic History    Marital status: Single     Spouse name: None    Number of " children: None    Years of education: None    Highest education level: None   Social Needs    Financial resource strain: None    Food insecurity - worry: None    Food insecurity - inability: None    Transportation needs - medical: None    Transportation needs - non-medical: None   Occupational History    None   Tobacco Use    Smoking status: Never Smoker    Smokeless tobacco: Never Used   Substance and Sexual Activity    Alcohol use: No    Drug use: None    Sexual activity: None   Other Topics Concern    None   Social History Narrative    None       Current Outpatient Medications   Medication Sig Dispense Refill    amLODIPine (NORVASC) 10 MG tablet Take 1 tablet (10 mg total) by mouth once daily. 90 tablet 1    atorvastatin (LIPITOR) 40 MG tablet Take 1 tablet (40 mg total) by mouth once daily. 90 tablet 1    bisacodyl (DULCOLAX) 5 mg EC tablet Take 5 mg by mouth daily as needed for Constipation.      blood sugar diagnostic Strp Test 2 (two) times daily.as directed 100 each 5    blood-glucose meter Misc Use to test blood sugar twice daily 1 each 0    ciprofloxacin HCl (CIPRO) 500 MG tablet Take 1 tablet (500 mg total) by mouth once daily. 35 tablet 0    clopidogrel (PLAVIX) 75 mg tablet Take 1 tablet (75 mg total) by mouth once daily. 30 tablet 11    dextrose 5 % SolP 100 mL with vancomycin 1,000 mg SolR 500 mg Inject 500 mg into the vein every Mon, Wed, Fri. 15 ampule 0    furosemide (LASIX) 80 MG tablet TAKE 1 TABLET BY MOUTH TWICE DAILY 60 tablet 6    insulin aspart U-100 (NOVOLOG FLEXPEN U-100 INSULIN) 100 unit/mL InPn pen Inject 10 Units into the skin 3 (three) times daily with meals. (Patient taking differently: Inject 4 Units into the skin 3 (three) times daily with meals. ) 15 mL 1    insulin glargine (LANTUS) 100 unit/mL injection Inject 34 Units into the skin every evening. 31 mL 1    lancets (TRUEPLUS LANCETS) 30 gauge Misc Use to test blood sugar twice daily 100 each 11     "loratadine (CLARITIN) 10 mg tablet Take 10 mg by mouth once daily.      metroNIDAZOLE (FLAGYL) 500 MG tablet Take 1 tablet (500 mg total) by mouth every 6 (six) hours. 140 tablet 0    pen needle, diabetic (NOVOFINE PLUS) 32 gauge x 1/6" Ndle use 3 (three) times daily as needed. 300 each 1    senna-docusate 8.6-50 mg (PERICOLACE) 8.6-50 mg per tablet Take 1 tablet by mouth 2 (two) times daily. (Patient taking differently: Take 1 tablet by mouth 2 (two) times daily as needed. )      sevelamer carbonate (RENVELA) 800 mg Tab Take 1 tablet (800 mg total) by mouth 3 (three) times a day with meals. 90 tablet 0    calcium carbonate (TUMS E-X) 300 mg (750 mg) Chew Take 1 tablet by mouth 3 (three) times daily with meals. 90 tablet 2     No current facility-administered medications for this visit.        Review of patient's allergies indicates:   Allergen Reactions    Morphine          Review of Systems   Constitution: Negative for chills and fever.   HENT: Negative for congestion.    Cardiovascular: Negative for chest pain.   Respiratory: Negative for cough and shortness of breath.    Skin: Positive for nail changes and poor wound healing. Negative for color change, dry skin, suspicious lesions and unusual hair distribution.   Musculoskeletal: Negative for falls, joint pain, joint swelling, muscle weakness and myalgias.   Gastrointestinal: Negative for nausea and vomiting.   Neurological: Positive for numbness and sensory change. Negative for loss of balance, paresthesias and tremors.   Psychiatric/Behavioral: Negative for altered mental status. The patient is not nervous/anxious.            Objective:      Physical Exam   Constitutional: She is oriented to person, place, and time. She appears well-developed and well-nourished. No distress.   HENT:   Head: Normocephalic and atraumatic.   Neck: No tracheal deviation present.   Cardiovascular:   Pulses:       Dorsalis pedis pulses are 1+ on the left side.        Posterior " tibial pulses are 1+ on the left side.   No hair growth bilateral lower extremity. Skin temp warm to cool bilateral foot.   Musculoskeletal: Normal range of motion. She exhibits no edema, tenderness or deformity.   No pain with ROM or MMT left lower extremity.       Feet:   Right Foot: amputated  Left Foot:   Protective Sensation: 10 sites tested. 7 sites sensed.   Skin Integrity: Positive for ulcer and dry skin. Negative for erythema or warmth.   Neurological: She is alert and oriented to person, place, and time. She has normal strength. She displays no atrophy and no tremor. A sensory deficit is present.   Reflex Scores:       Patellar reflexes are 2+ on the left side.       Achilles reflexes are 2+ on the left side.  Vibratory sensation diminished.  5/5 muscle strength  LLE   Skin: Skin is dry. Capillary refill takes 2 to 3 seconds. No rash noted. She is not diaphoretic. No cyanosis or erythema. No pallor. Nails show no clubbing.   Ulcer Location: dorsal left fifth toe  Measurements:0.2x0.2x0.2cm pre-debridement  Periwound: hyperkeratotic and undermined  Drainage: serosanguinous.  Pus: None.  Malodor: None.  Base:  70% granular. 30% fibrin with moderate biofilm and slough  Signs of infection: None.    Loose piece of bone visualized and excised in wound base left fifth toe.    Otherwise no open wounds, interdigital macerations, or calluses.       Psychiatric: She has a normal mood and affect. Her behavior is normal.     Right BKA          Assessment:       Encounter Diagnoses   Name Primary?    Diabetic ulcer of toe of left foot associated with type 2 diabetes mellitus, with bone involvement without evidence of necrosis Yes    PAD (peripheral artery disease)     Osteomyelitis of toe     Type 2 diabetes mellitus with stage 5 chronic kidney disease and hypertension          Plan:       Martha was seen today for post-op evaluation.    Diagnoses and all orders for this visit:    Diabetic ulcer of toe of left foot  associated with type 2 diabetes mellitus, with bone involvement without evidence of necrosis  -     Wound Debridement    PAD (peripheral artery disease)    Osteomyelitis of toe    Type 2 diabetes mellitus with stage 5 chronic kidney disease and hypertension      I counseled the patient on her conditions, their implications and medical management.    Debridement and dressing per attached note    Rest and elevate foot.    RTC 1 week

## 2019-01-29 LAB — FUNGUS SPEC CULT: NORMAL

## 2019-02-01 ENCOUNTER — OFFICE VISIT (OUTPATIENT)
Dept: PODIATRY | Facility: CLINIC | Age: 74
End: 2019-02-01
Payer: MEDICARE

## 2019-02-01 VITALS
WEIGHT: 185 LBS | DIASTOLIC BLOOD PRESSURE: 76 MMHG | BODY MASS INDEX: 30.82 KG/M2 | HEART RATE: 81 BPM | HEIGHT: 65 IN | SYSTOLIC BLOOD PRESSURE: 127 MMHG

## 2019-02-01 DIAGNOSIS — M86.9 OSTEOMYELITIS OF TOE: ICD-10-CM

## 2019-02-01 DIAGNOSIS — E11.621 DIABETIC ULCER OF TOE OF LEFT FOOT ASSOCIATED WITH TYPE 2 DIABETES MELLITUS, WITH BONE INVOLVEMENT WITHOUT EVIDENCE OF NECROSIS: Primary | ICD-10-CM

## 2019-02-01 DIAGNOSIS — L97.526 DIABETIC ULCER OF TOE OF LEFT FOOT ASSOCIATED WITH TYPE 2 DIABETES MELLITUS, WITH BONE INVOLVEMENT WITHOUT EVIDENCE OF NECROSIS: Primary | ICD-10-CM

## 2019-02-01 DIAGNOSIS — I73.9 PAD (PERIPHERAL ARTERY DISEASE): ICD-10-CM

## 2019-02-01 PROCEDURE — 11042 WOUND DEBRIDEMENT: ICD-10-PCS | Mod: S$GLB,,, | Performed by: PODIATRIST

## 2019-02-01 PROCEDURE — 99999 PR PBB SHADOW E&M-EST. PATIENT-LVL IV: CPT | Mod: PBBFAC,,, | Performed by: PODIATRIST

## 2019-02-01 PROCEDURE — 1101F PT FALLS ASSESS-DOCD LE1/YR: CPT | Mod: CPTII,S$GLB,, | Performed by: PODIATRIST

## 2019-02-01 PROCEDURE — 99213 OFFICE O/P EST LOW 20 MIN: CPT | Mod: 25,S$GLB,, | Performed by: PODIATRIST

## 2019-02-01 PROCEDURE — 99999 PR PBB SHADOW E&M-EST. PATIENT-LVL IV: ICD-10-PCS | Mod: PBBFAC,,, | Performed by: PODIATRIST

## 2019-02-01 PROCEDURE — 11042 DBRDMT SUBQ TIS 1ST 20SQCM/<: CPT | Mod: S$GLB,,, | Performed by: PODIATRIST

## 2019-02-01 PROCEDURE — 1101F PR PT FALLS ASSESS DOC 0-1 FALLS W/OUT INJ PAST YR: ICD-10-PCS | Mod: CPTII,S$GLB,, | Performed by: PODIATRIST

## 2019-02-01 PROCEDURE — 99213 PR OFFICE/OUTPT VISIT, EST, LEVL III, 20-29 MIN: ICD-10-PCS | Mod: 25,S$GLB,, | Performed by: PODIATRIST

## 2019-02-01 PROCEDURE — 3045F PR MOST RECENT HEMOGLOBIN A1C LEVEL 7.0-9.0%: ICD-10-PCS | Mod: CPTII,S$GLB,, | Performed by: PODIATRIST

## 2019-02-01 PROCEDURE — 3045F PR MOST RECENT HEMOGLOBIN A1C LEVEL 7.0-9.0%: CPT | Mod: CPTII,S$GLB,, | Performed by: PODIATRIST

## 2019-02-01 NOTE — PROCEDURES
"Wound Debridement  Date/Time: 2/1/2019 12:05 PM  Performed by: Flaco Morris DPM  Authorized by: Flaco Morris DPM     Time out: Immediately prior to procedure a "time out" was called to verify the correct patient, procedure, equipment, support staff and site/side marked as required.    Consent Done?:  Yes (Verbal)    Preparation: Patient was prepped and draped in usual sterile fashion    Local anesthesia used?: No      Wound Details:    Location:  Left foot    Location:  Left 5th Toe    Type of Debridement:  Excisional       Length (cm):  0.4       Area (sq cm):  0.12       Width (cm):  0.3       Percent Debrided (%):  100       Depth (cm):  0.3       Total Area Debrided (sq cm):  0.12    Depth of debridement:  Subcutaneous tissue    Tissue debrided:  Subcutaneous    Devitalized tissue debrided:  Biofilm, Fibrin and Slough    Instruments:  Curette    Bleeding:  Minimal  Hemostasis Achieved: Yes    Method Used:  Pressure  Patient tolerance:  Patient tolerated the procedure well with no immediate complications     Macrina, yasmani, foam, cast padding x 2 secured with coban.      "

## 2019-02-02 NOTE — PROGRESS NOTES
"Subjective:      Patient ID: Martha Melvin is a 73 y.o. female.    Chief Complaint: Follow-up (wound check)    Martha Melvin is a 73 y.o. female who  has a past medical history of Arthritis, Diabetes mellitus, Hyperlipidemia, Hypertension, and Renal disorder.  Right BKA.    Patient was recently hospitalized for osteomyelitis of left 5th digit where she underwent bone biopsy, revascularized,. and is currently on 6 week course of abx that are being dosed after dialysis.  Denies F/C/N/V    1/25/19: Wound check. Dressing remained intact since last visit. No new complaints.    Vitals:    02/01/19 1138   BP: 127/76   Pulse: 81   Weight: 83.9 kg (185 lb)   Height: 5' 5" (1.651 m)   PainSc: 0-No pain      Past Medical History:   Diagnosis Date    Arthritis     Diabetes mellitus     Hyperlipidemia     Hypertension     Renal disorder     poor kidney function       Past Surgical History:   Procedure Laterality Date    AORTOGRAM, WITH SERIALOGRAPHY N/A 12/31/2018    Performed by [facility]ay [facility] at [facility] CATH LAB/EP    Atherectomy, Peripheral Blood Vessel N/A 1/2/2019    Performed by Kenny Fink MD at [facility] CATH LAB/EP    Fistulogram, Arteriovenous Left 1/2/2019    Performed by [facility]ink [facility] at [facility] CATH LAB/EP    LEG AMPUTATION Right     PERMCATH INSERTION-TUNNELED CVC N/A 4/25/2018    Per[facility] by [facility] at [facility] CATH LAB    PTA, PERIPHERAL VESSEL N/A 1/2/2019    Performed by [facility]ink [facility] at [facility] CATH LAB/EP    TRANSPOSITION, VEIN, BASILIC Left 6/27/2018    Performed by AYESHA Mesa III, MD at [facility] OR 2ND FLR    ULTRASOUND, INTRAVASCULAR N/A 1/2/2019    Performed by Kenny Fink MD at [facility] CATH LAB/EP       Family History   Problem Relation Age of Onset    Cancer Mother     Diabetes Mother     Hypertension Mother     Cancer Father     Cancer Sister        Social History     Socioeconomic History    Marital status: Single     Spouse name: None    Number of children: " None    Years of education: None    Highest education level: None   Social Needs    Financial resource strain: None    Food insecurity - worry: None    Food insecurity - inability: None    Transportation needs - medical: None    Transportation needs - non-medical: None   Occupational History    None   Tobacco Use    Smoking status: Never Smoker    Smokeless tobacco: Never Used   Substance and Sexual Activity    Alcohol use: No    Drug use: None    Sexual activity: None   Other Topics Concern    None   Social History Narrative    None       Current Outpatient Medications   Medication Sig Dispense Refill    amLODIPine (NORVASC) 10 MG tablet Take 1 tablet (10 mg total) by mouth once daily. 90 tablet 1    atorvastatin (LIPITOR) 40 MG tablet Take 1 tablet (40 mg total) by mouth once daily. 90 tablet 1    bisacodyl (DULCOLAX) 5 mg EC tablet Take 5 mg by mouth daily as needed for Constipation.      blood sugar diagnostic Strp Test 2 (two) times daily.as directed 100 each 5    blood-glucose meter Misc Use to test blood sugar twice daily 1 each 0    ciprofloxacin HCl (CIPRO) 500 MG tablet Take 1 tablet (500 mg total) by mouth once daily. 35 tablet 0    clopidogrel (PLAVIX) 75 mg tablet Take 1 tablet (75 mg total) by mouth once daily. 30 tablet 11    dextrose 5 % SolP 100 mL with vancomycin 1,000 mg SolR 500 mg Inject 500 mg into the vein every Mon, Wed, Fri. 15 ampule 0    furosemide (LASIX) 80 MG tablet TAKE 1 TABLET BY MOUTH TWICE DAILY 60 tablet 6    insulin aspart U-100 (NOVOLOG FLEXPEN U-100 INSULIN) 100 unit/mL InPn pen Inject 10 Units into the skin 3 (three) times daily with meals. (Patient taking differently: Inject 4 Units into the skin 3 (three) times daily with meals. ) 15 mL 1    insulin glargine (LANTUS) 100 unit/mL injection Inject 34 Units into the skin every evening. 31 mL 1    lancets (TRUEPLUS LANCETS) 30 gauge Misc Use to test blood sugar twice daily 100 each 11    loratadine  "(CLARITIN) 10 mg tablet Take 10 mg by mouth once daily.      metroNIDAZOLE (FLAGYL) 500 MG tablet Take 1 tablet (500 mg total) by mouth every 6 (six) hours. 140 tablet 0    pen needle, diabetic (NOVOFINE PLUS) 32 gauge x 1/6" Ndle use 3 (three) times daily as needed. 300 each 1    senna-docusate 8.6-50 mg (PERICOLACE) 8.6-50 mg per tablet Take 1 tablet by mouth 2 (two) times daily. (Patient taking differently: Take 1 tablet by mouth 2 (two) times daily as needed. )      sevelamer carbonate (RENVELA) 800 mg Tab Take 1 tablet (800 mg total) by mouth 3 (three) times a day with meals. 90 tablet 0    calcium carbonate (TUMS E-X) 300 mg (750 mg) Chew Take 1 tablet by mouth 3 (three) times daily with meals. 90 tablet 2     No current facility-administered medications for this visit.        Review of patient's allergies indicates:   Allergen Reactions    Morphine          Review of Systems   Constitution: Negative for chills and fever.   HENT: Negative for congestion.    Cardiovascular: Negative for chest pain.   Respiratory: Negative for cough and shortness of breath.    Skin: Positive for nail changes and poor wound healing. Negative for color change, dry skin, suspicious lesions and unusual hair distribution.   Musculoskeletal: Negative for falls, joint pain, joint swelling, muscle weakness and myalgias.   Gastrointestinal: Negative for nausea and vomiting.   Neurological: Positive for numbness and sensory change. Negative for loss of balance, paresthesias and tremors.   Psychiatric/Behavioral: Negative for altered mental status. The patient is not nervous/anxious.            Objective:      Physical Exam   Constitutional: She is oriented to person, place, and time. She appears well-developed and well-nourished. No distress.   HENT:   Head: Normocephalic and atraumatic.   Neck: No tracheal deviation present.   Cardiovascular:   Pulses:       Dorsalis pedis pulses are 1+ on the left side.        Posterior tibial " pulses are 1+ on the left side.   No hair growth bilateral lower extremity. Skin temp warm to cool bilateral foot.   Musculoskeletal: Normal range of motion. She exhibits no edema, tenderness or deformity.   No pain with ROM or MMT left lower extremity.       Feet:   Right Foot: amputated  Left Foot:   Protective Sensation: 10 sites tested. 7 sites sensed.   Skin Integrity: Positive for ulcer and dry skin. Negative for erythema or warmth.   Neurological: She is alert and oriented to person, place, and time. She has normal strength. She displays no atrophy and no tremor. A sensory deficit is present.   Reflex Scores:       Patellar reflexes are 2+ on the left side.       Achilles reflexes are 2+ on the left side.  Vibratory sensation diminished.  5/5 muscle strength  LLE   Skin: Skin is dry. Capillary refill takes 2 to 3 seconds. No rash noted. She is not diaphoretic. No cyanosis or erythema. No pallor. Nails show no clubbing.   Ulcer Location: dorsal left fifth toe  Measurements:0.3x0.2x0.3cm pre-debridement  Periwound: hyperkeratotic and undermined  Drainage: serosanguinous.  Pus: None.  Malodor: None.  Base:  70% granular. 30% fibrin with moderate biofilm and slough  Signs of infection: None.    Loose piece of bone visualized and excised in wound base left fifth toe.    Otherwise no open wounds, interdigital macerations, or calluses.       Psychiatric: She has a normal mood and affect. Her behavior is normal.     Right BKA          Assessment:       Encounter Diagnoses   Name Primary?    Diabetic ulcer of toe of left foot associated with type 2 diabetes mellitus, with bone involvement without evidence of necrosis Yes    PAD (peripheral artery disease)     Osteomyelitis of toe          Plan:       Martha was seen today for follow-up.    Diagnoses and all orders for this visit:    Diabetic ulcer of toe of left foot associated with type 2 diabetes mellitus, with bone involvement without evidence of necrosis  -      Wound Debridement  -     SUBSEQUENT HOME HEALTH ORDERS    PAD (peripheral artery disease)  -     SUBSEQUENT HOME HEALTH ORDERS    Osteomyelitis of toe  -     SUBSEQUENT HOME HEALTH ORDERS      I counseled the patient on her conditions, their implications and medical management.    Debridement and dressing per attached note    Rest and elevate foot.    RTC 1 week    Assisted by Juice Crystal, PGY2    I have personally taken the history and examined this patient and agree with the resident's note as stated as above.   Flaco Morris DPM, FACFAS    HH orders updated.    Rest and elevate.    Continue offloading with darco shoe.

## 2019-02-04 ENCOUNTER — TELEPHONE (OUTPATIENT)
Dept: INTERNAL MEDICINE | Facility: CLINIC | Age: 74
End: 2019-02-04

## 2019-02-04 NOTE — TELEPHONE ENCOUNTER
----- Message from Darline Pena sent at 2/4/2019  4:02 PM CST -----  Contact: Raul @ [facility] Direct # [phone]  Raul is calling in regards to wanting to if you would change the patient's script to the Lancets flex instead of the lancets valves? She said that the patient cannot use the valves, she cannot inject it.

## 2019-02-05 ENCOUNTER — TELEPHONE (OUTPATIENT)
Dept: INTERNAL MEDICINE | Facility: CLINIC | Age: 74
End: 2019-02-05

## 2019-02-05 RX ORDER — BLOOD-GLUCOSE CONTROL, NORMAL
1 EACH MISCELLANEOUS 2 TIMES DAILY
Qty: 100 EACH | Refills: 5 | Status: SHIPPED | OUTPATIENT
Start: 2019-02-05

## 2019-02-05 NOTE — TELEPHONE ENCOUNTER
----- Message from Darline Pena sent at 2/5/2019 10:14 AM CST -----  Contact: Jay @ [facility] Office #[phone]  Jay is calling in regards to the patient script for lancets (TRUEPLUS LANCETS) 30 gauge Misc. She called yesterday and left a message stating that the patient cannot use the lancets  valves and she would like for you to write a script for lancets flex touch for the patient please.     Patient's pharmacy: [facility]  Phone#[phone],Fax# [phone]

## 2019-02-05 NOTE — TELEPHONE ENCOUNTER
Sal- Blaze @ [facility] Direct # [phone]    Sent initial request to provider yesterday; pt is not able to draw insulin via vials and syringes;  gave verbal to fill the following:    Lantus SoloStar Flexpen- 30units qpm     2 refills.     Blaze (pharmacist) is filling now and stated if insurance does not cover they will do p/a.     Please acknowledge disposition. Thank you

## 2019-02-07 ENCOUNTER — TELEPHONE (OUTPATIENT)
Dept: ADMINISTRATIVE | Facility: CLINIC | Age: 74
End: 2019-02-07

## 2019-02-07 NOTE — TELEPHONE ENCOUNTER
Home Health SOC 01/09/2019 - 03/09/2019 with [facility] ([facility]) - Dr. Florinda Davenport. Patient received SN and PT services.

## 2019-02-22 ENCOUNTER — OFFICE VISIT (OUTPATIENT)
Dept: PODIATRY | Facility: CLINIC | Age: 74
End: 2019-02-22
Payer: MEDICARE

## 2019-02-22 VITALS
HEIGHT: 65 IN | DIASTOLIC BLOOD PRESSURE: 67 MMHG | SYSTOLIC BLOOD PRESSURE: 119 MMHG | HEART RATE: 81 BPM | WEIGHT: 185 LBS | BODY MASS INDEX: 30.82 KG/M2

## 2019-02-22 DIAGNOSIS — Z87.2 HEALED ULCER OF LEFT FOOT ON EXAMINATION: Primary | ICD-10-CM

## 2019-02-22 DIAGNOSIS — I12.0 TYPE 2 DIABETES MELLITUS WITH STAGE 5 CHRONIC KIDNEY DISEASE AND HYPERTENSION: ICD-10-CM

## 2019-02-22 DIAGNOSIS — N18.5 TYPE 2 DIABETES MELLITUS WITH STAGE 5 CHRONIC KIDNEY DISEASE AND HYPERTENSION: ICD-10-CM

## 2019-02-22 DIAGNOSIS — I73.9 PAD (PERIPHERAL ARTERY DISEASE): ICD-10-CM

## 2019-02-22 DIAGNOSIS — E11.22 TYPE 2 DIABETES MELLITUS WITH STAGE 5 CHRONIC KIDNEY DISEASE AND HYPERTENSION: ICD-10-CM

## 2019-02-22 PROCEDURE — 99999 PR PBB SHADOW E&M-EST. PATIENT-LVL IV: ICD-10-PCS | Mod: PBBFAC,,, | Performed by: PODIATRIST

## 2019-02-22 PROCEDURE — 99213 OFFICE O/P EST LOW 20 MIN: CPT | Mod: S$GLB,,, | Performed by: PODIATRIST

## 2019-02-22 PROCEDURE — 3045F PR MOST RECENT HEMOGLOBIN A1C LEVEL 7.0-9.0%: CPT | Mod: CPTII,S$GLB,, | Performed by: PODIATRIST

## 2019-02-22 PROCEDURE — 1101F PT FALLS ASSESS-DOCD LE1/YR: CPT | Mod: CPTII,S$GLB,, | Performed by: PODIATRIST

## 2019-02-22 PROCEDURE — 3045F PR MOST RECENT HEMOGLOBIN A1C LEVEL 7.0-9.0%: ICD-10-PCS | Mod: CPTII,S$GLB,, | Performed by: PODIATRIST

## 2019-02-22 PROCEDURE — 99213 PR OFFICE/OUTPT VISIT, EST, LEVL III, 20-29 MIN: ICD-10-PCS | Mod: S$GLB,,, | Performed by: PODIATRIST

## 2019-02-22 PROCEDURE — 99999 PR PBB SHADOW E&M-EST. PATIENT-LVL IV: CPT | Mod: PBBFAC,,, | Performed by: PODIATRIST

## 2019-02-22 PROCEDURE — 1101F PR PT FALLS ASSESS DOC 0-1 FALLS W/OUT INJ PAST YR: ICD-10-PCS | Mod: CPTII,S$GLB,, | Performed by: PODIATRIST

## 2019-02-24 NOTE — PROGRESS NOTES
"Subjective:      Patient ID: Martha Melvin is a 73 y.o. female.    Chief Complaint: Follow-up (ulcer of left foot )    Martha Melvin is a 73 y.o. female who  has a past medical history of Arthritis, Diabetes mellitus, Hyperlipidemia, Hypertension, and Renal disorder.  Right BKA.    Patient was recently hospitalized for osteomyelitis of left 5th digit where she underwent bone biopsy, revascularized,. and is currently on 6 week course of abx that are being dosed after dialysis.  Denies F/C/N/V    1/25/19: Wound check. Dressing remained intact since last visit. No new complaints.    2/22/19: Wound check. Relates HH did not return to change her dressings and dressing remained on since previous visit.    Vitals:    02/22/19 0811   BP: 119/67   Pulse: 81   Weight: 83.9 kg (185 lb)   Height: 5' 5" (1.651 m)   PainSc: 0-No pain      Past Medical History:   Diagnosis Date    Arthritis     Diabetes mellitus     Hyperlipidemia     Hypertension     Renal disorder     poor kidney function       Past Surgical History:   Procedure Laterality Date    AORTOGRAM, WITH SERIALOGRAPHY N/A 12/31/2018    Performed by [facility] at [facility] CATH LAB/EP    Atherectomy, Peripheral Blood Vessel N/A 1/2/2019    Performed by Kenny Fink MD at [facility] CATH LAB/EP    Fistulogram, Arteriovenous Left 1/2/2019    Performed by Kenny Fink MD at [facility] CATH LAB/EP    LEG AMPUTATION Right     PERMCATH INSERTION-TUNNELED CVC N/A 4/25/2018    Per[facility] by [facility] at [facility] CATH LAB    PTA, PERIPHERAL VESSEL N/A 1/2/2019    Performed by [facility]ink MD at [facility] CATH LAB/EP    TRANSPOSITION, VEIN, BASILIC Left 6/27/2018    Performed by AYESHA Mesa III, MD at [facility] OR 2ND FLR    ULTRASOUND, INTRAVASCULAR N/A 1/2/2019    Performed by Kenny Fink MD at [facility] CATH LAB/EP       Family History   Problem Relation Age of Onset    Cancer Mother     Diabetes Mother     Hypertension Mother     Cancer Father     Cancer " Sister        Social History     Socioeconomic History    Marital status: Single     Spouse name: Not on file    Number of children: Not on file    Years of education: Not on file    Highest education level: Not on file   Social Needs    Financial resource strain: Not on file    Food insecurity - worry: Not on file    Food insecurity - inability: Not on file    Transportation needs - medical: Not on file    Transportation needs - non-medical: Not on file   Occupational History    Not on file   Tobacco Use    Smoking status: Never Smoker    Smokeless tobacco: Never Used   Substance and Sexual Activity    Alcohol use: No    Drug use: Not on file    Sexual activity: Not on file   Other Topics Concern    Not on file   Social History Narrative    Not on file       Current Outpatient Medications   Medication Sig Dispense Refill    amLODIPine (NORVASC) 10 MG tablet Take 1 tablet (10 mg total) by mouth once daily. 90 tablet 1    atorvastatin (LIPITOR) 40 MG tablet Take 1 tablet (40 mg total) by mouth once daily. 90 tablet 1    bisacodyl (DULCOLAX) 5 mg EC tablet Take 5 mg by mouth daily as needed for Constipation.      blood sugar diagnostic Strp Test 2 (two) times daily.as directed 100 each 5    blood-glucose meter Misc Use to test blood sugar twice daily 1 each 0    clopidogrel (PLAVIX) 75 mg tablet Take 1 tablet (75 mg total) by mouth once daily. 30 tablet 11    furosemide (LASIX) 80 MG tablet TAKE 1 TABLET BY MOUTH TWICE DAILY 60 tablet 6    insulin aspart U-100 (NOVOLOG FLEXPEN U-100 INSULIN) 100 unit/mL InPn pen Inject 10 Units into the skin 3 (three) times daily with meals. (Patient taking differently: Inject 4 Units into the skin 3 (three) times daily with meals. ) 15 mL 1    insulin glargine 100 units/mL (3mL) SubQ pen Inject into the skin 34  units into the skin every evening 30 mL 1    lancets 30 gauge Misc 1 lancet by Misc.(Non-Drug; Combo Route) route 2 (two) times daily. 100 each 5  "   loratadine (CLARITIN) 10 mg tablet Take 10 mg by mouth once daily.      pen needle, diabetic (BD ULTRA-FINE SHORT PEN NEEDLE) 31 gauge x 5/16" Ndle use as directed to inject insulin four times daily 100 each 1    pen needle, diabetic (NOVOFINE PLUS) 32 gauge x 1/6" Ndle use 3 (three) times daily as needed. 300 each 1    senna-docusate 8.6-50 mg (PERICOLACE) 8.6-50 mg per tablet Take 1 tablet by mouth 2 (two) times daily. (Patient taking differently: Take 1 tablet by mouth 2 (two) times daily as needed. )      sevelamer carbonate (RENVELA) 800 mg Tab Take 1 tablet (800 mg total) by mouth 3 (three) times a day with meals. 90 tablet 0    calcium carbonate (TUMS E-X) 300 mg (750 mg) Chew Take 1 tablet by mouth 3 (three) times daily with meals. 90 tablet 2     No current facility-administered medications for this visit.        Review of patient's allergies indicates:   Allergen Reactions    Morphine          Review of Systems   Constitution: Negative for chills and fever.   HENT: Negative for congestion.    Cardiovascular: Negative for chest pain.   Respiratory: Negative for cough and shortness of breath.    Skin: Positive for nail changes and poor wound healing. Negative for color change, dry skin, suspicious lesions and unusual hair distribution.   Musculoskeletal: Negative for falls, joint pain, joint swelling, muscle weakness and myalgias.   Gastrointestinal: Negative for nausea and vomiting.   Neurological: Positive for numbness and sensory change. Negative for loss of balance, paresthesias and tremors.   Psychiatric/Behavioral: Negative for altered mental status. The patient is not nervous/anxious.            Objective:      Physical Exam   Constitutional: She is oriented to person, place, and time. She appears well-developed and well-nourished. No distress.   HENT:   Head: Normocephalic and atraumatic.   Neck: No tracheal deviation present.   Cardiovascular:   Pulses:       Dorsalis pedis pulses are 1+ " on the left side.        Posterior tibial pulses are 1+ on the left side.   No hair growth bilateral lower extremity. Skin temp warm to cool bilateral foot.   Musculoskeletal: Normal range of motion. She exhibits no edema, tenderness or deformity.   No pain with ROM or MMT left lower extremity.       Feet:   Right Foot: amputated  Left Foot:   Protective Sensation: 10 sites tested. 7 sites sensed.   Skin Integrity: Positive for ulcer and dry skin. Negative for erythema or warmth.   Neurological: She is alert and oriented to person, place, and time. She has normal strength. She displays no atrophy and no tremor. A sensory deficit is present.   Reflex Scores:       Patellar reflexes are 2+ on the left side.       Achilles reflexes are 2+ on the left side.  Vibratory sensation diminished.  5/5 muscle strength  LLE   Skin: Skin is dry. Capillary refill takes 2 to 3 seconds. No rash noted. She is not diaphoretic. No cyanosis or erythema. No pallor. Nails show no clubbing.   Moderate hyperkeratosis overlying previous wound site dorsal lateral left fifth toe once trimmed noting healed wound.    Skin is dry and flaky left foot.    Otherwise no open wounds, interdigital macerations, or calluses.       Psychiatric: She has a normal mood and affect. Her behavior is normal.     Right BKA          Assessment:       Encounter Diagnoses   Name Primary?    Healed ulcer of left foot on examination Yes    PAD (peripheral artery disease)     Type 2 diabetes mellitus with stage 5 chronic kidney disease and hypertension          Plan:       Martha was seen today for follow-up.    Diagnoses and all orders for this visit:    Healed ulcer of left foot on examination  -     DIABETIC SHOES FOR HOME USE    PAD (peripheral artery disease)  -     DIABETIC SHOES FOR HOME USE    Type 2 diabetes mellitus with stage 5 chronic kidney disease and hypertension  -     DIABETIC SHOES FOR HOME USE      I counseled the patient on her conditions, their  implications and medical management.    In order to inspect her skin the hyperkeratotic skin dorsal left fifth toe was trimmed with #15 scalpel noting healed wound and normal appearing skin. She tolerated the procedure well.    Discussed appropriate foot hygiene and appropriate shoe gear. Recommend daily foot checks.    Rx for extra depth diabetic shoes with heat molded inserts    RTC 2-3 months or prn.

## 2019-03-02 LAB
ACID FAST MOD KINY STN SPEC: NORMAL
MYCOBACTERIUM SPEC QL CULT: NORMAL

## 2019-03-20 DIAGNOSIS — I10 HYPERTENSION, UNSPECIFIED TYPE: ICD-10-CM

## 2019-03-21 RX ORDER — SEVELAMER CARBONATE 800 MG/1
TABLET, FILM COATED ORAL
Qty: 90 TABLET | Refills: 0 | OUTPATIENT
Start: 2019-03-21

## 2019-03-21 RX ORDER — AMLODIPINE BESYLATE 10 MG/1
10 TABLET ORAL DAILY
Qty: 90 TABLET | Refills: 0 | Status: SHIPPED | OUTPATIENT
Start: 2019-03-21 | End: 2019-07-16 | Stop reason: SDUPTHER

## 2019-03-21 NOTE — TELEPHONE ENCOUNTER
Spoke with pt; informed amlodipine was filled; however a follow up with nephrology is needed to refill renvela . Pt verbalized understanding; nothing further.

## 2019-05-06 ENCOUNTER — TELEPHONE (OUTPATIENT)
Dept: PODIATRY | Facility: CLINIC | Age: 74
End: 2019-05-06

## 2019-05-06 ENCOUNTER — TELEPHONE (OUTPATIENT)
Dept: INTERNAL MEDICINE | Facility: CLINIC | Age: 74
End: 2019-05-06

## 2019-05-06 RX ORDER — ATORVASTATIN CALCIUM 40 MG/1
40 TABLET, FILM COATED ORAL DAILY
Qty: 90 TABLET | Refills: 0 | Status: SHIPPED | OUTPATIENT
Start: 2019-05-06 | End: 2019-08-30

## 2019-05-06 NOTE — TELEPHONE ENCOUNTER
----- Message from Carlotta Queen sent at 5/6/2019  4:34 PM CDT -----  Contact: Grisel, daughter, [phone]  Called in returning your call. Please advise.

## 2019-05-06 NOTE — TELEPHONE ENCOUNTER
----- Message from Akira Thorne sent at 5/6/2019  4:35 PM CDT -----  Contact: Luzmaria/daughter/[phone]  She has foot ulcer that is oozing; she needs an appt asap.

## 2019-05-06 NOTE — TELEPHONE ENCOUNTER
----- Message from Darrius Tejada sent at 5/6/2019  4:43 PM CDT -----  Contact: Daughter, [phone]  Patient is calling for an RX refill or new RX.  Is this a refill or new RX:  refill  RX name and strength:atorvastatin (LIPITOR) 40 MG tablet 90 tablet    Directions (copy/paste from chart):    Is this a 30 day or 90 day RX:  90  Local pharmacy or mail order pharmacy:  [facility] [phone] (Phone)  [phone] (Fax)

## 2019-05-07 ENCOUNTER — TELEPHONE (OUTPATIENT)
Dept: PODIATRY | Facility: CLINIC | Age: 74
End: 2019-05-07

## 2019-05-09 ENCOUNTER — TELEPHONE (OUTPATIENT)
Dept: PODIATRY | Facility: CLINIC | Age: 74
End: 2019-05-09

## 2019-05-09 ENCOUNTER — OFFICE VISIT (OUTPATIENT)
Dept: PODIATRY | Facility: CLINIC | Age: 74
End: 2019-05-09
Payer: MEDICARE

## 2019-05-09 VITALS
DIASTOLIC BLOOD PRESSURE: 80 MMHG | WEIGHT: 185 LBS | SYSTOLIC BLOOD PRESSURE: 146 MMHG | HEART RATE: 80 BPM | BODY MASS INDEX: 30.82 KG/M2 | HEIGHT: 65 IN

## 2019-05-09 DIAGNOSIS — E11.621 DIABETIC ULCER OF TOE OF LEFT FOOT ASSOCIATED WITH TYPE 2 DIABETES MELLITUS, WITH FAT LAYER EXPOSED: Primary | ICD-10-CM

## 2019-05-09 DIAGNOSIS — L97.522 DIABETIC ULCER OF TOE OF LEFT FOOT ASSOCIATED WITH TYPE 2 DIABETES MELLITUS, WITH FAT LAYER EXPOSED: Primary | ICD-10-CM

## 2019-05-09 DIAGNOSIS — Z99.2 ESRD (END STAGE RENAL DISEASE) ON DIALYSIS: ICD-10-CM

## 2019-05-09 DIAGNOSIS — N18.6 ESRD (END STAGE RENAL DISEASE) ON DIALYSIS: ICD-10-CM

## 2019-05-09 DIAGNOSIS — I73.9 PAD (PERIPHERAL ARTERY DISEASE): ICD-10-CM

## 2019-05-09 PROCEDURE — 11042 WOUND DEBRIDEMENT: ICD-10-PCS | Mod: S$GLB,,, | Performed by: PODIATRIST

## 2019-05-09 PROCEDURE — 99213 PR OFFICE/OUTPT VISIT, EST, LEVL III, 20-29 MIN: ICD-10-PCS | Mod: 25,S$GLB,, | Performed by: PODIATRIST

## 2019-05-09 PROCEDURE — 1101F PR PT FALLS ASSESS DOC 0-1 FALLS W/OUT INJ PAST YR: ICD-10-PCS | Mod: CPTII,S$GLB,, | Performed by: PODIATRIST

## 2019-05-09 PROCEDURE — 99213 OFFICE O/P EST LOW 20 MIN: CPT | Mod: 25,S$GLB,, | Performed by: PODIATRIST

## 2019-05-09 PROCEDURE — 3045F PR MOST RECENT HEMOGLOBIN A1C LEVEL 7.0-9.0%: CPT | Mod: CPTII,S$GLB,, | Performed by: PODIATRIST

## 2019-05-09 PROCEDURE — 99999 PR PBB SHADOW E&M-EST. PATIENT-LVL III: ICD-10-PCS | Mod: PBBFAC,,, | Performed by: PODIATRIST

## 2019-05-09 PROCEDURE — 11042 DBRDMT SUBQ TIS 1ST 20SQCM/<: CPT | Mod: S$GLB,,, | Performed by: PODIATRIST

## 2019-05-09 PROCEDURE — 3045F PR MOST RECENT HEMOGLOBIN A1C LEVEL 7.0-9.0%: ICD-10-PCS | Mod: CPTII,S$GLB,, | Performed by: PODIATRIST

## 2019-05-09 PROCEDURE — 1101F PT FALLS ASSESS-DOCD LE1/YR: CPT | Mod: CPTII,S$GLB,, | Performed by: PODIATRIST

## 2019-05-09 PROCEDURE — 99999 PR PBB SHADOW E&M-EST. PATIENT-LVL III: CPT | Mod: PBBFAC,,, | Performed by: PODIATRIST

## 2019-05-09 NOTE — PROCEDURES
"Wound Debridement  Date/Time: 5/9/2019 4:35 PM  Performed by: Flaco Morris DPM  Authorized by: Flaco Morris DPM     Time out: Immediately prior to procedure a "time out" was called to verify the correct patient, procedure, equipment, support staff and site/side marked as required.    Consent Done?:  Yes (Verbal)    Preparation: Patient was prepped and draped in usual sterile fashion    Local anesthesia used?: No      Wound Details:    Location:  Left foot    Location:  Left 5th Toe    Type of Debridement:  Excisional       Length (cm):  0.5       Area (sq cm):  0.1       Width (cm):  0.2       Percent Debrided (%):  100       Depth (cm):  0.1       Total Area Debrided (sq cm):  0.1    Depth of debridement:  Subcutaneous tissue    Tissue debrided:  Subcutaneous    Devitalized tissue debrided:  Biofilm, Callus, Fibrin and Slough    Instruments:  Blade    Bleeding:  Minimal  Hemostasis Achieved: Yes    Method Used:  Pressure  Patient tolerance:  Patient tolerated the procedure well with no immediate complications     Macrina, followed by saline moistened hydrothera blue, yasmani foam, cast padding secured with coban.         "

## 2019-05-09 NOTE — TELEPHONE ENCOUNTER
----- Message from Carlotta Queen sent at 5/9/2019  3:58 PM CDT -----  Contact: Grisel, daughter, [phone]  States they went to [facility] and were running late for today's appt. When advised they would have to reschedule stated you knew patient had to go to Dialysis and wouldn't make it in on time. Requests a call back for patient to be seen sooner than 6/20 with Dr. Morris.

## 2019-05-09 NOTE — PROGRESS NOTES
"Subjective:      Patient ID: Martha Melvin is a 73 y.o. female.    Chief Complaint: Foot Ulcer (left foot blister and foot and toes swollen )    Martha Melvin is a 73 y.o. female who  has a past medical history of Arthritis, Diabetes mellitus, Hyperlipidemia, Hypertension, and Renal disorder.  Right BKA.    Patient was recently hospitalized for osteomyelitis of left 5th digit where she underwent bone biopsy, revascularized,. and is currently on 6 week course of abx that are being dosed after dialysis.  Denies F/C/N/V    1/25/19: Wound check. Dressing remained intact since last visit. No new complaints.    2/22/19: Wound check. Relates HH did not return to change her dressings and dressing remained on since previous visit.    5/9/19: Noticed a clear fluid filled blister left fifth toe develop a few days ago. Denies trauma. Says her shoes rubbed. Noticed at dialysis.    Vitals:    05/09/19 1614   BP: (!) 146/80   Pulse: 80   Weight: 83.9 kg (185 lb)   Height: 5' 5" (1.651 m)   PainSc: 0-No pain      Past Medical History:   Diagnosis Date    Arthritis     Diabetes mellitus     Hyperlipidemia     Hypertension     Renal disorder     poor kidney function       Past Surgical History:   Procedure Laterality Date    AORTOGRAM, WITH SERIALOGRAPHY N/A 12/31/2018    Performed by [facility] at [facility] CATH LAB/EP    Atherectomy, Peripheral Blood Vessel N/A 1/2/2019    Performed by Kenny Fink MD at [facility] CATH LAB/EP    Fistulogram, Arteriovenous Left 1/2/2019    Performed by Kenny [facility]ink MD at [facility] CATH LAB/EP    LEG AMPUTATION Right     PERMCATH INSERTION-TUNNELED CVC N/A 4/25/2018    Performed by [facility]hu [facility] at [facility] CATH LAB    PTA, PERIPHERAL VESSEL N/A 1/2/2019    Performed by [facility]ink [facility] at [facility] CATH LAB/EP    TRANSPOSITION, VEIN, BASILIC Left 6/27/2018    Performed by AYESHA Mesa III, MD at [facility] OR 2ND FLR    ULTRASOUND, INTRAVASCULAR N/A 1/2/2019    Performed by Kenny LI" [facility] CATH LAB/EP       Family History   Problem Relation Age of Onset    Cancer Mother     Diabetes Mother     Hypertension Mother     Cancer Father     Cancer Sister        Social History     Socioeconomic History    Marital status: Single     Spouse name: Not on file    Number of children: Not on file    Years of education: Not on file    Highest education level: Not on file   Occupational History    Not on file   Social Needs    Financial resource strain: Not on file    Food insecurity:     Worry: Not on file     Inability: Not on file    Transportation needs:     Medical: Not on file     Non-medical: Not on file   Tobacco Use    Smoking status: Never Smoker    Smokeless tobacco: Never Used   Substance and Sexual Activity    Alcohol use: No    Drug use: Not on file    Sexual activity: Not on file   Lifestyle    Physical activity:     Days per week: Not on file     Minutes per session: Not on file    Stress: Not on file   Relationships    Social connections:     Talks on phone: Not on file     Gets together: Not on file     Attends [employer] service: Not on file     Active member of club or organization: Not on file     Attends meetings of clubs or organizations: Not on file     Relationship status: Not on file   Other Topics Concern    Not on file   Social History Narrative    Not on file       Current Outpatient Medications   Medication Sig Dispense Refill    amLODIPine (NORVASC) 10 MG tablet Take 1 tablet (10 mg total) by mouth once daily. 90 tablet 0    atorvastatin (LIPITOR) 40 MG tablet Take 1 tablet (40 mg total) by mouth once daily. 90 tablet 0    bisacodyl (DULCOLAX) 5 mg EC tablet Take 5 mg by mouth daily as needed for Constipation.      blood sugar diagnostic Strp Test 2 (two) times daily.as directed 100 each 5    blood-glucose meter Misc Use to test blood sugar twice daily 1 each 0    clopidogrel (PLAVIX) 75 mg tablet Take 1 tablet (75 mg total) by mouth once  "daily. 30 tablet 11    furosemide (LASIX) 80 MG tablet TAKE 1 TABLET BY MOUTH TWICE DAILY 60 tablet 6    insulin aspart U-100 (NOVOLOG FLEXPEN U-100 INSULIN) 100 unit/mL InPn pen Inject 10 Units into the skin 3 (three) times daily with meals. (Patient taking differently: Inject 4 Units into the skin 3 (three) times daily with meals. ) 15 mL 1    insulin glargine 100 units/mL (3mL) SubQ pen Inject into the skin 34  units into the skin every evening 30 mL 1    lancets 30 gauge Misc 1 lancet by Misc.(Non-Drug; Combo Route) route 2 (two) times daily. 100 each 5    loratadine (CLARITIN) 10 mg tablet Take 10 mg by mouth once daily.      pen needle, diabetic (BD ULTRA-FINE SHORT PEN NEEDLE) 31 gauge x 5/16" Ndle use as directed to inject insulin four times daily 100 each 1    pen needle, diabetic (NOVOFINE PLUS) 32 gauge x 1/6" Ndle use 3 (three) times daily as needed. 300 each 1    senna-docusate 8.6-50 mg (PERICOLACE) 8.6-50 mg per tablet Take 1 tablet by mouth 2 (two) times daily. (Patient taking differently: Take 1 tablet by mouth 2 (two) times daily as needed. )      sevelamer carbonate (RENVELA) 800 mg Tab Take 1 tablet (800 mg total) by mouth 3 (three) times a day with meals. 90 tablet 0    calcium carbonate (TUMS E-X) 300 mg (750 mg) Chew Take 1 tablet by mouth 3 (three) times daily with meals. 90 tablet 2     No current facility-administered medications for this visit.        Review of patient's allergies indicates:   Allergen Reactions    Morphine          Review of Systems   Constitution: Negative for chills and fever.   HENT: Negative for congestion.    Cardiovascular: Negative for chest pain.   Respiratory: Negative for cough and shortness of breath.    Skin: Positive for nail changes and poor wound healing. Negative for color change, dry skin, suspicious lesions and unusual hair distribution.   Musculoskeletal: Negative for falls, joint pain, joint swelling, muscle weakness and myalgias. "   Gastrointestinal: Negative for nausea and vomiting.   Neurological: Positive for numbness and sensory change. Negative for loss of balance, paresthesias and tremors.   Psychiatric/Behavioral: Negative for altered mental status. The patient is not nervous/anxious.            Objective:      Physical Exam   Constitutional: She is oriented to person, place, and time. She appears well-developed and well-nourished. No distress.   HENT:   Head: Normocephalic and atraumatic.   Neck: No tracheal deviation present.   Cardiovascular:   Pulses:       Dorsalis pedis pulses are 1+ on the left side.        Posterior tibial pulses are 1+ on the left side.   No hair growth bilateral lower extremity. Skin temp warm to cool bilateral foot.   Musculoskeletal: Normal range of motion. She exhibits no edema, tenderness or deformity.   No pain with ROM or MMT left lower extremity.       Feet:   Right Foot: amputated  Left Foot:   Protective Sensation: 10 sites tested. 7 sites sensed.   Skin Integrity: Positive for ulcer and dry skin. Negative for erythema or warmth.   Neurological: She is alert and oriented to person, place, and time. She has normal strength. She displays no atrophy and no tremor. A sensory deficit is present.   Reflex Scores:       Patellar reflexes are 2+ on the left side.       Achilles reflexes are 2+ on the left side.  Vibratory sensation diminished.  5/5 muscle strength  LLE   Skin: Skin is dry. Capillary refill takes 2 to 3 seconds. No rash noted. She is not diaphoretic. No cyanosis or erythema. No pallor. Nails show no clubbing.   Ulcer Location: left fifth toe dorsal lateral PIPJ region  Measurements: 0.5x0.2x0.0 pre-debridement and 0.5x0.2x0.1cm post debridement  Periwound: Intact  Drainage: serosanguinous.  Pus: None.  Malodor: None.  Base:  80% granular. 20% fibrin. With slough and biofilm  Signs of infection: None.  Does not probe or track or undermine    Skin is dry and flaky left foot.    Otherwise no  open wounds, interdigital macerations, or calluses.       Psychiatric: She has a normal mood and affect. Her behavior is normal.     Right BKA          Assessment:       Encounter Diagnoses   Name Primary?    Diabetic ulcer of toe of left foot associated with type 2 diabetes mellitus, with fat layer exposed Yes    PAD (peripheral artery disease)     ESRD (end stage renal disease) on dialysis          Plan:       Martha was seen today for foot ulcer.    Diagnoses and all orders for this visit:    Diabetic ulcer of toe of left foot associated with type 2 diabetes mellitus, with fat layer exposed  -     Aerobic culture (Specify Source)  -     Wound Debridement    PAD (peripheral artery disease)    ESRD (end stage renal disease) on dialysis      I counseled the patient on her conditions, their implications and medical management.    Wound debridement and dressing per attached note. Keep dressing intact.    Rest and elevate.    Discussed appropriate foot hygiene and appropriate shoe gear. Recommend daily foot checks.    RTC 7-10 days or prn.     Assisted by Emily Watson DPM PGY 3

## 2019-05-14 ENCOUNTER — TELEPHONE (OUTPATIENT)
Dept: PODIATRY | Facility: CLINIC | Age: 74
End: 2019-05-14

## 2019-05-14 NOTE — TELEPHONE ENCOUNTER
Spoke with pt and notified her that the specimen that was taken at her last visit with Dr. Morris that the company did not come and pick it up, so we had to discard of it and will re culture her foot on Friday. Pt understood this and was ok with it.

## 2019-05-17 ENCOUNTER — OFFICE VISIT (OUTPATIENT)
Dept: PODIATRY | Facility: CLINIC | Age: 74
End: 2019-05-17
Payer: MEDICARE

## 2019-05-17 VITALS
WEIGHT: 185 LBS | HEART RATE: 80 BPM | SYSTOLIC BLOOD PRESSURE: 151 MMHG | BODY MASS INDEX: 30.82 KG/M2 | DIASTOLIC BLOOD PRESSURE: 82 MMHG | HEIGHT: 65 IN

## 2019-05-17 DIAGNOSIS — N18.6 ESRD (END STAGE RENAL DISEASE) ON DIALYSIS: ICD-10-CM

## 2019-05-17 DIAGNOSIS — E11.621 DIABETIC ULCER OF TOE OF LEFT FOOT ASSOCIATED WITH TYPE 2 DIABETES MELLITUS, WITH FAT LAYER EXPOSED: Primary | ICD-10-CM

## 2019-05-17 DIAGNOSIS — I73.9 PAD (PERIPHERAL ARTERY DISEASE): ICD-10-CM

## 2019-05-17 DIAGNOSIS — Z99.2 ESRD (END STAGE RENAL DISEASE) ON DIALYSIS: ICD-10-CM

## 2019-05-17 DIAGNOSIS — L97.522 DIABETIC ULCER OF TOE OF LEFT FOOT ASSOCIATED WITH TYPE 2 DIABETES MELLITUS, WITH FAT LAYER EXPOSED: Primary | ICD-10-CM

## 2019-05-17 PROCEDURE — 99999 PR PBB SHADOW E&M-EST. PATIENT-LVL IV: ICD-10-PCS | Mod: PBBFAC,,, | Performed by: PODIATRIST

## 2019-05-17 PROCEDURE — 99999 PR PBB SHADOW E&M-EST. PATIENT-LVL IV: CPT | Mod: PBBFAC,,, | Performed by: PODIATRIST

## 2019-05-17 PROCEDURE — 99499 UNLISTED E&M SERVICE: CPT | Mod: S$GLB,,, | Performed by: PODIATRIST

## 2019-05-17 PROCEDURE — 11042 WOUND DEBRIDEMENT: ICD-10-PCS | Mod: S$GLB,,, | Performed by: PODIATRIST

## 2019-05-17 PROCEDURE — 11042 DBRDMT SUBQ TIS 1ST 20SQCM/<: CPT | Mod: S$GLB,,, | Performed by: PODIATRIST

## 2019-05-17 PROCEDURE — 99499 NO LOS: ICD-10-PCS | Mod: S$GLB,,, | Performed by: PODIATRIST

## 2019-05-17 NOTE — PROGRESS NOTES
"Subjective:      Patient ID: Martha Melvin is a 73 y.o. female.    Chief Complaint: Follow-up (ulcer of toe left foot )    Martha Melvin is a 73 y.o. female who  has a past medical history of Arthritis, Diabetes mellitus, Hyperlipidemia, Hypertension, and Renal disorder.  Right BKA.    Patient was recently hospitalized for osteomyelitis of left 5th digit where she underwent bone biopsy, revascularized,. and is currently on 6 week course of abx that are being dosed after dialysis.  Denies F/C/N/V    1/25/19: Wound check. Dressing remained intact since last visit. No new complaints.    2/22/19: Wound check. Relates HH did not return to change her dressings and dressing remained on since previous visit.    5/9/19: Noticed a clear fluid filled blister left fifth toe develop a few days ago. Denies trauma. Says her shoes rubbed. Noticed at dialysis.    5/17/19: Pt seen today, no pedal complaints. Wound check. Dressing remained intact.    Vitals:    05/17/19 1519   BP: (!) 151/82   Pulse: 80   Weight: 83.9 kg (185 lb)   Height: 5' 5" (1.651 m)   PainSc: 0-No pain      Past Medical History:   Diagnosis Date    Arthritis     Diabetes mellitus     Hyperlipidemia     Hypertension     Renal disorder     poor kidney function       Past Surgical History:   Procedure Laterality Date    AORTOGRAM, WITH SERIALOGRAPHY N/A 12/31/2018    Performed by [facility]ckay MD at [facility] CATH LAB/EP    Atherectomy, Peripheral Blood Vessel N/A 1/2/2019    Performed by Kenny Fink MD at [facility] CATH LAB/EP    Fistulogram, Arteriovenous Left 1/2/2019    Performed by Kenny Fink MD at [facility] CATH LAB/EP    LEG AMPUTATION Right     PERMCATH INSERTION-TUNNELED CVC N/A 4/25/2018    Per[facility] CATH LAB    PTA, PERIPHERAL VESSEL N/A 1/2/2019    Performed by Kenny Fink MD at [facility] CATH LAB/EP    TRANSPOSITION, VEIN, BASILIC Left 6/27/2018    Performed by AYESHA Mesa III, MD at [facility] OR 2ND FLR    " ULTRASOUND, INTRAVASCULAR N/A 1/2/2019    Performed by [facility] at [facility] CATH LAB/EP       Family History   Problem Relation Age of Onset    Cancer Mother     Diabetes Mother     Hypertension Mother     Cancer Father     Cancer Sister        Social History     Socioeconomic History    Marital status: Single     Spouse name: Not on file    Number of children: Not on file    Years of education: Not on file    Highest education level: Not on file   Occupational History    Not on file   Social Needs    Financial resource strain: Not on file    Food insecurity:     Worry: Not on file     Inability: Not on file    Transportation needs:     Medical: Not on file     Non-medical: Not on file   Tobacco Use    Smoking status: Never Smoker    Smokeless tobacco: Never Used   Substance and Sexual Activity    Alcohol use: No    Drug use: Not on file    Sexual activity: Not on file   Lifestyle    Physical activity:     Days per week: Not on file     Minutes per session: Not on file    Stress: Not on file   Relationships    Social connections:     Talks on phone: Not on file     Gets together: Not on file     Attends [employer]ism service: Not on file     Active member of club or organization: Not on file     Attends meetings of clubs or organizations: Not on file     Relationship status: Not on file   Other Topics Concern    Not on file   Social History Narrative    Not on file       Current Outpatient Medications   Medication Sig Dispense Refill    amLODIPine (NORVASC) 10 MG tablet Take 1 tablet (10 mg total) by mouth once daily. 90 tablet 0    atorvastatin (LIPITOR) 40 MG tablet Take 1 tablet (40 mg total) by mouth once daily. 90 tablet 0    bisacodyl (DULCOLAX) 5 mg EC tablet Take 5 mg by mouth daily as needed for Constipation.      blood sugar diagnostic Strp Test 2 (two) times daily.as directed 100 each 5    blood-glucose meter Misc Use to test blood sugar twice daily 1 each 0    clopidogrel  "(PLAVIX) 75 mg tablet Take 1 tablet (75 mg total) by mouth once daily. 30 tablet 11    furosemide (LASIX) 80 MG tablet TAKE 1 TABLET BY MOUTH TWICE DAILY 60 tablet 6    insulin aspart U-100 (NOVOLOG FLEXPEN U-100 INSULIN) 100 unit/mL InPn pen Inject 10 Units into the skin 3 (three) times daily with meals. (Patient taking differently: Inject 4 Units into the skin 3 (three) times daily with meals. ) 15 mL 1    insulin glargine 100 units/mL (3mL) SubQ pen Inject into the skin 34  units into the skin every evening 30 mL 1    lancets 30 gauge Misc 1 lancet by Misc.(Non-Drug; Combo Route) route 2 (two) times daily. 100 each 5    loratadine (CLARITIN) 10 mg tablet Take 10 mg by mouth once daily.      pen needle, diabetic (BD ULTRA-FINE SHORT PEN NEEDLE) 31 gauge x 5/16" Ndle use as directed to inject insulin four times daily 100 each 1    pen needle, diabetic (NOVOFINE PLUS) 32 gauge x 1/6" Ndle use 3 (three) times daily as needed. 300 each 1    senna-docusate 8.6-50 mg (PERICOLACE) 8.6-50 mg per tablet Take 1 tablet by mouth 2 (two) times daily. (Patient taking differently: Take 1 tablet by mouth 2 (two) times daily as needed. )      sevelamer carbonate (RENVELA) 800 mg Tab Take 1 tablet (800 mg total) by mouth 3 (three) times a day with meals. 90 tablet 0    calcium carbonate (TUMS E-X) 300 mg (750 mg) Chew Take 1 tablet by mouth 3 (three) times daily with meals. 90 tablet 2     No current facility-administered medications for this visit.        Review of patient's allergies indicates:   Allergen Reactions    Morphine          Review of Systems   Constitution: Negative for chills and fever.   HENT: Negative for congestion.    Cardiovascular: Negative for chest pain.   Respiratory: Negative for cough and shortness of breath.    Skin: Positive for nail changes and poor wound healing. Negative for color change, dry skin, suspicious lesions and unusual hair distribution.   Musculoskeletal: Negative for falls, " joint pain, joint swelling, muscle weakness and myalgias.   Gastrointestinal: Negative for nausea and vomiting.   Neurological: Positive for numbness and sensory change. Negative for loss of balance, paresthesias and tremors.   Psychiatric/Behavioral: Negative for altered mental status. The patient is not nervous/anxious.            Objective:      Physical Exam   Constitutional: She is oriented to person, place, and time. She appears well-developed and well-nourished. No distress.   HENT:   Head: Normocephalic and atraumatic.   Neck: No tracheal deviation present.   Cardiovascular:   Pulses:       Dorsalis pedis pulses are 1+ on the left side.        Posterior tibial pulses are 1+ on the left side.   No hair growth bilateral lower extremity. Skin temp warm to cool bilateral foot.   Musculoskeletal: Normal range of motion. She exhibits no edema, tenderness or deformity.   No pain with ROM or MMT left lower extremity.       Feet:   Right Foot: amputated  Left Foot:   Protective Sensation: 10 sites tested. 7 sites sensed.   Skin Integrity: Positive for ulcer and dry skin. Negative for erythema or warmth.   Neurological: She is alert and oriented to person, place, and time. She has normal strength. She displays no atrophy and no tremor. A sensory deficit is present.   Reflex Scores:       Patellar reflexes are 2+ on the left side.       Achilles reflexes are 2+ on the left side.  Vibratory sensation diminished.  5/5 muscle strength  LLE   Skin: Skin is dry. Capillary refill takes 2 to 3 seconds. No ecchymosis and no rash noted. She is not diaphoretic. No cyanosis or erythema. No pallor. Nails show no clubbing.   Ulcer Location: left fifth toe dorsal lateral PIPJ region  Measurements: 0.1x0.1x0.1 pre-debridement and 0.3x0.4x0.2cm post debridement  Periwound: Intact  Drainage: serosanguinous.  Pus: None.  Malodor: None.  Base:  80% granular. 20% fibrin. With slough and biofilm  Signs of infection: None.  Does not probe  or track or undermine    Skin is dry and flaky left foot.    Otherwise no open wounds, interdigital macerations, or calluses.       Psychiatric: She has a normal mood and affect. Her behavior is normal.     Right BKA          Assessment:       Encounter Diagnoses   Name Primary?    Diabetic ulcer of toe of left foot associated with type 2 diabetes mellitus, with fat layer exposed Yes    PAD (peripheral artery disease)     ESRD (end stage renal disease) on dialysis          Plan:       Martha was seen today for follow-up.    Diagnoses and all orders for this visit:    Diabetic ulcer of toe of left foot associated with type 2 diabetes mellitus, with fat layer exposed  -     Wound Debridement    PAD (peripheral artery disease)  -     Wound Debridement    ESRD (end stage renal disease) on dialysis  -     Wound Debridement      I counseled the patient on her conditions, their implications and medical management.    Wound debridement and dressing per attached note. Keep dressing intact.    Rest and elevate.    Discussed appropriate foot hygiene and appropriate shoe gear. Recommend daily foot checks.    RTC 7-10 days or prn.     Assisted by Emily Watson DPM PGY 3

## 2019-05-17 NOTE — PROCEDURES
"Wound Debridement  Date/Time: 5/17/2019 3:31 PM  Performed by: Flaco Morris DPM  Authorized by: Flaco Morris DPM     Time out: Immediately prior to procedure a "time out" was called to verify the correct patient, procedure, equipment, support staff and site/side marked as required.    Consent Done?:  Yes (Verbal)    Preparation: Patient was prepped and draped in usual sterile fashion    Local anesthesia used?: No      Wound Details:    Location:  Left foot    Location:  Left 5th Toe    Type of Debridement:  Excisional       Length (cm):  0.3       Area (sq cm):  0.12       Width (cm):  0.4       Percent Debrided (%):  100       Depth (cm):  0.2       Total Area Debrided (sq cm):  0.12    Depth of debridement:  Subcutaneous tissue    Devitalized tissue debrided:  Biofilm, Callus, Fibrin and Slough    Instruments:  Curette    Bleeding:  Minimal  Hemostasis Achieved: Yes    Method Used:  Pressure  Patient tolerance:  Patient tolerated the procedure well with no immediate complications     Applied saline moistened aidee, yasmani foam, cast padding x 2 secured with coban.        "

## 2019-07-16 ENCOUNTER — TELEPHONE (OUTPATIENT)
Dept: INTERNAL MEDICINE | Facility: CLINIC | Age: 74
End: 2019-07-16

## 2019-07-16 ENCOUNTER — OFFICE VISIT (OUTPATIENT)
Dept: INTERNAL MEDICINE | Facility: CLINIC | Age: 74
End: 2019-07-16
Payer: MEDICARE

## 2019-07-16 VITALS
HEART RATE: 80 BPM | SYSTOLIC BLOOD PRESSURE: 120 MMHG | WEIGHT: 187.81 LBS | HEIGHT: 65 IN | BODY MASS INDEX: 31.29 KG/M2 | DIASTOLIC BLOOD PRESSURE: 60 MMHG | TEMPERATURE: 98 F | RESPIRATION RATE: 12 BRPM

## 2019-07-16 DIAGNOSIS — I15.2 HYPERTENSION ASSOCIATED WITH DIABETES: Primary | ICD-10-CM

## 2019-07-16 DIAGNOSIS — I35.0 AORTIC VALVE STENOSIS, ETIOLOGY OF CARDIAC VALVE DISEASE UNSPECIFIED: ICD-10-CM

## 2019-07-16 DIAGNOSIS — E11.69 HYPERLIPIDEMIA ASSOCIATED WITH TYPE 2 DIABETES MELLITUS: ICD-10-CM

## 2019-07-16 DIAGNOSIS — N18.6 ESRD (END STAGE RENAL DISEASE) ON DIALYSIS: ICD-10-CM

## 2019-07-16 DIAGNOSIS — Z99.2 DIABETES MELLITUS DUE TO UNDERLYING CONDITION WITH CHRONIC KIDNEY DISEASE ON CHRONIC DIALYSIS, WITH LONG-TERM CURRENT USE OF INSULIN: ICD-10-CM

## 2019-07-16 DIAGNOSIS — Z99.2 ESRD (END STAGE RENAL DISEASE) ON DIALYSIS: ICD-10-CM

## 2019-07-16 DIAGNOSIS — E08.22 DIABETES MELLITUS DUE TO UNDERLYING CONDITION WITH CHRONIC KIDNEY DISEASE ON CHRONIC DIALYSIS, WITH LONG-TERM CURRENT USE OF INSULIN: ICD-10-CM

## 2019-07-16 DIAGNOSIS — E78.5 HYPERLIPIDEMIA ASSOCIATED WITH TYPE 2 DIABETES MELLITUS: ICD-10-CM

## 2019-07-16 DIAGNOSIS — E11.22 TYPE 2 DIABETES MELLITUS WITH ESRD (END-STAGE RENAL DISEASE): ICD-10-CM

## 2019-07-16 DIAGNOSIS — Z12.11 SCREEN FOR COLON CANCER: ICD-10-CM

## 2019-07-16 DIAGNOSIS — N18.6 TYPE 2 DIABETES MELLITUS WITH ESRD (END-STAGE RENAL DISEASE): ICD-10-CM

## 2019-07-16 DIAGNOSIS — R01.1 CARDIAC MURMUR: ICD-10-CM

## 2019-07-16 DIAGNOSIS — E11.59 HYPERTENSION ASSOCIATED WITH DIABETES: Primary | ICD-10-CM

## 2019-07-16 DIAGNOSIS — I50.32 CHRONIC DIASTOLIC HEART FAILURE: ICD-10-CM

## 2019-07-16 DIAGNOSIS — Z79.4 DIABETES MELLITUS DUE TO UNDERLYING CONDITION WITH CHRONIC KIDNEY DISEASE ON CHRONIC DIALYSIS, WITH LONG-TERM CURRENT USE OF INSULIN: ICD-10-CM

## 2019-07-16 DIAGNOSIS — Z89.511 HISTORY OF RIGHT BELOW KNEE AMPUTATION: ICD-10-CM

## 2019-07-16 DIAGNOSIS — N18.6 DIABETES MELLITUS DUE TO UNDERLYING CONDITION WITH CHRONIC KIDNEY DISEASE ON CHRONIC DIALYSIS, WITH LONG-TERM CURRENT USE OF INSULIN: ICD-10-CM

## 2019-07-16 PROBLEM — E11.621 DIABETIC ULCER OF TOE OF LEFT FOOT: Status: RESOLVED | Noted: 2018-12-28 | Resolved: 2019-07-16

## 2019-07-16 PROBLEM — T82.858A STENOSIS OF ARTERIOVENOUS DIALYSIS FISTULA: Status: RESOLVED | Noted: 2018-09-14 | Resolved: 2019-07-16

## 2019-07-16 PROBLEM — L97.529 DIABETIC ULCER OF TOE OF LEFT FOOT: Status: RESOLVED | Noted: 2018-12-28 | Resolved: 2019-07-16

## 2019-07-16 PROBLEM — E11.628 DIABETIC FOOT INFECTION: Status: RESOLVED | Noted: 2018-12-28 | Resolved: 2019-07-16

## 2019-07-16 PROBLEM — L08.9 DIABETIC FOOT INFECTION: Status: RESOLVED | Noted: 2018-12-28 | Resolved: 2019-07-16

## 2019-07-16 PROCEDURE — 3045F PR MOST RECENT HEMOGLOBIN A1C LEVEL 7.0-9.0%: CPT | Mod: CPTII,S$GLB,, | Performed by: INTERNAL MEDICINE

## 2019-07-16 PROCEDURE — 3045F PR MOST RECENT HEMOGLOBIN A1C LEVEL 7.0-9.0%: ICD-10-PCS | Mod: CPTII,S$GLB,, | Performed by: INTERNAL MEDICINE

## 2019-07-16 PROCEDURE — 99999 PR PBB SHADOW E&M-EST. PATIENT-LVL III: CPT | Mod: PBBFAC,,, | Performed by: INTERNAL MEDICINE

## 2019-07-16 PROCEDURE — 99214 OFFICE O/P EST MOD 30 MIN: CPT | Mod: 25,S$GLB,, | Performed by: INTERNAL MEDICINE

## 2019-07-16 PROCEDURE — 90732 PNEUMOCOCCAL POLYSACCHARIDE VACCINE 23-VALENT =>2YO SQ IM: ICD-10-PCS | Mod: S$GLB,,, | Performed by: INTERNAL MEDICINE

## 2019-07-16 PROCEDURE — 90732 PPSV23 VACC 2 YRS+ SUBQ/IM: CPT | Mod: S$GLB,,, | Performed by: INTERNAL MEDICINE

## 2019-07-16 PROCEDURE — 1101F PR PT FALLS ASSESS DOC 0-1 FALLS W/OUT INJ PAST YR: ICD-10-PCS | Mod: CPTII,S$GLB,, | Performed by: INTERNAL MEDICINE

## 2019-07-16 PROCEDURE — G0009 PNEUMOCOCCAL POLYSACCHARIDE VACCINE 23-VALENT =>2YO SQ IM: ICD-10-PCS | Mod: S$GLB,,, | Performed by: INTERNAL MEDICINE

## 2019-07-16 PROCEDURE — 99999 PR PBB SHADOW E&M-EST. PATIENT-LVL III: ICD-10-PCS | Mod: PBBFAC,,, | Performed by: INTERNAL MEDICINE

## 2019-07-16 PROCEDURE — 1101F PT FALLS ASSESS-DOCD LE1/YR: CPT | Mod: CPTII,S$GLB,, | Performed by: INTERNAL MEDICINE

## 2019-07-16 PROCEDURE — 99214 PR OFFICE/OUTPT VISIT, EST, LEVL IV, 30-39 MIN: ICD-10-PCS | Mod: 25,S$GLB,, | Performed by: INTERNAL MEDICINE

## 2019-07-16 PROCEDURE — G0009 ADMIN PNEUMOCOCCAL VACCINE: HCPCS | Mod: S$GLB,,, | Performed by: INTERNAL MEDICINE

## 2019-07-16 RX ORDER — INSULIN ASPART 100 [IU]/ML
4 INJECTION, SOLUTION INTRAVENOUS; SUBCUTANEOUS
Start: 2019-07-16 | End: 2019-07-23 | Stop reason: SDUPTHER

## 2019-07-16 RX ORDER — AMLODIPINE BESYLATE 10 MG/1
10 TABLET ORAL DAILY
Qty: 90 TABLET | Refills: 3 | Status: SHIPPED | OUTPATIENT
Start: 2019-07-16 | End: 2021-01-20 | Stop reason: SDUPTHER

## 2019-07-16 RX ORDER — INSULIN GLARGINE 100 [IU]/ML
20 INJECTION, SOLUTION SUBCUTANEOUS NIGHTLY
Qty: 30 ML | Refills: 1
Start: 2019-07-16 | End: 2021-01-20

## 2019-07-16 NOTE — TELEPHONE ENCOUNTER
----- Message from Katiuska Villalba MD sent at 7/16/2019  2:50 PM CDT -----  Need hemoglobin A1c and lipid panel.  Please see if these labs can be drawn at the dialysis clinic.  External lab orders done

## 2019-07-16 NOTE — PROGRESS NOTES
Subjective:       Patient ID: Martha Melvin is a 73 y.o. female who presents for Follow-up (6 mo); Hypertension; Diabetes; and Hyperlipidemia   Patient is here with her daughter.    Hypertension   This is a chronic problem. The current episode started more than 1 year ago. The problem is unchanged. Pertinent negatives include no anxiety, chest pain, headaches, malaise/fatigue, palpitations, peripheral edema or shortness of breath. There are no associated agents to hypertension. Risk factors for coronary artery disease include diabetes mellitus and dyslipidemia. Past treatments include calcium channel blockers and diuretics. The current treatment provides moderate improvement. Compliance problems include exercise.  Hypertensive end-organ damage includes kidney disease. Identifiable causes of hypertension include chronic renal disease and renovascular disease.   Diabetes   She presents for her follow-up diabetic visit. She has type 2 diabetes mellitus. Her disease course has been stable. There are no hypoglycemic associated symptoms. Pertinent negatives for hypoglycemia include no dizziness, headaches or tremors. Associated symptoms include foot ulcerations. Pertinent negatives for diabetes include no chest pain. There are no hypoglycemic complications. Symptoms are stable. Diabetic complications include heart disease, nephropathy and peripheral neuropathy. Risk factors for coronary artery disease include diabetes mellitus, dyslipidemia, hypertension and sedentary lifestyle. Current diabetic treatment includes intensive insulin program. Her weight is stable. She rarely participates in exercise. There is no change in her home blood glucose trend. Her breakfast blood glucose range is generally 110-130 mg/dl. An ACE inhibitor/angiotensin II receptor blocker is not being taken.   Hyperlipidemia   This is a chronic problem. The current episode started more than 1 year ago. Exacerbating diseases include chronic renal  disease and diabetes. She has no history of hypothyroidism. There are no known factors aggravating her hyperlipidemia. Pertinent negatives include no chest pain, leg pain, myalgias or shortness of breath. Current antihyperlipidemic treatment includes statins. Compliance problems include adherence to exercise.  Risk factors for coronary artery disease include diabetes mellitus, dyslipidemia, hypertension, a sedentary lifestyle and obesity.      Dialysis on Tu[facility]Th-Sat [facility]    -140's at home.      Review of Systems   Constitutional: Negative for chills, diaphoresis, fever and malaise/fatigue.   HENT: Negative for congestion, ear discharge, ear pain and sinus pressure.    Eyes: Negative for discharge and redness.   Respiratory: Negative for cough, chest tightness and shortness of breath.    Cardiovascular: Negative for chest pain, palpitations and leg swelling.        Right lower extremity prosthesis in place   Gastrointestinal: Negative for abdominal pain, constipation, diarrhea, nausea and vomiting.   Musculoskeletal: Negative for arthralgias and myalgias.   Skin: Negative for rash and wound.   Neurological: Negative for dizziness, tremors and headaches.       Objective:      Physical Exam   Constitutional: She is oriented to person, place, and time. Vital signs are normal. She appears well-developed and well-nourished. No distress.   HENT:   Head: Normocephalic and atraumatic.   Right Ear: Hearing and external ear normal.   Left Ear: Hearing and external ear normal.   Nose: Nose normal.   Mouth/Throat: Uvula is midline.   Eyes: Lids are normal.   Neck: Full passive range of motion without pain.   Cardiovascular: Normal rate, regular rhythm, normal heart sounds and intact distal pulses.   No murmur heard.  Pulmonary/Chest: Effort normal and breath sounds normal. She has no wheezes.   Abdominal: Soft. Bowel sounds are normal. She exhibits no distension. There is no tenderness.    Musculoskeletal: Normal range of motion. She exhibits no edema.        Right knee: No tenderness found.   No tenderness close to right lower extremity prosthesis   Neurological: She is alert and oriented to person, place, and time.   Skin: Skin is warm, dry and intact. No rash noted. She is not diaphoretic.   Psychiatric: She has a normal mood and affect.   Vitals reviewed.      Assessment/Plan:       1. Hypertension associated with diabetes  - blood pressure is controlled today, continue amlodipine and Lasix  - amLODIPine (NORVASC) 10 MG tablet; Take 1 tablet (10 mg total) by mouth once daily.  Dispense: 90 tablet; Refill: 3  - monitor blood pressure readings on non-dialysis days and call with readings    2. Type 2 diabetes mellitus with ESRD (end-stage renal disease)  - patient reports using Lantus 20 units daily and Novolin 4 units 3 times a day    3. Diabetes mellitus due to underlying condition with chronic kidney disease on chronic dialysis, with long-term current use of insulin  - reports good blood sugar control, will check hemoglobin A1c    4. ESRD (end stage renal disease) on dialysis  - on dialysis Tuesday Thursday and Saturday  - BP is controlled today after HD    5. Hyperlipidemia associated with type 2 diabetes mellitus  - last lipid panel done in December, was elevated, will need repeat lipid panel    6. Cardiac murmur  - Transthoracic echo (TTE) 2D with Color Flow; Future    7. Aortic valve stenosis, etiology of cardiac valve disease unspecified  - Transthoracic echo (TTE) 2D with Color Flow; Future    8. Chronic diastolic heart failure  - Transthoracic echo (TTE) 2D with Color Flow; Future    9. Screen for colon cancer  - Fecal Immunochemical Test (iFOBT); Future    10. History of right below knee amputation  - patient will bring paperwork for replacement prosthesis    RTC in September for annual exam or sooner if needed    Katiuska Villalba MD  Internal Medicine, [facility]

## 2019-07-16 NOTE — Clinical Note
Need hemoglobin A1c and lipid panel.  Please see if these labs can be drawn at the dialysis clinic.  External lab orders done

## 2019-07-19 ENCOUNTER — CLINICAL SUPPORT (OUTPATIENT)
Dept: CARDIOLOGY | Facility: CLINIC | Age: 74
End: 2019-07-19
Attending: INTERNAL MEDICINE
Payer: MEDICARE

## 2019-07-19 VITALS
HEIGHT: 65 IN | BODY MASS INDEX: 31.16 KG/M2 | HEART RATE: 76 BPM | DIASTOLIC BLOOD PRESSURE: 76 MMHG | SYSTOLIC BLOOD PRESSURE: 140 MMHG | WEIGHT: 187 LBS

## 2019-07-19 DIAGNOSIS — I51.89 DIASTOLIC DYSFUNCTION: ICD-10-CM

## 2019-07-19 DIAGNOSIS — I35.0 MODERATE AORTIC STENOSIS: Primary | ICD-10-CM

## 2019-07-19 DIAGNOSIS — R01.1 CARDIAC MURMUR: ICD-10-CM

## 2019-07-19 DIAGNOSIS — I50.32 CHRONIC DIASTOLIC HEART FAILURE: ICD-10-CM

## 2019-07-19 DIAGNOSIS — I35.0 AORTIC VALVE STENOSIS, ETIOLOGY OF CARDIAC VALVE DISEASE UNSPECIFIED: ICD-10-CM

## 2019-07-19 DIAGNOSIS — Z86.74 HISTORY OF CARDIAC ARREST: ICD-10-CM

## 2019-07-19 LAB
ASCENDING AORTA: 3.23 CM
AV INDEX (PROSTH): 0.33
AV MEAN GRADIENT: 25 MMHG
AV PEAK GRADIENT: 38 MMHG
AV VALVE AREA: 1.27 CM2
AV VELOCITY RATIO: 0.35
BSA FOR ECHO PROCEDURE: 1.97 M2
CV ECHO LV RWT: 0.49 CM
DOP CALC AO PEAK VEL: 3.1 M/S
DOP CALC AO VTI: 76.48 CM
DOP CALC LVOT AREA: 3.8 CM2
DOP CALC LVOT DIAMETER: 2.2 CM
DOP CALC LVOT PEAK VEL: 1.07 M/S
DOP CALC LVOT STROKE VOLUME: 96.92 CM3
DOP CALCLVOT PEAK VEL VTI: 25.51 CM
E WAVE DECELERATION TIME: 220.77 MSEC
E/A RATIO: 0.98
E/E' RATIO: 29.5 M/S
ECHO LV POSTERIOR WALL: 1.07 CM (ref 0.6–1.1)
FRACTIONAL SHORTENING: 39 % (ref 28–44)
INTERVENTRICULAR SEPTUM: 1.04 CM (ref 0.6–1.1)
IVRT: 0.08 MSEC
LA MAJOR: 5.41 CM
LA MINOR: 5.21 CM
LA WIDTH: 3.97 CM
LEFT ATRIUM SIZE: 3.9 CM
LEFT ATRIUM VOLUME INDEX: 36.3 ML/M2
LEFT ATRIUM VOLUME: 69.86 CM3
LEFT INTERNAL DIMENSION IN SYSTOLE: 2.7 CM (ref 2.1–4)
LEFT VENTRICLE DIASTOLIC VOLUME INDEX: 45.96 ML/M2
LEFT VENTRICLE DIASTOLIC VOLUME: 88.36 ML
LEFT VENTRICLE MASS INDEX: 83 G/M2
LEFT VENTRICLE SYSTOLIC VOLUME INDEX: 14 ML/M2
LEFT VENTRICLE SYSTOLIC VOLUME: 26.93 ML
LEFT VENTRICULAR INTERNAL DIMENSION IN DIASTOLE: 4.41 CM (ref 3.5–6)
LEFT VENTRICULAR MASS: 159.85 G
LV LATERAL E/E' RATIO: 29.5 M/S
LV SEPTAL E/E' RATIO: 29.5 M/S
MV PEAK A VEL: 1.21 M/S
MV PEAK E VEL: 1.18 M/S
PISA TR MAX VEL: 2.94 M/S
PULM VEIN S/D RATIO: 1.22
PV PEAK D VEL: 0.32 M/S
PV PEAK S VEL: 0.39 M/S
RA MAJOR: 5.06 CM
RA PRESSURE: 3 MMHG
RA WIDTH: 3.22 CM
RIGHT VENTRICULAR END-DIASTOLIC DIMENSION: 2.99 CM
SINUS: 2.77 CM
STJ: 2.39 CM
TDI LATERAL: 0.04 M/S
TDI SEPTAL: 0.04 M/S
TDI: 0.04 M/S
TR MAX PG: 35 MMHG
TRICUSPID ANNULAR PLANE SYSTOLIC EXCURSION: 1.74 CM
TV REST PULMONARY ARTERY PRESSURE: 38 MMHG

## 2019-07-19 PROCEDURE — 99999 PR PBB SHADOW E&M-EST. PATIENT-LVL II: ICD-10-PCS | Mod: PBBFAC,,,

## 2019-07-19 PROCEDURE — 99999 PR PBB SHADOW E&M-EST. PATIENT-LVL II: CPT | Mod: PBBFAC,,,

## 2019-07-19 PROCEDURE — 93306 TTE W/DOPPLER COMPLETE: CPT | Mod: S$GLB,,, | Performed by: INTERNAL MEDICINE

## 2019-07-19 PROCEDURE — 93306 TRANSTHORACIC ECHO (TTE) COMPLETE (CUPID ONLY): ICD-10-PCS | Mod: S$GLB,,, | Performed by: INTERNAL MEDICINE

## 2019-07-22 ENCOUNTER — TELEPHONE (OUTPATIENT)
Dept: INTERNAL MEDICINE | Facility: CLINIC | Age: 74
End: 2019-07-22

## 2019-07-22 NOTE — TELEPHONE ENCOUNTER
Called pt; not available; LM going over results and comments from echo as well as informing of referral to cardiology for further review.     please assist with cardiology referral; thank you

## 2019-07-22 NOTE — TELEPHONE ENCOUNTER
----- Message from Katiuska Villalba MD sent at 7/19/2019  7:23 PM CDT -----  The echo shows that the heart can pump blood appropriately. But relaxation is impaired and the heart may not work as well. The aortic valve is also stiffened and that can also impair normal heart function. Advise referral to Cardiology for further monitoring of the heart abnormalities.

## 2019-07-23 DIAGNOSIS — Z99.2 DIABETES MELLITUS DUE TO UNDERLYING CONDITION WITH CHRONIC KIDNEY DISEASE ON CHRONIC DIALYSIS, WITH LONG-TERM CURRENT USE OF INSULIN: ICD-10-CM

## 2019-07-23 DIAGNOSIS — Z79.4 DIABETES MELLITUS DUE TO UNDERLYING CONDITION WITH CHRONIC KIDNEY DISEASE ON CHRONIC DIALYSIS, WITH LONG-TERM CURRENT USE OF INSULIN: ICD-10-CM

## 2019-07-23 DIAGNOSIS — N18.6 DIABETES MELLITUS DUE TO UNDERLYING CONDITION WITH CHRONIC KIDNEY DISEASE ON CHRONIC DIALYSIS, WITH LONG-TERM CURRENT USE OF INSULIN: ICD-10-CM

## 2019-07-23 DIAGNOSIS — E08.22 DIABETES MELLITUS DUE TO UNDERLYING CONDITION WITH CHRONIC KIDNEY DISEASE ON CHRONIC DIALYSIS, WITH LONG-TERM CURRENT USE OF INSULIN: ICD-10-CM

## 2019-07-23 RX ORDER — INSULIN ASPART 100 [IU]/ML
4 INJECTION, SOLUTION INTRAVENOUS; SUBCUTANEOUS
Qty: 12 ML | Refills: 3 | Status: SHIPPED | OUTPATIENT
Start: 2019-07-23 | End: 2020-08-17 | Stop reason: SDUPTHER

## 2019-07-23 NOTE — TELEPHONE ENCOUNTER
----- Message from Denzel Ambrosio sent at 7/23/2019  9:55 AM CDT -----  Patient Martha Daniel [id number] is requesting a refill on Novolog flexpen she is completely out. Please sent new Rx to [facility]..Thanks

## 2019-07-24 ENCOUNTER — TELEPHONE (OUTPATIENT)
Dept: INTERNAL MEDICINE | Facility: CLINIC | Age: 74
End: 2019-07-24

## 2019-07-24 NOTE — TELEPHONE ENCOUNTER
----- Message from Sandrine Rolle sent at 7/24/2019  2:58 PM CDT -----  Contact: Mayuri with [facility] [phone]  Mayuri called to clinical notes from last office visit also paperwork for prosthetics supplies.  (liners,cover,socks,foot) that was left by patient on 7/16.     Fax:[phone]  
O/v notes faxed to Mayuri with [facility] [phone]  
no

## 2019-07-24 NOTE — TELEPHONE ENCOUNTER
Spoke with dolores- states pt informed her she brought papers here last time she was seen (we do not have) pertaining to prosthetic leg. dolores states there is a letter from their facility that was supposed to have been filled out and faxed along with office notes. I explained to dolores that we do not have a letter- she is faxing letter to us. Will have dr tam review and act accordingly.

## 2019-07-24 NOTE — TELEPHONE ENCOUNTER
----- Message from Lyudmila Martinez sent at 7/24/2019  3:18 PM CDT -----  Contact: Mayuri with [facility]   Mayuri states there were no orders faxed over for the patient. Also, there is no mention of anything ordered for the patient's prosthesis. Please call & advise

## 2019-08-23 ENCOUNTER — TELEPHONE (OUTPATIENT)
Dept: INTERNAL MEDICINE | Facility: CLINIC | Age: 74
End: 2019-08-23

## 2019-08-23 NOTE — TELEPHONE ENCOUNTER
Following up.    Need to know where pt goes to dialysis, in order to fax orders for labs (lipid, a1c).    LMOV of pt and daughter.

## 2019-08-30 DIAGNOSIS — E78.5 HYPERLIPIDEMIA ASSOCIATED WITH TYPE 2 DIABETES MELLITUS: Primary | ICD-10-CM

## 2019-08-30 DIAGNOSIS — E11.69 HYPERLIPIDEMIA ASSOCIATED WITH TYPE 2 DIABETES MELLITUS: Primary | ICD-10-CM

## 2019-08-30 RX ORDER — ATORVASTATIN CALCIUM 40 MG/1
40 TABLET, FILM COATED ORAL DAILY
Qty: 90 TABLET | Refills: 0 | Status: SHIPPED | OUTPATIENT
Start: 2019-08-30 | End: 2019-12-23

## 2019-09-27 ENCOUNTER — TELEPHONE (OUTPATIENT)
Dept: VASCULAR SURGERY | Facility: CLINIC | Age: 74
End: 2019-09-27

## 2019-09-27 NOTE — TELEPHONE ENCOUNTER
Appt for [facility] scheduled for same date as appt with Dr. Mesa.----- Message from Marlin Chavira sent at 9/27/2019 12:57 PM CDT -----  Contact: Emelia pts daughter  Patient Requesting Appointment.     Reason for sooner appt.: Please contact Emelia pts daughter to schedule coordinating appts for above patient on 10/18.     Communication Preference: [phone]

## 2019-10-15 ENCOUNTER — PATIENT OUTREACH (OUTPATIENT)
Dept: ADMINISTRATIVE | Facility: OTHER | Age: 74
End: 2019-10-15

## 2019-10-15 DIAGNOSIS — E11.22 TYPE 2 DIABETES MELLITUS WITH ESRD (END-STAGE RENAL DISEASE): Primary | ICD-10-CM

## 2019-10-15 DIAGNOSIS — N18.6 TYPE 2 DIABETES MELLITUS WITH ESRD (END-STAGE RENAL DISEASE): Primary | ICD-10-CM

## 2019-10-18 ENCOUNTER — HOSPITAL ENCOUNTER (OUTPATIENT)
Dept: VASCULAR SURGERY | Facility: CLINIC | Age: 74
Discharge: HOME | End: 2019-10-18
Attending: SURGERY
Payer: MEDICARE

## 2019-10-18 ENCOUNTER — OFFICE VISIT (OUTPATIENT)
Dept: VASCULAR SURGERY | Facility: CLINIC | Age: 74
End: 2019-10-18
Payer: MEDICARE

## 2019-10-18 VITALS
HEART RATE: 79 BPM | SYSTOLIC BLOOD PRESSURE: 137 MMHG | WEIGHT: 189.63 LBS | HEIGHT: 65 IN | BODY MASS INDEX: 31.59 KG/M2 | DIASTOLIC BLOOD PRESSURE: 65 MMHG | TEMPERATURE: 98 F

## 2019-10-18 DIAGNOSIS — T82.858D STENOSIS OF ARTERIOVENOUS DIALYSIS FISTULA, SUBSEQUENT ENCOUNTER: ICD-10-CM

## 2019-10-18 DIAGNOSIS — T82.858A STENOSIS OF ARTERIOVENOUS DIALYSIS FISTULA, INITIAL ENCOUNTER: Primary | ICD-10-CM

## 2019-10-18 DIAGNOSIS — Z99.2 ESRD (END STAGE RENAL DISEASE) ON DIALYSIS: ICD-10-CM

## 2019-10-18 DIAGNOSIS — N18.6 ESRD (END STAGE RENAL DISEASE) ON DIALYSIS: ICD-10-CM

## 2019-10-18 DIAGNOSIS — Z01.818 PRE-OP EVALUATION: Primary | ICD-10-CM

## 2019-10-18 PROCEDURE — 93990 DOPPLER FLOW TESTING: CPT | Mod: S$GLB,,, | Performed by: SURGERY

## 2019-10-18 PROCEDURE — 99214 OFFICE O/P EST MOD 30 MIN: CPT | Mod: S$GLB,,, | Performed by: SURGERY

## 2019-10-18 PROCEDURE — 99999 PR PBB SHADOW E&M-EST. PATIENT-LVL IV: ICD-10-PCS | Mod: PBBFAC,,, | Performed by: SURGERY

## 2019-10-18 PROCEDURE — 1101F PT FALLS ASSESS-DOCD LE1/YR: CPT | Mod: CPTII,S$GLB,, | Performed by: SURGERY

## 2019-10-18 PROCEDURE — 99999 PR PBB SHADOW E&M-EST. PATIENT-LVL IV: CPT | Mod: PBBFAC,,, | Performed by: SURGERY

## 2019-10-18 PROCEDURE — 1101F PR PT FALLS ASSESS DOC 0-1 FALLS W/OUT INJ PAST YR: ICD-10-PCS | Mod: CPTII,S$GLB,, | Performed by: SURGERY

## 2019-10-18 PROCEDURE — 99214 PR OFFICE/OUTPT VISIT, EST, LEVL IV, 30-39 MIN: ICD-10-PCS | Mod: S$GLB,,, | Performed by: SURGERY

## 2019-10-18 PROCEDURE — 93990 PR DUPLEX HEMODIALYSIS ACCESS: ICD-10-PCS | Mod: S$GLB,,, | Performed by: SURGERY

## 2019-10-18 RX ORDER — SODIUM CHLORIDE 9 MG/ML
INJECTION, SOLUTION INTRAVENOUS CONTINUOUS
Status: CANCELLED | OUTPATIENT
Start: 2019-10-18

## 2019-10-18 RX ORDER — LIDOCAINE HYDROCHLORIDE 10 MG/ML
1 INJECTION, SOLUTION EPIDURAL; INFILTRATION; INTRACAUDAL; PERINEURAL ONCE
Status: CANCELLED | OUTPATIENT
Start: 2019-10-18 | End: 2019-10-18

## 2019-10-18 NOTE — H&P (VIEW-ONLY)
REFERRING PHYSICIAN:  Tomas Ruelas MD    HISTORY OF PRESENT ILLNESS:  A 73-year-old female with end-stage renal disease,   (Tu/Th/Sa) who is here for f/u after left forearm Basilic vein transposition.      Vascular surgery:    1. Trans-radial PTA, L AVF 9/19/18  1. Left forearm Basilic vein transposition 06/27/18    I have not seen her since 10 January 2019.  She said point began to use this for basilic vein transposition has had no difficulty until recently when she has noted of low flows      PAST MEDICAL HISTORY:    1.  History of cardiac arrest during her initial fluid overload at initiation of   dialysis.   2.  Diabetes mellitus.   3.  End-stage renal disease, dialyzing Tuesday, Thursday, Saturday.  4.  Hypertension.  5.  Hyperlipidemia.  6.  History of right below-knee amputation in 2005 secondary to a nonhealing   diabetic ulcer.    FAMILY HISTORY:  Nil.    SOCIAL HISTORY:  She is a nonsmoker.    MEDICATIONS:  Includes a statin.  See EPIC for full list.    ALLERGIES:  MORPHINE.    REVIEW OF SYSTEMS:  Denies postprandial pain or DVT.  All other systems   including eyes, ENT, respiratory, musculoskeletal, psychiatric, lymph, allergy   and immune are negative.    PHYSICAL EXAMINATION:  VITAL SIGNS:  See nursing note.  GENERAL:  She is in no acute distress.  RESPIRATORY:  Normal effort.  Clear to auscultation.  CARDIAC:  Regular rate and rhythm, nondisplaced PMI.  No murmur.  VASCULAR:  L radial pulse is trace palpable.    EXTREMITIES:  L forearm basilic transposition AV fistula with soft thrill distally, subjectively well matured  NEUROLOGIC:  Cranial nerves VII-XII are intact.    IMAGING:  Proximal very high-grade stenosis of the loss of the 918, flow volume 420 mils per minute    fistualgram reviewed - have proximal and distal stenosis which was Rxed.  Small radial artery    ASSESSMENT:   Recurring high-grade proximal AV fistula stenosis.  Intervention is warranted  The medial radial pulse make strands radial  approach was attractive    PLAN:  1.   Antegrade left fistulogram and intervention 10/21/2019  2.  Cath lab case    I have explained the risks, benefits and alternatives for this procedure in detail.  The patients voices understanding and all questions have be answered, and agrees to proceed with the procedure.    GLADYS Mesa III, MD, FACS  Professor and Chief, Vascular and Endovascular Surgery

## 2019-10-21 ENCOUNTER — HOSPITAL ENCOUNTER (OUTPATIENT)
Facility: HOSPITAL | Age: 74
Discharge: HOME | End: 2019-10-21
Attending: SURGERY | Admitting: SURGERY
Payer: MEDICARE

## 2019-10-21 VITALS
HEART RATE: 77 BPM | OXYGEN SATURATION: 95 % | WEIGHT: 189 LBS | BODY MASS INDEX: 31.49 KG/M2 | RESPIRATION RATE: 18 BRPM | SYSTOLIC BLOOD PRESSURE: 119 MMHG | TEMPERATURE: 97 F | HEIGHT: 65 IN | DIASTOLIC BLOOD PRESSURE: 59 MMHG

## 2019-10-21 DIAGNOSIS — Z99.2 DIABETES MELLITUS DUE TO UNDERLYING CONDITION WITH CHRONIC KIDNEY DISEASE ON CHRONIC DIALYSIS, WITH LONG-TERM CURRENT USE OF INSULIN: ICD-10-CM

## 2019-10-21 DIAGNOSIS — N18.6 DIABETES MELLITUS DUE TO UNDERLYING CONDITION WITH CHRONIC KIDNEY DISEASE ON CHRONIC DIALYSIS, WITH LONG-TERM CURRENT USE OF INSULIN: ICD-10-CM

## 2019-10-21 DIAGNOSIS — T82.858D STENOSIS OF ARTERIOVENOUS DIALYSIS FISTULA, SUBSEQUENT ENCOUNTER: ICD-10-CM

## 2019-10-21 DIAGNOSIS — Z79.4 DIABETES MELLITUS DUE TO UNDERLYING CONDITION WITH CHRONIC KIDNEY DISEASE ON CHRONIC DIALYSIS, WITH LONG-TERM CURRENT USE OF INSULIN: ICD-10-CM

## 2019-10-21 DIAGNOSIS — E08.22 DIABETES MELLITUS DUE TO UNDERLYING CONDITION WITH CHRONIC KIDNEY DISEASE ON CHRONIC DIALYSIS, WITH LONG-TERM CURRENT USE OF INSULIN: ICD-10-CM

## 2019-10-21 DIAGNOSIS — T82.858A STENOSIS OF ARTERIOVENOUS DIALYSIS FISTULA, INITIAL ENCOUNTER: ICD-10-CM

## 2019-10-21 LAB — POCT GLUCOSE: 143 MG/DL (ref 70–110)

## 2019-10-21 PROCEDURE — C1725 CATH, TRANSLUMIN NON-LASER: HCPCS | Performed by: SURGERY

## 2019-10-21 PROCEDURE — 82962 GLUCOSE BLOOD TEST: CPT

## 2019-10-21 PROCEDURE — 27201423 OPTIME MED/SURG SUP & DEVICES STERILE SUPPLY: Performed by: SURGERY

## 2019-10-21 PROCEDURE — 25000003 PHARM REV CODE 250: Performed by: SURGERY

## 2019-10-21 PROCEDURE — C1894 INTRO/SHEATH, NON-LASER: HCPCS | Performed by: SURGERY

## 2019-10-21 PROCEDURE — 25500020 PHARM REV CODE 255: Performed by: SURGERY

## 2019-10-21 PROCEDURE — 99152 PR MOD CONSCIOUS SEDATION, SAME PHYS, 5+ YRS, FIRST 15 MIN: ICD-10-PCS | Mod: ,,, | Performed by: SURGERY

## 2019-10-21 PROCEDURE — 36902 PR INTRO CATH, DIALYSIS CIRCUIT W/TRANSLML BALLOON ANGIO: ICD-10-PCS | Mod: ,,, | Performed by: SURGERY

## 2019-10-21 PROCEDURE — 99152 MOD SED SAME PHYS/QHP 5/>YRS: CPT | Performed by: SURGERY

## 2019-10-21 PROCEDURE — 63600175 PHARM REV CODE 636 W HCPCS: Performed by: SURGERY

## 2019-10-21 PROCEDURE — 99152 MOD SED SAME PHYS/QHP 5/>YRS: CPT | Mod: ,,, | Performed by: SURGERY

## 2019-10-21 PROCEDURE — 99153 MOD SED SAME PHYS/QHP EA: CPT | Performed by: SURGERY

## 2019-10-21 PROCEDURE — 36902 INTRO CATH DIALYSIS CIRCUIT: CPT | Performed by: SURGERY

## 2019-10-21 PROCEDURE — 36902 INTRO CATH DIALYSIS CIRCUIT: CPT | Mod: ,,, | Performed by: SURGERY

## 2019-10-21 PROCEDURE — C1769 GUIDE WIRE: HCPCS | Performed by: SURGERY

## 2019-10-21 RX ORDER — SODIUM CHLORIDE 9 MG/ML
INJECTION, SOLUTION INTRAVENOUS CONTINUOUS
Status: DISCONTINUED | OUTPATIENT
Start: 2019-10-21 | End: 2019-10-21 | Stop reason: HOSPADM

## 2019-10-21 RX ORDER — HEPARIN SODIUM 200 [USP'U]/100ML
INJECTION, SOLUTION INTRAVENOUS
Status: DISCONTINUED | OUTPATIENT
Start: 2019-10-21 | End: 2019-10-21 | Stop reason: HOSPADM

## 2019-10-21 RX ORDER — CEFAZOLIN SODIUM 1 G/3ML
2 INJECTION, POWDER, FOR SOLUTION INTRAMUSCULAR; INTRAVENOUS
Status: DISCONTINUED | OUTPATIENT
Start: 2019-10-21 | End: 2019-10-21 | Stop reason: HOSPADM

## 2019-10-21 RX ORDER — MIDAZOLAM HYDROCHLORIDE 1 MG/ML
INJECTION, SOLUTION INTRAMUSCULAR; INTRAVENOUS
Status: DISCONTINUED | OUTPATIENT
Start: 2019-10-21 | End: 2019-10-21 | Stop reason: HOSPADM

## 2019-10-21 RX ORDER — FENTANYL CITRATE 50 UG/ML
INJECTION, SOLUTION INTRAMUSCULAR; INTRAVENOUS
Status: DISCONTINUED | OUTPATIENT
Start: 2019-10-21 | End: 2019-10-21 | Stop reason: HOSPADM

## 2019-10-21 RX ORDER — LIDOCAINE HYDROCHLORIDE 10 MG/ML
1 INJECTION, SOLUTION EPIDURAL; INFILTRATION; INTRACAUDAL; PERINEURAL ONCE
Status: DISCONTINUED | OUTPATIENT
Start: 2019-10-21 | End: 2019-10-21 | Stop reason: HOSPADM

## 2019-10-21 RX ORDER — HYDROCODONE BITARTRATE AND ACETAMINOPHEN 5; 325 MG/1; MG/1
1 TABLET ORAL EVERY 4 HOURS PRN
Status: DISCONTINUED | OUTPATIENT
Start: 2019-10-21 | End: 2019-10-21 | Stop reason: HOSPADM

## 2019-10-21 RX ORDER — LIDOCAINE HYDROCHLORIDE 20 MG/ML
INJECTION, SOLUTION EPIDURAL; INFILTRATION; INTRACAUDAL; PERINEURAL
Status: DISCONTINUED | OUTPATIENT
Start: 2019-10-21 | End: 2019-10-21 | Stop reason: HOSPADM

## 2019-10-21 NOTE — BRIEF OP NOTE
[facility]  Brief Operative Note     SUMMARY     Surgery Date: 10/21/2019     Surgeon(s) and Role:     * AYESHA Mesa III, MD - Primary    Assisting Surgeon: Zari Piedra    Pre-op Diagnosis:  Stenosis of arteriovenous dialysis fistula, subsequent encounter [T82.858D]    Post-op Diagnosis:  Post-Op Diagnosis Codes:     * Stenosis of arteriovenous dialysis fistula, subsequent encounter [T82.858D]    PROCEDURES:    1. PTA, L AVF (4.5x15)  2. Antegrade fistulagram  3. Conscious Sedation    Anesthesia: Local MAC    Description of the findings of the procedure: >90% prox stenosis, ~ 50% residual    Findings/Key Components: distal radial occlusion    Estimated Blood Loss: 5cc      Specimens:   Specimen (12h ago, onward)    None          Discharge Note    SUMMARY     Admit Date: 10/21/2019    Discharge Date and Time: 10/21/19    Hospital Course (synopsis of major diagnoses, care, treatment, and services provided during the course of the hospital stay): successful outpatient procedure    Final Diagnosis: Post-Op Diagnosis Codes:     * Stenosis of arteriovenous dialysis fistula, subsequent encounter [T82.858D]    Disposition: home    Follow Up/Patient Instructions: Diet: renal  Act: ad lindsey  FU: 1 week with AVF duplex     Medications: pre-op

## 2019-10-21 NOTE — INTERVAL H&P NOTE
The patient has been examined and the H&P has been reviewed:    I concur with the findings and no changes have occurred since H&P was written.    Anesthesia/Surgery risks, benefits and alternative options discussed and understood by patient/family.          Active Hospital Problems    Diagnosis  POA    Stenosis of arteriovenous dialysis fistula [T85.692V]  Yes      Resolved Hospital Problems   No resolved problems to display.

## 2019-10-21 NOTE — Clinical Note
Angiography performed of the proximal left av fistula. Angiography performed via hand injection with .

## 2019-10-21 NOTE — PLAN OF CARE
Report received from MERON Ervin. Patient s/p LUE fistulagram. Dressing cdi. +T/+B. Vss. No complaints from patient. Call light in reach. Family at bedside. Will monitor.

## 2019-10-21 NOTE — Clinical Note
40 ml injected throughout the case. 60 mL total wasted during the case. 100 mL total used in the case.

## 2019-10-21 NOTE — TELEPHONE ENCOUNTER
[facility] requesting refill of DM test strips.    Last office visit:  7/16/19  No recalls set    [facility] set for escript

## 2019-10-22 NOTE — OP NOTE
Date: 10/21/2019    Surgeon(s) and Role:   KRISTA Mesa MD - Primary   Zari Piedra MD - Assisting     Pre-op Diagnosis:   Stenosis of arteriovenous dialysis fistula, subsequent encounter [T82.558D]    Post-op Diagnosis: Same     Procedure(s):   1. PTA, L AVF (4.5x15)  2. Antegrade fistulagram  3. Conscious Sedation    Anesthesia: RN IV sedation    Findings/Key Components:   >90% prox stenosis, ~ 50% residual    EBL: Minimal    PROCEDURE IN DETAIL:  The patient was brought to the Cath Lab, placed in supine. L arm was prepped and draped in the standard surgical fashion. The mid fistula was accessed with a micropuncture needle in the arterial facing direction, followed by placement of 4/3-Fijian micropuncture dilator. Fistulogram was performed and revealed a high grade proximal stenosis. Through this, an 0.018-inch wire was placed in the short 6-Fijian sheath and advanced through the high-grade stenosis. This was treated with a 4.5x15 balloon (18 zacarias 2 min). Residual 50% stenosis was noted. Moderate thrill could be felt. The sheath was removed, 3-0 monocryl U-stitch was placed with good hemostasis.    MODERATE SEDATION IN CATH LAB   KRISTA Mesa III, MD was present for moderate sedation  KRISTA Mesa III, MD monitored the patients cardio respiratory functions during the moderate sedation   See nurses notes for Intra-service start and end times and for the log of the name of the RN who administered the medicines for the moderate sedation, including their doseage and route.    Time of sedation:  45 mins.    Zari Piedra MD   Fellow, Vascular/Endovascular Surgery

## 2019-12-23 DIAGNOSIS — E11.69 HYPERLIPIDEMIA ASSOCIATED WITH TYPE 2 DIABETES MELLITUS: ICD-10-CM

## 2019-12-23 DIAGNOSIS — E78.5 HYPERLIPIDEMIA ASSOCIATED WITH TYPE 2 DIABETES MELLITUS: ICD-10-CM

## 2019-12-23 DIAGNOSIS — N18.6 TYPE 2 DIABETES MELLITUS WITH ESRD (END-STAGE RENAL DISEASE): ICD-10-CM

## 2019-12-23 DIAGNOSIS — E11.22 TYPE 2 DIABETES MELLITUS WITH ESRD (END-STAGE RENAL DISEASE): ICD-10-CM

## 2019-12-23 DIAGNOSIS — Z00.00 ANNUAL PHYSICAL EXAM: Primary | ICD-10-CM

## 2019-12-23 NOTE — TELEPHONE ENCOUNTER
Pt requesting refill of atorvastatin    Last office visit:  7/16/19  No recalls set    [facility] set for escript

## 2019-12-24 RX ORDER — ATORVASTATIN CALCIUM 40 MG/1
40 TABLET, FILM COATED ORAL DAILY
Qty: 30 TABLET | Refills: 0 | Status: SHIPPED | OUTPATIENT
Start: 2019-12-24 | End: 2021-01-20 | Stop reason: SDUPTHER

## 2020-04-20 ENCOUNTER — TELEPHONE (OUTPATIENT)
Dept: INTERNAL MEDICINE | Facility: CLINIC | Age: 75
End: 2020-04-20

## 2020-04-20 DIAGNOSIS — Z89.511 HISTORY OF RIGHT BELOW KNEE AMPUTATION: Primary | ICD-10-CM

## 2020-04-20 NOTE — TELEPHONE ENCOUNTER
Entered a standard order for a prosthesis.     Please find out the name of the physician who wrote the order for her last prosthesis. The supplier should have that info.

## 2020-04-20 NOTE — TELEPHONE ENCOUNTER
----- Message from Rachel Garcia sent at 4/20/2020 12:42 PM CDT -----  Contact: Panchito [phone]  Requesting a Rx for a below the knee prosthesis. Please fax to [phone]    Please call and advise.    Thank You

## 2020-04-22 NOTE — TELEPHONE ENCOUNTER
Spoke with Calista carbone: waiting on signature from Dr carbone: prosthesis.    out of office until May 4th. Will fax over as soon as it has been completed.

## 2020-04-22 NOTE — TELEPHONE ENCOUNTER
----- Message from Leeanne Manriquez sent at 4/22/2020 10:43 AM CDT -----  Contact: Calista/ [phone] or fax [phone]   Calista sorensen/[employer], is requesting a order for a  below knee prosthesis for patient.    Please advise, thank you.

## 2020-04-23 ENCOUNTER — TELEPHONE (OUTPATIENT)
Dept: INTERNAL MEDICINE | Facility: CLINIC | Age: 75
End: 2020-04-23

## 2020-04-23 NOTE — TELEPHONE ENCOUNTER
----- Message from Leeanne Manriquez sent at 4/22/2020 10:43 AM CDT -----  Contact: Calista/ [phone] or fax [phone]   Calista sorensen/[employer], is requesting a order for a  below knee prosthesis for patient.    Please advise, thank you.

## 2020-05-06 ENCOUNTER — TELEPHONE (OUTPATIENT)
Dept: INTERNAL MEDICINE | Facility: CLINIC | Age: 75
End: 2020-05-06

## 2020-05-06 NOTE — TELEPHONE ENCOUNTER
Detailed written order and [facility]    Signed by Dr Villalba and faxed back today.

## 2020-05-06 NOTE — TELEPHONE ENCOUNTER
----- Message from Nanda Perez sent at 5/6/2020 10:49 AM CDT -----  Contact: Chikis/[employer]/[phone]  Chikis called in regards needing status on paper work that it was send on 04/28/20. Please call and advise. Thank you

## 2020-08-17 DIAGNOSIS — E08.22 DIABETES MELLITUS DUE TO UNDERLYING CONDITION WITH CHRONIC KIDNEY DISEASE ON CHRONIC DIALYSIS, WITH LONG-TERM CURRENT USE OF INSULIN: ICD-10-CM

## 2020-08-17 DIAGNOSIS — Z79.4 DIABETES MELLITUS DUE TO UNDERLYING CONDITION WITH CHRONIC KIDNEY DISEASE ON CHRONIC DIALYSIS, WITH LONG-TERM CURRENT USE OF INSULIN: ICD-10-CM

## 2020-08-17 DIAGNOSIS — N18.6 DIABETES MELLITUS DUE TO UNDERLYING CONDITION WITH CHRONIC KIDNEY DISEASE ON CHRONIC DIALYSIS, WITH LONG-TERM CURRENT USE OF INSULIN: ICD-10-CM

## 2020-08-17 DIAGNOSIS — Z99.2 DIABETES MELLITUS DUE TO UNDERLYING CONDITION WITH CHRONIC KIDNEY DISEASE ON CHRONIC DIALYSIS, WITH LONG-TERM CURRENT USE OF INSULIN: ICD-10-CM

## 2020-08-17 RX ORDER — INSULIN ASPART 100 [IU]/ML
4 INJECTION, SOLUTION INTRAVENOUS; SUBCUTANEOUS
Qty: 15 ML | Refills: 0 | Status: SHIPPED | OUTPATIENT
Start: 2020-08-17 | End: 2021-01-20 | Stop reason: SDUPTHER

## 2020-08-17 NOTE — TELEPHONE ENCOUNTER
Request from [facility]    LOV:  7/16/19  Multiple no shows and cancellations      LMOV for pt to return my call to schedule annual/labs

## 2020-09-17 ENCOUNTER — PATIENT OUTREACH (OUTPATIENT)
Dept: ADMINISTRATIVE | Facility: HOSPITAL | Age: 75
End: 2020-09-17

## 2020-11-13 NOTE — PLAN OF CARE
Problem: Adult Inpatient Plan of Care  Goal: Plan of Care Review  Outcome: Ongoing (interventions implemented as appropriate)  Patient AAO x 4. VSS. NAD. Daughter at bedside. 3 hrs of dialysis today. Seen by podiatry. Up with assist. Glucose monitoring. Left arm fistula, AKA with prosthetic at bedside. Toleratind diabetic diet well. No complaints of pain or nausea this shift. IV antibiotics infusing per mar. Safety maintained. Will continue to monitor.        no

## 2020-12-07 ENCOUNTER — PES CALL (OUTPATIENT)
Dept: ADMINISTRATIVE | Facility: CLINIC | Age: 75
End: 2020-12-07

## 2021-01-20 ENCOUNTER — OFFICE VISIT (OUTPATIENT)
Dept: INTERNAL MEDICINE | Facility: CLINIC | Age: 76
End: 2021-01-20
Payer: MEDICARE

## 2021-01-20 ENCOUNTER — TELEPHONE (OUTPATIENT)
Dept: INTERNAL MEDICINE | Facility: CLINIC | Age: 76
End: 2021-01-20

## 2021-01-20 VITALS
RESPIRATION RATE: 18 BRPM | BODY MASS INDEX: 31.96 KG/M2 | SYSTOLIC BLOOD PRESSURE: 144 MMHG | HEART RATE: 74 BPM | DIASTOLIC BLOOD PRESSURE: 76 MMHG | OXYGEN SATURATION: 96 % | HEIGHT: 65 IN | WEIGHT: 191.81 LBS | TEMPERATURE: 97 F

## 2021-01-20 DIAGNOSIS — E11.69 HYPERLIPIDEMIA ASSOCIATED WITH TYPE 2 DIABETES MELLITUS: ICD-10-CM

## 2021-01-20 DIAGNOSIS — N18.6 ANEMIA IN END-STAGE RENAL DISEASE: ICD-10-CM

## 2021-01-20 DIAGNOSIS — I15.2 HYPERTENSION ASSOCIATED WITH DIABETES: Primary | ICD-10-CM

## 2021-01-20 DIAGNOSIS — E11.59 HYPERTENSION ASSOCIATED WITH DIABETES: Primary | ICD-10-CM

## 2021-01-20 DIAGNOSIS — I50.32 CHRONIC DIASTOLIC HEART FAILURE: ICD-10-CM

## 2021-01-20 DIAGNOSIS — E78.5 HYPERLIPIDEMIA ASSOCIATED WITH TYPE 2 DIABETES MELLITUS: ICD-10-CM

## 2021-01-20 DIAGNOSIS — I35.0 MODERATE AORTIC STENOSIS: ICD-10-CM

## 2021-01-20 DIAGNOSIS — D63.1 ANEMIA IN END-STAGE RENAL DISEASE: ICD-10-CM

## 2021-01-20 DIAGNOSIS — Z12.11 SCREEN FOR COLON CANCER: ICD-10-CM

## 2021-01-20 DIAGNOSIS — Z99.2 ESRD (END STAGE RENAL DISEASE) ON DIALYSIS: ICD-10-CM

## 2021-01-20 DIAGNOSIS — E11.22 TYPE 2 DIABETES MELLITUS WITH ESRD (END-STAGE RENAL DISEASE): ICD-10-CM

## 2021-01-20 DIAGNOSIS — M70.22 OLECRANON BURSITIS OF LEFT ELBOW: ICD-10-CM

## 2021-01-20 DIAGNOSIS — Z89.511 HISTORY OF RIGHT BELOW KNEE AMPUTATION: ICD-10-CM

## 2021-01-20 DIAGNOSIS — N25.81 SECONDARY HYPERPARATHYROIDISM OF RENAL ORIGIN: ICD-10-CM

## 2021-01-20 DIAGNOSIS — N18.6 TYPE 2 DIABETES MELLITUS WITH ESRD (END-STAGE RENAL DISEASE): ICD-10-CM

## 2021-01-20 DIAGNOSIS — Z12.31 ENCOUNTER FOR SCREENING MAMMOGRAM FOR HIGH-RISK PATIENT: ICD-10-CM

## 2021-01-20 DIAGNOSIS — I73.9 PAD (PERIPHERAL ARTERY DISEASE): ICD-10-CM

## 2021-01-20 DIAGNOSIS — N18.6 ESRD (END STAGE RENAL DISEASE) ON DIALYSIS: ICD-10-CM

## 2021-01-20 DIAGNOSIS — Z00.00 ANNUAL PHYSICAL EXAM: ICD-10-CM

## 2021-01-20 PROCEDURE — 99499 UNLISTED E&M SERVICE: CPT | Mod: S$GLB,,, | Performed by: INTERNAL MEDICINE

## 2021-01-20 PROCEDURE — 1126F PR PAIN SEVERITY QUANTIFIED, NO PAIN PRESENT: ICD-10-PCS | Mod: S$GLB,,, | Performed by: INTERNAL MEDICINE

## 2021-01-20 PROCEDURE — 1159F MED LIST DOCD IN RCRD: CPT | Mod: S$GLB,,, | Performed by: INTERNAL MEDICINE

## 2021-01-20 PROCEDURE — 99214 PR OFFICE/OUTPT VISIT, EST, LEVL IV, 30-39 MIN: ICD-10-PCS | Mod: S$GLB,,, | Performed by: INTERNAL MEDICINE

## 2021-01-20 PROCEDURE — 3288F PR FALLS RISK ASSESSMENT DOCUMENTED: ICD-10-PCS | Mod: CPTII,S$GLB,, | Performed by: INTERNAL MEDICINE

## 2021-01-20 PROCEDURE — 99999 PR PBB SHADOW E&M-EST. PATIENT-LVL V: ICD-10-PCS | Mod: PBBFAC,,, | Performed by: INTERNAL MEDICINE

## 2021-01-20 PROCEDURE — 99499 RISK ADDL DX/OHS AUDIT: ICD-10-PCS | Mod: S$GLB,,, | Performed by: INTERNAL MEDICINE

## 2021-01-20 PROCEDURE — 99999 PR PBB SHADOW E&M-EST. PATIENT-LVL V: CPT | Mod: PBBFAC,,, | Performed by: INTERNAL MEDICINE

## 2021-01-20 PROCEDURE — 99214 OFFICE O/P EST MOD 30 MIN: CPT | Mod: S$GLB,,, | Performed by: INTERNAL MEDICINE

## 2021-01-20 PROCEDURE — 1159F PR MEDICATION LIST DOCUMENTED IN MEDICAL RECORD: ICD-10-PCS | Mod: S$GLB,,, | Performed by: INTERNAL MEDICINE

## 2021-01-20 PROCEDURE — 3288F FALL RISK ASSESSMENT DOCD: CPT | Mod: CPTII,S$GLB,, | Performed by: INTERNAL MEDICINE

## 2021-01-20 PROCEDURE — 3008F BODY MASS INDEX DOCD: CPT | Mod: CPTII,S$GLB,, | Performed by: INTERNAL MEDICINE

## 2021-01-20 PROCEDURE — 3008F PR BODY MASS INDEX (BMI) DOCUMENTED: ICD-10-PCS | Mod: CPTII,S$GLB,, | Performed by: INTERNAL MEDICINE

## 2021-01-20 PROCEDURE — 1101F PR PT FALLS ASSESS DOC 0-1 FALLS W/OUT INJ PAST YR: ICD-10-PCS | Mod: CPTII,S$GLB,, | Performed by: INTERNAL MEDICINE

## 2021-01-20 PROCEDURE — 1101F PT FALLS ASSESS-DOCD LE1/YR: CPT | Mod: CPTII,S$GLB,, | Performed by: INTERNAL MEDICINE

## 2021-01-20 PROCEDURE — 1126F AMNT PAIN NOTED NONE PRSNT: CPT | Mod: S$GLB,,, | Performed by: INTERNAL MEDICINE

## 2021-01-20 RX ORDER — ATORVASTATIN CALCIUM 40 MG/1
40 TABLET, FILM COATED ORAL DAILY
Qty: 90 TABLET | Refills: 3 | Status: SHIPPED | OUTPATIENT
Start: 2021-01-20 | End: 2022-04-13 | Stop reason: SDUPTHER

## 2021-01-20 RX ORDER — PEN NEEDLE, DIABETIC 30 GX3/16"
NEEDLE, DISPOSABLE MISCELLANEOUS
Qty: 100 EACH | Refills: 1 | Status: SHIPPED | OUTPATIENT
Start: 2021-01-20

## 2021-01-20 RX ORDER — INSULIN ASPART 100 [IU]/ML
4 INJECTION, SOLUTION INTRAVENOUS; SUBCUTANEOUS
Qty: 15 ML | Refills: 0 | Status: SHIPPED | OUTPATIENT
Start: 2021-01-20 | End: 2021-06-09 | Stop reason: SDUPTHER

## 2021-01-22 ENCOUNTER — TELEPHONE (OUTPATIENT)
Dept: INTERNAL MEDICINE | Facility: CLINIC | Age: 76
End: 2021-01-22

## 2021-01-22 PROBLEM — I51.89 DIASTOLIC DYSFUNCTION: Status: RESOLVED | Noted: 2019-07-19 | Resolved: 2021-01-22

## 2021-01-23 ENCOUNTER — TELEPHONE (OUTPATIENT)
Dept: INTERNAL MEDICINE | Facility: CLINIC | Age: 76
End: 2021-01-23

## 2021-01-23 PROBLEM — D63.1 ANEMIA IN END-STAGE RENAL DISEASE: Status: ACTIVE | Noted: 2018-06-27

## 2021-01-23 PROBLEM — E11.621 DIABETIC ULCER OF TOE OF LEFT FOOT ASSOCIATED WITH TYPE 2 DIABETES MELLITUS, WITH BONE INVOLVEMENT WITHOUT EVIDENCE OF NECROSIS: Status: RESOLVED | Noted: 2018-12-27 | Resolved: 2021-01-23

## 2021-01-23 PROBLEM — L97.526 DIABETIC ULCER OF TOE OF LEFT FOOT ASSOCIATED WITH TYPE 2 DIABETES MELLITUS, WITH BONE INVOLVEMENT WITHOUT EVIDENCE OF NECROSIS: Status: RESOLVED | Noted: 2018-12-27 | Resolved: 2021-01-23

## 2021-01-23 PROBLEM — M70.22 OLECRANON BURSITIS OF LEFT ELBOW: Status: ACTIVE | Noted: 2021-01-23

## 2021-01-23 PROBLEM — T82.858A STENOSIS OF ARTERIOVENOUS DIALYSIS FISTULA: Status: RESOLVED | Noted: 2018-09-14 | Resolved: 2021-01-23

## 2021-01-27 ENCOUNTER — TELEPHONE (OUTPATIENT)
Dept: INTERNAL MEDICINE | Facility: CLINIC | Age: 76
End: 2021-01-27

## 2021-02-08 ENCOUNTER — PATIENT OUTREACH (OUTPATIENT)
Dept: ADMINISTRATIVE | Facility: HOSPITAL | Age: 76
End: 2021-02-08

## 2021-02-18 ENCOUNTER — PATIENT OUTREACH (OUTPATIENT)
Dept: ADMINISTRATIVE | Facility: OTHER | Age: 76
End: 2021-02-18

## 2021-03-04 ENCOUNTER — PATIENT OUTREACH (OUTPATIENT)
Dept: ADMINISTRATIVE | Facility: HOSPITAL | Age: 76
End: 2021-03-04

## 2021-03-23 ENCOUNTER — HOSPITAL ENCOUNTER (OUTPATIENT)
Facility: HOSPITAL | Age: 76
Discharge: HOME | End: 2021-03-23
Attending: EMERGENCY MEDICINE | Admitting: INTERNAL MEDICINE
Payer: MEDICARE

## 2021-03-23 VITALS
OXYGEN SATURATION: 98 % | TEMPERATURE: 98 F | DIASTOLIC BLOOD PRESSURE: 79 MMHG | RESPIRATION RATE: 16 BRPM | BODY MASS INDEX: 32.49 KG/M2 | SYSTOLIC BLOOD PRESSURE: 131 MMHG | HEART RATE: 79 BPM | HEIGHT: 65 IN | WEIGHT: 195 LBS

## 2021-03-23 DIAGNOSIS — R06.02 SHORTNESS OF BREATH: ICD-10-CM

## 2021-03-23 DIAGNOSIS — Z99.2 DEPENDENCE ON INTERMITTENT RENAL DIALYSIS: Primary | ICD-10-CM

## 2021-03-23 DIAGNOSIS — R09.02 HYPOXIA: ICD-10-CM

## 2021-03-23 LAB
ALBUMIN SERPL BCP-MCNC: 3.8 G/DL (ref 3.5–5.2)
ALBUMIN SERPL BCP-MCNC: 3.9 G/DL (ref 3.5–5.2)
ANION GAP SERPL CALC-SCNC: 13 MMOL/L (ref 8–16)
ANION GAP SERPL CALC-SCNC: 15 MMOL/L (ref 8–16)
ANION GAP SERPL CALC-SCNC: 15 MMOL/L (ref 8–16)
BASOPHILS # BLD AUTO: 0.03 K/UL (ref 0–0.2)
BASOPHILS NFR BLD: 0.3 % (ref 0–1.9)
BNP SERPL-MCNC: 304 PG/ML (ref 0–99)
BUN SERPL-MCNC: 51 MG/DL (ref 8–23)
BUN SERPL-MCNC: 51 MG/DL (ref 8–23)
BUN SERPL-MCNC: 52 MG/DL (ref 8–23)
CALCIUM SERPL-MCNC: 8.8 MG/DL (ref 8.7–10.5)
CALCIUM SERPL-MCNC: 8.9 MG/DL (ref 8.7–10.5)
CALCIUM SERPL-MCNC: 8.9 MG/DL (ref 8.7–10.5)
CHLORIDE SERPL-SCNC: 109 MMOL/L (ref 95–110)
CO2 SERPL-SCNC: 19 MMOL/L (ref 23–29)
CO2 SERPL-SCNC: 19 MMOL/L (ref 23–29)
CO2 SERPL-SCNC: 21 MMOL/L (ref 23–29)
CREAT SERPL-MCNC: 6.6 MG/DL (ref 0.5–1.4)
CREAT SERPL-MCNC: 7 MG/DL (ref 0.5–1.4)
CREAT SERPL-MCNC: 7 MG/DL (ref 0.5–1.4)
CTP QC/QA: YES
DIFFERENTIAL METHOD: ABNORMAL
EOSINOPHIL # BLD AUTO: 0.2 K/UL (ref 0–0.5)
EOSINOPHIL NFR BLD: 2.2 % (ref 0–8)
ERYTHROCYTE [DISTWIDTH] IN BLOOD BY AUTOMATED COUNT: 18.2 % (ref 11.5–14.5)
EST. GFR  (AFRICAN AMERICAN): 6 ML/MIN/1.73 M^2
EST. GFR  (AFRICAN AMERICAN): 6 ML/MIN/1.73 M^2
EST. GFR  (AFRICAN AMERICAN): 7 ML/MIN/1.73 M^2
EST. GFR  (NON AFRICAN AMERICAN): 5 ML/MIN/1.73 M^2
EST. GFR  (NON AFRICAN AMERICAN): 5 ML/MIN/1.73 M^2
EST. GFR  (NON AFRICAN AMERICAN): 6 ML/MIN/1.73 M^2
GLUCOSE SERPL-MCNC: 136 MG/DL (ref 70–110)
GLUCOSE SERPL-MCNC: 161 MG/DL (ref 70–110)
GLUCOSE SERPL-MCNC: 161 MG/DL (ref 70–110)
HCT VFR BLD AUTO: 30.3 % (ref 37–48.5)
HGB BLD-MCNC: 9.7 G/DL (ref 12–16)
IMM GRANULOCYTES # BLD AUTO: 0.02 K/UL (ref 0–0.04)
IMM GRANULOCYTES NFR BLD AUTO: 0.2 % (ref 0–0.5)
LIPASE SERPL-CCNC: 34 U/L (ref 4–60)
LYMPHOCYTES # BLD AUTO: 2.2 K/UL (ref 1–4.8)
LYMPHOCYTES NFR BLD: 21.8 % (ref 18–48)
MAGNESIUM SERPL-MCNC: 2.2 MG/DL (ref 1.6–2.6)
MCH RBC QN AUTO: 24.6 PG (ref 27–31)
MCHC RBC AUTO-ENTMCNC: 32 G/DL (ref 32–36)
MCV RBC AUTO: 77 FL (ref 82–98)
MONOCYTES # BLD AUTO: 0.8 K/UL (ref 0.3–1)
MONOCYTES NFR BLD: 7.7 % (ref 4–15)
NEUTROPHILS # BLD AUTO: 6.8 K/UL (ref 1.8–7.7)
NEUTROPHILS NFR BLD: 67.8 % (ref 38–73)
NRBC BLD-RTO: 0 /100 WBC
PHOSPHATE SERPL-MCNC: 4.6 MG/DL (ref 2.7–4.5)
PHOSPHATE SERPL-MCNC: 4.6 MG/DL (ref 2.7–4.5)
PLATELET # BLD AUTO: 247 K/UL (ref 150–350)
PMV BLD AUTO: 10.3 FL (ref 9.2–12.9)
POTASSIUM SERPL-SCNC: 4.6 MMOL/L (ref 3.5–5.1)
POTASSIUM SERPL-SCNC: 4.6 MMOL/L (ref 3.5–5.1)
POTASSIUM SERPL-SCNC: 4.8 MMOL/L (ref 3.5–5.1)
RBC # BLD AUTO: 3.95 M/UL (ref 4–5.4)
SARS-COV-2 RDRP RESP QL NAA+PROBE: NEGATIVE
SODIUM SERPL-SCNC: 143 MMOL/L (ref 136–145)
TROPONIN I SERPL DL<=0.01 NG/ML-MCNC: 0.01 NG/ML (ref 0–0.03)
TSH SERPL DL<=0.005 MIU/L-ACNC: 1.83 UIU/ML (ref 0.4–4)
WBC # BLD AUTO: 10.05 K/UL (ref 3.9–12.7)

## 2021-03-23 PROCEDURE — 99291 CRITICAL CARE FIRST HOUR: CPT | Mod: 25

## 2021-03-23 PROCEDURE — 85025 COMPLETE CBC W/AUTO DIFF WBC: CPT | Performed by: EMERGENCY MEDICINE

## 2021-03-23 PROCEDURE — U0002 COVID-19 LAB TEST NON-CDC: HCPCS | Performed by: EMERGENCY MEDICINE

## 2021-03-23 PROCEDURE — 84484 ASSAY OF TROPONIN QUANT: CPT | Performed by: EMERGENCY MEDICINE

## 2021-03-23 PROCEDURE — 83690 ASSAY OF LIPASE: CPT | Performed by: EMERGENCY MEDICINE

## 2021-03-23 PROCEDURE — 93010 EKG 12-LEAD: ICD-10-PCS | Mod: ,,, | Performed by: INTERNAL MEDICINE

## 2021-03-23 PROCEDURE — 96374 THER/PROPH/DIAG INJ IV PUSH: CPT | Mod: 59

## 2021-03-23 PROCEDURE — 93010 ELECTROCARDIOGRAM REPORT: CPT | Mod: ,,, | Performed by: INTERNAL MEDICINE

## 2021-03-23 PROCEDURE — 63600175 PHARM REV CODE 636 W HCPCS: Performed by: EMERGENCY MEDICINE

## 2021-03-23 PROCEDURE — G0257 UNSCHED DIALYSIS ESRD PT HOS: HCPCS

## 2021-03-23 PROCEDURE — 84443 ASSAY THYROID STIM HORMONE: CPT | Performed by: EMERGENCY MEDICINE

## 2021-03-23 PROCEDURE — G0378 HOSPITAL OBSERVATION PER HR: HCPCS

## 2021-03-23 PROCEDURE — 83735 ASSAY OF MAGNESIUM: CPT | Performed by: EMERGENCY MEDICINE

## 2021-03-23 PROCEDURE — 80069 RENAL FUNCTION PANEL: CPT | Performed by: EMERGENCY MEDICINE

## 2021-03-23 PROCEDURE — 93005 ELECTROCARDIOGRAM TRACING: CPT

## 2021-03-23 PROCEDURE — 80069 RENAL FUNCTION PANEL: CPT | Mod: 91 | Performed by: INTERNAL MEDICINE

## 2021-03-23 PROCEDURE — 96372 THER/PROPH/DIAG INJ SC/IM: CPT | Mod: 59

## 2021-03-23 PROCEDURE — 83880 ASSAY OF NATRIURETIC PEPTIDE: CPT | Performed by: EMERGENCY MEDICINE

## 2021-03-23 PROCEDURE — 63600175 PHARM REV CODE 636 W HCPCS: Mod: TB | Performed by: INTERNAL MEDICINE

## 2021-03-23 RX ORDER — FUROSEMIDE 10 MG/ML
80 INJECTION INTRAMUSCULAR; INTRAVENOUS
Status: COMPLETED | OUTPATIENT
Start: 2021-03-23 | End: 2021-03-23

## 2021-03-23 RX ORDER — SODIUM CHLORIDE 9 MG/ML
INJECTION, SOLUTION INTRAVENOUS ONCE
Status: DISCONTINUED | OUTPATIENT
Start: 2021-03-23 | End: 2021-03-23 | Stop reason: HOSPADM

## 2021-03-23 RX ORDER — MUPIROCIN 20 MG/G
OINTMENT TOPICAL 2 TIMES DAILY
Status: DISCONTINUED | OUTPATIENT
Start: 2021-03-23 | End: 2021-03-23 | Stop reason: HOSPADM

## 2021-03-23 RX ADMIN — FUROSEMIDE 80 MG: 10 INJECTION, SOLUTION INTRAVENOUS at 07:03

## 2021-03-23 RX ADMIN — EPOETIN ALFA-EPBX 5000 UNITS: 3000 INJECTION, SOLUTION INTRAVENOUS; SUBCUTANEOUS at 01:03

## 2021-03-26 ENCOUNTER — TELEPHONE (OUTPATIENT)
Dept: INTERNAL MEDICINE | Facility: CLINIC | Age: 76
End: 2021-03-26

## 2021-03-29 ENCOUNTER — TELEPHONE (OUTPATIENT)
Dept: INTERNAL MEDICINE | Facility: CLINIC | Age: 76
End: 2021-03-29

## 2021-04-06 RX ORDER — AMLODIPINE BESYLATE 10 MG/1
10 TABLET ORAL DAILY
Qty: 90 TABLET | Refills: 3 | Status: CANCELLED | OUTPATIENT
Start: 2021-04-06

## 2021-04-07 RX ORDER — AMLODIPINE BESYLATE 10 MG/1
10 TABLET ORAL DAILY
Qty: 90 TABLET | Refills: 3 | Status: CANCELLED | OUTPATIENT
Start: 2021-04-06

## 2021-04-15 RX ORDER — AMLODIPINE BESYLATE 10 MG/1
10 TABLET ORAL DAILY
Qty: 90 TABLET | Refills: 3 | OUTPATIENT
Start: 2021-04-15 | End: 2022-04-13 | Stop reason: SDUPTHER

## 2021-05-26 ENCOUNTER — TELEPHONE (OUTPATIENT)
Dept: INTERNAL MEDICINE | Facility: CLINIC | Age: 76
End: 2021-05-26

## 2021-06-09 DIAGNOSIS — E11.22 TYPE 2 DIABETES MELLITUS WITH ESRD (END-STAGE RENAL DISEASE): ICD-10-CM

## 2021-06-09 DIAGNOSIS — N18.6 TYPE 2 DIABETES MELLITUS WITH ESRD (END-STAGE RENAL DISEASE): ICD-10-CM

## 2021-06-10 RX ORDER — INSULIN ASPART 100 [IU]/ML
4 INJECTION, SOLUTION INTRAVENOUS; SUBCUTANEOUS
Qty: 15 ML | Refills: 0 | Status: SHIPPED | OUTPATIENT
Start: 2021-06-10 | End: 2021-11-04 | Stop reason: SDUPTHER

## 2021-11-04 DIAGNOSIS — N18.6 TYPE 2 DIABETES MELLITUS WITH ESRD (END-STAGE RENAL DISEASE): ICD-10-CM

## 2021-11-04 DIAGNOSIS — E11.22 TYPE 2 DIABETES MELLITUS WITH ESRD (END-STAGE RENAL DISEASE): ICD-10-CM

## 2021-11-05 RX ORDER — INSULIN ASPART 100 [IU]/ML
4 INJECTION, SOLUTION INTRAVENOUS; SUBCUTANEOUS
Qty: 15 ML | Refills: 0 | Status: SHIPPED | OUTPATIENT
Start: 2021-11-05 | End: 2023-06-07

## 2022-01-01 NOTE — ASSESSMENT & PLAN NOTE
- Etiology thought to be CKD  - Stable,will hold on further evaluation unless clinically indicated   33 weeks gestation

## 2022-03-23 ENCOUNTER — TELEPHONE (OUTPATIENT)
Dept: OPTOMETRY | Facility: CLINIC | Age: 77
End: 2022-03-23
Payer: MEDICARE

## 2022-03-23 ENCOUNTER — TELEPHONE (OUTPATIENT)
Dept: INTERNAL MEDICINE | Facility: CLINIC | Age: 77
End: 2022-03-23
Payer: MEDICARE

## 2022-03-23 NOTE — TELEPHONE ENCOUNTER
----- Message from Elida Bassett sent at 3/23/2022 11:41 AM CDT -----  Contact: daughter @ [phone]  Patient's daughter is calling to see if there is something sooner for the patient. She is needing a diabetic eye exam and not just a routine exam. I am unable to pull up the entire schedule since Medicaid is blocking it. Please contact patient's daughter to inform her of the soonest appointment at either the [facility] or [facility] location at [phone]

## 2022-04-04 ENCOUNTER — PATIENT MESSAGE (OUTPATIENT)
Dept: INTERNAL MEDICINE | Facility: CLINIC | Age: 77
End: 2022-04-04
Payer: MEDICARE

## 2022-04-11 DIAGNOSIS — E11.22 TYPE 2 DIABETES MELLITUS WITH ESRD (END-STAGE RENAL DISEASE): ICD-10-CM

## 2022-04-11 DIAGNOSIS — N18.6 TYPE 2 DIABETES MELLITUS WITH ESRD (END-STAGE RENAL DISEASE): ICD-10-CM

## 2022-04-11 NOTE — TELEPHONE ENCOUNTER
Care Due:                  Date            Visit Type   Department     Provider  --------------------------------------------------------------------------------                                EP -                              PRIMARY      MET INTERNAL  Last Visit: 01-      CARE ([facility])   MEDICINE       Katiuska Villalba                              EP -                              PRIMARY      [facility] INTERNAL  Next Visit: 04-      CARE ([facility])   MEDICINE       Katiuska Villalba                                                            Last  Test          Frequency    Reason                     Performed    Due Date  --------------------------------------------------------------------------------    CMP.........  12 months..  atorvastatin, insulin....  Not Found    Overdue    HBA1C.......  6 months...  insulin..................  Not Found    Overdue    Lipid Panel.  12 months..  atorvastatin.............  Not Found    Overdue    Powered by [employer]. Reference number: [id number].   4/11/2022 4:29:11 PM CDT

## 2022-04-13 ENCOUNTER — OFFICE VISIT (OUTPATIENT)
Dept: INTERNAL MEDICINE | Facility: CLINIC | Age: 77
End: 2022-04-13
Payer: MEDICARE

## 2022-04-13 ENCOUNTER — PATIENT MESSAGE (OUTPATIENT)
Dept: ADMINISTRATIVE | Facility: HOSPITAL | Age: 77
End: 2022-04-13
Payer: MEDICARE

## 2022-04-13 ENCOUNTER — TELEPHONE (OUTPATIENT)
Dept: PODIATRY | Facility: CLINIC | Age: 77
End: 2022-04-13
Payer: MEDICARE

## 2022-04-13 VITALS
DIASTOLIC BLOOD PRESSURE: 80 MMHG | BODY MASS INDEX: 31.29 KG/M2 | WEIGHT: 187.81 LBS | TEMPERATURE: 97 F | OXYGEN SATURATION: 99 % | HEIGHT: 65 IN | RESPIRATION RATE: 20 BRPM | SYSTOLIC BLOOD PRESSURE: 158 MMHG | HEART RATE: 74 BPM

## 2022-04-13 DIAGNOSIS — N18.6 ESRD (END STAGE RENAL DISEASE) ON DIALYSIS: ICD-10-CM

## 2022-04-13 DIAGNOSIS — I35.0 MODERATE AORTIC STENOSIS: ICD-10-CM

## 2022-04-13 DIAGNOSIS — I15.2 HYPERTENSION ASSOCIATED WITH DIABETES: Primary | ICD-10-CM

## 2022-04-13 DIAGNOSIS — E11.69 HYPERLIPIDEMIA ASSOCIATED WITH TYPE 2 DIABETES MELLITUS: ICD-10-CM

## 2022-04-13 DIAGNOSIS — Z89.619 HISTORY OF LEG AMPUTATION: ICD-10-CM

## 2022-04-13 DIAGNOSIS — I73.9 PAD (PERIPHERAL ARTERY DISEASE): ICD-10-CM

## 2022-04-13 DIAGNOSIS — E11.59 HYPERTENSION ASSOCIATED WITH DIABETES: Primary | ICD-10-CM

## 2022-04-13 DIAGNOSIS — Z12.31 ENCOUNTER FOR SCREENING MAMMOGRAM FOR HIGH-RISK PATIENT: ICD-10-CM

## 2022-04-13 DIAGNOSIS — Z00.00 ANNUAL PHYSICAL EXAM: ICD-10-CM

## 2022-04-13 DIAGNOSIS — N18.6 TYPE 2 DIABETES MELLITUS WITH ESRD (END-STAGE RENAL DISEASE): ICD-10-CM

## 2022-04-13 DIAGNOSIS — N25.81 SECONDARY HYPERPARATHYROIDISM OF RENAL ORIGIN: ICD-10-CM

## 2022-04-13 DIAGNOSIS — I50.32 CHRONIC DIASTOLIC HEART FAILURE: ICD-10-CM

## 2022-04-13 DIAGNOSIS — Z12.11 SCREEN FOR COLON CANCER: ICD-10-CM

## 2022-04-13 DIAGNOSIS — N18.6 ANEMIA IN END-STAGE RENAL DISEASE: ICD-10-CM

## 2022-04-13 DIAGNOSIS — D63.1 ANEMIA IN END-STAGE RENAL DISEASE: ICD-10-CM

## 2022-04-13 DIAGNOSIS — E11.22 TYPE 2 DIABETES MELLITUS WITH ESRD (END-STAGE RENAL DISEASE): ICD-10-CM

## 2022-04-13 DIAGNOSIS — E78.5 HYPERLIPIDEMIA ASSOCIATED WITH TYPE 2 DIABETES MELLITUS: ICD-10-CM

## 2022-04-13 DIAGNOSIS — Z99.2 ESRD (END STAGE RENAL DISEASE) ON DIALYSIS: ICD-10-CM

## 2022-04-13 DIAGNOSIS — H60.543 DERMATITIS OF EAR CANAL, BILATERAL: ICD-10-CM

## 2022-04-13 PROCEDURE — 1160F RVW MEDS BY RX/DR IN RCRD: CPT | Mod: CPTII,S$GLB,, | Performed by: INTERNAL MEDICINE

## 2022-04-13 PROCEDURE — 99214 OFFICE O/P EST MOD 30 MIN: CPT | Mod: S$GLB,,, | Performed by: INTERNAL MEDICINE

## 2022-04-13 PROCEDURE — 99999 PR PBB SHADOW E&M-EST. PATIENT-LVL V: ICD-10-PCS | Mod: PBBFAC,,, | Performed by: INTERNAL MEDICINE

## 2022-04-13 PROCEDURE — 1126F AMNT PAIN NOTED NONE PRSNT: CPT | Mod: CPTII,S$GLB,, | Performed by: INTERNAL MEDICINE

## 2022-04-13 PROCEDURE — 3079F DIAST BP 80-89 MM HG: CPT | Mod: CPTII,S$GLB,, | Performed by: INTERNAL MEDICINE

## 2022-04-13 PROCEDURE — 3288F FALL RISK ASSESSMENT DOCD: CPT | Mod: CPTII,S$GLB,, | Performed by: INTERNAL MEDICINE

## 2022-04-13 PROCEDURE — 1101F PT FALLS ASSESS-DOCD LE1/YR: CPT | Mod: CPTII,S$GLB,, | Performed by: INTERNAL MEDICINE

## 2022-04-13 PROCEDURE — 1126F PR PAIN SEVERITY QUANTIFIED, NO PAIN PRESENT: ICD-10-PCS | Mod: CPTII,S$GLB,, | Performed by: INTERNAL MEDICINE

## 2022-04-13 PROCEDURE — 99499 RISK ADDL DX/OHS AUDIT: ICD-10-PCS | Mod: S$GLB,,, | Performed by: INTERNAL MEDICINE

## 2022-04-13 PROCEDURE — 1159F MED LIST DOCD IN RCRD: CPT | Mod: CPTII,S$GLB,, | Performed by: INTERNAL MEDICINE

## 2022-04-13 PROCEDURE — 3008F PR BODY MASS INDEX (BMI) DOCUMENTED: ICD-10-PCS | Mod: CPTII,S$GLB,, | Performed by: INTERNAL MEDICINE

## 2022-04-13 PROCEDURE — 3288F PR FALLS RISK ASSESSMENT DOCUMENTED: ICD-10-PCS | Mod: CPTII,S$GLB,, | Performed by: INTERNAL MEDICINE

## 2022-04-13 PROCEDURE — 3077F SYST BP >= 140 MM HG: CPT | Mod: CPTII,S$GLB,, | Performed by: INTERNAL MEDICINE

## 2022-04-13 PROCEDURE — 3077F PR MOST RECENT SYSTOLIC BLOOD PRESSURE >= 140 MM HG: ICD-10-PCS | Mod: CPTII,S$GLB,, | Performed by: INTERNAL MEDICINE

## 2022-04-13 PROCEDURE — 99999 PR PBB SHADOW E&M-EST. PATIENT-LVL V: CPT | Mod: PBBFAC,,, | Performed by: INTERNAL MEDICINE

## 2022-04-13 PROCEDURE — 3008F BODY MASS INDEX DOCD: CPT | Mod: CPTII,S$GLB,, | Performed by: INTERNAL MEDICINE

## 2022-04-13 PROCEDURE — 99499 UNLISTED E&M SERVICE: CPT | Mod: S$GLB,,, | Performed by: INTERNAL MEDICINE

## 2022-04-13 PROCEDURE — 1159F PR MEDICATION LIST DOCUMENTED IN MEDICAL RECORD: ICD-10-PCS | Mod: CPTII,S$GLB,, | Performed by: INTERNAL MEDICINE

## 2022-04-13 PROCEDURE — 3079F PR MOST RECENT DIASTOLIC BLOOD PRESSURE 80-89 MM HG: ICD-10-PCS | Mod: CPTII,S$GLB,, | Performed by: INTERNAL MEDICINE

## 2022-04-13 PROCEDURE — 1101F PR PT FALLS ASSESS DOC 0-1 FALLS W/OUT INJ PAST YR: ICD-10-PCS | Mod: CPTII,S$GLB,, | Performed by: INTERNAL MEDICINE

## 2022-04-13 PROCEDURE — 1160F PR REVIEW ALL MEDS BY PRESCRIBER/CLIN PHARMACIST DOCUMENTED: ICD-10-PCS | Mod: CPTII,S$GLB,, | Performed by: INTERNAL MEDICINE

## 2022-04-13 PROCEDURE — 99214 PR OFFICE/OUTPT VISIT, EST, LEVL IV, 30-39 MIN: ICD-10-PCS | Mod: S$GLB,,, | Performed by: INTERNAL MEDICINE

## 2022-04-13 PROCEDURE — 99215 OFFICE O/P EST HI 40 MIN: CPT | Mod: PBBFAC,PO | Performed by: INTERNAL MEDICINE

## 2022-04-13 RX ORDER — FLUOCINOLONE ACETONIDE 0.11 MG/ML
2 OIL AURICULAR (OTIC) 2 TIMES DAILY
Qty: 20 ML | Refills: 0 | Status: SHIPPED | OUTPATIENT
Start: 2022-04-13

## 2022-04-13 RX ORDER — ATORVASTATIN CALCIUM 40 MG/1
40 TABLET, FILM COATED ORAL DAILY
Qty: 90 TABLET | Refills: 3 | Status: SHIPPED | OUTPATIENT
Start: 2022-04-13 | End: 2023-08-24 | Stop reason: SDUPTHER

## 2022-04-13 NOTE — TELEPHONE ENCOUNTER
Spoke with Ms Melvin's daughter, Rupali, who reports that her mother needs to be scheduled for a routine diabetic foot exam. Pt had been scheduled previously on Dr. Morris's schedule back on 2/22/22 and cancelled. Appt date and time of 5/20/22 at 3:30 pm accepted. No other needs voiced. Instructed to call if further assistance is needed.

## 2022-04-13 NOTE — PROGRESS NOTES
Subjective:     PCP: Katiuska Villalba MD    Martha Melvin is a 76 y.o. female who presents for an annual exam.    Chronic Medical Problems:      Hypertension: The patient has been taking medications as instructed, no medication side effects noted, no chest pain on exertion, no dyspnea on exertion and no swelling of ankles    BP Readings from Last 3 Encounters:   04/13/22 (!) 158/80   03/23/21 131/79   01/20/21 (!) 144/76       Hyperlipidemia: Compliance with treatment has been good. The patient exercises never. Patient denies muscle pain associated with his medications. Previous history of cardiac disease includes: coronary artery disease.    Lab Results   Component Value Date    CHOL 124 12/31/2018    HDL 34 (L) 12/31/2018         Diabetes: Patient presents for follow up of diabetes. Current symptoms include: paresthesia of the feet. Symptoms have been intermittent. Patient denies visual disturbances. Evaluation to date has included: hemoglobin A1C.     Lab Results   Component Value Date    HGBA1C 7.4 (H) 12/28/2018     (H) 03/23/2021       Medical History:   Past Medical History:   Diagnosis Date    Arthritis     Chronic diastolic heart failure 4/15/2018    Diabetes mellitus     Hyperlipidemia     Hyperlipidemia 9/7/2018    Hypertension     Renal disorder     poor kidney function    Stenosis of arteriovenous dialysis fistula 9/14/2018       Family History: family history includes Cancer in her father, mother, and sister; Diabetes in her mother; Hypertension in her mother.    Surgical History:   Past Surgical History:   Procedure Laterality Date    AORTOGRAPHY WITH SERIALOGRAPHY N/A 12/31/2018    Procedure: AORTOGRAM, WITH SERIALOGRAPHY;  Surgeon: Mike Mckay MD;  Location: [facility] CATH LAB/EP;  Service: Cardiology;  Laterality: N/A;    ARTERIOVENOUS FISTULOGRAPHY Left 1/2/2019    Procedure: Fistulogram, Arteriovenous;  Surgeon: Kenny Fink MD;  Location: [facility] CATH LAB/EP;  Service:  Cardiology;  Laterality: Left;    FISTULOGRAM Left 10/21/2019    Procedure: Fistulogram;  Surgeon: AYESHA Mesa III, MD;  Location: [facility] CATH LAB;  Service: Peripheral Vascular;  Laterality: Left;    LEG AMPUTATION Right     PERCUTANEOUS TRANSLUMINAL ANGIOPLASTY (PTA) OF PERIPHERAL VESSEL N/A 1/2/2019    Procedure: PTA, PERIPHERAL VESSEL;  Surgeon: Kenny Fink MD;  Location: [facility] CATH LAB/EP;  Service: Cardiology;  Laterality: N/A;    PERCUTANEOUS TRANSLUMINAL ANGIOPLASTY OF ARTERIOVENOUS FISTULA N/A 10/21/2019    Procedure: PTA, AV FISTULA;  Surgeon: AYESHA Mesa III, MD;  Location: [facility] CATH LAB;  Service: Peripheral Vascular;  Laterality: N/A;    TRANSPOSITION OF BASILIC VEIN Left 6/27/2018    Procedure: TRANSPOSITION, VEIN, BASILIC;  Surgeon: AYESHA Mesa III, MD;  Location: [facility] OR [facility] FLR;  Service: Cardiovascular;  Laterality: Left;  forearm         Social History:  reports that she has never smoked. She has never used smokeless tobacco. She reports that she does not drink alcohol.     Allergies:   Review of patient's allergies indicates:   Allergen Reactions    Morphine Nausea Only and Other (See Comments)     Dizziness, feeling like she will pass out       Medications:   Current Outpatient Medications   Medication Sig    amLODIPine (NORVASC) 10 MG tablet Take 1 tablet (10 mg total) by mouth once daily.    bisacodyl (DULCOLAX) 5 mg EC tablet Take 5 mg by mouth daily as needed for Constipation.    blood-glucose meter Misc Use to test blood sugar twice daily    furosemide (LASIX) 80 MG tablet take 1 tablet by mouth twice daily    insulin aspart U-100 (NOVOLOG FLEXPEN U-100 INSULIN) 100 unit/mL (3 mL) InPn pen Inject 4 Units into the skin 2 (two) times daily before meals.    lancets 30 gauge Misc 1 lancet by Misc.(Non-Drug; Combo Route) route 2 (two) times daily.    loratadine (CLARITIN) 10 mg tablet Take 10 mg by mouth once daily.    losartan (COZAAR) 25 MG tablet Take 1 tablet (25  "mg total) by mouth 2 (two) times a day.    pen needle, diabetic (BD ULTRA-FINE SHORT PEN NEEDLE) 31 gauge x 5/16" Ndle use as directed to inject insulin four times daily    pen needle, diabetic (NOVOFINE PLUS) 32 gauge x 1/6" Ndle use 3 (three) times daily as needed.    atorvastatin (LIPITOR) 40 MG tablet Take 1 tablet (40 mg total) by mouth once daily.    blood sugar diagnostic (TRUE METRIX GLUCOSE TEST STRIP) Strp Test 2 (two) times daily.as directed    calcium carbonate (TUMS E-X) 300 mg (750 mg) Chew Take 1 tablet by mouth 3 (three) times daily with meals.    fluocinolone acetonide oiL 0.01 % Drop Place 2 drops in ear(s) 2 (two) times a day.    sevelamer carbonate (RENVELA) 800 mg Tab Take 3 tablets by mouth three times daily with meals then take 1 tablet by mouth twice daily with snacks     No current facility-administered medications for this visit.       Health Maintenance:   Health Maintenance Topics with due status: Not Due       Topic Last Completion Date    Influenza Vaccine 2021    Eye Exam 2022        Eye Exam:   due   Dental Exam: due   Mammogram: due   DEXA scan: due   Cologuard:   ordred  · Hepatitis C screenin2018, neg       Vaccinations:  Immunization History   Administered Date(s) Administered    COVID-19, MRNA, LN-S, PF (MODERNA FULL 0.5 ML DOSE) 2021, 2021, 2021    Influenza - High Dose - PF (65 years and older) 10/17/2017, 2019    PPD Test 2018    Pneumococcal Conjugate - 13 Valent 10/17/2017    Pneumococcal Polysaccharide - 23 Valent 2019      Influenza:  Not done   Tetanus: due but dialysis may have records   Shingrix: due   Pneumovax: 2019   Prevnar-13: 10/2017   Covid vaccine: boosted    ADL's: independent  Memory: normal  Mental health: no anxiety  Advance Directives: <no information>  Falls: none recently  Nutrition: normal  Home Safety: no issues    Body mass index is 31.26 kg/m².  Wt Readings from Last 3 " Encounters:   04/13/22 85.2 kg (187 lb 13.3 oz)   03/23/21 88.5 kg (195 lb)   01/20/21 87 kg (191 lb 12.8 oz)       Review of Systems   Constitutional: Negative for chills, diaphoresis, fatigue and fever.   HENT: Negative for congestion, ear discharge, ear pain, postnasal drip, rhinorrhea, sinus pressure, sore throat and trouble swallowing.    Eyes: Negative for redness and visual disturbance.   Respiratory: Negative for cough, chest tightness and shortness of breath.    Cardiovascular: Negative for chest pain, palpitations and leg swelling.   Gastrointestinal: Negative for abdominal pain, blood in stool, constipation, diarrhea, nausea and vomiting.   Genitourinary:        Makes very little urine   Musculoskeletal: Negative for arthralgias, back pain and myalgias.   Skin: Negative for rash and wound.   Neurological: Negative for dizziness, weakness, numbness and headaches.   Hematological: Negative for adenopathy.   Psychiatric/Behavioral: Negative for dysphoric mood and sleep disturbance. The patient is not nervous/anxious.           Objective:     Physical Exam  Vitals reviewed.   Constitutional:       General: She is awake. She is not in acute distress.     Appearance: Normal appearance. She is well-developed and well-groomed. She is not diaphoretic.   HENT:      Head: Normocephalic and atraumatic.      Right Ear: Hearing, tympanic membrane, ear canal and external ear normal. Tympanic membrane is not erythematous or bulging.      Left Ear: Hearing, tympanic membrane, ear canal and external ear normal. Tympanic membrane is not erythematous or bulging.      Nose: Nose normal. No congestion.      Mouth/Throat:      Mouth: Mucous membranes are moist.      Tongue: No lesions.      Pharynx: Oropharynx is clear. Uvula midline. No oropharyngeal exudate or posterior oropharyngeal erythema.   Eyes:      General: Lids are normal. Vision grossly intact. Gaze aligned appropriately. No scleral icterus.     Conjunctiva/sclera:       Right eye: Right conjunctiva is not injected.      Left eye: Left conjunctiva is not injected.      Pupils: Pupils are equal, round, and reactive to light.   Neck:      Thyroid: No thyroid mass or thyromegaly.   Cardiovascular:      Rate and Rhythm: Normal rate and regular rhythm.      Pulses: Normal pulses.      Heart sounds: Normal heart sounds. No murmur heard.  Pulmonary:      Effort: Pulmonary effort is normal. No respiratory distress.      Breath sounds: Normal breath sounds. No decreased breath sounds or wheezing.   Chest:   Breasts:      Right: No supraclavicular adenopathy.      Left: No supraclavicular adenopathy.       Abdominal:      General: Bowel sounds are normal. There is no distension.      Palpations: Abdomen is soft. Abdomen is not rigid.      Tenderness: There is no abdominal tenderness. There is no guarding or rebound.   Musculoskeletal:         General: Normal range of motion.      Cervical back: Normal range of motion and neck supple.      Right lower leg: No edema.      Left lower leg: No edema.   Lymphadenopathy:      Cervical: No cervical adenopathy.      Upper Body:      Right upper body: No supraclavicular adenopathy.      Left upper body: No supraclavicular adenopathy.   Skin:     General: Skin is warm and dry.      Coloration: Skin is not cyanotic.      Findings: No lesion or rash.      Nails: There is no clubbing.   Neurological:      General: No focal deficit present.      Mental Status: She is alert and oriented to person, place, and time.      Sensory: Sensation is intact.      Coordination: Coordination is intact.      Gait: Gait is intact.      Deep Tendon Reflexes: Reflexes are normal and symmetric.   Psychiatric:         Attention and Perception: Attention normal.         Mood and Affect: Mood normal.         Behavior: Behavior is cooperative.              Assessment:        1. Hypertension associated with diabetes    2. Hyperlipidemia associated with type 2 diabetes  mellitus    3. 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 ESRD (end stage renal disease) on dialysis    5. Anemia in end-stage renal disease    6. Secondary hyperparathyroidism of renal origin    7. Chronic diastolic heart failure    8. PAD (peripheral artery disease)    9. Moderate aortic stenosis    10. Dermatitis of ear canal, bilateral    11. Annual physical exam    12. Encounter for screening mammogram for high-risk patient    13. Screen for colon cancer    14. History of leg amputation           Plan:     1. Hypertension associated with diabetes  - BP elevated today, did not take BP meds  - advised her to check readings at home and call us if persistently elevated    2. Hyperlipidemia associated with type 2 diabetes mellitus  - atorvastatin (LIPITOR) 40 MG tablet; Take 1 tablet (40 mg total) by mouth once daily.  Dispense: 90 tablet; Refill: 3    3. 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 ESRD (end stage renal disease) on dialysis    5. Anemia in end-stage renal disease  - f/u with Nephrology, uses [facility]     6. Secondary hyperparathyroidism of renal origin  - f/u with Nephrology    7. Chronic diastolic heart failure  - s/u with Cardiology    8. PAD (peripheral artery disease)    9. Moderate aortic stenosis    10. Dermatitis of ear canal, bilateral  - may apply small amount of OTC hydrocortisone cream to external ear canal bid for  Week    11. Annual physical exam  - will request labs from dialysis center    12. Encounter for screening mammogram for high-risk patient  - Mammo Digital Screening Bilat w/ Michael; Future    13. Screen for colon cancer  - Cologuard Screening (Multitarget Stool DNA); Future    14. History of leg amputation      RTC in 3 months for follow-up or sooner if  needed    __________________________    Katiuska Villalba MD, PharmD  [facility]- Internal Medicine  American Board of Obesity Medicine diplomate  Office [phone]

## 2022-04-13 NOTE — TELEPHONE ENCOUNTER
----- Message from Wendy Choe sent at 4/13/2022  4:55 PM CDT -----  Good Afternoon,  Dr. Villlaba has put in a referral for patient: PARMJIT CONNER [[id number]]  to be scheduled with Podiatry. I was not able to fin an available appointment with Dr. Morris. Please call patient to schedule. Thank you in advance.

## 2022-04-26 ENCOUNTER — PATIENT OUTREACH (OUTPATIENT)
Dept: ADMINISTRATIVE | Facility: OTHER | Age: 77
End: 2022-04-26
Payer: MEDICARE

## 2022-04-26 DIAGNOSIS — N18.6 TYPE 2 DIABETES MELLITUS WITH ESRD (END-STAGE RENAL DISEASE): Primary | ICD-10-CM

## 2022-04-26 DIAGNOSIS — E11.22 TYPE 2 DIABETES MELLITUS WITH ESRD (END-STAGE RENAL DISEASE): Primary | ICD-10-CM

## 2022-04-26 NOTE — PROGRESS NOTES
Requested updates within Care Everywhere.  Patient's chart was reviewed for overdue LONG topics.  Health maintenance:updated  Immunizations:reconciled   Legacy:   Media:  Orders placed:Hemoglobin A1c  Tasked appts:  Labs Linked:  Upcoming appt:Mammogram 5/4/22

## 2022-04-27 ENCOUNTER — OFFICE VISIT (OUTPATIENT)
Dept: OPTOMETRY | Facility: CLINIC | Age: 77
End: 2022-04-27
Payer: COMMERCIAL

## 2022-04-27 DIAGNOSIS — E11.9 TYPE 2 DIABETES MELLITUS WITHOUT RETINOPATHY: Primary | ICD-10-CM

## 2022-04-27 DIAGNOSIS — H52.4 PRESBYOPIA: ICD-10-CM

## 2022-04-27 DIAGNOSIS — H25.13 NUCLEAR SCLEROSIS, BILATERAL: ICD-10-CM

## 2022-04-27 DIAGNOSIS — Z13.5 GLAUCOMA SCREENING: ICD-10-CM

## 2022-04-27 PROCEDURE — 92004 PR EYE EXAM, NEW PATIENT,COMPREHESV: ICD-10-PCS | Mod: S$GLB,,, | Performed by: OPTOMETRIST

## 2022-04-27 PROCEDURE — 99999 PR PBB SHADOW E&M-EST. PATIENT-LVL III: ICD-10-PCS | Mod: PBBFAC,,, | Performed by: OPTOMETRIST

## 2022-04-27 PROCEDURE — 92004 COMPRE OPH EXAM NEW PT 1/>: CPT | Mod: S$GLB,,, | Performed by: OPTOMETRIST

## 2022-04-27 PROCEDURE — 99999 PR PBB SHADOW E&M-EST. PATIENT-LVL III: CPT | Mod: PBBFAC,,, | Performed by: OPTOMETRIST

## 2022-04-27 PROCEDURE — 92015 DETERMINE REFRACTIVE STATE: CPT | Mod: S$GLB,,, | Performed by: OPTOMETRIST

## 2022-04-27 PROCEDURE — 92015 PR REFRACTION: ICD-10-PCS | Mod: S$GLB,,, | Performed by: OPTOMETRIST

## 2022-04-27 NOTE — PROGRESS NOTES
HPI     NUNO: 9/21/2018    Presents today for Diabetic Eye Exam.  Pt reports vision changes at both ranges.  No wearing any vision corrections.  No f/f  No gtts    LBS: 118  Hemoglobin A1C       Date                     Value               Ref Range             Status                12/28/2018               7.4 (H)             4.0 - 5.6 %           Final                   09/05/2018               7.4 (H)             4.0 - 5.6 %           Final                   04/16/2018               8.0 (H)             4.0 - 5.6 %           Final                  Last edited by Ying Alvarez MA on 4/27/2022  2:25 PM. (History)        ROS     Positive for: Endocrine (DM)    Negative for: Constitutional, Gastrointestinal, Neurological, Skin,   Genitourinary, Musculoskeletal, HENT, Cardiovascular, Eyes, Respiratory,   Psychiatric, Allergic/Imm, Heme/Lymph    Last edited by Renny Jones, QUINTON on 4/27/2022  2:32 PM. (History)        Assessment /Plan     For exam results, see Encounter Report.    Type 2 diabetes mellitus without retinopathy    Nuclear sclerosis, bilateral    Glaucoma screening    Presbyopia      1. Dense cats OU--pt wishes surgery  2. DM- WITHOUT RETINOPATHY.      PLAN:    Surgical consult--Dr Marshall (they may wish pre-op mac OCT, since poor visualization of mac due to cats)

## 2022-05-18 ENCOUNTER — TELEPHONE (OUTPATIENT)
Dept: PODIATRY | Facility: CLINIC | Age: 77
End: 2022-05-18
Payer: MEDICARE

## 2022-05-18 NOTE — TELEPHONE ENCOUNTER
Spoke with Ms Melvin's daughter, Luzmaria, to inform her that  will not be having clinic this Friday due to being ill. New appt date and time of 6/24/22 at 3:30 pm accepted. No other needs voiced. Encouraged to call if further assistance is needed.

## 2022-06-15 NOTE — ASSESSMENT & PLAN NOTE
- Stable  - Acute portion resolved  - LVEF preserved, biatrial enlargement with grade 2 diastolic dysfunction, moderate AS, and pHTN on most recent TTE  - Euvolemic with HD   Daughter informed through [facility].   Unknown

## 2022-06-22 ENCOUNTER — OFFICE VISIT (OUTPATIENT)
Dept: OPHTHALMOLOGY | Facility: CLINIC | Age: 77
End: 2022-06-22
Payer: MEDICARE

## 2022-06-22 DIAGNOSIS — H52.7 REFRACTIVE ERROR: ICD-10-CM

## 2022-06-22 DIAGNOSIS — E11.9 DM TYPE 2 WITHOUT RETINOPATHY: ICD-10-CM

## 2022-06-22 DIAGNOSIS — H25.13 NUCLEAR SCLEROSIS OF BOTH EYES: Primary | ICD-10-CM

## 2022-06-22 DIAGNOSIS — H25.043 POSTERIOR SUBCAPSULAR AGE-RELATED CATARACT OF BOTH EYES: ICD-10-CM

## 2022-06-22 PROCEDURE — 1159F PR MEDICATION LIST DOCUMENTED IN MEDICAL RECORD: ICD-10-PCS | Mod: CPTII,S$GLB,, | Performed by: OPHTHALMOLOGY

## 2022-06-22 PROCEDURE — 92136 OPHTHALMIC BIOMETRY: CPT | Mod: RT,S$GLB,, | Performed by: OPHTHALMOLOGY

## 2022-06-22 PROCEDURE — 1126F AMNT PAIN NOTED NONE PRSNT: CPT | Mod: CPTII,S$GLB,, | Performed by: OPHTHALMOLOGY

## 2022-06-22 PROCEDURE — 1101F PR PT FALLS ASSESS DOC 0-1 FALLS W/OUT INJ PAST YR: ICD-10-PCS | Mod: CPTII,S$GLB,, | Performed by: OPHTHALMOLOGY

## 2022-06-22 PROCEDURE — 1126F PR PAIN SEVERITY QUANTIFIED, NO PAIN PRESENT: ICD-10-PCS | Mod: CPTII,S$GLB,, | Performed by: OPHTHALMOLOGY

## 2022-06-22 PROCEDURE — 99999 PR PBB SHADOW E&M-EST. PATIENT-LVL III: CPT | Mod: PBBFAC,,, | Performed by: OPHTHALMOLOGY

## 2022-06-22 PROCEDURE — 99213 OFFICE O/P EST LOW 20 MIN: CPT | Mod: PBBFAC | Performed by: OPHTHALMOLOGY

## 2022-06-22 PROCEDURE — 92004 COMPRE OPH EXAM NEW PT 1/>: CPT | Mod: S$GLB,,, | Performed by: OPHTHALMOLOGY

## 2022-06-22 PROCEDURE — 92136 BIOMETRY: ICD-10-PCS | Mod: RT,S$GLB,, | Performed by: OPHTHALMOLOGY

## 2022-06-22 PROCEDURE — 2023F DILAT RTA XM W/O RTNOPTHY: CPT | Mod: CPTII,S$GLB,, | Performed by: OPHTHALMOLOGY

## 2022-06-22 PROCEDURE — 3288F PR FALLS RISK ASSESSMENT DOCUMENTED: ICD-10-PCS | Mod: CPTII,S$GLB,, | Performed by: OPHTHALMOLOGY

## 2022-06-22 PROCEDURE — 99999 PR PBB SHADOW E&M-EST. PATIENT-LVL III: ICD-10-PCS | Mod: PBBFAC,,, | Performed by: OPHTHALMOLOGY

## 2022-06-22 PROCEDURE — 3288F FALL RISK ASSESSMENT DOCD: CPT | Mod: CPTII,S$GLB,, | Performed by: OPHTHALMOLOGY

## 2022-06-22 PROCEDURE — 1159F MED LIST DOCD IN RCRD: CPT | Mod: CPTII,S$GLB,, | Performed by: OPHTHALMOLOGY

## 2022-06-22 PROCEDURE — 1160F PR REVIEW ALL MEDS BY PRESCRIBER/CLIN PHARMACIST DOCUMENTED: ICD-10-PCS | Mod: CPTII,S$GLB,, | Performed by: OPHTHALMOLOGY

## 2022-06-22 PROCEDURE — 92004 PR EYE EXAM, NEW PATIENT,COMPREHESV: ICD-10-PCS | Mod: S$GLB,,, | Performed by: OPHTHALMOLOGY

## 2022-06-22 PROCEDURE — 2023F PR DILATED RETINAL EXAM W/O EVID OF RETINOPATHY: ICD-10-PCS | Mod: CPTII,S$GLB,, | Performed by: OPHTHALMOLOGY

## 2022-06-22 PROCEDURE — 1160F RVW MEDS BY RX/DR IN RCRD: CPT | Mod: CPTII,S$GLB,, | Performed by: OPHTHALMOLOGY

## 2022-06-22 PROCEDURE — 1101F PT FALLS ASSESS-DOCD LE1/YR: CPT | Mod: CPTII,S$GLB,, | Performed by: OPHTHALMOLOGY

## 2022-06-22 NOTE — PROGRESS NOTES
Subjective:       Patient ID: Martha Melvin is a 76 y.o. female.    Chief Complaint: No chief complaint on file.    HPI     Pt here today for Cataract Eval. Pt state tearing OD, Itching OD. Pt   state floater OU, denies flashes.   Pt states no other complaints at this time.    Eye meds:NA    Last edited by Lenore Donald on 6/22/2022  2:11 PM. (History)             Assessment:       1. Nuclear sclerosis of both eyes    2. Posterior subcapsular age-related cataract of both eyes    3. DM type 2 without retinopathy    4. Refractive error        Plan:       Visually significant cataract OU -Pt. Wants Sx.     DM-No NPDR OU.  RE      Cataract Surgery Consent: Patient with a visually significant cataract with difficulties of ADLs, reading, driving, night vision, glare (any and all).  Discussed with Patient/Family/Caregiver: options, risks and benefits, expectations of cataract surgery, utilized an eye model with questions and answers to facilitate discussion.  Discussed lens options and patient understands that glasses may be required for optimal vision for distance and/or near vision after cataract surgery.  The Patient/Family/Caregiver  voice good understanding and patient wishes to proceed with surgery.  The patient will likely benefit from surgery and patient signed consent for Right Eye.  Will call Pt to schedule CE OD 1st [place] 24.5,                                              OS 2nd [place] 24.0.  Control DM.

## 2022-06-24 ENCOUNTER — PATIENT OUTREACH (OUTPATIENT)
Dept: ADMINISTRATIVE | Facility: HOSPITAL | Age: 77
End: 2022-06-24
Payer: MEDICARE

## 2022-06-24 ENCOUNTER — OFFICE VISIT (OUTPATIENT)
Dept: PODIATRY | Facility: CLINIC | Age: 77
End: 2022-06-24
Payer: MEDICARE

## 2022-06-24 VITALS
HEART RATE: 76 BPM | BODY MASS INDEX: 31.26 KG/M2 | HEIGHT: 65 IN | SYSTOLIC BLOOD PRESSURE: 142 MMHG | DIASTOLIC BLOOD PRESSURE: 73 MMHG

## 2022-06-24 DIAGNOSIS — E11.42 TYPE 2 DIABETES MELLITUS WITH PERIPHERAL NEUROPATHY: ICD-10-CM

## 2022-06-24 DIAGNOSIS — B35.1 ONYCHOMYCOSIS: ICD-10-CM

## 2022-06-24 DIAGNOSIS — N18.6 TYPE 2 DIABETES MELLITUS WITH ESRD (END-STAGE RENAL DISEASE): ICD-10-CM

## 2022-06-24 DIAGNOSIS — E11.22 TYPE 2 DIABETES MELLITUS WITH ESRD (END-STAGE RENAL DISEASE): ICD-10-CM

## 2022-06-24 DIAGNOSIS — Z89.511 HISTORY OF RIGHT BELOW KNEE AMPUTATION: Primary | ICD-10-CM

## 2022-06-24 PROCEDURE — 3077F SYST BP >= 140 MM HG: CPT | Mod: CPTII,S$GLB,, | Performed by: PODIATRIST

## 2022-06-24 PROCEDURE — 1126F PR PAIN SEVERITY QUANTIFIED, NO PAIN PRESENT: ICD-10-PCS | Mod: CPTII,S$GLB,, | Performed by: PODIATRIST

## 2022-06-24 PROCEDURE — 1159F PR MEDICATION LIST DOCUMENTED IN MEDICAL RECORD: ICD-10-PCS | Mod: CPTII,S$GLB,, | Performed by: PODIATRIST

## 2022-06-24 PROCEDURE — 11720 DEBRIDE NAIL 1-5: CPT | Mod: Q7,S$GLB,, | Performed by: PODIATRIST

## 2022-06-24 PROCEDURE — 99999 PR PBB SHADOW E&M-EST. PATIENT-LVL IV: ICD-10-PCS | Mod: PBBFAC,,, | Performed by: PODIATRIST

## 2022-06-24 PROCEDURE — 3078F PR MOST RECENT DIASTOLIC BLOOD PRESSURE < 80 MM HG: ICD-10-PCS | Mod: CPTII,S$GLB,, | Performed by: PODIATRIST

## 2022-06-24 PROCEDURE — 3008F BODY MASS INDEX DOCD: CPT | Mod: CPTII,S$GLB,, | Performed by: PODIATRIST

## 2022-06-24 PROCEDURE — 1160F RVW MEDS BY RX/DR IN RCRD: CPT | Mod: CPTII,S$GLB,, | Performed by: PODIATRIST

## 2022-06-24 PROCEDURE — 1159F MED LIST DOCD IN RCRD: CPT | Mod: CPTII,S$GLB,, | Performed by: PODIATRIST

## 2022-06-24 PROCEDURE — 99999 PR PBB SHADOW E&M-EST. PATIENT-LVL IV: CPT | Mod: PBBFAC,,, | Performed by: PODIATRIST

## 2022-06-24 PROCEDURE — 99203 OFFICE O/P NEW LOW 30 MIN: CPT | Mod: 25,S$GLB,, | Performed by: PODIATRIST

## 2022-06-24 PROCEDURE — 99214 OFFICE O/P EST MOD 30 MIN: CPT | Mod: PBBFAC,PN | Performed by: PODIATRIST

## 2022-06-24 PROCEDURE — 3077F PR MOST RECENT SYSTOLIC BLOOD PRESSURE >= 140 MM HG: ICD-10-PCS | Mod: CPTII,S$GLB,, | Performed by: PODIATRIST

## 2022-06-24 PROCEDURE — 1160F PR REVIEW ALL MEDS BY PRESCRIBER/CLIN PHARMACIST DOCUMENTED: ICD-10-PCS | Mod: CPTII,S$GLB,, | Performed by: PODIATRIST

## 2022-06-24 PROCEDURE — 1126F AMNT PAIN NOTED NONE PRSNT: CPT | Mod: CPTII,S$GLB,, | Performed by: PODIATRIST

## 2022-06-24 PROCEDURE — 11720 ROUTINE FOOT CARE: ICD-10-PCS | Mod: Q7,S$GLB,, | Performed by: PODIATRIST

## 2022-06-24 PROCEDURE — 99203 PR OFFICE/OUTPT VISIT, NEW, LEVL III, 30-44 MIN: ICD-10-PCS | Mod: 25,S$GLB,, | Performed by: PODIATRIST

## 2022-06-24 PROCEDURE — 3078F DIAST BP <80 MM HG: CPT | Mod: CPTII,S$GLB,, | Performed by: PODIATRIST

## 2022-06-24 PROCEDURE — 3008F PR BODY MASS INDEX (BMI) DOCUMENTED: ICD-10-PCS | Mod: CPTII,S$GLB,, | Performed by: PODIATRIST

## 2022-06-24 NOTE — PROCEDURES
"Routine Foot Care    Date/Time: 6/24/2022 3:30 PM  Performed by: Flaco Morris DPM  Authorized by: Flaco Morris DPM     Time out: Immediately prior to procedure a "time out" was called to verify the correct patient, procedure, equipment, support staff and site/side marked as required.    Consent Done?:  Yes (Verbal)  Hyperkeratotic Skin Lesions?: No      Nail Care Type:  Debride(Left 1st Toe, Left 2nd Toe, Left 3rd Toe, Left 4th Toe and Left 5th Toe)  Patient tolerance:  Patient tolerated the procedure well with no immediate complications     Used sterile nail nipper.        "

## 2022-06-24 NOTE — PROGRESS NOTES
Chart reviewed  Immunizations reconciled   Pts daughter states that she will get an A1c on next friday

## 2022-06-24 NOTE — PROGRESS NOTES
Subjective:      Patient ID: Martha Melvin is a 76 y.o. female.    Chief Complaint: Diabetes Mellitus (PCP Dr. Villalba, 4/13/22), Diabetic Foot Exam, and Routine Foot Care    Martha is a 76 y.o. female who presents to the clinic upon referral from Dr. Villalba  for evaluation and treatment of diabetic feet. 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  History previous right below-knee amputation.    PCP: Katiuska Villalba MD    Date Last Seen by PCP:  04/13/2022    Current shoe gear: Tennis shoes    Hemoglobin A1C   Date Value Ref Range Status   12/28/2018 7.4 (H) 4.0 - 5.6 % Final     Comment:     ADA Screening Guidelines:  5.7-6.4%  Consistent with prediabetes  >or=6.5%  Consistent with diabetes  High levels of fetal hemoglobin interfere with the HbA1C  assay. Heterozygous hemoglobin variants (HbS, HgC, etc)do  not significantly interfere with this assay.   However, presence of multiple variants may affect accuracy.     09/05/2018 7.4 (H) 4.0 - 5.6 % Final     Comment:     ADA Screening Guidelines:  5.7-6.4%  Consistent with prediabetes  >or=6.5%  Consistent with diabetes  High levels of fetal hemoglobin interfere with the HbA1C  assay. Heterozygous hemoglobin variants (HbS, HgC, etc)do  not significantly interfere with this assay.   However, presence of multiple variants may affect accuracy.     04/16/2018 8.0 (H) 4.0 - 5.6 % Final     Comment:     According to ADA guidelines, hemoglobin A1c <7.0% represents  optimal control in non-pregnant diabetic patients. Different  metrics may apply to specific patient populations.   Standards of Medical Care in Diabetes-2016.  For the purpose of screening for the presence of diabetes:  <5.7%     Consistent with the absence of diabetes  5.7-6.4%  Consistent with increasing  "risk for diabetes   (prediabetes)  >or=6.5%  Consistent with diabetes  Currently, no consensus exists for use of hemoglobin A1c  for diagnosis of diabetes for children.  This Hemoglobin A1c assay has significant interference with fetal   hemoglobin   (HbF). The results are invalid for patients with abnormal amounts of   HbF,   including those with known Hereditary Persistence   of Fetal Hemoglobin. Heterozygous hemoglobin variants (HbAS, HbAC,   HbAD, HbAE, HbA2) do not significantly interfere with this assay;   however, presence of multiple variants in a sample may impact the %   interference.       Vitals:    06/24/22 1510   BP: (!) 142/73   Pulse: 76   Height: 5' 5" (1.651 m)   PainSc: 0-No pain      Past Medical History:   Diagnosis Date    Arthritis     Chronic diastolic heart failure 4/15/2018    Diabetes mellitus     Hyperlipidemia     Hyperlipidemia 9/7/2018    Hypertension     Renal disorder     poor kidney function    Stenosis of arteriovenous dialysis fistula 9/14/2018       Past Surgical History:   Procedure Laterality Date    AORTOGRAPHY WITH SERIALOGRAPHY N/A 12/31/2018    Procedure: AORTOGRAM, WITH SERIALOGRAPHY;  Surgeon: Mike Mckay MD;  Location: [facility] CATH LAB/EP;  Service: Cardiology;  Laterality: N/A;    ARTERIOVENOUS FISTULOGRAPHY Left 1/2/2019    Procedure: Fistulogram, Arteriovenous;  Surgeon: Kenny Fink MD;  Location: [facility] CATH LAB/EP;  Service: Cardiology;  Laterality: Left;    FISTULOGRAM Left 10/21/2019    Procedure: Fistulogram;  Surgeon: AYESHA Mesa III, MD;  Location: [facility] CATH LAB;  Service: Peripheral Vascular;  Laterality: Left;    LEG AMPUTATION Right     PERCUTANEOUS TRANSLUMINAL ANGIOPLASTY (PTA) OF PERIPHERAL VESSEL N/A 1/2/2019    Procedure: PTA, PERIPHERAL VESSEL;  Surgeon: Kenny Fink MD;  Location: [facility] CATH LAB/EP;  Service: Cardiology;  Laterality: N/A;    PERCUTANEOUS TRANSLUMINAL ANGIOPLASTY OF ARTERIOVENOUS FISTULA N/A 10/21/2019    " Procedure: PTA, AV FISTULA;  Surgeon: AYESHA Mesa III, MD;  Location: [facility] CATH LAB;  Service: Peripheral Vascular;  Laterality: N/A;    TRANSPOSITION OF BASILIC VEIN Left 6/27/2018    Procedure: TRANSPOSITION, VEIN, BASILIC;  Surgeon: AYESHA Mesa III, MD;  Location: [facility] OR 2ND FLR;  Service: Cardiovascular;  Laterality: Left;  forearm        Family History   Problem Relation Age of Onset    Cancer Mother     Diabetes Mother     Hypertension Mother     Cancer Father     Cancer Sister        Social History     Socioeconomic History    Marital status: Single   Tobacco Use    Smoking status: Never Smoker    Smokeless tobacco: Never Used   Substance and Sexual Activity    Alcohol use: No       Current Outpatient Medications   Medication Sig Dispense Refill    amLODIPine (NORVASC) 10 MG tablet Take 1 tablet (10 mg total) by mouth once daily. 30 tablet 11    atorvastatin (LIPITOR) 40 MG tablet Take 1 tablet (40 mg total) by mouth once daily. 90 tablet 3    bisacodyl (DULCOLAX) 5 mg EC tablet Take 5 mg by mouth daily as needed for Constipation.      blood sugar diagnostic (TRUE METRIX GLUCOSE TEST STRIP) Strp Test 2 (two) times daily.as directed 100 each 11    blood-glucose meter Misc Use to test blood sugar twice daily 1 each 0    fluocinolone acetonide oiL 0.01 % Drop Place 2 drops in ear(s) 2 (two) times a day. 20 mL 0    furosemide (LASIX) 80 MG tablet take 1 tablet by mouth twice daily 180 tablet 3    insulin aspart U-100 (NOVOLOG FLEXPEN U-100 INSULIN) 100 unit/mL (3 mL) InPn pen Inject 4 Units into the skin 2 (two) times daily before meals. 15 mL 0    lancets 30 gauge Misc 1 lancet by Misc.(Non-Drug; Combo Route) route 2 (two) times daily. 100 each 5    loratadine (CLARITIN) 10 mg tablet Take 10 mg by mouth once daily.      losartan (COZAAR) 25 MG tablet Take 1 tablet (25 mg total) by mouth 2 (two) times a day. 60 tablet 6    pen needle, diabetic (BD ULTRA-FINE SHORT PEN NEEDLE) 31  "gauge x 5/16" Ndle use as directed to inject insulin four times daily 100 each 1    pen needle, diabetic (NOVOFINE PLUS) 32 gauge x 1/6" Ndle use 3 (three) times daily as needed. 300 each 1    sevelamer carbonate (RENVELA) 800 mg Tab Take 3 tablets by mouth three times daily with meals then take 1 tablet by mouth twice daily with snacks 330 tablet 6    calcium carbonate (TUMS E-X) 300 mg (750 mg) Chew Take 1 tablet by mouth 3 (three) times daily with meals. 90 tablet 2     No current facility-administered medications for this visit.       Review of patient's allergies indicates:   Allergen Reactions    Morphine Nausea Only and Other (See Comments)     Dizziness, feeling like she will pass out           Review of Systems   Constitutional: Negative for chills and fever.   HENT: Negative for congestion and hearing loss.    Cardiovascular: Negative for chest pain and claudication.   Respiratory: Negative for cough and shortness of breath.    Skin: Positive for color change and nail changes.   Musculoskeletal: Negative for back pain and joint pain.   Gastrointestinal: Negative for nausea and vomiting.   Neurological: Negative for numbness.   Psychiatric/Behavioral: Negative for altered mental status.           Objective:      Physical Exam  Constitutional:       General: She is not in acute distress.     Appearance: She is not ill-appearing.   Cardiovascular:      Pulses:           Dorsalis pedis pulses are 2+ on the left side.        Posterior tibial pulses are 2+ on the left side.      Comments: Mild nonpitting edema to left lower extremity.  Skin temp is warm to left foot.  No rubor on dependency left lower extremity.  Musculoskeletal:      Comments: Right below-knee amputation.    Semi rigid cavus foot structure left foot with semi rigid flexion contractures toes 2-5 left foot.      Right Lower Extremity: Right leg is amputated below knee.   Feet:      Left foot:      Protective Sensation: 10 sites tested. 5 " sites sensed.      Toenail Condition: Left toenails are abnormally thick and long. Fungal disease present.  Skin:     General: Skin is warm.      Capillary Refill: Capillary refill takes less than 2 seconds.      Findings: No ecchymosis or erythema.      Nails: There is no clubbing.      Comments: Nails 1-5 left foot are severely elongated, thickened, dystrophic and discolored yellow-brown with moderate underlying debris and loosening.   Neurological:      Mental Status: She is alert.               Assessment:       Encounter Diagnoses   Name Primary?    Type 2 diabetes mellitus with ESRD (end-stage renal disease)     History of right below knee amputation Yes    Type 2 diabetes mellitus with peripheral neuropathy     Onychomycosis          Plan:       Martha was seen today for diabetes mellitus, diabetic foot exam and routine foot care.    Diagnoses and all orders for this visit:    History of right below knee amputation  -     DIABETIC SHOES FOR HOME USE  -     Routine Foot Care    Type 2 diabetes mellitus with ESRD (end-stage renal disease)  -     Ambulatory referral/consult to Podiatry  -     DIABETIC SHOES FOR HOME USE  -     Routine Foot Care    Type 2 diabetes mellitus with peripheral neuropathy  -     DIABETIC SHOES FOR HOME USE  -     Routine Foot Care    Onychomycosis  -     Routine Foot Care      I counseled the patient on her conditions, their implications and medical management.    Shoe inspection. Diabetic Foot Education. Patient reminded of the importance of good nutrition and blood sugar control to help prevent podiatric complications of diabetes. Patient instructed on proper foot hygeine. We discussed wearing proper shoe gear, daily foot inspections, never walking without protective shoe gear, never putting sharp instruments to feet.    Routine foot care per attached note. She will continue to monitor the areas daily, inspect her feet, wear protective shoe gear when ambulatory, moisturizer to  maintain skin integrity.    Comprehensive annual diabetic foot exam completed today.  Patient found to be intermediate risk for diabetic foot complication.    RTC within 3-4 months or p.r.n. as discussed.    A portion of this note was generated by voice recognition software and may contain spelling and grammar errors.

## 2022-06-29 ENCOUNTER — TELEPHONE (OUTPATIENT)
Dept: OPHTHALMOLOGY | Facility: CLINIC | Age: 77
End: 2022-06-29
Payer: MEDICARE

## 2022-07-06 ENCOUNTER — TELEPHONE (OUTPATIENT)
Dept: OPHTHALMOLOGY | Facility: CLINIC | Age: 77
End: 2022-07-06
Payer: MEDICARE

## 2022-07-06 DIAGNOSIS — H25.11 NS (NUCLEAR SCLEROSIS), RIGHT: Primary | ICD-10-CM

## 2022-07-06 DIAGNOSIS — H25.12 NS (NUCLEAR SCLEROSIS), LEFT: Primary | ICD-10-CM

## 2022-07-06 DIAGNOSIS — H25.13 NUCLEAR SCLEROTIC CATARACT OF BOTH EYES: Primary | ICD-10-CM

## 2022-07-06 RX ORDER — OFLOXACIN 3 MG/ML
1 SOLUTION/ DROPS OPHTHALMIC 4 TIMES DAILY
Qty: 5 ML | Refills: 1 | Status: SHIPPED | OUTPATIENT
Start: 2022-08-13 | End: 2022-08-23

## 2022-07-06 RX ORDER — DIFLUPREDNATE OPHTHALMIC 0.5 MG/ML
1 EMULSION OPHTHALMIC 4 TIMES DAILY
Qty: 5 ML | Refills: 1 | Status: SHIPPED | OUTPATIENT
Start: 2022-08-16 | End: 2022-09-15

## 2022-07-06 RX ORDER — NEPAFENAC 3 MG/ML
1 SUSPENSION/ DROPS OPHTHALMIC DAILY
Qty: 3 ML | Refills: 1 | Status: SHIPPED | OUTPATIENT
Start: 2022-08-13 | End: 2022-09-12

## 2022-07-13 ENCOUNTER — TELEPHONE (OUTPATIENT)
Dept: INTERNAL MEDICINE | Facility: CLINIC | Age: 77
End: 2022-07-13
Payer: MEDICARE

## 2022-07-13 ENCOUNTER — PATIENT MESSAGE (OUTPATIENT)
Dept: ADMINISTRATIVE | Facility: HOSPITAL | Age: 77
End: 2022-07-13
Payer: MEDICARE

## 2022-07-28 ENCOUNTER — TELEPHONE (OUTPATIENT)
Dept: OPHTHALMOLOGY | Facility: CLINIC | Age: 77
End: 2022-07-28
Payer: MEDICARE

## 2022-08-09 ENCOUNTER — TELEPHONE (OUTPATIENT)
Dept: OPHTHALMOLOGY | Facility: CLINIC | Age: 77
End: 2022-08-09
Payer: MEDICARE

## 2022-08-10 ENCOUNTER — TELEPHONE (OUTPATIENT)
Dept: OPHTHALMOLOGY | Facility: CLINIC | Age: 77
End: 2022-08-10
Payer: MEDICARE

## 2022-08-11 ENCOUNTER — TELEPHONE (OUTPATIENT)
Dept: OPHTHALMOLOGY | Facility: CLINIC | Age: 77
End: 2022-08-11
Payer: MEDICARE

## 2022-08-12 ENCOUNTER — TELEPHONE (OUTPATIENT)
Dept: OPHTHALMOLOGY | Facility: CLINIC | Age: 77
End: 2022-08-12
Payer: MEDICARE

## 2022-08-14 NOTE — H&P
[facility]  History & Physical    Subjective:      Chief Complaint/Reason for Admission:     Martha eMlvin is a 76 y.o. female.    Past Medical History:   Diagnosis Date    Arthritis     Chronic diastolic heart failure 4/15/2018    Diabetes mellitus     Hyperlipidemia     Hyperlipidemia 9/7/2018    Hypertension     Renal disorder     poor kidney function    Stenosis of arteriovenous dialysis fistula 9/14/2018     Past Surgical History:   Procedure Laterality Date    AORTOGRAPHY WITH SERIALOGRAPHY N/A 12/31/2018    Procedure: AORTOGRAM, WITH SERIALOGRAPHY;  Surgeon: Mike Mckay MD;  Location: [facility] CATH LAB/EP;  Service: Cardiology;  Laterality: N/A;    ARTERIOVENOUS FISTULOGRAPHY Left 1/2/2019    Procedure: Fistulogram, Arteriovenous;  Surgeon: Kenny Fink MD;  Location: [facility] CATH LAB/EP;  Service: Cardiology;  Laterality: Left;    FISTULOGRAM Left 10/21/2019    Procedure: Fistulogram;  Surgeon: AYESHA Mesa III, MD;  Location: [facility] CATH LAB;  Service: Peripheral Vascular;  Laterality: Left;    LEG AMPUTATION Right     PERCUTANEOUS TRANSLUMINAL ANGIOPLASTY (PTA) OF PERIPHERAL VESSEL N/A 1/2/2019    Procedure: PTA, PERIPHERAL VESSEL;  Surgeon: Kenny Fink MD;  Location: [facility] CATH LAB/EP;  Service: Cardiology;  Laterality: N/A;    PERCUTANEOUS TRANSLUMINAL ANGIOPLASTY OF ARTERIOVENOUS FISTULA N/A 10/21/2019    Procedure: PTA, AV FISTULA;  Surgeon: AYESHA Mesa III, MD;  Location: [facility] CATH LAB;  Service: Peripheral Vascular;  Laterality: N/A;    TRANSPOSITION OF BASILIC VEIN Left 6/27/2018    Procedure: TRANSPOSITION, VEIN, BASILIC;  Surgeon: AYESHA Mesa III, MD;  Location: [facility] OR [address] [place] [address];  Service: Cardiovascular;  Laterality: Left;  forearm      Family History   Problem Relation Age of Onset    Cancer Mother     Diabetes Mother     Hypertension Mother     Cancer Father     Cancer Sister      Social History     Tobacco Use    Smoking status: Never Smoker     Smokeless tobacco: Never Used   Substance Use Topics    Alcohol use: No       No medications prior to admission.     Review of patient's allergies indicates:   Allergen Reactions    Morphine Nausea Only and Other (See Comments)     Dizziness, feeling like she will pass out        Review of Systems   Eyes: Positive for blurred vision.   All other systems reviewed and are negative.      Objective:      Vital Signs (Most Recent)       Vital Signs Range (Last 24H):       Physical Exam  Constitutional:       Appearance: She is well-developed.   HENT:      Head: Normocephalic.   Eyes:      Conjunctiva/sclera: Conjunctivae normal.      Pupils: Pupils are equal, round, and reactive to light.   Cardiovascular:      Rate and Rhythm: Normal rate and regular rhythm.      Heart sounds: Normal heart sounds.   Pulmonary:      Effort: Pulmonary effort is normal.      Breath sounds: Normal breath sounds.   Abdominal:      General: Bowel sounds are normal.      Palpations: Abdomen is soft.   Musculoskeletal:         General: Normal range of motion.      Cervical back: Normal range of motion and neck supple.   Skin:     General: Skin is warm.   Neurological:      Mental Status: She is alert and oriented to person, place, and time.         Data Review:    ECG:     Assessment:      Cataract OD.    Plan:    CE OD.

## 2022-08-16 ENCOUNTER — ANESTHESIA (OUTPATIENT)
Dept: SURGERY | Facility: OTHER | Age: 77
End: 2022-08-16
Payer: MEDICARE

## 2022-08-16 ENCOUNTER — ANESTHESIA EVENT (OUTPATIENT)
Dept: SURGERY | Facility: OTHER | Age: 77
End: 2022-08-16
Payer: MEDICARE

## 2022-08-16 ENCOUNTER — HOSPITAL ENCOUNTER (OUTPATIENT)
Facility: OTHER | Age: 77
Discharge: HOME | End: 2022-08-16
Attending: OPHTHALMOLOGY | Admitting: OPHTHALMOLOGY
Payer: MEDICARE

## 2022-08-16 VITALS
RESPIRATION RATE: 16 BRPM | HEIGHT: 65 IN | OXYGEN SATURATION: 98 % | TEMPERATURE: 98 F | DIASTOLIC BLOOD PRESSURE: 97 MMHG | BODY MASS INDEX: 30.32 KG/M2 | HEART RATE: 92 BPM | SYSTOLIC BLOOD PRESSURE: 144 MMHG | WEIGHT: 182 LBS

## 2022-08-16 DIAGNOSIS — H25.11 NUCLEAR SCLEROTIC CATARACT OF RIGHT EYE: Primary | ICD-10-CM

## 2022-08-16 LAB — POCT GLUCOSE: 138 MG/DL (ref 70–110)

## 2022-08-16 PROCEDURE — 25000003 PHARM REV CODE 250: Performed by: NURSE ANESTHETIST, CERTIFIED REGISTERED

## 2022-08-16 PROCEDURE — 71000015 HC POSTOP RECOV 1ST HR: Performed by: OPHTHALMOLOGY

## 2022-08-16 PROCEDURE — 63600175 PHARM REV CODE 636 W HCPCS: Performed by: NURSE ANESTHETIST, CERTIFIED REGISTERED

## 2022-08-16 PROCEDURE — 37000008 HC ANESTHESIA 1ST 15 MINUTES: Performed by: OPHTHALMOLOGY

## 2022-08-16 PROCEDURE — 66984 PR REMOVAL, CATARACT, W/INSRT INTRAOC LENS, W/O ENDO CYCLO: ICD-10-PCS | Mod: RT,,, | Performed by: OPHTHALMOLOGY

## 2022-08-16 PROCEDURE — 37000009 HC ANESTHESIA EA ADD 15 MINS: Performed by: OPHTHALMOLOGY

## 2022-08-16 PROCEDURE — 36000706: Performed by: OPHTHALMOLOGY

## 2022-08-16 PROCEDURE — 82962 GLUCOSE BLOOD TEST: CPT | Performed by: OPHTHALMOLOGY

## 2022-08-16 PROCEDURE — V2632 POST CHMBR INTRAOCULAR LENS: HCPCS | Performed by: OPHTHALMOLOGY

## 2022-08-16 PROCEDURE — 66984 XCAPSL CTRC RMVL W/O ECP: CPT | Mod: RT,,, | Performed by: OPHTHALMOLOGY

## 2022-08-16 PROCEDURE — 25000003 PHARM REV CODE 250: Performed by: OPHTHALMOLOGY

## 2022-08-16 PROCEDURE — 63600175 PHARM REV CODE 636 W HCPCS: Performed by: OPHTHALMOLOGY

## 2022-08-16 PROCEDURE — 36000707: Performed by: OPHTHALMOLOGY

## 2022-08-16 DEVICE — LENS CLAREON AUTONOME 24.5D: Type: IMPLANTABLE DEVICE | Site: EYE | Status: FUNCTIONAL

## 2022-08-16 RX ORDER — PREDNISOLONE ACETATE 10 MG/ML
SUSPENSION/ DROPS OPHTHALMIC
Status: DISCONTINUED | OUTPATIENT
Start: 2022-08-16 | End: 2022-08-16 | Stop reason: HOSPADM

## 2022-08-16 RX ORDER — ACETAMINOPHEN 325 MG/1
650 TABLET ORAL EVERY 4 HOURS PRN
Status: DISCONTINUED | OUTPATIENT
Start: 2022-08-16 | End: 2022-08-16 | Stop reason: HOSPADM

## 2022-08-16 RX ORDER — OFLOXACIN 3 MG/ML
1 SOLUTION/ DROPS OPHTHALMIC
Status: DISCONTINUED | OUTPATIENT
Start: 2022-08-16 | End: 2022-08-16 | Stop reason: HOSPADM

## 2022-08-16 RX ORDER — SODIUM CHLORIDE 0.9 % (FLUSH) 0.9 %
10 SYRINGE (ML) INJECTION
Status: DISCONTINUED | OUTPATIENT
Start: 2022-08-16 | End: 2022-08-16 | Stop reason: HOSPADM

## 2022-08-16 RX ORDER — CYCLOPENTOLATE HYDROCHLORIDE 10 MG/ML
1 SOLUTION/ DROPS OPHTHALMIC
Status: DISCONTINUED | OUTPATIENT
Start: 2022-08-16 | End: 2022-08-16 | Stop reason: HOSPADM

## 2022-08-16 RX ORDER — LIDOCAINE HYDROCHLORIDE 20 MG/ML
INJECTION, SOLUTION EPIDURAL; INFILTRATION; INTRACAUDAL; PERINEURAL
Status: DISCONTINUED | OUTPATIENT
Start: 2022-08-16 | End: 2022-08-16 | Stop reason: HOSPADM

## 2022-08-16 RX ORDER — TETRACAINE HYDROCHLORIDE 5 MG/ML
SOLUTION OPHTHALMIC
Status: DISCONTINUED | OUTPATIENT
Start: 2022-08-16 | End: 2022-08-16 | Stop reason: HOSPADM

## 2022-08-16 RX ORDER — TETRACAINE HYDROCHLORIDE 5 MG/ML
1 SOLUTION OPHTHALMIC
Status: COMPLETED | OUTPATIENT
Start: 2022-08-16 | End: 2022-08-16

## 2022-08-16 RX ORDER — MIDAZOLAM HYDROCHLORIDE 1 MG/ML
INJECTION INTRAMUSCULAR; INTRAVENOUS
Status: DISCONTINUED | OUTPATIENT
Start: 2022-08-16 | End: 2022-08-16

## 2022-08-16 RX ORDER — PHENYLEPHRINE HYDROCHLORIDE 25 MG/ML
1 SOLUTION/ DROPS OPHTHALMIC
Status: COMPLETED | OUTPATIENT
Start: 2022-08-16 | End: 2022-08-16

## 2022-08-16 RX ORDER — EPINEPHRINE 1 MG/ML
INJECTION, SOLUTION INTRACARDIAC; INTRAMUSCULAR; INTRAVENOUS; SUBCUTANEOUS
Status: DISCONTINUED | OUTPATIENT
Start: 2022-08-16 | End: 2022-08-16 | Stop reason: HOSPADM

## 2022-08-16 RX ORDER — TROPICAMIDE 10 MG/ML
1 SOLUTION/ DROPS OPHTHALMIC
Status: COMPLETED | OUTPATIENT
Start: 2022-08-16 | End: 2022-08-16

## 2022-08-16 RX ORDER — HYDROCODONE BITARTRATE AND ACETAMINOPHEN 5; 325 MG/1; MG/1
1 TABLET ORAL EVERY 4 HOURS PRN
Status: DISCONTINUED | OUTPATIENT
Start: 2022-08-16 | End: 2022-08-16 | Stop reason: HOSPADM

## 2022-08-16 RX ORDER — LABETALOL HYDROCHLORIDE 5 MG/ML
INJECTION, SOLUTION INTRAVENOUS
Status: DISCONTINUED | OUTPATIENT
Start: 2022-08-16 | End: 2022-08-16

## 2022-08-16 RX ADMIN — CYCLOPENTOLATE HYDROCHLORIDE 1 DROP: 10 SOLUTION/ DROPS OPHTHALMIC at 07:08

## 2022-08-16 RX ADMIN — PHENYLEPHRINE HYDROCHLORIDE 1 DROP: 25 SOLUTION/ DROPS OPHTHALMIC at 07:08

## 2022-08-16 RX ADMIN — LABETALOL HYDROCHLORIDE 5 MG: 5 INJECTION INTRAVENOUS at 08:08

## 2022-08-16 RX ADMIN — TETRACAINE HYDROCHLORIDE 1 DROP: 5 SOLUTION OPHTHALMIC at 07:08

## 2022-08-16 RX ADMIN — OFLOXACIN 1 DROP: 3 SOLUTION OPHTHALMIC at 07:08

## 2022-08-16 RX ADMIN — TROPICAMIDE 1 DROP: 10 SOLUTION/ DROPS OPHTHALMIC at 07:08

## 2022-08-16 RX ADMIN — MIDAZOLAM HYDROCHLORIDE 1 MG: 1 INJECTION, SOLUTION INTRAMUSCULAR; INTRAVENOUS at 08:08

## 2022-08-16 NOTE — OP NOTE
Operative Date:  08/16/2022    Discharge Date:  08/16/2022    Discharge Patient Home    Report Title: Operative Note      SURGEON: Fidel Lacy MD     ASSISTANT:     PREOPERATIVE DIAGNOSIS: Visually significant NSC cataract,  Right Eye.    POSTOPERATIVE DIAGNOSIS: Visually-significant NSC cataract,  Right Eye.    PROCEDURE PERFORMED: Phacoemulsification of the cataract with posterior chamber intraocular lens Right Eye.    ANESTHESIA: Topical with MAC / GETA    COMPLICATIONS: None    ESTIMATED BLOOD LOSS: Minimal    INDICATIONS FOR PROCEDURE:   The patient is a pleasant 76 year old woman with increasing difficulties with activities of daily living secondary to a dense visually significant cataract in the Right Eye.  Discussions have been carried out with this patient concerning the options to surgery, risks, benefits and expectations.  The patient voiced good understanding and wished to proceed with the above procedure.    PROCEDURE IN DETAIL: The patient was brought to the operating room and received topical anesthetic to the eye and then was prepped and draped in the usual sterile fashion.  Using the Barrera ring and the guarded ozzy blade set at 0.37 mm, a partial thickness clear cornea incision was made temporally.  The paracentesis site was made at the twelve o'clock position.  Omidria was injected into the anterior chamber through the paracentesis.  Viscoat was then injected into the anterior chamber.  The eye was then reentered at the primary surgical site with a 2.4 mm keratome followed by continuous capsulotomy, hydrodissection, hydrodelineation and phacoemulsification of the cataract.  Residual cortical material was removed using automated irrigation-aspiration technique.  Healon was injected into the posterior chamber and a Clareon 24.5 diopter lens was placed in the bag without difficulty. Residual viscoelastic was removed using automated irrigation-aspiration technique. The eye was  re-pressurized using BSS solution and both the paracentesis site and the primary surgical site were demonstrated to be watertight at the end of the case with Weck--Michelle manipulation.  One drop of Ofloxacin and one drop of Pred acetate 1% was applied to the Right Eye .The eye was closed, patched and a Brown shield placed.  The patient was taken to the recovery room in good and stable condition.  The patient tolerated the procedure well.  The patient was instructed to refrain from any heavy lifting, bending, stooping or straining activities, discharged home  and to follow-up in the morning for routine postoperative care with Fidel Lacy MD.

## 2022-08-16 NOTE — ANESTHESIA POSTPROCEDURE EVALUATION
Anesthesia Post Evaluation    Patient: Martha Melvin    Procedure(s) Performed: Procedure(s) (LRB):  EXTRACTION, CATARACT, WITH IOL INSERTION (Right)    Final Anesthesia Type: MAC      Patient location during evaluation: [facility]  Patient participation: Yes- Able to Participate  Level of consciousness: awake and alert and oriented  Post-procedure vital signs: reviewed and stable  Pain management: adequate  Airway patency: patent    PONV status at discharge: No PONV  Anesthetic complications: no      Cardiovascular status: stable, hemodynamically stable and blood pressure returned to baseline  Respiratory status: unassisted, spontaneous ventilation and room air  Hydration status: euvolemic  Follow-up not needed.          Vitals Value Taken Time   /90 08/16/22 0712   Temp 37.1 °C (98.7 °F) 08/16/22 0712   Pulse 97 08/16/22 0712   Resp 17 08/16/22 0712   SpO2 97 % 08/16/22 0712         No case tracking events are documented in the log.      Pain/Walker Score: No data recorded

## 2022-08-16 NOTE — ANESTHESIA PREPROCEDURE EVALUATION
08/16/2022  Martha Melvin is a 76 y.o., female.      Pre-op Assessment    I have reviewed the Patient Summary Reports.     I have reviewed the Nursing Notes.    I have reviewed the Medications.     Review of Systems  Anesthesia Hx:  Denies Family Hx of Anesthesia complications.   Denies Personal Hx of Anesthesia complications.   Social:  Non-Smoker    Hematology/Oncology:  Hematology Normal   Oncology Normal     EENT/Dental:EENT/Dental Normal   Cardiovascular:   Hypertension    Pulmonary:  Pulmonary Normal    Renal/:  Renal/ Normal     Hepatic/GI:  Hepatic/GI Normal    Musculoskeletal:  Musculoskeletal Normal    Neurological:  Neurology Normal    Endocrine:   Diabetes    Dermatological:  Skin Normal    Psych:  Psychiatric Normal           Physical Exam    Airway:  Mallampati: II           Anesthesia Plan  Type of Anesthesia, risks & benefits discussed:    Anesthesia Type: MAC  Intra-op Monitoring Plan: Standard ASA Monitors  Post Op Pain Control Plan: multimodal analgesia  Informed Consent: Informed consent signed with the Patient and all parties understand the risks and agree with anesthesia plan.  All questions answered.   ASA Score: 3    Ready For Surgery From Anesthesia Perspective.     .

## 2022-08-16 NOTE — INTERVAL H&P NOTE
The patient has been examined and the H&P has been reviewed:    I concur with the findings and no changes have occurred since H&P was written.    Surgery risks, benefits and alternative options discussed and understood by patient/family.          There are no hospital problems to display for this patient.    
no weight-bearing restrictions

## 2022-08-16 NOTE — BRIEF OP NOTE
[facility] ([place])  Brief Operative Note    Surgery Date: 8/16/2022     Surgeon(s) and Role:     * Fidel Lacy MD - Primary    Assisting Surgeon: None    Pre-op Diagnosis:  NS (nuclear sclerosis), right [H25.11]    Post-op Diagnosis:  Post-Op Diagnosis Codes:     * NS (nuclear sclerosis), right [H25.11]    Procedure(s) (LRB):  EXTRACTION, CATARACT, WITH IOL INSERTION (Right)    Anesthesia: Local MAC    Operative Findings:     Estimated Blood Loss: * No values recorded between 8/16/2022  8:15 AM and 8/16/2022  9:02 AM *         Specimens:   Specimen (24h ago, onward)            None            Discharge Note    OUTCOME: Patient tolerated treatment/procedure well without complication and is now ready for discharge.    DISPOSITION: Home or Self Care    FINAL DIAGNOSIS:  Nuclear sclerotic cataract of right eye    FOLLOWUP: In clinic    DISCHARGE INSTRUCTIONS:    Discharge Procedure Orders   Other restrictions (specify):   Order Comments: No heavy lifting or bending for 1 week.

## 2022-08-16 NOTE — PLAN OF CARE
Patient's blood pressure reported to Dr. Watson and that patient is scheduled to be dialyzed today. No new orders received

## 2022-08-17 ENCOUNTER — OFFICE VISIT (OUTPATIENT)
Dept: OPHTHALMOLOGY | Facility: CLINIC | Age: 77
End: 2022-08-17
Payer: MEDICARE

## 2022-08-17 DIAGNOSIS — Z98.890 POST-OPERATIVE STATE: Primary | ICD-10-CM

## 2022-08-17 PROCEDURE — 1159F PR MEDICATION LIST DOCUMENTED IN MEDICAL RECORD: ICD-10-PCS | Mod: CPTII,S$GLB,, | Performed by: OPHTHALMOLOGY

## 2022-08-17 PROCEDURE — 1160F PR REVIEW ALL MEDS BY PRESCRIBER/CLIN PHARMACIST DOCUMENTED: ICD-10-PCS | Mod: CPTII,S$GLB,, | Performed by: OPHTHALMOLOGY

## 2022-08-17 PROCEDURE — 4010F PR ACE/ARB THEARPY RXD/TAKEN: ICD-10-PCS | Mod: CPTII,S$GLB,, | Performed by: OPHTHALMOLOGY

## 2022-08-17 PROCEDURE — 99024 POSTOP FOLLOW-UP VISIT: CPT | Mod: S$GLB,,, | Performed by: OPHTHALMOLOGY

## 2022-08-17 PROCEDURE — 99024 PR POST-OP FOLLOW-UP VISIT: ICD-10-PCS | Mod: S$GLB,,, | Performed by: OPHTHALMOLOGY

## 2022-08-17 PROCEDURE — 4010F ACE/ARB THERAPY RXD/TAKEN: CPT | Mod: CPTII,S$GLB,, | Performed by: OPHTHALMOLOGY

## 2022-08-17 PROCEDURE — 1160F RVW MEDS BY RX/DR IN RCRD: CPT | Mod: CPTII,S$GLB,, | Performed by: OPHTHALMOLOGY

## 2022-08-17 PROCEDURE — 1126F PR PAIN SEVERITY QUANTIFIED, NO PAIN PRESENT: ICD-10-PCS | Mod: CPTII,S$GLB,, | Performed by: OPHTHALMOLOGY

## 2022-08-17 PROCEDURE — 3288F FALL RISK ASSESSMENT DOCD: CPT | Mod: CPTII,S$GLB,, | Performed by: OPHTHALMOLOGY

## 2022-08-17 PROCEDURE — 1126F AMNT PAIN NOTED NONE PRSNT: CPT | Mod: CPTII,S$GLB,, | Performed by: OPHTHALMOLOGY

## 2022-08-17 PROCEDURE — 1159F MED LIST DOCD IN RCRD: CPT | Mod: CPTII,S$GLB,, | Performed by: OPHTHALMOLOGY

## 2022-08-17 PROCEDURE — 1101F PT FALLS ASSESS-DOCD LE1/YR: CPT | Mod: CPTII,S$GLB,, | Performed by: OPHTHALMOLOGY

## 2022-08-17 PROCEDURE — 3288F PR FALLS RISK ASSESSMENT DOCUMENTED: ICD-10-PCS | Mod: CPTII,S$GLB,, | Performed by: OPHTHALMOLOGY

## 2022-08-17 PROCEDURE — 99999 PR PBB SHADOW E&M-EST. PATIENT-LVL III: ICD-10-PCS | Mod: PBBFAC,,, | Performed by: OPHTHALMOLOGY

## 2022-08-17 PROCEDURE — 1101F PR PT FALLS ASSESS DOC 0-1 FALLS W/OUT INJ PAST YR: ICD-10-PCS | Mod: CPTII,S$GLB,, | Performed by: OPHTHALMOLOGY

## 2022-08-17 PROCEDURE — 99999 PR PBB SHADOW E&M-EST. PATIENT-LVL III: CPT | Mod: PBBFAC,,, | Performed by: OPHTHALMOLOGY

## 2022-08-17 NOTE — PROGRESS NOTES
Subjective:       Patient ID: Martha Melvin is a 76 y.o. female.    Chief Complaint: Post-op Evaluation (Martha Melvin is a 75y/o female.)    HPI     Post-op Evaluation      Additional comments: Martha Melvin is a 75y/o female.              Comments     Pt is here for 1 day PCIOL OD.  Pt state tearing OD. Pt state scratchy feeling OD. Pt state no other   problem at this time.  PT is not taken durezol, she did not receive it.    GTTS:  Ofloxacin QID OD  Durezol QID OD ?  Ilevro QD OD          Last edited by Lenore Donald on 8/17/2022  8:50 AM. (History)             Assessment:       1. Post-operative state        Plan:       S/p CE OD- Doing well.         CPM OD.  Start PF qid OD.  RTC 1 wk.

## 2022-08-23 ENCOUNTER — TELEPHONE (OUTPATIENT)
Dept: OPHTHALMOLOGY | Facility: CLINIC | Age: 77
End: 2022-08-23
Payer: MEDICARE

## 2022-08-24 ENCOUNTER — OFFICE VISIT (OUTPATIENT)
Dept: OPHTHALMOLOGY | Facility: CLINIC | Age: 77
End: 2022-08-24
Payer: MEDICARE

## 2022-08-24 DIAGNOSIS — H25.12 NS (NUCLEAR SCLEROSIS), LEFT: ICD-10-CM

## 2022-08-24 DIAGNOSIS — Z98.890 POST-OPERATIVE STATE: Primary | ICD-10-CM

## 2022-08-24 PROCEDURE — 4010F PR ACE/ARB THEARPY RXD/TAKEN: ICD-10-PCS | Mod: CPTII,S$GLB,, | Performed by: OPHTHALMOLOGY

## 2022-08-24 PROCEDURE — 1101F PR PT FALLS ASSESS DOC 0-1 FALLS W/OUT INJ PAST YR: ICD-10-PCS | Mod: CPTII,S$GLB,, | Performed by: OPHTHALMOLOGY

## 2022-08-24 PROCEDURE — 99999 PR PBB SHADOW E&M-EST. PATIENT-LVL III: CPT | Mod: PBBFAC,,, | Performed by: OPHTHALMOLOGY

## 2022-08-24 PROCEDURE — 99999 PR PBB SHADOW E&M-EST. PATIENT-LVL III: ICD-10-PCS | Mod: PBBFAC,,, | Performed by: OPHTHALMOLOGY

## 2022-08-24 PROCEDURE — 1126F AMNT PAIN NOTED NONE PRSNT: CPT | Mod: CPTII,S$GLB,, | Performed by: OPHTHALMOLOGY

## 2022-08-24 PROCEDURE — 3288F PR FALLS RISK ASSESSMENT DOCUMENTED: ICD-10-PCS | Mod: CPTII,S$GLB,, | Performed by: OPHTHALMOLOGY

## 2022-08-24 PROCEDURE — 92136 PR OPHTHAL BIOMETRY,INTRAOC LENS POW CALC: ICD-10-PCS | Mod: 26,LT,S$GLB, | Performed by: OPHTHALMOLOGY

## 2022-08-24 PROCEDURE — 1126F PR PAIN SEVERITY QUANTIFIED, NO PAIN PRESENT: ICD-10-PCS | Mod: CPTII,S$GLB,, | Performed by: OPHTHALMOLOGY

## 2022-08-24 PROCEDURE — 99024 PR POST-OP FOLLOW-UP VISIT: ICD-10-PCS | Mod: S$GLB,,, | Performed by: OPHTHALMOLOGY

## 2022-08-24 PROCEDURE — 1159F PR MEDICATION LIST DOCUMENTED IN MEDICAL RECORD: ICD-10-PCS | Mod: CPTII,S$GLB,, | Performed by: OPHTHALMOLOGY

## 2022-08-24 PROCEDURE — 1159F MED LIST DOCD IN RCRD: CPT | Mod: CPTII,S$GLB,, | Performed by: OPHTHALMOLOGY

## 2022-08-24 PROCEDURE — 1101F PT FALLS ASSESS-DOCD LE1/YR: CPT | Mod: CPTII,S$GLB,, | Performed by: OPHTHALMOLOGY

## 2022-08-24 PROCEDURE — 4010F ACE/ARB THERAPY RXD/TAKEN: CPT | Mod: CPTII,S$GLB,, | Performed by: OPHTHALMOLOGY

## 2022-08-24 PROCEDURE — 1160F PR REVIEW ALL MEDS BY PRESCRIBER/CLIN PHARMACIST DOCUMENTED: ICD-10-PCS | Mod: CPTII,S$GLB,, | Performed by: OPHTHALMOLOGY

## 2022-08-24 PROCEDURE — 3288F FALL RISK ASSESSMENT DOCD: CPT | Mod: CPTII,S$GLB,, | Performed by: OPHTHALMOLOGY

## 2022-08-24 PROCEDURE — 99024 POSTOP FOLLOW-UP VISIT: CPT | Mod: S$GLB,,, | Performed by: OPHTHALMOLOGY

## 2022-08-24 PROCEDURE — 92136 OPHTHALMIC BIOMETRY: CPT | Mod: 26,LT,S$GLB, | Performed by: OPHTHALMOLOGY

## 2022-08-24 PROCEDURE — 1160F RVW MEDS BY RX/DR IN RCRD: CPT | Mod: CPTII,S$GLB,, | Performed by: OPHTHALMOLOGY

## 2022-08-24 NOTE — PROGRESS NOTES
Subjective:       Patient ID: Martha Melvin is a 76 y.o. female.    Chief Complaint: Post-op Evaluation (Martha Melvin is a 75 y/o female.)    HPI     Post-op Evaluation      Additional comments: Martha Melvin is a 75 y/o female.              Comments     Pt here for 1 week PCIOL OD  Pt state scratchy OD. Pt state no other complaint at this time.      GTTS:  Durezol BID OD  Ilevro QD OD          Last edited by Lenore Donald on 8/24/2022 11:25 AM. (History)             Assessment:       1. Post-operative state    2. NS (nuclear sclerosis), left        Plan:       S/p CE OD- Doing well.     Visually significant cataract OS -Pt. Wants Sx.      Taper gtts OD.  Cataract Surgery Consent: Patient with a visually significant cataract with difficulties of ADLs, reading, driving, night vision, glare (any and all).  Discussed with Patient/Family/Caregiver: options, risks and benefits, expectations of cataract surgery, utilized an eye model with questions and answers to facilitate discussion.  Discussed lens options and patient understands that glasses may be required for optimal vision for distance and/or near vision after cataract surgery.  The Patient/Family/Caregiver  voice good understanding and patient wishes to proceed with surgery.  The patient will likely benefit from surgery and patient signed consent for Left Eye.  CE OS 8/30/22.

## 2022-08-26 ENCOUNTER — TELEPHONE (OUTPATIENT)
Dept: OPHTHALMOLOGY | Facility: CLINIC | Age: 77
End: 2022-08-26
Payer: MEDICARE

## 2022-08-26 NOTE — TELEPHONE ENCOUNTER
----- Message from Vero Tate sent at 8/26/2022  2:23 PM CDT -----  Regarding: Pt Call back  Pts daughter returning missed call from you. Pts call back: [phone] Luzmaria

## 2022-08-28 NOTE — H&P
[facility]  History & Physical    Subjective:      Chief Complaint/Reason for Admission:     Martha Melvin is a 76 y.o. female.    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     Past Surgical History:   Procedure Laterality Date    AORTOGRAPHY WITH SERIALOGRAPHY N/A 12/31/2018    Procedure: AORTOGRAM, WITH SERIALOGRAPHY;  Surgeon: Mike Mckay MD;  Location: [facility] CATH LAB/EP;  Service: Cardiology;  Laterality: N/A;    ARTERIOVENOUS FISTULOGRAPHY Left 1/2/2019    Procedure: Fistulogram, Arteriovenous;  Surgeon: Kenny Fink MD;  Location: [facility] CATH LAB/EP;  Service: Cardiology;  Laterality: Left;    CATARACT EXTRACTION W/  INTRAOCULAR LENS IMPLANT Right 8/16/2022    Procedure: EXTRACTION, CATARACT, WITH IOL INSERTION;  Surgeon: Fidel Lacy MD;  Location: [facility];  Service: Ophthalmology;  Laterality: Right;    FISTULOGRAM Left 10/21/2019    Procedure: Fistulogram;  Surgeon: AYESHA Mesa III, MD;  Location: [facility] CATH LAB;  Service: Peripheral Vascular;  Laterality: Left;    LEG AMPUTATION Right     PERCUTANEOUS TRANSLUMINAL ANGIOPLASTY (PTA) OF PERIPHERAL VESSEL N/A 1/2/2019    Procedure: PTA, PERIPHERAL VESSEL;  Surgeon: Kenny Fink MD;  Location: [facility] CATH LAB/EP;  Service: Cardiology;  Laterality: N/A;    PERCUTANEOUS TRANSLUMINAL ANGIOPLASTY OF ARTERIOVENOUS FISTULA N/A 10/21/2019    Procedure: PTA, AV FISTULA;  Surgeon: AYESHA Mesa III, MD;  Location: [facility] CATH LAB;  Service: Peripheral Vascular;  Laterality: N/A;    TRANSPOSITION OF BASILIC VEIN Left 6/27/2018    Procedure: TRANSPOSITION, VEIN, BASILIC;  Surgeon: AYESHA Mesa III, MD;  Location: [address];  Service: Cardiovascular;  Laterality: Left;  forearm      Family  History   Problem Relation Age of Onset    Cancer Mother     Diabetes Mother     Hypertension Mother     Cancer Father     Cancer Sister      Social History     Tobacco Use    Smoking status: Never    Smokeless tobacco: Never   Substance Use Topics    Alcohol use: No       No medications prior to admission.     Review of patient's allergies indicates:   Allergen Reactions    Morphine Nausea Only and Other (See Comments)     Dizziness, feeling like she will pass out        Review of Systems   Eyes:  Positive for blurred vision.   All other systems reviewed and are negative.    Objective:      Vital Signs (Most Recent)       Vital Signs Range (Last 24H):       Physical Exam  Constitutional:       Appearance: She is well-developed.   HENT:      Head: Normocephalic.   Eyes:      Conjunctiva/sclera: Conjunctivae normal.      Pupils: Pupils are equal, round, and reactive to light.   Cardiovascular:      Rate and Rhythm: Normal rate and regular rhythm.      Heart sounds: Normal heart sounds.   Pulmonary:      Effort: Pulmonary effort is normal.      Breath sounds: Normal breath sounds.   Abdominal:      General: Bowel sounds are normal.      Palpations: Abdomen is soft.   Musculoskeletal:         General: Normal range of motion.      Cervical back: Normal range of motion and neck supple.   Skin:     General: Skin is warm.   Neurological:      Mental Status: She is alert and oriented to person, place, and time.       Data Review:    ECG:      Assessment:      Cataract OS.    Plan:    CE OS.

## 2022-08-30 ENCOUNTER — HOSPITAL ENCOUNTER (OUTPATIENT)
Facility: OTHER | Age: 77
Discharge: HOME | End: 2022-08-30
Attending: OPHTHALMOLOGY | Admitting: OPHTHALMOLOGY
Payer: MEDICARE

## 2022-08-30 ENCOUNTER — ANESTHESIA (OUTPATIENT)
Dept: SURGERY | Facility: OTHER | Age: 77
End: 2022-08-30
Payer: MEDICARE

## 2022-08-30 ENCOUNTER — ANESTHESIA EVENT (OUTPATIENT)
Dept: SURGERY | Facility: OTHER | Age: 77
End: 2022-08-30
Payer: MEDICARE

## 2022-08-30 VITALS
SYSTOLIC BLOOD PRESSURE: 173 MMHG | DIASTOLIC BLOOD PRESSURE: 75 MMHG | HEART RATE: 73 BPM | BODY MASS INDEX: 30.32 KG/M2 | HEIGHT: 65 IN | RESPIRATION RATE: 16 BRPM | OXYGEN SATURATION: 100 % | TEMPERATURE: 99 F | WEIGHT: 182 LBS

## 2022-08-30 DIAGNOSIS — H25.12 NUCLEAR SCLEROTIC CATARACT OF LEFT EYE: Primary | ICD-10-CM

## 2022-08-30 LAB — POCT GLUCOSE: 149 MG/DL (ref 70–110)

## 2022-08-30 PROCEDURE — 37000009 HC ANESTHESIA EA ADD 15 MINS: Performed by: OPHTHALMOLOGY

## 2022-08-30 PROCEDURE — 63600175 PHARM REV CODE 636 W HCPCS: Performed by: STUDENT IN AN ORGANIZED HEALTH CARE EDUCATION/TRAINING PROGRAM

## 2022-08-30 PROCEDURE — 63600175 PHARM REV CODE 636 W HCPCS: Performed by: OPHTHALMOLOGY

## 2022-08-30 PROCEDURE — 36000707: Performed by: OPHTHALMOLOGY

## 2022-08-30 PROCEDURE — 66984 PR REMOVAL, CATARACT, W/INSRT INTRAOC LENS, W/O ENDO CYCLO: ICD-10-PCS | Mod: 79,LT,, | Performed by: OPHTHALMOLOGY

## 2022-08-30 PROCEDURE — 36000706: Performed by: OPHTHALMOLOGY

## 2022-08-30 PROCEDURE — V2632 POST CHMBR INTRAOCULAR LENS: HCPCS | Performed by: OPHTHALMOLOGY

## 2022-08-30 PROCEDURE — 37000008 HC ANESTHESIA 1ST 15 MINUTES: Performed by: OPHTHALMOLOGY

## 2022-08-30 PROCEDURE — 25000003 PHARM REV CODE 250: Performed by: OPHTHALMOLOGY

## 2022-08-30 PROCEDURE — 71000015 HC POSTOP RECOV 1ST HR: Performed by: OPHTHALMOLOGY

## 2022-08-30 PROCEDURE — 66984 XCAPSL CTRC RMVL W/O ECP: CPT | Mod: 79,LT,, | Performed by: OPHTHALMOLOGY

## 2022-08-30 DEVICE — LENS CLAREON AUTONOME 24.0D: Type: IMPLANTABLE DEVICE | Site: EYE | Status: FUNCTIONAL

## 2022-08-30 RX ORDER — MIDAZOLAM HYDROCHLORIDE 1 MG/ML
INJECTION INTRAMUSCULAR; INTRAVENOUS
Status: DISCONTINUED | OUTPATIENT
Start: 2022-08-30 | End: 2022-08-30

## 2022-08-30 RX ORDER — ACETAMINOPHEN 325 MG/1
650 TABLET ORAL EVERY 4 HOURS PRN
Status: DISCONTINUED | OUTPATIENT
Start: 2022-08-30 | End: 2022-08-30 | Stop reason: HOSPADM

## 2022-08-30 RX ORDER — PHENYLEPHRINE HYDROCHLORIDE 25 MG/ML
1 SOLUTION/ DROPS OPHTHALMIC
Status: COMPLETED | OUTPATIENT
Start: 2022-08-30 | End: 2022-08-30

## 2022-08-30 RX ORDER — PREDNISOLONE ACETATE 10 MG/ML
SUSPENSION/ DROPS OPHTHALMIC
Status: DISCONTINUED | OUTPATIENT
Start: 2022-08-30 | End: 2022-08-30 | Stop reason: HOSPADM

## 2022-08-30 RX ORDER — OFLOXACIN 3 MG/ML
1 SOLUTION/ DROPS OPHTHALMIC
Status: DISCONTINUED | OUTPATIENT
Start: 2022-08-30 | End: 2022-08-30 | Stop reason: HOSPADM

## 2022-08-30 RX ORDER — HYDROCODONE BITARTRATE AND ACETAMINOPHEN 5; 325 MG/1; MG/1
1 TABLET ORAL EVERY 4 HOURS PRN
Status: CANCELLED | OUTPATIENT
Start: 2022-08-30

## 2022-08-30 RX ORDER — EPINEPHRINE 1 MG/ML
INJECTION, SOLUTION INTRACARDIAC; INTRAMUSCULAR; INTRAVENOUS; SUBCUTANEOUS
Status: DISCONTINUED | OUTPATIENT
Start: 2022-08-30 | End: 2022-08-30 | Stop reason: HOSPADM

## 2022-08-30 RX ORDER — TETRACAINE HYDROCHLORIDE 5 MG/ML
SOLUTION OPHTHALMIC
Status: DISCONTINUED | OUTPATIENT
Start: 2022-08-30 | End: 2022-08-30 | Stop reason: HOSPADM

## 2022-08-30 RX ORDER — LIDOCAINE HYDROCHLORIDE 40 MG/ML
INJECTION, SOLUTION RETROBULBAR
Status: DISCONTINUED | OUTPATIENT
Start: 2022-08-30 | End: 2022-08-30 | Stop reason: HOSPADM

## 2022-08-30 RX ORDER — TROPICAMIDE 10 MG/ML
1 SOLUTION/ DROPS OPHTHALMIC
Status: COMPLETED | OUTPATIENT
Start: 2022-08-30 | End: 2022-08-30

## 2022-08-30 RX ORDER — CYCLOPENTOLATE HYDROCHLORIDE 10 MG/ML
1 SOLUTION/ DROPS OPHTHALMIC
Status: DISCONTINUED | OUTPATIENT
Start: 2022-08-30 | End: 2022-08-30 | Stop reason: HOSPADM

## 2022-08-30 RX ORDER — TETRACAINE HYDROCHLORIDE 5 MG/ML
1 SOLUTION OPHTHALMIC
Status: COMPLETED | OUTPATIENT
Start: 2022-08-30 | End: 2022-08-30

## 2022-08-30 RX ORDER — SODIUM CHLORIDE 0.9 % (FLUSH) 0.9 %
10 SYRINGE (ML) INJECTION
Status: DISCONTINUED | OUTPATIENT
Start: 2022-08-30 | End: 2022-08-30 | Stop reason: HOSPADM

## 2022-08-30 RX ADMIN — MIDAZOLAM HYDROCHLORIDE 1 MG: 1 INJECTION, SOLUTION INTRAMUSCULAR; INTRAVENOUS at 07:08

## 2022-08-30 RX ADMIN — CYCLOPENTOLATE HYDROCHLORIDE 1 DROP: 10 SOLUTION/ DROPS OPHTHALMIC at 06:08

## 2022-08-30 RX ADMIN — OFLOXACIN 1 DROP: 3 SOLUTION OPHTHALMIC at 06:08

## 2022-08-30 RX ADMIN — TETRACAINE HYDROCHLORIDE 1 DROP: 5 SOLUTION OPHTHALMIC at 06:08

## 2022-08-30 RX ADMIN — PHENYLEPHRINE HYDROCHLORIDE 1 DROP: 25 SOLUTION/ DROPS OPHTHALMIC at 06:08

## 2022-08-30 RX ADMIN — TROPICAMIDE 1 DROP: 10 SOLUTION/ DROPS OPHTHALMIC at 06:08

## 2022-08-30 NOTE — BRIEF OP NOTE
[facility] - Surgery ([place])  Brief Operative Note    Surgery Date: 8/30/2022     Surgeon(s) and Role:     * Fidel Lacy MD - Primary    Assisting Surgeon: None    Pre-op Diagnosis:  NS (nuclear sclerosis), left [H25.12]    Post-op Diagnosis:  Post-Op Diagnosis Codes:     * NS (nuclear sclerosis), left [H25.12]    Procedure(s) (LRB):  EXTRACTION, CATARACT, WITH IOL INSERTION (Left)    Anesthesia: Local MAC    Operative Findings:     Estimated Blood Loss: * No values recorded between 8/30/2022  7:34 AM and 8/30/2022  8:04 AM *         Specimens:   Specimen (24h ago, onward)      None              Discharge Note    OUTCOME: Patient tolerated treatment/procedure well without complication and is now ready for discharge.    DISPOSITION: Home or Self Care    FINAL DIAGNOSIS:  Nuclear sclerotic cataract of left eye    FOLLOWUP: In clinic    DISCHARGE INSTRUCTIONS:    Discharge Procedure Orders   Other restrictions (specify):   Order Comments: No heavy lifting or bending for 1 week.

## 2022-08-30 NOTE — ANESTHESIA PREPROCEDURE EVALUATION
08/30/2022  Martha Melvin is a 76 y.o., female.      Pre-op Assessment    I have reviewed the Patient Summary Reports.     I have reviewed the Nursing Notes. I have reviewed the NPO Status.   I have reviewed the Medications.     Review of Systems  Anesthesia Hx:  Denies Family Hx of Anesthesia complications.   Denies Personal Hx of Anesthesia complications.   Social:  Non-Smoker    Hematology/Oncology:  Hematology Normal   Oncology Normal     EENT/Dental:EENT/Dental Normal   Cardiovascular:   Hypertension    Pulmonary:  Pulmonary Normal    Renal/:  Renal/ Normal     Hepatic/GI:  Hepatic/GI Normal    Musculoskeletal:  Musculoskeletal Normal    Neurological:  Neurology Normal    Endocrine:   Diabetes    Dermatological:  Skin Normal    Psych:  Psychiatric Normal           Physical Exam    Airway:  Mallampati: II           Anesthesia Plan  Type of Anesthesia, risks & benefits discussed:    Anesthesia Type: MAC  Intra-op Monitoring Plan: Standard ASA Monitors  Post Op Pain Control Plan: multimodal analgesia  Informed Consent: Informed consent signed with the Patient and all parties understand the risks and agree with anesthesia plan.  All questions answered.   ASA Score: 3    Ready For Surgery From Anesthesia Perspective.     .

## 2022-08-30 NOTE — ANESTHESIA POSTPROCEDURE EVALUATION
Anesthesia Post Evaluation    Patient: Martha Melvin    Procedure(s) Performed: Procedure(s) (LRB):  EXTRACTION, CATARACT, WITH IOL INSERTION (Left)    Final Anesthesia Type: MAC      Patient location during evaluation: [facility]  Patient participation: Yes- Able to Participate  Level of consciousness: awake and alert  Post-procedure vital signs: reviewed and stable  Pain management: adequate  Airway patency: patent    PONV status at discharge: No PONV  Anesthetic complications: no      Cardiovascular status: blood pressure returned to baseline and stable  Respiratory status: spontaneous ventilation and unassisted  Hydration status: euvolemic  Follow-up not needed.          Vitals Value Taken Time   /85 08/30/22 0621   Temp 37 °C (98.6 °F) 08/30/22 0608   Pulse 79 08/30/22 0621   Resp 16 08/30/22 0621   SpO2 98 % 08/30/22 0621         No case tracking events are documented in the log.      Pain/Walker Score: No data recorded

## 2022-08-31 ENCOUNTER — OFFICE VISIT (OUTPATIENT)
Dept: OPHTHALMOLOGY | Facility: CLINIC | Age: 77
End: 2022-08-31
Payer: MEDICARE

## 2022-08-31 DIAGNOSIS — Z98.890 POST-OPERATIVE STATE: Primary | ICD-10-CM

## 2022-08-31 PROCEDURE — 99999 PR PBB SHADOW E&M-EST. PATIENT-LVL III: ICD-10-PCS | Mod: PBBFAC,,, | Performed by: OPHTHALMOLOGY

## 2022-08-31 PROCEDURE — 3288F FALL RISK ASSESSMENT DOCD: CPT | Mod: CPTII,S$GLB,, | Performed by: OPHTHALMOLOGY

## 2022-08-31 PROCEDURE — 99024 PR POST-OP FOLLOW-UP VISIT: ICD-10-PCS | Mod: S$GLB,,, | Performed by: OPHTHALMOLOGY

## 2022-08-31 PROCEDURE — 1159F MED LIST DOCD IN RCRD: CPT | Mod: CPTII,S$GLB,, | Performed by: OPHTHALMOLOGY

## 2022-08-31 PROCEDURE — 1159F PR MEDICATION LIST DOCUMENTED IN MEDICAL RECORD: ICD-10-PCS | Mod: CPTII,S$GLB,, | Performed by: OPHTHALMOLOGY

## 2022-08-31 PROCEDURE — 1101F PR PT FALLS ASSESS DOC 0-1 FALLS W/OUT INJ PAST YR: ICD-10-PCS | Mod: CPTII,S$GLB,, | Performed by: OPHTHALMOLOGY

## 2022-08-31 PROCEDURE — 99024 POSTOP FOLLOW-UP VISIT: CPT | Mod: S$GLB,,, | Performed by: OPHTHALMOLOGY

## 2022-08-31 PROCEDURE — 3288F PR FALLS RISK ASSESSMENT DOCUMENTED: ICD-10-PCS | Mod: CPTII,S$GLB,, | Performed by: OPHTHALMOLOGY

## 2022-08-31 PROCEDURE — 4010F ACE/ARB THERAPY RXD/TAKEN: CPT | Mod: CPTII,S$GLB,, | Performed by: OPHTHALMOLOGY

## 2022-08-31 PROCEDURE — 99999 PR PBB SHADOW E&M-EST. PATIENT-LVL III: CPT | Mod: PBBFAC,,, | Performed by: OPHTHALMOLOGY

## 2022-08-31 PROCEDURE — 4010F PR ACE/ARB THEARPY RXD/TAKEN: ICD-10-PCS | Mod: CPTII,S$GLB,, | Performed by: OPHTHALMOLOGY

## 2022-08-31 PROCEDURE — 1160F RVW MEDS BY RX/DR IN RCRD: CPT | Mod: CPTII,S$GLB,, | Performed by: OPHTHALMOLOGY

## 2022-08-31 PROCEDURE — 1101F PT FALLS ASSESS-DOCD LE1/YR: CPT | Mod: CPTII,S$GLB,, | Performed by: OPHTHALMOLOGY

## 2022-08-31 PROCEDURE — 1160F PR REVIEW ALL MEDS BY PRESCRIBER/CLIN PHARMACIST DOCUMENTED: ICD-10-PCS | Mod: CPTII,S$GLB,, | Performed by: OPHTHALMOLOGY

## 2022-08-31 NOTE — PROGRESS NOTES
S/p CE OU- Doing well.       Subjective:       Patient ID: Martha Melvin is a 76 y.o. female.    Chief Complaint: Post-op Evaluation (Martha Melvin is a 77 y/o female.)    HPI     Post-op Evaluation     Additional comments: Martha Melvin is a 77 y/o female.           Comments    Pt here for 1 day PCIOL OS.  Pt state tearing OU. Pt state floaters OS .  Pt state no other complaints at this time.      Gtts:  Oflox QID OS  Durezol QID OS  Ilevro QD OS          Last edited by Lenore Donald on 8/31/2022 10:59 AM.             Assessment:       1. Post-operative state          Plan:       S/p CE OU- Doing well.         CPM OS.  Taper gtts OD.  RTC 1 wk.

## 2022-09-07 ENCOUNTER — OFFICE VISIT (OUTPATIENT)
Dept: OPHTHALMOLOGY | Facility: CLINIC | Age: 77
End: 2022-09-07
Payer: MEDICARE

## 2022-09-07 DIAGNOSIS — Z98.890 POST-OPERATIVE STATE: Primary | ICD-10-CM

## 2022-09-07 PROCEDURE — 1126F AMNT PAIN NOTED NONE PRSNT: CPT | Mod: CPTII,S$GLB,, | Performed by: OPHTHALMOLOGY

## 2022-09-07 PROCEDURE — 99999 PR PBB SHADOW E&M-EST. PATIENT-LVL III: ICD-10-PCS | Mod: PBBFAC,,, | Performed by: OPHTHALMOLOGY

## 2022-09-07 PROCEDURE — 3288F FALL RISK ASSESSMENT DOCD: CPT | Mod: CPTII,S$GLB,, | Performed by: OPHTHALMOLOGY

## 2022-09-07 PROCEDURE — 99024 PR POST-OP FOLLOW-UP VISIT: ICD-10-PCS | Mod: S$GLB,,, | Performed by: OPHTHALMOLOGY

## 2022-09-07 PROCEDURE — 1160F PR REVIEW ALL MEDS BY PRESCRIBER/CLIN PHARMACIST DOCUMENTED: ICD-10-PCS | Mod: CPTII,S$GLB,, | Performed by: OPHTHALMOLOGY

## 2022-09-07 PROCEDURE — 1101F PT FALLS ASSESS-DOCD LE1/YR: CPT | Mod: CPTII,S$GLB,, | Performed by: OPHTHALMOLOGY

## 2022-09-07 PROCEDURE — 1160F RVW MEDS BY RX/DR IN RCRD: CPT | Mod: CPTII,S$GLB,, | Performed by: OPHTHALMOLOGY

## 2022-09-07 PROCEDURE — 99024 POSTOP FOLLOW-UP VISIT: CPT | Mod: S$GLB,,, | Performed by: OPHTHALMOLOGY

## 2022-09-07 PROCEDURE — 1101F PR PT FALLS ASSESS DOC 0-1 FALLS W/OUT INJ PAST YR: ICD-10-PCS | Mod: CPTII,S$GLB,, | Performed by: OPHTHALMOLOGY

## 2022-09-07 PROCEDURE — 99999 PR PBB SHADOW E&M-EST. PATIENT-LVL III: CPT | Mod: PBBFAC,,, | Performed by: OPHTHALMOLOGY

## 2022-09-07 PROCEDURE — 3288F PR FALLS RISK ASSESSMENT DOCUMENTED: ICD-10-PCS | Mod: CPTII,S$GLB,, | Performed by: OPHTHALMOLOGY

## 2022-09-07 PROCEDURE — 4010F PR ACE/ARB THEARPY RXD/TAKEN: ICD-10-PCS | Mod: CPTII,S$GLB,, | Performed by: OPHTHALMOLOGY

## 2022-09-07 PROCEDURE — 4010F ACE/ARB THERAPY RXD/TAKEN: CPT | Mod: CPTII,S$GLB,, | Performed by: OPHTHALMOLOGY

## 2022-09-07 PROCEDURE — 1159F MED LIST DOCD IN RCRD: CPT | Mod: CPTII,S$GLB,, | Performed by: OPHTHALMOLOGY

## 2022-09-07 PROCEDURE — 1126F PR PAIN SEVERITY QUANTIFIED, NO PAIN PRESENT: ICD-10-PCS | Mod: CPTII,S$GLB,, | Performed by: OPHTHALMOLOGY

## 2022-09-07 PROCEDURE — 1159F PR MEDICATION LIST DOCUMENTED IN MEDICAL RECORD: ICD-10-PCS | Mod: CPTII,S$GLB,, | Performed by: OPHTHALMOLOGY

## 2022-09-07 NOTE — PROGRESS NOTES
Subjective:       Patient ID: Martha Melvin is a 76 y.o. female.    Chief Complaint: Post-op Evaluation (Martha Melvin is a 75 y/o female.)    HPI     Post-op Evaluation     Additional comments: Martha Melvin is a 75 y/o female.           Comments    S/p phaco PCIOL 08/30/2022 OS  S/p phaco PCIOL 08/16/2022 OD    Pt here for 1 week PCIOL OS.  Pt state irritated OD and tearing OS.      Gtts;  Durezol BID OS  Ilevro  QD OS           Last edited by Lenore Donald on 9/7/2022  9:12 AM.             Assessment:       1. Post-operative state          Plan:       S/p CE OU- Doing well.       Taper gtts OU.  RTC 3 wks.

## 2022-10-12 ENCOUNTER — PATIENT MESSAGE (OUTPATIENT)
Dept: ADMINISTRATIVE | Facility: HOSPITAL | Age: 77
End: 2022-10-12
Payer: MEDICARE

## 2023-01-10 NOTE — TELEPHONE ENCOUNTER
Care Due:                  Date            Visit Type   Department     Provider  --------------------------------------------------------------------------------                                EP -                              PRIMARY      [facility] INTERNAL  Last Visit: 04-      CARE ([facility])   MEDICINE       Katiuska Villalba  Next Visit: None Scheduled  None         None Found                                                            Last  Test          Frequency    Reason                     Performed    Due Date  --------------------------------------------------------------------------------    Office Visit  12 months..  atorvastatin, insulin....  04- 04-    CMP.........  12 months..  atorvastatin, insulin....  Not Found    Overdue    HBA1C.......  6 months...  insulin..................  Not Found    Overdue    Lipid Panel.  12 months..  atorvastatin.............  Not Found    Overdue    [employer] Embedded Care Gaps. Reference number: [id number]. 1/10/2023   3:10:27 PM CST

## 2023-01-11 ENCOUNTER — PATIENT MESSAGE (OUTPATIENT)
Dept: ADMINISTRATIVE | Facility: HOSPITAL | Age: 78
End: 2023-01-11
Payer: MEDICARE

## 2023-01-11 RX ORDER — FUROSEMIDE 80 MG/1
TABLET ORAL
Qty: 180 TABLET | Refills: 3 | OUTPATIENT
Start: 2023-01-11

## 2023-01-11 NOTE — TELEPHONE ENCOUNTER
Lt voice message regarding Medication refill.           The kidney specialist has been ordering Lasix. She should contact the Nephrologists for a refill.

## 2023-01-13 NOTE — TELEPHONE ENCOUNTER
No new care gaps identified.  [facility] Embedded Care Gaps. Reference number: [id number]. 1/13/2023   5:26:20 PM CST

## 2023-01-18 RX ORDER — FUROSEMIDE 80 MG/1
TABLET ORAL
Qty: 180 TABLET | Refills: 3 | OUTPATIENT
Start: 2023-01-18

## 2023-05-09 ENCOUNTER — PATIENT OUTREACH (OUTPATIENT)
Dept: ADMINISTRATIVE | Facility: HOSPITAL | Age: 78
End: 2023-05-09
Payer: MEDICARE

## 2023-05-09 NOTE — PROGRESS NOTES
Health Maintenance Due   Topic Date Due    TETANUS VACCINE  Never done    Shingles Vaccine (1 of 2) Never done    DEXA Scan  Never done    Colorectal Cancer Screening  Never done    Lipid Panel  12/31/2019    Mammogram  09/05/2020    COVID-19 Vaccine (4 - Booster for Moderna series) 01/06/2022    Hemoglobin A1c  01/08/2022    Foot Exam  06/24/2023     Chart reviewed.   Immunizations: Reconciled  Orders placed: N/A  Upcoming appts to satisfy LONG topics: N/A

## 2023-05-19 NOTE — ASSESSMENT & PLAN NOTE
- Tsat low but ferritin > 1000; cannot give IV iron  - will start CHACHO three times weekly with HD   Group Topic: BH Check-in/Symptom Rating    Date: 5/19/2023  Start Time: 0930  End Time: 0950  Facilitators: Marielena Villafana [facility]    Focus: morning information  Number in attendance: 12    Method: Group  Attendance: Present  Participation: Minimal  Patient Response: Attentive  Mood: Normal  Affect: Type: Euthymic (normal mood)   Range: Full (normal)   Congruency: Congruent   Stability: Stable  Behavior/Socialization: Appropriate to group  Thought Process: Focused  Task Performance: Follows directions  Patient Evaluation: Encouragement - needs prompts

## 2023-06-12 ENCOUNTER — TELEPHONE (OUTPATIENT)
Dept: INTERNAL MEDICINE | Facility: CLINIC | Age: 78
End: 2023-06-12
Payer: MEDICARE

## 2023-06-12 RX ORDER — FUROSEMIDE 80 MG/1
TABLET ORAL
Qty: 180 TABLET | Refills: 3 | Status: CANCELLED | OUTPATIENT
Start: 2023-06-12

## 2023-06-12 NOTE — TELEPHONE ENCOUNTER
No care due was identified.  [facility] Embedded Care Due Messages. Reference number: [id number].   6/12/2023 3:45:45 PM CDT

## 2023-06-12 NOTE — TELEPHONE ENCOUNTER
----- Message from Rachel Garcia sent at 6/12/2023  3:20 PM CDT -----  Contact: Luzmaria/Daughter [phone]  States that the patient has been experiencing dizziness. Requesting a call to go over the current med list.    Please call and advise.    Thank You

## 2023-06-12 NOTE — TELEPHONE ENCOUNTER
LOV:2022  LRF:21  RTC:2023    Pt daughter is saying that that the rx is  and mother experiencing dizziness

## 2023-06-13 NOTE — TELEPHONE ENCOUNTER
I did not prescribe Lasix. Her Nephrologist is managing her fluid status with dialysis and has been managing Lasix. She should contact them.    Dizziness is another problem that she should discuss with the nephrology staff. Lasix would make her more dizzy.

## 2023-07-06 DIAGNOSIS — E11.22 TYPE 2 DIABETES MELLITUS WITH ESRD (END-STAGE RENAL DISEASE): ICD-10-CM

## 2023-07-06 DIAGNOSIS — N18.6 TYPE 2 DIABETES MELLITUS WITH ESRD (END-STAGE RENAL DISEASE): ICD-10-CM

## 2023-07-06 NOTE — TELEPHONE ENCOUNTER
No care due was identified.  [facility] Embedded Care Due Messages. Reference number: [id number].   7/06/2023 12:18:29 PM CDT

## 2023-07-10 ENCOUNTER — OFFICE VISIT (OUTPATIENT)
Dept: FAMILY MEDICINE | Facility: CLINIC | Age: 78
End: 2023-07-10
Payer: MEDICARE

## 2023-07-10 VITALS
TEMPERATURE: 99 F | HEIGHT: 65 IN | WEIGHT: 182.56 LBS | OXYGEN SATURATION: 99 % | SYSTOLIC BLOOD PRESSURE: 152 MMHG | DIASTOLIC BLOOD PRESSURE: 84 MMHG | BODY MASS INDEX: 30.42 KG/M2 | HEART RATE: 80 BPM

## 2023-07-10 DIAGNOSIS — N18.6 TYPE 2 DIABETES MELLITUS WITH ESRD (END-STAGE RENAL DISEASE): ICD-10-CM

## 2023-07-10 DIAGNOSIS — R01.1 MURMUR, CARDIAC: ICD-10-CM

## 2023-07-10 DIAGNOSIS — E11.22 TYPE 2 DIABETES MELLITUS WITH ESRD (END-STAGE RENAL DISEASE): ICD-10-CM

## 2023-07-10 DIAGNOSIS — R42 DIZZINESS: Primary | ICD-10-CM

## 2023-07-10 DIAGNOSIS — H69.91 EUSTACHIAN TUBE DISORDER, RIGHT: ICD-10-CM

## 2023-07-10 DIAGNOSIS — E11.59 HYPERTENSION ASSOCIATED WITH DIABETES: ICD-10-CM

## 2023-07-10 DIAGNOSIS — I15.2 HYPERTENSION ASSOCIATED WITH DIABETES: ICD-10-CM

## 2023-07-10 PROCEDURE — 3079F DIAST BP 80-89 MM HG: CPT | Mod: CPTII,S$GLB,, | Performed by: PEDIATRICS

## 2023-07-10 PROCEDURE — 1159F PR MEDICATION LIST DOCUMENTED IN MEDICAL RECORD: ICD-10-PCS | Mod: CPTII,S$GLB,, | Performed by: PEDIATRICS

## 2023-07-10 PROCEDURE — 3077F PR MOST RECENT SYSTOLIC BLOOD PRESSURE >= 140 MM HG: ICD-10-PCS | Mod: CPTII,S$GLB,, | Performed by: PEDIATRICS

## 2023-07-10 PROCEDURE — 3079F PR MOST RECENT DIASTOLIC BLOOD PRESSURE 80-89 MM HG: ICD-10-PCS | Mod: CPTII,S$GLB,, | Performed by: PEDIATRICS

## 2023-07-10 PROCEDURE — 3077F SYST BP >= 140 MM HG: CPT | Mod: CPTII,S$GLB,, | Performed by: PEDIATRICS

## 2023-07-10 PROCEDURE — 1160F PR REVIEW ALL MEDS BY PRESCRIBER/CLIN PHARMACIST DOCUMENTED: ICD-10-PCS | Mod: CPTII,S$GLB,, | Performed by: PEDIATRICS

## 2023-07-10 PROCEDURE — 3288F FALL RISK ASSESSMENT DOCD: CPT | Mod: CPTII,S$GLB,, | Performed by: PEDIATRICS

## 2023-07-10 PROCEDURE — 99999 PR PBB SHADOW E&M-EST. PATIENT-LVL V: ICD-10-PCS | Mod: PBBFAC,,, | Performed by: PEDIATRICS

## 2023-07-10 PROCEDURE — 99999 PR PBB SHADOW E&M-EST. PATIENT-LVL V: CPT | Mod: PBBFAC,,, | Performed by: PEDIATRICS

## 2023-07-10 PROCEDURE — 1160F RVW MEDS BY RX/DR IN RCRD: CPT | Mod: CPTII,S$GLB,, | Performed by: PEDIATRICS

## 2023-07-10 PROCEDURE — 3072F PR LOW RISK FOR RETINOPATHY: ICD-10-PCS | Mod: CPTII,S$GLB,, | Performed by: PEDIATRICS

## 2023-07-10 PROCEDURE — 3288F PR FALLS RISK ASSESSMENT DOCUMENTED: ICD-10-PCS | Mod: CPTII,S$GLB,, | Performed by: PEDIATRICS

## 2023-07-10 PROCEDURE — 99214 PR OFFICE/OUTPT VISIT, EST, LEVL IV, 30-39 MIN: ICD-10-PCS | Mod: S$GLB,,, | Performed by: PEDIATRICS

## 2023-07-10 PROCEDURE — 1101F PT FALLS ASSESS-DOCD LE1/YR: CPT | Mod: CPTII,S$GLB,, | Performed by: PEDIATRICS

## 2023-07-10 PROCEDURE — 3072F LOW RISK FOR RETINOPATHY: CPT | Mod: CPTII,S$GLB,, | Performed by: PEDIATRICS

## 2023-07-10 PROCEDURE — 1101F PR PT FALLS ASSESS DOC 0-1 FALLS W/OUT INJ PAST YR: ICD-10-PCS | Mod: CPTII,S$GLB,, | Performed by: PEDIATRICS

## 2023-07-10 PROCEDURE — 99214 OFFICE O/P EST MOD 30 MIN: CPT | Mod: S$GLB,,, | Performed by: PEDIATRICS

## 2023-07-10 PROCEDURE — 1126F PR PAIN SEVERITY QUANTIFIED, NO PAIN PRESENT: ICD-10-PCS | Mod: CPTII,S$GLB,, | Performed by: PEDIATRICS

## 2023-07-10 PROCEDURE — 1126F AMNT PAIN NOTED NONE PRSNT: CPT | Mod: CPTII,S$GLB,, | Performed by: PEDIATRICS

## 2023-07-10 PROCEDURE — 1159F MED LIST DOCD IN RCRD: CPT | Mod: CPTII,S$GLB,, | Performed by: PEDIATRICS

## 2023-07-10 RX ORDER — LORATADINE 10 MG/1
10 TABLET ORAL DAILY
Qty: 30 TABLET | Refills: 0 | Status: SHIPPED | OUTPATIENT
Start: 2023-07-10 | End: 2023-08-24 | Stop reason: SDUPTHER

## 2023-07-10 RX ORDER — FLUTICASONE PROPIONATE 50 MCG
1 SPRAY, SUSPENSION (ML) NASAL DAILY
Qty: 16 G | Refills: 0 | Status: SHIPPED | OUTPATIENT
Start: 2023-07-10

## 2023-07-10 NOTE — PROGRESS NOTES
Subjective:      Patient ID: Martha Melvin is a 77 y.o. female.    Chief Complaint: Dizziness (Loss of balance/ dizzies, bloating- ever since taken of fluid pills about 3-6 months ago)    Patient reports pain in back of head spreading to forehead and eyes and causing dizziness. Sometimes the feeling is constant. She has been feeling this for 4 months, and has stayed about the same. Sometimes her vision gets blurry, but admits she has cataract surgery about a year ago by Dr. Honeycutt. Admits she sometimes has nausea but has not vomited. Has fallen twice in the last few months due to dizziness. Does not get confused and has not passed out. Has not taken any medicines to help it.         Dizziness:    Associated symptoms: ear pain, headaches and nausea.no fever, no vomiting, no palpitations and no chest pain.  Review of Systems   Constitutional:  Negative for chills, fatigue and fever.   HENT:  Positive for congestion, ear pain, sinus pressure, sinus pain and sneezing. Negative for postnasal drip, rhinorrhea and sore throat.    Respiratory:  Negative for cough, chest tightness, shortness of breath and wheezing.    Cardiovascular:  Negative for chest pain and palpitations.   Gastrointestinal:  Positive for nausea. Negative for diarrhea and vomiting.   Genitourinary:  Negative for difficulty urinating.   Musculoskeletal:  Negative for arthralgias.   Skin:  Negative for rash.   Neurological:  Positive for dizziness and headaches.   Psychiatric/Behavioral:  Negative for confusion.       Current Outpatient Medications on File Prior to Visit   Medication Sig    amLODIPine (NORVASC) 10 MG tablet Take 1 tablet (10 mg total) by mouth once daily.    atorvastatin (LIPITOR) 40 MG tablet Take 1 tablet (40 mg total) by mouth once daily.    bisacodyl (DULCOLAX) 5 mg EC tablet Take 5 mg by mouth daily as needed for Constipation.    blood sugar diagnostic (TRUE METRIX GLUCOSE TEST STRIP) Strp Test 2 (two) times daily.as directed     "blood-glucose meter Misc Use to test blood sugar twice daily    fluocinolone acetonide oiL 0.01 % Drop Place 2 drops in ear(s) 2 (two) times a day.    insulin aspart U-100 (NOVOLOG FLEXPEN U-100 INSULIN) 100 unit/mL (3 mL) InPn pen Inject 4 Units into the skin 2 (two) times daily before meals.    lancets 30 gauge Misc 1 lancet by Misc.(Non-Drug; Combo Route) route 2 (two) times daily.    losartan (COZAAR) 25 MG tablet Take 1 tablet (25 mg total) by mouth 2 (two) times a day.    losartan (COZAAR) 50 MG tablet Take 1 tablet (50 mg total) by mouth once daily.    pen needle, diabetic (BD ULTRA-FINE SHORT PEN NEEDLE) 31 gauge x 5/16" Ndle use as directed to inject insulin four times daily    pen needle, diabetic (NOVOFINE PLUS) 32 gauge x 1/6" Ndle use 3 (three) times daily as needed.    sevelamer carbonate (RENVELA) 800 mg Tab Take 3 tablets by mouth three times daily with meals then take 1 tablet by mouth twice daily with snacks    [DISCONTINUED] furosemide (LASIX) 80 MG tablet take 1 tablet by mouth twice daily    [DISCONTINUED] loratadine (CLARITIN) 10 mg tablet Take 10 mg by mouth once daily.    calcium carbonate (TUMS E-X) 300 mg (750 mg) Chew Take 1 tablet by mouth 3 (three) times daily with meals.     No current facility-administered medications on file prior to visit.        Objective:     Vitals:    07/10/23 0816   BP: (!) 152/84   Pulse:    Temp:       Physical Exam  Constitutional:       General: She is not in acute distress.     Appearance: Normal appearance.   HENT:      Head: Normocephalic and atraumatic.      Right Ear: Ear canal and external ear normal. A middle ear effusion is present. Tympanic membrane is bulging.      Left Ear: Tympanic membrane, ear canal and external ear normal.      Nose: Nose normal.      Mouth/Throat:      Mouth: Mucous membranes are moist.      Pharynx: Oropharynx is clear.   Eyes:      General: Vision grossly intact.      Extraocular Movements: Extraocular movements intact.     "  Right eye: Normal extraocular motion and no nystagmus.      Left eye: Normal extraocular motion and no nystagmus.      Conjunctiva/sclera: Conjunctivae normal.      Pupils: Pupils are equal, round, and reactive to light.   Cardiovascular:      Rate and Rhythm: Normal rate and regular rhythm.      Pulses: Normal pulses.      Heart sounds: Murmur heard.   Pulmonary:      Effort: Pulmonary effort is normal. No respiratory distress.      Breath sounds: Normal breath sounds. No wheezing.   Abdominal:      General: Abdomen is flat. Bowel sounds are normal.   Musculoskeletal:         General: No swelling. Normal range of motion.      Cervical back: Normal range of motion and neck supple.   Skin:     General: Skin is warm and dry.      Findings: No rash.   Neurological:      Mental Status: She is alert and oriented to person, place, and time.      Motor: No weakness.      Coordination: Coordination normal.      Gait: Gait normal.   Psychiatric:         Mood and Affect: Mood normal.         Behavior: Behavior normal.      Assessment:         1. Dizziness    2. Eustachian tube disorder, right    3. Murmur, cardiac    4. 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 Hypertension associated with diabetes          Plan:   1. Dizziness  - Ambulatory referral/consult to Cardiology; Future  - CT Head Without Contrast; Future  - Ambulatory referral/consult to Outpatient Case Management  - loratadine (CLARITIN) 10 mg tablet; Take 1 tablet (10 mg total) by mouth once daily.  Dispense: 30 tablet; Refill: 0  - fluticasone propionate (FLONASE) 50 mcg/actuation nasal spray; 1 spray (50 mcg total) by Each Nostril route once daily.  Dispense: 16 g; Refill: 0    2. Eustachian tube disorder, right  - loratadine (CLARITIN) 10 mg tablet; Take 1 tablet (10 mg total) by mouth once daily.  Dispense: 30 tablet; Refill: 0  - fluticasone propionate (FLONASE) 50 mcg/actuation nasal spray; 1 spray (50 mcg total) by Each Nostril  route once daily.  Dispense: 16 g; Refill: 0    3. Murmur, cardiac  - Ambulatory referral/consult to Cardiology; Future    4. 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 Hypertension associated with diabetes  Patient has not taken BP meds since going to dialysis later today, bp drops drastically during dialysis.    Will be establishing care with new PCP in [place] soon.  Will contact w/ results.  Follow up if symptoms worsen or fail to improve.       Slade Dykes NP   [facility]  7/10/23

## 2023-08-04 ENCOUNTER — PATIENT OUTREACH (OUTPATIENT)
Dept: ADMINISTRATIVE | Facility: HOSPITAL | Age: 78
End: 2023-08-04
Payer: MEDICARE

## 2023-08-04 NOTE — PROGRESS NOTES
Health Maintenance Due   Topic Date Due    TETANUS VACCINE  Never done    DEXA Scan  Never done    Colorectal Cancer Screening  Never done    Shingles Vaccine (1 of 2) Never done    Lipid Panel  12/31/2019    Mammogram  09/05/2020    COVID-19 Vaccine (4 - Moderna series) 01/06/2022    Hemoglobin A1c  01/08/2022    Eye Exam  06/22/2023    Foot Exam  06/24/2023     Chart reviewed.   Immunizations: Reconciled  Orders placed: N/A  Upcoming appts to satisfy LONG topics: N/A

## 2023-08-18 ENCOUNTER — TELEPHONE (OUTPATIENT)
Dept: PODIATRY | Facility: CLINIC | Age: 78
End: 2023-08-18
Payer: MEDICARE

## 2023-08-18 NOTE — TELEPHONE ENCOUNTER
Spoke with pt's daughter who reports pt is in dialysis. Informed her that Dr Morris will be in surgery all day on Sept 1. Per daughter it would be preferred for pt to have appts on Tuesdays or Thursday afternoons. Accepted appt date and time of 9/28/23 at 1:45 pm. Appt reminder sent through the mail. No other needs voiced at this time. Encouraged call back if needed

## 2023-08-24 ENCOUNTER — OFFICE VISIT (OUTPATIENT)
Dept: FAMILY MEDICINE | Facility: CLINIC | Age: 78
End: 2023-08-24
Attending: FAMILY MEDICINE
Payer: MEDICARE

## 2023-08-24 DIAGNOSIS — T30.0 BURN INJURY: Primary | ICD-10-CM

## 2023-08-24 DIAGNOSIS — E78.5 HYPERLIPIDEMIA ASSOCIATED WITH TYPE 2 DIABETES MELLITUS: ICD-10-CM

## 2023-08-24 DIAGNOSIS — E11.69 HYPERLIPIDEMIA ASSOCIATED WITH TYPE 2 DIABETES MELLITUS: ICD-10-CM

## 2023-08-24 DIAGNOSIS — T23.152A SUPERFICIAL BURN OF PALM OF LEFT HAND, INITIAL ENCOUNTER: ICD-10-CM

## 2023-08-24 DIAGNOSIS — H69.91 EUSTACHIAN TUBE DISORDER, RIGHT: ICD-10-CM

## 2023-08-24 DIAGNOSIS — R42 DIZZINESS: ICD-10-CM

## 2023-08-24 PROCEDURE — 1159F MED LIST DOCD IN RCRD: CPT | Mod: CPTII,95,, | Performed by: FAMILY MEDICINE

## 2023-08-24 PROCEDURE — 99214 PR OFFICE/OUTPT VISIT, EST, LEVL IV, 30-39 MIN: ICD-10-PCS | Mod: 95,,, | Performed by: FAMILY MEDICINE

## 2023-08-24 PROCEDURE — 3072F PR LOW RISK FOR RETINOPATHY: ICD-10-PCS | Mod: CPTII,95,, | Performed by: FAMILY MEDICINE

## 2023-08-24 PROCEDURE — 99214 OFFICE O/P EST MOD 30 MIN: CPT | Mod: 95,,, | Performed by: FAMILY MEDICINE

## 2023-08-24 PROCEDURE — 1160F PR REVIEW ALL MEDS BY PRESCRIBER/CLIN PHARMACIST DOCUMENTED: ICD-10-PCS | Mod: CPTII,95,, | Performed by: FAMILY MEDICINE

## 2023-08-24 PROCEDURE — 3072F LOW RISK FOR RETINOPATHY: CPT | Mod: CPTII,95,, | Performed by: FAMILY MEDICINE

## 2023-08-24 PROCEDURE — 1159F PR MEDICATION LIST DOCUMENTED IN MEDICAL RECORD: ICD-10-PCS | Mod: CPTII,95,, | Performed by: FAMILY MEDICINE

## 2023-08-24 PROCEDURE — 1160F RVW MEDS BY RX/DR IN RCRD: CPT | Mod: CPTII,95,, | Performed by: FAMILY MEDICINE

## 2023-08-24 RX ORDER — AMLODIPINE BESYLATE 10 MG/1
10 TABLET ORAL DAILY
Qty: 90 TABLET | Refills: 0 | Status: SHIPPED | OUTPATIENT
Start: 2023-08-24

## 2023-08-24 RX ORDER — ATORVASTATIN CALCIUM 40 MG/1
40 TABLET, FILM COATED ORAL DAILY
Qty: 90 TABLET | Refills: 0 | Status: SHIPPED | OUTPATIENT
Start: 2023-08-24

## 2023-08-24 RX ORDER — SILVER SULFADIAZINE 10 G/1000G
CREAM TOPICAL DAILY
Qty: 25 G | Refills: 1 | Status: SHIPPED | OUTPATIENT
Start: 2023-08-24

## 2023-08-24 RX ORDER — LORATADINE 10 MG/1
10 TABLET ORAL DAILY
Qty: 30 TABLET | Refills: 0 | Status: SHIPPED | OUTPATIENT
Start: 2023-08-24 | End: 2023-12-29 | Stop reason: SDUPTHER

## 2023-08-24 NOTE — TELEPHONE ENCOUNTER
Care Due:                  Date            Visit Type   Department     Provider  --------------------------------------------------------------------------------                                EP -                              PRIMARY      [facility] INTERNAL  Last Visit: 04-      CARE ([facility])   MEDICINE       Katiuska Villalba  Next Visit: None Scheduled  None         None Found                                                            Last  Test          Frequency    Reason                     Performed    Due Date  --------------------------------------------------------------------------------    Office Visit  12 months..  amLODIPine, atorvastatin,   04- 04-                             insulin..................    CMP.........  12 months..  atorvastatin, insulin....  Not Found    Overdue    HBA1C.......  6 months...  insulin..................  Not Found    Overdue    Lipid Panel.  12 months..  atorvastatin.............  Not Found    Overdue    [employer] Embedded Care Due Messages. Reference number: [id number].   8/24/2023 11:25:38 AM CDT
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
## 2023-12-29 ENCOUNTER — OFFICE VISIT (OUTPATIENT)
Dept: FAMILY MEDICINE | Facility: CLINIC | Age: 78
End: 2023-12-29
Payer: MEDICARE

## 2023-12-29 DIAGNOSIS — J30.89 NON-SEASONAL ALLERGIC RHINITIS DUE TO OTHER ALLERGIC TRIGGER: ICD-10-CM

## 2023-12-29 DIAGNOSIS — G63 POLYNEUROPATHY IN RENAL DISEASE: ICD-10-CM

## 2023-12-29 DIAGNOSIS — N18.6 TYPE 2 DIABETES MELLITUS WITH ESRD (END-STAGE RENAL DISEASE): ICD-10-CM

## 2023-12-29 DIAGNOSIS — N18.9 POLYNEUROPATHY IN RENAL DISEASE: ICD-10-CM

## 2023-12-29 DIAGNOSIS — G62.9 NEUROPATHY: Primary | ICD-10-CM

## 2023-12-29 DIAGNOSIS — E11.22 TYPE 2 DIABETES MELLITUS WITH ESRD (END-STAGE RENAL DISEASE): ICD-10-CM

## 2023-12-29 PROCEDURE — 99214 OFFICE O/P EST MOD 30 MIN: CPT | Mod: 95,,, | Performed by: FAMILY MEDICINE

## 2023-12-29 PROCEDURE — 1160F RVW MEDS BY RX/DR IN RCRD: CPT | Mod: CPTII,95,, | Performed by: FAMILY MEDICINE

## 2023-12-29 PROCEDURE — 1160F PR REVIEW ALL MEDS BY PRESCRIBER/CLIN PHARMACIST DOCUMENTED: ICD-10-PCS | Mod: CPTII,95,, | Performed by: FAMILY MEDICINE

## 2023-12-29 PROCEDURE — 3072F LOW RISK FOR RETINOPATHY: CPT | Mod: CPTII,95,, | Performed by: FAMILY MEDICINE

## 2023-12-29 PROCEDURE — 1159F MED LIST DOCD IN RCRD: CPT | Mod: CPTII,95,, | Performed by: FAMILY MEDICINE

## 2023-12-29 PROCEDURE — 3072F PR LOW RISK FOR RETINOPATHY: ICD-10-PCS | Mod: CPTII,95,, | Performed by: FAMILY MEDICINE

## 2023-12-29 PROCEDURE — 99214 PR OFFICE/OUTPT VISIT, EST, LEVL IV, 30-39 MIN: ICD-10-PCS | Mod: 95,,, | Performed by: FAMILY MEDICINE

## 2023-12-29 PROCEDURE — 1159F PR MEDICATION LIST DOCUMENTED IN MEDICAL RECORD: ICD-10-PCS | Mod: CPTII,95,, | Performed by: FAMILY MEDICINE

## 2023-12-29 RX ORDER — PREGABALIN 25 MG/1
25 CAPSULE ORAL DAILY
Qty: 30 CAPSULE | Refills: 0 | Status: SHIPPED | OUTPATIENT
Start: 2023-12-29

## 2023-12-29 RX ORDER — LORATADINE 10 MG/1
TABLET ORAL
Qty: 90 TABLET | Refills: 0 | Status: SHIPPED | OUTPATIENT
Start: 2023-12-29

## 2023-12-29 NOTE — PROGRESS NOTES
Subjective:         Patient ID: Martha Melvin is a 78 y.o. female.    Chief Complaint: No chief complaint on file.    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 female who presents today for tingling, stiffness in L foot and lower leg to mid calf. No swelling.  Worse when sitting still. Moving and walking feels better. Sometimes with burning. Feels like wood. Thickened skin. Cold feet.   x1-2 years, intermittent, worsen over last 5-6 months.     Declined visual exam of feet, has shoes on to go to dialysis.   HD three times per week. Nephrologist Florecita Pinzon.   Taking iron pills.  Does have restless leg sensation.    Lives in [place] - Goes to [employer] [facility] on [address].  Walks with cane, no falls. s/p BKA on right - does feel burning on stump on right as well.    Not interested in gabapentin because sister got sick and passed away, was on/off gabapentin before her death.     7-8 months of allergy symptoms - rhinorrhea, congestion, clear, no fever/chills. Would like refill of prior med. Reviewed chart. Xyzal contraindicated with HD. Refilled prior loratadine.     Review of Systems   All other systems reviewed and are negative.        Objective:   There were no vitals filed for this visit.      Physical Exam  Constitutional:       General: She is not in acute distress.     Appearance: She is well-developed. She is not diaphoretic.      Comments: Exam performed as able, but limited due to the inherent nature of telemedicine visit.    HENT:      Head: Normocephalic and atraumatic.   Eyes:      Conjunctiva/sclera: Conjunctivae normal.   Pulmonary:      Effort: No respiratory distress.      Comments: No audible wheezing noted   Breathing as noted over telemedicine appears normal with no distress  Skin:     Comments: No visible rash noted   Neurological:      Mental Status: She is alert and oriented to person, place, and time.   Psychiatric:         Behavior: Behavior normal.         Thought Content: Thought content normal.         Judgment: Judgment normal.       Assessment:       1. Neuropathy    2. Non-seasonal allergic rhinitis due to other allergic trigger    3. Polyneuropathy in renal disease    4. 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     Will start with low dose of Lyrica. No available labs to review, likely all done with dialysis. Cursory work up for possible reversible cause, likely long standing and related to long standing renal disease and will be symptomatic control.   Set up follow up with PCP.   Long standing chronic problems.         1. Neuropathy  -     pregabalin (LYRICA) 25 MG capsule; Take 1 capsule (25 mg total) by mouth once daily.  Dispense: 30 capsule; Refill: 0  -     Vitamin B12; Future; Expected date: 12/29/2023  -     FOLATE; Future; Expected date: 12/29/2023  -     Comprehensive Metabolic Panel; Future; Expected date: 12/29/2023  -     CBC Auto Differential; Future; Expected date: 12/29/2023  -     IRON AND TIBC; Future; Expected date: 12/29/2023  -     Ferritin; Future; Expected date: 12/29/2023  -     HIV 1/2 Ag/Ab (4th Gen);  Future; Expected date: 12/29/2023  -     RPR; Future; Expected date: 12/29/2023  -     Hemoglobin A1C; Future; Expected date: 12/29/2023    2. Non-seasonal allergic rhinitis due to other allergic trigger  -     loratadine (CLARITIN) 10 mg tablet; Take 1/2 to 1 tabby mouth daily as needed for allergies symptoms  Dispense: 90 tablet; Refill: 0    3. 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 Plan reviewed with patient at the end of visit. Relevant precautions to chief complaint and reasons to seek further medical care or to contact the office sooner reviewed with patient.     Follow up in about 2 weeks (around 1/12/2024) for f/u neuropathy with PCP.

## 2024-03-25 NOTE — ASSESSMENT & PLAN NOTE
Anemia of chronic disease    HD TTS, H/H at baseline   -consult nephrology (pt does not know name of her nephrologist)    Patient in CT at this time.

## 2024-04-03 DIAGNOSIS — J30.89 NON-SEASONAL ALLERGIC RHINITIS DUE TO OTHER ALLERGIC TRIGGER: ICD-10-CM

## 2024-04-03 DIAGNOSIS — E11.22 TYPE 2 DIABETES MELLITUS WITH ESRD (END-STAGE RENAL DISEASE): ICD-10-CM

## 2024-04-03 DIAGNOSIS — N18.6 TYPE 2 DIABETES MELLITUS WITH ESRD (END-STAGE RENAL DISEASE): ICD-10-CM

## 2024-04-03 RX ORDER — LORATADINE 10 MG/1
TABLET ORAL
Qty: 90 TABLET | Refills: 0 | Status: CANCELLED | OUTPATIENT
Start: 2024-04-03

## 2024-04-03 NOTE — TELEPHONE ENCOUNTER
Care Due:                  Date            Visit Type   Department     Provider  --------------------------------------------------------------------------------    Last Visit: None Found      None         None Found  Next Visit: None Scheduled  None         None Found                                                            Last  Test          Frequency    Reason                     Performed    Due Date  --------------------------------------------------------------------------------    Office Visit  15 months..  amLODIPine, atorvastatin,   Not Found    Overdue                             insulin, loratadine......    CMP.........  12 months..  atorvastatin, insulin....  Not Found    Overdue    HBA1C.......  6 months...  insulin..................  Not Found    Overdue    Lipid Panel.  12 months..  atorvastatin.............  Not Found    Overdue    [employer] Embedded Care Due Messages. Reference number: [id number].   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  The patient's overall health status was discussed with the nursing home staff, and the facilities' medical records for the patient were reviewed.    Chart reviewed.  Patient admitted to [facility] 2/2/22-3/21/22.  She was admitted to the ICU for acute respiratory failure secondary to COVID versus aspiration pneumonia.  Patient required mechanical ventilatory support 2/2-2/5 and vasopressor support for hypotension.  She received empiric antibiotics for concern of aspiration pneumonia, and steroids.    - Bacteremic with positive BC 2/2/22 for staph aureus (blood cultures negative 2/8). S/p treatment with IV vancomycin and aztreonam in ICU. GUY 2/18 showed thrombus vs infected thrombus at SVC/RA junction attached to [place] line. IV vanc was held on 2/22 for concern of possible red man syndrome, restarted, then had another allergic type reaction on 3/2 at which time vanc was stopped (although suspected more related to mirtazipine). Patient received daptomycin for 1 day. ID was consulted and patient was transitioned to PO doxycycline and moxifloxacin on 3/4/22. Remained on antibiotics (doxy and Moxifloxacin) until 3/19.  Patient underwent SVC thrombectomy with Dr. Head on 3/1/2022  - malnutrition, dysphagia secondary to previous stroke.  Did not meet nutritional needs via PO.  Status post PEG tube placement 3/12/2022  - patient had SILVIANO on CKD2 and had temporary dialysis, ending 2/12. Cr stable at time of discharge    Today patient denies complaints. Wondering how she will get home since she doesn't have someone to care for her at home. Tolerated PEG feeds. Breathing okay. Normal urination and  BM's.     Review of systems:      The patient denies any other recent illnesses.  No fevers, chills, nausea, vomiting, chest pain, shortness of breath, headache, dizziness, blurry vision or loss of consciousness.  Denies any reflux or changes in bowel movements or voiding.  Denies vaginal discharge or pruritis.  Denies any dysuria, increased frequency or nocturia.  No focal neurological deficits.    All other systems reviewed and are negative except as noted above.      OBJECTIVE:  PAST HISTORIES:  I have reviewed the past medical history, family history, social history, medications and allergies listed in the medical record as obtained by my nursing staff and support staff and agree with their documentation.    LEGAL HISTORY:    Marital status:     Code Status:  Full Code per review of the supporting legal documentation.  Power of : Activated    Physical Exam:        General:  Alert, cooperative, conversive..   Lymphatics: No neck, axillary or inguinal lymphadenopathy.   Skin:  Normal turgor without.   Pressure Sores:  Sacral ulcer with overlying bandage not removed for exam    Head: Normal cephalic , atraumatic.  Eyes:  Normal conjunctiva and non-icteric sclera bilaterally.    Mouth:  Oral mucosa moist. Posterior pharynx is WNL.  Lungs:  lungs are clear to auscultation with normal inspiratory/expiratory sounds, no rales, no rhonchi and no wheezes  Heart:  Regular rate and rhythm without murmur.  Normal S1 and S2.  Abdomen:  Abdomen: soft, non-tender, non-distended. No guarding or rebound. No hepatomegaly or splenomegaly. PEG tube in place  Extremities:   No edema.  No deformity.  No tenderness.  Neurologic:  Oriented times 3.   No gross lateralizing signs or focal deficits.  Psychiatric: appropriate insight. Flat affect    LAB RESULTS:  No new since discharge    RADIOLOGY RESULTS:  No new since discharge    Assessment/Plan    Resolved:  - Respiratory failure secondary to COVID pneumonia versus  aspiration pneumonia  -MSSA bacteremia, possible infected thrombus at SVC/RA junction s/p Angiovac thrombectomy    Malnutrition  Dysphagia as a consequence of CVA- status post PEG placement, tolerating continuous feeds well  - continue feeds, meds via PEG    Type 2 Diabetes Mellitus: PTA Metformin  mg BID. Last A1c in 1/17/22 : 6.3%, was previously 8.8% on 11/10/21. S/p DKA on hospital admission. Metformin discontinued during admission.   Diabetic ketoacidosis with metabolic acidosis, resolved- metformin held with acidosis. BG  while on tube feeds  - continue without metformin  - recheck A1c in 1 month  - decrease BG checks to once daily    HTN  Paroxysmal Afib on chronic warfarin: rate controlled. INR goal: 2-3. INR supratherapeutic on admission to ICU in early 2/2022, PTA warfarin restarted on 2/6/22. PTA warfarin held again on 2/9 for anemia and blood transfusions. Restarted on 2/13. INR 1.7 on 3/29  - continue carvedilol 25mg, amlodipine 10mg, doxazosin 2mg  - continue coumadin 3mg   - continue INR checks q Mon/Thurs    CHRONIC AND STABLE  Depression: History of depression and anxiety PTA, likely contributing to decreased appetite. Pt allergic to mirtazapine, see below. POA activated. S/p psych consult on 3/4. Zoloft started and titrated up during admission.   - Zoloft 100 mg daily      Acute on chronic normocytic anemia: Likely 2/2 chronic disease vs iron deficiency. Patient had Hgb of 6.7 g/dL in ICU on 2/9 with repeat 7 g/dL, patient declined transfusion at that time. Now s/p 3u pRBCs (2/11, 2/14, 2/27). Previous iron panel low (2/14/22), vitamin B12 and folate levels WNL (2/20/22). FOBT positive on 2/14/22. Hgb stable 10.1 at [facility].   - consider colonoscopy outpatient given FOBT  - repeat CBC 2-4 weeks (ordered for 4/12)     CAD: s/p CABG 11/26/2021 - left internal mammary artery to LAD, saphenous vein graft to PDA and saphenous vein graft to OM3  - Continue PTA ASA 81 mg daily  - Continue PTA  rosuvastatin 40 mg daily     PMHx of CVA: 2019, with residual left sided weakness and dysphagia.     Sacral Decubitus ulcer: stage 3.  - Wound care consulted, appreciate recommendations      RESOLVED    Transaminitis  Leukocytosis   Hypoactive delirium: Patient started on modafinil in ICU, weaned off once on the floor to help with hypertension appetite.  Allergic reaction: likely 2/2 mirtazapine.     Patient was discussed with Dr. Vargas who agreed with the assessment and plan.    Thelma Guy DO  PGY-3 Family Medicine Residency      Agree with resident A+P.  Patient seen and examined.  Discussed patient care with resident team.      Mack Vargas MD  ( 3/29/2022 )    [facility]

## 2024-04-03 NOTE — TELEPHONE ENCOUNTER
No care due was identified.  [facility] Embedded Care Due Messages. Reference number: [id number].   4/03/2024 4:09:26 PM CDT

## 2024-07-03 ENCOUNTER — TELEPHONE (OUTPATIENT)
Dept: INTERNAL MEDICINE | Facility: CLINIC | Age: 79
End: 2024-07-03
Payer: MEDICARE

## 2024-07-03 DIAGNOSIS — E11.69 HYPERLIPIDEMIA ASSOCIATED WITH TYPE 2 DIABETES MELLITUS: ICD-10-CM

## 2024-07-03 DIAGNOSIS — N18.6 TYPE 2 DIABETES MELLITUS WITH ESRD (END-STAGE RENAL DISEASE): ICD-10-CM

## 2024-07-03 DIAGNOSIS — E11.22 TYPE 2 DIABETES MELLITUS WITH ESRD (END-STAGE RENAL DISEASE): ICD-10-CM

## 2024-07-03 DIAGNOSIS — E78.5 HYPERLIPIDEMIA ASSOCIATED WITH TYPE 2 DIABETES MELLITUS: ICD-10-CM

## 2024-07-03 RX ORDER — INSULIN ASPART 100 [IU]/ML
INJECTION, SOLUTION INTRAVENOUS; SUBCUTANEOUS
Qty: 15 ML | Refills: 0 | Status: CANCELLED | OUTPATIENT
Start: 2024-07-03

## 2024-07-03 RX ORDER — AMLODIPINE BESYLATE 10 MG/1
10 TABLET ORAL DAILY
Qty: 90 TABLET | Refills: 0 | Status: CANCELLED | OUTPATIENT
Start: 2024-07-03

## 2024-07-03 NOTE — TELEPHONE ENCOUNTER
Care Due:                  Date            Visit Type   Department     Provider  --------------------------------------------------------------------------------    Last Visit: None Found      None         None Found  Next Visit: None Scheduled  None         None Found                                                            Last  Test          Frequency    Reason                     Performed    Due Date  --------------------------------------------------------------------------------    Office Visit  15 months..  amLODIPine, atorvastatin,   Not Found    Overdue                             insulin, loratadine......    CMP.........  12 months..  atorvastatin, insulin....  Not Found    Overdue    HBA1C.......  6 months...  insulin..................  Not Found    Overdue    Lipid Panel.  12 months..  atorvastatin.............  12- 12-    [facility] Embedded Care Due Messages. Reference number: [id number].   7/03/2024 4:41:26 PM CDT

## 2024-07-03 NOTE — TELEPHONE ENCOUNTER
No care due was identified.  [facility] Embedded Care Due Messages. Reference number: [id number].   7/03/2024 4:42:03 PM CDT

## 2024-07-05 ENCOUNTER — TELEPHONE (OUTPATIENT)
Dept: INTERNAL MEDICINE | Facility: CLINIC | Age: 79
End: 2024-07-05
Payer: MEDICARE

## 2024-07-05 DIAGNOSIS — E11.22 TYPE 2 DIABETES MELLITUS WITH ESRD (END-STAGE RENAL DISEASE): ICD-10-CM

## 2024-07-05 DIAGNOSIS — N18.6 TYPE 2 DIABETES MELLITUS WITH ESRD (END-STAGE RENAL DISEASE): ICD-10-CM

## 2024-07-05 RX ORDER — INSULIN ASPART 100 [IU]/ML
INJECTION, SOLUTION INTRAVENOUS; SUBCUTANEOUS
Qty: 15 ML | Refills: 0 | OUTPATIENT
Start: 2024-07-05

## 2024-07-05 RX ORDER — ATORVASTATIN CALCIUM 40 MG/1
40 TABLET, FILM COATED ORAL DAILY
Qty: 90 TABLET | Refills: 0 | Status: SHIPPED | OUTPATIENT
Start: 2024-07-05

## 2024-07-05 RX ORDER — AMLODIPINE BESYLATE 10 MG/1
10 TABLET ORAL DAILY
Qty: 90 TABLET | Refills: 0 | Status: SHIPPED | OUTPATIENT
Start: 2024-07-05

## 2024-07-05 NOTE — TELEPHONE ENCOUNTER
No care due was identified.  [facility] Embedded Care Due Messages. Reference number: [id number].   7/05/2024 12:09:36 PM CDT

## 2024-07-05 NOTE — TELEPHONE ENCOUNTER
Pharmacy sent to us.    Last seen 4/13/22  She no showed for appt 5/16/24  And 1/8/24 & 8/31/23 & 8/16/23 & 5/23/23    I think it should be ok to deny.  Agree??

## 2024-07-05 NOTE — TELEPHONE ENCOUNTER
See my previous message.    Reviewed BP readings from dialysis notes. Will refill amlodipine and test strips.    I cannot refill the insulin. I have not refilled it in 1 year and refills were never requested.     If she can send in recent BG readings, then will refill.     Also schedule lipid, hba1c, tsh prior to annual exam on 7/30/24.

## 2024-07-05 NOTE — TELEPHONE ENCOUNTER
Refilled Lipitor.    Annual exam scheduled on 7/30/25.     Dialysis orders labs but she needs additional labs in order to continue refilling labs,    Ordered lipid panel, tsh, hba1c---> Please schedule these before annual exam.

## 2024-07-30 ENCOUNTER — TELEPHONE (OUTPATIENT)
Dept: INTERNAL MEDICINE | Facility: CLINIC | Age: 79
End: 2024-07-30
Payer: MEDICARE

## 2024-07-30 NOTE — TELEPHONE ENCOUNTER
----- Message from Rayna Carlos sent at 7/30/2024  8:16 AM CDT -----  Pts daughter is calling in regards to the pts appointment this morning 07/30/2024. Pts daughter thought the pts appointment was for 3 PM today. Pts daughter is asking if the doctor has something else today so the pt can be seen because she really needs her medications. Please call and advise. Thank you.

## 2024-07-30 NOTE — TELEPHONE ENCOUNTER
I spoke with pts daughter she states phone staff scheduled her for next week. There was an appointment opened for Friday but pts daughter says she will be at dialysis .

## 2024-08-07 NOTE — OP NOTE
Operative Date:  08/30/2022    Discharge Date:  08/30/2022    Discharge Patient Home    Report Title: Operative Note      SURGEON: Fidel Lacy MD     ASSISTANT:     PREOPERATIVE DIAGNOSIS: Visually significant NSC cataract,  Left Eye.    POSTOPERATIVE DIAGNOSIS: Visually-significant NSC cataract,  Left Eye.    PROCEDURE PERFORMED: Phacoemulsification of the cataract with posterior chamber intraocular lens Left Eye.    ANESTHESIA: Topical with MAC    COMPLICATIONS: None    ESTIMATED BLOOD LOSS: Minimal    INDICATIONS FOR PROCEDURE:   The patient is a pleasant 76 year old woman with increasing difficulties with activities of daily living secondary to a dense visually significant cataract in the Left Eye.  Discussions have been carried out with this patient concerning the options to surgery, risks, benefits and expectations.  The patient voiced good understanding and wished to proceed with the above procedure.    PROCEDURE IN DETAIL: The patient was brought to the operating room and received topical anesthetic to the eye and then was prepped and draped in the usual sterile fashion.  Using the Barrera ring and the guarded ozzy blade set at 0.37 mm, a partial thickness clear cornea incision was made temporally.  The paracentesis site was made at the six o'clock position.  Omidria was injected into the anterior chamber through the paracentesis.  Viscoat was then injected into the anterior chamber.  The eye was then reentered at the primary surgical site with a 2.4 mm keratome followed by continuous capsulotomy, hydrodissection, hydrodelineation and phacoemulsification of the cataract.  Residual cortical material was removed using automated irrigation-aspiration technique.  Healon was injected into the posterior chamber and a Clareon 24.0 diopter lens was placed in the bag without difficulty. Residual viscoelastic was removed using automated irrigation-aspiration technique. The eye was re-pressurized using  BSS solution and both the paracentesis site and the primary surgical site were demonstrated to be watertight at the end of the case with Weck--Michelle manipulation.  One drop of Ofloxacin and one drop of Pred acetate 1% was applied to the Left Eye .The eye was closed, patched and a Brown shield placed.  The patient was taken to the recovery room in good and stable condition.  The patient tolerated the procedure well.  The patient was instructed to refrain from any heavy lifting, bending, stooping or straining activities, discharged home  and to follow-up in the morning for routine postoperative care with Fidel Lacy MD.    none

## 2024-08-22 ENCOUNTER — OFFICE VISIT (OUTPATIENT)
Dept: PODIATRY | Facility: CLINIC | Age: 79
End: 2024-08-22
Payer: MEDICARE

## 2024-08-22 VITALS
WEIGHT: 182.56 LBS | DIASTOLIC BLOOD PRESSURE: 80 MMHG | BODY MASS INDEX: 30.42 KG/M2 | SYSTOLIC BLOOD PRESSURE: 173 MMHG | HEART RATE: 81 BPM | HEIGHT: 65 IN

## 2024-08-22 DIAGNOSIS — E11.9 ENCOUNTER FOR DIABETIC FOOT EXAM: Primary | ICD-10-CM

## 2024-08-22 DIAGNOSIS — B35.1 ONYCHOMYCOSIS: ICD-10-CM

## 2024-08-22 DIAGNOSIS — E11.42 TYPE 2 DIABETES MELLITUS WITH PERIPHERAL NEUROPATHY: ICD-10-CM

## 2024-08-22 DIAGNOSIS — E11.51 TYPE 2 DIABETES MELLITUS WITH PERIPHERAL VASCULAR DISEASE: ICD-10-CM

## 2024-08-22 DIAGNOSIS — L84 CORN OR CALLUS: ICD-10-CM

## 2024-08-22 DIAGNOSIS — Z89.511 HISTORY OF RIGHT BELOW KNEE AMPUTATION: ICD-10-CM

## 2024-08-22 PROCEDURE — 99999 PR PBB SHADOW E&M-EST. PATIENT-LVL IV: CPT | Mod: PBBFAC,,, | Performed by: PODIATRIST

## 2024-08-22 NOTE — PROGRESS NOTES
Subjective:      Patient ID: Martha Melvin is a 78 y.o. female.    Chief Complaint: Foot Problem (Foot examine and nail care,)    Martha is a 78 y.o. female who presents to the clinic upon referral from Dr. Yoselin norwood. provider found  for evaluation and treatment of diabetic feet. 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  History previous right below-knee amputation.    08/22/2024:  Returns for annual diabetic foot exam.  Accompanied by her daughter and grandson.  Reports intermittent sharp pain in her left hand and left foot especially at night.  No history of back pain.    PCP: Katiuska Villalba MD    Date Last Seen by PCP:  04/13/2022  Shante Carrillo MD on 12/29/2023    Current shoe gear: Tennis shoes    Hemoglobin A1C   Date Value Ref Range Status   07/10/2024 6.1 (H) 4.8 - 5.9 % Final   04/10/2024 6.8 (H) 4.8 - 5.9 % Final   12/28/2018 7.4 (H) 4.0 - 5.6 % Final     Comment:     ADA Screening Guidelines:  5.7-6.4%  Consistent with prediabetes  >or=6.5%  Consistent with diabetes  High levels of fetal hemoglobin interfere with the HbA1C  assay. Heterozygous hemoglobin variants (HbS, HgC, etc)do  not significantly interfere with this assay.   However, presence of multiple variants may affect accuracy.     09/05/2018 7.4 (H) 4.0 - 5.6 % Final     Comment:     ADA Screening Guidelines:  5.7-6.4%  Consistent with prediabetes  >or=6.5%  Consistent with diabetes  High levels of fetal hemoglobin interfere with the HbA1C  assay. Heterozygous hemoglobin variants (HbS, HgC, etc)do  not significantly interfere with this assay.   However, presence of multiple variants may affect accuracy.     04/16/2018 8.0 (H) 4.0 - 5.6 % Final     Comment:     According to ADA guidelines, hemoglobin A1c <7.0% represents  optimal control in  "non-pregnant diabetic patients. Different  metrics may apply to specific patient populations.   Standards of Medical Care in Diabetes-2016.  For the purpose of screening for the presence of diabetes:  <5.7%     Consistent with the absence of diabetes  5.7-6.4%  Consistent with increasing risk for diabetes   (prediabetes)  >or=6.5%  Consistent with diabetes  Currently, no consensus exists for use of hemoglobin A1c  for diagnosis of diabetes for children.  This Hemoglobin A1c assay has significant interference with fetal   hemoglobin   (HbF). The results are invalid for patients with abnormal amounts of   HbF,   including those with known Hereditary Persistence   of Fetal Hemoglobin. Heterozygous hemoglobin variants (HbAS, HbAC,   HbAD, HbAE, HbA2) do not significantly interfere with this assay;   however, presence of multiple variants in a sample may impact the %   interference.       Vitals:    08/22/24 1619   BP: (!) 173/80   Pulse: 81   Weight: 82.8 kg (182 lb 8.7 oz)   Height: 5' 5" (1.651 m)   PainSc: 0-No pain      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       Past Surgical History:   Procedure Laterality Date    AORTOGRAPHY WITH SERIALOGRAPHY N/A 12/31/2018    Procedure: AORTOGRAM, WITH SERIALOGRAPHY;  Surgeon: Mike Mckay MD;  Location: [facility] CATH LAB/EP;  Service: Cardiology;  Laterality: N/A;    ARTERIOVENOUS FISTULOGRAPHY Left 1/2/2019    Procedure: Fistulogram, Arteriovenous;  Surgeon: Kenny Fink MD;  Location: [facility] CATH LAB/EP;  Service: Cardiology;  Laterality: Left;    CATARACT EXTRACTION W/  INTRAOCULAR LENS IMPLANT Right 8/16/2022    Procedure: EXTRACTION, CATARACT, WITH IOL INSERTION;  Surgeon: Fidel Lacy MD;  Location: [facility] OR;  Service: " Ophthalmology;  Laterality: Right;    CATARACT EXTRACTION W/  INTRAOCULAR LENS IMPLANT Left 8/30/2022    Procedure: EXTRACTION, CATARACT, WITH IOL INSERTION;  Surgeon: Fidel Lacy MD;  Location: [facility];  Service: Ophthalmology;  Laterality: Left;    FISTULOGRAM Left 10/21/2019    Procedure: Fistulogram;  Surgeon: AYESHA Mesa III, MD;  Location: [facility] CATH LAB;  Service: Peripheral Vascular;  Laterality: Left;    LEG AMPUTATION Right     PERCUTANEOUS TRANSLUMINAL ANGIOPLASTY (PTA) OF PERIPHERAL VESSEL N/A 1/2/2019    Procedure: PTA, PERIPHERAL VESSEL;  Surgeon: Kenny Fink MD;  Location: [facility] CATH LAB/EP;  Service: Cardiology;  Laterality: N/A;    PERCUTANEOUS TRANSLUMINAL ANGIOPLASTY OF ARTERIOVENOUS FISTULA N/A 10/21/2019    Procedure: PTA, AV FISTULA;  Surgeon: AYESHA Mesa III, MD;  Location: [facility] CATH LAB;  Service: Peripheral Vascular;  Laterality: N/A;    TRANSPOSITION OF BASILIC VEIN Left 6/27/2018    Procedure: TRANSPOSITION, VEIN, BASILIC;  Surgeon: AYESHA Mesa III, MD;  Location: [facility] 2ND FLR;  Service: Cardiovascular;  Laterality: Left;  forearm        Family History   Problem Relation Name Age of Onset    Cancer Mother      Diabetes Mother      Hypertension Mother      Cancer Father      Cancer Sister         Social History     Socioeconomic History    Marital status: Single   Tobacco Use    Smoking status: Never    Smokeless tobacco: Never   Substance and Sexual Activity    Alcohol use: No     Social Determinants of Health     Financial Resource Strain: Low Risk  (12/29/2023)    Overall Financial Resource Strain (CARDIA)     Difficulty of Paying Living Expenses: Not hard at all   Food Insecurity: No Food Insecurity (12/29/2023)    Hunger Vital Sign     Worried About Running Out of Food in the Last Year: Never true     Ran Out of Food in the Last Year: Never true   Transportation Needs: No Transportation Needs (12/29/2023)    PRAPARE - Transportation     Lack of  Transportation (Medical): No     Lack of Transportation (Non-Medical): No   Physical Activity: Inactive (12/29/2023)    Exercise Vital Sign     Days of Exercise per Week: 0 days     Minutes of Exercise per Session: 0 min   Stress: Stress Concern Present (12/29/2023)    [facility]ational [facility] - Occupational Stress Questionnaire     Feeling of Stress : To some extent   Housing Stability: Low Risk  (12/29/2023)    Housing Stability Vital Sign     Unable to Pay for Housing in the Last Year: No     Number of Places Lived in the Last Year: 2     Unstable Housing in the Last Year: No       Current Outpatient Medications   Medication Sig Dispense Refill    amLODIPine (NORVASC) 10 MG tablet Take 1 tablet (10 mg total) by mouth once daily. 90 tablet 0    atorvastatin (LIPITOR) 40 MG tablet Take 1 tablet (40 mg total) by mouth once daily. 90 tablet 0    bisacodyl (DULCOLAX) 5 mg EC tablet Take 5 mg by mouth daily as needed for Constipation.      blood sugar diagnostic (TRUE METRIX GLUCOSE TEST STRIP) Strp Test 2 (two) times daily.as directed 100 each 0    blood-glucose meter Misc Use to test blood sugar twice daily 1 each 0    calcium carbonate (TUMS E-X) 300 mg (750 mg) Chew Take 1 tablet by mouth 3 (three) times daily with meals. 90 tablet 2    fluocinolone acetonide oiL 0.01 % Drop Place 2 drops in ear(s) 2 (two) times a day. 20 mL 0    fluticasone propionate (FLONASE) 50 mcg/actuation nasal spray 1 spray (50 mcg total) by Each Nostril route once daily. 16 g 0    furosemide (LASIX) 80 MG tablet TAKE 1 TABLET BY MOUTH DAILY 90 tablet 6    insulin aspart U-100 (NOVOLOG FLEXPEN U-100 INSULIN) 100 unit/mL (3 mL) InPn pen Inject 4 Units into the skin 2 (two) times daily before meals. 15 mL 0    lancets 30 gauge Misc 1 lancet by Misc.(Non-Drug; Combo Route) route 2 (two) times daily. 100 each 5    loratadine (CLARITIN) 10 mg tablet Take 1/2 to 1 tablet mouth daily as needed for allergies symptoms 90 tablet 0     "losartan (COZAAR) 25 MG tablet Take 1 tablet (25 mg total) by mouth 2 (two) times a day. 60 tablet 6    losartan (COZAAR) 50 MG tablet Take 1 tablet (50 mg total) by mouth once daily. 90 tablet 3    pen needle, diabetic (BD ULTRA-FINE SHORT PEN NEEDLE) 31 gauge x 5/16" Ndle use as directed to inject insulin four times daily 100 each 1    pen needle, diabetic (NOVOFINE PLUS) 32 gauge x 1/6" Ndle use 3 (three) times daily as needed. 300 each 1    pregabalin (LYRICA) 25 MG capsule Take 1 capsule (25 mg total) by mouth once daily. 30 capsule 0    sevelamer carbonate (RENVELA) 800 mg Tab Take 3 tablets by mouth three times daily with meals then take 1 tablet by mouth twice daily with snacks 330 tablet 6    sevelamer carbonate (RENVELA) 800 mg Tab Take 3 tablets (2,400 mg total) by mouth 3 (three) times daily with meals. 270 tablet 6    silver sulfADIAZINE 1% (SILVADENE) 1 % cream Apply topically once daily. 25 g 1     No current facility-administered medications for this visit.       Review of patient's allergies indicates:   Allergen Reactions    Morphine Nausea Only and Other (See Comments)     Dizziness, feeling like she will pass out           Review of Systems   Constitutional: Negative for chills and fever.   HENT:  Negative for congestion and hearing loss.    Cardiovascular:  Negative for chest pain and claudication.   Respiratory:  Negative for cough and shortness of breath.    Skin:  Positive for color change and nail changes.   Musculoskeletal:  Negative for back pain and joint pain.   Gastrointestinal:  Negative for nausea and vomiting.   Neurological:  Positive for paresthesias. Negative for numbness.   Psychiatric/Behavioral:  Negative for altered mental status.            Objective:      Physical Exam  Constitutional:       General: She is not in acute distress.     Appearance: She is not ill-appearing.   Cardiovascular:      Pulses:           Dorsalis pedis pulses are 1+ on the left side.        Posterior " tibial pulses are 1+ on the left side.      Comments: Mild nonpitting edema to left lower extremity.  Skin temp is warm to left foot.  No rubor on dependency left lower extremity.  Musculoskeletal:      Comments: Right below-knee amputation.    Semi rigid cavus foot structure left foot with semi rigid flexion contractures toes 2-5 left foot.      Right Lower Extremity: Right leg is amputated below knee.   Feet:      Left foot:      Protective Sensation: 10 sites tested.  4 sites sensed.      Toenail Condition: Left toenails are abnormally thick and long. Fungal disease present.  Skin:     General: Skin is warm.      Capillary Refill: Capillary refill takes less than 2 seconds.      Findings: No ecchymosis or erythema.      Nails: There is no clubbing.      Comments: Nails 1-5 left foot are severely elongated, thickened, dystrophic and discolored yellow-brown with moderate underlying debris and loosening.    Raised hyperkeratotic skin of the distal tip of the left 2nd toe.   Neurological:      Mental Status: She is alert.               Assessment:       Encounter Diagnoses   Name Primary?    Encounter for diabetic foot exam Yes    Type 2 diabetes mellitus with peripheral vascular disease     Type 2 diabetes mellitus with peripheral neuropathy     Onychomycosis     Corn or callus     History of right below knee amputation          Plan:       Martha was seen today for foot problem.    Diagnoses and all orders for this visit:    Encounter for diabetic foot exam    Type 2 diabetes mellitus with peripheral vascular disease  -     Routine Foot Care  -     DIABETIC SHOES FOR HOME USE    Type 2 diabetes mellitus with peripheral neuropathy  -     Routine Foot Care  -     DIABETIC SHOES FOR HOME USE    Onychomycosis  -     Routine Foot Care    [place] or callus  -     Routine Foot Care  -     DIABETIC SHOES FOR HOME USE    History of right below knee amputation  -     DIABETIC SHOES FOR HOME USE      I counseled the patient on  her conditions, their implications and medical management.    Shoe inspection. Diabetic Foot Education. Patient reminded of the importance of good nutrition and blood sugar control to help prevent podiatric complications of diabetes. Patient instructed on proper foot hygeine. We discussed wearing proper shoe gear, daily foot inspections, never walking without protective shoe gear, never putting sharp instruments to feet.    Routine foot care per attached note. She will continue to monitor the areas daily, inspect her feet, wear protective shoe gear when ambulatory, moisturizer to maintain skin integrity.    Comprehensive annual diabetic foot exam completed today.  Patient found to be intermediate to high risk for diabetic foot complication.    RTC within 4-6 months or p.r.n. as discussed.    Assisted by Danelle Henry DPM PGY 3    A portion of this note was generated by voice recognition software and may contain spelling and grammar errors.

## 2024-08-22 NOTE — PROCEDURES
"Routine Foot Care    Date/Time: 8/22/2024 4:30 PM    Performed by: Flaco Morris DPM  Authorized by: Flaco Morris DPM    Time out: Immediately prior to procedure a "time out" was called to verify the correct patient, procedure, equipment, support staff and site/side marked as required.    Consent Done?:  Yes (Verbal)  Hyperkeratotic Skin Lesions?: Yes    Number of trimmed lesions:  1  Location(s):  Left 2nd Toe    Nail Care Type:  Debride(Left 1st Toe, Left 2nd Toe, Left 3rd Toe, Left 4th Toe and Left 5th Toe)  Patient tolerance:  Patient tolerated the procedure well with no immediate complications     Used sterile nail nipper and #15 scalpel. Assisted by Danelle Henry DPM PGY 3 and Yen Dumas LPN      "

## 2024-12-26 ENCOUNTER — TELEPHONE (OUTPATIENT)
Dept: INTERNAL MEDICINE | Facility: CLINIC | Age: 79
End: 2024-12-26
Payer: MEDICARE

## 2024-12-26 DIAGNOSIS — R42 DIZZINESS: ICD-10-CM

## 2024-12-26 DIAGNOSIS — E11.22 TYPE 2 DIABETES MELLITUS WITH ESRD (END-STAGE RENAL DISEASE): ICD-10-CM

## 2024-12-26 DIAGNOSIS — N18.6 TYPE 2 DIABETES MELLITUS WITH ESRD (END-STAGE RENAL DISEASE): ICD-10-CM

## 2024-12-26 DIAGNOSIS — H69.91 EUSTACHIAN TUBE DISORDER, RIGHT: ICD-10-CM

## 2024-12-26 DIAGNOSIS — J30.89 NON-SEASONAL ALLERGIC RHINITIS DUE TO OTHER ALLERGIC TRIGGER: ICD-10-CM

## 2024-12-26 RX ORDER — AMLODIPINE BESYLATE 10 MG/1
10 TABLET ORAL DAILY
Qty: 90 TABLET | Refills: 0 | OUTPATIENT
Start: 2024-12-26

## 2024-12-26 RX ORDER — LORATADINE 10 MG/1
TABLET ORAL
Qty: 90 TABLET | Refills: 0 | Status: CANCELLED | OUTPATIENT
Start: 2024-12-26

## 2024-12-26 RX ORDER — INSULIN ASPART 100 [IU]/ML
INJECTION, SOLUTION INTRAVENOUS; SUBCUTANEOUS
Qty: 15 ML | Refills: 0 | OUTPATIENT
Start: 2024-12-26

## 2024-12-26 RX ORDER — FLUTICASONE PROPIONATE 50 MCG
1 SPRAY, SUSPENSION (ML) NASAL DAILY
Qty: 16 G | Refills: 0 | Status: CANCELLED | OUTPATIENT
Start: 2024-12-26

## 2024-12-26 NOTE — TELEPHONE ENCOUNTER
No care due was identified.  [facility] Embedded Care Due Messages. Reference number: [id number].   12/26/2024 4:43:37 PM CST

## 2024-12-26 NOTE — TELEPHONE ENCOUNTER
Refused medication refills- insulin and amlodipine.    I have not seen her since 2022. She has an appointment scheduled on 2/13/25 BUT she has canceled or NS all of her appointments with me.    I cannot safely refill these medications. Please notify patient.

## 2024-12-26 NOTE — TELEPHONE ENCOUNTER
No care due was identified.  [facility] Embedded Care Due Messages. Reference number: [id number].   12/26/2024 12:52:39 PM CST

## 2024-12-26 NOTE — TELEPHONE ENCOUNTER
Prev msg today dr tam said can't refill.  Last seen 2022.     She has an appt 12/31 to see someone  That is next Tuesday.    We have no openings here sooner.

## 2024-12-26 NOTE — TELEPHONE ENCOUNTER
----- Message from Lorraine sent at 12/26/2024  3:55 PM CST -----  Type:  Needs Medical Advice    Who Called: pt daughter  Would the patient rather a call back or a response via [facility]? call  Best Call Back Number:  [phone]  Additional Information: states she can't go without pressure medication and next appt available is not until February would like to see what could be done for pt refills or if she can be seen with another doctor in same office

## 2024-12-26 NOTE — TELEPHONE ENCOUNTER
Care Due:                  Date            Visit Type   Department     Provider  --------------------------------------------------------------------------------    Last Visit: None Found      None         None Found                              [facility]                              ANNUAL                              CHECKUP/PHY  [facility] INTERNAL  Next Visit: 02-      S            FRANSICO Villalba                                                            Last  Test          Frequency    Reason                     Performed    Due Date  --------------------------------------------------------------------------------    CMP.........  12 months..  atorvastatin, insulin....  Not Found    Overdue    HBA1C.......  6 months...  insulin..................  12- 06-    Lipid Panel.  12 months..  atorvastatin.............  12- 12-    Health [facility] Embedded Care Due Messages. Reference number: [id number].   12/26/2024 12:52:05 PM CST

## 2024-12-31 ENCOUNTER — OFFICE VISIT (OUTPATIENT)
Dept: FAMILY MEDICINE | Facility: CLINIC | Age: 79
End: 2024-12-31
Payer: MEDICARE

## 2024-12-31 VITALS
BODY MASS INDEX: 29.35 KG/M2 | HEART RATE: 82 BPM | WEIGHT: 176.38 LBS | DIASTOLIC BLOOD PRESSURE: 68 MMHG | SYSTOLIC BLOOD PRESSURE: 124 MMHG | TEMPERATURE: 98 F | OXYGEN SATURATION: 99 %

## 2024-12-31 DIAGNOSIS — E11.69 HYPERLIPIDEMIA ASSOCIATED WITH TYPE 2 DIABETES MELLITUS: ICD-10-CM

## 2024-12-31 DIAGNOSIS — Z01.00 DIABETIC EYE EXAM: ICD-10-CM

## 2024-12-31 DIAGNOSIS — Z12.31 ENCOUNTER FOR SCREENING MAMMOGRAM FOR MALIGNANT NEOPLASM OF BREAST: ICD-10-CM

## 2024-12-31 DIAGNOSIS — E11.22 TYPE 2 DIABETES MELLITUS WITH ESRD (END-STAGE RENAL DISEASE): ICD-10-CM

## 2024-12-31 DIAGNOSIS — Z13.820 SCREENING FOR OSTEOPOROSIS: ICD-10-CM

## 2024-12-31 DIAGNOSIS — E21.3 HYPERPARATHYROIDISM, UNSPECIFIED: ICD-10-CM

## 2024-12-31 DIAGNOSIS — Z00.00 ROUTINE GENERAL MEDICAL EXAMINATION AT HEALTH CARE FACILITY: Primary | ICD-10-CM

## 2024-12-31 DIAGNOSIS — R42 DIZZINESS: ICD-10-CM

## 2024-12-31 DIAGNOSIS — I15.2 HYPERTENSION ASSOCIATED WITH DIABETES: ICD-10-CM

## 2024-12-31 DIAGNOSIS — N18.6 TYPE 2 DIABETES MELLITUS WITH ESRD (END-STAGE RENAL DISEASE): ICD-10-CM

## 2024-12-31 DIAGNOSIS — E78.5 HYPERLIPIDEMIA ASSOCIATED WITH TYPE 2 DIABETES MELLITUS: ICD-10-CM

## 2024-12-31 DIAGNOSIS — H69.91 EUSTACHIAN TUBE DISORDER, RIGHT: ICD-10-CM

## 2024-12-31 DIAGNOSIS — G62.9 NEUROPATHY: ICD-10-CM

## 2024-12-31 DIAGNOSIS — E11.59 HYPERTENSION ASSOCIATED WITH DIABETES: ICD-10-CM

## 2024-12-31 DIAGNOSIS — E11.9 DIABETIC EYE EXAM: ICD-10-CM

## 2024-12-31 PROCEDURE — 3288F FALL RISK ASSESSMENT DOCD: CPT | Mod: CPTII,S$GLB,,

## 2024-12-31 PROCEDURE — 99999 PR PBB SHADOW E&M-EST. PATIENT-LVL IV: CPT | Mod: PBBFAC,,,

## 2024-12-31 PROCEDURE — 3078F DIAST BP <80 MM HG: CPT | Mod: CPTII,S$GLB,,

## 2024-12-31 PROCEDURE — 3074F SYST BP LT 130 MM HG: CPT | Mod: CPTII,S$GLB,,

## 2024-12-31 PROCEDURE — 1159F MED LIST DOCD IN RCRD: CPT | Mod: CPTII,S$GLB,,

## 2024-12-31 PROCEDURE — 99214 OFFICE O/P EST MOD 30 MIN: CPT | Mod: S$GLB,,,

## 2024-12-31 PROCEDURE — 1160F RVW MEDS BY RX/DR IN RCRD: CPT | Mod: CPTII,S$GLB,,

## 2024-12-31 PROCEDURE — 1101F PT FALLS ASSESS-DOCD LE1/YR: CPT | Mod: CPTII,S$GLB,,

## 2024-12-31 RX ORDER — LOSARTAN POTASSIUM 50 MG/1
50 TABLET ORAL DAILY
Qty: 90 TABLET | Refills: 3 | Status: SHIPPED | OUTPATIENT
Start: 2024-12-31

## 2024-12-31 RX ORDER — ATORVASTATIN CALCIUM 40 MG/1
40 TABLET, FILM COATED ORAL DAILY
Qty: 90 TABLET | Refills: 0 | Status: SHIPPED | OUTPATIENT
Start: 2024-12-31

## 2024-12-31 RX ORDER — FUROSEMIDE 80 MG/1
TABLET ORAL
Qty: 90 TABLET | Refills: 6 | Status: SHIPPED | OUTPATIENT
Start: 2024-12-31

## 2024-12-31 RX ORDER — INSULIN ASPART 100 [IU]/ML
INJECTION, SOLUTION INTRAVENOUS; SUBCUTANEOUS
Qty: 15 ML | Refills: 0 | Status: SHIPPED | OUTPATIENT
Start: 2024-12-31

## 2024-12-31 NOTE — PROGRESS NOTES
Subjective     Patient ID: Martha Melvin is a 79 y.o. female.    Chief Complaint: Annual Exam (fasting)      Patient is a 79 year old AA female with ESRD on dialysis MWF ([facility]), DM, HTN, HLD, chronic diastolic HF, and arthritis that presents today as a new patient to me with her daughter, established with Dr. Villalba, but wishes to get a new PCP closer to home for her annual physical exam with requisition for labs. Patient doing well overall. Denies any acute complaints or concerns today. Denies any CP, SOB, abdominal pain, n/v/d. Due for dialysis today. Patient is compliant with her medications. Fasting today and blood sugar this morning was 140. Patient still makes urine. Followed by her nephrologist, terrell nowak, who routinely sees her at the dialysis center. Patient requests medication refills today.     Health maintenance:  Dexa- due; order placed  Mammogram- due; ordered placed  Flu shot- UTD; Covid- declined at this time.  Eye exam- due- referral placed        Review of Systems   Constitutional:  Negative for fatigue.   HENT:  Negative for dental problem and trouble swallowing.    Eyes:  Negative for pain and visual disturbance.   Respiratory:  Negative for cough and shortness of breath.    Cardiovascular:  Negative for chest pain, palpitations and leg swelling.   Gastrointestinal:  Negative for abdominal pain and change in bowel habit.   Genitourinary:  Negative for bladder incontinence and difficulty urinating.   Musculoskeletal:  Negative for back pain and leg pain.   Integumentary:  Negative for wound.   Neurological:  Negative for dizziness, weakness and headaches.   Psychiatric/Behavioral:  Negative for confusion and hallucinations.      Past Medical History:   Diagnosis Date    Arthritis     Chronic diastolic heart failure 4/15/2018    Diabetes mellitus     Hyperlipidemia     Hyperlipidemia 9/7/2018    Hypertension     Renal disorder     poor kidney function    Stenosis of  arteriovenous dialysis fistula 9/14/2018       Past Surgical History:   Procedure Laterality Date    AORTOGRAPHY WITH SERIALOGRAPHY N/A 12/31/2018    Procedure: AORTOGRAM, WITH SERIALOGRAPHY;  Surgeon: Mike Mckay MD;  Location: [facility] CATH LAB/EP;  Service: Cardiology;  Laterality: N/A;    ARTERIOVENOUS FISTULOGRAPHY Left 1/2/2019    Procedure: Fistulogram, Arteriovenous;  Surgeon: Kenny Fink MD;  Location: [facility] CATH LAB/EP;  Service: Cardiology;  Laterality: Left;    CATARACT EXTRACTION W/  INTRAOCULAR LENS IMPLANT Right 8/16/2022    Procedure: EXTRACTION, CATARACT, WITH IOL INSERTION;  Surgeon: Fidel Lacy MD;  Location: [facility];  Service: Ophthalmology;  Laterality: Right;    CATARACT EXTRACTION W/  INTRAOCULAR LENS IMPLANT Left 8/30/2022    Procedure: EXTRACTION, CATARACT, WITH IOL INSERTION;  Surgeon: Fidel Lacy MD;  Location: [facility];  Service: Ophthalmology;  Laterality: Left;    FISTULOGRAM Left 10/21/2019    Procedure: Fistulogram;  Surgeon: AYESHA Mesa III, MD;  Location: [facility] CATH LAB;  Service: Peripheral Vascular;  Laterality: Left;    LEG AMPUTATION Right     PERCUTANEOUS TRANSLUMINAL ANGIOPLASTY (PTA) OF PERIPHERAL VESSEL N/A 1/2/2019    Procedure: PTA, PERIPHERAL VESSEL;  Surgeon: Kenny Fink MD;  Location: [facility] CATH LAB/EP;  Service: Cardiology;  Laterality: N/A;    PERCUTANEOUS TRANSLUMINAL ANGIOPLASTY OF ARTERIOVENOUS FISTULA N/A 10/21/2019    Procedure: PTA, AV FISTULA;  Surgeon: AYESHA Mesa III, MD;  Location: [facility] CATH LAB;  Service: Peripheral Vascular;  Laterality: N/A;    TRANSPOSITION OF BASILIC VEIN Left 6/27/2018    Procedure: TRANSPOSITION, VEIN, BASILIC;  Surgeon: AYESHA Mesa III, MD;  Location: [address] FLR;  Service: Cardiovascular;  Laterality: Left;  forearm        Family History   Problem Relation Name Age of Onset    Cancer Mother      Diabetes Mother      Hypertension Mother      Cancer Father      Cancer Sister         Social  History     Socioeconomic History    Marital status: Single   Tobacco Use    Smoking status: Never    Smokeless tobacco: Never   Substance and Sexual Activity    Alcohol use: No     Social Drivers of Health     Financial Resource Strain: Low Risk  (12/29/2023)    Overall Financial Resource Strain (CARDIA)     Difficulty of Paying Living Expenses: Not hard at all   Food Insecurity: No Food Insecurity (12/29/2023)    Hunger Vital Sign     Worried About Running Out of Food in the Last Year: Never true     Ran Out of Food in the Last Year: Never true   Transportation Needs: No Transportation Needs (12/29/2023)    PRAPARE - Transportation     Lack of Transportation (Medical): No     Lack of Transportation (Non-Medical): No   Physical Activity: Inactive (12/29/2023)    Exercise Vital Sign     Days of Exercise per Week: 0 days     Minutes of Exercise per Session: 0 min   Stress: Stress Concern Present (12/29/2023)    [employer] - Occupational Stress Questionnaire     Feeling of Stress : To some extent   Housing Stability: Low Risk  (12/29/2023)    Housing Stability Vital Sign     Unable to Pay for Housing in the Last Year: No     Number of Places Lived in the Last Year: 2     Unstable Housing in the Last Year: No       Current Outpatient Medications   Medication Sig Dispense Refill    amLODIPine (NORVASC) 10 MG tablet Take 1 tablet (10 mg total) by mouth once daily. 90 tablet 0    amLODIPine (NORVASC) 10 MG tablet TAKE 1 TABLET BY MOUTH DAILY 30 tablet 6    bisacodyl (DULCOLAX) 5 mg EC tablet Take 5 mg by mouth daily as needed for Constipation.      blood sugar diagnostic (TRUE METRIX GLUCOSE TEST STRIP) Strp Test 2 (two) times daily.as directed 100 each 0    blood-glucose meter Misc Use to test blood sugar twice daily 1 each 0    fluocinolone acetonide oiL 0.01 % Drop Place 2 drops in ear(s) 2 (two) times a day. 20 mL 0    fluticasone propionate (FLONASE) 50 mcg/actuation nasal spray 1 spray  "(50 mcg total) by Each Nostril route once daily. 16 g 0    lancets 30 gauge Misc 1 lancet by Misc.(Non-Drug; Combo Route) route 2 (two) times daily. 100 each 5    loratadine (CLARITIN) 10 mg tablet Take 1/2 to 1 tablet mouth daily as needed for allergies symptoms 90 tablet 0    pen needle, diabetic (BD ULTRA-FINE SHORT PEN NEEDLE) 31 gauge x 5/16" Ndle use as directed to inject insulin four times daily 100 each 1    pen needle, diabetic (NOVOFINE PLUS) 32 gauge x 1/6" Ndle use 3 (three) times daily as needed. 300 each 1    pregabalin (LYRICA) 25 MG capsule Take 1 capsule (25 mg total) by mouth once daily. 30 capsule 0    sevelamer carbonate (RENVELA) 800 mg Tab Take 3 tablets by mouth three times daily with meals then take 1 tablet by mouth twice daily with snacks 330 tablet 6    sevelamer carbonate (RENVELA) 800 mg Tab Take 3 tablets (2,400 mg total) by mouth 3 (three) times daily with meals. 270 tablet 6    atorvastatin (LIPITOR) 40 MG tablet Take 1 tablet (40 mg total) by mouth once daily. 90 tablet 0    calcium carbonate (TUMS E-X) 300 mg (750 mg) Chew Take 1 tablet by mouth 3 (three) times daily with meals. 90 tablet 2    furosemide (LASIX) 80 MG tablet TAKE 1 TABLET BY MOUTH DAILY 90 tablet 6    insulin aspart U-100 (NOVOLOG FLEXPEN U-100 INSULIN) 100 unit/mL (3 mL) InPn pen Inject 4 Units into the skin 2 (two) times daily before meals. 15 mL 0    losartan (COZAAR) 50 MG tablet Take 1 tablet (50 mg total) by mouth once daily. 90 tablet 3     No current facility-administered medications for this visit.       Review of patient's allergies indicates:   Allergen Reactions    Morphine Nausea Only and Other (See Comments)     Dizziness, feeling like she will pass out         Objective     Vitals:    12/31/24 0803   BP: 124/68   Pulse: 82   Temp: 98.4 °F (36.9 °C)   TempSrc: Oral   SpO2: 99%   Weight: 80 kg (176 lb 5.9 oz)      Physical Exam  Vitals and nursing note reviewed.   Constitutional:       General: She is " not in acute distress.     Appearance: Normal appearance. She is not ill-appearing.   HENT:      Head: Normocephalic and atraumatic.      Right Ear: Tympanic membrane normal.      Left Ear: Tympanic membrane normal.      Nose: Nose normal.      Mouth/Throat:      Mouth: Mucous membranes are moist.      Pharynx: Oropharynx is clear.   Eyes:      General: No scleral icterus.        Right eye: No discharge.         Left eye: No discharge.      Extraocular Movements: Extraocular movements intact.      Conjunctiva/sclera: Conjunctivae normal.   Cardiovascular:      Rate and Rhythm: Normal rate and regular rhythm.      Pulses: Normal pulses.      Heart sounds: Murmur heard.      Comments: Dialysis Fistula left forearm with +thrill +bruit  Pulmonary:      Effort: Pulmonary effort is normal. No respiratory distress.      Breath sounds: No wheezing.   Abdominal:      General: Abdomen is flat. Bowel sounds are normal. There is no distension.      Palpations: Abdomen is soft. There is no mass.      Tenderness: There is no abdominal tenderness.   Musculoskeletal:         General: Normal range of motion.      Cervical back: Normal range of motion.      Right lower leg: No edema.      Left lower leg: No edema.      Comments: Right BKA with prosthesis   Skin:     General: Skin is warm and dry.   Neurological:      General: No focal deficit present.      Mental Status: She is alert and oriented to person, place, and time.   Psychiatric:         Mood and Affect: Mood normal.         Behavior: Behavior normal. Behavior is cooperative.         Cognition and Memory: Cognition and memory normal.            Assessment and Plan     1. Routine general medical examination at health care facility  - Annual physical exam with labs; labs ordered externally- will be done at her dialysis clinic.   -     CBC Auto Differential; Future; Expected date: 12/31/2024  -     Lipid Panel; Future; Expected date: 12/31/2024  -     Comprehensive Metabolic  Panel; Future; Expected date: 12/31/2024  -     TSH; Future; Expected date: 12/31/2024  -     Hemoglobin A1C; Future; Expected date: 12/31/2024    2. Hypertension associated with diabetes  - Chronic; stable on current treatment plan; follow up with PCP  - Taking amlodipine 10 mg daily, losartan 50 mg daily, and furosemide 80 mg as needed. Refills sent.   -     CBC Auto Differential; Future; Expected date: 12/31/2024  -     Comprehensive Metabolic Panel; Future; Expected date: 12/31/2024  -     furosemide (LASIX) 80 MG tablet; TAKE 1 TABLET BY MOUTH DAILY  Dispense: 90 tablet; Refill: 6  -     losartan (COZAAR) 50 MG tablet; Take 1 tablet (50 mg total) by mouth once daily.  Dispense: 90 tablet; Refill: 3    3. Hyperlipidemia associated with type 2 diabetes mellitus  - Chronic; taking atorvastatin 40 mg daily; follow up with PCP  - Labs ordered today  -     atorvastatin (LIPITOR) 40 MG tablet; Take 1 tablet (40 mg total) by mouth once daily.  Dispense: 90 tablet; Refill: 0    4. 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 Monitors blood sugar daily. Labs ordered today. Refill sent to pharmacy.   -     CBC Auto Differential; Future; Expected date: 12/31/2024  -     Lipid Panel; Future; Expected date: 12/31/2024  -     Comprehensive Metabolic Panel; Future; Expected date: 12/31/2024  -     TSH; Future; Expected date: 12/31/2024  -     Hemoglobin A1C; Future; Expected date: 12/31/2024    5. Neuropathy  Chronic; stable- not taking medication at this time; follow up with PCP    6. Hyperparathyroidism, unspecified  - Chronic; stable on current treatment plan; follow up with PCP  - Taking Vit D and tums with meals as recommended by nephrology.   -     DXA Bone Density Axial Skeleton 1 or more sites; Future; Expected date: 12/31/2024    7. Screening for osteoporosis  -     DXA Bone Density Axial Skeleton 1 or more  sites; Future; Expected date: 12/31/2024    8. Encounter for screening mammogram for malignant neoplasm of breast  -     Mammo Digital Screening Bilat w/ Michael; Future; Expected date: 12/31/2024    9. Diabetic eye exam  -     Ambulatory referral/consult to Ophthalmology; Future; Expected date: 01/07/2025      Labs ordered to be done externally at [facility] on [address].          Follow up in about 3 months (around 3/31/2025), or if symptoms worsen or fail to improve, for follow up HTN, follow up Diabetes and to establish care with PCP.     35 minutes of total time spent on the encounter, which includes face to face time and non-face to face time preparing to see the patient (eg, review of tests), Obtaining and/or reviewing separately obtained history, Documenting clinical information in the electronic or other health record, Independently interpreting results (not separately reported) and communicating results to the patient/family/caregiver, or Care coordination (not separately reported).    Tina Hammer, MSN, APRN, FNP-C  Family Medicine  Office #[phone]

## 2025-01-02 RX ORDER — FLUTICASONE PROPIONATE 50 MCG
1 SPRAY, SUSPENSION (ML) NASAL DAILY
Qty: 16 G | Refills: 0 | OUTPATIENT
Start: 2025-01-02

## 2025-01-02 NOTE — TELEPHONE ENCOUNTER
"Per provider note on 12/26 states"Note  Refused medication refills- insulin and amlodipine.     I have not seen her since 2022. She has an appointment scheduled on 2/13/25 BUT she has canceled or NS all of her appointments with me.     I cannot safely refill these medications. Please notify patient."        "

## 2025-02-03 NOTE — ASSESSMENT & PLAN NOTE
Elevated WBC     Patient called to cancel home visit for tomorrow 2/4/25. Pt stated she has appt with OBGYN. Please contact pt to assist in rescheduling home visit. Thank you!   Present, unchanged

## 2025-02-21 DIAGNOSIS — Z00.00 ENCOUNTER FOR MEDICARE ANNUAL WELLNESS EXAM: ICD-10-CM

## 2025-03-03 DIAGNOSIS — E11.22 TYPE 2 DIABETES MELLITUS WITH ESRD (END-STAGE RENAL DISEASE): ICD-10-CM

## 2025-03-03 DIAGNOSIS — N18.6 TYPE 2 DIABETES MELLITUS WITH ESRD (END-STAGE RENAL DISEASE): ICD-10-CM

## 2025-03-03 NOTE — TELEPHONE ENCOUNTER
Care Due:                  Date            Visit Type   Department     Provider  --------------------------------------------------------------------------------    Last Visit: None Found      None         None Found  Next Visit: None Scheduled  None         None Found                                                            Last  Test          Frequency    Reason                     Performed    Due Date  --------------------------------------------------------------------------------    Office Visit  15 months..  amLODIPine, loratadine...  Not Found    Overdue    [employer] Embedded Care Due Messages. Reference number: [id number].   3/03/2025 2:16:11 PM CST
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
## 2025-04-30 DIAGNOSIS — N18.6 TYPE 2 DIABETES MELLITUS WITH ESRD (END-STAGE RENAL DISEASE): ICD-10-CM

## 2025-04-30 DIAGNOSIS — E11.22 TYPE 2 DIABETES MELLITUS WITH ESRD (END-STAGE RENAL DISEASE): ICD-10-CM

## 2025-04-30 NOTE — TELEPHONE ENCOUNTER
No care due was identified.  [facility] Embedded Care Due Messages. Reference number: [id number].   4/30/2025 10:28:48 AM CDT

## 2025-05-27 ENCOUNTER — OFFICE VISIT (OUTPATIENT)
Dept: FAMILY MEDICINE | Facility: CLINIC | Age: 80
End: 2025-05-27
Payer: MEDICARE

## 2025-05-27 ENCOUNTER — PATIENT OUTREACH (OUTPATIENT)
Dept: ADMINISTRATIVE | Facility: OTHER | Age: 80
End: 2025-05-27
Payer: MEDICARE

## 2025-05-27 VITALS
BODY MASS INDEX: 29.24 KG/M2 | WEIGHT: 175.5 LBS | OXYGEN SATURATION: 99 % | DIASTOLIC BLOOD PRESSURE: 80 MMHG | HEART RATE: 82 BPM | HEIGHT: 65 IN | SYSTOLIC BLOOD PRESSURE: 130 MMHG

## 2025-05-27 DIAGNOSIS — I50.32 CHRONIC DIASTOLIC HEART FAILURE: ICD-10-CM

## 2025-05-27 DIAGNOSIS — R26.9 ABNORMALITY OF GAIT AND MOBILITY: ICD-10-CM

## 2025-05-27 DIAGNOSIS — I73.9 PAD (PERIPHERAL ARTERY DISEASE): ICD-10-CM

## 2025-05-27 DIAGNOSIS — Z79.4 DIABETES MELLITUS DUE TO UNDERLYING CONDITION WITH CHRONIC KIDNEY DISEASE ON CHRONIC DIALYSIS, WITH LONG-TERM CURRENT USE OF INSULIN: ICD-10-CM

## 2025-05-27 DIAGNOSIS — Z00.00 ENCOUNTER FOR MEDICARE ANNUAL WELLNESS EXAM: Primary | ICD-10-CM

## 2025-05-27 DIAGNOSIS — N18.6 TYPE 2 DIABETES MELLITUS WITH ESRD (END-STAGE RENAL DISEASE): ICD-10-CM

## 2025-05-27 DIAGNOSIS — I15.2 HYPERTENSION ASSOCIATED WITH DIABETES: ICD-10-CM

## 2025-05-27 DIAGNOSIS — Z89.619 HISTORY OF LEG AMPUTATION: ICD-10-CM

## 2025-05-27 DIAGNOSIS — E78.5 HYPERLIPIDEMIA ASSOCIATED WITH TYPE 2 DIABETES MELLITUS: ICD-10-CM

## 2025-05-27 DIAGNOSIS — Z99.2 DIABETES MELLITUS DUE TO UNDERLYING CONDITION WITH CHRONIC KIDNEY DISEASE ON CHRONIC DIALYSIS, WITH LONG-TERM CURRENT USE OF INSULIN: ICD-10-CM

## 2025-05-27 DIAGNOSIS — E08.22 DIABETES MELLITUS DUE TO UNDERLYING CONDITION WITH CHRONIC KIDNEY DISEASE ON CHRONIC DIALYSIS, WITH LONG-TERM CURRENT USE OF INSULIN: ICD-10-CM

## 2025-05-27 DIAGNOSIS — N18.6 DIABETES MELLITUS DUE TO UNDERLYING CONDITION WITH CHRONIC KIDNEY DISEASE ON CHRONIC DIALYSIS, WITH LONG-TERM CURRENT USE OF INSULIN: ICD-10-CM

## 2025-05-27 DIAGNOSIS — Z99.2 DEPENDENCE ON RENAL DIALYSIS: ICD-10-CM

## 2025-05-27 DIAGNOSIS — E11.22 TYPE 2 DIABETES MELLITUS WITH ESRD (END-STAGE RENAL DISEASE): ICD-10-CM

## 2025-05-27 DIAGNOSIS — E11.59 HYPERTENSION ASSOCIATED WITH DIABETES: ICD-10-CM

## 2025-05-27 DIAGNOSIS — Z99.89 DEPENDENCE ON OTHER ENABLING MACHINES AND DEVICES: ICD-10-CM

## 2025-05-27 DIAGNOSIS — I13.2 HYPERTENSIVE HEART AND CHRONIC KIDNEY DISEASE WITH HEART FAILURE AND WITH STAGE 5 CHRONIC KIDNEY DISEASE, OR END STAGE RENAL DISEASE: ICD-10-CM

## 2025-05-27 DIAGNOSIS — E66.3 OVERWEIGHT WITH BODY MASS INDEX (BMI) OF 29 TO 29.9 IN ADULT: ICD-10-CM

## 2025-05-27 DIAGNOSIS — E11.69 HYPERLIPIDEMIA ASSOCIATED WITH TYPE 2 DIABETES MELLITUS: ICD-10-CM

## 2025-05-27 DIAGNOSIS — N25.81 SECONDARY HYPERPARATHYROIDISM OF RENAL ORIGIN: ICD-10-CM

## 2025-05-27 PROBLEM — D63.1 ANEMIA IN END-STAGE RENAL DISEASE: Status: ACTIVE | Noted: 2018-04-16

## 2025-05-27 PROBLEM — E21.3 HYPERPARATHYROIDISM, UNSPECIFIED: Status: ACTIVE | Noted: 2025-05-27

## 2025-05-27 PROBLEM — E55.9 VITAMIN D DEFICIENCY, UNSPECIFIED: Status: ACTIVE | Noted: 2023-10-16

## 2025-05-27 PROBLEM — R09.02 HYPOXIA: Status: RESOLVED | Noted: 2021-03-23 | Resolved: 2025-05-27

## 2025-05-27 PROCEDURE — 99999 PR PBB SHADOW E&M-EST. PATIENT-LVL V: CPT | Mod: PBBFAC,,, | Performed by: NURSE PRACTITIONER

## 2025-05-27 RX ORDER — VIT B COMPLX C/FOLIC ACID/ZINC 0.8-12.5MG
1 TABLET ORAL DAILY
COMMUNITY
Start: 2024-12-16

## 2025-05-27 RX ORDER — FERRIC CITRATE 210 MG/1
1 TABLET, FILM COATED ORAL
COMMUNITY
Start: 2024-04-17

## 2025-05-27 NOTE — PROGRESS NOTES
"  Martha Melvin presented for a  Medicare AWV and comprehensive Health Risk Assessment today. The following components were reviewed and updated:    Medical history  Family History  Social history  Allergies and Current Medications  Health Risk Assessment  Health Maintenance  Care Team     Patient screened moderate and/or high risk for one or more social determinants of health (SDOH).      ** See Completed Assessments for Annual Wellness Visit within the encounter summary.**         The following assessments were completed:  Living Situation  CAGE  Depression Screening  Timed Get Up and Go  Whisper Test  Cognitive Function Screening - asked to spell "world" backwards, done correctly  Nutrition Screening  ADL Screening  PAQ Screening      Opioid documentation:      Patient does not have a current opioid prescription.        Vitals:    05/27/25 1107   BP: 130/80   BP Location: Right arm   Patient Position: Sitting   Pulse: 82   SpO2: 99%   Weight: 79.6 kg (175 lb 8 oz)   Height: 5' 5" (1.651 m)     Body mass index is 29.2 kg/m².    Physical Exam  Vitals reviewed.   Constitutional:       General: She is not in acute distress.     Appearance: Normal appearance. She is well-developed and well-groomed.   HENT:      Head: Normocephalic.   Cardiovascular:      Rate and Rhythm: Normal rate.   Pulmonary:      Effort: Pulmonary effort is normal. No respiratory distress.   Skin:     Coloration: Skin is not pale.   Neurological:      Mental Status: She is alert and oriented to person, place, and time.      Coordination: Coordination normal.      Gait: Gait abnormal (ambulates with a cane).   Psychiatric:         Attention and Perception: Attention normal.         Mood and Affect: Mood and affect normal.         Speech: Speech normal.         Behavior: Behavior normal. Behavior is cooperative.         Thought Content: Thought content normal.             Diagnoses and health risks identified today and associated " recommendations/orders:    1. Encounter for Medicare annual wellness exam  - Referral to Enhanced Annual Wellness Visit (eAWV) W+1    2. History of leg amputation  Stable using prosthetic. Follow up with PCP.    3. Diabetes mellitus due to underlying condition with chronic kidney disease on chronic dialysis, with long-term current use of insulin  Chronic; stable on insulin medication. Follow up with PCP.    4. Dependence on renal dialysis  Patient with ESRD on HD M/W/F. Followed by Nephrology.    5. 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 Also taking renal vitamins. Follow up with PCP.    6. Chronic diastolic heart failure  Chronic; stable on Lasix medication. Follow up with PCP.    7. Hypertension associated with diabetes  Chronic; stable on amlodipine, losartan, Lasix medication. Follow up with PCP.  - Ambulatory referral/consult to Optometry; Future    8. Hyperlipidemia associated with type 2 diabetes mellitus  Chronic; stable on atorvastatin medication. Follow up with PCP.    9. 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 Follow up with PCP.  - Ambulatory referral/consult to Optometry; Future    10. PAD (peripheral artery disease)  Chronic; stable. Follow up with PCP.    11. Secondary hyperparathyroidism of renal origin  Chronic; stable. Followed by Nephrology.    12. Dependence on other enabling machines and devices  Ambulates with a cane; follow up with PCP.    13. Abnormality of gait and mobility  Ambulates with a cane; follow up with PCP.    14. Overweight with body mass index (BMI) of 29 to 29.9 in adult  Eat a low salt/low fat ADA diet and discussed importance of engaging in physical activity at least 5x/week for a minimum of 30 min/day.      Provided Martha with a 5-10 year written screening schedule and personal  prevention plan. Recommendations were developed using the USPSTF age appropriate recommendations. Education, counseling, and referrals were provided as needed. After Visit Summary printed and given to patient which includes a list of additional screenings/tests needed.    Follow up for your next annual wellness visit.    Nanda Jernigan NP      Advance Care Planning     I offered to discuss advanced care planning, including how to pick a person who would make decisions for you if you were unable to make them for yourself, called a health care power of , and what kind of decisions you might make such as use of life sustaining treatments such as ventilators and tube feeding when faced with a life limiting illness recorded on a living will that they will need to know. (How you want to be cared for as you near the end of your natural life)     X  Patient is unwilling to engage in a discussion regarding advance directives at this time.

## 2025-05-27 NOTE — PATIENT INSTRUCTIONS
Counseling and Referral of Other Preventative  (Italic type indicates deductible and co-insurance are waived)    Patient Name: Martha Melvin  Today's Date: 5/27/2025    Health Maintenance       Date Due Completion Date    TETANUS VACCINE Never done ---    DEXA Scan Never done ---    Shingles Vaccine (1 of 2) Never done ---    Lipid Panel 12/31/2019 12/31/2018    RSV Vaccine (Age 60+ and Pregnant patients) (1 - 1-dose 75+ series) Never done ---    Diabetic Eye Exam 06/22/2023 6/22/2022    COVID-19 Vaccine (4 - 2024-25 season) 09/01/2024 11/11/2021    Mammogram 05/27/2026 (Originally 9/5/2020) 9/5/2018    Hemoglobin A1c 07/08/2025 1/8/2025    Override on 1/7/2021: Done (completed at dialysis center (7.4))        Orders Placed This Encounter   Procedures    Ambulatory referral/consult to Optometry     The following information is provided to all patients.  This information is to help you find resources for any of the problems found today that may be affecting your health:                  Living healthy guide: www.[facility].[place].gov      Understanding Diabetes: www.diabetes.org      Eating healthy: www.cdc.gov/healthyweight      CDC home safety checklist: www.cdc.gov/steadi/patient.html      Agency on Aging: www.goea.[place].[facility]      Alcoholics anonymous (AA): www.aa.org      Physical Activity: www.mark.nih.gov/gr4tnac      Tobacco use: www.quitwithusla.org

## 2025-05-30 NOTE — PROGRESS NOTES
CHW - Case Closure    This Community Health Worker spoke to caregiver via telephone today.   Pt/Caregiver reported: D/g stated that pt has transportation benefit through insurance. At some point she did not have anymore rides but that is no longer the issue. No other needs identified at this time  Pt/Caregiver denied any additional needs at this time and agrees with episode closure at this time.  Provided caregiver with Community Health Worker's contact information and encouraged him/her to contact this Community Health Worker if additional needs arise.

## 2025-06-03 ENCOUNTER — TELEPHONE (OUTPATIENT)
Dept: FAMILY MEDICINE | Facility: CLINIC | Age: 80
End: 2025-06-03
Payer: MEDICARE

## 2025-06-03 ENCOUNTER — PATIENT MESSAGE (OUTPATIENT)
Dept: FAMILY MEDICINE | Facility: CLINIC | Age: 80
End: 2025-06-03
Payer: MEDICARE

## 2025-06-30 DIAGNOSIS — N18.6 TYPE 2 DIABETES MELLITUS WITH ESRD (END-STAGE RENAL DISEASE): ICD-10-CM

## 2025-06-30 DIAGNOSIS — E11.22 TYPE 2 DIABETES MELLITUS WITH ESRD (END-STAGE RENAL DISEASE): ICD-10-CM

## 2025-06-30 NOTE — TELEPHONE ENCOUNTER
Care Due:                  Date            Visit Type   Department     Provider  --------------------------------------------------------------------------------    Last Visit: None Found      None         None Found  Next Visit: None Scheduled  None         None Found                                                            Last  Test          Frequency    Reason                     Performed    Due Date  --------------------------------------------------------------------------------    Office Visit  15 months..  loratadine...............  Not Found    Overdue    [employer] Embedded Care Due Messages. Reference number: [id number].   6/30/2025 11:41:55 AM CDT

## 2025-07-17 ENCOUNTER — OFFICE VISIT (OUTPATIENT)
Dept: PODIATRY | Facility: CLINIC | Age: 80
End: 2025-07-17
Payer: MEDICARE

## 2025-07-17 VITALS
WEIGHT: 175.5 LBS | BODY MASS INDEX: 29.24 KG/M2 | DIASTOLIC BLOOD PRESSURE: 89 MMHG | HEART RATE: 82 BPM | HEIGHT: 65 IN | SYSTOLIC BLOOD PRESSURE: 166 MMHG

## 2025-07-17 DIAGNOSIS — M20.5X2 ACQUIRED CLAW TOE, LEFT: ICD-10-CM

## 2025-07-17 DIAGNOSIS — E11.42 TYPE 2 DIABETES MELLITUS WITH PERIPHERAL NEUROPATHY: ICD-10-CM

## 2025-07-17 DIAGNOSIS — L84 PRE-ULCERATIVE CALLUSES: ICD-10-CM

## 2025-07-17 DIAGNOSIS — Z89.511 HISTORY OF RIGHT BELOW KNEE AMPUTATION: ICD-10-CM

## 2025-07-17 DIAGNOSIS — B35.1 ONYCHOMYCOSIS: ICD-10-CM

## 2025-07-17 DIAGNOSIS — E11.51 TYPE 2 DIABETES MELLITUS WITH PERIPHERAL VASCULAR DISEASE: ICD-10-CM

## 2025-07-17 DIAGNOSIS — Z99.2 ESRD (END STAGE RENAL DISEASE) ON DIALYSIS: Primary | ICD-10-CM

## 2025-07-17 DIAGNOSIS — N18.6 ESRD (END STAGE RENAL DISEASE) ON DIALYSIS: Primary | ICD-10-CM

## 2025-07-17 PROCEDURE — 99999 PR PBB SHADOW E&M-EST. PATIENT-LVL IV: CPT | Mod: PBBFAC,HCNC,, | Performed by: PODIATRIST

## 2025-07-17 NOTE — PROCEDURES
"Routine Foot Care    Date/Time: 7/17/2025 3:45 PM    Performed by: Flaco Morris DPM  Authorized by: Flaco Morris DPM    Time out: Immediately prior to procedure a "time out" was called to verify the correct patient, procedure, equipment, support staff and site/side marked as required.    Consent Done?:  Yes (Verbal)  Hyperkeratotic Skin Lesions?: Yes    Number of trimmed lesions:  1  Location(s):  Left 2nd Toe    Nail Care Type:  Debride(Left 1st Toe, Left 2nd Toe, Left 3rd Toe, Left 4th Toe and Left 5th Toe)  Patient tolerance:  Patient tolerated the procedure well with no immediate complications     Used sterile nail nipper and #15 scalpel. Assisted by Louise Barclay DPM PGY 2    "

## 2025-07-17 NOTE — PROGRESS NOTES
Subjective:      Patient ID: Martha Melvin is a 79 y.o. female.    Chief Complaint: Nail Care and Diabetes Mellitus    Martha is a 79 y.o. female who presents to the clinic upon referral from Dr. Morris  for evaluation and treatment of diabetic feet. 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  History previous right below-knee amputation.    08/22/2024:  Returns for annual diabetic foot exam.  Accompanied by her daughter and grandson.  Reports intermittent sharp pain in her left hand and left foot especially at night.  No history of back pain.    07/17/2025:  Due for annual foot exam.  States that her previous follow up was canceled.  She is inquiring about diabetic shoes.  She has not had a chance to get any shoes.    PCP: Katiuska Villalba MD Karen Chavez-Jones NP on 05/27/2025  Date Last Seen by PCP:      Current shoe gear: Tennis shoes    Hemoglobin A1C   Date Value Ref Range Status   01/08/2025 6.6 (H) 4.8 - 5.9 % Final   10/09/2024 6.1 (H) 4.8 - 5.9 % Final   07/10/2024 6.1 (H) 4.8 - 5.9 % Final   12/28/2018 7.4 (H) 4.0 - 5.6 % Final     Comment:     ADA Screening Guidelines:  5.7-6.4%  Consistent with prediabetes  >or=6.5%  Consistent with diabetes  High levels of fetal hemoglobin interfere with the HbA1C  assay. Heterozygous hemoglobin variants (HbS, HgC, etc)do  not significantly interfere with this assay.   However, presence of multiple variants may affect accuracy.     09/05/2018 7.4 (H) 4.0 - 5.6 % Final     Comment:     ADA Screening Guidelines:  5.7-6.4%  Consistent with prediabetes  >or=6.5%  Consistent with diabetes  High levels of fetal hemoglobin interfere with the HbA1C  assay. Heterozygous hemoglobin variants (HbS, HgC, etc)do  not significantly interfere with this assay.   However, presence  "of multiple variants may affect accuracy.     04/16/2018 8.0 (H) 4.0 - 5.6 % Final     Comment:     According to ADA guidelines, hemoglobin A1c <7.0% represents  optimal control in non-pregnant diabetic patients. Different  metrics may apply to specific patient populations.   Standards of Medical Care in Diabetes-2016.  For the purpose of screening for the presence of diabetes:  <5.7%     Consistent with the absence of diabetes  5.7-6.4%  Consistent with increasing risk for diabetes   (prediabetes)  >or=6.5%  Consistent with diabetes  Currently, no consensus exists for use of hemoglobin A1c  for diagnosis of diabetes for children.  This Hemoglobin A1c assay has significant interference with fetal   hemoglobin   (HbF). The results are invalid for patients with abnormal amounts of   HbF,   including those with known Hereditary Persistence   of Fetal Hemoglobin. Heterozygous hemoglobin variants (HbAS, HbAC,   HbAD, HbAE, HbA2) do not significantly interfere with this assay;   however, presence of multiple variants in a sample may impact the %   interference.       Vitals:    07/17/25 1543   BP: (!) 166/89   Pulse: 82   Weight: 79.6 kg (175 lb 7.8 oz)   Height: 5' 5" (1.651 m)   PainSc: 0-No pain      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       Past Surgical History:   Procedure Laterality Date    AORTOGRAPHY WITH SERIALOGRAPHY N/A 12/31/2018    Procedure: AORTOGRAM, WITH SERIALOGRAPHY;  Surgeon: Mike Mckay MD;  Location: [facility] CATH LAB/EP;  Service: Cardiology;  Laterality: N/A;    ARTERIOVENOUS FISTULOGRAPHY Left 1/2/2019    Procedure: Fistulogram, Arteriovenous;  Surgeon: Kenny Fink MD;  Location: [facility] CATH LAB/EP;  Service: Cardiology;  Laterality: Left;    CATARACT " EXTRACTION W/  INTRAOCULAR LENS IMPLANT Right 8/16/2022    Procedure: EXTRACTION, CATARACT, WITH IOL INSERTION;  Surgeon: Fidle Lacy MD;  Location: [facility];  Service: Ophthalmology;  Laterality: Right;    CATARACT EXTRACTION W/  INTRAOCULAR LENS IMPLANT Left 8/30/2022    Procedure: EXTRACTION, CATARACT, WITH IOL INSERTION;  Surgeon: Fidel Lacy MD;  Location: [facility] OR;  Service: Ophthalmology;  Laterality: Left;    FISTULOGRAM Left 10/21/2019    Procedure: Fistulogram;  Surgeon: AYESHA Mesa III, MD;  Location: [facility] CATH LAB;  Service: Peripheral Vascular;  Laterality: Left;    LEG AMPUTATION Right     PERCUTANEOUS TRANSLUMINAL ANGIOPLASTY (PTA) OF PERIPHERAL VESSEL N/A 1/2/2019    Procedure: PTA, PERIPHERAL VESSEL;  Surgeon: Kenny Fink MD;  Location: [facility] CATH LAB/EP;  Service: Cardiology;  Laterality: N/A;    PERCUTANEOUS TRANSLUMINAL ANGIOPLASTY OF ARTERIOVENOUS FISTULA N/A 10/21/2019    Procedure: PTA, AV FISTULA;  Surgeon: AYESHA Mesa III, MD;  Location: [facility] CATH LAB;  Service: Peripheral Vascular;  Laterality: N/A;    TRANSPOSITION OF BASILIC VEIN Left 6/27/2018    Procedure: TRANSPOSITION, VEIN, BASILIC;  Surgeon: AYESHA Mesa III, MD;  Location: [address];  Service: Cardiovascular;  Laterality: Left;  forearm        Family History   Problem Relation Name Age of Onset    Cancer Mother      Diabetes Mother      Hypertension Mother      Cancer Father      Cancer Sister      Cancer Sister      Cancer Brother x3     Rheumatic fever Brother x3     Sickle cell anemia Brother x3     No Known Problems Daughter x1     Cirrhosis Son x1     Hypertension Son x2        Social History     Socioeconomic History    Marital status: Single   Tobacco Use    Smoking status: Never    Smokeless tobacco: Never   Substance and Sexual Activity    Alcohol use: No    Drug use: Never    Sexual activity: Not Currently     Partners: Male     Social Drivers of Health     Financial Resource  Strain: Medium Risk (5/27/2025)    Overall Financial Resource Strain (CARDIA)     Difficulty of Paying Living Expenses: Somewhat hard   Food Insecurity: Food Insecurity Present (5/27/2025)    Hunger Vital Sign     Worried About Running Out of Food in the Last Year: Never true     Ran Out of Food in the Last Year: Sometimes true   Transportation Needs: No Transportation Needs (5/30/2025)    PRAPARE - Transportation     Lack of Transportation (Medical): No     Lack of Transportation (Non-Medical): No   Recent Concern: Transportation Needs - Unmet Transportation Needs (5/27/2025)    PRAPARE - Transportation     Lack of Transportation (Medical): Yes     Lack of Transportation (Non-Medical): No   Physical Activity: Inactive (5/27/2025)    Exercise Vital Sign     Days of Exercise per Week: 0 days     Minutes of Exercise per Session: 0 min   Stress: Stress Concern Present (5/27/2025)    [employer] - Occupational Stress Questionnaire     Feeling of Stress : To some extent   Housing Stability: High Risk (5/27/2025)    Housing Stability Vital Sign     Unable to Pay for Housing in the Last Year: Yes     Number of Times Moved in the Last Year: 0     Homeless in the Last Year: No       Current Outpatient Medications   Medication Sig Dispense Refill    amLODIPine (NORVASC) 10 MG tablet TAKE 1 TABLET BY MOUTH DAILY 30 tablet 6    atorvastatin (LIPITOR) 40 MG tablet Take 1 tablet (40 mg total) by mouth once daily. 90 tablet 0    bisacodyl (DULCOLAX) 5 mg EC tablet Take 5 mg by mouth daily as needed for Constipation.      blood sugar diagnostic (TRUE METRIX GLUCOSE TEST STRIP) Strp Test 2 (two) times daily.as directed 100 each 0    blood-glucose meter Misc Use to test blood sugar twice daily 1 each 0    ergocalciferol, vitamin D2, (VITAMIN D ORAL) Take 0.5 mcg by mouth 3 (three) times a week.      ferric citrate (AURYXIA) 210 mg iron Tab Take 1 tablet by mouth 3 (three) times a week.      fluocinolone  "acetonide oiL 0.01 % Drop Place 2 drops in ear(s) 2 (two) times a day. 20 mL 0    fluticasone propionate (FLONASE) 50 mcg/actuation nasal spray 1 spray (50 mcg total) by Each Nostril route once daily. 16 g 0    furosemide (LASIX) 80 MG tablet TAKE 1 TABLET BY MOUTH DAILY 90 tablet 6    insulin aspart U-100 (NOVOLOG FLEXPEN U-100 INSULIN) 100 unit/mL (3 mL) InPn pen Inject 4 Units into the skin 2 (two) times daily before meals. 15 mL 0    lancets 30 gauge Misc 1 lancet by Misc.(Non-Drug; Combo Route) route 2 (two) times daily. 100 each 5    loratadine (CLARITIN) 10 mg tablet Take 1/2 to 1 tablet mouth daily as needed for allergies symptoms 90 tablet 0    losartan (COZAAR) 50 MG tablet Take 1 tablet (50 mg total) by mouth once daily. 90 tablet 3    pen needle, diabetic (BD ULTRA-FINE SHORT PEN NEEDLE) 31 gauge x 5/16" Ndle use as directed to inject insulin four times daily 100 each 1    pen needle, diabetic (NOVOFINE PLUS) 32 gauge x 1/6" Ndle use 3 (three) times daily as needed. 300 each 1    pregabalin (LYRICA) 25 MG capsule Take 1 capsule (25 mg total) by mouth once daily. 30 capsule 0    RENAPLEX 800 mcg- 12.5 mg Tab Take 1 tablet by mouth Daily.      sevelamer carbonate (RENVELA) 800 mg Tab Take 3 tablets (2,400 mg total) by mouth 3 (three) times daily with meals. 270 tablet 6    calcium carbonate (TUMS E-X) 300 mg (750 mg) Chew Take 1 tablet by mouth 3 (three) times daily with meals. 90 tablet 2     No current facility-administered medications for this visit.       Review of patient's allergies indicates:   Allergen Reactions    Morphine Nausea Only and Other (See Comments)     Dizziness, feeling like she will pass out           Review of Systems   Constitutional: Negative for chills and fever.   HENT:  Negative for congestion and hearing loss.    Cardiovascular:  Negative for chest pain and claudication.   Respiratory:  Negative for cough and shortness of breath.    Skin:  Positive for color change and nail " changes.   Musculoskeletal:  Negative for back pain and joint pain.   Gastrointestinal:  Negative for nausea and vomiting.   Neurological:  Positive for paresthesias. Negative for numbness.   Psychiatric/Behavioral:  Negative for altered mental status.            Objective:      Physical Exam  Constitutional:       General: She is not in acute distress.     Appearance: She is not ill-appearing.   Cardiovascular:      Pulses:           Dorsalis pedis pulses are detected w/ Doppler on the left side.        Posterior tibial pulses are detected w/ Doppler on the left side.      Comments: Monophasic left DP and biphasic left PT per Doppler.  Mild nonpitting edema to left lower extremity.  Skin temp is warm to left foot.  No rubor on dependency left lower extremity.  Musculoskeletal:      Comments: Right below-knee amputation.    Semi rigid cavus foot structure left foot with semi rigid flexion contractures toes 2-5 left foot.  Mild non track bound hallux valgus left foot.      Right Lower Extremity: Right leg is amputated below knee.   Feet:      Left foot:      Protective Sensation: 10 sites tested.  5 sites sensed.      Toenail Condition: Left toenails are abnormally thick and long. Fungal disease present.  Skin:     General: Skin is warm.      Capillary Refill: Capillary refill takes less than 2 seconds.      Findings: No ecchymosis or erythema.      Nails: There is no clubbing.      Comments: Nails 1-5 left foot are severely elongated, thickened, dystrophic and discolored yellow-brown with moderate underlying debris and loosening.    Raised hyperkeratotic skin of the distal tip of the left 2nd toe.   Neurological:      Mental Status: She is alert.               Assessment:       Encounter Diagnoses   Name Primary?    ESRD (end stage renal disease) on dialysis Yes    Type 2 diabetes mellitus with peripheral vascular disease     Type 2 diabetes mellitus with peripheral neuropathy     History of right below knee  amputation     Onychomycosis     Pre-ulcerative calluses     Acquired claw toe, left          Plan:       Martha was seen today for nail care and diabetes mellitus.    Diagnoses and all orders for this visit:    ESRD (end stage renal disease) on dialysis  -     Foot Exam Performed  -     Routine Foot Care  -     DIABETIC SHOES FOR HOME USE    Type 2 diabetes mellitus with peripheral vascular disease  -     Foot Exam Performed  -     Routine Foot Care  -     DIABETIC SHOES FOR HOME USE    Type 2 diabetes mellitus with peripheral neuropathy  -     Foot Exam Performed  -     Routine Foot Care  -     DIABETIC SHOES FOR HOME USE    History of right below knee amputation  -     Foot Exam Performed  -     Routine Foot Care  -     DIABETIC SHOES FOR HOME USE    Onychomycosis  -     Routine Foot Care  -     DIABETIC SHOES FOR HOME USE    Pre-ulcerative calluses  -     Routine Foot Care  -     DIABETIC SHOES FOR HOME USE    Acquired claw toe, left  -     DIABETIC SHOES FOR HOME USE      I counseled the patient on her conditions, their implications and medical management.    Shoe inspection. Diabetic Foot Education. Patient reminded of the importance of good nutrition and blood sugar control to help prevent podiatric complications of diabetes. Patient instructed on proper foot hygeine. We discussed wearing proper shoe gear, daily foot inspections, never walking without protective shoe gear, never putting sharp instruments to feet.    Routine foot care per attached note. She will continue to monitor the areas daily, inspect her feet, wear protective shoe gear when ambulatory, moisturizer to maintain skin integrity.    Comprehensive annual diabetic foot exam completed today.  Patient found to be intermediate to high risk for diabetic foot complication.    New prescription for extra-depth diabetic shoes with heat molded insoles.    RTC within 4-6 months or p.r.n. as discussed.    Assisted by Louise Barclay DPM PGY 2    A portion  of this note was generated by voice recognition software and may contain spelling and grammar errors.

## 2025-07-28 ENCOUNTER — TELEPHONE (OUTPATIENT)
Dept: FAMILY MEDICINE | Facility: CLINIC | Age: 80
End: 2025-07-28
Payer: MEDICARE

## 2025-07-29 DIAGNOSIS — E11.69 HYPERLIPIDEMIA ASSOCIATED WITH TYPE 2 DIABETES MELLITUS: ICD-10-CM

## 2025-07-29 DIAGNOSIS — N18.6 TYPE 2 DIABETES MELLITUS WITH ESRD (END-STAGE RENAL DISEASE): ICD-10-CM

## 2025-07-29 DIAGNOSIS — E11.22 TYPE 2 DIABETES MELLITUS WITH ESRD (END-STAGE RENAL DISEASE): ICD-10-CM

## 2025-07-29 DIAGNOSIS — J30.89 NON-SEASONAL ALLERGIC RHINITIS DUE TO OTHER ALLERGIC TRIGGER: ICD-10-CM

## 2025-07-29 DIAGNOSIS — E78.5 HYPERLIPIDEMIA ASSOCIATED WITH TYPE 2 DIABETES MELLITUS: ICD-10-CM

## 2025-07-29 RX ORDER — ATORVASTATIN CALCIUM 40 MG/1
40 TABLET, FILM COATED ORAL DAILY
Qty: 90 TABLET | Refills: 0 | Status: CANCELLED | OUTPATIENT
Start: 2025-07-29

## 2025-07-29 RX ORDER — LORATADINE 10 MG/1
TABLET ORAL
Qty: 90 TABLET | Refills: 0 | Status: CANCELLED | OUTPATIENT
Start: 2025-07-29

## 2025-07-29 NOTE — TELEPHONE ENCOUNTER
No care due was identified.  [facility] Embedded Care Due Messages. Reference number: [id number].   7/29/2025 7:07:00 AM CDT

## 2025-07-29 NOTE — TELEPHONE ENCOUNTER
No care due was identified.  [facility] Embedded Care Due Messages. Reference number: [id number].   7/29/2025 7:06:30 AM CDT

## 2025-07-31 ENCOUNTER — OFFICE VISIT (OUTPATIENT)
Dept: FAMILY MEDICINE | Facility: CLINIC | Age: 80
End: 2025-07-31
Payer: MEDICARE

## 2025-07-31 VITALS
WEIGHT: 171.06 LBS | HEIGHT: 65 IN | TEMPERATURE: 98 F | SYSTOLIC BLOOD PRESSURE: 132 MMHG | HEART RATE: 80 BPM | OXYGEN SATURATION: 99 % | BODY MASS INDEX: 28.5 KG/M2 | DIASTOLIC BLOOD PRESSURE: 66 MMHG

## 2025-07-31 DIAGNOSIS — E11.22 TYPE 2 DIABETES MELLITUS WITH ESRD (END-STAGE RENAL DISEASE): ICD-10-CM

## 2025-07-31 DIAGNOSIS — I15.2 HYPERTENSION ASSOCIATED WITH DIABETES: ICD-10-CM

## 2025-07-31 DIAGNOSIS — Z01.00 ROUTINE EYE EXAM: ICD-10-CM

## 2025-07-31 DIAGNOSIS — N18.6 TYPE 2 DIABETES MELLITUS WITH ESRD (END-STAGE RENAL DISEASE): ICD-10-CM

## 2025-07-31 DIAGNOSIS — Z99.2 DEPENDENCE ON RENAL DIALYSIS: ICD-10-CM

## 2025-07-31 DIAGNOSIS — E78.5 HYPERLIPIDEMIA ASSOCIATED WITH TYPE 2 DIABETES MELLITUS: ICD-10-CM

## 2025-07-31 DIAGNOSIS — R51.9 FRONTAL HEADACHE: ICD-10-CM

## 2025-07-31 DIAGNOSIS — R42 DIZZINESS: Primary | ICD-10-CM

## 2025-07-31 DIAGNOSIS — E11.59 HYPERTENSION ASSOCIATED WITH DIABETES: ICD-10-CM

## 2025-07-31 DIAGNOSIS — E11.69 HYPERLIPIDEMIA ASSOCIATED WITH TYPE 2 DIABETES MELLITUS: ICD-10-CM

## 2025-07-31 DIAGNOSIS — J30.89 NON-SEASONAL ALLERGIC RHINITIS DUE TO OTHER ALLERGIC TRIGGER: ICD-10-CM

## 2025-07-31 PROCEDURE — 99999 PR PBB SHADOW E&M-EST. PATIENT-LVL V: CPT | Mod: PBBFAC,HCNC,,

## 2025-07-31 NOTE — TELEPHONE ENCOUNTER
No care due was identified.  [facility] Embedded Care Due Messages. Reference number: [id number].   7/31/2025 11:30:10 AM CDT

## 2025-07-31 NOTE — TELEPHONE ENCOUNTER
No care due was identified.  [facility] Embedded Care Due Messages. Reference number: [id number].   7/31/2025 11:30:38 AM CDT

## 2025-07-31 NOTE — PROGRESS NOTES
Subjective     Patient ID: Martha Melvin is a 79 y.o. female.    Chief Complaint: Dizziness      Patient is a 79 year old AA female with ESRD on dialysis MWF ([facility] [address]), DM, HTN, HLD, chronic diastolic HF, and arthritis that presents today as a known patient to me with her daughter, established with Dr. Villalba, but establishing with new PCP soon closer to home. Has acute c/o dizziness. Has been having this off an on for over 2 years. No worsening of symptoms. Denies headaches or ear pain. Endorse pressure above eyes sometimes. Denies any vision changes but has not had her eyes checked in several years. Denies any CP or SOB. Does not monitor BP routinely at home. Goes to dialysis MWF and feels symptoms worsen after dialysis, with position changes, and moving head at times.       Review of Systems   Constitutional:  Negative for fatigue and fever.   HENT:  Negative for nasal congestion, ear pain, sinus pressure/congestion and sore throat.    Eyes:  Negative for visual disturbance.   Respiratory:  Negative for cough and shortness of breath.    Cardiovascular:  Negative for chest pain and palpitations.   Gastrointestinal:  Negative for abdominal pain, nausea and vomiting.   Musculoskeletal:  Negative for back pain and neck pain.   Integumentary:  Negative for rash.   Neurological:  Positive for dizziness and headaches. Negative for speech difficulty, weakness and numbness.   Psychiatric/Behavioral:  Negative for confusion and hallucinations.      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       Past Surgical History:   Procedure Laterality Date    AORTOGRAPHY WITH SERIALOGRAPHY N/A 12/31/2018    Procedure: AORTOGRAM, WITH SERIALOGRAPHY;  Surgeon: Mike Mckay MD;   Location: [facility] CATH LAB/EP;  Service: Cardiology;  Laterality: N/A;    ARTERIOVENOUS FISTULOGRAPHY Left 1/2/2019    Procedure: Fistulogram, Arteriovenous;  Surgeon: Kenny Fink MD;  Location: [facility] CATH LAB/EP;  Service: Cardiology;  Laterality: Left;    CATARACT EXTRACTION W/  INTRAOCULAR LENS IMPLANT Right 8/16/2022    Procedure: EXTRACTION, CATARACT, WITH IOL INSERTION;  Surgeon: Fidel Lacy MD;  Location: [facility];  Service: Ophthalmology;  Laterality: Right;    CATARACT EXTRACTION W/  INTRAOCULAR LENS IMPLANT Left 8/30/2022    Procedure: EXTRACTION, CATARACT, WITH IOL INSERTION;  Surgeon: Fidel Lacy MD;  Location: [facility];  Service: Ophthalmology;  Laterality: Left;    FISTULOGRAM Left 10/21/2019    Procedure: Fistulogram;  Surgeon: AYESHA Mesa III, MD;  Location: [facility] CATH LAB;  Service: Peripheral Vascular;  Laterality: Left;    LEG AMPUTATION Right     PERCUTANEOUS TRANSLUMINAL ANGIOPLASTY (PTA) OF PERIPHERAL VESSEL N/A 1/2/2019    Procedure: PTA, PERIPHERAL VESSEL;  Surgeon: Kenny Fink MD;  Location: [facility] CATH LAB/EP;  Service: Cardiology;  Laterality: N/A;    PERCUTANEOUS TRANSLUMINAL ANGIOPLASTY OF ARTERIOVENOUS FISTULA N/A 10/21/2019    Procedure: PTA, AV FISTULA;  Surgeon: AYESHA Mesa III, MD;  Location: [facility] CATH LAB;  Service: Peripheral Vascular;  Laterality: N/A;    TRANSPOSITION OF BASILIC VEIN Left 6/27/2018    Procedure: TRANSPOSITION, VEIN, BASILIC;  Surgeon: AYESHA Mesa III, MD;  Location: [address];  Service: Cardiovascular;  Laterality: Left;  forearm        Family History   Problem Relation Name Age of Onset    Cancer Mother      Diabetes Mother      Hypertension Mother      Cancer Father      Cancer Sister      Cancer Sister      Cancer Brother x3     Rheumatic fever Brother x3     Sickle cell anemia Brother x3     No Known Problems Daughter x1     Cirrhosis Son x1     Hypertension Son x2        Social History     Socioeconomic History     "Marital status: Single   Tobacco Use    Smoking status: Never    Smokeless tobacco: Never   Substance and Sexual Activity    Alcohol use: No    Drug use: Never    Sexual activity: Not Currently     Partners: Male     Social Drivers of Health     Financial Resource Strain: Medium Risk (5/27/2025)    Overall Financial Resource Strain (CARDIA)     Difficulty of Paying Living Expenses: Somewhat hard   Food Insecurity: Food Insecurity Present (5/27/2025)    Hunger Vital Sign     Worried About Running Out of Food in the Last Year: Never true     Ran Out of Food in the Last Year: Sometimes true   Transportation Needs: No Transportation Needs (5/30/2025)    PRAPARE - Transportation     Lack of Transportation (Medical): No     Lack of Transportation (Non-Medical): No   Recent Concern: Transportation Needs - Unmet Transportation Needs (5/27/2025)    PRAPARE - Transportation     Lack of Transportation (Medical): Yes     Lack of Transportation (Non-Medical): No   Physical Activity: Inactive (5/27/2025)    Exercise Vital Sign     Days of Exercise per Week: 0 days     Minutes of Exercise per Session: 0 min   Stress: Stress Concern Present (5/27/2025)    [employer] [place] [employer] - Occupational Stress Questionnaire     Feeling of Stress : To some extent   Housing Stability: High Risk (5/27/2025)    Housing Stability Vital Sign     Unable to Pay for Housing in the Last Year: Yes     Number of Times Moved in the Last Year: 0     Homeless in the Last Year: No       Current Medications[1]    Review of patient's allergies indicates:   Allergen Reactions    Morphine Nausea Only and Other (See Comments)     Dizziness, feeling like she will pass out         Objective     Vitals:    07/31/25 1432 07/31/25 1502 07/31/25 1504   BP: (!) 158/80 (!) 144/70 132/66   Pulse: 80     Temp: 98.3 °F (36.8 °C)     TempSrc: Oral     SpO2: 99%     Weight: 77.6 kg (171 lb 1.2 oz)     Height: 5' 5" (1.651 m)     PainSc: 0-No pain      "   Physical Exam  Vitals and nursing note reviewed.   Constitutional:       General: She is not in acute distress.     Appearance: Normal appearance. She is not ill-appearing or diaphoretic.   HENT:      Head: Normocephalic and atraumatic.      Right Ear: Tympanic membrane and ear canal normal.      Left Ear: Tympanic membrane and ear canal normal.      Nose: Nose normal. No congestion or rhinorrhea.      Right Sinus: No maxillary sinus tenderness or frontal sinus tenderness.      Left Sinus: No maxillary sinus tenderness or frontal sinus tenderness.      Mouth/Throat:      Lips: Pink.      Mouth: Mucous membranes are moist.      Pharynx: Oropharynx is clear. No oropharyngeal exudate or posterior oropharyngeal erythema.   Eyes:      General: No scleral icterus.        Right eye: No discharge.         Left eye: No discharge.      Extraocular Movements: Extraocular movements intact.      Right eye: No nystagmus.      Left eye: No nystagmus.      Conjunctiva/sclera: Conjunctivae normal.   Cardiovascular:      Rate and Rhythm: Normal rate and regular rhythm.      Pulses: Normal pulses.      Heart sounds: Murmur heard.   Pulmonary:      Effort: Pulmonary effort is normal. No respiratory distress.      Breath sounds: Normal breath sounds.   Abdominal:      General: Abdomen is flat. Bowel sounds are normal. There is no distension.      Palpations: Abdomen is soft. There is no mass.      Tenderness: There is no abdominal tenderness.   Musculoskeletal:         General: Normal range of motion.      Cervical back: Normal range of motion and neck supple.      Right lower leg: No edema.      Left lower leg: No edema.   Skin:     General: Skin is warm and dry.   Neurological:      General: No focal deficit present.      Mental Status: She is alert and oriented to person, place, and time.      GCS: GCS eye subscore is 4. GCS verbal subscore is 5. GCS motor subscore is 6.      Sensory: Sensation is intact.      Motor: Motor function  "is intact.   Psychiatric:         Mood and Affect: Mood normal.         Speech: Speech normal.         Behavior: Behavior normal. Behavior is cooperative.         Cognition and Memory: Cognition and memory normal.            Assessment and Plan     1. Dizziness  - Chronic; has been going on for 2 years off and on.  Denies any ear pain/congestion/cough, chest pain or SOB. Has been seen for similar symptoms in the past and had head CT done which was normal in 2023.   - States symptoms worsen when she moves her head or changes position from sitting to standing.   -     Orthostatic vital signs negative today in clinic.   (Sitting BP was 144/70 with HR at 80; stand BP was 132 66 with HR at 80. Denies dizziness with change in position.)  -  referral placed for vestibular therapy.   -     Ambulatory Referral/Consult to Physical Therapy  - Has labs ordered from previous visit that she has not had done yet; declines doing labs here at [facility]; will have labs drawn at dialysis clinic. Will reach out with results when received.     2. Frontal headache  "Pressure" above eyes intermittently but denies headaches. Denies any pain today. No TTP to frontal sinuses. CT head done 2023 was normal.     3. Routine eye exam  - Due for eye exam; referral placed.   -     Ambulatory referral/consult to Optometry; Future; Expected date: 08/07/2025    4. Dependence on renal dialysis  Goes to dialysis MWF; feels drained and sometimes dizzy after Dialysis when she gets home. Monitor BP at home and reach out with readings.     5. Hypertension associated with diabetes  Chronic; BP elevated on intake today but improved on recheck. Taking amlodipine and losartan. Monitor BP at home. Follow up with PCP.         ER/Urgent Care precautions discussed with patient. Go to ER for new or worsening symptoms.         Follow up if symptoms worsen or fail to improve.    40 minutes of total time spent on the encounter, which includes face to face time and " non-face to face time preparing to see the patient (eg, review of tests), Obtaining and/or reviewing separately obtained history, Documenting clinical information in the electronic or other health record, Independently interpreting results (not separately reported) and communicating results to the patient/family/caregiver, or Care coordination (not separately reported).      Tina Hammer, MSN, APRN, FNP-C  [facility]  Office #[phone]          [1]   Current Outpatient Medications   Medication Sig Dispense Refill    amLODIPine (NORVASC) 10 MG tablet TAKE 1 TABLET BY MOUTH DAILY 30 tablet 6    atorvastatin (LIPITOR) 40 MG tablet Take 1 tablet (40 mg total) by mouth once daily. 90 tablet 0    bisacodyl (DULCOLAX) 5 mg EC tablet Take 5 mg by mouth daily as needed for Constipation.      blood sugar diagnostic (TRUE METRIX GLUCOSE TEST STRIP) Strp Test 2 (two) times daily.as directed 100 each 0    blood-glucose meter Misc Use to test blood sugar twice daily 1 each 0    calcium carbonate (TUMS E-X) 300 mg (750 mg) Chew Take 1 tablet by mouth 3 (three) times daily with meals. 90 tablet 2    ergocalciferol, vitamin D2, (VITAMIN D ORAL) Take 0.5 mcg by mouth 3 (three) times a week.      ferric citrate (AURYXIA) 210 mg iron Tab Take 1 tablet by mouth 3 (three) times a week.      fluocinolone acetonide oiL 0.01 % Drop Place 2 drops in ear(s) 2 (two) times a day. 20 mL 0    fluticasone propionate (FLONASE) 50 mcg/actuation nasal spray 1 spray (50 mcg total) by Each Nostril route once daily. 16 g 0    furosemide (LASIX) 80 MG tablet TAKE 1 TABLET BY MOUTH DAILY 90 tablet 6    insulin aspart U-100 (NOVOLOG FLEXPEN U-100 INSULIN) 100 unit/mL (3 mL) InPn pen Inject 4 units into the skin 2 (two) times daily before meals. DISCARD EACH PEN 28 DAYS AFTER OPENING. 15 mL 0    lancets 30 gauge Misc 1 lancet by Misc.(Non-Drug; Combo Route) route 2 (two) times daily. 100 each 5    loratadine (CLARITIN) 10 mg tablet Take 1/2 to 1  "tablet mouth daily as needed for allergies symptoms 90 tablet 0    losartan (COZAAR) 50 MG tablet Take 1 tablet (50 mg total) by mouth once daily. 90 tablet 3    pen needle, diabetic (BD ULTRA-FINE SHORT PEN NEEDLE) 31 gauge x 5/16" Ndle use as directed to inject insulin four times daily 100 each 1    pen needle, diabetic (NOVOFINE PLUS) 32 gauge x 1/6" Ndle use 3 (three) times daily as needed. 300 each 1    RENAPLEX 800 mcg- 12.5 mg Tab Take 1 tablet by mouth Daily.      sevelamer carbonate (RENVELA) 800 mg Tab Take 3 tablets (2,400 mg total) by mouth 3 (three) times daily with meals. 270 tablet 6    pregabalin (LYRICA) 25 MG capsule Take 1 capsule (25 mg total) by mouth once daily. (Patient not taking: Reported on 7/31/2025) 30 capsule 0     No current facility-administered medications for this visit.     "

## 2025-08-04 RX ORDER — ATORVASTATIN CALCIUM 40 MG/1
40 TABLET, FILM COATED ORAL DAILY
Qty: 90 TABLET | Refills: 0 | OUTPATIENT
Start: 2025-08-04

## 2025-08-04 RX ORDER — LORATADINE 10 MG/1
TABLET ORAL
Qty: 90 TABLET | Refills: 0 | OUTPATIENT
Start: 2025-08-04

## (undated) DEVICE — COVER PROBE US 5.5X58L NON LTX

## (undated) DEVICE — SOL BETADINE 5%

## (undated) DEVICE — GUIDEWIRE EMERALD 150CM PTFE

## (undated) DEVICE — SUT MONOCRYL 3-0 SH U/D

## (undated) DEVICE — SUT LIGACLIP SMALL XTRA

## (undated) DEVICE — HEMOSTAT VASC BAND REG 24CM

## (undated) DEVICE — SEE MEDLINE ITEM 157173

## (undated) DEVICE — SET MICRO PUNCT 4FR/MPIS-401

## (undated) DEVICE — Device

## (undated) DEVICE — TRAY MINOR GEN SURG

## (undated) DEVICE — SEE MEDLINE ITEM 157187

## (undated) DEVICE — STOCKINET 4INX48

## (undated) DEVICE — SHEILD & GARTERS FOX METAL EYE

## (undated) DEVICE — SHEATH DESTINATION 6F X 65CM

## (undated) DEVICE — GLOVE BIOGEL SKINSENSE PI 7.5

## (undated) DEVICE — GUIDEWIRE VIPER FIRM .014

## (undated) DEVICE — BOOT SUTURE AID

## (undated) DEVICE — DRESSING TELFA STRL 4X3 LF

## (undated) DEVICE — DRAPE OPTIMA MAJOR PEDIATRIC

## (undated) DEVICE — KIT INTRODUCER MICROPUNCTR 4F

## (undated) DEVICE — SET DECANTER MEDICHOICE

## (undated) DEVICE — SOL IRR STRL WATER 500ML

## (undated) DEVICE — SUT VICRYL 3-0 27 SH

## (undated) DEVICE — SYR SLIP TIP 1CC

## (undated) DEVICE — DEVICE PERCLOSE SUT CLSR 6FR

## (undated) DEVICE — DRAPE PLASTIC U 60X72

## (undated) DEVICE — CATH ULTRAVERSE 035 4X40X130

## (undated) DEVICE — SHEATH PINNACLE 6FRX6CM

## (undated) DEVICE — CONTRAST VISIPAQUE 150ML

## (undated) DEVICE — CATH IMPULSE PIGTAIL 5FR 125CM

## (undated) DEVICE — SYR ONLY LUER LOCK 20CC

## (undated) DEVICE — SHEATH INTRODUCER 6FR 11CM

## (undated) DEVICE — SOL 9P NACL IRR PIC IL

## (undated) DEVICE — CATH ULTRAVERSE014 2.5X300X150

## (undated) DEVICE — VISE RADIFOCUS MULTI TORQUE

## (undated) DEVICE — ELECTRODE REM PLYHSV RETURN 9

## (undated) DEVICE — SPONGE DERMACEA 4X4IN 12PLY

## (undated) DEVICE — SUT SILK 2-0 SH 18IN BLACK

## (undated) DEVICE — SUT 3-0 12-18IN SILK

## (undated) DEVICE — CATH EAGLE EYE ST .014X20X150

## (undated) DEVICE — GUIDEWIRE ADVNTG 035X260CM ANG

## (undated) DEVICE — INFLATOR ENCORE 26 BLLN INFL

## (undated) DEVICE — SUT MCRYL PLUS 4-0 PS2 27IN

## (undated) DEVICE — GLIDESHEATH SLENDER SS 5FR10CM

## (undated) DEVICE — GAUZE SPONGE 4X4 12PLY

## (undated) DEVICE — BLADE SURG BVL ANG COAX 2.4MM

## (undated) DEVICE — SOL PVP-I SCRUB 7.5% 4OZ

## (undated) DEVICE — SEE MEDLINE ITEM 156894

## (undated) DEVICE — SUT 2-0 VICRYL / SH (J417)

## (undated) DEVICE — KIT LEFT HEART MANIFOLD CUSTOM

## (undated) DEVICE — GOWN SURGICAL X-LARGE

## (undated) DEVICE — SPONGE GAUZE 16PLY 4X4

## (undated) DEVICE — GUIDEWIRE FIELDER XT.014X300CM

## (undated) DEVICE — LUBRICANT VIPERSLIDE 100ML BAG

## (undated) DEVICE — PAD RADI FEMORAL

## (undated) DEVICE — WIRE MANDRIL 98/60CM

## (undated) DEVICE — GUIDEWIRE CHOICE PT FLPY 182CM

## (undated) DEVICE — STOPCOCK 3-WAY

## (undated) DEVICE — SEE MEDLINE ITEM 153688

## (undated) DEVICE — BLADE SURG CARBON STEEL SZ11

## (undated) DEVICE — KIT INTRODUCER W/GUIDEWIRE

## (undated) DEVICE — CATH 5FR MP 125CM 5/BX

## (undated) DEVICE — DRESSING TRANS 2X2 TEGADERM

## (undated) DEVICE — SPIKE CONTRAST CONTROLLER

## (undated) DEVICE — SYR MARK 7 ARTERION 150ML

## (undated) DEVICE — CATH ULTRAVERSE 014 2X4X150

## (undated) DEVICE — CATH CXI STR 2.6F .018IN 150CM

## (undated) DEVICE — GUIDEWIRE STD .035X260CM ANG

## (undated) DEVICE — CATH MINI TREK RX 4.5X15

## (undated) DEVICE — APPLICATOR CHLORAPREP ORN 26ML

## (undated) DEVICE — GOWN SURG 2XL DISP TIE BACK

## (undated) DEVICE — OMNIPAQUE 350 200ML

## (undated) DEVICE — PROTECTION STATION PLUS

## (undated) DEVICE — SOL NS 1000CC

## (undated) DEVICE — LOOP VESSEL BLUE MAXI

## (undated) DEVICE — DEFIBRILLATOR STAT PADZ II

## (undated) DEVICE — TAPE RADIOPLAQUE VIPER TRAC

## (undated) DEVICE — CLIP MED TICALL

## (undated) DEVICE — CATH ULTRAVERSE 014 3X8X150

## (undated) DEVICE — DRESSING TRANS 4X4 TEGADERM

## (undated) DEVICE — KIT MICROINTRO 4F .018X40X7CM

## (undated) DEVICE — COVER LIGHT HANDLE 80/CA